# Patient Record
Sex: FEMALE | Race: OTHER | HISPANIC OR LATINO | URBAN - METROPOLITAN AREA
[De-identification: names, ages, dates, MRNs, and addresses within clinical notes are randomized per-mention and may not be internally consistent; named-entity substitution may affect disease eponyms.]

---

## 2017-03-01 ENCOUNTER — OUTPATIENT (OUTPATIENT)
Dept: OUTPATIENT SERVICES | Facility: HOSPITAL | Age: 57
LOS: 1 days | End: 2017-03-01
Payer: MEDICARE

## 2017-03-01 DIAGNOSIS — E11.9 TYPE 2 DIABETES MELLITUS WITHOUT COMPLICATIONS: ICD-10-CM

## 2017-03-01 DIAGNOSIS — R06.09 OTHER FORMS OF DYSPNEA: ICD-10-CM

## 2017-03-01 DIAGNOSIS — I35.0 NONRHEUMATIC AORTIC (VALVE) STENOSIS: ICD-10-CM

## 2017-03-01 PROCEDURE — A9505: CPT

## 2017-03-01 PROCEDURE — A9500: CPT

## 2017-03-01 PROCEDURE — 93017 CV STRESS TEST TRACING ONLY: CPT

## 2017-03-01 PROCEDURE — 78452 HT MUSCLE IMAGE SPECT MULT: CPT

## 2017-04-10 PROBLEM — Z00.00 ENCOUNTER FOR PREVENTIVE HEALTH EXAMINATION: Status: ACTIVE | Noted: 2017-04-10

## 2017-04-14 VITALS — BODY MASS INDEX: 38.45 KG/M2 | HEIGHT: 63 IN | WEIGHT: 217 LBS

## 2017-04-14 PROBLEM — Z82.49 FAMILY HISTORY OF HEART ATTACK: Status: ACTIVE | Noted: 2017-04-14

## 2017-04-14 PROBLEM — Z86.79 HISTORY OF HYPERTENSION: Status: RESOLVED | Noted: 2017-04-14 | Resolved: 2017-04-14

## 2017-04-14 PROBLEM — Z87.09 HISTORY OF ASTHMA: Status: RESOLVED | Noted: 2017-04-14 | Resolved: 2017-04-14

## 2017-04-14 PROBLEM — Z82.3 FAMILY HISTORY OF STROKE: Status: ACTIVE | Noted: 2017-04-14

## 2017-04-14 PROBLEM — Z87.19 HISTORY OF ESOPHAGEAL REFLUX: Status: RESOLVED | Noted: 2017-04-14 | Resolved: 2017-04-14

## 2017-04-14 PROBLEM — E66.9 OBESITY (BMI 30-39.9): Status: RESOLVED | Noted: 2017-04-14 | Resolved: 2017-04-14

## 2017-04-14 RX ORDER — ASPIRIN ENTERIC COATED TABLETS 81 MG 81 MG/1
81 TABLET, DELAYED RELEASE ORAL
Qty: 30 | Refills: 1 | Status: ACTIVE | COMMUNITY

## 2017-04-19 ENCOUNTER — APPOINTMENT (OUTPATIENT)
Dept: CARDIOTHORACIC SURGERY | Facility: CLINIC | Age: 57
End: 2017-04-19

## 2017-04-19 ENCOUNTER — OUTPATIENT (OUTPATIENT)
Dept: OUTPATIENT SERVICES | Facility: HOSPITAL | Age: 57
LOS: 1 days | End: 2017-04-19
Payer: MEDICARE

## 2017-04-19 VITALS
DIASTOLIC BLOOD PRESSURE: 60 MMHG | WEIGHT: 213 LBS | RESPIRATION RATE: 18 BRPM | TEMPERATURE: 97.8 F | SYSTOLIC BLOOD PRESSURE: 110 MMHG | HEART RATE: 80 BPM | HEIGHT: 63 IN | BODY MASS INDEX: 37.74 KG/M2 | OXYGEN SATURATION: 97 %

## 2017-04-19 DIAGNOSIS — Z87.19 PERSONAL HISTORY OF OTHER DISEASES OF THE DIGESTIVE SYSTEM: ICD-10-CM

## 2017-04-19 DIAGNOSIS — Z86.79 PERSONAL HISTORY OF OTHER DISEASES OF THE CIRCULATORY SYSTEM: ICD-10-CM

## 2017-04-19 DIAGNOSIS — I35.0 NONRHEUMATIC AORTIC (VALVE) STENOSIS: ICD-10-CM

## 2017-04-19 DIAGNOSIS — Z82.3 FAMILY HISTORY OF STROKE: ICD-10-CM

## 2017-04-19 DIAGNOSIS — E66.9 OBESITY, UNSPECIFIED: ICD-10-CM

## 2017-04-19 DIAGNOSIS — Z87.09 PERSONAL HISTORY OF OTHER DISEASES OF THE RESPIRATORY SYSTEM: ICD-10-CM

## 2017-04-19 DIAGNOSIS — Z82.49 FAMILY HISTORY OF ISCHEMIC HEART DISEASE AND OTHER DISEASES OF THE CIRCULATORY SYSTEM: ICD-10-CM

## 2017-04-19 PROCEDURE — 93306 TTE W/DOPPLER COMPLETE: CPT

## 2017-04-19 PROCEDURE — 93306 TTE W/DOPPLER COMPLETE: CPT | Mod: 26

## 2017-04-20 PROBLEM — Z87.19 HISTORY OF DIVERTICULITIS OF COLON: Status: RESOLVED | Noted: 2017-04-20 | Resolved: 2017-04-20

## 2017-05-03 ENCOUNTER — FORM ENCOUNTER (OUTPATIENT)
Age: 57
End: 2017-05-03

## 2017-05-03 VITALS
TEMPERATURE: 97 F | OXYGEN SATURATION: 97 % | HEART RATE: 70 BPM | SYSTOLIC BLOOD PRESSURE: 140 MMHG | DIASTOLIC BLOOD PRESSURE: 80 MMHG | RESPIRATION RATE: 18 BRPM

## 2017-05-03 NOTE — H&P ADULT - PMH
Asthma    Diabetes mellitus    Diverticulitis    GERD (gastroesophageal reflux disease)    Hyperlipidemia    Hypertension    Obesity

## 2017-05-03 NOTE — H&P ADULT - PSH
Carpal tunnel syndrome    Status post small bowel resection Carpal tunnel syndrome    Status post laser lithotripsy of ureteral calculus    Status post small bowel resection    Uterine fibroid

## 2017-05-03 NOTE — H&P ADULT - RS GEN PE MLT RESP DETAILS PC
good air movement/clear to auscultation bilaterally/breath sounds equal/airway patent/normal/respirations non-labored

## 2017-05-03 NOTE — H&P ADULT - FAMILY HISTORY
Mother  Still living? Unknown  Family history of atrial fibrillation, Age at diagnosis: Age Unknown  Family history of cardiac pacemaker, Age at diagnosis: Age Unknown  Family history of asthma, Age at diagnosis: Age Unknown  Family history of MI (myocardial infarction), Age at diagnosis: Age Unknown  Family history of pulmonary embolism, Age at diagnosis: Age Unknown

## 2017-05-03 NOTE — H&P ADULT - HISTORY OF PRESENT ILLNESS
57 y.o Obese Female, Current smoker,  with PMHx of HTN, dyslipidemia, NIDDM, Aortic stenosis,     who presented to her cardiologist c/o       She denies experiencing any CP, SOB, palpitations, dizziness, syncope, or decrease in exercise tolerance.      In light of pt's risk factors, above CCS Anginal Class 3 Equivalent symptoms, pt is now referred to [facility] for recommended Right and Left Heart Cath to ****SKELETON:        57 y.o Obese Female, Current smoker,  with FHx of MI/CVA,  PMHx significant for HTN, dyslipidemia, NIDDM, Asthma (denies hospitalizations/intubations   ????), GERD, Diverticulitis,  Aortic stenosis, who presented to her cardiologist Dr Islas for  c/o recent decline in her exercise tolerance due to SOB and back pain.  Pt states she is able to ambulate no more than one city block before she is winded and fatigue and finds the need to stop and rest for awhile.   Symptoms are relieved with rest.  She denies experiencing any CP, recent PND/orthopnea, palpitations, dizziness, or syncope.  Echo 11/05/16 revealed  mild LVH, impaired relaxation with elevated LA pressure, moderate to severe AS, ARAMIS of 0.6cm with mean gradient of 31mmHg and a peak gradient of 36mmHg, LVEF of 65-70%.  Pt was referred to Dr. Hanson for surgical valuation of aortic stenosis.  Nuclear Stress test done 03/01/17 revealed     In light of pt's risk factors, above CCS Anginal Class 3 Equivalent symptoms, pt is now referred to [facility] for recommended Right and Left Heart Cath to evaluate coronaries and aortic valve in the setting of risk stratification for upcoming AVR. ****SKELETON:        57 y.o Obese Female, Current smoker,  with FHx of MI/CVA,  PMHx significant for HTN, dyslipidemia, NIDDM, Asthma (denies hospitalizations/intubations   ????), GERD, Diverticulitis,  Aortic stenosis, who presented to her cardiologist Dr Islas for  c/o recent decline in her exercise tolerance due to SOB and back pain.  Pt states she is able to ambulate no more than one city block before she is winded and fatigue and finds the need to stop and rest for awhile.   Symptoms are relieved with rest.  She denies experiencing any CP, recent PND/orthopnea, palpitations, dizziness, or syncope.  Echo 11/05/16 revealed  mild LVH, impaired relaxation with elevated LA pressure, moderate to severe AS, ARAMIS of 0.6cm with mean gradient of 31mmHg and a peak gradient of 36mmHg, LVEF of 65-70%.  Pt was referred to Dr. Hanson for surgical valuation of aortic stenosis.  As per MD note, Nuclear Stress test done 03/01/17 was negative for ischemia.      In light of pt's risk factors, above CCS Anginal Class 3 Equivalent symptoms,   and moderate to Severe AS, pt is now referred to [facility] for recommended Right and Left Heart Cath to evaluate coronaries and aortic valve in the setting of risk stratification for upcoming AVR. PRE OP: DR. YIP:  AORTIC STENOSIS  57 y.o Obese Female, Current smoker,  with FHx of MI/CVA, with a PMHx significant for HTN, dyslipidemia, NIDDM, Asthma (denies hospitalizations/intubations), GERD, Diverticulitis,  moderate to severe Aortic stenosis who presented to her cardiologist Dr Islas for c/o recent decline in her exercise tolerance due to SOB and back pain.  Pt states she is able to ambulate no more than one city block before she is winded and fatigue and finds the need to stop and rest for a while.  At times with heavy exertion her SOB is associated with "pounding palpitations" and dizziness (such as walking uphill, dancing or brisk exertion).  She denies experiencing any CP, recent PND/orthopnea, palpitations, or syncope.  Echo 11/05/16 revealed  mild LVH, impaired relaxation with elevated LA pressure, moderate to severe AS, ARAMIS of 0.6cm with mean gradient of 31mmHg and a peak gradient of 36mmHg, LVEF of 65-70%.  Pt was referred to Dr. Yip for surgical valuation of aortic stenosis.  Pharmacological NST (3/1/2017) was negative for ischemic, but there was a 20 mmHG drop in systolic BP during peak exercise. Pt has been evaluated by Dr. Yip with recommendation for Right and Left heart catheterization to evaluate coronaries and aortic valve in the setting of risk stratification for upcoming AVR. PRE OP: DR. YIP:  AORTIC STENOSIS  57 y.o Obese Female, Current smoker,  with FHx of MI/CVA, with a PMHx significant for HTN, dyslipidemia, NIDDM, Asthma (denies hospitalizations/intubations), GERD, Diverticulitis,  moderate to severe Aortic stenosis who presented to her cardiologist Dr Islas for c/o recent decline in her exercise tolerance due to SOB and back pain.  Pt states she is able to ambulate no more than one city block before she is winded and fatigue and finds the need to stop and rest for a while.  At times with heavy exertion her SOB is associated with "pounding palpitations" and dizziness (such as walking uphill, dancing or brisk exertion).  She denies experiencing any CP, recent PND/orthopnea, palpitations, or syncope.  Echo 11/05/16 revealed  mild LVH, impaired relaxation with elevated LA pressure, moderate to severe AS, ARAMIS of 0.6cm with mean gradient of 31mmHg and a peak gradient of 36mmHg, LVEF of 65-70%.  Pt was referred to Dr. Yip for surgical valuation of aortic stenosis.  Pharmacological NST (3/1/2017) was negative for ischemic, but there was a 20 mmHG drop in systolic BP during peak exercise. Pt has been evaluated by Dr. Yip with recommendation for Right and Left heart catheterization to evaluate coronaries and aortic valve in the setting of risk stratification for upcoming AVR, CCS Angina Class III Equivalent symptoms and patient's risk factors .     *Of note upon arrival to cath lab; pt with headache since this AM. Pt attributes headache to no coffee caffeine this morning. No focal deficits noted; Given Tylenol 650 mg orally x one dose pre procedure. PRE OP: DR. YIP:  AORTIC STENOSIS  57 y.o Obese Female, Current smoker,  with FHx of MI/CVA, with a PMHx significant for HTN, dyslipidemia, NIDDM, Asthma (denies hospitalizations/intubations), GERD, Diverticulitis,  moderate to severe Aortic stenosis who presented to her cardiologist Dr Islas for c/o recent decline in her exercise tolerance due to SOB and back pain.  Pt states she is able to ambulate no more than one city block before she is winded and fatigue and finds the need to stop and rest for a while.  At times with heavy exertion her SOB is associated with "pounding palpitations" and dizziness (such as walking uphill, dancing or brisk exertion).  She denies experiencing any CP, recent PND/orthopnea, palpitations, or syncope.  Echo 11/05/16 revealed  mild LVH, impaired relaxation with elevated LA pressure, moderate to severe AS, ARAMIS of 0.6cm with mean gradient of 31mmHg and a peak gradient of 36mmHg, LVEF of 65-70%.  Pt was referred to Dr. Yip for surgical valuation of aortic stenosis.  Pharmacological NST (3/1/2017) was negative for ischemic, but there was a 20 mmHG drop in systolic BP during peak exercise. Pt has been evaluated by Dr. Yip with recommendation for Right and Left heart catheterization to evaluate coronaries and aortic valve in the setting of risk stratification for upcoming AVR, CCS Angina Class III Equivalent symptoms and patient's risk factors .     *Of note upon arrival to cath lab; pt with headache since this AM. Pt attributes headache to no coffee caffeine this morning. No focal deficits noted; Given Tylenol 650 mg orally x one dose pre procedure.   *Pre Op: PFTS and CXR completed; awaiting Carotid US PRE OP: DR. YIP:  AORTIC STENOSIS  57 y.o Obese Female, Current smoker,  with FHx of MI/CVA, with a PMHx significant for HTN, dyslipidemia, NIDDM, Asthma (denies hospitalizations/intubations), GERD, Diverticulitis,  moderate to severe Aortic stenosis who presented to her cardiologist Dr Islas for c/o recent decline in her exercise tolerance due to SOB and back pain.  Pt states she is able to ambulate no more than one city block before she is winded and fatigue and finds the need to stop and rest for a while.  At times with heavy exertion her SOB is associated with "pounding palpitations" and dizziness (such as walking uphill, dancing or brisk exertion).  She denies experiencing any CP, recent PND/orthopnea, palpitations, or syncope.  Echo 11/05/16 revealed  mild LVH, impaired relaxation with elevated LA pressure, moderate to severe AS, ARAMIS of 0.6cm with mean gradient of 31mmHg and a peak gradient of 36mmHg, LVEF of 65-70%.  Pt was referred to Dr. Yip for surgical valuation of aortic stenosis.  Pharmacological NST (3/1/2017) was negative for ischemic, but there was a 20 mmHG drop in systolic BP during peak exercise. Pt has been evaluated by Dr. Yip with recommendation for Right and Left heart catheterization to evaluate coronaries and aortic valve in the setting of risk stratification for upcoming AVR, CCS Angina Class III Equivalent symptoms and patient's risk factors .     *Of note upon arrival to cath lab; pt with headache since this AM. Pt attributes headache to no coffee caffeine this morning. No focal deficits noted; Given Tylenol 650 mg orally x one dose pre procedure. Fingerstick 92   *Pre Op: PFTS and CXR completed; awaiting Carotid US PRE OP: DR. YIP:  AORTIC STENOSIS  57 y.o Obese Female, Current smoker,  with FHx of MI/CVA, with a PMHx significant for HTN, dyslipidemia, NIDDM, Asthma (denies hospitalizations/intubations), GERD, Diverticulitis,  moderate to severe Aortic stenosis who presented to her cardiologist Dr Islas for c/o recent decline in her exercise tolerance due to SOB and back pain.  Pt states she is able to ambulate no more than one city block before she is winded and fatigue and finds the need to stop and rest for a while.  At times with heavy exertion her SOB is associated with "pounding palpitations" and dizziness (such as walking uphill, dancing or brisk exertion).  She denies experiencing any CP, recent PND/orthopnea, palpitations, or syncope.  Echo 11/05/16 revealed  mild LVH, impaired relaxation with elevated LA pressure, moderate to severe AS, ARAMIS of 0.6cm with mean gradient of 31mmHg and a peak gradient of 36mmHg, LVEF of 65-70%.  Pt was referred to Dr. Yip for surgical valuation of aortic stenosis.  Pharmacological NST (3/1/2017) was negative for ischemic, but there was a 20 mmHG drop in systolic BP during peak exercise. Pt has been evaluated by Dr. Yip with recommendation for Right and Left heart catheterization to evaluate coronaries and aortic valve in the setting of risk stratification for upcoming AVR, CCS Angina Class III Equivalent symptoms and patient's risk factors .     *Of note upon arrival to cath lab; pt with headache since this AM. Pt attributes headache to no coffee caffeine this morning. No focal deficits noted; Given Tylenol 650 mg orally x one dose pre procedure. Fingerstick 92   *Pre Op: PFTS and CXR completed; awaiting Carotid US  *No ASA/Plavix load prior to procedure: pre op: Severe AS

## 2017-05-03 NOTE — H&P ADULT - ASSESSMENT
57 y.o Obese Female, Current smoker,  with FHx of MI/CVA, with a PMHx significant for HTN, dyslipidemia, NIDDM, Asthma (denies hospitalizations/intubations), GERD, Diverticulitis,  moderate to severe Aortic stenosis presents for right and left heart catheterization due to need for  risk stratification for upcoming AVR, CCS Angina Class III Equivalent symptoms and patient's risk factors.  -No ASA/Plavix load administered in the setting of Pre OP work up  -PFTs and CXR performed. Awaiting Carotid US prior to cath this afternoon.   *Of note upon arrival to cath lab; pt with headache since this AM. Pt attributes headache to no coffee caffeine this morning. No focal deficits noted; Given Tylenol 650 mg orally x one dose pre procedure. 57 y.o Obese Female, Current smoker,  with FHx of MI/CVA, with a PMHx significant for HTN, dyslipidemia, NIDDM, Asthma (denies hospitalizations/intubations), GERD, Diverticulitis,  moderate to severe Aortic stenosis presents for right and left heart catheterization due to need for  risk stratification for upcoming AVR, CCS Angina Class III Equivalent symptoms and patient's risk factors.  -No ASA/Plavix load administered in the setting of Pre OP work up  -PFTs and CXR performed. Awaiting Carotid US prior to cath this afternoon.   *Of note upon arrival to cath lab; pt with headache since this AM. Pt attributes headache to no coffee caffeine this morning. No focal deficits noted; Given Tylenol 650 mg orally x one dose pre procedure.     Risks & benefits of procedure and alternative therapy have been explained to the patient including but not limited to: allergic reaction, bleeding w/possible need for blood transfusion, infection, renal and vascular compromise, limb damage, arrhythmia, stroke, vessel dissection/perforation, Myocardial infarction, emergent CABG. Informed consent obtained and in chart.

## 2017-05-04 ENCOUNTER — OUTPATIENT (OUTPATIENT)
Dept: OUTPATIENT SERVICES | Facility: HOSPITAL | Age: 57
LOS: 1 days | Discharge: NURSING, REHAB OR LONG-TERM CARE | End: 2017-05-04
Payer: MEDICARE

## 2017-05-04 DIAGNOSIS — Z01.810 ENCOUNTER FOR PREPROCEDURAL CARDIOVASCULAR EXAMINATION: ICD-10-CM

## 2017-05-04 DIAGNOSIS — Z98.890 OTHER SPECIFIED POSTPROCEDURAL STATES: Chronic | ICD-10-CM

## 2017-05-04 DIAGNOSIS — I10 ESSENTIAL (PRIMARY) HYPERTENSION: ICD-10-CM

## 2017-05-04 DIAGNOSIS — E78.5 HYPERLIPIDEMIA, UNSPECIFIED: ICD-10-CM

## 2017-05-04 DIAGNOSIS — I34.0 NONRHEUMATIC MITRAL (VALVE) INSUFFICIENCY: ICD-10-CM

## 2017-05-04 DIAGNOSIS — F17.210 NICOTINE DEPENDENCE, CIGARETTES, UNCOMPLICATED: ICD-10-CM

## 2017-05-04 DIAGNOSIS — E11.9 TYPE 2 DIABETES MELLITUS WITHOUT COMPLICATIONS: ICD-10-CM

## 2017-05-04 DIAGNOSIS — Z90.49 ACQUIRED ABSENCE OF OTHER SPECIFIED PARTS OF DIGESTIVE TRACT: Chronic | ICD-10-CM

## 2017-05-04 DIAGNOSIS — D25.9 LEIOMYOMA OF UTERUS, UNSPECIFIED: Chronic | ICD-10-CM

## 2017-05-04 DIAGNOSIS — G56.00 CARPAL TUNNEL SYNDROME, UNSPECIFIED UPPER LIMB: Chronic | ICD-10-CM

## 2017-05-04 DIAGNOSIS — Z82.49 FAMILY HISTORY OF ISCHEMIC HEART DISEASE AND OTHER DISEASES OF THE CIRCULATORY SYSTEM: ICD-10-CM

## 2017-05-04 DIAGNOSIS — R94.39 ABNORMAL RESULT OF OTHER CARDIOVASCULAR FUNCTION STUDY: ICD-10-CM

## 2017-05-04 LAB
ALBUMIN SERPL ELPH-MCNC: 3.7 G/DL — SIGNIFICANT CHANGE UP (ref 3.4–5)
ALP SERPL-CCNC: 36 U/L — LOW (ref 40–120)
ALT FLD-CCNC: 36 U/L — SIGNIFICANT CHANGE UP (ref 12–42)
ANION GAP SERPL CALC-SCNC: 10 MMOL/L — SIGNIFICANT CHANGE UP (ref 9–16)
APTT BLD: 31.4 SEC — SIGNIFICANT CHANGE UP (ref 27.5–37.4)
AST SERPL-CCNC: 35 U/L — SIGNIFICANT CHANGE UP (ref 15–37)
BASOPHILS NFR BLD AUTO: 0.5 % — SIGNIFICANT CHANGE UP (ref 0–2)
BILIRUB SERPL-MCNC: 0.2 MG/DL — SIGNIFICANT CHANGE UP (ref 0.2–1.2)
BUN SERPL-MCNC: 14 MG/DL — SIGNIFICANT CHANGE UP (ref 7–23)
CALCIUM SERPL-MCNC: 9 MG/DL — SIGNIFICANT CHANGE UP (ref 8.5–10.5)
CHLORIDE SERPL-SCNC: 101 MMOL/L — SIGNIFICANT CHANGE UP (ref 96–108)
CHOLEST SERPL-MCNC: 196 MG/DL — SIGNIFICANT CHANGE UP
CK MB CFR SERPL CALC: 2.1 NG/ML — SIGNIFICANT CHANGE UP (ref 0.5–3.6)
CO2 SERPL-SCNC: 27 MMOL/L — SIGNIFICANT CHANGE UP (ref 22–31)
CREAT SERPL-MCNC: 0.54 MG/DL — SIGNIFICANT CHANGE UP (ref 0.5–1.3)
CRP SERPL-MCNC: 0.36 MG/DL — HIGH
EOSINOPHIL NFR BLD AUTO: 2 % — SIGNIFICANT CHANGE UP (ref 0–6)
GLUCOSE SERPL-MCNC: 100 MG/DL — HIGH (ref 70–99)
HBA1C BLD-MCNC: 7.3 % — HIGH (ref 4.8–5.6)
HCT VFR BLD CALC: 42.4 % — SIGNIFICANT CHANGE UP (ref 34.5–45)
HDLC SERPL-MCNC: 40 MG/DL — SIGNIFICANT CHANGE UP
HGB BLD-MCNC: 14.2 G/DL — SIGNIFICANT CHANGE UP (ref 11.5–15.5)
INR BLD: 0.94 — SIGNIFICANT CHANGE UP (ref 0.88–1.16)
LIPID PNL WITH DIRECT LDL SERPL: 53 MG/DL — SIGNIFICANT CHANGE UP
LYMPHOCYTES # BLD AUTO: 37.3 % — SIGNIFICANT CHANGE UP (ref 13–44)
MCHC RBC-ENTMCNC: 29 PG — SIGNIFICANT CHANGE UP (ref 27–34)
MCHC RBC-ENTMCNC: 33.5 G/DL — SIGNIFICANT CHANGE UP (ref 32–36)
MCV RBC AUTO: 86.5 FL — SIGNIFICANT CHANGE UP (ref 80–100)
MONOCYTES NFR BLD AUTO: 6.4 % — SIGNIFICANT CHANGE UP (ref 2–14)
NEUTROPHILS NFR BLD AUTO: 53.8 % — SIGNIFICANT CHANGE UP (ref 43–77)
PLATELET # BLD AUTO: 251 K/UL — SIGNIFICANT CHANGE UP (ref 150–400)
POTASSIUM SERPL-MCNC: 4 MMOL/L — SIGNIFICANT CHANGE UP (ref 3.5–5.3)
POTASSIUM SERPL-SCNC: 4 MMOL/L — SIGNIFICANT CHANGE UP (ref 3.5–5.3)
PROT SERPL-MCNC: 8.1 G/DL — SIGNIFICANT CHANGE UP (ref 6.4–8.2)
PROTHROM AB SERPL-ACNC: 10.4 SEC — SIGNIFICANT CHANGE UP (ref 9.8–12.7)
RBC # BLD: 4.9 M/UL — SIGNIFICANT CHANGE UP (ref 3.8–5.2)
RBC # FLD: 13.3 % — SIGNIFICANT CHANGE UP (ref 10.3–16.9)
SODIUM SERPL-SCNC: 138 MMOL/L — SIGNIFICANT CHANGE UP (ref 135–145)
TOTAL CHOLESTEROL/HDL RATIO MEASUREMENT: 4.9 RATIO — SIGNIFICANT CHANGE UP
TRIGL SERPL-MCNC: 513 MG/DL — HIGH
WBC # BLD: 8.6 K/UL — SIGNIFICANT CHANGE UP (ref 3.8–10.5)
WBC # FLD AUTO: 8.6 K/UL — SIGNIFICANT CHANGE UP (ref 3.8–10.5)

## 2017-05-04 PROCEDURE — 93454 CORONARY ARTERY ANGIO S&I: CPT | Mod: 26

## 2017-05-04 PROCEDURE — 83036 HEMOGLOBIN GLYCOSYLATED A1C: CPT

## 2017-05-04 PROCEDURE — 93005 ELECTROCARDIOGRAM TRACING: CPT

## 2017-05-04 PROCEDURE — 86140 C-REACTIVE PROTEIN: CPT

## 2017-05-04 PROCEDURE — C1889: CPT

## 2017-05-04 PROCEDURE — 93880 EXTRACRANIAL BILAT STUDY: CPT

## 2017-05-04 PROCEDURE — 82550 ASSAY OF CK (CPK): CPT

## 2017-05-04 PROCEDURE — 94010 BREATHING CAPACITY TEST: CPT | Mod: 26

## 2017-05-04 PROCEDURE — C1894: CPT

## 2017-05-04 PROCEDURE — C1769: CPT

## 2017-05-04 PROCEDURE — 71010: CPT | Mod: 26

## 2017-05-04 PROCEDURE — 80061 LIPID PANEL: CPT

## 2017-05-04 PROCEDURE — 85610 PROTHROMBIN TIME: CPT

## 2017-05-04 PROCEDURE — 36415 COLL VENOUS BLD VENIPUNCTURE: CPT

## 2017-05-04 PROCEDURE — 93010 ELECTROCARDIOGRAM REPORT: CPT

## 2017-05-04 PROCEDURE — 85730 THROMBOPLASTIN TIME PARTIAL: CPT

## 2017-05-04 PROCEDURE — 93454 CORONARY ARTERY ANGIO S&I: CPT

## 2017-05-04 PROCEDURE — 82553 CREATINE MB FRACTION: CPT

## 2017-05-04 PROCEDURE — 85025 COMPLETE CBC W/AUTO DIFF WBC: CPT

## 2017-05-04 PROCEDURE — 93880 EXTRACRANIAL BILAT STUDY: CPT | Mod: 26

## 2017-05-04 PROCEDURE — 80053 COMPREHEN METABOLIC PANEL: CPT

## 2017-05-04 PROCEDURE — 71045 X-RAY EXAM CHEST 1 VIEW: CPT

## 2017-05-04 PROCEDURE — C1887: CPT

## 2017-05-04 RX ORDER — CHLORHEXIDINE GLUCONATE 213 G/1000ML
1 SOLUTION TOPICAL ONCE
Qty: 0 | Refills: 0 | Status: DISCONTINUED | OUTPATIENT
Start: 2017-05-04 | End: 2017-05-04

## 2017-05-04 RX ORDER — DEXTROSE 50 % IN WATER 50 %
1 SYRINGE (ML) INTRAVENOUS ONCE
Qty: 0 | Refills: 0 | Status: DISCONTINUED | OUTPATIENT
Start: 2017-05-04 | End: 2017-05-04

## 2017-05-04 RX ORDER — ACETAMINOPHEN 500 MG
650 TABLET ORAL ONCE
Qty: 0 | Refills: 0 | Status: COMPLETED | OUTPATIENT
Start: 2017-05-04 | End: 2017-05-04

## 2017-05-04 RX ORDER — SODIUM CHLORIDE 9 MG/ML
1000 INJECTION, SOLUTION INTRAVENOUS
Qty: 0 | Refills: 0 | Status: DISCONTINUED | OUTPATIENT
Start: 2017-05-04 | End: 2017-05-04

## 2017-05-04 RX ORDER — SODIUM CHLORIDE 9 MG/ML
500 INJECTION INTRAMUSCULAR; INTRAVENOUS; SUBCUTANEOUS
Qty: 0 | Refills: 0 | Status: DISCONTINUED | OUTPATIENT
Start: 2017-05-04 | End: 2017-05-04

## 2017-05-04 RX ORDER — GLUCAGON INJECTION, SOLUTION 0.5 MG/.1ML
1 INJECTION, SOLUTION SUBCUTANEOUS ONCE
Qty: 0 | Refills: 0 | Status: DISCONTINUED | OUTPATIENT
Start: 2017-05-04 | End: 2017-05-04

## 2017-05-04 RX ORDER — DEXTROSE 50 % IN WATER 50 %
12.5 SYRINGE (ML) INTRAVENOUS ONCE
Qty: 0 | Refills: 0 | Status: DISCONTINUED | OUTPATIENT
Start: 2017-05-04 | End: 2017-05-04

## 2017-05-04 RX ORDER — DEXTROSE 50 % IN WATER 50 %
25 SYRINGE (ML) INTRAVENOUS ONCE
Qty: 0 | Refills: 0 | Status: DISCONTINUED | OUTPATIENT
Start: 2017-05-04 | End: 2017-05-04

## 2017-05-04 RX ORDER — INSULIN LISPRO 100/ML
VIAL (ML) SUBCUTANEOUS ONCE
Qty: 0 | Refills: 0 | Status: DISCONTINUED | OUTPATIENT
Start: 2017-05-04 | End: 2017-05-04

## 2017-05-04 RX ADMIN — Medication 650 MILLIGRAM(S): at 17:39

## 2017-05-04 NOTE — PROGRESS NOTE ADULT - SUBJECTIVE AND OBJECTIVE BOX
Interventional Cardiology PA SDA Discharge Note    Patient without complaints.   Afebrile, VSS    Ext:    		Right Groin:      no hematoma,    no bruit, dressing; C/D/I      Pulses:    intact DP/PT to baseline     A/P:  57 y.o Obese Female, Current smoker,  with FHx of MI/CVA, with a PMHx significant for HTN, dyslipidemia, NIDDM, Asthma (denies hospitalizations/intubations), GERD, Diverticulitis,  moderate to severe Aortic stenosis presents for right and left heart catheterization due to need for  risk stratification for upcoming AVR, CCS Angina Class III Equivalent symptoms and patient's risk factors.    cath revealed: LM -normal, LAD 30% m, Cx normal, RCA moderate Ca + 80%, LV not done.         CTS consult with Dr Hanson as an outpatient.         1.	Stable for discharge as per attending . _____Khris____ after bed rest, pt voids, groin/wrist stable and 30 minutes of ambulation.  2.	Follow-up with PMD/Cardiologist _____Margie______ in 1-2 weeks  3.	Discharged forms signed and copies in chart

## 2017-05-08 PROBLEM — E11.9 TYPE 2 DIABETES MELLITUS WITHOUT COMPLICATIONS: Chronic | Status: ACTIVE | Noted: 2017-05-03

## 2017-05-08 PROBLEM — E78.5 HYPERLIPIDEMIA, UNSPECIFIED: Chronic | Status: ACTIVE | Noted: 2017-05-03

## 2017-05-08 PROBLEM — J45.909 UNSPECIFIED ASTHMA, UNCOMPLICATED: Chronic | Status: ACTIVE | Noted: 2017-05-03

## 2017-05-08 PROBLEM — I10 ESSENTIAL (PRIMARY) HYPERTENSION: Chronic | Status: ACTIVE | Noted: 2017-05-03

## 2017-05-08 PROBLEM — K21.9 GASTRO-ESOPHAGEAL REFLUX DISEASE WITHOUT ESOPHAGITIS: Chronic | Status: ACTIVE | Noted: 2017-05-03

## 2017-05-10 ENCOUNTER — APPOINTMENT (OUTPATIENT)
Dept: CARDIOTHORACIC SURGERY | Facility: CLINIC | Age: 57
End: 2017-05-10

## 2017-05-10 ENCOUNTER — OUTPATIENT (OUTPATIENT)
Dept: OUTPATIENT SERVICES | Facility: HOSPITAL | Age: 57
LOS: 1 days | End: 2017-05-10
Payer: MEDICARE

## 2017-05-10 VITALS
BODY MASS INDEX: 37.55 KG/M2 | WEIGHT: 212 LBS | HEART RATE: 74 BPM | SYSTOLIC BLOOD PRESSURE: 117 MMHG | DIASTOLIC BLOOD PRESSURE: 67 MMHG | RESPIRATION RATE: 18 BRPM | OXYGEN SATURATION: 95 % | TEMPERATURE: 98.5 F

## 2017-05-10 DIAGNOSIS — Z90.49 ACQUIRED ABSENCE OF OTHER SPECIFIED PARTS OF DIGESTIVE TRACT: Chronic | ICD-10-CM

## 2017-05-10 DIAGNOSIS — D25.9 LEIOMYOMA OF UTERUS, UNSPECIFIED: Chronic | ICD-10-CM

## 2017-05-10 DIAGNOSIS — I34.0 NONRHEUMATIC MITRAL (VALVE) INSUFFICIENCY: ICD-10-CM

## 2017-05-10 DIAGNOSIS — Z98.890 OTHER SPECIFIED POSTPROCEDURAL STATES: Chronic | ICD-10-CM

## 2017-05-10 DIAGNOSIS — G56.00 CARPAL TUNNEL SYNDROME, UNSPECIFIED UPPER LIMB: Chronic | ICD-10-CM

## 2017-05-10 LAB
ALBUMIN SERPL ELPH-MCNC: 3.6 G/DL — SIGNIFICANT CHANGE UP (ref 3.4–5)
ALP SERPL-CCNC: 38 U/L — LOW (ref 40–120)
ALT FLD-CCNC: 35 U/L — SIGNIFICANT CHANGE UP (ref 12–42)
ANION GAP SERPL CALC-SCNC: 8 MMOL/L — LOW (ref 9–16)
APPEARANCE UR: CLEAR — SIGNIFICANT CHANGE UP
APTT BLD: 36.2 SEC — SIGNIFICANT CHANGE UP (ref 27.5–37.4)
AST SERPL-CCNC: 22 U/L — SIGNIFICANT CHANGE UP (ref 15–37)
BACTERIA # UR AUTO: PRESENT /HPF
BASOPHILS NFR BLD AUTO: 0.3 % — SIGNIFICANT CHANGE UP (ref 0–2)
BILIRUB SERPL-MCNC: 0.2 MG/DL — SIGNIFICANT CHANGE UP (ref 0.2–1.2)
BILIRUB UR-MCNC: NEGATIVE — SIGNIFICANT CHANGE UP
BUN SERPL-MCNC: 13 MG/DL — SIGNIFICANT CHANGE UP (ref 7–23)
CALCIUM SERPL-MCNC: 9.4 MG/DL — SIGNIFICANT CHANGE UP (ref 8.5–10.5)
CHLORIDE SERPL-SCNC: 102 MMOL/L — SIGNIFICANT CHANGE UP (ref 96–108)
CHOLEST SERPL-MCNC: 191 MG/DL — SIGNIFICANT CHANGE UP
CO2 SERPL-SCNC: 28 MMOL/L — SIGNIFICANT CHANGE UP (ref 22–31)
COLOR SPEC: YELLOW — SIGNIFICANT CHANGE UP
CREAT SERPL-MCNC: 0.54 MG/DL — SIGNIFICANT CHANGE UP (ref 0.5–1.3)
DIFF PNL FLD: NEGATIVE — SIGNIFICANT CHANGE UP
EOSINOPHIL NFR BLD AUTO: 1.6 % — SIGNIFICANT CHANGE UP (ref 0–6)
EPI CELLS # UR: SIGNIFICANT CHANGE UP /HPF
GLUCOSE SERPL-MCNC: 125 MG/DL — HIGH (ref 70–99)
GLUCOSE UR QL: NEGATIVE — SIGNIFICANT CHANGE UP
HBA1C BLD-MCNC: 7.4 % — HIGH (ref 4.8–5.6)
HBV SURFACE AG SER-ACNC: SIGNIFICANT CHANGE UP
HCT VFR BLD CALC: 43.3 % — SIGNIFICANT CHANGE UP (ref 34.5–45)
HDLC SERPL-MCNC: 42 MG/DL — SIGNIFICANT CHANGE UP
HGB BLD-MCNC: 14.2 G/DL — SIGNIFICANT CHANGE UP (ref 11.5–15.5)
INR BLD: 0.96 — SIGNIFICANT CHANGE UP (ref 0.88–1.16)
KETONES UR-MCNC: NEGATIVE — SIGNIFICANT CHANGE UP
LEUKOCYTE ESTERASE UR-ACNC: NEGATIVE — SIGNIFICANT CHANGE UP
LIPID PNL WITH DIRECT LDL SERPL: 49 MG/DL — SIGNIFICANT CHANGE UP
LYMPHOCYTES # BLD AUTO: 37 % — SIGNIFICANT CHANGE UP (ref 13–44)
MCHC RBC-ENTMCNC: 28.5 PG — SIGNIFICANT CHANGE UP (ref 27–34)
MCHC RBC-ENTMCNC: 32.8 G/DL — SIGNIFICANT CHANGE UP (ref 32–36)
MCV RBC AUTO: 86.9 FL — SIGNIFICANT CHANGE UP (ref 80–100)
MONOCYTES NFR BLD AUTO: 5.4 % — SIGNIFICANT CHANGE UP (ref 2–14)
NEUTROPHILS NFR BLD AUTO: 55.7 % — SIGNIFICANT CHANGE UP (ref 43–77)
NITRITE UR-MCNC: POSITIVE
PH UR: 6 — SIGNIFICANT CHANGE UP (ref 5–8)
PLATELET # BLD AUTO: 300 K/UL — SIGNIFICANT CHANGE UP (ref 150–400)
POTASSIUM SERPL-MCNC: 4.1 MMOL/L — SIGNIFICANT CHANGE UP (ref 3.5–5.3)
POTASSIUM SERPL-SCNC: 4.1 MMOL/L — SIGNIFICANT CHANGE UP (ref 3.5–5.3)
PROT SERPL-MCNC: 7.7 G/DL — SIGNIFICANT CHANGE UP (ref 6.4–8.2)
PROT UR-MCNC: 30 MG/DL
PROTHROM AB SERPL-ACNC: 10.7 SEC — SIGNIFICANT CHANGE UP (ref 9.8–12.7)
RBC # BLD: 4.98 M/UL — SIGNIFICANT CHANGE UP (ref 3.8–5.2)
RBC # FLD: 13.6 % — SIGNIFICANT CHANGE UP (ref 10.3–16.9)
RBC CASTS # UR COMP ASSIST: < 5 /HPF — SIGNIFICANT CHANGE UP
SODIUM SERPL-SCNC: 138 MMOL/L — SIGNIFICANT CHANGE UP (ref 135–145)
SP GR SPEC: >=1.03 — SIGNIFICANT CHANGE UP (ref 1–1.03)
TOTAL CHOLESTEROL/HDL RATIO MEASUREMENT: 4.5 RATIO — SIGNIFICANT CHANGE UP
TRIGL SERPL-MCNC: 501 MG/DL — HIGH
TSH SERPL-MCNC: 2.49 UIU/ML — SIGNIFICANT CHANGE UP (ref 0.35–4.94)
UROBILINOGEN FLD QL: 0.2 E.U./DL — SIGNIFICANT CHANGE UP
WBC # BLD: 8.7 K/UL — SIGNIFICANT CHANGE UP (ref 3.8–10.5)
WBC # FLD AUTO: 8.7 K/UL — SIGNIFICANT CHANGE UP (ref 3.8–10.5)
WBC UR QL: < 5 /HPF — SIGNIFICANT CHANGE UP

## 2017-05-19 VITALS
SYSTOLIC BLOOD PRESSURE: 117 MMHG | WEIGHT: 211.86 LBS | HEIGHT: 63 IN | TEMPERATURE: 98 F | RESPIRATION RATE: 18 BRPM | DIASTOLIC BLOOD PRESSURE: 67 MMHG | HEART RATE: 74 BPM | OXYGEN SATURATION: 95 %

## 2017-05-19 RX ORDER — METFORMIN HYDROCHLORIDE 850 MG/1
1 TABLET ORAL
Qty: 0 | Refills: 0 | COMMUNITY

## 2017-05-19 RX ORDER — METFORMIN HYDROCHLORIDE 850 MG/1
2 TABLET ORAL
Qty: 0 | Refills: 0 | COMMUNITY

## 2017-05-19 NOTE — H&P ADULT - ASSESSMENT
56 yo female with a history of HTN, HLD, obesity, current smoker, Asthma who presents with severe AS and single vessel CAD.  Dr. Hanson reviewed the cardiac cath images and echocardiogram images with the patient and her friend and discussed the case with Dr. Islas.  Dr. Hanson discussed the risks, benefits and alternatives to surgery and the various surgical approaches, minimally invasive versus sternotomy.    Risks include but not limited to death, heart attack, bleeding, stroke, kidney problems and infection.  Dr. Hanson quoted a 1-2% operative mortality and complication risk. Dr. Hanson feels the patient will benefit from and is a candidate for AVR w/bioprosthesis and CABG x 1. All questions were addressed and the patient agrees to proceed with surgery.     Plan:   PST  SDA 5/22  instructions provided re antibacterial showers and pt given 3 sponges

## 2017-05-19 NOTE — H&P ADULT - NSHPLABSRESULTS_GEN_ALL_CORE
Hgb A1C = 7.4  creat =0.54  hct= 43.3  hgb= 14.2  plt= 300  WBC= 8.7  INR= 0.96  tot alb= 3.6  tot bili= 0.2    TSH= 2.489    5/4/17 Chest xray: linear atelectasis in the left mid lung     5/4/17 EKG: SR, 72 bpm    5/4/17 Carotid US: no hemodynamically significant stenosis    5/4/17 PFT's: FEV1= 57%--moderate-severe COPD    4/19/17 Echo: EF 65-70%, moderate-severe AS, peak grad 63mmHg, mean grad 36mmHg, ARAMIS 0.6cm2    5/4/17 Cardiac Cath:30% mid LAD, 80% mid RCA Hgb A1C = 7.4  creat =0.54  hct= 43.3  hgb= 14.2  plt= 300  WBC= 8.7  INR= 0.96  tot alb= 3.6  tot bili= 0.2    TSH= 2.489    5/4/17 Chest xray: linear atelectasis in the left mid lung     5/4/17 EKG: SR, 72 bpm    5/4/17 Carotid US: no hemodynamically significant stenosis    5/4/17 PFT's: FEV1= 57%--moderate-severe COPD    4/19/17 Echo: EF 65-70%, moderate-severe AS, peak grad 63mmHg, mean grad 36mmHg, ARAMIS 0.6cm2    5/4/17 Cardiac Cath:30% mid LAD, 80% mid RCA    On the day of surgery, patient is not in acute distress.  She denies chest pain no shortness of breath.  She is NPO.  She took metoprolol the evening prior.  She took her lisinopril, ASA, diazepam, HCTZ on the AM of the surgery.

## 2017-05-19 NOTE — H&P ADULT - PSH
Carpal tunnel syndrome    Status post laser lithotripsy of ureteral calculus    Status post small bowel resection    Uterine fibroid

## 2017-05-19 NOTE — PATIENT PROFILE ADULT. - PSH
Carpal tunnel syndrome    Status post laser lithotripsy of ureteral calculus    Status post small bowel resection    Uterine fibroid Carpal tunnel syndrome    Status post laser lithotripsy of ureteral calculus    Status post small bowel resection    Uterine fibroid  removal

## 2017-05-19 NOTE — PATIENT PROFILE ADULT. - PMH
Asthma    Back injury  lumbar, work related  Diabetes mellitus    Diverticulitis    GERD (gastroesophageal reflux disease)    Hyperlipidemia    Hypertension    Kidney stone    Obesity

## 2017-05-19 NOTE — PATIENT PROFILE ADULT. - LONGEST PERIOD TOBACCO-FREE
smoker since 16 yrs old, 1.5 pack a day, trying to quit on her own and smoking 3-4 cigarettes a day currently

## 2017-05-19 NOTE — H&P ADULT - HISTORY OF PRESENT ILLNESS
57 y.o Obese Female, Current smoker,  with FHx of MI/CVA, with a PMHx significant for HTN, dyslipidemia, NIDDM, Asthma (denies hospitalizations/intubations), GERD, Diverticulitis,  moderate to severe Aortic stenosis who presented to her cardiologist Dr Islas for c/o recent decline in her exercise tolerance due to SOB and back pain.  Pt states she is able to ambulate no more than one city block before she is winded and fatigue and finds the need to stop and rest for a while.  At times with heavy exertion her SOB is associated with "pounding palpitations" and dizziness (such as walking uphill, dancing or brisk exertion).  She denies experiencing any CP, recent PND/orthopnea, palpitations, or syncope.  Echo 11/05/16 revealed  mild LVH, impaired relaxation with elevated LA pressure, moderate to severe AS, ARAMIS of 0.6cm with mean gradient of 31mmHg and a peak gradient of 36mmHg, LVEF of 65-70%.  Pt was referred to Dr. Hanson for surgical valuation of aortic stenosis.  Pharmacological NST (3/1/2017) was negative for ischemic, but there was a 20 mmHG drop in systolic BP during peak exercise. Cardiac cath 5/4/17 80% mid RCA stenosis. Patient was seen in the outpatient office by Dr. Hanson and now presents for elective surgery.

## 2017-05-22 ENCOUNTER — INPATIENT (INPATIENT)
Facility: HOSPITAL | Age: 57
LOS: 11 days | Discharge: HOME HEALTH | DRG: 219 | End: 2017-06-03
Attending: THORACIC SURGERY (CARDIOTHORACIC VASCULAR SURGERY) | Admitting: THORACIC SURGERY (CARDIOTHORACIC VASCULAR SURGERY)
Payer: MEDICARE

## 2017-05-22 ENCOUNTER — APPOINTMENT (OUTPATIENT)
Dept: CARDIOTHORACIC SURGERY | Facility: HOSPITAL | Age: 57
End: 2017-05-22

## 2017-05-22 ENCOUNTER — RESULT REVIEW (OUTPATIENT)
Age: 57
End: 2017-05-22

## 2017-05-22 DIAGNOSIS — D25.9 LEIOMYOMA OF UTERUS, UNSPECIFIED: Chronic | ICD-10-CM

## 2017-05-22 DIAGNOSIS — Z90.49 ACQUIRED ABSENCE OF OTHER SPECIFIED PARTS OF DIGESTIVE TRACT: Chronic | ICD-10-CM

## 2017-05-22 DIAGNOSIS — G56.00 CARPAL TUNNEL SYNDROME, UNSPECIFIED UPPER LIMB: Chronic | ICD-10-CM

## 2017-05-22 DIAGNOSIS — Z98.890 OTHER SPECIFIED POSTPROCEDURAL STATES: Chronic | ICD-10-CM

## 2017-05-22 PROBLEM — S39.92XA UNSPECIFIED INJURY OF LOWER BACK, INITIAL ENCOUNTER: Chronic | Status: ACTIVE | Noted: 2017-05-19

## 2017-05-22 PROBLEM — N20.0 CALCULUS OF KIDNEY: Chronic | Status: ACTIVE | Noted: 2017-05-19

## 2017-05-22 LAB
ALBUMIN SERPL ELPH-MCNC: 3.1 G/DL — LOW (ref 3.3–5)
ALP SERPL-CCNC: 31 U/L — LOW (ref 40–120)
ALT FLD-CCNC: 24 U/L — SIGNIFICANT CHANGE UP (ref 10–45)
ANION GAP SERPL CALC-SCNC: 15 MMOL/L — SIGNIFICANT CHANGE UP (ref 5–17)
ANION GAP SERPL CALC-SCNC: 16 MMOL/L — SIGNIFICANT CHANGE UP (ref 5–17)
ANION GAP SERPL CALC-SCNC: 17 MMOL/L — SIGNIFICANT CHANGE UP (ref 5–17)
APTT BLD: 24.9 SEC — LOW (ref 27.5–37.4)
APTT BLD: 36.7 SEC — SIGNIFICANT CHANGE UP (ref 27.5–37.4)
APTT BLD: 41.5 SEC — HIGH (ref 27.5–37.4)
AST SERPL-CCNC: 68 U/L — HIGH (ref 10–40)
BASE EXCESS BLDA CALC-SCNC: -0.7 MMOL/L — SIGNIFICANT CHANGE UP (ref -2–3)
BASE EXCESS BLDA CALC-SCNC: -2.7 MMOL/L — LOW (ref -2–3)
BILIRUB SERPL-MCNC: 0.5 MG/DL — SIGNIFICANT CHANGE UP (ref 0.2–1.2)
BLD GP AB SCN SERPL QL: NEGATIVE — SIGNIFICANT CHANGE UP
BUN SERPL-MCNC: 10 MG/DL — SIGNIFICANT CHANGE UP (ref 7–23)
BUN SERPL-MCNC: 12 MG/DL — SIGNIFICANT CHANGE UP (ref 7–23)
BUN SERPL-MCNC: 14 MG/DL — SIGNIFICANT CHANGE UP (ref 7–23)
CA-I BLDA-SCNC: 1.1 MMOL/L — SIGNIFICANT CHANGE UP (ref 1.1–1.3)
CALCIUM SERPL-MCNC: 7.8 MG/DL — LOW (ref 8.4–10.5)
CALCIUM SERPL-MCNC: 8.1 MG/DL — LOW (ref 8.4–10.5)
CALCIUM SERPL-MCNC: 8.4 MG/DL — SIGNIFICANT CHANGE UP (ref 8.4–10.5)
CHLORIDE SERPL-SCNC: 101 MMOL/L — SIGNIFICANT CHANGE UP (ref 96–108)
CHLORIDE SERPL-SCNC: 102 MMOL/L — SIGNIFICANT CHANGE UP (ref 96–108)
CHLORIDE SERPL-SCNC: 98 MMOL/L — SIGNIFICANT CHANGE UP (ref 96–108)
CO2 SERPL-SCNC: 20 MMOL/L — LOW (ref 22–31)
CO2 SERPL-SCNC: 22 MMOL/L — SIGNIFICANT CHANGE UP (ref 22–31)
CO2 SERPL-SCNC: 23 MMOL/L — SIGNIFICANT CHANGE UP (ref 22–31)
COHGB MFR BLDA: 2.7 % — HIGH
CREAT SERPL-MCNC: 0.5 MG/DL — SIGNIFICANT CHANGE UP (ref 0.5–1.3)
CREAT SERPL-MCNC: 0.6 MG/DL — SIGNIFICANT CHANGE UP (ref 0.5–1.3)
CREAT SERPL-MCNC: 0.7 MG/DL — SIGNIFICANT CHANGE UP (ref 0.5–1.3)
GAS PNL BLDA: SIGNIFICANT CHANGE UP
GLUCOSE SERPL-MCNC: 158 MG/DL — HIGH (ref 70–99)
GLUCOSE SERPL-MCNC: 232 MG/DL — HIGH (ref 70–99)
GLUCOSE SERPL-MCNC: 232 MG/DL — HIGH (ref 70–99)
HCO3 BLDA-SCNC: 23 MMOL/L — SIGNIFICANT CHANGE UP (ref 21–28)
HCO3 BLDA-SCNC: 24 MMOL/L — SIGNIFICANT CHANGE UP (ref 21–28)
HCT VFR BLD CALC: 28.7 % — LOW (ref 34.5–45)
HCT VFR BLD CALC: 29.5 % — LOW (ref 34.5–45)
HCT VFR BLD CALC: 32.3 % — LOW (ref 34.5–45)
HGB BLD-MCNC: 10.5 G/DL — LOW (ref 11.5–15.5)
HGB BLD-MCNC: 9.2 G/DL — LOW (ref 11.5–15.5)
HGB BLD-MCNC: 9.9 G/DL — LOW (ref 11.5–15.5)
HGB BLDA-MCNC: 13.7 G/DL — SIGNIFICANT CHANGE UP (ref 11.5–15.5)
INR BLD: 1.02 — SIGNIFICANT CHANGE UP (ref 0.88–1.16)
INR BLD: 1.04 — SIGNIFICANT CHANGE UP (ref 0.88–1.16)
INR BLD: 1.09 — SIGNIFICANT CHANGE UP (ref 0.88–1.16)
LACTATE SERPL-SCNC: 4 MMOL/L — CRITICAL HIGH (ref 0.5–2)
LACTATE SERPL-SCNC: 5.6 MMOL/L — CRITICAL HIGH (ref 0.5–2)
LACTATE SERPL-SCNC: 5.7 MMOL/L — CRITICAL HIGH (ref 0.5–2)
MAGNESIUM SERPL-MCNC: 1.9 MG/DL — SIGNIFICANT CHANGE UP (ref 1.6–2.6)
MAGNESIUM SERPL-MCNC: 2.2 MG/DL — SIGNIFICANT CHANGE UP (ref 1.6–2.6)
MCHC RBC-ENTMCNC: 27.6 PG — SIGNIFICANT CHANGE UP (ref 27–34)
MCHC RBC-ENTMCNC: 28.1 PG — SIGNIFICANT CHANGE UP (ref 27–34)
MCHC RBC-ENTMCNC: 28.7 PG — SIGNIFICANT CHANGE UP (ref 27–34)
MCHC RBC-ENTMCNC: 32.1 G/DL — SIGNIFICANT CHANGE UP (ref 32–36)
MCHC RBC-ENTMCNC: 32.5 G/DL — SIGNIFICANT CHANGE UP (ref 32–36)
MCHC RBC-ENTMCNC: 33.6 G/DL — SIGNIFICANT CHANGE UP (ref 32–36)
MCV RBC AUTO: 85.5 FL — SIGNIFICANT CHANGE UP (ref 80–100)
MCV RBC AUTO: 86.2 FL — SIGNIFICANT CHANGE UP (ref 80–100)
MCV RBC AUTO: 86.4 FL — SIGNIFICANT CHANGE UP (ref 80–100)
METHGB MFR BLDA: 0.2 % — SIGNIFICANT CHANGE UP
O2 CT VFR BLDA CALC: 19.5 ML/DL — SIGNIFICANT CHANGE UP (ref 15–23)
OXYHGB MFR BLDA: 97 % — SIGNIFICANT CHANGE UP (ref 94–100)
PCO2 BLDA: 39 MMHG — SIGNIFICANT CHANGE UP (ref 32–45)
PCO2 BLDA: 43 MMHG — SIGNIFICANT CHANGE UP (ref 32–45)
PH BLDA: 7.34 — LOW (ref 7.35–7.45)
PH BLDA: 7.4 — SIGNIFICANT CHANGE UP (ref 7.35–7.45)
PLATELET # BLD AUTO: 181 K/UL — SIGNIFICANT CHANGE UP (ref 150–400)
PLATELET # BLD AUTO: 204 K/UL — SIGNIFICANT CHANGE UP (ref 150–400)
PLATELET # BLD AUTO: 233 K/UL — SIGNIFICANT CHANGE UP (ref 150–400)
PO2 BLDA: 219 MMHG — HIGH (ref 83–108)
PO2 BLDA: 342 MMHG — HIGH (ref 83–108)
POTASSIUM BLDA-SCNC: 4 MMOL/L — SIGNIFICANT CHANGE UP (ref 3.5–4.9)
POTASSIUM SERPL-MCNC: 4.2 MMOL/L — SIGNIFICANT CHANGE UP (ref 3.5–5.3)
POTASSIUM SERPL-MCNC: 4.3 MMOL/L — SIGNIFICANT CHANGE UP (ref 3.5–5.3)
POTASSIUM SERPL-MCNC: 4.6 MMOL/L — SIGNIFICANT CHANGE UP (ref 3.5–5.3)
POTASSIUM SERPL-SCNC: 4.2 MMOL/L — SIGNIFICANT CHANGE UP (ref 3.5–5.3)
POTASSIUM SERPL-SCNC: 4.3 MMOL/L — SIGNIFICANT CHANGE UP (ref 3.5–5.3)
POTASSIUM SERPL-SCNC: 4.6 MMOL/L — SIGNIFICANT CHANGE UP (ref 3.5–5.3)
PROT SERPL-MCNC: 5.5 G/DL — LOW (ref 6–8.3)
PROTHROM AB SERPL-ACNC: 11.3 SEC — SIGNIFICANT CHANGE UP (ref 9.8–12.7)
PROTHROM AB SERPL-ACNC: 11.5 SEC — SIGNIFICANT CHANGE UP (ref 9.8–12.7)
PROTHROM AB SERPL-ACNC: 12.1 SEC — SIGNIFICANT CHANGE UP (ref 9.8–12.7)
RBC # BLD: 3.33 M/UL — LOW (ref 3.8–5.2)
RBC # BLD: 3.45 M/UL — LOW (ref 3.8–5.2)
RBC # BLD: 3.74 M/UL — LOW (ref 3.8–5.2)
RBC # FLD: 13 % — SIGNIFICANT CHANGE UP (ref 10.3–16.9)
RBC # FLD: 13.1 % — SIGNIFICANT CHANGE UP (ref 10.3–16.9)
RBC # FLD: 13.3 % — SIGNIFICANT CHANGE UP (ref 10.3–16.9)
RH IG SCN BLD-IMP: POSITIVE — SIGNIFICANT CHANGE UP
SAO2 % BLDA: 100 % — SIGNIFICANT CHANGE UP (ref 95–100)
SAO2 % BLDA: 99 % — SIGNIFICANT CHANGE UP (ref 95–100)
SODIUM BLDA-SCNC: 140 MMOL/L — SIGNIFICANT CHANGE UP (ref 138–146)
SODIUM SERPL-SCNC: 137 MMOL/L — SIGNIFICANT CHANGE UP (ref 135–145)
SODIUM SERPL-SCNC: 138 MMOL/L — SIGNIFICANT CHANGE UP (ref 135–145)
SODIUM SERPL-SCNC: 139 MMOL/L — SIGNIFICANT CHANGE UP (ref 135–145)
WBC # BLD: 11 K/UL — HIGH (ref 3.8–10.5)
WBC # BLD: 13.9 K/UL — HIGH (ref 3.8–10.5)
WBC # BLD: 9.1 K/UL — SIGNIFICANT CHANGE UP (ref 3.8–10.5)
WBC # FLD AUTO: 11 K/UL — HIGH (ref 3.8–10.5)
WBC # FLD AUTO: 13.9 K/UL — HIGH (ref 3.8–10.5)
WBC # FLD AUTO: 9.1 K/UL — SIGNIFICANT CHANGE UP (ref 3.8–10.5)

## 2017-05-22 PROCEDURE — 71010: CPT | Mod: 26

## 2017-05-22 PROCEDURE — 80061 LIPID PANEL: CPT

## 2017-05-22 PROCEDURE — 85025 COMPLETE CBC W/AUTO DIFF WBC: CPT

## 2017-05-22 PROCEDURE — 84443 ASSAY THYROID STIM HORMONE: CPT

## 2017-05-22 PROCEDURE — 83036 HEMOGLOBIN GLYCOSYLATED A1C: CPT

## 2017-05-22 PROCEDURE — 81001 URINALYSIS AUTO W/SCOPE: CPT

## 2017-05-22 PROCEDURE — 99291 CRITICAL CARE FIRST HOUR: CPT

## 2017-05-22 PROCEDURE — 80053 COMPREHEN METABOLIC PANEL: CPT

## 2017-05-22 PROCEDURE — 86850 RBC ANTIBODY SCREEN: CPT

## 2017-05-22 PROCEDURE — 86900 BLOOD TYPING SEROLOGIC ABO: CPT

## 2017-05-22 PROCEDURE — 87340 HEPATITIS B SURFACE AG IA: CPT

## 2017-05-22 PROCEDURE — 93010 ELECTROCARDIOGRAM REPORT: CPT

## 2017-05-22 PROCEDURE — 86901 BLOOD TYPING SEROLOGIC RH(D): CPT

## 2017-05-22 PROCEDURE — 33860: CPT | Mod: AS

## 2017-05-22 PROCEDURE — 33411 REPLACEMENT OF AORTIC VALVE: CPT | Mod: AS

## 2017-05-22 PROCEDURE — 85610 PROTHROMBIN TIME: CPT

## 2017-05-22 PROCEDURE — 85730 THROMBOPLASTIN TIME PARTIAL: CPT

## 2017-05-22 RX ORDER — PHENYLEPHRINE HYDROCHLORIDE 10 MG/ML
0.2 INJECTION INTRAVENOUS
Qty: 80 | Refills: 0 | Status: DISCONTINUED | OUTPATIENT
Start: 2017-05-22 | End: 2017-05-23

## 2017-05-22 RX ORDER — ALBUMIN HUMAN 25 %
250 VIAL (ML) INTRAVENOUS ONCE
Qty: 0 | Refills: 0 | Status: COMPLETED | OUTPATIENT
Start: 2017-05-22 | End: 2017-05-22

## 2017-05-22 RX ORDER — PROPOFOL 10 MG/ML
5 INJECTION, EMULSION INTRAVENOUS
Qty: 1000 | Refills: 0 | Status: DISCONTINUED | OUTPATIENT
Start: 2017-05-22 | End: 2017-05-23

## 2017-05-22 RX ORDER — FUROSEMIDE 40 MG
20 TABLET ORAL ONCE
Qty: 0 | Refills: 0 | Status: COMPLETED | OUTPATIENT
Start: 2017-05-22 | End: 2017-05-22

## 2017-05-22 RX ORDER — SODIUM CHLORIDE 9 MG/ML
1000 INJECTION, SOLUTION INTRAVENOUS
Qty: 0 | Refills: 0 | Status: DISCONTINUED | OUTPATIENT
Start: 2017-05-22 | End: 2017-06-03

## 2017-05-22 RX ORDER — FENTANYL CITRATE 50 UG/ML
25 INJECTION INTRAVENOUS ONCE
Qty: 0 | Refills: 0 | Status: DISCONTINUED | OUTPATIENT
Start: 2017-05-22 | End: 2017-05-22

## 2017-05-22 RX ORDER — CALCIUM GLUCONATE 100 MG/ML
2 VIAL (ML) INTRAVENOUS ONCE
Qty: 0 | Refills: 0 | Status: COMPLETED | OUTPATIENT
Start: 2017-05-22 | End: 2017-05-22

## 2017-05-22 RX ORDER — FAMOTIDINE 10 MG/ML
20 INJECTION INTRAVENOUS DAILY
Qty: 0 | Refills: 0 | Status: DISCONTINUED | OUTPATIENT
Start: 2017-05-22 | End: 2017-05-27

## 2017-05-22 RX ORDER — CEFAZOLIN SODIUM 1 G
2000 VIAL (EA) INJECTION EVERY 8 HOURS
Qty: 0 | Refills: 0 | Status: COMPLETED | OUTPATIENT
Start: 2017-05-22 | End: 2017-05-24

## 2017-05-22 RX ORDER — MAGNESIUM SULFATE 500 MG/ML
2 VIAL (ML) INJECTION ONCE
Qty: 0 | Refills: 0 | Status: COMPLETED | OUTPATIENT
Start: 2017-05-22 | End: 2017-05-22

## 2017-05-22 RX ORDER — HEPARIN SODIUM 5000 [USP'U]/ML
5000 INJECTION INTRAVENOUS; SUBCUTANEOUS EVERY 8 HOURS
Qty: 0 | Refills: 0 | Status: DISCONTINUED | OUTPATIENT
Start: 2017-05-22 | End: 2017-06-03

## 2017-05-22 RX ORDER — NOREPINEPHRINE BITARTRATE/D5W 8 MG/250ML
0.03 PLASTIC BAG, INJECTION (ML) INTRAVENOUS
Qty: 8 | Refills: 0 | Status: DISCONTINUED | OUTPATIENT
Start: 2017-05-22 | End: 2017-05-24

## 2017-05-22 RX ORDER — DEXMEDETOMIDINE HYDROCHLORIDE IN 0.9% SODIUM CHLORIDE 4 UG/ML
0.41 INJECTION INTRAVENOUS
Qty: 200 | Refills: 0 | Status: DISCONTINUED | OUTPATIENT
Start: 2017-05-22 | End: 2017-05-26

## 2017-05-22 RX ORDER — MILRINONE LACTATE 1 MG/ML
0.2 INJECTION, SOLUTION INTRAVENOUS
Qty: 20 | Refills: 0 | Status: DISCONTINUED | OUTPATIENT
Start: 2017-05-22 | End: 2017-05-24

## 2017-05-22 RX ORDER — POTASSIUM CHLORIDE 20 MEQ
20 PACKET (EA) ORAL ONCE
Qty: 0 | Refills: 0 | Status: COMPLETED | OUTPATIENT
Start: 2017-05-22 | End: 2017-05-22

## 2017-05-22 RX ORDER — MEPERIDINE HYDROCHLORIDE 50 MG/ML
25 INJECTION INTRAMUSCULAR; INTRAVENOUS; SUBCUTANEOUS ONCE
Qty: 0 | Refills: 0 | Status: DISCONTINUED | OUTPATIENT
Start: 2017-05-22 | End: 2017-05-23

## 2017-05-22 RX ORDER — ACETAMINOPHEN 500 MG
1000 TABLET ORAL ONCE
Qty: 0 | Refills: 0 | Status: COMPLETED | OUTPATIENT
Start: 2017-05-22 | End: 2017-05-22

## 2017-05-22 RX ORDER — FENTANYL CITRATE 50 UG/ML
25 INJECTION INTRAVENOUS ONCE
Qty: 0 | Refills: 0 | Status: DISCONTINUED | OUTPATIENT
Start: 2017-05-22 | End: 2017-05-23

## 2017-05-22 RX ORDER — INSULIN HUMAN 100 [IU]/ML
1 INJECTION, SOLUTION SUBCUTANEOUS
Qty: 250 | Refills: 0 | Status: DISCONTINUED | OUTPATIENT
Start: 2017-05-22 | End: 2017-05-27

## 2017-05-22 RX ADMIN — Medication 400 MILLIGRAM(S): at 17:30

## 2017-05-22 RX ADMIN — FENTANYL CITRATE 25 MICROGRAM(S): 50 INJECTION INTRAVENOUS at 19:30

## 2017-05-22 RX ADMIN — Medication 125 MILLILITER(S): at 19:03

## 2017-05-22 RX ADMIN — Medication 100 MILLIGRAM(S): at 17:30

## 2017-05-22 RX ADMIN — Medication 125 MILLILITER(S): at 18:56

## 2017-05-22 RX ADMIN — FENTANYL CITRATE 25 MICROGRAM(S): 50 INJECTION INTRAVENOUS at 18:59

## 2017-05-22 RX ADMIN — Medication 100 MILLIEQUIVALENT(S): at 23:30

## 2017-05-22 RX ADMIN — Medication 50 GRAM(S): at 22:45

## 2017-05-22 RX ADMIN — Medication 200 GRAM(S): at 19:14

## 2017-05-22 RX ADMIN — FAMOTIDINE 20 MILLIGRAM(S): 10 INJECTION INTRAVENOUS at 21:10

## 2017-05-22 RX ADMIN — Medication 1000 MILLIGRAM(S): at 18:00

## 2017-05-22 RX ADMIN — Medication 20 MILLIGRAM(S): at 22:50

## 2017-05-22 NOTE — PROVIDER CONTACT NOTE (CRITICAL VALUE NOTIFICATION) - ASSESSMENT
pt's awake and alert and follow simple commands and MARIA VICTORIA*4. pt's intubated to ventilation.

## 2017-05-22 NOTE — BRIEF OPERATIVE NOTE - PROCEDURE
AVR (aortic valve replacement)  05/22/2017    Active  [email or web address]  CABG x 1  05/22/2017  SVG to PDA  Active  [email or web address]

## 2017-05-22 NOTE — BRIEF OPERATIVE NOTE - POST-OP DX
Aortic stenosis  05/22/2017    Active  Lucinda Gibbs  CAD (coronary artery disease)  05/22/2017    Active  Lucinda Gibbs

## 2017-05-22 NOTE — PROGRESS NOTE ADULT - SUBJECTIVE AND OBJECTIVE BOX
Date of surgery : 5/22/2017    Surgeon : Yasir    Anesthesia : Iglesia    Procedure : AVR, Root replacement CABG X1    Pump Time :     126min         Aortic X -Clamp :          104min         Circulatory Arrest :    EF :     Pre OP :           nl    Post OP :   nl                                         Assist Device : none    Airway :      Difficult Airway :                   [ ] Yes     [x ] No      ETT             Size :                         Lip Line :                           Ventilator setting :              [ x] ON          [ x] BS +           [x ] ETCO2       Mode: AC/ CMV (Assist Control/ Continuous Mandatory Ventilation)  RR (machine): 14  TV (machine): 500  FiO2: 50  PEEP: 8  ITime: 1  MAP: 11  PIP: 31          Lines :      [ ] PA catheter      [ x] Radial Arterial Line              [ x ] Right              [  ] Left       [ ] Femoral Arterial Line          [  ] Right              [  ] Left      [ x]Central Line   IJ                     [ x ] Right              [  ] Left      [ ] Femoral Central line            [  ] Right              [  ] Left     Tubes :      Blakes :                                      [ 3 ] Med.             [ left X1 ] Pleural      Chest Tubes :                           [  ] Med.             [  ] Pleural       Pacing     Wires :             [ x  ] Atrial                  [ x]  Venticular      Pacer Setting :      VVI @50   Threshold  :                Sensitivity :       Intake     Drips :   Levo @ 0.03mcg/kg/min, Milrinone 0.25mcg/kg/min     IVF : 4000ml     Albumin :     Product :           [   ]  PRBC       [1  ] Platelet     [  2] FFP          [10 units] Cryoprecipitate                                  [ x  ]  Cell saver - 1566ml                                   [ x ]  Hemostatic agent : FEIBA 500                                  [   ]   Factors :       Output      EBL :      Urine : 2300      Antibiotics :   ICU Vital Signs Last 24 Hrs  T(C): 36.7, Max: 36.7 (05-22 @ 15:00)  T(F): 98, Max: 98 (05-22 @ 15:00)  HR: 80 (78 - 86)  ABP: 118/62 (100/52 - 118/62)  ABP(mean): 76 (64 - 76)  RR: 17 (12 - 17)  SpO2: 98% (98% - 100%)    I&O's Summary    I & Os for current day (as of 22 May 2017 15:50)  =============================================  IN: 0 ml / OUT: 24 ml / NET: -24 ml    ABG - ( 22 May 2017 15:18 )  pH: 7.34  /  pCO2: 43    /  pO2: 219   / HCO3: 23    / Base Excess: -2.7  /  SaO2: 99        PT/INR - ( 22 May 2017 15:20 )   PT: 11.3 sec;   INR: 1.02     PTT - ( 22 May 2017 15:20 )  PTT:36.7 sec    MEDICATIONS  (STANDING):  sodium chloride 0.45%. 1000milliLiter(s) IV Continuous <Continuous>  heparin  Injectable 5000Unit(s) SubCutaneous every 8 hours  ceFAZolin   IVPB 2000milliGRAM(s) IV Intermittent every 8 hours  famotidine Injectable 20milliGRAM(s) IV Push daily  milrinone Infusion 0.199MICROgram(s)/kG/Min IV Continuous <Continuous>  propofol Infusion 5MICROgram(s)/kG/Min IV Continuous <Continuous>  norepinephrine Infusion 0.029MICROgram(s)/kG/Min IV Continuous <Continuous>  insulin Infusion 1Unit(s)/Hr IV Continuous <Continuous>  dexmedetomidine Infusion 0.411MICROgram(s)/kG/Hr IV Continuous <Continuous>    AS CAD  GERD (gastroesophageal reflux disease)  Diverticulitis  Asthma  Obesity  Diabetes mellitus  Hyperlipidemia  Hypertension  Status post laser lithotripsy of ureteral calculus  Uterine fibroid  Status post small bowel resection  Carpal tunnel syndrome      PHYSICAL EXAM:  Gen : no acute distress  Respiratory: decreased in the bases  Cardiovascular: S1 and S2, RRR, no M/G/R  Gastrointestinal: BS+, soft, NT/ND  Extremities: No peripheral edema  Vascular: 2+ peripheral pulses  Neurological: no focal deficits, following command  Incision: clean dry/ no sign of infection  Lines: no sign of infection

## 2017-05-22 NOTE — PROGRESS NOTE ADULT - ASSESSMENT
58yo female with history of Asthma, obesity, HTN, active smoker diagnosed with severe AS (ARAMIS 0.6cm 2) on workup of CARRILLO.  Pt underwent AVR, root repair and CABG X1 (vein to RCA).  Uncomplicated case.  Recieved products as above.  Concern for RV dysfunction coming off bypass started on milrinone, and low dose levo.      Problems  1. s/p AVR, root repair and CABG x1  2. hemodynamic instability  3. post op resp failure    Plan   Neuro -- pain control, sedation PRN  CVS - hemodynamic support, monitor for arrhythmia.  Monitor for bleeding  wean pressors as tolerated  chest tube management  Pulm - vent weaning as tolerated   GI - GI proph   - monitor UOP  Endo - glycemic control  Heme - correct coagulapathy, monitor for bleeding  ID - periop antibiotics.     Critical post op.    Critical care time spent 50 min

## 2017-05-23 LAB
ALBUMIN SERPL ELPH-MCNC: 3.6 G/DL — SIGNIFICANT CHANGE UP (ref 3.3–5)
ALP SERPL-CCNC: 25 U/L — LOW (ref 40–120)
ALT FLD-CCNC: 24 U/L — SIGNIFICANT CHANGE UP (ref 10–45)
ANION GAP SERPL CALC-SCNC: 11 MMOL/L — SIGNIFICANT CHANGE UP (ref 5–17)
ANION GAP SERPL CALC-SCNC: 11 MMOL/L — SIGNIFICANT CHANGE UP (ref 5–17)
ANION GAP SERPL CALC-SCNC: 13 MMOL/L — SIGNIFICANT CHANGE UP (ref 5–17)
ANION GAP SERPL CALC-SCNC: 14 MMOL/L — SIGNIFICANT CHANGE UP (ref 5–17)
ANION GAP SERPL CALC-SCNC: 15 MMOL/L — SIGNIFICANT CHANGE UP (ref 5–17)
APTT BLD: 24 SEC — LOW (ref 27.5–37.4)
APTT BLD: 24.5 SEC — LOW (ref 27.5–37.4)
APTT BLD: 25.1 SEC — LOW (ref 27.5–37.4)
APTT BLD: 25.4 SEC — LOW (ref 27.5–37.4)
AST SERPL-CCNC: 86 U/L — HIGH (ref 10–40)
BASE EXCESS BLDA CALC-SCNC: 2.7 MMOL/L — SIGNIFICANT CHANGE UP (ref -2–3)
BILIRUB SERPL-MCNC: 0.3 MG/DL — SIGNIFICANT CHANGE UP (ref 0.2–1.2)
BUN SERPL-MCNC: 11 MG/DL — SIGNIFICANT CHANGE UP (ref 7–23)
BUN SERPL-MCNC: 14 MG/DL — SIGNIFICANT CHANGE UP (ref 7–23)
BUN SERPL-MCNC: 15 MG/DL — SIGNIFICANT CHANGE UP (ref 7–23)
BUN SERPL-MCNC: 18 MG/DL — SIGNIFICANT CHANGE UP (ref 7–23)
BUN SERPL-MCNC: 21 MG/DL — SIGNIFICANT CHANGE UP (ref 7–23)
CALCIUM SERPL-MCNC: 7 MG/DL — LOW (ref 8.4–10.5)
CALCIUM SERPL-MCNC: 8.2 MG/DL — LOW (ref 8.4–10.5)
CALCIUM SERPL-MCNC: 8.2 MG/DL — LOW (ref 8.4–10.5)
CALCIUM SERPL-MCNC: 8.3 MG/DL — LOW (ref 8.4–10.5)
CALCIUM SERPL-MCNC: 8.7 MG/DL — SIGNIFICANT CHANGE UP (ref 8.4–10.5)
CHLORIDE SERPL-SCNC: 103 MMOL/L — SIGNIFICANT CHANGE UP (ref 96–108)
CHLORIDE SERPL-SCNC: 104 MMOL/L — SIGNIFICANT CHANGE UP (ref 96–108)
CHLORIDE SERPL-SCNC: 105 MMOL/L — SIGNIFICANT CHANGE UP (ref 96–108)
CHLORIDE SERPL-SCNC: 106 MMOL/L — SIGNIFICANT CHANGE UP (ref 96–108)
CHLORIDE SERPL-SCNC: 109 MMOL/L — HIGH (ref 96–108)
CO2 SERPL-SCNC: 22 MMOL/L — SIGNIFICANT CHANGE UP (ref 22–31)
CO2 SERPL-SCNC: 24 MMOL/L — SIGNIFICANT CHANGE UP (ref 22–31)
CO2 SERPL-SCNC: 26 MMOL/L — SIGNIFICANT CHANGE UP (ref 22–31)
CREAT SERPL-MCNC: 0.5 MG/DL — SIGNIFICANT CHANGE UP (ref 0.5–1.3)
CREAT SERPL-MCNC: 0.6 MG/DL — SIGNIFICANT CHANGE UP (ref 0.5–1.3)
EOSINOPHIL NFR BLD AUTO: 0.3 % — SIGNIFICANT CHANGE UP (ref 0–6)
GAS PNL BLDA: SIGNIFICANT CHANGE UP
GLUCOSE SERPL-MCNC: 121 MG/DL — HIGH (ref 70–99)
GLUCOSE SERPL-MCNC: 127 MG/DL — HIGH (ref 70–99)
GLUCOSE SERPL-MCNC: 140 MG/DL — HIGH (ref 70–99)
GLUCOSE SERPL-MCNC: 161 MG/DL — HIGH (ref 70–99)
GLUCOSE SERPL-MCNC: 97 MG/DL — SIGNIFICANT CHANGE UP (ref 70–99)
HCO3 BLDA-SCNC: 26 MMOL/L — SIGNIFICANT CHANGE UP (ref 21–28)
HCT VFR BLD CALC: 27.2 % — LOW (ref 34.5–45)
HCT VFR BLD CALC: 27.7 % — LOW (ref 34.5–45)
HCT VFR BLD CALC: 28 % — LOW (ref 34.5–45)
HCT VFR BLD CALC: 30.4 % — LOW (ref 34.5–45)
HCT VFR BLD CALC: 30.4 % — LOW (ref 34.5–45)
HGB BLD-MCNC: 10 G/DL — LOW (ref 11.5–15.5)
HGB BLD-MCNC: 10.4 G/DL — LOW (ref 11.5–15.5)
HGB BLD-MCNC: 9 G/DL — LOW (ref 11.5–15.5)
HGB BLD-MCNC: 9.1 G/DL — LOW (ref 11.5–15.5)
HGB BLD-MCNC: 9.3 G/DL — LOW (ref 11.5–15.5)
INR BLD: 1.09 — SIGNIFICANT CHANGE UP (ref 0.88–1.16)
LACTATE SERPL-SCNC: 1.5 MMOL/L — SIGNIFICANT CHANGE UP (ref 0.5–2)
LACTATE SERPL-SCNC: 2.4 MMOL/L — HIGH (ref 0.5–2)
LACTATE SERPL-SCNC: 2.5 MMOL/L — HIGH (ref 0.5–2)
LACTATE SERPL-SCNC: 3.6 MMOL/L — HIGH (ref 0.5–2)
LYMPHOCYTES # BLD AUTO: 8.1 % — LOW (ref 13–44)
MAGNESIUM SERPL-MCNC: 1.9 MG/DL — SIGNIFICANT CHANGE UP (ref 1.6–2.6)
MAGNESIUM SERPL-MCNC: 2 MG/DL — SIGNIFICANT CHANGE UP (ref 1.6–2.6)
MAGNESIUM SERPL-MCNC: 2 MG/DL — SIGNIFICANT CHANGE UP (ref 1.6–2.6)
MAGNESIUM SERPL-MCNC: 2.2 MG/DL — SIGNIFICANT CHANGE UP (ref 1.6–2.6)
MCHC RBC-ENTMCNC: 28 PG — SIGNIFICANT CHANGE UP (ref 27–34)
MCHC RBC-ENTMCNC: 28.2 PG — SIGNIFICANT CHANGE UP (ref 27–34)
MCHC RBC-ENTMCNC: 28.2 PG — SIGNIFICANT CHANGE UP (ref 27–34)
MCHC RBC-ENTMCNC: 28.4 PG — SIGNIFICANT CHANGE UP (ref 27–34)
MCHC RBC-ENTMCNC: 28.6 PG — SIGNIFICANT CHANGE UP (ref 27–34)
MCHC RBC-ENTMCNC: 32.5 G/DL — SIGNIFICANT CHANGE UP (ref 32–36)
MCHC RBC-ENTMCNC: 32.9 G/DL — SIGNIFICANT CHANGE UP (ref 32–36)
MCHC RBC-ENTMCNC: 33.2 G/DL — SIGNIFICANT CHANGE UP (ref 32–36)
MCHC RBC-ENTMCNC: 33.5 G/DL — SIGNIFICANT CHANGE UP (ref 32–36)
MCHC RBC-ENTMCNC: 34.2 G/DL — SIGNIFICANT CHANGE UP (ref 32–36)
MCV RBC AUTO: 83.5 FL — SIGNIFICANT CHANGE UP (ref 80–100)
MCV RBC AUTO: 84.8 FL — SIGNIFICANT CHANGE UP (ref 80–100)
MCV RBC AUTO: 85 FL — SIGNIFICANT CHANGE UP (ref 80–100)
MCV RBC AUTO: 85.6 FL — SIGNIFICANT CHANGE UP (ref 80–100)
MCV RBC AUTO: 86.3 FL — SIGNIFICANT CHANGE UP (ref 80–100)
MONOCYTES NFR BLD AUTO: 3.3 % — SIGNIFICANT CHANGE UP (ref 2–14)
NEUTROPHILS NFR BLD AUTO: 88.3 % — HIGH (ref 43–77)
PCO2 BLDA: 37 MMHG — SIGNIFICANT CHANGE UP (ref 32–45)
PH BLDA: 7.47 — HIGH (ref 7.35–7.45)
PHOSPHATE SERPL-MCNC: 2.7 MG/DL — SIGNIFICANT CHANGE UP (ref 2.5–4.5)
PHOSPHATE SERPL-MCNC: 2.7 MG/DL — SIGNIFICANT CHANGE UP (ref 2.5–4.5)
PHOSPHATE SERPL-MCNC: 3.3 MG/DL — SIGNIFICANT CHANGE UP (ref 2.5–4.5)
PLATELET # BLD AUTO: 120 K/UL — LOW (ref 150–400)
PLATELET # BLD AUTO: 126 K/UL — LOW (ref 150–400)
PLATELET # BLD AUTO: 174 K/UL — SIGNIFICANT CHANGE UP (ref 150–400)
PLATELET # BLD AUTO: 177 K/UL — SIGNIFICANT CHANGE UP (ref 150–400)
PLATELET # BLD AUTO: 191 K/UL — SIGNIFICANT CHANGE UP (ref 150–400)
PO2 BLDA: 66 MMHG — LOW (ref 83–108)
POTASSIUM SERPL-MCNC: 3.6 MMOL/L — SIGNIFICANT CHANGE UP (ref 3.5–5.3)
POTASSIUM SERPL-MCNC: 3.8 MMOL/L — SIGNIFICANT CHANGE UP (ref 3.5–5.3)
POTASSIUM SERPL-MCNC: 4 MMOL/L — SIGNIFICANT CHANGE UP (ref 3.5–5.3)
POTASSIUM SERPL-MCNC: 4.5 MMOL/L — SIGNIFICANT CHANGE UP (ref 3.5–5.3)
POTASSIUM SERPL-MCNC: 4.8 MMOL/L — SIGNIFICANT CHANGE UP (ref 3.5–5.3)
POTASSIUM SERPL-SCNC: 3.6 MMOL/L — SIGNIFICANT CHANGE UP (ref 3.5–5.3)
POTASSIUM SERPL-SCNC: 3.8 MMOL/L — SIGNIFICANT CHANGE UP (ref 3.5–5.3)
POTASSIUM SERPL-SCNC: 4 MMOL/L — SIGNIFICANT CHANGE UP (ref 3.5–5.3)
POTASSIUM SERPL-SCNC: 4.5 MMOL/L — SIGNIFICANT CHANGE UP (ref 3.5–5.3)
POTASSIUM SERPL-SCNC: 4.8 MMOL/L — SIGNIFICANT CHANGE UP (ref 3.5–5.3)
PROT SERPL-MCNC: 6 G/DL — SIGNIFICANT CHANGE UP (ref 6–8.3)
PROTHROM AB SERPL-ACNC: 12.1 SEC — SIGNIFICANT CHANGE UP (ref 9.8–12.7)
RBC # BLD: 3.2 M/UL — LOW (ref 3.8–5.2)
RBC # BLD: 3.21 M/UL — LOW (ref 3.8–5.2)
RBC # BLD: 3.3 M/UL — LOW (ref 3.8–5.2)
RBC # BLD: 3.55 M/UL — LOW (ref 3.8–5.2)
RBC # BLD: 3.64 M/UL — LOW (ref 3.8–5.2)
RBC # FLD: 13.2 % — SIGNIFICANT CHANGE UP (ref 10.3–16.9)
RBC # FLD: 13.4 % — SIGNIFICANT CHANGE UP (ref 10.3–16.9)
RBC # FLD: 13.5 % — SIGNIFICANT CHANGE UP (ref 10.3–16.9)
RBC # FLD: 13.7 % — SIGNIFICANT CHANGE UP (ref 10.3–16.9)
RBC # FLD: 14.8 % — SIGNIFICANT CHANGE UP (ref 10.3–16.9)
SAO2 % BLDA: 94 % — LOW (ref 95–100)
SODIUM SERPL-SCNC: 142 MMOL/L — SIGNIFICANT CHANGE UP (ref 135–145)
SODIUM SERPL-SCNC: 144 MMOL/L — SIGNIFICANT CHANGE UP (ref 135–145)
SODIUM SERPL-SCNC: 145 MMOL/L — SIGNIFICANT CHANGE UP (ref 135–145)
WBC # BLD: 11.4 K/UL — HIGH (ref 3.8–10.5)
WBC # BLD: 12.2 K/UL — HIGH (ref 3.8–10.5)
WBC # BLD: 12.6 K/UL — HIGH (ref 3.8–10.5)
WBC # BLD: 4 K/UL — SIGNIFICANT CHANGE UP (ref 3.8–10.5)
WBC # BLD: 9.1 K/UL — SIGNIFICANT CHANGE UP (ref 3.8–10.5)
WBC # FLD AUTO: 11.4 K/UL — HIGH (ref 3.8–10.5)
WBC # FLD AUTO: 12.2 K/UL — HIGH (ref 3.8–10.5)
WBC # FLD AUTO: 12.6 K/UL — HIGH (ref 3.8–10.5)
WBC # FLD AUTO: 4 K/UL — SIGNIFICANT CHANGE UP (ref 3.8–10.5)
WBC # FLD AUTO: 9.1 K/UL — SIGNIFICANT CHANGE UP (ref 3.8–10.5)

## 2017-05-23 PROCEDURE — 99292 CRITICAL CARE ADDL 30 MIN: CPT

## 2017-05-23 PROCEDURE — 99291 CRITICAL CARE FIRST HOUR: CPT

## 2017-05-23 PROCEDURE — 71010: CPT | Mod: 26,76

## 2017-05-23 RX ORDER — FENTANYL CITRATE 50 UG/ML
25 INJECTION INTRAVENOUS ONCE
Qty: 0 | Refills: 0 | Status: DISCONTINUED | OUTPATIENT
Start: 2017-05-23 | End: 2017-05-23

## 2017-05-23 RX ORDER — POTASSIUM CHLORIDE 20 MEQ
20 PACKET (EA) ORAL
Qty: 0 | Refills: 0 | Status: COMPLETED | OUTPATIENT
Start: 2017-05-23 | End: 2017-05-23

## 2017-05-23 RX ORDER — FUROSEMIDE 40 MG
10 TABLET ORAL ONCE
Qty: 0 | Refills: 0 | Status: COMPLETED | OUTPATIENT
Start: 2017-05-23 | End: 2017-05-23

## 2017-05-23 RX ORDER — FUROSEMIDE 40 MG
20 TABLET ORAL ONCE
Qty: 0 | Refills: 0 | Status: COMPLETED | OUTPATIENT
Start: 2017-05-23 | End: 2017-05-23

## 2017-05-23 RX ORDER — POTASSIUM CHLORIDE 20 MEQ
20 PACKET (EA) ORAL ONCE
Qty: 0 | Refills: 0 | Status: COMPLETED | OUTPATIENT
Start: 2017-05-23 | End: 2017-05-23

## 2017-05-23 RX ORDER — ATORVASTATIN CALCIUM 80 MG/1
80 TABLET, FILM COATED ORAL AT BEDTIME
Qty: 0 | Refills: 0 | Status: DISCONTINUED | OUTPATIENT
Start: 2017-05-23 | End: 2017-06-03

## 2017-05-23 RX ORDER — IPRATROPIUM/ALBUTEROL SULFATE 18-103MCG
3 AEROSOL WITH ADAPTER (GRAM) INHALATION EVERY 6 HOURS
Qty: 0 | Refills: 0 | Status: DISCONTINUED | OUTPATIENT
Start: 2017-05-23 | End: 2017-05-23

## 2017-05-23 RX ORDER — HYDROMORPHONE HYDROCHLORIDE 2 MG/ML
0.5 INJECTION INTRAMUSCULAR; INTRAVENOUS; SUBCUTANEOUS ONCE
Qty: 0 | Refills: 0 | Status: DISCONTINUED | OUTPATIENT
Start: 2017-05-23 | End: 2017-05-23

## 2017-05-23 RX ORDER — CALCIUM GLUCONATE 100 MG/ML
2 VIAL (ML) INTRAVENOUS ONCE
Qty: 0 | Refills: 0 | Status: COMPLETED | OUTPATIENT
Start: 2017-05-23 | End: 2017-05-23

## 2017-05-23 RX ORDER — ALBUMIN HUMAN 25 %
250 VIAL (ML) INTRAVENOUS ONCE
Qty: 0 | Refills: 0 | Status: COMPLETED | OUTPATIENT
Start: 2017-05-23 | End: 2017-05-23

## 2017-05-23 RX ORDER — IPRATROPIUM/ALBUTEROL SULFATE 18-103MCG
3 AEROSOL WITH ADAPTER (GRAM) INHALATION EVERY 6 HOURS
Qty: 0 | Refills: 0 | Status: COMPLETED | OUTPATIENT
Start: 2017-05-23 | End: 2017-05-23

## 2017-05-23 RX ORDER — VASOPRESSIN 20 [USP'U]/ML
0.02 INJECTION INTRAVENOUS
Qty: 100 | Refills: 0 | Status: DISCONTINUED | OUTPATIENT
Start: 2017-05-23 | End: 2017-05-25

## 2017-05-23 RX ORDER — ALBUTEROL 90 UG/1
1 AEROSOL, METERED ORAL EVERY 4 HOURS
Qty: 0 | Refills: 0 | Status: DISCONTINUED | OUTPATIENT
Start: 2017-05-23 | End: 2017-05-23

## 2017-05-23 RX ORDER — MAGNESIUM SULFATE 500 MG/ML
2 VIAL (ML) INJECTION ONCE
Qty: 0 | Refills: 0 | Status: COMPLETED | OUTPATIENT
Start: 2017-05-23 | End: 2017-05-23

## 2017-05-23 RX ORDER — IPRATROPIUM BROMIDE 0.2 MG/ML
1 SOLUTION, NON-ORAL INHALATION EVERY 6 HOURS
Qty: 0 | Refills: 0 | Status: DISCONTINUED | OUTPATIENT
Start: 2017-05-23 | End: 2017-05-23

## 2017-05-23 RX ORDER — ACETAMINOPHEN 500 MG
1000 TABLET ORAL ONCE
Qty: 0 | Refills: 0 | Status: COMPLETED | OUTPATIENT
Start: 2017-05-23 | End: 2017-05-23

## 2017-05-23 RX ADMIN — HYDROMORPHONE HYDROCHLORIDE 0.5 MILLIGRAM(S): 2 INJECTION INTRAMUSCULAR; INTRAVENOUS; SUBCUTANEOUS at 15:30

## 2017-05-23 RX ADMIN — FENTANYL CITRATE 25 MICROGRAM(S): 50 INJECTION INTRAVENOUS at 15:45

## 2017-05-23 RX ADMIN — FENTANYL CITRATE 25 MICROGRAM(S): 50 INJECTION INTRAVENOUS at 11:30

## 2017-05-23 RX ADMIN — Medication 100 MILLIGRAM(S): at 08:44

## 2017-05-23 RX ADMIN — FAMOTIDINE 20 MILLIGRAM(S): 10 INJECTION INTRAVENOUS at 13:00

## 2017-05-23 RX ADMIN — Medication 100 MILLIEQUIVALENT(S): at 03:40

## 2017-05-23 RX ADMIN — FENTANYL CITRATE 25 MICROGRAM(S): 50 INJECTION INTRAVENOUS at 08:05

## 2017-05-23 RX ADMIN — Medication 100 MILLIGRAM(S): at 01:00

## 2017-05-23 RX ADMIN — Medication 10 MILLIGRAM(S): at 19:00

## 2017-05-23 RX ADMIN — Medication 20 MILLIGRAM(S): at 14:30

## 2017-05-23 RX ADMIN — Medication 20 MILLIGRAM(S): at 21:17

## 2017-05-23 RX ADMIN — Medication 3 MILLILITER(S): at 04:14

## 2017-05-23 RX ADMIN — FENTANYL CITRATE 25 MICROGRAM(S): 50 INJECTION INTRAVENOUS at 07:50

## 2017-05-23 RX ADMIN — Medication 200 GRAM(S): at 22:38

## 2017-05-23 RX ADMIN — Medication 125 MILLILITER(S): at 15:41

## 2017-05-23 RX ADMIN — FENTANYL CITRATE 25 MICROGRAM(S): 50 INJECTION INTRAVENOUS at 15:15

## 2017-05-23 RX ADMIN — Medication 3 MILLILITER(S): at 11:57

## 2017-05-23 RX ADMIN — HEPARIN SODIUM 5000 UNIT(S): 5000 INJECTION INTRAVENOUS; SUBCUTANEOUS at 13:24

## 2017-05-23 RX ADMIN — Medication 200 GRAM(S): at 14:30

## 2017-05-23 RX ADMIN — HEPARIN SODIUM 5000 UNIT(S): 5000 INJECTION INTRAVENOUS; SUBCUTANEOUS at 06:00

## 2017-05-23 RX ADMIN — Medication 50 GRAM(S): at 04:40

## 2017-05-23 RX ADMIN — Medication 100 MILLIEQUIVALENT(S): at 20:44

## 2017-05-23 RX ADMIN — Medication 400 MILLIGRAM(S): at 22:07

## 2017-05-23 RX ADMIN — Medication 100 MILLIEQUIVALENT(S): at 08:44

## 2017-05-23 RX ADMIN — Medication 10 MILLIGRAM(S): at 05:30

## 2017-05-23 RX ADMIN — HEPARIN SODIUM 5000 UNIT(S): 5000 INJECTION INTRAVENOUS; SUBCUTANEOUS at 21:16

## 2017-05-23 RX ADMIN — HYDROMORPHONE HYDROCHLORIDE 0.5 MILLIGRAM(S): 2 INJECTION INTRAMUSCULAR; INTRAVENOUS; SUBCUTANEOUS at 16:00

## 2017-05-23 RX ADMIN — Medication 100 MILLIEQUIVALENT(S): at 05:40

## 2017-05-23 RX ADMIN — Medication 50 GRAM(S): at 21:14

## 2017-05-23 RX ADMIN — Medication 1000 MILLIGRAM(S): at 22:40

## 2017-05-23 RX ADMIN — MILRINONE LACTATE 5.8 MICROGRAM(S)/KG/MIN: 1 INJECTION, SOLUTION INTRAVENOUS at 08:45

## 2017-05-23 RX ADMIN — FENTANYL CITRATE 25 MICROGRAM(S): 50 INJECTION INTRAVENOUS at 00:40

## 2017-05-23 RX ADMIN — Medication 10 MILLIGRAM(S): at 10:16

## 2017-05-23 RX ADMIN — Medication 100 MILLIGRAM(S): at 17:18

## 2017-05-23 RX ADMIN — FENTANYL CITRATE 25 MICROGRAM(S): 50 INJECTION INTRAVENOUS at 12:00

## 2017-05-23 RX ADMIN — Medication 3 MILLILITER(S): at 17:23

## 2017-05-23 RX ADMIN — FENTANYL CITRATE 25 MICROGRAM(S): 50 INJECTION INTRAVENOUS at 00:20

## 2017-05-23 RX ADMIN — Medication 100 MILLIEQUIVALENT(S): at 08:00

## 2017-05-23 NOTE — PROGRESS NOTE ADULT - SUBJECTIVE AND OBJECTIVE BOX
CTICU  CRITICAL  CARE  attending     Hand off received 					   Pertinent clinical, laboratory, radiographic, hemodynamic, echocardiographic, respiratory data, microbiologic data and chart were reviewed and analyzed frequently throughout the course of the day and night  Patient seen and examined with CTS/ SH attending at bedside  Pt is a 57y , Female, HEALTH ISSUES - PROBLEM Dx:      , FAMILY HISTORY:  Family history of pulmonary embolism (Mother)  Family history of MI (myocardial infarction) (Mother)  Family history of asthma (Mother)  Family history of cardiac pacemaker (Mother)  Family history of atrial fibrillation (Mother)  PAST MEDICAL & SURGICAL HISTORY:  Back injury: lumbar, work related  Kidney stone  GERD (gastroesophageal reflux disease)  Diverticulitis  Asthma  Obesity  Diabetes mellitus  Hyperlipidemia  Hypertension  Status post laser lithotripsy of ureteral calculus  Uterine fibroid: removal  Status post small bowel resection  Carpal tunnel syndrome    Patient is a 57y old  Female who presents with a chief complaint of AS (19 May 2017 10:50)      14 system review was unremarkable  acute changes include acute respiratory failure  Vital signs, hemodynamic and respiratory parameters were reviewed from the bedside nursing flowsheet.  ICU Vital Signs Last 24 Hrs  T(C): 37.1, Max: 37.1 (05-23 @ 11:00)  T(F): 98.8, Max: 98.8 (05-23 @ 11:00)  HR: 78 (78 - 102)  BP: 110/59 (94/55 - 110/59)  BP(mean): 70 (64 - 70)  ABP: 100/62 (100/62 - 146/66)  ABP(mean): 74 (62 - 90)  RR: 15 (5 - 28)  SpO2: 95% (92% - 100%)    Adult Advanced Hemodynamics Last 24 Hrs  CVP(mm Hg): 14 (5 - 24)  CVP(cm H2O): --  CO: --  CI: --  PA: --  PA(mean): --  PCWP: --  SVR: --  SVRI: --  PVR: --  PVRI: --, ABG - ( 23 May 2017 21:34 )  pH: 7.49  /  pCO2: 37    /  pO2: 64    / HCO3: 28    / Base Excess: 4.2   /  SaO2: 94                Mode: AC/ CMV (Assist Control/ Continuous Mandatory Ventilation)  RR (machine): 10  TV (machine): 500  FiO2: 60  PEEP: 8  ITime: 2.3  MAP: 16  PIP: 28    Intake and output was reviewed and the fluid balance was calculated  Daily     Daily   I&O's Summary  I & Os for 24h ending 23 May 2017 07:00  =============================================  IN: 2911.5 ml / OUT: 2806 ml / NET: 105.5 ml    I & Os for current day (as of 23 May 2017 21:50)  =============================================  IN: 1957.2 ml / OUT: 1959 ml / NET: -1.8 ml      All lines and drain sites were assessed  Glycemic trend was reviewedCAPILLARY BLOOD GLUCOSE  90 (23 May 2017 21:00)    No acute change in mental status  Auscultation of the chest reveals equal bs  Abdomen is soft  Extremities are warm and well perfused  Wounds appear clean and unremarkable  Antibiotics are periop    labs  CBC Full  -  ( 23 May 2017 19:10 )  WBC Count : 9.1 K/uL  Hemoglobin : 10.0 g/dL  Hematocrit : 30.4 %  Platelet Count - Automated : 126 K/uL  Mean Cell Volume : 85.6 fL  Mean Cell Hemoglobin : 28.2 pg  Mean Cell Hemoglobin Concentration : 32.9 g/dL  Auto Neutrophil # : x  Auto Lymphocyte # : x  Auto Monocyte # : x  Auto Eosinophil # : x  Auto Basophil # : x  Auto Neutrophil % : x  Auto Lymphocyte % : x  Auto Monocyte % : x  Auto Eosinophil % : x  Auto Basophil % : x    05-23    145  |  106  |  21  ----------------------------<  97  3.8   |  26  |  0.60    Ca    8.2<L>      23 May 2017 19:10  Phos  3.3     05-23  Mg     1.9     05-23    TPro  6.0  /  Alb  3.6  /  TBili  0.3  /  DBili  x   /  AST  86<H>  /  ALT  24  /  AlkPhos  25<L>  05-23    PT/INR - ( 23 May 2017 19:10 )   PT: 12.1 sec;   INR: 1.09          PTT - ( 23 May 2017 19:10 )  PTT:24.0 sec  The current medications were reviewed   MEDICATIONS  (STANDING):  sodium chloride 0.45%. 1000milliLiter(s) IV Continuous <Continuous>  heparin  Injectable 5000Unit(s) SubCutaneous every 8 hours  ceFAZolin   IVPB 2000milliGRAM(s) IV Intermittent every 8 hours  famotidine Injectable 20milliGRAM(s) IV Push daily  milrinone Infusion 0.199MICROgram(s)/kG/Min IV Continuous <Continuous>  norepinephrine Infusion 0.029MICROgram(s)/kG/Min IV Continuous <Continuous>  insulin Infusion 1Unit(s)/Hr IV Continuous <Continuous>  dexmedetomidine Infusion 0.411MICROgram(s)/kG/Hr IV Continuous <Continuous>  vasopressin Infusion 0.017Unit(s)/Min IV Continuous <Continuous>  atorvastatin 80milliGRAM(s) Oral at bedtime  acetaminophen  IVPB. 1000milliGRAM(s) IV Intermittent once  calcium gluconate IVPB 2Gram(s) IV Intermittent once    MEDICATIONS  (PRN):       PROBLEM LIST/ ASSESSMENT:  HEALTH ISSUES - PROBLEM Dx:      ,   Patient is a 57y old  Female who presents with a chief complaint of AS (19 May 2017 10:50)     s/p aortic root cabg pod 1  acute changes include acute respiratory failure    My plan includes :  close hemodynamic, ventilatory and drain monitoring and management per post op routine    Monitor for arrhythmias and monitor parameters for organ perfusion  monitor neurologic status  Head of the bed should remain elevated to 45 deg .   chest PT and IS will be encouraged  monitor adequacy of oxygenation and ventilation and attempt to wean oxygen  Nutritional goals will be met using po eventually , ensure adequate caloric intake and montior the same  Stress ulcer and VTE prophylaxis will be achieved    Glycemic control is satisfactory  Electrolytes have been repleted as necessary and wound care has been carried out. Pain control has been achieved.   agressive physical therapy and early mobility and ambulation goals will be met   The family was updated about the course and plan  CRITICAL CARE TIME SPENT in evaluation and management, reassessments, review and interpretation of labs and x-rays, ventilator and hemodynamic management, formulating a plan and coordinating care: ___90____ MIN.  Time does not include procedural time.  CTICU ATTENDING     					    Rivera Pace MD

## 2017-05-24 LAB
ALBUMIN SERPL ELPH-MCNC: 3.2 G/DL — LOW (ref 3.3–5)
ALBUMIN SERPL ELPH-MCNC: 3.4 G/DL — SIGNIFICANT CHANGE UP (ref 3.3–5)
ALP SERPL-CCNC: 20 U/L — LOW (ref 40–120)
ALP SERPL-CCNC: 22 U/L — LOW (ref 40–120)
ALT FLD-CCNC: 15 U/L — SIGNIFICANT CHANGE UP (ref 10–45)
ALT FLD-CCNC: 20 U/L — SIGNIFICANT CHANGE UP (ref 10–45)
ANION GAP SERPL CALC-SCNC: 12 MMOL/L — SIGNIFICANT CHANGE UP (ref 5–17)
ANION GAP SERPL CALC-SCNC: 13 MMOL/L — SIGNIFICANT CHANGE UP (ref 5–17)
ANION GAP SERPL CALC-SCNC: 13 MMOL/L — SIGNIFICANT CHANGE UP (ref 5–17)
ANION GAP SERPL CALC-SCNC: 15 MMOL/L — SIGNIFICANT CHANGE UP (ref 5–17)
APTT BLD: 20 SEC — LOW (ref 27.5–37.4)
APTT BLD: 23.2 SEC — LOW (ref 27.5–37.4)
APTT BLD: 25.1 SEC — LOW (ref 27.5–37.4)
APTT BLD: 26.1 SEC — LOW (ref 27.5–37.4)
AST SERPL-CCNC: 30 U/L — SIGNIFICANT CHANGE UP (ref 10–40)
AST SERPL-CCNC: 53 U/L — HIGH (ref 10–40)
BASE EXCESS BLDA CALC-SCNC: 3.9 MMOL/L — HIGH (ref -2–3)
BASE EXCESS BLDA CALC-SCNC: 4.9 MMOL/L — HIGH (ref -2–3)
BASOPHILS NFR BLD AUTO: 0.1 % — SIGNIFICANT CHANGE UP (ref 0–2)
BILIRUB SERPL-MCNC: 0.2 MG/DL — SIGNIFICANT CHANGE UP (ref 0.2–1.2)
BILIRUB SERPL-MCNC: 0.3 MG/DL — SIGNIFICANT CHANGE UP (ref 0.2–1.2)
BUN SERPL-MCNC: 21 MG/DL — SIGNIFICANT CHANGE UP (ref 7–23)
BUN SERPL-MCNC: 22 MG/DL — SIGNIFICANT CHANGE UP (ref 7–23)
BUN SERPL-MCNC: 23 MG/DL — SIGNIFICANT CHANGE UP (ref 7–23)
BUN SERPL-MCNC: 24 MG/DL — HIGH (ref 7–23)
CALCIUM SERPL-MCNC: 8.1 MG/DL — LOW (ref 8.4–10.5)
CALCIUM SERPL-MCNC: 8.4 MG/DL — SIGNIFICANT CHANGE UP (ref 8.4–10.5)
CALCIUM SERPL-MCNC: 8.5 MG/DL — SIGNIFICANT CHANGE UP (ref 8.4–10.5)
CALCIUM SERPL-MCNC: 8.6 MG/DL — SIGNIFICANT CHANGE UP (ref 8.4–10.5)
CHLORIDE SERPL-SCNC: 102 MMOL/L — SIGNIFICANT CHANGE UP (ref 96–108)
CHLORIDE SERPL-SCNC: 104 MMOL/L — SIGNIFICANT CHANGE UP (ref 96–108)
CHLORIDE SERPL-SCNC: 104 MMOL/L — SIGNIFICANT CHANGE UP (ref 96–108)
CHLORIDE SERPL-SCNC: 98 MMOL/L — SIGNIFICANT CHANGE UP (ref 96–108)
CO2 SERPL-SCNC: 26 MMOL/L — SIGNIFICANT CHANGE UP (ref 22–31)
CO2 SERPL-SCNC: 27 MMOL/L — SIGNIFICANT CHANGE UP (ref 22–31)
CREAT SERPL-MCNC: 0.5 MG/DL — SIGNIFICANT CHANGE UP (ref 0.5–1.3)
CREAT SERPL-MCNC: 0.6 MG/DL — SIGNIFICANT CHANGE UP (ref 0.5–1.3)
EOSINOPHIL NFR BLD AUTO: 0.1 % — SIGNIFICANT CHANGE UP (ref 0–6)
GAS PNL BLDA: SIGNIFICANT CHANGE UP
GLUCOSE SERPL-MCNC: 100 MG/DL — HIGH (ref 70–99)
GLUCOSE SERPL-MCNC: 131 MG/DL — HIGH (ref 70–99)
GLUCOSE SERPL-MCNC: 156 MG/DL — HIGH (ref 70–99)
GLUCOSE SERPL-MCNC: 88 MG/DL — SIGNIFICANT CHANGE UP (ref 70–99)
HCO3 BLDA-SCNC: 28 MMOL/L — SIGNIFICANT CHANGE UP (ref 21–28)
HCO3 BLDA-SCNC: 29 MMOL/L — HIGH (ref 21–28)
HCT VFR BLD CALC: 30.4 % — LOW (ref 34.5–45)
HCT VFR BLD CALC: 30.6 % — LOW (ref 34.5–45)
HCT VFR BLD CALC: 30.9 % — LOW (ref 34.5–45)
HCT VFR BLD CALC: 31.2 % — LOW (ref 34.5–45)
HGB BLD-MCNC: 10 G/DL — LOW (ref 11.5–15.5)
HGB BLD-MCNC: 10.3 G/DL — LOW (ref 11.5–15.5)
HGB BLD-MCNC: 10.5 G/DL — LOW (ref 11.5–15.5)
HGB BLD-MCNC: 10.5 G/DL — LOW (ref 11.5–15.5)
INR BLD: 1.11 — SIGNIFICANT CHANGE UP (ref 0.88–1.16)
LYMPHOCYTES # BLD AUTO: 13 % — SIGNIFICANT CHANGE UP (ref 13–44)
MAGNESIUM SERPL-MCNC: 2 MG/DL — SIGNIFICANT CHANGE UP (ref 1.6–2.6)
MAGNESIUM SERPL-MCNC: 2.2 MG/DL — SIGNIFICANT CHANGE UP (ref 1.6–2.6)
MAGNESIUM SERPL-MCNC: 2.3 MG/DL — SIGNIFICANT CHANGE UP (ref 1.6–2.6)
MAGNESIUM SERPL-MCNC: 3.2 MG/DL — HIGH (ref 1.6–2.6)
MCHC RBC-ENTMCNC: 28.3 PG — SIGNIFICANT CHANGE UP (ref 27–34)
MCHC RBC-ENTMCNC: 28.7 PG — SIGNIFICANT CHANGE UP (ref 27–34)
MCHC RBC-ENTMCNC: 29.1 PG — SIGNIFICANT CHANGE UP (ref 27–34)
MCHC RBC-ENTMCNC: 29.3 PG — SIGNIFICANT CHANGE UP (ref 27–34)
MCHC RBC-ENTMCNC: 32.7 G/DL — SIGNIFICANT CHANGE UP (ref 32–36)
MCHC RBC-ENTMCNC: 33 G/DL — SIGNIFICANT CHANGE UP (ref 32–36)
MCHC RBC-ENTMCNC: 34 G/DL — SIGNIFICANT CHANGE UP (ref 32–36)
MCHC RBC-ENTMCNC: 34.5 G/DL — SIGNIFICANT CHANGE UP (ref 32–36)
MCV RBC AUTO: 84.9 FL — SIGNIFICANT CHANGE UP (ref 80–100)
MCV RBC AUTO: 85.6 FL — SIGNIFICANT CHANGE UP (ref 80–100)
MCV RBC AUTO: 86.7 FL — SIGNIFICANT CHANGE UP (ref 80–100)
MCV RBC AUTO: 86.9 FL — SIGNIFICANT CHANGE UP (ref 80–100)
MONOCYTES NFR BLD AUTO: 9.9 % — SIGNIFICANT CHANGE UP (ref 2–14)
NEUTROPHILS NFR BLD AUTO: 76.9 % — SIGNIFICANT CHANGE UP (ref 43–77)
PCO2 BLDA: 39 MMHG — SIGNIFICANT CHANGE UP (ref 32–45)
PCO2 BLDA: 40 MMHG — SIGNIFICANT CHANGE UP (ref 32–45)
PH BLDA: 7.46 — HIGH (ref 7.35–7.45)
PH BLDA: 7.48 — HIGH (ref 7.35–7.45)
PHOSPHATE SERPL-MCNC: 2.4 MG/DL — LOW (ref 2.5–4.5)
PHOSPHATE SERPL-MCNC: 2.8 MG/DL — SIGNIFICANT CHANGE UP (ref 2.5–4.5)
PHOSPHATE SERPL-MCNC: 3.7 MG/DL — SIGNIFICANT CHANGE UP (ref 2.5–4.5)
PLATELET # BLD AUTO: 107 K/UL — LOW (ref 150–400)
PLATELET # BLD AUTO: 107 K/UL — LOW (ref 150–400)
PLATELET # BLD AUTO: 110 K/UL — LOW (ref 150–400)
PLATELET # BLD AUTO: 115 K/UL — LOW (ref 150–400)
PO2 BLDA: 68 MMHG — LOW (ref 83–108)
PO2 BLDA: 70 MMHG — LOW (ref 83–108)
POTASSIUM SERPL-MCNC: 3.3 MMOL/L — LOW (ref 3.5–5.3)
POTASSIUM SERPL-MCNC: 3.6 MMOL/L — SIGNIFICANT CHANGE UP (ref 3.5–5.3)
POTASSIUM SERPL-MCNC: 3.9 MMOL/L — SIGNIFICANT CHANGE UP (ref 3.5–5.3)
POTASSIUM SERPL-MCNC: 4.4 MMOL/L — SIGNIFICANT CHANGE UP (ref 3.5–5.3)
POTASSIUM SERPL-SCNC: 3.3 MMOL/L — LOW (ref 3.5–5.3)
POTASSIUM SERPL-SCNC: 3.6 MMOL/L — SIGNIFICANT CHANGE UP (ref 3.5–5.3)
POTASSIUM SERPL-SCNC: 3.9 MMOL/L — SIGNIFICANT CHANGE UP (ref 3.5–5.3)
POTASSIUM SERPL-SCNC: 4.4 MMOL/L — SIGNIFICANT CHANGE UP (ref 3.5–5.3)
PROT SERPL-MCNC: 6 G/DL — SIGNIFICANT CHANGE UP (ref 6–8.3)
PROT SERPL-MCNC: 6 G/DL — SIGNIFICANT CHANGE UP (ref 6–8.3)
PROTHROM AB SERPL-ACNC: 12.3 SEC — SIGNIFICANT CHANGE UP (ref 9.8–12.7)
PROTHROM AB SERPL-ACNC: 12.4 SEC — SIGNIFICANT CHANGE UP (ref 9.8–12.7)
RBC # BLD: 3.53 M/UL — LOW (ref 3.8–5.2)
RBC # BLD: 3.58 M/UL — LOW (ref 3.8–5.2)
RBC # BLD: 3.59 M/UL — LOW (ref 3.8–5.2)
RBC # BLD: 3.61 M/UL — LOW (ref 3.8–5.2)
RBC # FLD: 13.7 % — SIGNIFICANT CHANGE UP (ref 10.3–16.9)
RBC # FLD: 13.7 % — SIGNIFICANT CHANGE UP (ref 10.3–16.9)
RBC # FLD: 13.9 % — SIGNIFICANT CHANGE UP (ref 10.3–16.9)
RBC # FLD: 13.9 % — SIGNIFICANT CHANGE UP (ref 10.3–16.9)
SAO2 % BLDA: 94 % — LOW (ref 95–100)
SAO2 % BLDA: 95 % — SIGNIFICANT CHANGE UP (ref 95–100)
SODIUM SERPL-SCNC: 140 MMOL/L — SIGNIFICANT CHANGE UP (ref 135–145)
SODIUM SERPL-SCNC: 142 MMOL/L — SIGNIFICANT CHANGE UP (ref 135–145)
SODIUM SERPL-SCNC: 142 MMOL/L — SIGNIFICANT CHANGE UP (ref 135–145)
SODIUM SERPL-SCNC: 144 MMOL/L — SIGNIFICANT CHANGE UP (ref 135–145)
WBC # BLD: 11 K/UL — HIGH (ref 3.8–10.5)
WBC # BLD: 11.4 K/UL — HIGH (ref 3.8–10.5)
WBC # BLD: 11.9 K/UL — HIGH (ref 3.8–10.5)
WBC # BLD: 13 K/UL — HIGH (ref 3.8–10.5)
WBC # FLD AUTO: 11 K/UL — HIGH (ref 3.8–10.5)
WBC # FLD AUTO: 11.4 K/UL — HIGH (ref 3.8–10.5)
WBC # FLD AUTO: 11.9 K/UL — HIGH (ref 3.8–10.5)
WBC # FLD AUTO: 13 K/UL — HIGH (ref 3.8–10.5)

## 2017-05-24 PROCEDURE — 71010: CPT | Mod: 26

## 2017-05-24 PROCEDURE — 93010 ELECTROCARDIOGRAM REPORT: CPT

## 2017-05-24 PROCEDURE — 99291 CRITICAL CARE FIRST HOUR: CPT

## 2017-05-24 PROCEDURE — 71010: CPT | Mod: 26,77

## 2017-05-24 PROCEDURE — 43752 NASAL/OROGASTRIC W/TUBE PLMT: CPT

## 2017-05-24 RX ORDER — ALBUTEROL 90 UG/1
1 AEROSOL, METERED ORAL EVERY 4 HOURS
Qty: 0 | Refills: 0 | Status: DISCONTINUED | OUTPATIENT
Start: 2017-05-24 | End: 2017-05-27

## 2017-05-24 RX ORDER — FUROSEMIDE 40 MG
20 TABLET ORAL ONCE
Qty: 0 | Refills: 0 | Status: COMPLETED | OUTPATIENT
Start: 2017-05-24 | End: 2017-05-24

## 2017-05-24 RX ORDER — DIAZEPAM 5 MG
5 TABLET ORAL ONCE
Qty: 0 | Refills: 0 | Status: DISCONTINUED | OUTPATIENT
Start: 2017-05-24 | End: 2017-05-24

## 2017-05-24 RX ORDER — MAGNESIUM SULFATE 500 MG/ML
1 VIAL (ML) INJECTION ONCE
Qty: 0 | Refills: 0 | Status: COMPLETED | OUTPATIENT
Start: 2017-05-24 | End: 2017-05-24

## 2017-05-24 RX ORDER — POTASSIUM CHLORIDE 20 MEQ
20 PACKET (EA) ORAL
Qty: 0 | Refills: 0 | Status: COMPLETED | OUTPATIENT
Start: 2017-05-24 | End: 2017-05-24

## 2017-05-24 RX ORDER — ALBUMIN HUMAN 25 %
250 VIAL (ML) INTRAVENOUS ONCE
Qty: 0 | Refills: 0 | Status: COMPLETED | OUTPATIENT
Start: 2017-05-24 | End: 2017-05-24

## 2017-05-24 RX ORDER — POTASSIUM CHLORIDE 20 MEQ
20 PACKET (EA) ORAL ONCE
Qty: 0 | Refills: 0 | Status: COMPLETED | OUTPATIENT
Start: 2017-05-24 | End: 2017-05-24

## 2017-05-24 RX ORDER — IPRATROPIUM/ALBUTEROL SULFATE 18-103MCG
3 AEROSOL WITH ADAPTER (GRAM) INHALATION EVERY 6 HOURS
Qty: 0 | Refills: 0 | Status: DISCONTINUED | OUTPATIENT
Start: 2017-05-24 | End: 2017-05-27

## 2017-05-24 RX ORDER — PROPOFOL 10 MG/ML
5 INJECTION, EMULSION INTRAVENOUS
Qty: 1000 | Refills: 0 | Status: DISCONTINUED | OUTPATIENT
Start: 2017-05-24 | End: 2017-05-24

## 2017-05-24 RX ORDER — FUROSEMIDE 40 MG
40 TABLET ORAL ONCE
Qty: 0 | Refills: 0 | Status: COMPLETED | OUTPATIENT
Start: 2017-05-24 | End: 2017-05-24

## 2017-05-24 RX ORDER — KETOROLAC TROMETHAMINE 30 MG/ML
15 SYRINGE (ML) INJECTION ONCE
Qty: 0 | Refills: 0 | Status: COMPLETED | OUTPATIENT
Start: 2017-05-24 | End: 2017-05-29

## 2017-05-24 RX ORDER — HYDROMORPHONE HYDROCHLORIDE 2 MG/ML
0.5 INJECTION INTRAMUSCULAR; INTRAVENOUS; SUBCUTANEOUS ONCE
Qty: 0 | Refills: 0 | Status: DISCONTINUED | OUTPATIENT
Start: 2017-05-24 | End: 2017-05-24

## 2017-05-24 RX ORDER — TIOTROPIUM BROMIDE 18 UG/1
1 CAPSULE ORAL; RESPIRATORY (INHALATION) DAILY
Qty: 0 | Refills: 0 | Status: DISCONTINUED | OUTPATIENT
Start: 2017-05-24 | End: 2017-06-03

## 2017-05-24 RX ORDER — ACETAMINOPHEN 500 MG
1000 TABLET ORAL ONCE
Qty: 0 | Refills: 0 | Status: COMPLETED | OUTPATIENT
Start: 2017-05-24 | End: 2017-05-24

## 2017-05-24 RX ORDER — POTASSIUM CHLORIDE 20 MEQ
20 PACKET (EA) ORAL ONCE
Qty: 0 | Refills: 0 | Status: DISCONTINUED | OUTPATIENT
Start: 2017-05-24 | End: 2017-05-24

## 2017-05-24 RX ORDER — LIDOCAINE 4 G/100G
1 CREAM TOPICAL DAILY
Qty: 0 | Refills: 0 | Status: DISCONTINUED | OUTPATIENT
Start: 2017-05-24 | End: 2017-06-03

## 2017-05-24 RX ADMIN — Medication 100 MILLIEQUIVALENT(S): at 09:30

## 2017-05-24 RX ADMIN — HYDROMORPHONE HYDROCHLORIDE 0.5 MILLIGRAM(S): 2 INJECTION INTRAMUSCULAR; INTRAVENOUS; SUBCUTANEOUS at 07:57

## 2017-05-24 RX ADMIN — Medication 1000 MILLIGRAM(S): at 06:15

## 2017-05-24 RX ADMIN — HEPARIN SODIUM 5000 UNIT(S): 5000 INJECTION INTRAVENOUS; SUBCUTANEOUS at 05:43

## 2017-05-24 RX ADMIN — HYDROMORPHONE HYDROCHLORIDE 0.5 MILLIGRAM(S): 2 INJECTION INTRAMUSCULAR; INTRAVENOUS; SUBCUTANEOUS at 08:16

## 2017-05-24 RX ADMIN — Medication 40 MILLIGRAM(S): at 09:30

## 2017-05-24 RX ADMIN — Medication 100 MILLIEQUIVALENT(S): at 07:18

## 2017-05-24 RX ADMIN — DEXMEDETOMIDINE HYDROCHLORIDE IN 0.9% SODIUM CHLORIDE 10 MICROGRAM(S)/KG/HR: 4 INJECTION INTRAVENOUS at 17:30

## 2017-05-24 RX ADMIN — Medication 3 MILLILITER(S): at 15:12

## 2017-05-24 RX ADMIN — Medication 100 MILLIEQUIVALENT(S): at 19:00

## 2017-05-24 RX ADMIN — FAMOTIDINE 20 MILLIGRAM(S): 10 INJECTION INTRAVENOUS at 12:00

## 2017-05-24 RX ADMIN — Medication 100 MILLIEQUIVALENT(S): at 23:27

## 2017-05-24 RX ADMIN — INSULIN HUMAN 1 UNIT(S)/HR: 100 INJECTION, SOLUTION SUBCUTANEOUS at 10:00

## 2017-05-24 RX ADMIN — Medication 100 MILLIEQUIVALENT(S): at 04:30

## 2017-05-24 RX ADMIN — Medication 100 MILLIEQUIVALENT(S): at 19:30

## 2017-05-24 RX ADMIN — VASOPRESSIN 1 UNIT(S)/MIN: 20 INJECTION INTRAVENOUS at 07:44

## 2017-05-24 RX ADMIN — Medication 100 GRAM(S): at 13:00

## 2017-05-24 RX ADMIN — Medication 40 MILLIGRAM(S): at 19:30

## 2017-05-24 RX ADMIN — HEPARIN SODIUM 5000 UNIT(S): 5000 INJECTION INTRAVENOUS; SUBCUTANEOUS at 21:28

## 2017-05-24 RX ADMIN — Medication 20 MILLIGRAM(S): at 01:30

## 2017-05-24 RX ADMIN — Medication 5 MILLIGRAM(S): at 12:00

## 2017-05-24 RX ADMIN — DEXMEDETOMIDINE HYDROCHLORIDE IN 0.9% SODIUM CHLORIDE 10 MICROGRAM(S)/KG/HR: 4 INJECTION INTRAVENOUS at 12:00

## 2017-05-24 RX ADMIN — Medication 1000 MILLIGRAM(S): at 22:10

## 2017-05-24 RX ADMIN — Medication 400 MILLIGRAM(S): at 21:53

## 2017-05-24 RX ADMIN — INSULIN HUMAN 1 UNIT(S)/HR: 100 INJECTION, SOLUTION SUBCUTANEOUS at 07:20

## 2017-05-24 RX ADMIN — HYDROMORPHONE HYDROCHLORIDE 0.5 MILLIGRAM(S): 2 INJECTION INTRAMUSCULAR; INTRAVENOUS; SUBCUTANEOUS at 17:15

## 2017-05-24 RX ADMIN — Medication 1000 MILLIGRAM(S): at 09:45

## 2017-05-24 RX ADMIN — Medication 400 MILLIGRAM(S): at 09:30

## 2017-05-24 RX ADMIN — Medication 20 MILLIGRAM(S): at 07:45

## 2017-05-24 RX ADMIN — Medication 400 MILLIGRAM(S): at 05:42

## 2017-05-24 RX ADMIN — Medication 100 MILLIEQUIVALENT(S): at 06:00

## 2017-05-24 RX ADMIN — HEPARIN SODIUM 5000 UNIT(S): 5000 INJECTION INTRAVENOUS; SUBCUTANEOUS at 15:30

## 2017-05-24 RX ADMIN — Medication 3 MILLILITER(S): at 23:28

## 2017-05-24 RX ADMIN — DEXMEDETOMIDINE HYDROCHLORIDE IN 0.9% SODIUM CHLORIDE 10 MICROGRAM(S)/KG/HR: 4 INJECTION INTRAVENOUS at 07:19

## 2017-05-24 RX ADMIN — Medication 100 GRAM(S): at 19:45

## 2017-05-24 RX ADMIN — HYDROMORPHONE HYDROCHLORIDE 0.5 MILLIGRAM(S): 2 INJECTION INTRAMUSCULAR; INTRAVENOUS; SUBCUTANEOUS at 17:30

## 2017-05-24 RX ADMIN — DEXMEDETOMIDINE HYDROCHLORIDE IN 0.9% SODIUM CHLORIDE 10 MICROGRAM(S)/KG/HR: 4 INJECTION INTRAVENOUS at 00:44

## 2017-05-24 RX ADMIN — ATORVASTATIN CALCIUM 80 MILLIGRAM(S): 80 TABLET, FILM COATED ORAL at 21:27

## 2017-05-24 RX ADMIN — LIDOCAINE 1 PATCH: 4 CREAM TOPICAL at 12:00

## 2017-05-24 RX ADMIN — Medication 100 MILLIGRAM(S): at 01:00

## 2017-05-24 RX ADMIN — Medication 3 MILLILITER(S): at 17:25

## 2017-05-24 NOTE — DIETITIAN INITIAL EVALUATION ADULT. - ENERGY NEEDS
IBW 52.3Kg  %%  BMI 38    Utilized IBW to calculate needs, pt >120% of IBW. Adjusted needs for post-op/vent.

## 2017-05-24 NOTE — DIETITIAN INITIAL EVALUATION ADULT. - OTHER INFO
56y/o F s/p AVR, Root replacement, CABG X1. Pt remains intubated on CPAP. Precedex and insulin infusion running. If pt is safely extubated, conduct dysphagia screen vs formal S&S prior to diet advancement. If pt remains intubated, recommend EN initiation; route per MD. Will follow.

## 2017-05-24 NOTE — PROGRESS NOTE ADULT - ASSESSMENT
58yo female with history of Asthma, obesity, HTN, active smoker diagnosed with severe AS (ARAMIS 0.6cm 2) on workup of CARRILLO.  Pt underwent AVR, root repair and CABG X1 (vein to RCA)on 5/21  Uncomplicated case.  Post op remains vent dependent due to hypoxia    Problems  1. s/p AVR, root repair and CABG x1  2. hemodynamic instability  3. post op resp failure - increased A-a gradient    Plan   Neuro -- pain control, resume her home pain meds  CVS - hemodynamic support, continue to wean pressors  chest tube management  Pulm - vent weaning as tolerated  - CPAP wean  GI - GI proph, start feeds   - monitor UOP, aggressive diuresis  Endo - glycemic control  Heme -  DVT prophylaxis  ID - completed periop antibiotics    Critical post op.    Critical care time spent 40 min

## 2017-05-24 NOTE — PROGRESS NOTE ADULT - SUBJECTIVE AND OBJECTIVE BOX
POD #2 s/p AVR, Root replacement, CABG X1   EF normal    Post op course complicated by hypoxia increased A-a gradient  On iontropic support which was weaned this AM  remains on low dose Vaso  Pt complaining of chronic low back pain    PMH :  AS CAD  Back injury  GERD (gastroesophageal reflux disease)  Diverticulitis  Asthma  Obesity  Diabetes mellitus  Hyperlipidemia  Hypertension    ROS No complaints  All other systems reviewed and negative.    ICU Vital Signs Last 24 Hrs  T(C): 36.1, Max: 37.1 (05-23 @ 11:00)  T(F): 97, Max: 98.8 (05-23 @ 11:00)  HR: 70 (70 - 102)  ABP: 106/82 (88/70 - 146/66)  ABP(mean): 92 (62 - 94)  RR: 23 (10 - 28)  SpO2: 94% (92% - 98%)    I&O's Summary  I & Os for 24h ending 24 May 2017 07:00  =============================================  IN: 2850 ml / OUT: 3491 ml / NET: -641 ml    I & Os for current day (as of 24 May 2017 10:35)  =============================================  IN: 46.4 ml / OUT: 421 ml / NET: -374.6 ml    Mode: CPAP with PS  FiO2: 80  PEEP: 8  PS: 15  PIP: 26    ABG - ( 24 May 2017 08:12 )  pH: 7.46  /  pCO2: 40    /  pO2: 70    / HCO3: 28    / Base Excess: 3.9   /  SaO2: 95                              10.5   11.4  )-----------( 115      ( 24 May 2017 02:40 )             30.4     24 May 2017 02:41    140    |  98     |  22     ----------------------------<  131    3.3     |  27     |  0.60     Ca    8.4        24 May 2017 02:41  Phos  3.7       24 May 2017 02:41  Mg     2.2       24 May 2017 02:41    TPro  6.0    /  Alb  3.4    /  TBili  0.2    /  DBili  x      /  AST  53     /  ALT  20     /  AlkPhos  22     24 May 2017 02:41    PT/INR - ( 24 May 2017 02:38 )   PT: 12.3 sec;   INR: 1.11          PTT - ( 24 May 2017 02:38 )  PTT:23.2 sec  MEDICATIONS  (STANDING):  sodium chloride 0.45%. 1000milliLiter(s) IV Continuous <Continuous>  heparin  Injectable 5000Unit(s) SubCutaneous every 8 hours  famotidine Injectable 20milliGRAM(s) IV Push daily  insulin Infusion 1Unit(s)/Hr IV Continuous <Continuous>  dexmedetomidine Infusion 0.411MICROgram(s)/kG/Hr IV Continuous <Continuous>  vasopressin Infusion 0.017Unit(s)/Min IV Continuous <Continuous>  atorvastatin 80milliGRAM(s) Oral at bedtime  potassium chloride  20 mEq/100 mL IVPB 20milliEquivalent(s) IV Intermittent once  diazepam    Tablet 5milliGRAM(s) Oral once  ALBUTerol/ipratropium for Nebulization 3milliLiter(s) Nebulizer every 6 hours  ALBUTerol    90 MICROgram(s) HFA Inhaler 1Puff(s) Inhalation every 4 hours  tiotropium 18 MICROgram(s) Capsule 1Capsule(s) Inhalation daily  lidocaine   Patch 1Patch Transdermal daily    PHYSICAL EXAM:  Gen : no acute distress  Respiratory: decreased in the bases, no wheezing  Cardiovascular: S1 and S2, RRR, no M/G/R  Gastrointestinal: BS+, soft, NT/ND  Extremities: No peripheral edema  Vascular: 2+ peripheral pulses  Neurological: A/O x 3, no focal deficits  Incision: clean dry/ no sign of infection  Lines: no sign of infection

## 2017-05-24 NOTE — DIETITIAN INITIAL EVALUATION ADULT. - PERTINENT MEDS FT
heparin, KCl, propofol, lipitor, vasopressin, precedex, pepcid, primacor, insulin infusion, levophed

## 2017-05-25 LAB
ALBUMIN SERPL ELPH-MCNC: 3 G/DL — LOW (ref 3.3–5)
ALBUMIN SERPL ELPH-MCNC: 3.1 G/DL — LOW (ref 3.3–5)
ALP SERPL-CCNC: 21 U/L — LOW (ref 40–120)
ALP SERPL-CCNC: 24 U/L — LOW (ref 40–120)
ALT FLD-CCNC: 14 U/L — SIGNIFICANT CHANGE UP (ref 10–45)
ALT FLD-CCNC: 19 U/L — SIGNIFICANT CHANGE UP (ref 10–45)
ANION GAP SERPL CALC-SCNC: 12 MMOL/L — SIGNIFICANT CHANGE UP (ref 5–17)
ANION GAP SERPL CALC-SCNC: 15 MMOL/L — SIGNIFICANT CHANGE UP (ref 5–17)
ANION GAP SERPL CALC-SCNC: 16 MMOL/L — SIGNIFICANT CHANGE UP (ref 5–17)
APTT BLD: 23.7 SEC — LOW (ref 27.5–37.4)
APTT BLD: 26.9 SEC — LOW (ref 27.5–37.4)
APTT BLD: 28 SEC — SIGNIFICANT CHANGE UP (ref 27.5–37.4)
AST SERPL-CCNC: 27 U/L — SIGNIFICANT CHANGE UP (ref 10–40)
AST SERPL-CCNC: 31 U/L — SIGNIFICANT CHANGE UP (ref 10–40)
BASE EXCESS BLDA CALC-SCNC: 0.1 MMOL/L — SIGNIFICANT CHANGE UP (ref -2–3)
BASE EXCESS BLDA CALC-SCNC: 1.8 MMOL/L — SIGNIFICANT CHANGE UP (ref -2–3)
BASE EXCESS BLDA CALC-SCNC: 4.1 MMOL/L — HIGH (ref -2–3)
BASE EXCESS BLDV CALC-SCNC: 4.2 MMOL/L — SIGNIFICANT CHANGE UP
BILIRUB SERPL-MCNC: 0.3 MG/DL — SIGNIFICANT CHANGE UP (ref 0.2–1.2)
BILIRUB SERPL-MCNC: 0.4 MG/DL — SIGNIFICANT CHANGE UP (ref 0.2–1.2)
BUN SERPL-MCNC: 24 MG/DL — HIGH (ref 7–23)
BUN SERPL-MCNC: 26 MG/DL — HIGH (ref 7–23)
BUN SERPL-MCNC: 27 MG/DL — HIGH (ref 7–23)
CALCIUM SERPL-MCNC: 8.2 MG/DL — LOW (ref 8.4–10.5)
CALCIUM SERPL-MCNC: 8.6 MG/DL — SIGNIFICANT CHANGE UP (ref 8.4–10.5)
CALCIUM SERPL-MCNC: 8.7 MG/DL — SIGNIFICANT CHANGE UP (ref 8.4–10.5)
CHLORIDE SERPL-SCNC: 101 MMOL/L — SIGNIFICANT CHANGE UP (ref 96–108)
CHLORIDE SERPL-SCNC: 103 MMOL/L — SIGNIFICANT CHANGE UP (ref 96–108)
CHLORIDE SERPL-SCNC: 105 MMOL/L — SIGNIFICANT CHANGE UP (ref 96–108)
CO2 SERPL-SCNC: 24 MMOL/L — SIGNIFICANT CHANGE UP (ref 22–31)
CO2 SERPL-SCNC: 24 MMOL/L — SIGNIFICANT CHANGE UP (ref 22–31)
CO2 SERPL-SCNC: 25 MMOL/L — SIGNIFICANT CHANGE UP (ref 22–31)
CREAT SERPL-MCNC: 0.6 MG/DL — SIGNIFICANT CHANGE UP (ref 0.5–1.3)
CREAT SERPL-MCNC: 0.7 MG/DL — SIGNIFICANT CHANGE UP (ref 0.5–1.3)
CREAT SERPL-MCNC: 0.7 MG/DL — SIGNIFICANT CHANGE UP (ref 0.5–1.3)
GAS PNL BLDA: SIGNIFICANT CHANGE UP
GAS PNL BLDV: SIGNIFICANT CHANGE UP
GLUCOSE SERPL-MCNC: 119 MG/DL — HIGH (ref 70–99)
GLUCOSE SERPL-MCNC: 124 MG/DL — HIGH (ref 70–99)
GLUCOSE SERPL-MCNC: 99 MG/DL — SIGNIFICANT CHANGE UP (ref 70–99)
HCO3 BLDA-SCNC: 22 MMOL/L — SIGNIFICANT CHANGE UP (ref 21–28)
HCO3 BLDA-SCNC: 25 MMOL/L — SIGNIFICANT CHANGE UP (ref 21–28)
HCO3 BLDA-SCNC: 26 MMOL/L — SIGNIFICANT CHANGE UP (ref 21–28)
HCO3 BLDV-SCNC: 29 MMOL/L — HIGH (ref 20–27)
HCT VFR BLD CALC: 31 % — LOW (ref 34.5–45)
HCT VFR BLD CALC: 31.3 % — LOW (ref 34.5–45)
HCT VFR BLD CALC: 31.4 % — LOW (ref 34.5–45)
HGB BLD-MCNC: 10.1 G/DL — LOW (ref 11.5–15.5)
HGB BLD-MCNC: 10.2 G/DL — LOW (ref 11.5–15.5)
HGB BLD-MCNC: 10.3 G/DL — LOW (ref 11.5–15.5)
INR BLD: 1.1 — SIGNIFICANT CHANGE UP (ref 0.88–1.16)
INR BLD: 1.16 — SIGNIFICANT CHANGE UP (ref 0.88–1.16)
INR BLD: 1.16 — SIGNIFICANT CHANGE UP (ref 0.88–1.16)
LACTATE SERPL-SCNC: 1.1 MMOL/L — SIGNIFICANT CHANGE UP (ref 0.5–2)
MAGNESIUM SERPL-MCNC: 1.9 MG/DL — SIGNIFICANT CHANGE UP (ref 1.6–2.6)
MAGNESIUM SERPL-MCNC: 2 MG/DL — SIGNIFICANT CHANGE UP (ref 1.6–2.6)
MAGNESIUM SERPL-MCNC: 2.4 MG/DL — SIGNIFICANT CHANGE UP (ref 1.6–2.6)
MCHC RBC-ENTMCNC: 28.2 PG — SIGNIFICANT CHANGE UP (ref 27–34)
MCHC RBC-ENTMCNC: 28.3 PG — SIGNIFICANT CHANGE UP (ref 27–34)
MCHC RBC-ENTMCNC: 28.5 PG — SIGNIFICANT CHANGE UP (ref 27–34)
MCHC RBC-ENTMCNC: 32.6 G/DL — SIGNIFICANT CHANGE UP (ref 32–36)
MCHC RBC-ENTMCNC: 32.6 G/DL — SIGNIFICANT CHANGE UP (ref 32–36)
MCHC RBC-ENTMCNC: 32.8 G/DL — SIGNIFICANT CHANGE UP (ref 32–36)
MCV RBC AUTO: 86.6 FL — SIGNIFICANT CHANGE UP (ref 80–100)
MCV RBC AUTO: 86.7 FL — SIGNIFICANT CHANGE UP (ref 80–100)
MCV RBC AUTO: 87 FL — SIGNIFICANT CHANGE UP (ref 80–100)
PCO2 BLDA: 27 MMHG — LOW (ref 32–45)
PCO2 BLDA: 28 MMHG — LOW (ref 32–45)
PCO2 BLDA: 36 MMHG — SIGNIFICANT CHANGE UP (ref 32–45)
PCO2 BLDV: 42 MMHG — SIGNIFICANT CHANGE UP (ref 41–51)
PH BLDA: 7.46 — HIGH (ref 7.35–7.45)
PH BLDA: 7.53 — HIGH (ref 7.35–7.45)
PH BLDA: 7.57 — HIGH (ref 7.35–7.45)
PH BLDV: 7.45 — HIGH (ref 7.32–7.43)
PHOSPHATE SERPL-MCNC: 3.5 MG/DL — SIGNIFICANT CHANGE UP (ref 2.5–4.5)
PHOSPHATE SERPL-MCNC: 4.4 MG/DL — SIGNIFICANT CHANGE UP (ref 2.5–4.5)
PLATELET # BLD AUTO: 121 K/UL — LOW (ref 150–400)
PLATELET # BLD AUTO: 136 K/UL — LOW (ref 150–400)
PLATELET # BLD AUTO: 137 K/UL — LOW (ref 150–400)
PO2 BLDA: 108 MMHG — SIGNIFICANT CHANGE UP (ref 83–108)
PO2 BLDA: 83 MMHG — SIGNIFICANT CHANGE UP (ref 83–108)
PO2 BLDA: 83 MMHG — SIGNIFICANT CHANGE UP (ref 83–108)
PO2 BLDV: 36 MMHG — SIGNIFICANT CHANGE UP
POTASSIUM SERPL-MCNC: 3.1 MMOL/L — LOW (ref 3.5–5.3)
POTASSIUM SERPL-MCNC: 3.8 MMOL/L — SIGNIFICANT CHANGE UP (ref 3.5–5.3)
POTASSIUM SERPL-MCNC: 4.1 MMOL/L — SIGNIFICANT CHANGE UP (ref 3.5–5.3)
POTASSIUM SERPL-SCNC: 3.1 MMOL/L — LOW (ref 3.5–5.3)
POTASSIUM SERPL-SCNC: 3.8 MMOL/L — SIGNIFICANT CHANGE UP (ref 3.5–5.3)
POTASSIUM SERPL-SCNC: 4.1 MMOL/L — SIGNIFICANT CHANGE UP (ref 3.5–5.3)
PROT SERPL-MCNC: 6 G/DL — SIGNIFICANT CHANGE UP (ref 6–8.3)
PROT SERPL-MCNC: 6.1 G/DL — SIGNIFICANT CHANGE UP (ref 6–8.3)
PROTHROM AB SERPL-ACNC: 12.2 SEC — SIGNIFICANT CHANGE UP (ref 9.8–12.7)
PROTHROM AB SERPL-ACNC: 12.9 SEC — HIGH (ref 9.8–12.7)
PROTHROM AB SERPL-ACNC: 12.9 SEC — HIGH (ref 9.8–12.7)
RBC # BLD: 3.58 M/UL — LOW (ref 3.8–5.2)
RBC # BLD: 3.61 M/UL — LOW (ref 3.8–5.2)
RBC # BLD: 3.61 M/UL — LOW (ref 3.8–5.2)
RBC # FLD: 13.3 % — SIGNIFICANT CHANGE UP (ref 10.3–16.9)
RBC # FLD: 13.5 % — SIGNIFICANT CHANGE UP (ref 10.3–16.9)
RBC # FLD: 13.8 % — SIGNIFICANT CHANGE UP (ref 10.3–16.9)
SAO2 % BLDA: 95 % — SIGNIFICANT CHANGE UP (ref 95–100)
SAO2 % BLDA: 96 % — SIGNIFICANT CHANGE UP (ref 95–100)
SAO2 % BLDA: 98 % — SIGNIFICANT CHANGE UP (ref 95–100)
SAO2 % BLDV: 61 % — SIGNIFICANT CHANGE UP
SODIUM SERPL-SCNC: 141 MMOL/L — SIGNIFICANT CHANGE UP (ref 135–145)
SODIUM SERPL-SCNC: 142 MMOL/L — SIGNIFICANT CHANGE UP (ref 135–145)
SODIUM SERPL-SCNC: 142 MMOL/L — SIGNIFICANT CHANGE UP (ref 135–145)
WBC # BLD: 10.2 K/UL — SIGNIFICANT CHANGE UP (ref 3.8–10.5)
WBC # BLD: 12.5 K/UL — HIGH (ref 3.8–10.5)
WBC # BLD: 13.1 K/UL — HIGH (ref 3.8–10.5)
WBC # FLD AUTO: 10.2 K/UL — SIGNIFICANT CHANGE UP (ref 3.8–10.5)
WBC # FLD AUTO: 12.5 K/UL — HIGH (ref 3.8–10.5)
WBC # FLD AUTO: 13.1 K/UL — HIGH (ref 3.8–10.5)

## 2017-05-25 PROCEDURE — 71010: CPT | Mod: 26,77

## 2017-05-25 PROCEDURE — 93970 EXTREMITY STUDY: CPT | Mod: 26

## 2017-05-25 PROCEDURE — 93010 ELECTROCARDIOGRAM REPORT: CPT

## 2017-05-25 PROCEDURE — 99291 CRITICAL CARE FIRST HOUR: CPT

## 2017-05-25 PROCEDURE — 71010: CPT | Mod: 26

## 2017-05-25 PROCEDURE — 93306 TTE W/DOPPLER COMPLETE: CPT | Mod: 26

## 2017-05-25 RX ORDER — METOPROLOL TARTRATE 50 MG
12.5 TABLET ORAL
Qty: 0 | Refills: 0 | Status: DISCONTINUED | OUTPATIENT
Start: 2017-05-25 | End: 2017-05-26

## 2017-05-25 RX ORDER — PIPERACILLIN AND TAZOBACTAM 4; .5 G/20ML; G/20ML
3.38 INJECTION, POWDER, LYOPHILIZED, FOR SOLUTION INTRAVENOUS ONCE
Qty: 0 | Refills: 0 | Status: DISCONTINUED | OUTPATIENT
Start: 2017-05-25 | End: 2017-05-25

## 2017-05-25 RX ORDER — ALBUMIN HUMAN 25 %
100 VIAL (ML) INTRAVENOUS ONCE
Qty: 0 | Refills: 0 | Status: COMPLETED | OUTPATIENT
Start: 2017-05-25 | End: 2017-05-25

## 2017-05-25 RX ORDER — HYDRALAZINE HCL 50 MG
25 TABLET ORAL ONCE
Qty: 0 | Refills: 0 | Status: DISCONTINUED | OUTPATIENT
Start: 2017-05-25 | End: 2017-05-27

## 2017-05-25 RX ORDER — CALCIUM GLUCONATE 100 MG/ML
2 VIAL (ML) INTRAVENOUS ONCE
Qty: 0 | Refills: 0 | Status: COMPLETED | OUTPATIENT
Start: 2017-05-25 | End: 2017-05-25

## 2017-05-25 RX ORDER — NICOTINE POLACRILEX 2 MG
1 GUM BUCCAL DAILY
Qty: 0 | Refills: 0 | Status: DISCONTINUED | OUTPATIENT
Start: 2017-05-25 | End: 2017-06-03

## 2017-05-25 RX ORDER — MAGNESIUM SULFATE 500 MG/ML
2 VIAL (ML) INJECTION ONCE
Qty: 0 | Refills: 0 | Status: COMPLETED | OUTPATIENT
Start: 2017-05-25 | End: 2017-05-25

## 2017-05-25 RX ORDER — FUROSEMIDE 40 MG
20 TABLET ORAL ONCE
Qty: 0 | Refills: 0 | Status: COMPLETED | OUTPATIENT
Start: 2017-05-25 | End: 2017-05-25

## 2017-05-25 RX ORDER — FUROSEMIDE 40 MG
40 TABLET ORAL ONCE
Qty: 0 | Refills: 0 | Status: COMPLETED | OUTPATIENT
Start: 2017-05-25 | End: 2017-05-25

## 2017-05-25 RX ORDER — CHLORHEXIDINE GLUCONATE 213 G/1000ML
15 SOLUTION TOPICAL EVERY 4 HOURS
Qty: 0 | Refills: 0 | Status: DISCONTINUED | OUTPATIENT
Start: 2017-05-25 | End: 2017-05-27

## 2017-05-25 RX ORDER — FENTANYL CITRATE 50 UG/ML
25 INJECTION INTRAVENOUS ONCE
Qty: 0 | Refills: 0 | Status: DISCONTINUED | OUTPATIENT
Start: 2017-05-25 | End: 2017-05-25

## 2017-05-25 RX ORDER — POTASSIUM CHLORIDE 20 MEQ
20 PACKET (EA) ORAL
Qty: 0 | Refills: 0 | Status: COMPLETED | OUTPATIENT
Start: 2017-05-25 | End: 2017-05-26

## 2017-05-25 RX ORDER — MORPHINE SULFATE 50 MG/1
2 CAPSULE, EXTENDED RELEASE ORAL ONCE
Qty: 0 | Refills: 0 | Status: DISCONTINUED | OUTPATIENT
Start: 2017-05-25 | End: 2017-05-25

## 2017-05-25 RX ORDER — ACETAMINOPHEN 500 MG
1000 TABLET ORAL ONCE
Qty: 0 | Refills: 0 | Status: COMPLETED | OUTPATIENT
Start: 2017-05-25 | End: 2017-05-25

## 2017-05-25 RX ORDER — HYDROMORPHONE HYDROCHLORIDE 2 MG/ML
0.5 INJECTION INTRAMUSCULAR; INTRAVENOUS; SUBCUTANEOUS ONCE
Qty: 0 | Refills: 0 | Status: DISCONTINUED | OUTPATIENT
Start: 2017-05-25 | End: 2017-05-25

## 2017-05-25 RX ORDER — FUROSEMIDE 40 MG
40 TABLET ORAL EVERY 8 HOURS
Qty: 0 | Refills: 0 | Status: DISCONTINUED | OUTPATIENT
Start: 2017-05-25 | End: 2017-05-27

## 2017-05-25 RX ORDER — POTASSIUM CHLORIDE 20 MEQ
20 PACKET (EA) ORAL
Qty: 0 | Refills: 0 | Status: COMPLETED | OUTPATIENT
Start: 2017-05-25 | End: 2017-05-25

## 2017-05-25 RX ORDER — PIPERACILLIN AND TAZOBACTAM 4; .5 G/20ML; G/20ML
INJECTION, POWDER, LYOPHILIZED, FOR SOLUTION INTRAVENOUS
Qty: 0 | Refills: 0 | Status: DISCONTINUED | OUTPATIENT
Start: 2017-05-25 | End: 2017-05-28

## 2017-05-25 RX ORDER — KETOROLAC TROMETHAMINE 30 MG/ML
15 SYRINGE (ML) INJECTION ONCE
Qty: 0 | Refills: 0 | Status: DISCONTINUED | OUTPATIENT
Start: 2017-05-25 | End: 2017-05-25

## 2017-05-25 RX ORDER — PIPERACILLIN AND TAZOBACTAM 4; .5 G/20ML; G/20ML
4.5 INJECTION, POWDER, LYOPHILIZED, FOR SOLUTION INTRAVENOUS EVERY 6 HOURS
Qty: 0 | Refills: 0 | Status: DISCONTINUED | OUTPATIENT
Start: 2017-05-25 | End: 2017-05-28

## 2017-05-25 RX ORDER — ALPRAZOLAM 0.25 MG
5 TABLET ORAL DAILY
Qty: 0 | Refills: 0 | Status: DISCONTINUED | OUTPATIENT
Start: 2017-05-25 | End: 2017-05-25

## 2017-05-25 RX ORDER — POTASSIUM PHOSPHATE, MONOBASIC POTASSIUM PHOSPHATE, DIBASIC 236; 224 MG/ML; MG/ML
15 INJECTION, SOLUTION INTRAVENOUS ONCE
Qty: 0 | Refills: 0 | Status: COMPLETED | OUTPATIENT
Start: 2017-05-25 | End: 2017-05-25

## 2017-05-25 RX ORDER — PIPERACILLIN AND TAZOBACTAM 4; .5 G/20ML; G/20ML
4.5 INJECTION, POWDER, LYOPHILIZED, FOR SOLUTION INTRAVENOUS ONCE
Qty: 0 | Refills: 0 | Status: COMPLETED | OUTPATIENT
Start: 2017-05-25 | End: 2017-05-25

## 2017-05-25 RX ORDER — DIAZEPAM 5 MG
5 TABLET ORAL DAILY
Qty: 0 | Refills: 0 | Status: DISCONTINUED | OUTPATIENT
Start: 2017-05-25 | End: 2017-05-27

## 2017-05-25 RX ORDER — KETOROLAC TROMETHAMINE 30 MG/ML
30 SYRINGE (ML) INJECTION ONCE
Qty: 0 | Refills: 0 | Status: DISCONTINUED | OUTPATIENT
Start: 2017-05-25 | End: 2017-05-25

## 2017-05-25 RX ORDER — PIPERACILLIN AND TAZOBACTAM 4; .5 G/20ML; G/20ML
3.38 INJECTION, POWDER, LYOPHILIZED, FOR SOLUTION INTRAVENOUS EVERY 8 HOURS
Qty: 0 | Refills: 0 | Status: DISCONTINUED | OUTPATIENT
Start: 2017-05-25 | End: 2017-05-25

## 2017-05-25 RX ADMIN — Medication 100 MILLIEQUIVALENT(S): at 17:22

## 2017-05-25 RX ADMIN — Medication 3 MILLILITER(S): at 05:22

## 2017-05-25 RX ADMIN — PIPERACILLIN AND TAZOBACTAM 200 GRAM(S): 4; .5 INJECTION, POWDER, LYOPHILIZED, FOR SOLUTION INTRAVENOUS at 06:43

## 2017-05-25 RX ADMIN — Medication 1000 MILLIGRAM(S): at 06:37

## 2017-05-25 RX ADMIN — Medication 400 MILLIGRAM(S): at 06:13

## 2017-05-25 RX ADMIN — FENTANYL CITRATE 25 MICROGRAM(S): 50 INJECTION INTRAVENOUS at 09:55

## 2017-05-25 RX ADMIN — Medication 100 MILLIEQUIVALENT(S): at 19:12

## 2017-05-25 RX ADMIN — Medication 15 MILLIGRAM(S): at 03:42

## 2017-05-25 RX ADMIN — FENTANYL CITRATE 25 MICROGRAM(S): 50 INJECTION INTRAVENOUS at 01:12

## 2017-05-25 RX ADMIN — Medication 200 GRAM(S): at 01:12

## 2017-05-25 RX ADMIN — HEPARIN SODIUM 5000 UNIT(S): 5000 INJECTION INTRAVENOUS; SUBCUTANEOUS at 06:07

## 2017-05-25 RX ADMIN — Medication 40 MILLIGRAM(S): at 13:17

## 2017-05-25 RX ADMIN — Medication 40 MILLIGRAM(S): at 08:24

## 2017-05-25 RX ADMIN — Medication 100 MILLIEQUIVALENT(S): at 23:53

## 2017-05-25 RX ADMIN — CHLORHEXIDINE GLUCONATE 15 MILLILITER(S): 213 SOLUTION TOPICAL at 21:34

## 2017-05-25 RX ADMIN — PIPERACILLIN AND TAZOBACTAM 200 GRAM(S): 4; .5 INJECTION, POWDER, LYOPHILIZED, FOR SOLUTION INTRAVENOUS at 19:12

## 2017-05-25 RX ADMIN — Medication 50 GRAM(S): at 23:52

## 2017-05-25 RX ADMIN — Medication 30 MILLIGRAM(S): at 15:46

## 2017-05-25 RX ADMIN — CHLORHEXIDINE GLUCONATE 15 MILLILITER(S): 213 SOLUTION TOPICAL at 13:18

## 2017-05-25 RX ADMIN — Medication 50 MILLILITER(S): at 22:51

## 2017-05-25 RX ADMIN — HEPARIN SODIUM 5000 UNIT(S): 5000 INJECTION INTRAVENOUS; SUBCUTANEOUS at 21:34

## 2017-05-25 RX ADMIN — FAMOTIDINE 20 MILLIGRAM(S): 10 INJECTION INTRAVENOUS at 11:50

## 2017-05-25 RX ADMIN — POTASSIUM PHOSPHATE, MONOBASIC POTASSIUM PHOSPHATE, DIBASIC 63.75 MILLIMOLE(S): 236; 224 INJECTION, SOLUTION INTRAVENOUS at 02:11

## 2017-05-25 RX ADMIN — PIPERACILLIN AND TAZOBACTAM 200 GRAM(S): 4; .5 INJECTION, POWDER, LYOPHILIZED, FOR SOLUTION INTRAVENOUS at 11:51

## 2017-05-25 RX ADMIN — HEPARIN SODIUM 5000 UNIT(S): 5000 INJECTION INTRAVENOUS; SUBCUTANEOUS at 13:17

## 2017-05-25 RX ADMIN — Medication 3 MILLILITER(S): at 18:11

## 2017-05-25 RX ADMIN — DEXMEDETOMIDINE HYDROCHLORIDE IN 0.9% SODIUM CHLORIDE 10 MICROGRAM(S)/KG/HR: 4 INJECTION INTRAVENOUS at 02:11

## 2017-05-25 RX ADMIN — Medication 5 MILLIGRAM(S): at 11:50

## 2017-05-25 RX ADMIN — CHLORHEXIDINE GLUCONATE 15 MILLILITER(S): 213 SOLUTION TOPICAL at 17:32

## 2017-05-25 RX ADMIN — HYDROMORPHONE HYDROCHLORIDE 0.5 MILLIGRAM(S): 2 INJECTION INTRAMUSCULAR; INTRAVENOUS; SUBCUTANEOUS at 20:03

## 2017-05-25 RX ADMIN — Medication 20 MILLIGRAM(S): at 20:28

## 2017-05-25 RX ADMIN — FENTANYL CITRATE 25 MICROGRAM(S): 50 INJECTION INTRAVENOUS at 15:46

## 2017-05-25 RX ADMIN — Medication 30 MILLIGRAM(S): at 16:00

## 2017-05-25 RX ADMIN — FENTANYL CITRATE 25 MICROGRAM(S): 50 INJECTION INTRAVENOUS at 10:10

## 2017-05-25 RX ADMIN — HYDROMORPHONE HYDROCHLORIDE 0.5 MILLIGRAM(S): 2 INJECTION INTRAMUSCULAR; INTRAVENOUS; SUBCUTANEOUS at 20:29

## 2017-05-25 RX ADMIN — ATORVASTATIN CALCIUM 80 MILLIGRAM(S): 80 TABLET, FILM COATED ORAL at 21:34

## 2017-05-25 RX ADMIN — LIDOCAINE 1 PATCH: 4 CREAM TOPICAL at 11:50

## 2017-05-25 RX ADMIN — FENTANYL CITRATE 25 MICROGRAM(S): 50 INJECTION INTRAVENOUS at 16:00

## 2017-05-25 RX ADMIN — Medication 1 PATCH: at 11:50

## 2017-05-25 RX ADMIN — LIDOCAINE 1 PATCH: 4 CREAM TOPICAL at 01:02

## 2017-05-25 RX ADMIN — Medication 15 MILLIGRAM(S): at 04:10

## 2017-05-25 RX ADMIN — Medication 20 MILLIGRAM(S): at 04:36

## 2017-05-25 RX ADMIN — Medication 3 MILLILITER(S): at 13:05

## 2017-05-25 RX ADMIN — Medication 100 MILLIEQUIVALENT(S): at 17:32

## 2017-05-25 RX ADMIN — Medication 40 MILLIGRAM(S): at 21:33

## 2017-05-25 NOTE — PROGRESS NOTE ADULT - SUBJECTIVE AND OBJECTIVE BOX
POD #3 s/p AVR, Root replacement, CABG X1   EF normal    Post op course complicated by hypoxia increased A-a gradient  On iontropic support which was weaned this AM  remains on low dose Vaso  Pt complaining of chronic low back pain    PMH :  AS CAD  Back injury  GERD (gastroesophageal reflux disease)  Diverticulitis  Asthma      ROS  All other systems reviewed and negative.    ICU Vital Signs Last 24 Hrs  T(C): 37.5, Max: 37.5 (05-25 @ 09:00)  T(F): 99.5, Max: 99.5 (05-25 @ 09:00)  HR: 68 (58 - 80)  BP: 102/70 (89/62 - 104/71)  BP(mean): 80 (73 - 83)  ABP: 126/62 (98/56 - 158/82)  ABP(mean): 84 (70 - 110)  RR: 17 (11 - 36)  SpO2: 96% (92% - 99%)    I&O's Summary  I & Os for 24h ending 25 May 2017 07:00  =============================================  IN: 1643.4 ml / OUT: 3344 ml / NET: -1700.6 ml    I & Os for current day (as of 25 May 2017 14:04)  =============================================  IN: 53.2 ml / OUT: 404 ml / NET: -350.8 ml    Mode: CPAP with PS  FiO2: 70  PEEP: 8  PS: 15  MAP: 12  PIP: 27    ABG - ( 25 May 2017 13:53 )  pH: 7.46  /  pCO2: 36    /  pO2: 83    / HCO3: 26    / Base Excess: 1.8   /  SaO2: 95                                      10.2   13.1  )-----------( 121      ( 25 May 2017 03:37 )             31.3     25 May 2017 03:37    141    |  105    |  24     ----------------------------<  119    4.1     |  24     |  0.60     Ca    8.7        25 May 2017 03:37  Phos  3.5       25 May 2017 03:37  Mg     2.4       25 May 2017 03:37    TPro  6.0    /  Alb  3.1    /  TBili  0.3    /  DBili  x      /  AST  27     /  ALT  14     /  AlkPhos  21     25 May 2017 03:37    PT/INR - ( 25 May 2017 03:37 )   PT: 12.9 sec;   INR: 1.16          PTT - ( 25 May 2017 03:37 )  PTT:23.7 sec  MEDICATIONS  (STANDING):  sodium chloride 0.45%. 1000milliLiter(s) IV Continuous <Continuous>  heparin  Injectable 5000Unit(s) SubCutaneous every 8 hours  famotidine Injectable 20milliGRAM(s) IV Push daily  insulin Infusion 1Unit(s)/Hr IV Continuous <Continuous>  dexmedetomidine Infusion 0.411MICROgram(s)/kG/Hr IV Continuous <Continuous>  atorvastatin 80milliGRAM(s) Oral at bedtime  ALBUTerol/ipratropium for Nebulization 3milliLiter(s) Nebulizer every 6 hours  ALBUTerol    90 MICROgram(s) HFA Inhaler 1Puff(s) Inhalation every 4 hours  tiotropium 18 MICROgram(s) Capsule 1Capsule(s) Inhalation daily  lidocaine   Patch 1Patch Transdermal daily  piperacillin/tazobactam IVPB.  IV Intermittent   piperacillin/tazobactam IVPB. 4.5Gram(s) IV Intermittent every 6 hours  diazepam    Tablet 5milliGRAM(s) Oral daily  nicotine -   7 mG/24Hr(s) Patch 1patch Transdermal daily  furosemide   Injectable 40milliGRAM(s) IV Push every 8 hours  metoprolol 12.5milliGRAM(s) Oral <User Schedule>  chlorhexidine 0.12% Liquid 15milliLiter(s) Swish and Spit every 4 hours    PHYSICAL EXAM:  Gen : no acute distress  Neck: No LAD, No JVD  Respiratory: decreased in the bases  Cardiovascular: S1 and S2, RRR, no M/G/R  Gastrointestinal: BS+, soft, NT/ND  Extremities: No peripheral edema  Vascular: 2+ peripheral pulses  Neurological: A/O x 3, no focal deficits  Incision: clean dry/ no sign of infection  Lines: no sign of infection POD #3 s/p AVR, Root replacement, CABG X1   EF normal    Post op course complicated by hypoxia increased A-a gradient  s/p aggressive diuresis yesterday   off pressors  Able to tolerate early mobility oob to chair - clinically very strong  oxygenation improving       PMH :  AS CAD  Back injury  GERD (gastroesophageal reflux disease)  Diverticulitis  Asthma      ROS _ pain and discomfort from ETT  All other systems reviewed and negative.    ICU Vital Signs Last 24 Hrs  T(C): 37.5, Max: 37.5 (05-25 @ 09:00)  T(F): 99.5, Max: 99.5 (05-25 @ 09:00)  HR: 68 (58 - 80)  BP: 102/70 (89/62 - 104/71)  BP(mean): 80 (73 - 83)  ABP: 126/62 (98/56 - 158/82)  ABP(mean): 84 (70 - 110)  RR: 17 (11 - 36)  SpO2: 96% (92% - 99%)    I&O's Summary  I & Os for 24h ending 25 May 2017 07:00  =============================================  IN: 1643.4 ml / OUT: 3344 ml / NET: -1700.6 ml    I & Os for current day (as of 25 May 2017 14:04)  =============================================  IN: 53.2 ml / OUT: 404 ml / NET: -350.8 ml    Mode: CPAP with PS  FiO2: 70  PEEP: 8  PS: 15  MAP: 12  PIP: 27    ABG - ( 25 May 2017 13:53 )  pH: 7.46  /  pCO2: 36    /  pO2: 83    / HCO3: 26    / Base Excess: 1.8   /  SaO2: 95                                      10.2   13.1  )-----------( 121      ( 25 May 2017 03:37 )             31.3     25 May 2017 03:37    141    |  105    |  24     ----------------------------<  119    4.1     |  24     |  0.60     Ca    8.7        25 May 2017 03:37  Phos  3.5       25 May 2017 03:37  Mg     2.4       25 May 2017 03:37    TPro  6.0    /  Alb  3.1    /  TBili  0.3    /  DBili  x      /  AST  27     /  ALT  14     /  AlkPhos  21     25 May 2017 03:37    PT/INR - ( 25 May 2017 03:37 )   PT: 12.9 sec;   INR: 1.16          PTT - ( 25 May 2017 03:37 )  PTT:23.7 sec  MEDICATIONS  (STANDING):  sodium chloride 0.45%. 1000milliLiter(s) IV Continuous <Continuous>  heparin  Injectable 5000Unit(s) SubCutaneous every 8 hours  famotidine Injectable 20milliGRAM(s) IV Push daily  insulin Infusion 1Unit(s)/Hr IV Continuous <Continuous>  dexmedetomidine Infusion 0.411MICROgram(s)/kG/Hr IV Continuous <Continuous>  atorvastatin 80milliGRAM(s) Oral at bedtime  ALBUTerol/ipratropium for Nebulization 3milliLiter(s) Nebulizer every 6 hours  ALBUTerol    90 MICROgram(s) HFA Inhaler 1Puff(s) Inhalation every 4 hours  tiotropium 18 MICROgram(s) Capsule 1Capsule(s) Inhalation daily  lidocaine   Patch 1Patch Transdermal daily  piperacillin/tazobactam IVPB.  IV Intermittent   piperacillin/tazobactam IVPB. 4.5Gram(s) IV Intermittent every 6 hours  diazepam    Tablet 5milliGRAM(s) Oral daily  nicotine -   7 mG/24Hr(s) Patch 1patch Transdermal daily  furosemide   Injectable 40milliGRAM(s) IV Push every 8 hours  metoprolol 12.5milliGRAM(s) Oral <User Schedule>  chlorhexidine 0.12% Liquid 15milliLiter(s) Swish and Spit every 4 hours    PHYSICAL EXAM:  Gen intubated but awake and interactive  Respiratory: decreased in the bases  Cardiovascular: S1 and S2, RRR, no M/G/R  Gastrointestinal: BS+, soft, NT/ND  Extremities: No peripheral edema  Vascular: 2+ peripheral pulses  Neurological: A/O x 3, no focal deficits  Incision: clean dry/ no sign of infection  Lines: no sign of infection

## 2017-05-25 NOTE — PHYSICAL THERAPY INITIAL EVALUATION ADULT - PERTINENT HX OF CURRENT PROBLEM, REHAB EVAL
56 yo female with a history of HTN, HLD, obesity, current smoker, Asthma who presents with severe AS and single vessel CAD.

## 2017-05-25 NOTE — PHYSICAL THERAPY INITIAL EVALUATION ADULT - GENERAL OBSERVATIONS, REHAB EVAL
Patient received semi-supine no acute distress +telemetry +4 blakes to wall suction +de anda +CPAP PEEP 8 FiO2 80% +Jackie +b/l SCDs. Patient left seated out of bed no acute distress +call campbell. CAM Connors aware.

## 2017-05-25 NOTE — PHYSICAL THERAPY INITIAL EVALUATION ADULT - ADDITIONAL COMMENTS
patient reports she owns cane but never used it,was able to complete all mobility and ADLs independently

## 2017-05-26 LAB
ALBUMIN SERPL ELPH-MCNC: 3.3 G/DL — SIGNIFICANT CHANGE UP (ref 3.3–5)
ALBUMIN SERPL ELPH-MCNC: 3.4 G/DL — SIGNIFICANT CHANGE UP (ref 3.3–5)
ALBUMIN SERPL ELPH-MCNC: 3.5 G/DL — SIGNIFICANT CHANGE UP (ref 3.3–5)
ALBUMIN SERPL ELPH-MCNC: 3.5 G/DL — SIGNIFICANT CHANGE UP (ref 3.3–5)
ALP SERPL-CCNC: 25 U/L — LOW (ref 40–120)
ALP SERPL-CCNC: 25 U/L — LOW (ref 40–120)
ALP SERPL-CCNC: 26 U/L — LOW (ref 40–120)
ALP SERPL-CCNC: 26 U/L — LOW (ref 40–120)
ALT FLD-CCNC: 26 U/L — SIGNIFICANT CHANGE UP (ref 10–45)
ALT FLD-CCNC: 31 U/L — SIGNIFICANT CHANGE UP (ref 10–45)
ALT FLD-CCNC: 32 U/L — SIGNIFICANT CHANGE UP (ref 10–45)
ALT FLD-CCNC: 33 U/L — SIGNIFICANT CHANGE UP (ref 10–45)
ANION GAP SERPL CALC-SCNC: 13 MMOL/L — SIGNIFICANT CHANGE UP (ref 5–17)
ANION GAP SERPL CALC-SCNC: 14 MMOL/L — SIGNIFICANT CHANGE UP (ref 5–17)
ANION GAP SERPL CALC-SCNC: 15 MMOL/L — SIGNIFICANT CHANGE UP (ref 5–17)
ANION GAP SERPL CALC-SCNC: 16 MMOL/L — SIGNIFICANT CHANGE UP (ref 5–17)
APTT BLD: 28.1 SEC — SIGNIFICANT CHANGE UP (ref 27.5–37.4)
APTT BLD: 28.6 SEC — SIGNIFICANT CHANGE UP (ref 27.5–37.4)
APTT BLD: 30.7 SEC — SIGNIFICANT CHANGE UP (ref 27.5–37.4)
AST SERPL-CCNC: 27 U/L — SIGNIFICANT CHANGE UP (ref 10–40)
AST SERPL-CCNC: 37 U/L — SIGNIFICANT CHANGE UP (ref 10–40)
AST SERPL-CCNC: 51 U/L — HIGH (ref 10–40)
AST SERPL-CCNC: 55 U/L — HIGH (ref 10–40)
BASE EXCESS BLDA CALC-SCNC: 3.8 MMOL/L — HIGH (ref -2–3)
BASE EXCESS BLDV CALC-SCNC: 2.9 MMOL/L — SIGNIFICANT CHANGE UP
BILIRUB SERPL-MCNC: 0.4 MG/DL — SIGNIFICANT CHANGE UP (ref 0.2–1.2)
BILIRUB SERPL-MCNC: 0.4 MG/DL — SIGNIFICANT CHANGE UP (ref 0.2–1.2)
BILIRUB SERPL-MCNC: 0.5 MG/DL — SIGNIFICANT CHANGE UP (ref 0.2–1.2)
BILIRUB SERPL-MCNC: 0.6 MG/DL — SIGNIFICANT CHANGE UP (ref 0.2–1.2)
BUN SERPL-MCNC: 24 MG/DL — HIGH (ref 7–23)
BUN SERPL-MCNC: 26 MG/DL — HIGH (ref 7–23)
BUN SERPL-MCNC: 27 MG/DL — HIGH (ref 7–23)
BUN SERPL-MCNC: 28 MG/DL — HIGH (ref 7–23)
CALCIUM SERPL-MCNC: 8.5 MG/DL — SIGNIFICANT CHANGE UP (ref 8.4–10.5)
CALCIUM SERPL-MCNC: 8.6 MG/DL — SIGNIFICANT CHANGE UP (ref 8.4–10.5)
CALCIUM SERPL-MCNC: 8.9 MG/DL — SIGNIFICANT CHANGE UP (ref 8.4–10.5)
CALCIUM SERPL-MCNC: 8.9 MG/DL — SIGNIFICANT CHANGE UP (ref 8.4–10.5)
CHLORIDE SERPL-SCNC: 102 MMOL/L — SIGNIFICANT CHANGE UP (ref 96–108)
CHLORIDE SERPL-SCNC: 102 MMOL/L — SIGNIFICANT CHANGE UP (ref 96–108)
CHLORIDE SERPL-SCNC: 104 MMOL/L — SIGNIFICANT CHANGE UP (ref 96–108)
CHLORIDE SERPL-SCNC: 104 MMOL/L — SIGNIFICANT CHANGE UP (ref 96–108)
CO2 SERPL-SCNC: 23 MMOL/L — SIGNIFICANT CHANGE UP (ref 22–31)
CO2 SERPL-SCNC: 23 MMOL/L — SIGNIFICANT CHANGE UP (ref 22–31)
CO2 SERPL-SCNC: 27 MMOL/L — SIGNIFICANT CHANGE UP (ref 22–31)
CO2 SERPL-SCNC: 27 MMOL/L — SIGNIFICANT CHANGE UP (ref 22–31)
CREAT SERPL-MCNC: 0.7 MG/DL — SIGNIFICANT CHANGE UP (ref 0.5–1.3)
GAS PNL BLDA: SIGNIFICANT CHANGE UP
GAS PNL BLDV: SIGNIFICANT CHANGE UP
GLUCOSE SERPL-MCNC: 101 MG/DL — HIGH (ref 70–99)
GLUCOSE SERPL-MCNC: 131 MG/DL — HIGH (ref 70–99)
GLUCOSE SERPL-MCNC: 141 MG/DL — HIGH (ref 70–99)
GLUCOSE SERPL-MCNC: 88 MG/DL — SIGNIFICANT CHANGE UP (ref 70–99)
GRAM STN FLD: SIGNIFICANT CHANGE UP
HCO3 BLDA-SCNC: 28 MMOL/L — SIGNIFICANT CHANGE UP (ref 21–28)
HCO3 BLDV-SCNC: 28 MMOL/L — HIGH (ref 20–27)
HCT VFR BLD CALC: 29.5 % — LOW (ref 34.5–45)
HCT VFR BLD CALC: 30.1 % — LOW (ref 34.5–45)
HCT VFR BLD CALC: 30.8 % — LOW (ref 34.5–45)
HGB BLD-MCNC: 10 G/DL — LOW (ref 11.5–15.5)
HGB BLD-MCNC: 9.8 G/DL — LOW (ref 11.5–15.5)
HGB BLD-MCNC: 9.9 G/DL — LOW (ref 11.5–15.5)
INR BLD: 1.04 — SIGNIFICANT CHANGE UP (ref 0.88–1.16)
INR BLD: 1.09 — SIGNIFICANT CHANGE UP (ref 0.88–1.16)
INR BLD: 1.11 — SIGNIFICANT CHANGE UP (ref 0.88–1.16)
LACTATE SERPL-SCNC: 1.2 MMOL/L — SIGNIFICANT CHANGE UP (ref 0.5–2)
MAGNESIUM SERPL-MCNC: 2 MG/DL — SIGNIFICANT CHANGE UP (ref 1.6–2.6)
MAGNESIUM SERPL-MCNC: 2.1 MG/DL — SIGNIFICANT CHANGE UP (ref 1.6–2.6)
MAGNESIUM SERPL-MCNC: 2.1 MG/DL — SIGNIFICANT CHANGE UP (ref 1.6–2.6)
MAGNESIUM SERPL-MCNC: 2.5 MG/DL — SIGNIFICANT CHANGE UP (ref 1.6–2.6)
MCHC RBC-ENTMCNC: 28.3 PG — SIGNIFICANT CHANGE UP (ref 27–34)
MCHC RBC-ENTMCNC: 28.7 PG — SIGNIFICANT CHANGE UP (ref 27–34)
MCHC RBC-ENTMCNC: 29.2 PG — SIGNIFICANT CHANGE UP (ref 27–34)
MCHC RBC-ENTMCNC: 32.1 G/DL — SIGNIFICANT CHANGE UP (ref 32–36)
MCHC RBC-ENTMCNC: 33.2 G/DL — SIGNIFICANT CHANGE UP (ref 32–36)
MCHC RBC-ENTMCNC: 33.2 G/DL — SIGNIFICANT CHANGE UP (ref 32–36)
MCV RBC AUTO: 86.2 FL — SIGNIFICANT CHANGE UP (ref 80–100)
MCV RBC AUTO: 87.8 FL — SIGNIFICANT CHANGE UP (ref 80–100)
MCV RBC AUTO: 88 FL — SIGNIFICANT CHANGE UP (ref 80–100)
PCO2 BLDA: 41 MMHG — SIGNIFICANT CHANGE UP (ref 32–45)
PCO2 BLDV: 42 MMHG — SIGNIFICANT CHANGE UP (ref 41–51)
PH BLDA: 7.46 — HIGH (ref 7.35–7.45)
PH BLDV: 7.43 — SIGNIFICANT CHANGE UP (ref 7.32–7.43)
PHOSPHATE SERPL-MCNC: 4.5 MG/DL — SIGNIFICANT CHANGE UP (ref 2.5–4.5)
PHOSPHATE SERPL-MCNC: 5.1 MG/DL — HIGH (ref 2.5–4.5)
PHOSPHATE SERPL-MCNC: 5.5 MG/DL — HIGH (ref 2.5–4.5)
PHOSPHATE SERPL-MCNC: 5.9 MG/DL — HIGH (ref 2.5–4.5)
PLATELET # BLD AUTO: 131 K/UL — LOW (ref 150–400)
PLATELET # BLD AUTO: 166 K/UL — SIGNIFICANT CHANGE UP (ref 150–400)
PLATELET # BLD AUTO: 174 K/UL — SIGNIFICANT CHANGE UP (ref 150–400)
PO2 BLDA: 131 MMHG — HIGH (ref 83–108)
PO2 BLDV: 33 MMHG — SIGNIFICANT CHANGE UP
POTASSIUM SERPL-MCNC: 3.7 MMOL/L — SIGNIFICANT CHANGE UP (ref 3.5–5.3)
POTASSIUM SERPL-MCNC: 3.8 MMOL/L — SIGNIFICANT CHANGE UP (ref 3.5–5.3)
POTASSIUM SERPL-MCNC: 4.2 MMOL/L — SIGNIFICANT CHANGE UP (ref 3.5–5.3)
POTASSIUM SERPL-MCNC: 5.8 MMOL/L — HIGH (ref 3.5–5.3)
POTASSIUM SERPL-SCNC: 3.7 MMOL/L — SIGNIFICANT CHANGE UP (ref 3.5–5.3)
POTASSIUM SERPL-SCNC: 3.8 MMOL/L — SIGNIFICANT CHANGE UP (ref 3.5–5.3)
POTASSIUM SERPL-SCNC: 4.2 MMOL/L — SIGNIFICANT CHANGE UP (ref 3.5–5.3)
POTASSIUM SERPL-SCNC: 5.8 MMOL/L — HIGH (ref 3.5–5.3)
PROT SERPL-MCNC: 5.9 G/DL — LOW (ref 6–8.3)
PROT SERPL-MCNC: 6.1 G/DL — SIGNIFICANT CHANGE UP (ref 6–8.3)
PROT SERPL-MCNC: 6.2 G/DL — SIGNIFICANT CHANGE UP (ref 6–8.3)
PROT SERPL-MCNC: 6.4 G/DL — SIGNIFICANT CHANGE UP (ref 6–8.3)
PROTHROM AB SERPL-ACNC: 11.6 SEC — SIGNIFICANT CHANGE UP (ref 9.8–12.7)
PROTHROM AB SERPL-ACNC: 12.1 SEC — SIGNIFICANT CHANGE UP (ref 9.8–12.7)
PROTHROM AB SERPL-ACNC: 12.3 SEC — SIGNIFICANT CHANGE UP (ref 9.8–12.7)
RBC # BLD: 3.36 M/UL — LOW (ref 3.8–5.2)
RBC # BLD: 3.49 M/UL — LOW (ref 3.8–5.2)
RBC # BLD: 3.5 M/UL — LOW (ref 3.8–5.2)
RBC # FLD: 13.5 % — SIGNIFICANT CHANGE UP (ref 10.3–16.9)
RBC # FLD: 13.5 % — SIGNIFICANT CHANGE UP (ref 10.3–16.9)
RBC # FLD: 13.8 % — SIGNIFICANT CHANGE UP (ref 10.3–16.9)
SAO2 % BLDA: 99 % — SIGNIFICANT CHANGE UP (ref 95–100)
SAO2 % BLDV: 65 % — SIGNIFICANT CHANGE UP
SODIUM SERPL-SCNC: 141 MMOL/L — SIGNIFICANT CHANGE UP (ref 135–145)
SODIUM SERPL-SCNC: 142 MMOL/L — SIGNIFICANT CHANGE UP (ref 135–145)
SODIUM SERPL-SCNC: 142 MMOL/L — SIGNIFICANT CHANGE UP (ref 135–145)
SODIUM SERPL-SCNC: 145 MMOL/L — SIGNIFICANT CHANGE UP (ref 135–145)
SPECIMEN SOURCE: SIGNIFICANT CHANGE UP
SURGICAL PATHOLOGY STUDY: SIGNIFICANT CHANGE UP
WBC # BLD: 8.9 K/UL — SIGNIFICANT CHANGE UP (ref 3.8–10.5)
WBC # BLD: 8.9 K/UL — SIGNIFICANT CHANGE UP (ref 3.8–10.5)
WBC # BLD: 9.9 K/UL — SIGNIFICANT CHANGE UP (ref 3.8–10.5)
WBC # FLD AUTO: 8.9 K/UL — SIGNIFICANT CHANGE UP (ref 3.8–10.5)
WBC # FLD AUTO: 8.9 K/UL — SIGNIFICANT CHANGE UP (ref 3.8–10.5)
WBC # FLD AUTO: 9.9 K/UL — SIGNIFICANT CHANGE UP (ref 3.8–10.5)

## 2017-05-26 PROCEDURE — 99291 CRITICAL CARE FIRST HOUR: CPT

## 2017-05-26 PROCEDURE — 99292 CRITICAL CARE ADDL 30 MIN: CPT

## 2017-05-26 PROCEDURE — 71010: CPT | Mod: 26

## 2017-05-26 RX ORDER — ACETAMINOPHEN 500 MG
1000 TABLET ORAL ONCE
Qty: 0 | Refills: 0 | Status: COMPLETED | OUTPATIENT
Start: 2017-05-26 | End: 2017-05-26

## 2017-05-26 RX ORDER — FENTANYL CITRATE 50 UG/ML
50 INJECTION INTRAVENOUS ONCE
Qty: 0 | Refills: 0 | Status: DISCONTINUED | OUTPATIENT
Start: 2017-05-26 | End: 2017-05-26

## 2017-05-26 RX ORDER — FUROSEMIDE 40 MG
40 TABLET ORAL ONCE
Qty: 0 | Refills: 0 | Status: COMPLETED | OUTPATIENT
Start: 2017-05-26 | End: 2017-05-26

## 2017-05-26 RX ORDER — HYDROMORPHONE HYDROCHLORIDE 2 MG/ML
0.5 INJECTION INTRAMUSCULAR; INTRAVENOUS; SUBCUTANEOUS ONCE
Qty: 0 | Refills: 0 | Status: DISCONTINUED | OUTPATIENT
Start: 2017-05-26 | End: 2017-05-26

## 2017-05-26 RX ORDER — KETOROLAC TROMETHAMINE 30 MG/ML
30 SYRINGE (ML) INJECTION ONCE
Qty: 0 | Refills: 0 | Status: DISCONTINUED | OUTPATIENT
Start: 2017-05-26 | End: 2017-05-26

## 2017-05-26 RX ORDER — CALCIUM GLUCONATE 100 MG/ML
2 VIAL (ML) INTRAVENOUS ONCE
Qty: 0 | Refills: 0 | Status: COMPLETED | OUTPATIENT
Start: 2017-05-26 | End: 2017-05-26

## 2017-05-26 RX ORDER — POTASSIUM CHLORIDE 20 MEQ
80 PACKET (EA) ORAL ONCE
Qty: 0 | Refills: 0 | Status: COMPLETED | OUTPATIENT
Start: 2017-05-26 | End: 2017-05-26

## 2017-05-26 RX ORDER — BUDESONIDE, MICRONIZED 100 %
0.5 POWDER (GRAM) MISCELLANEOUS EVERY 12 HOURS
Qty: 0 | Refills: 0 | Status: DISCONTINUED | OUTPATIENT
Start: 2017-05-26 | End: 2017-06-03

## 2017-05-26 RX ORDER — VASOPRESSIN 20 [USP'U]/ML
0.03 INJECTION INTRAVENOUS
Qty: 100 | Refills: 0 | Status: DISCONTINUED | OUTPATIENT
Start: 2017-05-26 | End: 2017-05-26

## 2017-05-26 RX ORDER — POTASSIUM CHLORIDE 20 MEQ
20 PACKET (EA) ORAL ONCE
Qty: 0 | Refills: 0 | Status: COMPLETED | OUTPATIENT
Start: 2017-05-26 | End: 2017-05-26

## 2017-05-26 RX ORDER — POTASSIUM CHLORIDE 20 MEQ
20 PACKET (EA) ORAL
Qty: 0 | Refills: 0 | Status: COMPLETED | OUTPATIENT
Start: 2017-05-26 | End: 2017-05-26

## 2017-05-26 RX ADMIN — PIPERACILLIN AND TAZOBACTAM 200 GRAM(S): 4; .5 INJECTION, POWDER, LYOPHILIZED, FOR SOLUTION INTRAVENOUS at 05:27

## 2017-05-26 RX ADMIN — Medication 40 MILLIGRAM(S): at 11:16

## 2017-05-26 RX ADMIN — Medication 400 MILLIGRAM(S): at 00:48

## 2017-05-26 RX ADMIN — Medication 1000 MILLIGRAM(S): at 01:13

## 2017-05-26 RX ADMIN — Medication 40 MILLIGRAM(S): at 04:21

## 2017-05-26 RX ADMIN — PIPERACILLIN AND TAZOBACTAM 200 GRAM(S): 4; .5 INJECTION, POWDER, LYOPHILIZED, FOR SOLUTION INTRAVENOUS at 11:17

## 2017-05-26 RX ADMIN — Medication 30 MILLIGRAM(S): at 10:00

## 2017-05-26 RX ADMIN — HEPARIN SODIUM 5000 UNIT(S): 5000 INJECTION INTRAVENOUS; SUBCUTANEOUS at 05:27

## 2017-05-26 RX ADMIN — Medication 100 MILLIEQUIVALENT(S): at 15:13

## 2017-05-26 RX ADMIN — Medication 3 MILLILITER(S): at 17:02

## 2017-05-26 RX ADMIN — HYDROMORPHONE HYDROCHLORIDE 0.5 MILLIGRAM(S): 2 INJECTION INTRAMUSCULAR; INTRAVENOUS; SUBCUTANEOUS at 00:47

## 2017-05-26 RX ADMIN — Medication 100 MILLIEQUIVALENT(S): at 07:31

## 2017-05-26 RX ADMIN — Medication 1 PATCH: at 11:17

## 2017-05-26 RX ADMIN — PIPERACILLIN AND TAZOBACTAM 200 GRAM(S): 4; .5 INJECTION, POWDER, LYOPHILIZED, FOR SOLUTION INTRAVENOUS at 19:22

## 2017-05-26 RX ADMIN — Medication 5 MILLIGRAM(S): at 11:16

## 2017-05-26 RX ADMIN — HYDROMORPHONE HYDROCHLORIDE 0.5 MILLIGRAM(S): 2 INJECTION INTRAMUSCULAR; INTRAVENOUS; SUBCUTANEOUS at 03:50

## 2017-05-26 RX ADMIN — Medication 1000 MILLIGRAM(S): at 10:15

## 2017-05-26 RX ADMIN — Medication 3 MILLILITER(S): at 06:45

## 2017-05-26 RX ADMIN — Medication 40 MILLIGRAM(S): at 21:52

## 2017-05-26 RX ADMIN — FENTANYL CITRATE 50 MICROGRAM(S): 50 INJECTION INTRAVENOUS at 03:30

## 2017-05-26 RX ADMIN — Medication 100 MILLIEQUIVALENT(S): at 23:02

## 2017-05-26 RX ADMIN — PIPERACILLIN AND TAZOBACTAM 200 GRAM(S): 4; .5 INJECTION, POWDER, LYOPHILIZED, FOR SOLUTION INTRAVENOUS at 00:47

## 2017-05-26 RX ADMIN — FENTANYL CITRATE 50 MICROGRAM(S): 50 INJECTION INTRAVENOUS at 03:50

## 2017-05-26 RX ADMIN — Medication 40 MILLIGRAM(S): at 01:13

## 2017-05-26 RX ADMIN — CHLORHEXIDINE GLUCONATE 15 MILLILITER(S): 213 SOLUTION TOPICAL at 01:57

## 2017-05-26 RX ADMIN — Medication 400 MILLIGRAM(S): at 10:00

## 2017-05-26 RX ADMIN — HYDROMORPHONE HYDROCHLORIDE 0.5 MILLIGRAM(S): 2 INJECTION INTRAMUSCULAR; INTRAVENOUS; SUBCUTANEOUS at 03:30

## 2017-05-26 RX ADMIN — HEPARIN SODIUM 5000 UNIT(S): 5000 INJECTION INTRAVENOUS; SUBCUTANEOUS at 22:30

## 2017-05-26 RX ADMIN — Medication 3 MILLILITER(S): at 23:43

## 2017-05-26 RX ADMIN — Medication 3 MILLILITER(S): at 12:19

## 2017-05-26 RX ADMIN — HYDROMORPHONE HYDROCHLORIDE 0.5 MILLIGRAM(S): 2 INJECTION INTRAMUSCULAR; INTRAVENOUS; SUBCUTANEOUS at 01:13

## 2017-05-26 RX ADMIN — Medication 80 MILLIEQUIVALENT(S): at 00:47

## 2017-05-26 RX ADMIN — Medication 1 PATCH: at 11:15

## 2017-05-26 RX ADMIN — FAMOTIDINE 20 MILLIGRAM(S): 10 INJECTION INTRAVENOUS at 11:16

## 2017-05-26 RX ADMIN — Medication 30 MILLIGRAM(S): at 10:15

## 2017-05-26 RX ADMIN — Medication 3 MILLILITER(S): at 00:52

## 2017-05-26 RX ADMIN — HEPARIN SODIUM 5000 UNIT(S): 5000 INJECTION INTRAVENOUS; SUBCUTANEOUS at 11:16

## 2017-05-26 RX ADMIN — Medication 100 MILLIEQUIVALENT(S): at 01:20

## 2017-05-26 RX ADMIN — Medication 100 MILLIEQUIVALENT(S): at 21:53

## 2017-05-26 RX ADMIN — LIDOCAINE 1 PATCH: 4 CREAM TOPICAL at 01:13

## 2017-05-26 RX ADMIN — Medication 200 GRAM(S): at 00:48

## 2017-05-26 RX ADMIN — Medication 0.5 MILLIGRAM(S): at 22:07

## 2017-05-26 NOTE — PROGRESS NOTE ADULT - SUBJECTIVE AND OBJECTIVE BOX
CTICU  CRITICAL  CARE  attending     Hand off received 					   Pertinent clinical, laboratory, radiographic, hemodynamic, echocardiographic, respiratory data, microbiologic data and chart were reviewed and analyzed frequently throughout the course of the day and night  Patient seen and examined with CTS/ SH attending at bedside  Pt is a 57y , Female, HEALTH ISSUES - PROBLEM Dx:      , FAMILY HISTORY:  Family history of pulmonary embolism (Mother)  Family history of MI (myocardial infarction) (Mother)  Family history of asthma (Mother)  Family history of cardiac pacemaker (Mother)  Family history of atrial fibrillation (Mother)  PAST MEDICAL & SURGICAL HISTORY:  Back injury: lumbar, work related  Kidney stone  GERD (gastroesophageal reflux disease)  Diverticulitis  Asthma  Obesity  Diabetes mellitus  Hyperlipidemia  Hypertension  Status post laser lithotripsy of ureteral calculus  Uterine fibroid: removal  Status post small bowel resection  Carpal tunnel syndrome    Patient is a 57y old  Female who presents with a chief complaint of AS (19 May 2017 10:50)      14 system review was unremarkable  acute changes include acute respiratory failure  Vital signs, hemodynamic and respiratory parameters were reviewed from the bedside nursing flowsheet.  ICU Vital Signs Last 24 Hrs  T(C): 36.2, Max: 36.3 (05-25 @ 18:10)  T(F): 97.1, Max: 97.4 (05-25 @ 18:10)  HR: 68 (59 - 70)  BP: --  BP(mean): --  ABP: 129/71 (92/46 - 154/76)  ABP(mean): 96 (64 - 104)  RR: 16 (12 - 36)  SpO2: 98% (91% - 100%)    Adult Advanced Hemodynamics Last 24 Hrs  CVP(mm Hg): 14 (8 - 16)  CVP(cm H2O): --  CO: --  CI: --  PA: --  PA(mean): --  PCWP: --  SVR: --  SVRI: --  PVR: --  PVRI: --, ABG - ( 26 May 2017 09:56 )  pH: 7.46  /  pCO2: 41    /  pO2: 131   / HCO3: 28    / Base Excess: 3.8   /  SaO2: 99                Mode: CPAP with PS  FiO2: 50  PEEP: 8  PS: 14  MAP: 11  PIP: 27    Intake and output was reviewed and the fluid balance was calculated  Daily     Daily   I&O's Summary  I & Os for 24h ending 26 May 2017 07:00  =============================================  IN: 2691.1 ml / OUT: 5004 ml / NET: -2312.9 ml    I & Os for current day (as of 26 May 2017 10:06)  =============================================  IN: 137 ml / OUT: 75 ml / NET: 62 ml      All lines and drain sites were assessed  Glycemic trend was reviewedCAPILLARY BLOOD GLUCOSE  102 (26 May 2017 09:00)    No acute change in mental status  Auscultation of the chest reveals equal bs  Abdomen is soft  Extremities are warm and well perfused  Wounds appear clean and unremarkable  Antibiotics are periop    labs  CBC Full  -  ( 26 May 2017 02:27 )  WBC Count : 9.9 K/uL  Hemoglobin : 9.8 g/dL  Hematocrit : 29.5 %  Platelet Count - Automated : 131 K/uL  Mean Cell Volume : 87.8 fL  Mean Cell Hemoglobin : 29.2 pg  Mean Cell Hemoglobin Concentration : 33.2 g/dL  Auto Neutrophil # : x  Auto Lymphocyte # : x  Auto Monocyte # : x  Auto Eosinophil # : x  Auto Basophil # : x  Auto Neutrophil % : x  Auto Lymphocyte % : x  Auto Monocyte % : x  Auto Eosinophil % : x  Auto Basophil % : x    05-26    142  |  104  |  24<H>  ----------------------------<  131<H>  4.2   |  23  |  0.70    Ca    8.5      26 May 2017 06:03  Phos  5.1     05-26  Mg     2.1     05-26    TPro  6.1  /  Alb  3.4  /  TBili  0.4  /  DBili  x   /  AST  51<H>  /  ALT  33  /  AlkPhos  26<L>  05-26    PT/INR - ( 26 May 2017 02:27 )   PT: 12.1 sec;   INR: 1.09          PTT - ( 26 May 2017 02:27 )  PTT:28.6 sec  The current medications were reviewed   MEDICATIONS  (STANDING):  sodium chloride 0.45%. 1000milliLiter(s) IV Continuous <Continuous>  heparin  Injectable 5000Unit(s) SubCutaneous every 8 hours  famotidine Injectable 20milliGRAM(s) IV Push daily  insulin Infusion 1Unit(s)/Hr IV Continuous <Continuous>  dexmedetomidine Infusion 0.411MICROgram(s)/kG/Hr IV Continuous <Continuous>  atorvastatin 80milliGRAM(s) Oral at bedtime  ALBUTerol/ipratropium for Nebulization 3milliLiter(s) Nebulizer every 6 hours  ALBUTerol    90 MICROgram(s) HFA Inhaler 1Puff(s) Inhalation every 4 hours  tiotropium 18 MICROgram(s) Capsule 1Capsule(s) Inhalation daily  lidocaine   Patch 1Patch Transdermal daily  piperacillin/tazobactam IVPB.  IV Intermittent   piperacillin/tazobactam IVPB. 4.5Gram(s) IV Intermittent every 6 hours  diazepam    Tablet 5milliGRAM(s) Oral daily  nicotine -   7 mG/24Hr(s) Patch 1patch Transdermal daily  furosemide   Injectable 40milliGRAM(s) IV Push every 8 hours  chlorhexidine 0.12% Liquid 15milliLiter(s) Swish and Spit every 4 hours  hydrALAZINE 25milliGRAM(s) Oral once  vasopressin Infusion 0.03Unit(s)/Min IV Continuous <Continuous>    MEDICATIONS  (PRN):  oxyCODONE  5 mG/acetaminophen 325 mG 2Tablet(s) Oral every 4 hours PRN Moderate Pain (4 - 6)       PROBLEM LIST/ ASSESSMENT:  HEALTH ISSUES - PROBLEM Dx:      ,   Patient is a 57y old  Female who presents with a chief complaint of AS (19 May 2017 10:50)      acute changes include acute respiratory failure    My plan includes :  close hemodynamic, ventilatory and drain monitoring and management per post op routine  extubate to bipap  Monitor for arrhythmias and monitor parameters for organ perfusion  monitor neurologic status  Head of the bed should remain elevated to 45 deg .   chest PT and IS will be encouraged  monitor adequacy of oxygenation and ventilation and attempt to wean oxygen  Nutritional goals will be met using po eventually , ensure adequate caloric intake and montior the same  Stress ulcer and VTE prophylaxis will be achieved    Glycemic control is satisfactory  Electrolytes have been repleted as necessary and wound care has been carried out. Pain control has been achieved.   agressive physical therapy and early mobility and ambulation goals will be met   The family was updated about the course and plan  CRITICAL CARE TIME SPENT in evaluation and management, reassessments, review and interpretation of labs and x-rays, ventilator and hemodynamic management, formulating a plan and coordinating care: ___90____ MIN.  Time does not include procedural time.  CTICU ATTENDING     					    Rivera Pace MD

## 2017-05-26 NOTE — PROGRESS NOTE ADULT - SUBJECTIVE AND OBJECTIVE BOX
58yo female with history of Asthma, obesity, HTN, active smoker diagnosed with severe AS (ARAMIS 0.6cm 2) on workup of CARRILLO.  Pt underwent AVR, root repair and CABG X1 (vein to RCA)on 5/21  Uncomplicated case.  Post op remains vent dependent due to hypoxia    POD #4 s/p AVR, Root replacement, CABG X1   EF normal    Post op course complicated by hypoxia increased A-a gradient  s/p aggressive diuresis, successfully extubated this AM.  Weaned for BiPAP to High flow nasal cannula.  Remains hemodynamically stable.    Overall improving.         Problems  1. s/p AVR, root repair and CABG x1  2.  post op resp failure  s/p extubation     Plan   Neuro -- pain management  CVS - Stable HD., ASA, will start BB tmr   Pulm - continue diuresis, Support with High flow and intermittent BiPAP. Ambulated with BiPAP.  Clinically stable.  But needs close follow up.   GI - GI proph, start feeds   - monitor UOP, aggressive diuresis  Endo - glycemic control  Heme -  DVT prophylaxis  Dopplers negative for DVT.       Critical post op.    Critical care time spent 40 min

## 2017-05-27 LAB
ALBUMIN SERPL ELPH-MCNC: 3.4 G/DL — SIGNIFICANT CHANGE UP (ref 3.3–5)
ALP SERPL-CCNC: 26 U/L — LOW (ref 40–120)
ALT FLD-CCNC: 25 U/L — SIGNIFICANT CHANGE UP (ref 10–45)
ANION GAP SERPL CALC-SCNC: 12 MMOL/L — SIGNIFICANT CHANGE UP (ref 5–17)
ANION GAP SERPL CALC-SCNC: 14 MMOL/L — SIGNIFICANT CHANGE UP (ref 5–17)
ANION GAP SERPL CALC-SCNC: 17 MMOL/L — SIGNIFICANT CHANGE UP (ref 5–17)
APTT BLD: 24.4 SEC — LOW (ref 27.5–37.4)
APTT BLD: 25.3 SEC — LOW (ref 27.5–37.4)
APTT BLD: 27.8 SEC — SIGNIFICANT CHANGE UP (ref 27.5–37.4)
AST SERPL-CCNC: 22 U/L — SIGNIFICANT CHANGE UP (ref 10–40)
BILIRUB SERPL-MCNC: 0.6 MG/DL — SIGNIFICANT CHANGE UP (ref 0.2–1.2)
BUN SERPL-MCNC: 26 MG/DL — HIGH (ref 7–23)
CALCIUM SERPL-MCNC: 8.2 MG/DL — LOW (ref 8.4–10.5)
CALCIUM SERPL-MCNC: 8.5 MG/DL — SIGNIFICANT CHANGE UP (ref 8.4–10.5)
CALCIUM SERPL-MCNC: 8.5 MG/DL — SIGNIFICANT CHANGE UP (ref 8.4–10.5)
CHLORIDE SERPL-SCNC: 100 MMOL/L — SIGNIFICANT CHANGE UP (ref 96–108)
CHLORIDE SERPL-SCNC: 97 MMOL/L — SIGNIFICANT CHANGE UP (ref 96–108)
CHLORIDE SERPL-SCNC: 97 MMOL/L — SIGNIFICANT CHANGE UP (ref 96–108)
CO2 SERPL-SCNC: 25 MMOL/L — SIGNIFICANT CHANGE UP (ref 22–31)
CO2 SERPL-SCNC: 27 MMOL/L — SIGNIFICANT CHANGE UP (ref 22–31)
CO2 SERPL-SCNC: 28 MMOL/L — SIGNIFICANT CHANGE UP (ref 22–31)
CREAT SERPL-MCNC: 0.7 MG/DL — SIGNIFICANT CHANGE UP (ref 0.5–1.3)
CREAT SERPL-MCNC: 0.7 MG/DL — SIGNIFICANT CHANGE UP (ref 0.5–1.3)
CREAT SERPL-MCNC: 0.8 MG/DL — SIGNIFICANT CHANGE UP (ref 0.5–1.3)
CULTURE RESULTS: SIGNIFICANT CHANGE UP
GAS PNL BLDA: SIGNIFICANT CHANGE UP
GAS PNL BLDA: SIGNIFICANT CHANGE UP
GLUCOSE SERPL-MCNC: 113 MG/DL — HIGH (ref 70–99)
GLUCOSE SERPL-MCNC: 117 MG/DL — HIGH (ref 70–99)
GLUCOSE SERPL-MCNC: 89 MG/DL — SIGNIFICANT CHANGE UP (ref 70–99)
HCT VFR BLD CALC: 29.6 % — LOW (ref 34.5–45)
HCT VFR BLD CALC: 30.1 % — LOW (ref 34.5–45)
HCT VFR BLD CALC: 31.8 % — LOW (ref 34.5–45)
HGB BLD-MCNC: 10.3 G/DL — LOW (ref 11.5–15.5)
HGB BLD-MCNC: 9.4 G/DL — LOW (ref 11.5–15.5)
HGB BLD-MCNC: 9.7 G/DL — LOW (ref 11.5–15.5)
INR BLD: 1.04 — SIGNIFICANT CHANGE UP (ref 0.88–1.16)
INR BLD: 1.1 — SIGNIFICANT CHANGE UP (ref 0.88–1.16)
INR BLD: 1.1 — SIGNIFICANT CHANGE UP (ref 0.88–1.16)
LACTATE SERPL-SCNC: 1.2 MMOL/L — SIGNIFICANT CHANGE UP (ref 0.5–2)
MAGNESIUM SERPL-MCNC: 2 MG/DL — SIGNIFICANT CHANGE UP (ref 1.6–2.6)
MAGNESIUM SERPL-MCNC: 2.2 MG/DL — SIGNIFICANT CHANGE UP (ref 1.6–2.6)
MAGNESIUM SERPL-MCNC: 2.9 MG/DL — HIGH (ref 1.6–2.6)
MCHC RBC-ENTMCNC: 27.8 PG — SIGNIFICANT CHANGE UP (ref 27–34)
MCHC RBC-ENTMCNC: 28.2 PG — SIGNIFICANT CHANGE UP (ref 27–34)
MCHC RBC-ENTMCNC: 28.3 PG — SIGNIFICANT CHANGE UP (ref 27–34)
MCHC RBC-ENTMCNC: 31.8 G/DL — LOW (ref 32–36)
MCHC RBC-ENTMCNC: 32.2 G/DL — SIGNIFICANT CHANGE UP (ref 32–36)
MCHC RBC-ENTMCNC: 32.4 G/DL — SIGNIFICANT CHANGE UP (ref 32–36)
MCV RBC AUTO: 87.4 FL — SIGNIFICANT CHANGE UP (ref 80–100)
MCV RBC AUTO: 87.5 FL — SIGNIFICANT CHANGE UP (ref 80–100)
MCV RBC AUTO: 87.6 FL — SIGNIFICANT CHANGE UP (ref 80–100)
PHOSPHATE SERPL-MCNC: 4.1 MG/DL — SIGNIFICANT CHANGE UP (ref 2.5–4.5)
PHOSPHATE SERPL-MCNC: 4.4 MG/DL — SIGNIFICANT CHANGE UP (ref 2.5–4.5)
PLATELET # BLD AUTO: 176 K/UL — SIGNIFICANT CHANGE UP (ref 150–400)
PLATELET # BLD AUTO: 187 K/UL — SIGNIFICANT CHANGE UP (ref 150–400)
PLATELET # BLD AUTO: 199 K/UL — SIGNIFICANT CHANGE UP (ref 150–400)
POTASSIUM SERPL-MCNC: 3.8 MMOL/L — SIGNIFICANT CHANGE UP (ref 3.5–5.3)
POTASSIUM SERPL-MCNC: 3.8 MMOL/L — SIGNIFICANT CHANGE UP (ref 3.5–5.3)
POTASSIUM SERPL-MCNC: 4 MMOL/L — SIGNIFICANT CHANGE UP (ref 3.5–5.3)
POTASSIUM SERPL-SCNC: 3.8 MMOL/L — SIGNIFICANT CHANGE UP (ref 3.5–5.3)
POTASSIUM SERPL-SCNC: 3.8 MMOL/L — SIGNIFICANT CHANGE UP (ref 3.5–5.3)
POTASSIUM SERPL-SCNC: 4 MMOL/L — SIGNIFICANT CHANGE UP (ref 3.5–5.3)
PROT SERPL-MCNC: 6.2 G/DL — SIGNIFICANT CHANGE UP (ref 6–8.3)
PROTHROM AB SERPL-ACNC: 11.6 SEC — SIGNIFICANT CHANGE UP (ref 9.8–12.7)
PROTHROM AB SERPL-ACNC: 12.2 SEC — SIGNIFICANT CHANGE UP (ref 9.8–12.7)
PROTHROM AB SERPL-ACNC: 12.2 SEC — SIGNIFICANT CHANGE UP (ref 9.8–12.7)
RBC # BLD: 3.38 M/UL — LOW (ref 3.8–5.2)
RBC # BLD: 3.44 M/UL — LOW (ref 3.8–5.2)
RBC # BLD: 3.64 M/UL — LOW (ref 3.8–5.2)
RBC # FLD: 13.2 % — SIGNIFICANT CHANGE UP (ref 10.3–16.9)
RBC # FLD: 13.6 % — SIGNIFICANT CHANGE UP (ref 10.3–16.9)
RBC # FLD: 13.8 % — SIGNIFICANT CHANGE UP (ref 10.3–16.9)
SODIUM SERPL-SCNC: 136 MMOL/L — SIGNIFICANT CHANGE UP (ref 135–145)
SODIUM SERPL-SCNC: 139 MMOL/L — SIGNIFICANT CHANGE UP (ref 135–145)
SODIUM SERPL-SCNC: 142 MMOL/L — SIGNIFICANT CHANGE UP (ref 135–145)
SPECIMEN SOURCE: SIGNIFICANT CHANGE UP
WBC # BLD: 10.4 K/UL — SIGNIFICANT CHANGE UP (ref 3.8–10.5)
WBC # BLD: 10.5 K/UL — SIGNIFICANT CHANGE UP (ref 3.8–10.5)
WBC # BLD: 8.6 K/UL — SIGNIFICANT CHANGE UP (ref 3.8–10.5)
WBC # FLD AUTO: 10.4 K/UL — SIGNIFICANT CHANGE UP (ref 3.8–10.5)
WBC # FLD AUTO: 10.5 K/UL — SIGNIFICANT CHANGE UP (ref 3.8–10.5)
WBC # FLD AUTO: 8.6 K/UL — SIGNIFICANT CHANGE UP (ref 3.8–10.5)

## 2017-05-27 PROCEDURE — 99292 CRITICAL CARE ADDL 30 MIN: CPT

## 2017-05-27 PROCEDURE — 93010 ELECTROCARDIOGRAM REPORT: CPT

## 2017-05-27 PROCEDURE — 71010: CPT | Mod: 26

## 2017-05-27 PROCEDURE — 99291 CRITICAL CARE FIRST HOUR: CPT

## 2017-05-27 RX ORDER — DEXTROSE 50 % IN WATER 50 %
25 SYRINGE (ML) INTRAVENOUS ONCE
Qty: 0 | Refills: 0 | Status: DISCONTINUED | OUTPATIENT
Start: 2017-05-27 | End: 2017-06-03

## 2017-05-27 RX ORDER — INSULIN LISPRO 100/ML
VIAL (ML) SUBCUTANEOUS
Qty: 0 | Refills: 0 | Status: DISCONTINUED | OUTPATIENT
Start: 2017-05-27 | End: 2017-06-03

## 2017-05-27 RX ORDER — DEXTROSE 50 % IN WATER 50 %
12.5 SYRINGE (ML) INTRAVENOUS ONCE
Qty: 0 | Refills: 0 | Status: DISCONTINUED | OUTPATIENT
Start: 2017-05-27 | End: 2017-06-03

## 2017-05-27 RX ORDER — POTASSIUM CHLORIDE 20 MEQ
20 PACKET (EA) ORAL
Qty: 0 | Refills: 0 | Status: COMPLETED | OUTPATIENT
Start: 2017-05-27 | End: 2017-05-27

## 2017-05-27 RX ORDER — DOCUSATE SODIUM 100 MG
100 CAPSULE ORAL THREE TIMES A DAY
Qty: 0 | Refills: 0 | Status: DISCONTINUED | OUTPATIENT
Start: 2017-05-27 | End: 2017-06-03

## 2017-05-27 RX ORDER — POTASSIUM CHLORIDE 20 MEQ
20 PACKET (EA) ORAL ONCE
Qty: 0 | Refills: 0 | Status: COMPLETED | OUTPATIENT
Start: 2017-05-27 | End: 2017-05-27

## 2017-05-27 RX ORDER — PANTOPRAZOLE SODIUM 20 MG/1
40 TABLET, DELAYED RELEASE ORAL
Qty: 0 | Refills: 0 | Status: DISCONTINUED | OUTPATIENT
Start: 2017-05-27 | End: 2017-06-03

## 2017-05-27 RX ORDER — GLUCAGON INJECTION, SOLUTION 0.5 MG/.1ML
1 INJECTION, SOLUTION SUBCUTANEOUS ONCE
Qty: 0 | Refills: 0 | Status: DISCONTINUED | OUTPATIENT
Start: 2017-05-27 | End: 2017-06-03

## 2017-05-27 RX ORDER — HYDROMORPHONE HYDROCHLORIDE 2 MG/ML
2 INJECTION INTRAMUSCULAR; INTRAVENOUS; SUBCUTANEOUS EVERY 4 HOURS
Qty: 0 | Refills: 0 | Status: DISCONTINUED | OUTPATIENT
Start: 2017-05-27 | End: 2017-06-02

## 2017-05-27 RX ORDER — SODIUM CHLORIDE 9 MG/ML
1000 INJECTION, SOLUTION INTRAVENOUS
Qty: 0 | Refills: 0 | Status: DISCONTINUED | OUTPATIENT
Start: 2017-05-27 | End: 2017-06-03

## 2017-05-27 RX ORDER — ALBUMIN HUMAN 25 %
250 VIAL (ML) INTRAVENOUS ONCE
Qty: 0 | Refills: 0 | Status: COMPLETED | OUTPATIENT
Start: 2017-05-27 | End: 2017-05-27

## 2017-05-27 RX ORDER — FUROSEMIDE 40 MG
40 TABLET ORAL EVERY 12 HOURS
Qty: 0 | Refills: 0 | Status: DISCONTINUED | OUTPATIENT
Start: 2017-05-27 | End: 2017-05-28

## 2017-05-27 RX ORDER — METOCLOPRAMIDE HCL 10 MG
10 TABLET ORAL ONCE
Qty: 0 | Refills: 0 | Status: COMPLETED | OUTPATIENT
Start: 2017-05-27 | End: 2017-05-28

## 2017-05-27 RX ORDER — DEXTROSE 50 % IN WATER 50 %
1 SYRINGE (ML) INTRAVENOUS ONCE
Qty: 0 | Refills: 0 | Status: DISCONTINUED | OUTPATIENT
Start: 2017-05-27 | End: 2017-06-03

## 2017-05-27 RX ORDER — MAGNESIUM SULFATE 500 MG/ML
2 VIAL (ML) INJECTION ONCE
Qty: 0 | Refills: 0 | Status: COMPLETED | OUTPATIENT
Start: 2017-05-27 | End: 2017-05-27

## 2017-05-27 RX ORDER — ASPIRIN/CALCIUM CARB/MAGNESIUM 324 MG
81 TABLET ORAL DAILY
Qty: 0 | Refills: 0 | Status: DISCONTINUED | OUTPATIENT
Start: 2017-05-27 | End: 2017-06-03

## 2017-05-27 RX ORDER — POLYETHYLENE GLYCOL 3350 17 G/17G
17 POWDER, FOR SOLUTION ORAL DAILY
Qty: 0 | Refills: 0 | Status: DISCONTINUED | OUTPATIENT
Start: 2017-05-27 | End: 2017-06-03

## 2017-05-27 RX ORDER — DIAZEPAM 5 MG
5 TABLET ORAL DAILY
Qty: 0 | Refills: 0 | Status: DISCONTINUED | OUTPATIENT
Start: 2017-05-28 | End: 2017-06-03

## 2017-05-27 RX ORDER — SENNA PLUS 8.6 MG/1
2 TABLET ORAL AT BEDTIME
Qty: 0 | Refills: 0 | Status: DISCONTINUED | OUTPATIENT
Start: 2017-05-27 | End: 2017-06-03

## 2017-05-27 RX ORDER — KETOROLAC TROMETHAMINE 30 MG/ML
30 SYRINGE (ML) INJECTION ONCE
Qty: 0 | Refills: 0 | Status: DISCONTINUED | OUTPATIENT
Start: 2017-05-27 | End: 2017-05-27

## 2017-05-27 RX ORDER — KETOROLAC TROMETHAMINE 30 MG/ML
15 SYRINGE (ML) INJECTION ONCE
Qty: 0 | Refills: 0 | Status: DISCONTINUED | OUTPATIENT
Start: 2017-05-27 | End: 2017-05-27

## 2017-05-27 RX ORDER — HYDROMORPHONE HYDROCHLORIDE 2 MG/ML
0.5 INJECTION INTRAMUSCULAR; INTRAVENOUS; SUBCUTANEOUS ONCE
Qty: 0 | Refills: 0 | Status: DISCONTINUED | OUTPATIENT
Start: 2017-05-27 | End: 2017-05-27

## 2017-05-27 RX ADMIN — Medication 100 MILLIEQUIVALENT(S): at 23:48

## 2017-05-27 RX ADMIN — Medication 125 MILLILITER(S): at 08:52

## 2017-05-27 RX ADMIN — HYDROMORPHONE HYDROCHLORIDE 0.5 MILLIGRAM(S): 2 INJECTION INTRAMUSCULAR; INTRAVENOUS; SUBCUTANEOUS at 11:45

## 2017-05-27 RX ADMIN — HEPARIN SODIUM 5000 UNIT(S): 5000 INJECTION INTRAVENOUS; SUBCUTANEOUS at 06:30

## 2017-05-27 RX ADMIN — Medication 15 MILLIGRAM(S): at 19:40

## 2017-05-27 RX ADMIN — HYDROMORPHONE HYDROCHLORIDE 0.5 MILLIGRAM(S): 2 INJECTION INTRAMUSCULAR; INTRAVENOUS; SUBCUTANEOUS at 16:30

## 2017-05-27 RX ADMIN — PIPERACILLIN AND TAZOBACTAM 200 GRAM(S): 4; .5 INJECTION, POWDER, LYOPHILIZED, FOR SOLUTION INTRAVENOUS at 15:34

## 2017-05-27 RX ADMIN — FAMOTIDINE 20 MILLIGRAM(S): 10 INJECTION INTRAVENOUS at 10:36

## 2017-05-27 RX ADMIN — Medication 0.5 MILLIGRAM(S): at 17:03

## 2017-05-27 RX ADMIN — HEPARIN SODIUM 5000 UNIT(S): 5000 INJECTION INTRAVENOUS; SUBCUTANEOUS at 21:47

## 2017-05-27 RX ADMIN — Medication 1 PATCH: at 09:35

## 2017-05-27 RX ADMIN — Medication 100 MILLIEQUIVALENT(S): at 05:00

## 2017-05-27 RX ADMIN — Medication 15 MILLIGRAM(S): at 19:13

## 2017-05-27 RX ADMIN — Medication 40 MILLIGRAM(S): at 03:26

## 2017-05-27 RX ADMIN — Medication 100 MILLIEQUIVALENT(S): at 04:00

## 2017-05-27 RX ADMIN — Medication 0.5 MILLIGRAM(S): at 06:18

## 2017-05-27 RX ADMIN — Medication 1 PATCH: at 10:36

## 2017-05-27 RX ADMIN — PIPERACILLIN AND TAZOBACTAM 200 GRAM(S): 4; .5 INJECTION, POWDER, LYOPHILIZED, FOR SOLUTION INTRAVENOUS at 00:58

## 2017-05-27 RX ADMIN — Medication 100 MILLIEQUIVALENT(S): at 09:39

## 2017-05-27 RX ADMIN — HYDROMORPHONE HYDROCHLORIDE 0.5 MILLIGRAM(S): 2 INJECTION INTRAMUSCULAR; INTRAVENOUS; SUBCUTANEOUS at 16:14

## 2017-05-27 RX ADMIN — HYDROMORPHONE HYDROCHLORIDE 0.5 MILLIGRAM(S): 2 INJECTION INTRAMUSCULAR; INTRAVENOUS; SUBCUTANEOUS at 11:37

## 2017-05-27 RX ADMIN — Medication 125 MILLILITER(S): at 18:46

## 2017-05-27 RX ADMIN — LIDOCAINE 1 PATCH: 4 CREAM TOPICAL at 23:08

## 2017-05-27 RX ADMIN — PIPERACILLIN AND TAZOBACTAM 200 GRAM(S): 4; .5 INJECTION, POWDER, LYOPHILIZED, FOR SOLUTION INTRAVENOUS at 10:37

## 2017-05-27 RX ADMIN — Medication 30 MILLIGRAM(S): at 16:45

## 2017-05-27 RX ADMIN — ATORVASTATIN CALCIUM 80 MILLIGRAM(S): 80 TABLET, FILM COATED ORAL at 21:47

## 2017-05-27 RX ADMIN — Medication 5 MILLIGRAM(S): at 10:36

## 2017-05-27 RX ADMIN — Medication 3 MILLILITER(S): at 06:19

## 2017-05-27 RX ADMIN — HEPARIN SODIUM 5000 UNIT(S): 5000 INJECTION INTRAVENOUS; SUBCUTANEOUS at 12:28

## 2017-05-27 RX ADMIN — HYDROMORPHONE HYDROCHLORIDE 2 MILLIGRAM(S): 2 INJECTION INTRAMUSCULAR; INTRAVENOUS; SUBCUTANEOUS at 21:48

## 2017-05-27 RX ADMIN — Medication 30 MILLIGRAM(S): at 16:14

## 2017-05-27 RX ADMIN — LIDOCAINE 1 PATCH: 4 CREAM TOPICAL at 10:36

## 2017-05-27 RX ADMIN — Medication 50 GRAM(S): at 09:39

## 2017-05-27 RX ADMIN — Medication 40 MILLIGRAM(S): at 11:42

## 2017-05-27 RX ADMIN — HYDROMORPHONE HYDROCHLORIDE 2 MILLIGRAM(S): 2 INJECTION INTRAMUSCULAR; INTRAVENOUS; SUBCUTANEOUS at 22:30

## 2017-05-27 RX ADMIN — Medication 500 MILLILITER(S): at 21:48

## 2017-05-27 RX ADMIN — PIPERACILLIN AND TAZOBACTAM 200 GRAM(S): 4; .5 INJECTION, POWDER, LYOPHILIZED, FOR SOLUTION INTRAVENOUS at 06:30

## 2017-05-27 NOTE — PROGRESS NOTE ADULT - SUBJECTIVE AND OBJECTIVE BOX
CTICU  CRITICAL  CARE  attending     Hand off received 					   Pertinent clinical, laboratory, radiographic, hemodynamic, echocardiographic, respiratory data, microbiologic data and chart were reviewed and analyzed frequently throughout the course of the day and night  Patient seen and examined with CTS/ SH attending at bedside  Pt is a 57y , Female, HEALTH ISSUES - PROBLEM Dx:      , FAMILY HISTORY:  Family history of pulmonary embolism (Mother)  Family history of MI (myocardial infarction) (Mother)  Family history of asthma (Mother)  Family history of cardiac pacemaker (Mother)  Family history of atrial fibrillation (Mother)  PAST MEDICAL & SURGICAL HISTORY:  Back injury: lumbar, work related  Kidney stone  GERD (gastroesophageal reflux disease)  Diverticulitis  Asthma  Obesity  Diabetes mellitus  Hyperlipidemia  Hypertension  Status post laser lithotripsy of ureteral calculus  Uterine fibroid: removal  Status post small bowel resection  Carpal tunnel syndrome    Patient is a 57y old  Female who presents with a chief complaint of AS (19 May 2017 10:50)      14 system review was unremarkable  acute changes include acute respiratory failure  Vital signs, hemodynamic and respiratory parameters were reviewed from the bedside nursing flowsheet.  ICU Vital Signs Last 24 Hrs  T(C): 36.3, Max: 37.4 (05-27 @ 10:26)  T(F): 97.3, Max: 99.4 (05-27 @ 10:26)  HR: 68 (68 - 98)  BP: --  BP(mean): --  ABP: 94/54 (94/52 - 112/68)  ABP(mean): 70 (64 - 86)  RR: 20 (15 - 28)  SpO2: 95% (94% - 99%)    Adult Advanced Hemodynamics Last 24 Hrs  CVP(mm Hg): 1 (1 - 4)  CVP(cm H2O): --  CO: --  CI: --  PA: --  PA(mean): --  PCWP: --  SVR: --  SVRI: --  PVR: --  PVRI: --, ABG - ( 27 May 2017 11:41 )  pH: 7.43  /  pCO2: 44    /  pO2: 90    / HCO3: 28    / Base Excess: 3.7   /  SaO2: 97                  Intake and output was reviewed and the fluid balance was calculated  Daily     Daily   I&O's Summary  I & Os for 24h ending 27 May 2017 07:00  =============================================  IN: 1163 ml / OUT: 5520 ml / NET: -4357 ml    I & Os for current day (as of 27 May 2017 21:28)  =============================================  IN: 1080 ml / OUT: 2280 ml / NET: -1200 ml      All lines and drain sites were assessed  Glycemic trend was reviewedCAPILLARY BLOOD GLUCOSE  94 (27 May 2017 00:00)    No acute change in mental status  Auscultation of the chest reveals equal bs  Abdomen is soft  Extremities are warm and well perfused  Wounds appear clean and unremarkable  Antibiotics are periop    labs  CBC Full  -  ( 27 May 2017 11:43 )  WBC Count : 10.5 K/uL  Hemoglobin : 10.3 g/dL  Hematocrit : 31.8 %  Platelet Count - Automated : 199 K/uL  Mean Cell Volume : 87.4 fL  Mean Cell Hemoglobin : 28.3 pg  Mean Cell Hemoglobin Concentration : 32.4 g/dL  Auto Neutrophil # : x  Auto Lymphocyte # : x  Auto Monocyte # : x  Auto Eosinophil # : x  Auto Basophil # : x  Auto Neutrophil % : x  Auto Lymphocyte % : x  Auto Monocyte % : x  Auto Eosinophil % : x  Auto Basophil % : x    05-27    139  |  97  |  26<H>  ----------------------------<  113<H>  3.8   |  25  |  0.70    Ca    8.5      27 May 2017 11:43  Phos  4.4     05-27  Mg     2.9     05-27    TPro  6.2  /  Alb  3.4  /  TBili  0.6  /  DBili  x   /  AST  22  /  ALT  25  /  AlkPhos  26<L>  05-27    PT/INR - ( 27 May 2017 11:43 )   PT: 12.2 sec;   INR: 1.10          PTT - ( 27 May 2017 11:43 )  PTT:24.4 sec  The current medications were reviewed   MEDICATIONS  (STANDING):  sodium chloride 0.45%. 1000milliLiter(s) IV Continuous <Continuous>  heparin  Injectable 5000Unit(s) SubCutaneous every 8 hours  atorvastatin 80milliGRAM(s) Oral at bedtime  tiotropium 18 MICROgram(s) Capsule 1Capsule(s) Inhalation daily  lidocaine   Patch 1Patch Transdermal daily  piperacillin/tazobactam IVPB.  IV Intermittent   piperacillin/tazobactam IVPB. 4.5Gram(s) IV Intermittent every 6 hours  nicotine -   7 mG/24Hr(s) Patch 1patch Transdermal daily  buDESOnide   0.5 milliGRAM(s) Respule 0.5milliGRAM(s) Inhalation every 12 hours  insulin lispro (HumaLOG) corrective regimen sliding scale  SubCutaneous Before meals and at bedtime  dextrose 5%. 1000milliLiter(s) IV Continuous <Continuous>  dextrose 50% Injectable 12.5Gram(s) IV Push once  dextrose 50% Injectable 25Gram(s) IV Push once  dextrose 50% Injectable 25Gram(s) IV Push once  furosemide   Injectable 40milliGRAM(s) IV Push every 12 hours  aspirin enteric coated 81milliGRAM(s) Oral daily  pantoprazole    Tablet 40milliGRAM(s) Oral before breakfast  docusate sodium 100milliGRAM(s) Oral three times a day  senna 2Tablet(s) Oral at bedtime  albumin human  5% IVPB 250milliLiter(s) IV Intermittent once    MEDICATIONS  (PRN):  dextrose Gel 1Dose(s) Oral once PRN Blood Glucose LESS THAN 70 milliGRAM(s)/deciliter  glucagon  Injectable 1milliGRAM(s) IntraMuscular once PRN Glucose LESS THAN 70 milligrams/deciliter  HYDROmorphone   Tablet 2milliGRAM(s) Oral every 4 hours PRN Moderate Pain (4 - 6)  polyethylene glycol 3350 17Gram(s) Oral daily PRN Constipation       PROBLEM LIST/ ASSESSMENT:  HEALTH ISSUES - PROBLEM Dx:      ,   Patient is a 57y old  Female who presents with a chief complaint of AS (19 May 2017 10:50)     s/p ohs  acute changes include acute respiratory failure    My plan includes :  close hemodynamic, ventilatory and drain monitoring and management per post op routine    Monitor for arrhythmias and monitor parameters for organ perfusion  monitor neurologic status  Head of the bed should remain elevated to 45 deg .   chest PT and IS will be encouraged  monitor adequacy of oxygenation and ventilation and attempt to wean oxygen  Nutritional goals will be met using po eventually , ensure adequate caloric intake and montior the same  Stress ulcer and VTE prophylaxis will be achieved    Glycemic control is satisfactory  Electrolytes have been repleted as necessary and wound care has been carried out. Pain control has been achieved.   agressive physical therapy and early mobility and ambulation goals will be met   The family was updated about the course and plan  CRITICAL CARE TIME SPENT in evaluation and management, reassessments, review and interpretation of labs and x-rays, ventilator and hemodynamic management, formulating a plan and coordinating care: ___90____ MIN.  Time does not include procedural time.  CTICU ATTENDING     					    Rivera Pace MD

## 2017-05-28 LAB
ANION GAP SERPL CALC-SCNC: 12 MMOL/L — SIGNIFICANT CHANGE UP (ref 5–17)
ANION GAP SERPL CALC-SCNC: 13 MMOL/L — SIGNIFICANT CHANGE UP (ref 5–17)
APTT BLD: 26.1 SEC — LOW (ref 27.5–37.4)
APTT BLD: 26.1 SEC — LOW (ref 27.5–37.4)
BUN SERPL-MCNC: 19 MG/DL — SIGNIFICANT CHANGE UP (ref 7–23)
BUN SERPL-MCNC: 26 MG/DL — HIGH (ref 7–23)
CALCIUM SERPL-MCNC: 8.3 MG/DL — LOW (ref 8.4–10.5)
CALCIUM SERPL-MCNC: 8.4 MG/DL — SIGNIFICANT CHANGE UP (ref 8.4–10.5)
CHLORIDE SERPL-SCNC: 96 MMOL/L — SIGNIFICANT CHANGE UP (ref 96–108)
CHLORIDE SERPL-SCNC: 96 MMOL/L — SIGNIFICANT CHANGE UP (ref 96–108)
CO2 SERPL-SCNC: 25 MMOL/L — SIGNIFICANT CHANGE UP (ref 22–31)
CO2 SERPL-SCNC: 27 MMOL/L — SIGNIFICANT CHANGE UP (ref 22–31)
CREAT SERPL-MCNC: 0.7 MG/DL — SIGNIFICANT CHANGE UP (ref 0.5–1.3)
CREAT SERPL-MCNC: 0.7 MG/DL — SIGNIFICANT CHANGE UP (ref 0.5–1.3)
GLUCOSE SERPL-MCNC: 119 MG/DL — HIGH (ref 70–99)
GLUCOSE SERPL-MCNC: 141 MG/DL — HIGH (ref 70–99)
HCT VFR BLD CALC: 28 % — LOW (ref 34.5–45)
HCT VFR BLD CALC: 29.8 % — LOW (ref 34.5–45)
HGB BLD-MCNC: 9.3 G/DL — LOW (ref 11.5–15.5)
HGB BLD-MCNC: 9.5 G/DL — LOW (ref 11.5–15.5)
INR BLD: 1.04 — SIGNIFICANT CHANGE UP (ref 0.88–1.16)
INR BLD: 1.04 — SIGNIFICANT CHANGE UP (ref 0.88–1.16)
MAGNESIUM SERPL-MCNC: 2.1 MG/DL — SIGNIFICANT CHANGE UP (ref 1.6–2.6)
MAGNESIUM SERPL-MCNC: 2.2 MG/DL — SIGNIFICANT CHANGE UP (ref 1.6–2.6)
MCHC RBC-ENTMCNC: 27.7 PG — SIGNIFICANT CHANGE UP (ref 27–34)
MCHC RBC-ENTMCNC: 28.8 PG — SIGNIFICANT CHANGE UP (ref 27–34)
MCHC RBC-ENTMCNC: 31.9 G/DL — LOW (ref 32–36)
MCHC RBC-ENTMCNC: 33.2 G/DL — SIGNIFICANT CHANGE UP (ref 32–36)
MCV RBC AUTO: 86.7 FL — SIGNIFICANT CHANGE UP (ref 80–100)
MCV RBC AUTO: 86.9 FL — SIGNIFICANT CHANGE UP (ref 80–100)
PHOSPHATE SERPL-MCNC: 3.6 MG/DL — SIGNIFICANT CHANGE UP (ref 2.5–4.5)
PLATELET # BLD AUTO: 189 K/UL — SIGNIFICANT CHANGE UP (ref 150–400)
PLATELET # BLD AUTO: 224 K/UL — SIGNIFICANT CHANGE UP (ref 150–400)
POTASSIUM SERPL-MCNC: 3.9 MMOL/L — SIGNIFICANT CHANGE UP (ref 3.5–5.3)
POTASSIUM SERPL-MCNC: 4.1 MMOL/L — SIGNIFICANT CHANGE UP (ref 3.5–5.3)
POTASSIUM SERPL-SCNC: 3.9 MMOL/L — SIGNIFICANT CHANGE UP (ref 3.5–5.3)
POTASSIUM SERPL-SCNC: 4.1 MMOL/L — SIGNIFICANT CHANGE UP (ref 3.5–5.3)
PROTHROM AB SERPL-ACNC: 11.5 SEC — SIGNIFICANT CHANGE UP (ref 9.8–12.7)
PROTHROM AB SERPL-ACNC: 11.5 SEC — SIGNIFICANT CHANGE UP (ref 9.8–12.7)
RBC # BLD: 3.23 M/UL — LOW (ref 3.8–5.2)
RBC # BLD: 3.43 M/UL — LOW (ref 3.8–5.2)
RBC # FLD: 13.3 % — SIGNIFICANT CHANGE UP (ref 10.3–16.9)
RBC # FLD: 13.4 % — SIGNIFICANT CHANGE UP (ref 10.3–16.9)
SODIUM SERPL-SCNC: 134 MMOL/L — LOW (ref 135–145)
SODIUM SERPL-SCNC: 135 MMOL/L — SIGNIFICANT CHANGE UP (ref 135–145)
WBC # BLD: 10.6 K/UL — HIGH (ref 3.8–10.5)
WBC # BLD: 10.7 K/UL — HIGH (ref 3.8–10.5)
WBC # FLD AUTO: 10.6 K/UL — HIGH (ref 3.8–10.5)
WBC # FLD AUTO: 10.7 K/UL — HIGH (ref 3.8–10.5)

## 2017-05-28 PROCEDURE — 99291 CRITICAL CARE FIRST HOUR: CPT

## 2017-05-28 PROCEDURE — 71010: CPT | Mod: 26

## 2017-05-28 PROCEDURE — 93010 ELECTROCARDIOGRAM REPORT: CPT

## 2017-05-28 RX ORDER — DIPHENHYDRAMINE HCL 50 MG
25 CAPSULE ORAL ONCE
Qty: 0 | Refills: 0 | Status: COMPLETED | OUTPATIENT
Start: 2017-05-28 | End: 2017-05-28

## 2017-05-28 RX ORDER — FUROSEMIDE 40 MG
40 TABLET ORAL DAILY
Qty: 0 | Refills: 0 | Status: DISCONTINUED | OUTPATIENT
Start: 2017-05-29 | End: 2017-05-30

## 2017-05-28 RX ORDER — DIPHENHYDRAMINE HCL 50 MG
25 CAPSULE ORAL ONCE
Qty: 0 | Refills: 0 | Status: DISCONTINUED | OUTPATIENT
Start: 2017-05-28 | End: 2017-05-28

## 2017-05-28 RX ORDER — ALBUMIN HUMAN 25 %
250 VIAL (ML) INTRAVENOUS ONCE
Qty: 0 | Refills: 0 | Status: COMPLETED | OUTPATIENT
Start: 2017-05-28 | End: 2017-05-28

## 2017-05-28 RX ORDER — KETOROLAC TROMETHAMINE 30 MG/ML
15 SYRINGE (ML) INJECTION ONCE
Qty: 0 | Refills: 0 | Status: DISCONTINUED | OUTPATIENT
Start: 2017-05-28 | End: 2017-05-28

## 2017-05-28 RX ORDER — ACETAMINOPHEN 500 MG
1000 TABLET ORAL ONCE
Qty: 0 | Refills: 0 | Status: COMPLETED | OUTPATIENT
Start: 2017-05-28 | End: 2017-05-28

## 2017-05-28 RX ADMIN — HYDROMORPHONE HYDROCHLORIDE 2 MILLIGRAM(S): 2 INJECTION INTRAMUSCULAR; INTRAVENOUS; SUBCUTANEOUS at 19:25

## 2017-05-28 RX ADMIN — Medication 25 MILLIGRAM(S): at 21:18

## 2017-05-28 RX ADMIN — Medication 15 MILLIGRAM(S): at 22:00

## 2017-05-28 RX ADMIN — HYDROMORPHONE HYDROCHLORIDE 2 MILLIGRAM(S): 2 INJECTION INTRAMUSCULAR; INTRAVENOUS; SUBCUTANEOUS at 19:35

## 2017-05-28 RX ADMIN — Medication 5 MILLIGRAM(S): at 11:21

## 2017-05-28 RX ADMIN — HEPARIN SODIUM 5000 UNIT(S): 5000 INJECTION INTRAVENOUS; SUBCUTANEOUS at 16:06

## 2017-05-28 RX ADMIN — Medication 1 PATCH: at 11:21

## 2017-05-28 RX ADMIN — HYDROMORPHONE HYDROCHLORIDE 2 MILLIGRAM(S): 2 INJECTION INTRAMUSCULAR; INTRAVENOUS; SUBCUTANEOUS at 21:01

## 2017-05-28 RX ADMIN — Medication 0.5 MILLIGRAM(S): at 17:12

## 2017-05-28 RX ADMIN — Medication 15 MILLIGRAM(S): at 21:17

## 2017-05-28 RX ADMIN — HYDROMORPHONE HYDROCHLORIDE 2 MILLIGRAM(S): 2 INJECTION INTRAMUSCULAR; INTRAVENOUS; SUBCUTANEOUS at 05:49

## 2017-05-28 RX ADMIN — PANTOPRAZOLE SODIUM 40 MILLIGRAM(S): 20 TABLET, DELAYED RELEASE ORAL at 06:49

## 2017-05-28 RX ADMIN — Medication 125 MILLILITER(S): at 10:30

## 2017-05-28 RX ADMIN — PIPERACILLIN AND TAZOBACTAM 200 GRAM(S): 4; .5 INJECTION, POWDER, LYOPHILIZED, FOR SOLUTION INTRAVENOUS at 01:19

## 2017-05-28 RX ADMIN — Medication 0.5 MILLIGRAM(S): at 05:41

## 2017-05-28 RX ADMIN — ATORVASTATIN CALCIUM 80 MILLIGRAM(S): 80 TABLET, FILM COATED ORAL at 21:16

## 2017-05-28 RX ADMIN — Medication 40 MILLIGRAM(S): at 06:49

## 2017-05-28 RX ADMIN — PIPERACILLIN AND TAZOBACTAM 200 GRAM(S): 4; .5 INJECTION, POWDER, LYOPHILIZED, FOR SOLUTION INTRAVENOUS at 06:49

## 2017-05-28 RX ADMIN — HEPARIN SODIUM 5000 UNIT(S): 5000 INJECTION INTRAVENOUS; SUBCUTANEOUS at 21:17

## 2017-05-28 RX ADMIN — HYDROMORPHONE HYDROCHLORIDE 2 MILLIGRAM(S): 2 INJECTION INTRAMUSCULAR; INTRAVENOUS; SUBCUTANEOUS at 15:40

## 2017-05-28 RX ADMIN — HEPARIN SODIUM 5000 UNIT(S): 5000 INJECTION INTRAVENOUS; SUBCUTANEOUS at 06:49

## 2017-05-28 RX ADMIN — Medication 125 MILLILITER(S): at 10:31

## 2017-05-28 RX ADMIN — Medication 10 MILLIGRAM(S): at 06:49

## 2017-05-28 RX ADMIN — LIDOCAINE 1 PATCH: 4 CREAM TOPICAL at 11:22

## 2017-05-28 RX ADMIN — Medication 400 MILLIGRAM(S): at 06:49

## 2017-05-28 RX ADMIN — LIDOCAINE 1 PATCH: 4 CREAM TOPICAL at 23:56

## 2017-05-28 RX ADMIN — Medication 1 PATCH: at 10:39

## 2017-05-28 RX ADMIN — Medication 2: at 21:16

## 2017-05-28 RX ADMIN — Medication 4: at 11:21

## 2017-05-28 RX ADMIN — Medication 81 MILLIGRAM(S): at 11:21

## 2017-05-28 RX ADMIN — Medication 125 MILLILITER(S): at 08:33

## 2017-05-28 RX ADMIN — HYDROMORPHONE HYDROCHLORIDE 2 MILLIGRAM(S): 2 INJECTION INTRAMUSCULAR; INTRAVENOUS; SUBCUTANEOUS at 04:35

## 2017-05-28 RX ADMIN — Medication 1000 MILLIGRAM(S): at 07:32

## 2017-05-28 NOTE — PROGRESS NOTE ADULT - ASSESSMENT
58yo female with history of Asthma, obesity, HTN, active smoker diagnosed with severe AS (ARAMIS 0.6cm 2) on workup of CARRILLO.  Pt underwent AVR, root repair and CABG X1 (vein to RCA) on 5/22  Uncomplicated case.     Problems  1. s/p AVR, root repair and CABG x1  2.  post op Hypoxia - improved     Plan   Neuro -- pain control, resume her home pain meds.   CVS - stable hemodynamics, Hypotension likely hypovolemia - improved with fluid resusciation.  Pt s/p Aggressive diuresis during the last few days  Pulm - tolerating TC.  continue pulm toileting   GI - GI proph, Diet    - monitor UOP, Cr stable   Endo - glycemic control  Heme -  DVT prophylaxis  pending dopplers of LE and Echo to rule out VTE   ID - completed periop antibiotics    Critical post op.    Critical care time spent 40 min 58yo female with history of Asthma, obesity, HTN, active smoker diagnosed with severe AS (ARAMIS 0.6cm 2) on workup of CARRILLO.  Pt underwent AVR, root repair and CABG X1 (vein to RCA) on 5/22  Uncomplicated case.     Problems  1. s/p AVR, root repair and CABG x1  2.  post op Hypoxia - improved     Plan   Neuro -- pain control, resume her home pain meds.   CVS - stable hemodynamics, Hypotension likely hypovolemia - improved with fluid resusciation.  Pt s/p Aggressive diuresis during the last few days  Pulm -  continue pulm toileting   GI - GI proph, Diet    - monitor UOP, Cr stable   Endo - glycemic control  Heme -  DVT prophylaxis    ID - completed periop antibiotics    Critical post op.    Critical care time spent 40 min

## 2017-05-28 NOTE — PROGRESS NOTE ADULT - SUBJECTIVE AND OBJECTIVE BOX
POD #6 s/p AVR, Root replacement, CABG X1   EF normal    Transient hypotension improved with fluid resuscitaion  Overall improving. Oxygenation improved  pt on ^L NC      PMH :  AS CAD  Back injury  Kidney stone  GERD (gastroesophageal reflux disease)  Diverticulitis  Asthma  Obesity  Diabetes mellitus  Hyperlipidemia  Hypertension  AVR (aortic valve replacement)    ROS  All other systems reviewed and negative.    ICU Vital Signs Last 24 Hrs  T(C): 36.2, Max: 36.9 (05-28 @ 10:05)  T(F): 97.2, Max: 98.4 (05-28 @ 10:05)  HR: 82 (68 - 96)  BP: 112/53 (77/45 - 112/53)  BP(mean): 74 (57 - 74)  ABP: 96/52 (94/52 - 110/58)  ABP(mean): 66 (64 - 80)  RR: 26 (10 - 28)  SpO2: 93% (93% - 99%)    I&O's Summary  I & Os for 24h ending 28 May 2017 07:00  =============================================  IN: 1890 ml / OUT: 2705 ml / NET: -815 ml    I & Os for current day (as of 28 May 2017 15:34)  =============================================  IN: 1050 ml / OUT: 1085 ml / NET: -35 ml      ABG - ( 27 May 2017 11:41 )  pH: 7.43  /  pCO2: 44    /  pO2: 90    / HCO3: 28    / Base Excess: 3.7   /  SaO2: 97                   9.5    10.7  )-----------( 224      ( 28 May 2017 13:55 )             29.8     28 May 2017 13:55    134    |  96     |  19     ----------------------------<  141    3.9     |  25     |  0.70     Ca    8.4        28 May 2017 13:55  Phos  3.6       28 May 2017 02:36  Mg     2.1       28 May 2017 13:55    TPro  6.2    /  Alb  3.4    /  TBili  0.6    /  DBili  x      /  AST  22     /  ALT  25     /  AlkPhos  26     27 May 2017 03:26    PT/INR - ( 28 May 2017 13:55 )   PT: 11.5 sec;   INR: 1.04     PTT - ( 28 May 2017 13:55 )  PTT:26.1 sec    MEDICATIONS  (STANDING):  sodium chloride 0.45%. 1000milliLiter(s) IV Continuous <Continuous>  heparin  Injectable 5000Unit(s) SubCutaneous every 8 hours  atorvastatin 80milliGRAM(s) Oral at bedtime  tiotropium 18 MICROgram(s) Capsule 1Capsule(s) Inhalation daily  lidocaine   Patch 1Patch Transdermal daily  nicotine -   7 mG/24Hr(s) Patch 1patch Transdermal daily  buDESOnide   0.5 milliGRAM(s) Respule 0.5milliGRAM(s) Inhalation every 12 hours  insulin lispro (HumaLOG) corrective regimen sliding scale  SubCutaneous Before meals and at bedtime  aspirin enteric coated 81milliGRAM(s) Oral daily  pantoprazole    Tablet 40milliGRAM(s) Oral before breakfast  diazepam    Tablet 5milliGRAM(s) Oral daily  docusate sodium 100milliGRAM(s) Oral three times a day  senna 2Tablet(s) Oral at bedtime    PHYSICAL EXAM:  Gen : no acute distress  Respiratory: decreased in the bases, no wheezing or rhonchi  Cardiovascular: S1 and S2, RRR, no M/G/R  Gastrointestinal: BS+, soft, NT/ND  Extremities: No peripheral edema  Vascular: 2+ peripheral pulses  Neurological: A/O x 3, no focal deficits  Incision: clean dry/ no sign of infection  Lines: no sign of infection

## 2017-05-29 LAB
ANION GAP SERPL CALC-SCNC: 12 MMOL/L — SIGNIFICANT CHANGE UP (ref 5–17)
APTT BLD: 29.2 SEC — SIGNIFICANT CHANGE UP (ref 27.5–37.4)
BUN SERPL-MCNC: 18 MG/DL — SIGNIFICANT CHANGE UP (ref 7–23)
CALCIUM SERPL-MCNC: 8.4 MG/DL — SIGNIFICANT CHANGE UP (ref 8.4–10.5)
CHLORIDE SERPL-SCNC: 99 MMOL/L — SIGNIFICANT CHANGE UP (ref 96–108)
CO2 SERPL-SCNC: 26 MMOL/L — SIGNIFICANT CHANGE UP (ref 22–31)
CREAT SERPL-MCNC: 0.6 MG/DL — SIGNIFICANT CHANGE UP (ref 0.5–1.3)
GLUCOSE SERPL-MCNC: 120 MG/DL — HIGH (ref 70–99)
HCT VFR BLD CALC: 29.4 % — LOW (ref 34.5–45)
HGB BLD-MCNC: 9.4 G/DL — LOW (ref 11.5–15.5)
INR BLD: 1.06 — SIGNIFICANT CHANGE UP (ref 0.88–1.16)
MAGNESIUM SERPL-MCNC: 2.2 MG/DL — SIGNIFICANT CHANGE UP (ref 1.6–2.6)
MCHC RBC-ENTMCNC: 28.2 PG — SIGNIFICANT CHANGE UP (ref 27–34)
MCHC RBC-ENTMCNC: 32 G/DL — SIGNIFICANT CHANGE UP (ref 32–36)
MCV RBC AUTO: 88.3 FL — SIGNIFICANT CHANGE UP (ref 80–100)
PHOSPHATE SERPL-MCNC: 2.6 MG/DL — SIGNIFICANT CHANGE UP (ref 2.5–4.5)
PLATELET # BLD AUTO: 219 K/UL — SIGNIFICANT CHANGE UP (ref 150–400)
POTASSIUM SERPL-MCNC: 4 MMOL/L — SIGNIFICANT CHANGE UP (ref 3.5–5.3)
POTASSIUM SERPL-SCNC: 4 MMOL/L — SIGNIFICANT CHANGE UP (ref 3.5–5.3)
PROTHROM AB SERPL-ACNC: 11.8 SEC — SIGNIFICANT CHANGE UP (ref 9.8–12.7)
RBC # BLD: 3.33 M/UL — LOW (ref 3.8–5.2)
RBC # FLD: 13.2 % — SIGNIFICANT CHANGE UP (ref 10.3–16.9)
SODIUM SERPL-SCNC: 137 MMOL/L — SIGNIFICANT CHANGE UP (ref 135–145)
WBC # BLD: 11.2 K/UL — HIGH (ref 3.8–10.5)
WBC # FLD AUTO: 11.2 K/UL — HIGH (ref 3.8–10.5)

## 2017-05-29 PROCEDURE — 99233 SBSQ HOSP IP/OBS HIGH 50: CPT

## 2017-05-29 PROCEDURE — 71010: CPT | Mod: 26

## 2017-05-29 RX ORDER — POTASSIUM CHLORIDE 20 MEQ
20 PACKET (EA) ORAL ONCE
Qty: 0 | Refills: 0 | Status: COMPLETED | OUTPATIENT
Start: 2017-05-29 | End: 2017-05-29

## 2017-05-29 RX ORDER — KETOROLAC TROMETHAMINE 30 MG/ML
30 SYRINGE (ML) INJECTION EVERY 6 HOURS
Qty: 0 | Refills: 0 | Status: DISCONTINUED | OUTPATIENT
Start: 2017-05-29 | End: 2017-05-31

## 2017-05-29 RX ORDER — ACETAMINOPHEN 500 MG
650 TABLET ORAL EVERY 6 HOURS
Qty: 0 | Refills: 0 | Status: DISCONTINUED | OUTPATIENT
Start: 2017-05-29 | End: 2017-06-03

## 2017-05-29 RX ORDER — FUROSEMIDE 40 MG
40 TABLET ORAL ONCE
Qty: 0 | Refills: 0 | Status: COMPLETED | OUTPATIENT
Start: 2017-05-29 | End: 2017-05-29

## 2017-05-29 RX ORDER — DIPHENHYDRAMINE HCL 50 MG
25 CAPSULE ORAL ONCE
Qty: 0 | Refills: 0 | Status: COMPLETED | OUTPATIENT
Start: 2017-05-29 | End: 2017-05-29

## 2017-05-29 RX ADMIN — Medication 30 MILLIGRAM(S): at 22:20

## 2017-05-29 RX ADMIN — PANTOPRAZOLE SODIUM 40 MILLIGRAM(S): 20 TABLET, DELAYED RELEASE ORAL at 06:55

## 2017-05-29 RX ADMIN — Medication 30 MILLIGRAM(S): at 22:35

## 2017-05-29 RX ADMIN — ATORVASTATIN CALCIUM 80 MILLIGRAM(S): 80 TABLET, FILM COATED ORAL at 22:09

## 2017-05-29 RX ADMIN — Medication 1 PATCH: at 11:12

## 2017-05-29 RX ADMIN — LIDOCAINE 1 PATCH: 4 CREAM TOPICAL at 23:00

## 2017-05-29 RX ADMIN — Medication 1 PATCH: at 11:07

## 2017-05-29 RX ADMIN — HYDROMORPHONE HYDROCHLORIDE 2 MILLIGRAM(S): 2 INJECTION INTRAMUSCULAR; INTRAVENOUS; SUBCUTANEOUS at 11:27

## 2017-05-29 RX ADMIN — Medication 5 MILLIGRAM(S): at 11:07

## 2017-05-29 RX ADMIN — Medication 40 MILLIGRAM(S): at 06:55

## 2017-05-29 RX ADMIN — Medication 2: at 11:16

## 2017-05-29 RX ADMIN — Medication 0.5 MILLIGRAM(S): at 06:07

## 2017-05-29 RX ADMIN — Medication 0.5 MILLIGRAM(S): at 18:00

## 2017-05-29 RX ADMIN — Medication 25 MILLIGRAM(S): at 22:09

## 2017-05-29 RX ADMIN — HYDROMORPHONE HYDROCHLORIDE 2 MILLIGRAM(S): 2 INJECTION INTRAMUSCULAR; INTRAVENOUS; SUBCUTANEOUS at 17:04

## 2017-05-29 RX ADMIN — Medication 2: at 22:09

## 2017-05-29 RX ADMIN — HYDROMORPHONE HYDROCHLORIDE 2 MILLIGRAM(S): 2 INJECTION INTRAMUSCULAR; INTRAVENOUS; SUBCUTANEOUS at 11:45

## 2017-05-29 RX ADMIN — HEPARIN SODIUM 5000 UNIT(S): 5000 INJECTION INTRAVENOUS; SUBCUTANEOUS at 13:28

## 2017-05-29 RX ADMIN — LIDOCAINE 1 PATCH: 4 CREAM TOPICAL at 11:07

## 2017-05-29 RX ADMIN — HEPARIN SODIUM 5000 UNIT(S): 5000 INJECTION INTRAVENOUS; SUBCUTANEOUS at 06:55

## 2017-05-29 RX ADMIN — Medication 81 MILLIGRAM(S): at 11:07

## 2017-05-29 RX ADMIN — Medication 30 MILLIGRAM(S): at 16:52

## 2017-05-29 RX ADMIN — HEPARIN SODIUM 5000 UNIT(S): 5000 INJECTION INTRAVENOUS; SUBCUTANEOUS at 22:10

## 2017-05-29 RX ADMIN — Medication 100 MILLIEQUIVALENT(S): at 08:46

## 2017-05-29 RX ADMIN — Medication 20 MILLIEQUIVALENT(S): at 20:33

## 2017-05-29 RX ADMIN — Medication 40 MILLIGRAM(S): at 20:33

## 2017-05-29 NOTE — PROGRESS NOTE ADULT - ASSESSMENT
56yo female with history of Asthma, obesity, HTN, active smoker diagnosed with severe AS (ARAMIS 0.6cm 2) on workup of CARRILLO.  Pt underwent AVR, root repair and CABG X1 (vein to RCA) on 5/22  Uncomplicated case.     Problems  1. s/p AVR, root repair and CABG x1  2.  post op Hypoxia - improved     Plan   Neuro -- pain control, back on home dose of valium  CVS - stable hemodynamics  Pulm -  continue pulm toileting   GI - GI proph, Diet    - monitor UOP, Cr stable   Endo - glycemic control  Heme -  DVT prophylaxis   Floor eligible.

## 2017-05-29 NOTE — PROGRESS NOTE ADULT - SUBJECTIVE AND OBJECTIVE BOX
POD #7 s/p AVR, Root replacement, CABG X1   EF normal    stable hemodynamics.  complained of blurry vision thsi AM which spontaneously resolved, otherwise OOB to chair, ambulated     PMH :  AS CAD  Back injury  Kidney stone  GERD (gastroesophageal reflux disease)  Diverticulitis  Asthma  Obesity  Diabetes mellitus  Hyperlipidemia  Hypertension  AVR (aortic valve replacement)    ROS + pain   All other systems reviewed and negative.    ICU Vital Signs Last 24 Hrs  T(C): 36.3, Max: 36.8 (05-28 @ 18:27)  T(F): 97.3, Max: 98.3 (05-28 @ 20:55)  HR: 84 (74 - 99)  BP: 114/56 (92/68 - 122/60)  BP(mean): 69 (64 - 90)  RR: 28 (12 - 32)  SpO2: 74% (74% - 100%)                            9.4    11.2  )-----------( 219      ( 29 May 2017 03:48 )             29.4   05-29    137  |  99  |  18  ----------------------------<  120<H>  4.0   |  26  |  0.60    Ca    8.4      29 May 2017 03:48  Phos  2.6     05-29  Mg     2.2     05-29    PHYSICAL EXAM:  Gen : no acute distress  Respiratory: decreased in the bases, no wheezing  Cardiovascular: S1 and S2, RRR, no M/G/R  Gastrointestinal: BS+, soft, NT/ND  Extremities: No peripheral edema  Vascular: 2+ peripheral pulses  Neurological: A/O x 3, no focal deficits  Incision: + previna dressing on   Lines: no sign of infectio

## 2017-05-30 LAB
ALBUMIN SERPL ELPH-MCNC: 3.4 G/DL — SIGNIFICANT CHANGE UP (ref 3.3–5)
ALP SERPL-CCNC: 29 U/L — LOW (ref 40–120)
ALT FLD-CCNC: 21 U/L — SIGNIFICANT CHANGE UP (ref 10–45)
ANION GAP SERPL CALC-SCNC: 11 MMOL/L — SIGNIFICANT CHANGE UP (ref 5–17)
APTT BLD: 28.8 SEC — SIGNIFICANT CHANGE UP (ref 27.5–37.4)
AST SERPL-CCNC: 23 U/L — SIGNIFICANT CHANGE UP (ref 10–40)
BILIRUB SERPL-MCNC: 0.4 MG/DL — SIGNIFICANT CHANGE UP (ref 0.2–1.2)
BUN SERPL-MCNC: 19 MG/DL — SIGNIFICANT CHANGE UP (ref 7–23)
CALCIUM SERPL-MCNC: 8.7 MG/DL — SIGNIFICANT CHANGE UP (ref 8.4–10.5)
CHLORIDE SERPL-SCNC: 100 MMOL/L — SIGNIFICANT CHANGE UP (ref 96–108)
CO2 SERPL-SCNC: 23 MMOL/L — SIGNIFICANT CHANGE UP (ref 22–31)
CREAT SERPL-MCNC: 0.7 MG/DL — SIGNIFICANT CHANGE UP (ref 0.5–1.3)
GLUCOSE SERPL-MCNC: 134 MG/DL — HIGH (ref 70–99)
HCT VFR BLD CALC: 29.2 % — LOW (ref 34.5–45)
HGB BLD-MCNC: 9.6 G/DL — LOW (ref 11.5–15.5)
INR BLD: 1.03 — SIGNIFICANT CHANGE UP (ref 0.88–1.16)
MAGNESIUM SERPL-MCNC: 2.1 MG/DL — SIGNIFICANT CHANGE UP (ref 1.6–2.6)
MCHC RBC-ENTMCNC: 28.8 PG — SIGNIFICANT CHANGE UP (ref 27–34)
MCHC RBC-ENTMCNC: 32.9 G/DL — SIGNIFICANT CHANGE UP (ref 32–36)
MCV RBC AUTO: 87.7 FL — SIGNIFICANT CHANGE UP (ref 80–100)
PHOSPHATE SERPL-MCNC: 3.3 MG/DL — SIGNIFICANT CHANGE UP (ref 2.5–4.5)
PLATELET # BLD AUTO: 241 K/UL — SIGNIFICANT CHANGE UP (ref 150–400)
POTASSIUM SERPL-MCNC: 4.2 MMOL/L — SIGNIFICANT CHANGE UP (ref 3.5–5.3)
POTASSIUM SERPL-SCNC: 4.2 MMOL/L — SIGNIFICANT CHANGE UP (ref 3.5–5.3)
PROT SERPL-MCNC: 6.3 G/DL — SIGNIFICANT CHANGE UP (ref 6–8.3)
PROTHROM AB SERPL-ACNC: 11.4 SEC — SIGNIFICANT CHANGE UP (ref 9.8–12.7)
RBC # BLD: 3.33 M/UL — LOW (ref 3.8–5.2)
RBC # FLD: 13.6 % — SIGNIFICANT CHANGE UP (ref 10.3–16.9)
SODIUM SERPL-SCNC: 134 MMOL/L — LOW (ref 135–145)
WBC # BLD: 9.9 K/UL — SIGNIFICANT CHANGE UP (ref 3.8–10.5)
WBC # FLD AUTO: 9.9 K/UL — SIGNIFICANT CHANGE UP (ref 3.8–10.5)

## 2017-05-30 PROCEDURE — 71010: CPT | Mod: 26,76

## 2017-05-30 RX ORDER — ALBUMIN HUMAN 25 %
250 VIAL (ML) INTRAVENOUS ONCE
Qty: 0 | Refills: 0 | Status: COMPLETED | OUTPATIENT
Start: 2017-05-30 | End: 2017-05-30

## 2017-05-30 RX ORDER — POTASSIUM CHLORIDE 20 MEQ
20 PACKET (EA) ORAL DAILY
Qty: 0 | Refills: 0 | Status: DISCONTINUED | OUTPATIENT
Start: 2017-05-30 | End: 2017-05-30

## 2017-05-30 RX ORDER — ALBUMIN HUMAN 25 %
50 VIAL (ML) INTRAVENOUS ONCE
Qty: 0 | Refills: 0 | Status: COMPLETED | OUTPATIENT
Start: 2017-05-30 | End: 2017-05-30

## 2017-05-30 RX ORDER — ALBUMIN HUMAN 25 %
50 VIAL (ML) INTRAVENOUS ONCE
Qty: 0 | Refills: 0 | Status: DISCONTINUED | OUTPATIENT
Start: 2017-05-30 | End: 2017-05-30

## 2017-05-30 RX ORDER — FUROSEMIDE 40 MG
20 TABLET ORAL DAILY
Qty: 0 | Refills: 0 | Status: DISCONTINUED | OUTPATIENT
Start: 2017-05-31 | End: 2017-06-03

## 2017-05-30 RX ORDER — SODIUM CHLORIDE 9 MG/ML
3 INJECTION INTRAMUSCULAR; INTRAVENOUS; SUBCUTANEOUS EVERY 8 HOURS
Qty: 0 | Refills: 0 | Status: DISCONTINUED | OUTPATIENT
Start: 2017-05-30 | End: 2017-06-03

## 2017-05-30 RX ORDER — POTASSIUM CHLORIDE 20 MEQ
10 PACKET (EA) ORAL DAILY
Qty: 0 | Refills: 0 | Status: DISCONTINUED | OUTPATIENT
Start: 2017-05-31 | End: 2017-06-02

## 2017-05-30 RX ADMIN — SODIUM CHLORIDE 3 MILLILITER(S): 9 INJECTION INTRAMUSCULAR; INTRAVENOUS; SUBCUTANEOUS at 21:46

## 2017-05-30 RX ADMIN — Medication 81 MILLIGRAM(S): at 11:30

## 2017-05-30 RX ADMIN — Medication 100 MILLIGRAM(S): at 21:45

## 2017-05-30 RX ADMIN — Medication 30 MILLIGRAM(S): at 13:30

## 2017-05-30 RX ADMIN — Medication 30 MILLIGRAM(S): at 22:15

## 2017-05-30 RX ADMIN — Medication 4: at 11:31

## 2017-05-30 RX ADMIN — Medication 30 MILLIGRAM(S): at 12:56

## 2017-05-30 RX ADMIN — Medication 40 MILLIGRAM(S): at 06:18

## 2017-05-30 RX ADMIN — ATORVASTATIN CALCIUM 80 MILLIGRAM(S): 80 TABLET, FILM COATED ORAL at 21:45

## 2017-05-30 RX ADMIN — Medication 1 PATCH: at 13:01

## 2017-05-30 RX ADMIN — Medication 30 MILLIGRAM(S): at 21:45

## 2017-05-30 RX ADMIN — Medication 5 MILLIGRAM(S): at 11:30

## 2017-05-30 RX ADMIN — HEPARIN SODIUM 5000 UNIT(S): 5000 INJECTION INTRAVENOUS; SUBCUTANEOUS at 13:01

## 2017-05-30 RX ADMIN — Medication 0.5 MILLIGRAM(S): at 17:32

## 2017-05-30 RX ADMIN — SENNA PLUS 2 TABLET(S): 8.6 TABLET ORAL at 21:45

## 2017-05-30 RX ADMIN — Medication 100 MILLIGRAM(S): at 13:01

## 2017-05-30 RX ADMIN — Medication 1 PATCH: at 12:55

## 2017-05-30 RX ADMIN — LIDOCAINE 1 PATCH: 4 CREAM TOPICAL at 11:30

## 2017-05-30 RX ADMIN — Medication 0.5 MILLIGRAM(S): at 06:08

## 2017-05-30 RX ADMIN — SODIUM CHLORIDE 3 MILLILITER(S): 9 INJECTION INTRAMUSCULAR; INTRAVENOUS; SUBCUTANEOUS at 13:05

## 2017-05-30 RX ADMIN — HEPARIN SODIUM 5000 UNIT(S): 5000 INJECTION INTRAVENOUS; SUBCUTANEOUS at 06:19

## 2017-05-30 RX ADMIN — PANTOPRAZOLE SODIUM 40 MILLIGRAM(S): 20 TABLET, DELAYED RELEASE ORAL at 06:19

## 2017-05-30 RX ADMIN — Medication 125 MILLILITER(S): at 13:21

## 2017-05-30 RX ADMIN — Medication 50 MILLILITER(S): at 09:54

## 2017-05-30 RX ADMIN — HEPARIN SODIUM 5000 UNIT(S): 5000 INJECTION INTRAVENOUS; SUBCUTANEOUS at 21:45

## 2017-05-30 NOTE — DISCHARGE NOTE ADULT - HOSPITAL COURSE
This 57-year old female with PMH of HTN. HLD, CAD, DMT2 and aortic aneurysm presents with findings of aortic root aneurysm/dilation along with her CAD underwent surgical evaluation.  She underwent AVR/ascending root and CABGx1; patient tolerates the procedures well.  For further details, please refer the operative reports.  Post operatively, she is complicated with pulmonary issues associated with hypoxia.  Diuresis and broncho dilator does help her.  She is extubated without difficulty.  She is discharged home with home O2.  During the hospitalization, she continues to do well.  Beta blocker is titrated up to meet her need.  Incision was taken care of with prevena post op, which was removed at POD#8.  On POD#12, patient is hemodynamically stable.  He incisions are examined.  Pacing wires and stitches were removed.  She states some soreness of the left leg due to vein harvesting; instruction for warm compression adn ambulation were given.  Patient understands that.  She is discharged to home for further care.

## 2017-05-30 NOTE — DISCHARGE NOTE ADULT - CARE PROVIDERS DIRECT ADDRESSES
[email or web address]

## 2017-05-30 NOTE — DISCHARGE NOTE ADULT - CARE PLAN
Principal Discharge DX:	CAD (coronary artery disease)  Goal:	s/p CABG  Instructions for follow-up, activity and diet:	low sodium diet  continue current care   ASA 81mg  metoprolol 12.5 BID  Secondary Diagnosis:	COPD (chronic obstructive pulmonary disease)  Instructions for follow-up, activity and diet:	smoking cessation with nicotine patch daily  inhaler continues as needed  Secondary Diagnosis:	Has been smoking tobacco for 20 years  Instructions for follow-up, activity and diet:	smoking cessation with aid of nicotine patch  Secondary Diagnosis:	Diabetes mellitus  Instructions for follow-up, activity and diet:	resume home metformin  continue diet control and return to PCP for further management  Secondary Diagnosis:	Ascending aortic aneurysm

## 2017-05-30 NOTE — DISCHARGE NOTE ADULT - ADDITIONAL INSTRUCTIONS
-Please follow up with Dr. Hanson on 06/14/17_.  The office is located at [facility], [facility], 4th floor. Call us with any questions #[phone].  Please call  to confirm the appointment     -Walk daily as tolerated and use your incentive spirometer every hour.    -No driving or strenuous activity/exercise for 6 weeks, or until cleared by your surgeon.    -Gently clean your incisions with anti-bacterial soap and water, pat dry.  You may leave them open to air.    -Call your doctor if you have shortness of breath, chest pain not relieved by pain medication, dizziness, fever >101.5, or increased redness or drainage from incisions.

## 2017-05-30 NOTE — PROGRESS NOTE ADULT - SUBJECTIVE AND OBJECTIVE BOX
CTICU  CRITICAL  CARE  attending     Hand off received 					   Pertinent clinical, laboratory, radiographic, hemodynamic, echocardiographic, respiratory data, microbiologic data and chart were reviewed and analyzed frequently throughout the course of the day and night  Patient seen and examined with CTS/ SH attending at bedside  Pt is a 57y , Female, HEALTH ISSUES - PROBLEM Dx:      , FAMILY HISTORY:  Family history of pulmonary embolism (Mother)  Family history of MI (myocardial infarction) (Mother)  Family history of asthma (Mother)  Family history of cardiac pacemaker (Mother)  Family history of atrial fibrillation (Mother)  PAST MEDICAL & SURGICAL HISTORY:  Back injury: lumbar, work related  Kidney stone  GERD (gastroesophageal reflux disease)  Diverticulitis  Asthma  Obesity  Diabetes mellitus  Hyperlipidemia  Hypertension  Status post laser lithotripsy of ureteral calculus  Uterine fibroid: removal  Status post small bowel resection  Carpal tunnel syndrome    Patient is a 57y old  Female who presents with a chief complaint of AS (19 May 2017 10:50)      14 system review was unremarkable  acute changes include acute respiratory failure  Vital signs, hemodynamic and respiratory parameters were reviewed from the bedside nursing flowsheet.  ICU Vital Signs Last 24 Hrs  T(C): 36.3, Max: 36.8 (05-29 @ 18:28)  T(F): 97.3, Max: 98.3 (05-29 @ 18:28)  HR: 82 (64 - 95)  BP: 100/63 (86/49 - 130/64)  BP(mean): 71 (60 - 93)  ABP: --  ABP(mean): --  RR: 20 (16 - 29)  SpO2: 98% (90% - 919%)    Adult Advanced Hemodynamics Last 24 Hrs  CVP(mm Hg): --  CVP(cm H2O): --  CO: --  CI: --  PA: --  PA(mean): --  PCWP: --  SVR: --  SVRI: --  PVR: --  PVRI: --,     Intake and output was reviewed and the fluid balance was calculated  Daily     Daily   I&O's Summary  I & Os for 24h ending 30 May 2017 07:00  =============================================  IN: 1600 ml / OUT: 2370 ml / NET: -770 ml    I & Os for current day (as of 30 May 2017 17:38)  =============================================  IN: 1290 ml / OUT: 650 ml / NET: 640 ml      All lines and drain sites were assessed  Glycemic trend was reviewedCAPILLARY BLOOD GLUCOSE  147 (30 May 2017 17:01)    No acute change in mental status  Auscultation of the chest reveals equal bs  Abdomen is soft  Extremities are warm and well perfused  Wounds appear clean and unremarkable  Antibiotics are periop    labs  CBC Full  -  ( 30 May 2017 03:30 )  WBC Count : 9.9 K/uL  Hemoglobin : 9.6 g/dL  Hematocrit : 29.2 %  Platelet Count - Automated : 241 K/uL  Mean Cell Volume : 87.7 fL  Mean Cell Hemoglobin : 28.8 pg  Mean Cell Hemoglobin Concentration : 32.9 g/dL  Auto Neutrophil # : x  Auto Lymphocyte # : x  Auto Monocyte # : x  Auto Eosinophil # : x  Auto Basophil # : x  Auto Neutrophil % : x  Auto Lymphocyte % : x  Auto Monocyte % : x  Auto Eosinophil % : x  Auto Basophil % : x    05-30    134<L>  |  100  |  19  ----------------------------<  134<H>  4.2   |  23  |  0.70    Ca    8.7      30 May 2017 03:34  Phos  3.3     05-30  Mg     2.1     05-30    TPro  6.3  /  Alb  3.4  /  TBili  0.4  /  DBili  x   /  AST  23  /  ALT  21  /  AlkPhos  29<L>  05-30    PT/INR - ( 30 May 2017 03:34 )   PT: 11.4 sec;   INR: 1.03          PTT - ( 30 May 2017 03:34 )  PTT:28.8 sec  The current medications were reviewed   MEDICATIONS  (STANDING):  sodium chloride 0.45%. 1000milliLiter(s) IV Continuous <Continuous>  heparin  Injectable 5000Unit(s) SubCutaneous every 8 hours  atorvastatin 80milliGRAM(s) Oral at bedtime  tiotropium 18 MICROgram(s) Capsule 1Capsule(s) Inhalation daily  lidocaine   Patch 1Patch Transdermal daily  nicotine -   7 mG/24Hr(s) Patch 1patch Transdermal daily  buDESOnide   0.5 milliGRAM(s) Respule 0.5milliGRAM(s) Inhalation every 12 hours  insulin lispro (HumaLOG) corrective regimen sliding scale  SubCutaneous Before meals and at bedtime  dextrose 5%. 1000milliLiter(s) IV Continuous <Continuous>  dextrose 50% Injectable 12.5Gram(s) IV Push once  dextrose 50% Injectable 25Gram(s) IV Push once  dextrose 50% Injectable 25Gram(s) IV Push once  aspirin enteric coated 81milliGRAM(s) Oral daily  pantoprazole    Tablet 40milliGRAM(s) Oral before breakfast  diazepam    Tablet 5milliGRAM(s) Oral daily  docusate sodium 100milliGRAM(s) Oral three times a day  senna 2Tablet(s) Oral at bedtime  sodium chloride 0.9% lock flush 3milliLiter(s) IV Push every 8 hours    MEDICATIONS  (PRN):  dextrose Gel 1Dose(s) Oral once PRN Blood Glucose LESS THAN 70 milliGRAM(s)/deciliter  glucagon  Injectable 1milliGRAM(s) IntraMuscular once PRN Glucose LESS THAN 70 milligrams/deciliter  HYDROmorphone   Tablet 2milliGRAM(s) Oral every 4 hours PRN Moderate Pain (4 - 6)  polyethylene glycol 3350 17Gram(s) Oral daily PRN Constipation  acetaminophen   Tablet. 650milliGRAM(s) Oral every 6 hours PRN Mild Pain (1 - 3)  ketorolac   Injectable 30milliGRAM(s) IV Push every 6 hours PRN Severe Pain (7 - 10)       PROBLEM LIST/ ASSESSMENT:  HEALTH ISSUES - PROBLEM Dx:      ,   Patient is a 57y old  Female who presents with a chief complaint of AS (19 May 2017 10:50)     s/p ohs  acute changes include acute respiratory failure    My plan includes :  close hemodynamic, ventilatory and drain monitoring and management per post op routine    Monitor for arrhythmias and monitor parameters for organ perfusion  monitor neurologic status  Head of the bed should remain elevated to 45 deg .   chest PT and IS will be encouraged  monitor adequacy of oxygenation and ventilation and attempt to wean oxygen  Nutritional goals will be met using po eventually , ensure adequate caloric intake and montior the same  Stress ulcer and VTE prophylaxis will be achieved    Glycemic control is satisfactory  Electrolytes have been repleted as necessary and wound care has been carried out. Pain control has been achieved.   agressive physical therapy and early mobility and ambulation goals will be met   The family was updated about the course and plan  CRITICAL CARE TIME SPENT in evaluation and management, reassessments, review and interpretation of labs and x-rays, ventilator and hemodynamic management, formulating a plan and coordinating care: ___90____ MIN.  Time does not include procedural time.  CTICU ATTENDING     					    Rivera Pace MD

## 2017-05-30 NOTE — DISCHARGE NOTE ADULT - MEDICATION SUMMARY - MEDICATIONS TO TAKE
I will START or STAY ON the medications listed below when I get home from the hospital:    Percocet 5/325 oral tablet  -- 1 tab(s) by mouth every 8 hours MDD:no more than 4 tabs daily  -- Indication: For pain control    aspirin 81 mg oral delayed release tablet  -- 1 tab(s) by mouth once a day  -- Indication: For Antiplatelet    diazePAM 10 mg oral tablet  -- 0.5 tab(s) by mouth once a day MDD:no more than 1 tabs daily  -- Indication: For supplement    metFORMIN 500 mg oral tablet  -- 2 tab(s) by mouth once a day  -- Indication: For Anti-hyperglycemia    metFORMIN 500 mg oral tablet  -- 1 tab(s) by mouth once a day (at bedtime)  -- Indication: For Antihyperglycemia    Crestor 20 mg oral tablet  -- 1 tab(s) by mouth once a day (at bedtime)  -- Indication: For Anti-dyslipidemia    metoprolol succinate 25 mg oral tablet, extended release  -- 0.5 tab(s) by mouth once a day MDD:hope when SBP is less than 120.  HR is less than 60  -- Indication: For rate control    ProAir HFA 90 mcg/inh inhalation aerosol  -- 2 puff(s) inhaled 2 times a day, As Needed  -- Indication: For COPD (chronic obstructive pulmonary disease)    furosemide 20 mg oral tablet  -- 1 tab(s) by mouth once a day.  -- Indication: For fluid over load    raNITIdine 150 mg oral capsule  -- 1 cap(s) by mouth 2 times a day  -- Indication: For GI PPX    polyethylene glycol 3350 oral powder for reconstitution  -- 17 gram(s) by mouth once a day, As needed, Constipation. OTC  -- Indication: For GI PPX    K-Tab 10 mEq oral tablet, extended release  -- 1 tab(s) by mouth once a day MDD:only take it when taking lasix  -- It is very important that you take or use this exactly as directed.  Do not skip doses or discontinue unless directed by your doctor.  Medication should be taken with plenty of water.  Take with food or milk.    -- Indication: For supplement     pantoprazole 40 mg oral delayed release tablet  -- 1 tab(s) by mouth once a day (before a meal)  -- Indication: For GI PPX    Habitrol 21 mg/24 hr transdermal film, extended release  -- 1 patch by transdermal patch once a day  -- Do not take this drug if you are pregnant.  For external use only.  It is very important that you take or use this exactly as directed.  Do not skip doses or discontinue unless directed by your doctor.  Remove old patch prior to applying a new patch.    -- Indication: For smoking cessation

## 2017-05-30 NOTE — DISCHARGE NOTE ADULT - PATIENT PORTAL LINK FT
“You can access the [employer] Patient Portal, offered by [facility], by registering with the following website: [email or web address]”

## 2017-05-30 NOTE — CHART NOTE - NSCHARTNOTEFT_GEN_A_CORE
CT SURGERY:    As directed by Dr. Kim, boff Nasogastric Feeding tube placed at the bedside without incident. Pt tolerated procedure well and placement was confirmed by gastric ascultation and XR.
Mediastinal enid with minimal drainage removed without difficulty. Patient tolerated procedure well. CXR ordered.

## 2017-05-30 NOTE — PROGRESS NOTE ADULT - SUBJECTIVE AND OBJECTIVE BOX
CTICU TO 9LA TRANSFER NOTE    Operation / Date: AVR, ascending root replacement, and CABG x 1 (SVG to PDA) on 5/22/17    SUBJECTIVE ASSESSMENT:  57y Female seen at bedside after transfer from CTICU.  Pt states that she feels well, slept well overnight, but continues to endorse some moderate incisional pain with SOB/CARRILLO.          Vital Signs Last 24 Hrs  T(C): 35.8, Max: 36.8 (05-29 @ 18:28)  T(F): 96.5, Max: 98.3 (05-29 @ 18:28)  HR: 95 (64 - 95)  BP: 93/48 (86/49 - 132/62)  BP(mean): 60 (60 - 86)  RR: 20 (16 - 31)  SpO2: 95% (74% - 919%)  I&O's Detail  I & Os for 24h ending 30 May 2017 07:00  =============================================  IN:    Oral Fluid: 1600 ml    Total IN: 1600 ml  ---------------------------------------------  OUT:    Voided: 2325 ml    Drain: 45 ml    Total OUT: 2370 ml  ---------------------------------------------  Total NET: -770 ml    I & Os for current day (as of 30 May 2017 13:40)  =============================================  IN:    Oral Fluid: 1040 ml    Albumin 5%  - 250 mL: 250 ml    Total IN: 1290 ml  ---------------------------------------------  OUT:    Voided: 50 ml    Total OUT: 50 ml  ---------------------------------------------  Total NET: 1240 ml      CHEST TUBE:  Yes/No. AIR LEAKS: Yes/No. Suction / H2O SEAL.   ROXANE DRAIN:  Yes/No.  EPICARDIAL WIRES: Yes/No.  TIE DOWNS: Yes/No.  HACKETT: Yes/No.    PHYSICAL EXAM:    General:     Neurological:    Cardiovascular:    Respiratory:    Gastrointestinal:    Extremities:    Vascular:    Incision Sites:    LABS:                        9.6    9.9   )-----------( 241      ( 30 May 2017 03:30 )             29.2       COUMADIN:  Yes/No. REASON: .    PT/INR - ( 30 May 2017 03:34 )   PT: 11.4 sec;   INR: 1.03          PTT - ( 30 May 2017 03:34 )  PTT:28.8 sec    05-30    134<L>  |  100  |  19  ----------------------------<  134<H>  4.2   |  23  |  0.70    Ca    8.7      30 May 2017 03:34  Phos  3.3     05-30  Mg     2.1     05-30    TPro  6.3  /  Alb  3.4  /  TBili  0.4  /  DBili  x   /  AST  23  /  ALT  21  /  AlkPhos  29<L>  05-30          MEDICATIONS  (STANDING):  sodium chloride 0.45%. 1000milliLiter(s) IV Continuous <Continuous>  heparin  Injectable 5000Unit(s) SubCutaneous every 8 hours  atorvastatin 80milliGRAM(s) Oral at bedtime  tiotropium 18 MICROgram(s) Capsule 1Capsule(s) Inhalation daily  lidocaine   Patch 1Patch Transdermal daily  nicotine -   7 mG/24Hr(s) Patch 1patch Transdermal daily  buDESOnide   0.5 milliGRAM(s) Respule 0.5milliGRAM(s) Inhalation every 12 hours  insulin lispro (HumaLOG) corrective regimen sliding scale  SubCutaneous Before meals and at bedtime  dextrose 5%. 1000milliLiter(s) IV Continuous <Continuous>  dextrose 50% Injectable 12.5Gram(s) IV Push once  dextrose 50% Injectable 25Gram(s) IV Push once  dextrose 50% Injectable 25Gram(s) IV Push once  aspirin enteric coated 81milliGRAM(s) Oral daily  pantoprazole    Tablet 40milliGRAM(s) Oral before breakfast  diazepam    Tablet 5milliGRAM(s) Oral daily  docusate sodium 100milliGRAM(s) Oral three times a day  senna 2Tablet(s) Oral at bedtime  sodium chloride 0.9% lock flush 3milliLiter(s) IV Push every 8 hours    MEDICATIONS  (PRN):  dextrose Gel 1Dose(s) Oral once PRN Blood Glucose LESS THAN 70 milliGRAM(s)/deciliter  glucagon  Injectable 1milliGRAM(s) IntraMuscular once PRN Glucose LESS THAN 70 milligrams/deciliter  HYDROmorphone   Tablet 2milliGRAM(s) Oral every 4 hours PRN Moderate Pain (4 - 6)  polyethylene glycol 3350 17Gram(s) Oral daily PRN Constipation  acetaminophen   Tablet. 650milliGRAM(s) Oral every 6 hours PRN Mild Pain (1 - 3)  ketorolac   Injectable 30milliGRAM(s) IV Push every 6 hours PRN Severe Pain (7 - 10)        RADIOLOGY & ADDITIONAL TESTS: CTICU TO 9LA TRANSFER NOTE    Operation / Date: AVR, ascending root replacement, and CABG x 1 (SVG to PDA) on 5/22/17    SUBJECTIVE ASSESSMENT:  57y Female seen at bedside after transfer from CTICU.  Pt states that she feels well, slept well overnight, but continues to endorse some moderate incisional pain with SOB/CARRILLO.  She has had a BM post-operatively. No other acute complaints, denies HA, AMS, substernal chest pain, hemoptysis, orthopnea, PND, n/v/d, fever.        Vital Signs Last 24 Hrs  T(C): 35.8, Max: 36.8 (05-29 @ 18:28)  T(F): 96.5, Max: 98.3 (05-29 @ 18:28)  HR: 95 (64 - 95)  BP: 93/48 (86/49 - 132/62)  BP(mean): 60 (60 - 86)  RR: 20 (16 - 31)  SpO2: 95% (74% - 919%)  I&O's Detail  I & Os for 24h ending 30 May 2017 07:00  =============================================  IN:    Oral Fluid: 1600 ml    Total IN: 1600 ml  ---------------------------------------------  OUT:    Voided: 2325 ml    Drain: 45 ml    Total OUT: 2370 ml  ---------------------------------------------  Total NET: -770 ml    I & Os for current day (as of 30 May 2017 13:40)  =============================================  IN:    Oral Fluid: 1040 ml    Albumin 5%  - 250 mL: 250 ml    Total IN: 1290 ml  ---------------------------------------------  OUT:    Voided: 50 ml    Total OUT: 50 ml  ---------------------------------------------  Total NET: 1240 ml      CHEST TUBE:  No.   ROXANE DRAIN: No.  EPICARDIAL WIRES: Yes.  TIE DOWNS: Yes  HACKETT: No.    PHYSICAL EXAM:    General: OOB to chair, no acute distress.     Neurological: AAOx3, no AMS or focal deficits.     Cardiovascular: RRR, S1/S2, no m/r/g.     Respiratory: No acute distress, diminished BS at b/l bases, diffuse expiratory wheeze.       Gastrointestinal: ND, NBS, non-TTP.    Extremities: Warm and well perfused, no calf ttp b/l.  No edema b/l.     Vascular: Pulses 2+ throughout.     Incision sites: MSI: Prevena dressing in place, no palpable click.  L EVH site: C/D/I    LABS:                        9.6    9.9   )-----------( 241      ( 30 May 2017 03:30 )             29.2       COUMADIN:  No.     PT/INR - ( 30 May 2017 03:34 )   PT: 11.4 sec;   INR: 1.03          PTT - ( 30 May 2017 03:34 )  PTT:28.8 sec    05-30    134<L>  |  100  |  19  ----------------------------<  134<H>  4.2   |  23  |  0.70    Ca    8.7      30 May 2017 03:34  Phos  3.3     05-30  Mg     2.1     05-30    TPro  6.3  /  Alb  3.4  /  TBili  0.4  /  DBili  x   /  AST  23  /  ALT  21  /  AlkPhos  29<L>  05-30      MEDICATIONS  (STANDING):  sodium chloride 0.45%. 1000milliLiter(s) IV Continuous <Continuous>  heparin  Injectable 5000Unit(s) SubCutaneous every 8 hours  atorvastatin 80milliGRAM(s) Oral at bedtime  tiotropium 18 MICROgram(s) Capsule 1Capsule(s) Inhalation daily  lidocaine   Patch 1Patch Transdermal daily  nicotine -   7 mG/24Hr(s) Patch 1patch Transdermal daily  buDESOnide   0.5 milliGRAM(s) Respule 0.5milliGRAM(s) Inhalation every 12 hours  insulin lispro (HumaLOG) corrective regimen sliding scale  SubCutaneous Before meals and at bedtime  dextrose 5%. 1000milliLiter(s) IV Continuous <Continuous>  dextrose 50% Injectable 12.5Gram(s) IV Push once  dextrose 50% Injectable 25Gram(s) IV Push once  dextrose 50% Injectable 25Gram(s) IV Push once  aspirin enteric coated 81milliGRAM(s) Oral daily  pantoprazole    Tablet 40milliGRAM(s) Oral before breakfast  diazepam    Tablet 5milliGRAM(s) Oral daily  docusate sodium 100milliGRAM(s) Oral three times a day  senna 2Tablet(s) Oral at bedtime  sodium chloride 0.9% lock flush 3milliLiter(s) IV Push every 8 hours    MEDICATIONS  (PRN):  dextrose Gel 1Dose(s) Oral once PRN Blood Glucose LESS THAN 70 milliGRAM(s)/deciliter  glucagon  Injectable 1milliGRAM(s) IntraMuscular once PRN Glucose LESS THAN 70 milligrams/deciliter  HYDROmorphone   Tablet 2milliGRAM(s) Oral every 4 hours PRN Moderate Pain (4 - 6)  polyethylene glycol 3350 17Gram(s) Oral daily PRN Constipation  acetaminophen   Tablet. 650milliGRAM(s) Oral every 6 hours PRN Mild Pain (1 - 3)  ketorolac   Injectable 30milliGRAM(s) IV Push every 6 hours PRN Severe Pain (7 - 10)        RADIOLOGY & ADDITIONAL TESTS:    EXAM:  XR CHEST 1 VIEW PORT URGENT                          PROCEDURE DATE:  05/30/2017         INTERPRETATION:  XR CHEST 1 VIEW PORT URGENT DATED 5/30/2017 8:37 AM    INDICATION: 57 years-old Female with chest tube removed.    PRIOR STUDIES: Chest x-ray from 5/30/2017    FINDINGS: AP view of the chest demonstrates the chest tube has been   removed. Persistent bilateral pleural effusions and bibasilar   infiltrates. Trace right apical pneumothorax, decreased. Heart size   stable. Noother change.    IMPRESSION:  Decreased trace right apical pneumothorax. Persistent pleural effusions.

## 2017-05-30 NOTE — DISCHARGE NOTE ADULT - CARE PROVIDER_API CALL
Hayden Hanson (MD), Surgery; Thoracic and Cardiac Surgery  [address] [place] [address]  Phone: [phone]  Fax: [phone]

## 2017-05-30 NOTE — DISCHARGE NOTE ADULT - PLAN OF CARE
s/p CABG low sodium diet  continue current care   ASA 81mg  metoprolol 12.5 BID smoking cessation with nicotine patch daily  inhaler continues as needed smoking cessation with aid of nicotine patch resume home metformin  continue diet control and return to PCP for further management

## 2017-05-30 NOTE — DISCHARGE NOTE ADULT - SECONDARY DIAGNOSIS.
COPD (chronic obstructive pulmonary disease) Has been smoking tobacco for 20 years Diabetes mellitus Ascending aortic aneurysm

## 2017-05-30 NOTE — PROGRESS NOTE ADULT - ASSESSMENT
57y F, current smoker, with history of HTN, HLD, DM2, Asthma, GERD, diverticulitis, moderate to severe AS, and recently diagnosed CAD found on cardiac cath from 5/4/17 showing 80% RCA stenosis who is POD 8 s/p AVR, ascending aortic root replacement, and CABG x 1 on 5/22/17.  Intraoperatively, pt required transfusion with 2u FFP, 1u Plt, 10u cryo, FEIBA 500u, and cell saver.  She arrived to CTICU intubated and on pressors.  Developed hypoxia overnight requiring diuresis and intermittent CPAP as well as an additional 1u PC and Terrell 20ppm.  POD 2, improved respiratory status, Terrell d/c'd, extubated to biPAP.  POD 3, HiFlow alternating with BiPAP required during ambulation.  5/27-5/30, uneventful respiratory status, progressive improvement.  POD 8, stable and transferred to [facility] for step down care.    1. Neuro: No delirium, pain well managed  -c/w PRNs for pain control  -C/w diazepam 5mg QD  -Nicotine patch and continue to encourage smoke cessation    2. Respiratory: 96% on 6L NC; post-op course c/b respiratory distress requiring prolonged intubation with CPAP, then HFNC/BiPAP.  Hx of asthma  -Successfully weaned to 6L NC this AM, saturating well with no acute distress.   -chest tubes removed at bedside, stable ptx on f/u CXR.  -C/w lasix 40mg QD and nebs  -CXR in AM  -IS and ambulation  -Monitor SpO2 for respiratory status    3. CVS: HD Stable, HR controlled.  POD 8 s/p AVR/CABG/aortic root replacement on 5/22/17, EF 65%.  Hx of HTN, HLD  -C/w ASA 81mg QD, Statin  -Lopressor held at this time 2/2 hypotension.  Resume when BP controlled.   -Pacing wires present, on VVI backup at 30.   -Monitor HR/BP/Tele    4. GI: Stable, tolerating PO diet well.  -PPX: Protonix  -Colace and senna for bowel regimen.     5. /Renal: Cr: 0.70; No urinary issues  -Trend AM Cr  -Monitor I/O    6. ID: Afebrile, Asymptomatic  -No acute issues at this time, continue to monitor for SIRS    7. Endo: A1C: 7.4, TSH: 2.489  -Hx of DM2, BS well controlled on current regimen.   -Trend FS    8. Heme: H/H Stable  -DVT PPX: HSQ 5000 q8h  -CBC, chem in AM    Dispo: Home when medically ready.

## 2017-05-30 NOTE — DISCHARGE NOTE ADULT - MEDICATION SUMMARY - MEDICATIONS TO STOP TAKING
I will STOP taking the medications listed below when I get home from the hospital:    lisinopril 20 mg oral tablet  -- 1 tab(s) by mouth once a day    hydroCHLOROthiazide 25 mg oral tablet  -- 1 tab(s) by mouth once a day    Tylenol 500 mg oral tablet  --  by mouth , As Needed

## 2017-05-31 LAB
ANION GAP SERPL CALC-SCNC: 14 MMOL/L — SIGNIFICANT CHANGE UP (ref 5–17)
BUN SERPL-MCNC: 17 MG/DL — SIGNIFICANT CHANGE UP (ref 7–23)
CALCIUM SERPL-MCNC: 9 MG/DL — SIGNIFICANT CHANGE UP (ref 8.4–10.5)
CHLORIDE SERPL-SCNC: 100 MMOL/L — SIGNIFICANT CHANGE UP (ref 96–108)
CO2 SERPL-SCNC: 22 MMOL/L — SIGNIFICANT CHANGE UP (ref 22–31)
CREAT SERPL-MCNC: 0.6 MG/DL — SIGNIFICANT CHANGE UP (ref 0.5–1.3)
GLUCOSE SERPL-MCNC: 144 MG/DL — HIGH (ref 70–99)
HCT VFR BLD CALC: 30 % — LOW (ref 34.5–45)
HGB BLD-MCNC: 9.5 G/DL — LOW (ref 11.5–15.5)
MAGNESIUM SERPL-MCNC: 2 MG/DL — SIGNIFICANT CHANGE UP (ref 1.6–2.6)
MCHC RBC-ENTMCNC: 28.2 PG — SIGNIFICANT CHANGE UP (ref 27–34)
MCHC RBC-ENTMCNC: 31.7 G/DL — LOW (ref 32–36)
MCV RBC AUTO: 89 FL — SIGNIFICANT CHANGE UP (ref 80–100)
PHOSPHATE SERPL-MCNC: 3.8 MG/DL — SIGNIFICANT CHANGE UP (ref 2.5–4.5)
PLATELET # BLD AUTO: 290 K/UL — SIGNIFICANT CHANGE UP (ref 150–400)
POTASSIUM SERPL-MCNC: 4 MMOL/L — SIGNIFICANT CHANGE UP (ref 3.5–5.3)
POTASSIUM SERPL-SCNC: 4 MMOL/L — SIGNIFICANT CHANGE UP (ref 3.5–5.3)
RBC # BLD: 3.37 M/UL — LOW (ref 3.8–5.2)
RBC # FLD: 13.3 % — SIGNIFICANT CHANGE UP (ref 10.3–16.9)
SODIUM SERPL-SCNC: 136 MMOL/L — SIGNIFICANT CHANGE UP (ref 135–145)
T3 SERPL-MCNC: 202 NG/DL — HIGH (ref 80–200)
T4 AB SER-ACNC: 6.95 UG/DL — SIGNIFICANT CHANGE UP (ref 3.17–11.72)
TSH SERPL-MCNC: 4.47 UIU/ML — SIGNIFICANT CHANGE UP (ref 0.35–4.94)
WBC # BLD: 9.3 K/UL — SIGNIFICANT CHANGE UP (ref 3.8–10.5)
WBC # FLD AUTO: 9.3 K/UL — SIGNIFICANT CHANGE UP (ref 3.8–10.5)

## 2017-05-31 PROCEDURE — 71010: CPT | Mod: 26,76

## 2017-05-31 PROCEDURE — 93010 ELECTROCARDIOGRAM REPORT: CPT

## 2017-05-31 RX ORDER — FUROSEMIDE 40 MG
20 TABLET ORAL ONCE
Qty: 0 | Refills: 0 | Status: COMPLETED | OUTPATIENT
Start: 2017-05-31 | End: 2017-05-31

## 2017-05-31 RX ORDER — METOPROLOL TARTRATE 50 MG
12.5 TABLET ORAL EVERY 12 HOURS
Qty: 0 | Refills: 0 | Status: DISCONTINUED | OUTPATIENT
Start: 2017-05-31 | End: 2017-06-03

## 2017-05-31 RX ADMIN — Medication 30 MILLIGRAM(S): at 18:35

## 2017-05-31 RX ADMIN — HYDROMORPHONE HYDROCHLORIDE 2 MILLIGRAM(S): 2 INJECTION INTRAMUSCULAR; INTRAVENOUS; SUBCUTANEOUS at 22:24

## 2017-05-31 RX ADMIN — Medication 100 MILLIGRAM(S): at 15:42

## 2017-05-31 RX ADMIN — Medication 30 MILLIGRAM(S): at 05:28

## 2017-05-31 RX ADMIN — Medication 20 MILLIGRAM(S): at 15:42

## 2017-05-31 RX ADMIN — Medication 10 MILLIEQUIVALENT(S): at 11:26

## 2017-05-31 RX ADMIN — Medication 100 MILLIGRAM(S): at 05:27

## 2017-05-31 RX ADMIN — Medication 650 MILLIGRAM(S): at 22:35

## 2017-05-31 RX ADMIN — ATORVASTATIN CALCIUM 80 MILLIGRAM(S): 80 TABLET, FILM COATED ORAL at 22:07

## 2017-05-31 RX ADMIN — SODIUM CHLORIDE 3 MILLILITER(S): 9 INJECTION INTRAMUSCULAR; INTRAVENOUS; SUBCUTANEOUS at 05:50

## 2017-05-31 RX ADMIN — HEPARIN SODIUM 5000 UNIT(S): 5000 INJECTION INTRAVENOUS; SUBCUTANEOUS at 05:28

## 2017-05-31 RX ADMIN — Medication 5 MILLIGRAM(S): at 15:42

## 2017-05-31 RX ADMIN — Medication 0.5 MILLIGRAM(S): at 08:37

## 2017-05-31 RX ADMIN — Medication 650 MILLIGRAM(S): at 12:25

## 2017-05-31 RX ADMIN — HEPARIN SODIUM 5000 UNIT(S): 5000 INJECTION INTRAVENOUS; SUBCUTANEOUS at 22:07

## 2017-05-31 RX ADMIN — Medication 650 MILLIGRAM(S): at 22:07

## 2017-05-31 RX ADMIN — LIDOCAINE 1 PATCH: 4 CREAM TOPICAL at 22:35

## 2017-05-31 RX ADMIN — Medication 650 MILLIGRAM(S): at 11:25

## 2017-05-31 RX ADMIN — SODIUM CHLORIDE 3 MILLILITER(S): 9 INJECTION INTRAMUSCULAR; INTRAVENOUS; SUBCUTANEOUS at 15:42

## 2017-05-31 RX ADMIN — PANTOPRAZOLE SODIUM 40 MILLIGRAM(S): 20 TABLET, DELAYED RELEASE ORAL at 07:17

## 2017-05-31 RX ADMIN — LIDOCAINE 1 PATCH: 4 CREAM TOPICAL at 00:03

## 2017-05-31 RX ADMIN — Medication 100 MILLIGRAM(S): at 22:07

## 2017-05-31 RX ADMIN — Medication 30 MILLIGRAM(S): at 06:00

## 2017-05-31 RX ADMIN — Medication 1 PATCH: at 11:30

## 2017-05-31 RX ADMIN — Medication 20 MILLIGRAM(S): at 05:28

## 2017-05-31 RX ADMIN — SODIUM CHLORIDE 3 MILLILITER(S): 9 INJECTION INTRAMUSCULAR; INTRAVENOUS; SUBCUTANEOUS at 22:04

## 2017-05-31 RX ADMIN — Medication 1 PATCH: at 11:26

## 2017-05-31 RX ADMIN — LIDOCAINE 1 PATCH: 4 CREAM TOPICAL at 11:26

## 2017-05-31 RX ADMIN — Medication 81 MILLIGRAM(S): at 11:26

## 2017-05-31 RX ADMIN — HYDROMORPHONE HYDROCHLORIDE 2 MILLIGRAM(S): 2 INJECTION INTRAMUSCULAR; INTRAVENOUS; SUBCUTANEOUS at 22:45

## 2017-05-31 RX ADMIN — HEPARIN SODIUM 5000 UNIT(S): 5000 INJECTION INTRAVENOUS; SUBCUTANEOUS at 15:41

## 2017-05-31 RX ADMIN — SENNA PLUS 2 TABLET(S): 8.6 TABLET ORAL at 22:09

## 2017-05-31 RX ADMIN — Medication 0.5 MILLIGRAM(S): at 19:33

## 2017-05-31 RX ADMIN — Medication 30 MILLIGRAM(S): at 19:00

## 2017-05-31 RX ADMIN — Medication 12.5 MILLIGRAM(S): at 18:34

## 2017-05-31 NOTE — PROGRESS NOTE ADULT - SUBJECTIVE AND OBJECTIVE BOX
Patient discussed on morning rounds with Dr. Yuen      Operation / Date: AVR, ascending root replacement, and CABG x 1 (SVG to PDA) on 5/22/17     SUBJECTIVE ASSESSMENT:  57y Female seen at bedside this AM.  Pt is doing well overall and feels that she is progressing well but continues to have incisional chest pain well managed on current medications.  Currently denies HA, AMS, chest pain, SOB, hemoptysis, orthopnea, PND, n/v/d, fever.     Of note: pt began to have chest pain/pressure in late morning, EKG was ordered which was stable compared to prior studies.  Pain resolved with rest.  Will continue to monitor.         Vital Signs Last 24 Hrs  T(C): 35.8, Max: 36.4 (05-31 @ 01:00)  T(F): 96.5, Max: 97.6 (05-31 @ 01:00)  HR: 80 (80 - 92)  BP: 114/60 (100/63 - 142/75)  BP(mean): 88 (71 - 95)  RR: 18 (18 - 24)  SpO2: 95% (94% - 98%)  I&O's Detail  I & Os for 24h ending 31 May 2017 07:00  =============================================  IN:    Oral Fluid: 1040 ml    Albumin 5%  - 250 mL: 250 ml    Total IN: 1290 ml  ---------------------------------------------  OUT:    Voided: 1350 ml    Total OUT: 1350 ml  ---------------------------------------------  Total NET: -60 ml    I & Os for current day (as of 31 May 2017 14:11)  =============================================  IN:    Total IN: 0 ml  ---------------------------------------------  OUT:    Voided: 480 ml    Total OUT: 480 ml  ---------------------------------------------  Total NET: -480 ml    CHEST TUBE:  No.   ROXANE DRAIN: No.  EPICARDIAL WIRES: Yes.  TIE DOWNS: Yes  HACKETT: No.    PHYSICAL EXAM:    General: OOB to chair, no acute distress.     Neurological: AAOx3, no AMS or focal deficits.     Cardiovascular: RRR, S1/S2, no m/r/g.     Respiratory: No acute distress, diminished BS at b/l bases, diffuse expiratory wheeze.       Gastrointestinal: ND, NBS, non-TTP.    Extremities: Warm and well perfused, no calf ttp b/l.  No edema b/l.     Vascular: Pulses 2+ throughout.     Incision sites: MSI: Prevena dressing in place, no palpable click.  L EVH site: C/D/I    LABS:                        9.5    9.3   )-----------( 290      ( 31 May 2017 06:08 )             30.0       COUMADIN:  No.    PT/INR - ( 30 May 2017 03:34 )   PT: 11.4 sec;   INR: 1.03          PTT - ( 30 May 2017 03:34 )  PTT:28.8 sec    05-31    136  |  100  |  17  ----------------------------<  144<H>  4.0   |  22  |  0.60    Ca    9.0      31 May 2017 06:08  Phos  3.8     05-31  Mg     2.0     05-31    TPro  6.3  /  Alb  3.4  /  TBili  0.4  /  DBili  x   /  AST  23  /  ALT  21  /  AlkPhos  29<L>  05-30      MEDICATIONS  (STANDING):  sodium chloride 0.45%. 1000milliLiter(s) IV Continuous <Continuous>  heparin  Injectable 5000Unit(s) SubCutaneous every 8 hours  atorvastatin 80milliGRAM(s) Oral at bedtime  tiotropium 18 MICROgram(s) Capsule 1Capsule(s) Inhalation daily  lidocaine   Patch 1Patch Transdermal daily  nicotine -   7 mG/24Hr(s) Patch 1patch Transdermal daily  buDESOnide   0.5 milliGRAM(s) Respule 0.5milliGRAM(s) Inhalation every 12 hours  insulin lispro (HumaLOG) corrective regimen sliding scale  SubCutaneous Before meals and at bedtime  dextrose 5%. 1000milliLiter(s) IV Continuous <Continuous>  dextrose 50% Injectable 12.5Gram(s) IV Push once  dextrose 50% Injectable 25Gram(s) IV Push once  dextrose 50% Injectable 25Gram(s) IV Push once  aspirin enteric coated 81milliGRAM(s) Oral daily  pantoprazole    Tablet 40milliGRAM(s) Oral before breakfast  diazepam    Tablet 5milliGRAM(s) Oral daily  docusate sodium 100milliGRAM(s) Oral three times a day  senna 2Tablet(s) Oral at bedtime  sodium chloride 0.9% lock flush 3milliLiter(s) IV Push every 8 hours  furosemide    Tablet 20milliGRAM(s) Oral daily  potassium chloride    Tablet ER 10milliEquivalent(s) Oral daily    MEDICATIONS  (PRN):  dextrose Gel 1Dose(s) Oral once PRN Blood Glucose LESS THAN 70 milliGRAM(s)/deciliter  glucagon  Injectable 1milliGRAM(s) IntraMuscular once PRN Glucose LESS THAN 70 milligrams/deciliter  HYDROmorphone   Tablet 2milliGRAM(s) Oral every 4 hours PRN Moderate Pain (4 - 6)  polyethylene glycol 3350 17Gram(s) Oral daily PRN Constipation  acetaminophen   Tablet. 650milliGRAM(s) Oral every 6 hours PRN Mild Pain (1 - 3)  ketorolac   Injectable 30milliGRAM(s) IV Push every 6 hours PRN Severe Pain (7 - 10)        RADIOLOGY & ADDITIONAL TESTS:    EXAM:  XR CHEST 1 VIEW PORT URGENT                          PROCEDURE DATE:  05/30/2017           INTERPRETATION:  XR CHEST 1 VIEW PORT URGENT DATED 5/30/2017 8:37 AM    INDICATION: 57 years-old Female with chest tube removed.    PRIOR STUDIES: Chest x-ray from 5/30/2017    FINDINGS: AP view of the chest demonstrates the chest tube has been   removed. Persistent bilateral pleural effusions and bibasilar   infiltrates. Trace right apical pneumothorax, decreased. Heart size   stable. Noother change.    IMPRESSION:  Decreased trace right apical pneumothorax. Persistent pleural effusions.

## 2017-05-31 NOTE — PROGRESS NOTE ADULT - ASSESSMENT
57y F, current smoker, with history of HTN, HLD, DM2, Asthma, GERD, diverticulitis, moderate to severe AS, and recently diagnosed CAD found on cardiac cath from 5/4/17 showing 80% RCA stenosis who is POD 8 s/p AVR, ascending aortic root replacement, and CABG x 1 on 5/22/17.  Intraoperatively, pt required transfusion with 2u FFP, 1u Plt, 10u cryo, FEIBA 500u, and cell saver.  She arrived to [facility]ICU intubated and on pressors.  Developed hypoxia overnight requiring diuresis and intermittent CPAP as well as an additional 1u PC and Terrell 20ppm.  POD 2, improved respiratory status, Terrell d/c'd, extubated to biPAP.  POD 3, HiFlow alternating with BiPAP required during ambulation.  5/27-5/30, uneventful respiratory status, progressive improvement.  POD 8, stable and transferred to [facility] for step down care. Now POD 9.     1. Neuro: No delirium, pain well managed  -c/w PRNs for pain control  -C/w diazepam 5mg QD  -Nicotine patch and continue to encourage smoke cessation    2. Respiratory: 95% on 4L NC; post-op course c/b respiratory distress requiring prolonged intubation with CPAP, then HFNC/BiPAP.  Hx of asthma  -Successfully weaned to 4L NC this AM, saturating well with no acute distress.   -chest tubes removed at bedside, stable ptx on f/u CXR.  -C/w lasix 40mg QD and nebs  -CXR in AM  -IS and ambulation  -Monitor SpO2 for respiratory status  -CXR ordered in afternoon to r/o acute issues after onset of acute chest pain.     3. CVS: HD Stable, HR controlled.  POD 9 s/p AVR/CABG/aortic root replacement on 5/22/17, EF 65%.  Hx of HTN, HLD  -C/w ASA 81mg QD, Statin  -Lopressor held at this time 2/2 hypotension.  Resume when BP controlled.   -Pacing wires present, on VVI backup at 30.   -Monitor HR/BP/Tele    4. GI: Stable, tolerating PO diet well.  -PPX: Protonix  -Colace and senna for bowel regimen.     5. /Renal: Cr: 0.60; No urinary issues  -Trend AM Cr  -Monitor I/O    6. ID: Afebrile, Asymptomatic  -No acute issues at this time, continue to monitor for SIRS    7. Endo: A1C: 7.4, TSH: 2.489  -Hx of DM2, BS well controlled on current regimen.   -Trend FS    8. Heme: H/H Stable  -DVT PPX: HSQ 5000 q8h  -CBC, chem in AM    Dispo: Home when medically ready.

## 2017-06-01 LAB
ANION GAP SERPL CALC-SCNC: 13 MMOL/L — SIGNIFICANT CHANGE UP (ref 5–17)
BUN SERPL-MCNC: 18 MG/DL — SIGNIFICANT CHANGE UP (ref 7–23)
CALCIUM SERPL-MCNC: 8.7 MG/DL — SIGNIFICANT CHANGE UP (ref 8.4–10.5)
CHLORIDE SERPL-SCNC: 101 MMOL/L — SIGNIFICANT CHANGE UP (ref 96–108)
CO2 SERPL-SCNC: 23 MMOL/L — SIGNIFICANT CHANGE UP (ref 22–31)
CREAT SERPL-MCNC: 0.6 MG/DL — SIGNIFICANT CHANGE UP (ref 0.5–1.3)
GLUCOSE SERPL-MCNC: 125 MG/DL — HIGH (ref 70–99)
HCT VFR BLD CALC: 29.6 % — LOW (ref 34.5–45)
HGB BLD-MCNC: 9.4 G/DL — LOW (ref 11.5–15.5)
MAGNESIUM SERPL-MCNC: 2 MG/DL — SIGNIFICANT CHANGE UP (ref 1.6–2.6)
MCHC RBC-ENTMCNC: 28.2 PG — SIGNIFICANT CHANGE UP (ref 27–34)
MCHC RBC-ENTMCNC: 31.8 G/DL — LOW (ref 32–36)
MCV RBC AUTO: 88.9 FL — SIGNIFICANT CHANGE UP (ref 80–100)
PLATELET # BLD AUTO: 319 K/UL — SIGNIFICANT CHANGE UP (ref 150–400)
POTASSIUM SERPL-MCNC: 4.3 MMOL/L — SIGNIFICANT CHANGE UP (ref 3.5–5.3)
POTASSIUM SERPL-SCNC: 4.3 MMOL/L — SIGNIFICANT CHANGE UP (ref 3.5–5.3)
RBC # BLD: 3.33 M/UL — LOW (ref 3.8–5.2)
RBC # FLD: 13.7 % — SIGNIFICANT CHANGE UP (ref 10.3–16.9)
SODIUM SERPL-SCNC: 137 MMOL/L — SIGNIFICANT CHANGE UP (ref 135–145)
WBC # BLD: 9.5 K/UL — SIGNIFICANT CHANGE UP (ref 3.8–10.5)
WBC # FLD AUTO: 9.5 K/UL — SIGNIFICANT CHANGE UP (ref 3.8–10.5)

## 2017-06-01 PROCEDURE — 93306 TTE W/DOPPLER COMPLETE: CPT | Mod: 26

## 2017-06-01 PROCEDURE — 71010: CPT | Mod: 26

## 2017-06-01 RX ADMIN — Medication 650 MILLIGRAM(S): at 18:00

## 2017-06-01 RX ADMIN — LIDOCAINE 1 PATCH: 4 CREAM TOPICAL at 11:35

## 2017-06-01 RX ADMIN — HEPARIN SODIUM 5000 UNIT(S): 5000 INJECTION INTRAVENOUS; SUBCUTANEOUS at 22:21

## 2017-06-01 RX ADMIN — Medication 100 MILLIGRAM(S): at 14:44

## 2017-06-01 RX ADMIN — HEPARIN SODIUM 5000 UNIT(S): 5000 INJECTION INTRAVENOUS; SUBCUTANEOUS at 06:29

## 2017-06-01 RX ADMIN — Medication 650 MILLIGRAM(S): at 06:30

## 2017-06-01 RX ADMIN — Medication 20 MILLIGRAM(S): at 06:30

## 2017-06-01 RX ADMIN — LIDOCAINE 1 PATCH: 4 CREAM TOPICAL at 00:13

## 2017-06-01 RX ADMIN — Medication 81 MILLIGRAM(S): at 11:36

## 2017-06-01 RX ADMIN — Medication 650 MILLIGRAM(S): at 07:00

## 2017-06-01 RX ADMIN — Medication 650 MILLIGRAM(S): at 17:26

## 2017-06-01 RX ADMIN — SODIUM CHLORIDE 3 MILLILITER(S): 9 INJECTION INTRAMUSCULAR; INTRAVENOUS; SUBCUTANEOUS at 14:42

## 2017-06-01 RX ADMIN — SODIUM CHLORIDE 3 MILLILITER(S): 9 INJECTION INTRAMUSCULAR; INTRAVENOUS; SUBCUTANEOUS at 22:00

## 2017-06-01 RX ADMIN — HYDROMORPHONE HYDROCHLORIDE 2 MILLIGRAM(S): 2 INJECTION INTRAMUSCULAR; INTRAVENOUS; SUBCUTANEOUS at 07:00

## 2017-06-01 RX ADMIN — Medication 12.5 MILLIGRAM(S): at 06:31

## 2017-06-01 RX ADMIN — ATORVASTATIN CALCIUM 80 MILLIGRAM(S): 80 TABLET, FILM COATED ORAL at 22:21

## 2017-06-01 RX ADMIN — Medication 0.5 MILLIGRAM(S): at 17:54

## 2017-06-01 RX ADMIN — Medication 12.5 MILLIGRAM(S): at 17:27

## 2017-06-01 RX ADMIN — SODIUM CHLORIDE 3 MILLILITER(S): 9 INJECTION INTRAMUSCULAR; INTRAVENOUS; SUBCUTANEOUS at 06:33

## 2017-06-01 RX ADMIN — Medication 1 PATCH: at 13:25

## 2017-06-01 RX ADMIN — Medication 100 MILLIGRAM(S): at 22:21

## 2017-06-01 RX ADMIN — Medication 100 MILLIGRAM(S): at 06:30

## 2017-06-01 RX ADMIN — SENNA PLUS 2 TABLET(S): 8.6 TABLET ORAL at 22:21

## 2017-06-01 RX ADMIN — HEPARIN SODIUM 5000 UNIT(S): 5000 INJECTION INTRAVENOUS; SUBCUTANEOUS at 14:44

## 2017-06-01 RX ADMIN — Medication 5 MILLIGRAM(S): at 11:36

## 2017-06-01 RX ADMIN — Medication 0.5 MILLIGRAM(S): at 06:31

## 2017-06-01 RX ADMIN — HYDROMORPHONE HYDROCHLORIDE 2 MILLIGRAM(S): 2 INJECTION INTRAMUSCULAR; INTRAVENOUS; SUBCUTANEOUS at 23:12

## 2017-06-01 RX ADMIN — HYDROMORPHONE HYDROCHLORIDE 2 MILLIGRAM(S): 2 INJECTION INTRAMUSCULAR; INTRAVENOUS; SUBCUTANEOUS at 06:32

## 2017-06-01 RX ADMIN — HYDROMORPHONE HYDROCHLORIDE 2 MILLIGRAM(S): 2 INJECTION INTRAMUSCULAR; INTRAVENOUS; SUBCUTANEOUS at 22:21

## 2017-06-01 RX ADMIN — Medication 1 PATCH: at 11:35

## 2017-06-01 RX ADMIN — Medication 1: at 17:51

## 2017-06-01 RX ADMIN — Medication 10 MILLIEQUIVALENT(S): at 11:36

## 2017-06-01 RX ADMIN — PANTOPRAZOLE SODIUM 40 MILLIGRAM(S): 20 TABLET, DELAYED RELEASE ORAL at 06:31

## 2017-06-01 NOTE — PROGRESS NOTE ADULT - ASSESSMENT
57y F pod#10 s/p AVR/Ao Root/CABGx1    1. Neuro: No delirium, pain well managed  -c/w PRNs for pain control  -C/w diazepam 5mg QD  -Nicotine patch and continue to encourage smoke cessation    2. Respiratory: 92% on 3L NC; post-op course c/b respiratory distress requiring prolonged intubation with CPAP, then HFNC/BiPAP.  Hx of asthma  -desaturating to 86% when ambulating on room air, encouraged increased IS and ambulation.   -C/w lasix 40mg QD and nebs  -CXR in AM  -Monitor SpO2 for respiratory status    3. CVS: HD Stable, HR controlled.  POD 10 s/p AVR/CABG/aortic root replacement on 5/22/17, EF 65%.  Hx of HTN, HLD  -C/w ASA 81mg QD, Statin  -c/w metop 12.5 bid  -Pacing wires present, on VVI backup at 30.   -Monitor HR/BP/Tele    4. GI: Stable, tolerating PO diet well.  -PPX: Protonix  -Colace and senna for bowel regimen.     5. /Renal: Cr: 0.60; No urinary issues  -Trend AM Cr  -Monitor I/O    6. ID: Afebrile, Asymptomatic  -No acute issues at this time, continue to monitor for SIRS    7. Endo: A1C: 7.4, TSH: 2.489  -Hx of DM2, BS well controlled on current regimen.   -Trend FS    8. Heme: H/H Stable  -DVT PPX: HSQ 5000 q8h  -CBC, chem in AM    Dispo: Home when medically ready, likely over weekend. 57y F pod#11 s/p AVR/Ao Root/CABGx1    1. Neuro: No delirium, pain well managed  -c/w PRNs for pain control  - C/w diazepam 5mg QD  - Nicotine patch and continue to encourage smoke cessation    2. Respiratory: 94% on 3L NC; post-op course c/b respiratory distress requiring prolonged intubation with CPAP, then HFNC/BiPAP.  Hx of asthma. Richard following. Chest Xray looks wet this morning + wheeze on exam.  - Start on Spiriva, pulmicort and given 20 IV Lasix today.   - desaturating to 86% when ambulating on room air, encouraged increased IS and ambulation.   - Home O2 ordered in case unable to wean. Will likely need O2 for a few weeks.   - CXR in AM  - Monitor SpO2 for respiratory status    3. CVS: HD Stable, HR controlled.  POD 11s/p AVR/CABG/aortic root replacement on 5/22/17, EF 65%.  Hx of HTN, HLD  -C/w ASA 81mg QD, Statin  -c/w metoprolol 12.5mg bid. Blood pressure soft but patient asymptomatic.   -Pacing wires present, on VVI backup .   -Monitor HR/BP/Tele    4. GI: Stable, tolerating PO diet well.  -PPX: Protonix  -Colace and senna for bowel regimen.     5. /Renal: Cr: 0.60; No urinary issues  -Trend AM Cr  -Monitor I/O    6. ID: Afebrile, Asymptomatic WBC 10.9. Continue to trend.   -No acute issues at this time, continue to monitor for SIRS    7. Endo: A1C: 7.4, TSH: 2.489  -Hx of DM2, BS well controlled on ISS.   - Endocrine consulted awaiting recs.   -Trend FS    8. Heme: H/H Stable at 9.3/29.6  -DVT PPX: HSQ 5000 q8h  - Trend CBC     Dispo: Home when medically ready, O2 ordered. may need as an outpatient for a few weeks.

## 2017-06-01 NOTE — PROGRESS NOTE ADULT - SUBJECTIVE AND OBJECTIVE BOX
Patient discussed on morning rounds with Dr. Hanson     Operation / Date: 5/ SUBJECTIVE ASSESSMENT:  57y Female         Vital Signs Last 24 Hrs  T(C): 38.3, Max: 38.3 (06-01 @ 16:31)  T(F): 100.9, Max: 100.9 (06-01 @ 16:31)  HR: 88 (66 - 88)  BP: 130/76 (94/53 - 137/68)  BP(mean): 90 (65 - 90)  RR: 18 (14 - 18)  SpO2: 92% (86% - 96%)  I&O's Detail    I & Os for current day (as of 01 Jun 2017 17:07)  =============================================  IN:    Oral Fluid: 425 ml    Total IN: 425 ml  ---------------------------------------------  OUT:    Voided: 1680 ml    Total OUT: 1680 ml  ---------------------------------------------  Total NET: -1255 ml      CHEST TUBE:  Yes/No. AIR LEAKS: Yes/No. Suction / H2O SEAL.   ROXANE DRAIN:  Yes/No.  EPICARDIAL WIRES: Yes/No.  TIE DOWNS: Yes/No.  HACKETT: Yes/No.    PHYSICAL EXAM:    General:     Neurological:    Cardiovascular:    Respiratory:    Gastrointestinal:    Extremities:    Vascular:    Incision Sites:    LABS:                        9.4    9.5   )-----------( 319      ( 01 Jun 2017 06:10 )             29.6       COUMADIN:  Yes/No. REASON: .        06-01    137  |  101  |  18  ----------------------------<  125<H>  4.3   |  23  |  0.60    Ca    8.7      01 Jun 2017 06:10  Phos  3.8     05-31  Mg     2.0     06-01            MEDICATIONS  (STANDING):  sodium chloride 0.45%. 1000milliLiter(s) IV Continuous <Continuous>  heparin  Injectable 5000Unit(s) SubCutaneous every 8 hours  atorvastatin 80milliGRAM(s) Oral at bedtime  tiotropium 18 MICROgram(s) Capsule 1Capsule(s) Inhalation daily  lidocaine   Patch 1Patch Transdermal daily  nicotine -   7 mG/24Hr(s) Patch 1patch Transdermal daily  buDESOnide   0.5 milliGRAM(s) Respule 0.5milliGRAM(s) Inhalation every 12 hours  insulin lispro (HumaLOG) corrective regimen sliding scale  SubCutaneous Before meals and at bedtime  dextrose 5%. 1000milliLiter(s) IV Continuous <Continuous>  dextrose 50% Injectable 12.5Gram(s) IV Push once  dextrose 50% Injectable 25Gram(s) IV Push once  dextrose 50% Injectable 25Gram(s) IV Push once  aspirin enteric coated 81milliGRAM(s) Oral daily  pantoprazole    Tablet 40milliGRAM(s) Oral before breakfast  diazepam    Tablet 5milliGRAM(s) Oral daily  docusate sodium 100milliGRAM(s) Oral three times a day  senna 2Tablet(s) Oral at bedtime  sodium chloride 0.9% lock flush 3milliLiter(s) IV Push every 8 hours  furosemide    Tablet 20milliGRAM(s) Oral daily  potassium chloride    Tablet ER 10milliEquivalent(s) Oral daily  metoprolol 12.5milliGRAM(s) Oral every 12 hours    MEDICATIONS  (PRN):  dextrose Gel 1Dose(s) Oral once PRN Blood Glucose LESS THAN 70 milliGRAM(s)/deciliter  glucagon  Injectable 1milliGRAM(s) IntraMuscular once PRN Glucose LESS THAN 70 milligrams/deciliter  HYDROmorphone   Tablet 2milliGRAM(s) Oral every 4 hours PRN Moderate Pain (4 - 6)  polyethylene glycol 3350 17Gram(s) Oral daily PRN Constipation  acetaminophen   Tablet. 650milliGRAM(s) Oral every 6 hours PRN Mild Pain (1 - 3)  LORazepam     Tablet 0.5milliGRAM(s) Oral at bedtime PRN insomnia        RADIOLOGY & ADDITIONAL TESTS: Patient discussed on morning rounds with Dr. Hanson     Operation / Date: 5/22/17 AVR/Ao Root/CABGx1    SUBJECTIVE ASSESSMENT:    Pt reports pain is controlled with pain rx, but still feels that she can't take a full breath.  She denies dyspnea/SOB at rest but does get dyspneic while ambulating.  She denies fevers/chills, N/V.    Vital Signs Last 24 Hrs  T(C): 38.3, Max: 38.3 (06-01 @ 16:31)  T(F): 100.9, Max: 100.9 (06-01 @ 16:31)  HR: 88 (66 - 88)  BP: 130/76 (94/53 - 137/68)  BP(mean): 90 (65 - 90)  RR: 18 (14 - 18)  SpO2: 92% (86% - 96%)  I&O's Detail    I & Os for current day (as of 01 Jun 2017 17:07)  =============================================  IN:    Oral Fluid: 425 ml    Total IN: 425 ml  ---------------------------------------------  OUT:    Voided: 1680 ml    Total OUT: 1680 ml  ---------------------------------------------  Total NET: -1255 ml      CHEST TUBE:  Yes/No. AIR LEAKS: Yes/No. Suction / H2O SEAL.   ROXANE DRAIN:  Yes/No.  EPICARDIAL WIRES: Yes/No.  TIE DOWNS: Yes/No.  HACKETT: Yes/No.    PHYSICAL EXAM:    General:     Neurological:    Cardiovascular:    Respiratory:    Gastrointestinal:    Extremities:    Vascular:    Incision Sites:    LABS:                        9.4    9.5   )-----------( 319      ( 01 Jun 2017 06:10 )             29.6       COUMADIN:  Yes/No. REASON: .        06-01    137  |  101  |  18  ----------------------------<  125<H>  4.3   |  23  |  0.60    Ca    8.7      01 Jun 2017 06:10  Phos  3.8     05-31  Mg     2.0     06-01            MEDICATIONS  (STANDING):  sodium chloride 0.45%. 1000milliLiter(s) IV Continuous <Continuous>  heparin  Injectable 5000Unit(s) SubCutaneous every 8 hours  atorvastatin 80milliGRAM(s) Oral at bedtime  tiotropium 18 MICROgram(s) Capsule 1Capsule(s) Inhalation daily  lidocaine   Patch 1Patch Transdermal daily  nicotine -   7 mG/24Hr(s) Patch 1patch Transdermal daily  buDESOnide   0.5 milliGRAM(s) Respule 0.5milliGRAM(s) Inhalation every 12 hours  insulin lispro (HumaLOG) corrective regimen sliding scale  SubCutaneous Before meals and at bedtime  dextrose 5%. 1000milliLiter(s) IV Continuous <Continuous>  dextrose 50% Injectable 12.5Gram(s) IV Push once  dextrose 50% Injectable 25Gram(s) IV Push once  dextrose 50% Injectable 25Gram(s) IV Push once  aspirin enteric coated 81milliGRAM(s) Oral daily  pantoprazole    Tablet 40milliGRAM(s) Oral before breakfast  diazepam    Tablet 5milliGRAM(s) Oral daily  docusate sodium 100milliGRAM(s) Oral three times a day  senna 2Tablet(s) Oral at bedtime  sodium chloride 0.9% lock flush 3milliLiter(s) IV Push every 8 hours  furosemide    Tablet 20milliGRAM(s) Oral daily  potassium chloride    Tablet ER 10milliEquivalent(s) Oral daily  metoprolol 12.5milliGRAM(s) Oral every 12 hours    MEDICATIONS  (PRN):  dextrose Gel 1Dose(s) Oral once PRN Blood Glucose LESS THAN 70 milliGRAM(s)/deciliter  glucagon  Injectable 1milliGRAM(s) IntraMuscular once PRN Glucose LESS THAN 70 milligrams/deciliter  HYDROmorphone   Tablet 2milliGRAM(s) Oral every 4 hours PRN Moderate Pain (4 - 6)  polyethylene glycol 3350 17Gram(s) Oral daily PRN Constipation  acetaminophen   Tablet. 650milliGRAM(s) Oral every 6 hours PRN Mild Pain (1 - 3)  LORazepam     Tablet 0.5milliGRAM(s) Oral at bedtime PRN insomnia        RADIOLOGY & ADDITIONAL TESTS: Patient discussed on morning rounds with Dr. Hanson     Operation / Date: 5/22/17 AVR/Ao Root/CABGx1    SUBJECTIVE ASSESSMENT:    Pt reports pain is controlled with pain rx, but still feels that she can't take a full breath.  She denies dyspnea/SOB at rest but does get dyspneic while ambulating.  She denies fevers/chills, N/V.    Vital Signs Last 24 Hrs  T(C): 38.3, Max: 38.3 (06-01 @ 16:31)  T(F): 100.9, Max: 100.9 (06-01 @ 16:31)  HR: 88 (66 - 88)  BP: 130/76 (94/53 - 137/68)  BP(mean): 90 (65 - 90)  RR: 18 (14 - 18)  SpO2: 92% (86% - 96%)  I&O's Detail    I & Os for current day (as of 01 Jun 2017 17:07)  =============================================  IN:    Oral Fluid: 425 ml    Total IN: 425 ml  ---------------------------------------------  OUT:    Voided: 1680 ml    Total OUT: 1680 ml  ---------------------------------------------  Total NET: -1255 ml      CHEST TUBE:  No.    ROXANE DRAIN:  No.  EPICARDIAL WIRES: Yes.  TIE DOWNS: Yes.  HACKETT: No.    PHYSICAL EXAM:    General: NAD    Neurological: A&Ox3    Cardiovascular: S1S2 RRR    Respiratory:  CTA b/l no W/R/R    Gastrointestinal: soft NT/ND +BS    Extremities: no edema    Vascular: warm and well perfused bilaterally    Incision Sites: MSI C/D/I, surgi-bra in place    LABS:                        9.4    9.5   )-----------( 319      ( 01 Jun 2017 06:10 )             29.6       COUMADIN:  No.         06-01    137  |  101  |  18  ----------------------------<  125<H>  4.3   |  23  |  0.60    Ca    8.7      01 Jun 2017 06:10  Phos  3.8     05-31  Mg     2.0     06-01            MEDICATIONS  (STANDING):  sodium chloride 0.45%. 1000milliLiter(s) IV Continuous <Continuous>  heparin  Injectable 5000Unit(s) SubCutaneous every 8 hours  atorvastatin 80milliGRAM(s) Oral at bedtime  tiotropium 18 MICROgram(s) Capsule 1Capsule(s) Inhalation daily  lidocaine   Patch 1Patch Transdermal daily  nicotine -   7 mG/24Hr(s) Patch 1patch Transdermal daily  buDESOnide   0.5 milliGRAM(s) Respule 0.5milliGRAM(s) Inhalation every 12 hours  insulin lispro (HumaLOG) corrective regimen sliding scale  SubCutaneous Before meals and at bedtime  dextrose 5%. 1000milliLiter(s) IV Continuous <Continuous>  dextrose 50% Injectable 12.5Gram(s) IV Push once  dextrose 50% Injectable 25Gram(s) IV Push once  dextrose 50% Injectable 25Gram(s) IV Push once  aspirin enteric coated 81milliGRAM(s) Oral daily  pantoprazole    Tablet 40milliGRAM(s) Oral before breakfast  diazepam    Tablet 5milliGRAM(s) Oral daily  docusate sodium 100milliGRAM(s) Oral three times a day  senna 2Tablet(s) Oral at bedtime  sodium chloride 0.9% lock flush 3milliLiter(s) IV Push every 8 hours  furosemide    Tablet 20milliGRAM(s) Oral daily  potassium chloride    Tablet ER 10milliEquivalent(s) Oral daily  metoprolol 12.5milliGRAM(s) Oral every 12 hours    MEDICATIONS  (PRN):  dextrose Gel 1Dose(s) Oral once PRN Blood Glucose LESS THAN 70 milliGRAM(s)/deciliter  glucagon  Injectable 1milliGRAM(s) IntraMuscular once PRN Glucose LESS THAN 70 milligrams/deciliter  HYDROmorphone   Tablet 2milliGRAM(s) Oral every 4 hours PRN Moderate Pain (4 - 6)  polyethylene glycol 3350 17Gram(s) Oral daily PRN Constipation  acetaminophen   Tablet. 650milliGRAM(s) Oral every 6 hours PRN Mild Pain (1 - 3)  LORazepam     Tablet 0.5milliGRAM(s) Oral at bedtime PRN insomnia

## 2017-06-02 LAB
ANION GAP SERPL CALC-SCNC: 13 MMOL/L — SIGNIFICANT CHANGE UP (ref 5–17)
BUN SERPL-MCNC: 12 MG/DL — SIGNIFICANT CHANGE UP (ref 7–23)
CALCIUM SERPL-MCNC: 9.2 MG/DL — SIGNIFICANT CHANGE UP (ref 8.4–10.5)
CHLORIDE SERPL-SCNC: 102 MMOL/L — SIGNIFICANT CHANGE UP (ref 96–108)
CO2 SERPL-SCNC: 23 MMOL/L — SIGNIFICANT CHANGE UP (ref 22–31)
CREAT SERPL-MCNC: 0.6 MG/DL — SIGNIFICANT CHANGE UP (ref 0.5–1.3)
GLUCOSE SERPL-MCNC: 124 MG/DL — HIGH (ref 70–99)
HCT VFR BLD CALC: 29.6 % — LOW (ref 34.5–45)
HGB BLD-MCNC: 9.3 G/DL — LOW (ref 11.5–15.5)
MAGNESIUM SERPL-MCNC: 2 MG/DL — SIGNIFICANT CHANGE UP (ref 1.6–2.6)
MCHC RBC-ENTMCNC: 28 PG — SIGNIFICANT CHANGE UP (ref 27–34)
MCHC RBC-ENTMCNC: 31.4 G/DL — LOW (ref 32–36)
MCV RBC AUTO: 89.2 FL — SIGNIFICANT CHANGE UP (ref 80–100)
PLATELET # BLD AUTO: 338 K/UL — SIGNIFICANT CHANGE UP (ref 150–400)
POTASSIUM SERPL-MCNC: 4.2 MMOL/L — SIGNIFICANT CHANGE UP (ref 3.5–5.3)
POTASSIUM SERPL-SCNC: 4.2 MMOL/L — SIGNIFICANT CHANGE UP (ref 3.5–5.3)
RBC # BLD: 3.32 M/UL — LOW (ref 3.8–5.2)
RBC # FLD: 14 % — SIGNIFICANT CHANGE UP (ref 10.3–16.9)
SODIUM SERPL-SCNC: 138 MMOL/L — SIGNIFICANT CHANGE UP (ref 135–145)
WBC # BLD: 10.9 K/UL — HIGH (ref 3.8–10.5)
WBC # FLD AUTO: 10.9 K/UL — HIGH (ref 3.8–10.5)

## 2017-06-02 PROCEDURE — 99223 1ST HOSP IP/OBS HIGH 75: CPT | Mod: GC

## 2017-06-02 PROCEDURE — 71010: CPT | Mod: 26

## 2017-06-02 RX ORDER — BUDESONIDE, MICRONIZED 100 %
2 POWDER (GRAM) MISCELLANEOUS
Qty: 0 | Refills: 0 | Status: DISCONTINUED | OUTPATIENT
Start: 2017-06-02 | End: 2017-06-02

## 2017-06-02 RX ORDER — SODIUM CHLORIDE 9 MG/ML
250 INJECTION INTRAMUSCULAR; INTRAVENOUS; SUBCUTANEOUS ONCE
Qty: 0 | Refills: 0 | Status: COMPLETED | OUTPATIENT
Start: 2017-06-02 | End: 2017-06-02

## 2017-06-02 RX ORDER — FUROSEMIDE 40 MG
20 TABLET ORAL ONCE
Qty: 0 | Refills: 0 | Status: COMPLETED | OUTPATIENT
Start: 2017-06-02 | End: 2017-06-02

## 2017-06-02 RX ORDER — TIOTROPIUM BROMIDE 18 UG/1
1 CAPSULE ORAL; RESPIRATORY (INHALATION) DAILY
Qty: 0 | Refills: 0 | Status: DISCONTINUED | OUTPATIENT
Start: 2017-06-02 | End: 2017-06-03

## 2017-06-02 RX ADMIN — HEPARIN SODIUM 5000 UNIT(S): 5000 INJECTION INTRAVENOUS; SUBCUTANEOUS at 05:08

## 2017-06-02 RX ADMIN — Medication 1 PATCH: at 12:31

## 2017-06-02 RX ADMIN — Medication 0.5 MILLIGRAM(S): at 17:11

## 2017-06-02 RX ADMIN — Medication 0.5 MILLIGRAM(S): at 07:10

## 2017-06-02 RX ADMIN — PANTOPRAZOLE SODIUM 40 MILLIGRAM(S): 20 TABLET, DELAYED RELEASE ORAL at 07:10

## 2017-06-02 RX ADMIN — Medication 100 MILLIGRAM(S): at 05:00

## 2017-06-02 RX ADMIN — Medication 650 MILLIGRAM(S): at 12:30

## 2017-06-02 RX ADMIN — Medication 1 PATCH: at 11:30

## 2017-06-02 RX ADMIN — Medication 81 MILLIGRAM(S): at 12:31

## 2017-06-02 RX ADMIN — Medication 650 MILLIGRAM(S): at 19:09

## 2017-06-02 RX ADMIN — Medication 12.5 MILLIGRAM(S): at 05:01

## 2017-06-02 RX ADMIN — Medication 650 MILLIGRAM(S): at 13:30

## 2017-06-02 RX ADMIN — Medication 650 MILLIGRAM(S): at 05:30

## 2017-06-02 RX ADMIN — ATORVASTATIN CALCIUM 80 MILLIGRAM(S): 80 TABLET, FILM COATED ORAL at 21:29

## 2017-06-02 RX ADMIN — SODIUM CHLORIDE 3 MILLILITER(S): 9 INJECTION INTRAMUSCULAR; INTRAVENOUS; SUBCUTANEOUS at 15:54

## 2017-06-02 RX ADMIN — SODIUM CHLORIDE 3 MILLILITER(S): 9 INJECTION INTRAMUSCULAR; INTRAVENOUS; SUBCUTANEOUS at 21:35

## 2017-06-02 RX ADMIN — HEPARIN SODIUM 5000 UNIT(S): 5000 INJECTION INTRAVENOUS; SUBCUTANEOUS at 21:29

## 2017-06-02 RX ADMIN — Medication 0.5 MILLIGRAM(S): at 21:32

## 2017-06-02 RX ADMIN — Medication 5 MILLIGRAM(S): at 12:31

## 2017-06-02 RX ADMIN — LIDOCAINE 1 PATCH: 4 CREAM TOPICAL at 19:11

## 2017-06-02 RX ADMIN — Medication 20 MILLIGRAM(S): at 13:39

## 2017-06-02 RX ADMIN — Medication 650 MILLIGRAM(S): at 05:00

## 2017-06-02 RX ADMIN — HEPARIN SODIUM 5000 UNIT(S): 5000 INJECTION INTRAVENOUS; SUBCUTANEOUS at 13:39

## 2017-06-02 RX ADMIN — LIDOCAINE 1 PATCH: 4 CREAM TOPICAL at 06:09

## 2017-06-02 RX ADMIN — Medication 12.5 MILLIGRAM(S): at 19:08

## 2017-06-02 RX ADMIN — SODIUM CHLORIDE 3 MILLILITER(S): 9 INJECTION INTRAMUSCULAR; INTRAVENOUS; SUBCUTANEOUS at 05:01

## 2017-06-02 RX ADMIN — SODIUM CHLORIDE 500 MILLILITER(S): 9 INJECTION INTRAMUSCULAR; INTRAVENOUS; SUBCUTANEOUS at 06:05

## 2017-06-02 NOTE — CONSULT NOTE ADULT - SUBJECTIVE AND OBJECTIVE BOX
Patient is a 57y old  Female who presents with a chief complaint of AS (19 May 2017 10:50)      HPI:  57 y.o Obese Female, Current smoker,  with FHx of MI/CVA, with a PMHx significant for HTN, dyslipidemia, NIDDM, Asthma (denies hospitalizations/intubations), GERD, Diverticulitis,  moderate to severe Aortic stenosis who presented to her cardiologist Dr Islas for c/o recent decline in her exercise tolerance due to SOB and back pain.  Pt states she is able to ambulate no more than one city block before she is winded and fatigue and finds the need to stop and rest for a while.  At times with heavy exertion her SOB is associated with "pounding palpitations" and dizziness (such as walking uphill, dancing or brisk exertion).  She denies experiencing any CP, recent PND/orthopnea, palpitations, or syncope.  Echo 11/05/16 revealed  mild LVH, impaired relaxation with elevated LA pressure, moderate to severe AS, ARAMIS of 0.6cm with mean gradient of 31mmHg and a peak gradient of 36mmHg, LVEF of 65-70%.  Pt was referred to Dr. Hanson for surgical valuation of aortic stenosis.  Pharmacological NST (3/1/2017) was negative for ischemic, but there was a 20 mmHG drop in systolic BP during peak exercise. Cardiac cath 5/4/17 80% mid RCA stenosis. Patient was seen in the outpatient office by Dr. Hnason and now presents for elective surgery. (19 May 2017 10:50)      PAST MEDICAL & SURGICAL HISTORY:  Back injury: lumbar, work related  Kidney stone  GERD (gastroesophageal reflux disease)  Diverticulitis  Asthma  Obesity  Diabetes mellitus  Hyperlipidemia  Hypertension  Status post laser lithotripsy of ureteral calculus  Uterine fibroid: removal  Status post small bowel resection  Carpal tunnel syndrome      FAMILY HISTORY:  Family history of pulmonary embolism (Mother)  Family history of MI (myocardial infarction) (Mother)  Family history of asthma (Mother)  Family history of cardiac pacemaker (Mother)  Family history of atrial fibrillation (Mother)      SOCIAL HISTORY:  Smoking Status: [ ] Current, [ ] Former, [ ] Never  Pack Years:    MEDICATIONS:  Pulmonary:  tiotropium 18 MICROgram(s) Capsule 1Capsule(s) Inhalation daily  buDESOnide   0.5 milliGRAM(s) Respule 0.5milliGRAM(s) Inhalation every 12 hours  tiotropium 18 MICROgram(s) Capsule 1Capsule(s) Inhalation daily    Antimicrobials:    Anticoagulants:  heparin  Injectable 5000Unit(s) SubCutaneous every 8 hours  aspirin enteric coated 81milliGRAM(s) Oral daily    Onc:    GI/:  pantoprazole    Tablet 40milliGRAM(s) Oral before breakfast  docusate sodium 100milliGRAM(s) Oral three times a day  senna 2Tablet(s) Oral at bedtime  polyethylene glycol 3350 17Gram(s) Oral daily PRN    Endocrine:  atorvastatin 80milliGRAM(s) Oral at bedtime  insulin lispro (HumaLOG) corrective regimen sliding scale  SubCutaneous Before meals and at bedtime  dextrose Gel 1Dose(s) Oral once PRN  dextrose 50% Injectable 12.5Gram(s) IV Push once  dextrose 50% Injectable 25Gram(s) IV Push once  dextrose 50% Injectable 25Gram(s) IV Push once  glucagon  Injectable 1milliGRAM(s) IntraMuscular once PRN    Cardiac:  furosemide    Tablet 20milliGRAM(s) Oral daily  metoprolol 12.5milliGRAM(s) Oral every 12 hours    Other Medications:  sodium chloride 0.45%. 1000milliLiter(s) IV Continuous <Continuous>  lidocaine   Patch 1Patch Transdermal daily  nicotine -   7 mG/24Hr(s) Patch 1patch Transdermal daily  dextrose 5%. 1000milliLiter(s) IV Continuous <Continuous>  diazepam    Tablet 5milliGRAM(s) Oral daily  acetaminophen   Tablet. 650milliGRAM(s) Oral every 6 hours PRN  LORazepam     Tablet 0.5milliGRAM(s) Oral at bedtime PRN      Allergies    Biaxin (Urticaria; Rash; Hives)  latex (Unknown)    Intolerances        Vital Signs Last 24 Hrs  T(C): 36.1, Max: 36.9 (06-02 @ 21:40)  T(F): 97, Max: 98.4 (06-02 @ 21:40)  HR: 70 (68 - 80)  BP: 125/63 (100/57 - 125/63)  BP(mean): 89 (73 - 89)  RR: 18 (15 - 30)  SpO2: 94% (87% - 96%)    I & Os for current day (as of 06-03 @ 08:51)  =============================================  IN: 840 ml / OUT: 0 ml / NET: 840 ml        LABS:      CBC Full  -  ( 03 Jun 2017 06:21 )  WBC Count : 11.4 K/uL  Hemoglobin : 10.1 g/dL  Hematocrit : 32.2 %  Platelet Count - Automated : 374 K/uL  Mean Cell Volume : 89.7 fL  Mean Cell Hemoglobin : 28.1 pg  Mean Cell Hemoglobin Concentration : 31.4 g/dL  Auto Neutrophil # : x  Auto Lymphocyte # : x  Auto Monocyte # : x  Auto Eosinophil # : x  Auto Basophil # : x  Auto Neutrophil % : x  Auto Lymphocyte % : x  Auto Monocyte % : x  Auto Eosinophil % : x  Auto Basophil % : x    06-03    139  |  102  |  11  ----------------------------<  132<H>  4.7   |  26  |  0.60    Ca    9.5      03 Jun 2017 06:21  Phos  3.9     06-03  Mg     2.1     06-03      PT/INR - ( 03 Jun 2017 06:22 )   PT: 12.3 sec;   INR: 1.11          PTT - ( 03 Jun 2017 06:22 )  PTT:30.6 sec        ?  EXAM:  XR CHEST 1 VIEW PORT IMMEDIATE                          PROCEDURE DATE:  06/02/2017                     INTERPRETATION:  Portable Chest X-Ray dated 6/2/2017 8:41 AM    Indication: follow up    An AP portable view of the chest is compared to 6/1/2017. There is mild   to moderate pulmonary venous congestion, slightly increased.  Retrocardiac consolidation is is slightly more prominent. Sternal wires.   Probable cardiomegaly. Probable small bilateral pleural effusions.    IMPRESSION:  Moderate pulmonary venous congestion, increased. Retrocardiac   consolidation again noted.            RADIOLOGY & ADDITIONAL STUDIES (The following images were personally reviewed):

## 2017-06-02 NOTE — PROGRESS NOTE ADULT - SUBJECTIVE AND OBJECTIVE BOX
Patient discussed on morning rounds with Dr. Hanson    Operation / Date: 5/22/17 AVR/Aortic Root Replacement/ CABG x 1 (SVG to PDA), EF 65%    SUBJECTIVE ASSESSMENT:  57y Female         Vital Signs Last 24 Hrs  T(C): 35.7, Max: 38.3 (06-01 @ 16:31)  T(F): 96.3, Max: 100.9 (06-01 @ 16:31)  HR: 76 (76 - 88)  BP: 108/57 (100/61 - 130/76)  BP(mean): 79 (62 - 90)  RR: 15 (15 - 18)  SpO2: 96% (86% - 96%)  I&O's Detail    I & Os for current day (as of 02 Jun 2017 14:27)  =============================================  IN:    Total IN: 0 ml  ---------------------------------------------  OUT:    Voided: 1300 ml    Total OUT: 1300 ml  ---------------------------------------------  Total NET: -1300 ml      CHEST TUBE:  Yes/No. AIR LEAKS: Yes/No. Suction / H2O SEAL.   ROXANE DRAIN:  Yes/No.  EPICARDIAL WIRES: Yes/No.  TIE DOWNS: Yes/No.  HACKETT: Yes/No.    PHYSICAL EXAM:    General:     Neurological:    Cardiovascular:    Respiratory:    Gastrointestinal:    Extremities:    Vascular:    Incision Sites:    LABS:                        9.3    10.9  )-----------( 338      ( 02 Jun 2017 06:02 )             29.6       COUMADIN:  Yes/No. REASON: .        06-02    138  |  102  |  12  ----------------------------<  124<H>  4.2   |  23  |  0.60    Ca    9.2      02 Jun 2017 06:02  Mg     2.0     06-02            MEDICATIONS  (STANDING):  sodium chloride 0.45%. 1000milliLiter(s) IV Continuous <Continuous>  heparin  Injectable 5000Unit(s) SubCutaneous every 8 hours  atorvastatin 80milliGRAM(s) Oral at bedtime  tiotropium 18 MICROgram(s) Capsule 1Capsule(s) Inhalation daily  lidocaine   Patch 1Patch Transdermal daily  nicotine -   7 mG/24Hr(s) Patch 1patch Transdermal daily  buDESOnide   0.5 milliGRAM(s) Respule 0.5milliGRAM(s) Inhalation every 12 hours  insulin lispro (HumaLOG) corrective regimen sliding scale  SubCutaneous Before meals and at bedtime  dextrose 5%. 1000milliLiter(s) IV Continuous <Continuous>  dextrose 50% Injectable 12.5Gram(s) IV Push once  dextrose 50% Injectable 25Gram(s) IV Push once  dextrose 50% Injectable 25Gram(s) IV Push once  aspirin enteric coated 81milliGRAM(s) Oral daily  pantoprazole    Tablet 40milliGRAM(s) Oral before breakfast  diazepam    Tablet 5milliGRAM(s) Oral daily  docusate sodium 100milliGRAM(s) Oral three times a day  senna 2Tablet(s) Oral at bedtime  sodium chloride 0.9% lock flush 3milliLiter(s) IV Push every 8 hours  furosemide    Tablet 20milliGRAM(s) Oral daily  potassium chloride    Tablet ER 10milliEquivalent(s) Oral daily  metoprolol 12.5milliGRAM(s) Oral every 12 hours  tiotropium 18 MICROgram(s) Capsule 1Capsule(s) Inhalation daily    MEDICATIONS  (PRN):  dextrose Gel 1Dose(s) Oral once PRN Blood Glucose LESS THAN 70 milliGRAM(s)/deciliter  glucagon  Injectable 1milliGRAM(s) IntraMuscular once PRN Glucose LESS THAN 70 milligrams/deciliter  HYDROmorphone   Tablet 2milliGRAM(s) Oral every 4 hours PRN Moderate Pain (4 - 6)  polyethylene glycol 3350 17Gram(s) Oral daily PRN Constipation  acetaminophen   Tablet. 650milliGRAM(s) Oral every 6 hours PRN Mild Pain (1 - 3)  LORazepam     Tablet 0.5milliGRAM(s) Oral at bedtime PRN insomnia        RADIOLOGY & ADDITIONAL TESTS: Patient discussed on morning rounds with Dr. Hanson    Operation / Date: 5/22/17 AVR/Aortic Root Replacement/ CABG x 1 (SVG to PDA), EF 65%    SUBJECTIVE ASSESSMENT:  57y Female reports feeling very short of breath with ambulation today but reports being comfortable on oxygen . States that she hears herself wheezing as well. Denies chest pain. States that she feels steady with ambulation although she is SOB.         Vital Signs Last 24 Hrs  T(C): 35.7, Max: 38.3 (06-01 @ 16:31)  T(F): 96.3, Max: 100.9 (06-01 @ 16:31)  HR: 76 (76 - 88)  BP: 108/57 (100/61 - 130/76)  BP(mean): 79 (62 - 90)  RR: 15 (15 - 18)  SpO2: 96% (86% - 96%)  I&O's Detail    I & Os for current day (as of 02 Jun 2017 14:27)  =============================================  IN:    Total IN: 0 ml  ---------------------------------------------  OUT:    Voided: 1300 ml    Total OUT: 1300 ml  ---------------------------------------------  Total NET: -1300 ml      CHEST TUBE:  No.   ROXANE DRAIN: /No.  EPICARDIAL WIRES: Yes A-V wires.  TIE DOWNS: Yes.  HACKETT: No.    PHYSICAL EXAM:    General: Aox3 in no acute distress    Neurological: No focal neuro deficit.     Cardiovascular: RRR no murmurs rubs or gallops.     Respiratory: + wheeze worse on left than right. No rhonchi or rales. Poor inspiratory effort     Gastrointestinal: soft non tender non distended    Extremities: WWP EVH site above left knee with ecchymosis proximal.     Vascular: 2+ distal pulses bilaterally    Incision Sites: MSI clean dry intact without sternal click.     LABS:                        9.3    10.9  )-----------( 338      ( 02 Jun 2017 06:02 )             29.6         06-02    138  |  102  |  12  ----------------------------<  124<H>  4.2   |  23  |  0.60    Ca    9.2      02 Jun 2017 06:02  Mg     2.0     06-02            MEDICATIONS  (STANDING):  sodium chloride 0.45%. 1000milliLiter(s) IV Continuous <Continuous>  heparin  Injectable 5000Unit(s) SubCutaneous every 8 hours  atorvastatin 80milliGRAM(s) Oral at bedtime  tiotropium 18 MICROgram(s) Capsule 1Capsule(s) Inhalation daily  lidocaine   Patch 1Patch Transdermal daily  nicotine -   7 mG/24Hr(s) Patch 1patch Transdermal daily  buDESOnide   0.5 milliGRAM(s) Respule 0.5milliGRAM(s) Inhalation every 12 hours  insulin lispro (HumaLOG) corrective regimen sliding scale  SubCutaneous Before meals and at bedtime  dextrose 5%. 1000milliLiter(s) IV Continuous <Continuous>  dextrose 50% Injectable 12.5Gram(s) IV Push once  dextrose 50% Injectable 25Gram(s) IV Push once  dextrose 50% Injectable 25Gram(s) IV Push once  aspirin enteric coated 81milliGRAM(s) Oral daily  pantoprazole    Tablet 40milliGRAM(s) Oral before breakfast  diazepam    Tablet 5milliGRAM(s) Oral daily  docusate sodium 100milliGRAM(s) Oral three times a day  senna 2Tablet(s) Oral at bedtime  sodium chloride 0.9% lock flush 3milliLiter(s) IV Push every 8 hours  furosemide    Tablet 20milliGRAM(s) Oral daily  potassium chloride    Tablet ER 10milliEquivalent(s) Oral daily  metoprolol 12.5milliGRAM(s) Oral every 12 hours  tiotropium 18 MICROgram(s) Capsule 1Capsule(s) Inhalation daily    MEDICATIONS  (PRN):  dextrose Gel 1Dose(s) Oral once PRN Blood Glucose LESS THAN 70 milliGRAM(s)/deciliter  glucagon  Injectable 1milliGRAM(s) IntraMuscular once PRN Glucose LESS THAN 70 milligrams/deciliter  HYDROmorphone   Tablet 2milliGRAM(s) Oral every 4 hours PRN Moderate Pain (4 - 6)  polyethylene glycol 3350 17Gram(s) Oral daily PRN Constipation  acetaminophen   Tablet. 650milliGRAM(s) Oral every 6 hours PRN Mild Pain (1 - 3)  LORazepam     Tablet 0.5milliGRAM(s) Oral at bedtime PRN insomnia        RADIOLOGY & ADDITIONAL TESTS:  An AP portable view of the chest is compared to 6/1/2017. There is mild   to moderate pulmonary venous congestion, slightly increased.  Retrocardiac consolidation is is slightly more prominent. Sternal wires.   Probable cardiomegaly. Probable small bilateral pleural effusions.    IMPRESSION:  Moderate pulmonary venous congestion, increased. Retrocardiac   consolidation again noted.

## 2017-06-02 NOTE — CONSULT NOTE ADULT - PROBLEM SELECTOR RECOMMENDATION 3
most likely related to fluid overload as suggested by the finding on the chest x-ray. Patient is on diuretics. There is no clinical evidence of underlying thromboembolic disease neither pulmonary infection. There's no evidence of exacerbation Medrol her COPD or asthma

## 2017-06-02 NOTE — CONSULT NOTE ADULT - PROBLEM SELECTOR RECOMMENDATION 9
patient has a history of asthma which is mild and she uses the inhaler as-needed basis. Does no evidence of exacerbation of bronchial asthma at this point and she is to continue on Pulmicort

## 2017-06-02 NOTE — CONSULT NOTE ADULT - PROBLEM SELECTOR RECOMMENDATION 2
the patient recently quit smoking. Patient has known diagnosis of COPD but suggestive from the clinical history. There is no evidence of exacerbation of COPD. Started the patient was reluctant will follow as outpatient

## 2017-06-03 VITALS — TEMPERATURE: 98 F

## 2017-06-03 DIAGNOSIS — R09.02 HYPOXEMIA: ICD-10-CM

## 2017-06-03 DIAGNOSIS — J45.909 UNSPECIFIED ASTHMA, UNCOMPLICATED: ICD-10-CM

## 2017-06-03 DIAGNOSIS — R06.00 DYSPNEA, UNSPECIFIED: ICD-10-CM

## 2017-06-03 DIAGNOSIS — J44.9 CHRONIC OBSTRUCTIVE PULMONARY DISEASE, UNSPECIFIED: ICD-10-CM

## 2017-06-03 LAB
ANION GAP SERPL CALC-SCNC: 11 MMOL/L — SIGNIFICANT CHANGE UP (ref 5–17)
APTT BLD: 30.6 SEC — SIGNIFICANT CHANGE UP (ref 27.5–37.4)
BUN SERPL-MCNC: 11 MG/DL — SIGNIFICANT CHANGE UP (ref 7–23)
CALCIUM SERPL-MCNC: 9.5 MG/DL — SIGNIFICANT CHANGE UP (ref 8.4–10.5)
CHLORIDE SERPL-SCNC: 102 MMOL/L — SIGNIFICANT CHANGE UP (ref 96–108)
CO2 SERPL-SCNC: 26 MMOL/L — SIGNIFICANT CHANGE UP (ref 22–31)
CREAT SERPL-MCNC: 0.6 MG/DL — SIGNIFICANT CHANGE UP (ref 0.5–1.3)
GLUCOSE SERPL-MCNC: 132 MG/DL — HIGH (ref 70–99)
HCT VFR BLD CALC: 32.2 % — LOW (ref 34.5–45)
HGB BLD-MCNC: 10.1 G/DL — LOW (ref 11.5–15.5)
INR BLD: 1.11 — SIGNIFICANT CHANGE UP (ref 0.88–1.16)
MAGNESIUM SERPL-MCNC: 2.1 MG/DL — SIGNIFICANT CHANGE UP (ref 1.6–2.6)
MCHC RBC-ENTMCNC: 28.1 PG — SIGNIFICANT CHANGE UP (ref 27–34)
MCHC RBC-ENTMCNC: 31.4 G/DL — LOW (ref 32–36)
MCV RBC AUTO: 89.7 FL — SIGNIFICANT CHANGE UP (ref 80–100)
PHOSPHATE SERPL-MCNC: 3.9 MG/DL — SIGNIFICANT CHANGE UP (ref 2.5–4.5)
PLATELET # BLD AUTO: 374 K/UL — SIGNIFICANT CHANGE UP (ref 150–400)
POTASSIUM SERPL-MCNC: 4.7 MMOL/L — SIGNIFICANT CHANGE UP (ref 3.5–5.3)
POTASSIUM SERPL-SCNC: 4.7 MMOL/L — SIGNIFICANT CHANGE UP (ref 3.5–5.3)
PROTHROM AB SERPL-ACNC: 12.3 SEC — SIGNIFICANT CHANGE UP (ref 9.8–12.7)
RBC # BLD: 3.59 M/UL — LOW (ref 3.8–5.2)
RBC # FLD: 14 % — SIGNIFICANT CHANGE UP (ref 10.3–16.9)
SODIUM SERPL-SCNC: 139 MMOL/L — SIGNIFICANT CHANGE UP (ref 135–145)
WBC # BLD: 11.4 K/UL — HIGH (ref 3.8–10.5)
WBC # FLD AUTO: 11.4 K/UL — HIGH (ref 3.8–10.5)

## 2017-06-03 PROCEDURE — 85610 PROTHROMBIN TIME: CPT

## 2017-06-03 PROCEDURE — 83605 ASSAY OF LACTIC ACID: CPT

## 2017-06-03 PROCEDURE — 97116 GAIT TRAINING THERAPY: CPT

## 2017-06-03 PROCEDURE — 93306 TTE W/DOPPLER COMPLETE: CPT

## 2017-06-03 PROCEDURE — 84132 ASSAY OF SERUM POTASSIUM: CPT

## 2017-06-03 PROCEDURE — C8929: CPT

## 2017-06-03 PROCEDURE — 83735 ASSAY OF MAGNESIUM: CPT

## 2017-06-03 PROCEDURE — 94660 CPAP INITIATION&MGMT: CPT

## 2017-06-03 PROCEDURE — P9047: CPT

## 2017-06-03 PROCEDURE — P9016: CPT

## 2017-06-03 PROCEDURE — 94003 VENT MGMT INPAT SUBQ DAY: CPT

## 2017-06-03 PROCEDURE — P9012: CPT

## 2017-06-03 PROCEDURE — 82803 BLOOD GASES ANY COMBINATION: CPT

## 2017-06-03 PROCEDURE — 82330 ASSAY OF CALCIUM: CPT

## 2017-06-03 PROCEDURE — 86923 COMPATIBILITY TEST ELECTRIC: CPT

## 2017-06-03 PROCEDURE — 36415 COLL VENOUS BLD VENIPUNCTURE: CPT

## 2017-06-03 PROCEDURE — 85027 COMPLETE CBC AUTOMATED: CPT

## 2017-06-03 PROCEDURE — 93005 ELECTROCARDIOGRAM TRACING: CPT

## 2017-06-03 PROCEDURE — 71045 X-RAY EXAM CHEST 1 VIEW: CPT

## 2017-06-03 PROCEDURE — P9035: CPT

## 2017-06-03 PROCEDURE — 87070 CULTURE OTHR SPECIMN AEROBIC: CPT

## 2017-06-03 PROCEDURE — 85018 HEMOGLOBIN: CPT

## 2017-06-03 PROCEDURE — 88311 DECALCIFY TISSUE: CPT

## 2017-06-03 PROCEDURE — 84436 ASSAY OF TOTAL THYROXINE: CPT

## 2017-06-03 PROCEDURE — 99233 SBSQ HOSP IP/OBS HIGH 50: CPT | Mod: GC

## 2017-06-03 PROCEDURE — 80048 BASIC METABOLIC PNL TOTAL CA: CPT

## 2017-06-03 PROCEDURE — 84480 ASSAY TRIIODOTHYRONINE (T3): CPT

## 2017-06-03 PROCEDURE — 84295 ASSAY OF SERUM SODIUM: CPT

## 2017-06-03 PROCEDURE — 84443 ASSAY THYROID STIM HORMONE: CPT

## 2017-06-03 PROCEDURE — P9017: CPT

## 2017-06-03 PROCEDURE — 94640 AIRWAY INHALATION TREATMENT: CPT

## 2017-06-03 PROCEDURE — 97163 PT EVAL HIGH COMPLEX 45 MIN: CPT

## 2017-06-03 PROCEDURE — 93970 EXTREMITY STUDY: CPT

## 2017-06-03 PROCEDURE — C1889: CPT

## 2017-06-03 PROCEDURE — P9045: CPT

## 2017-06-03 PROCEDURE — 85730 THROMBOPLASTIN TIME PARTIAL: CPT

## 2017-06-03 PROCEDURE — 94002 VENT MGMT INPAT INIT DAY: CPT

## 2017-06-03 PROCEDURE — 86850 RBC ANTIBODY SCREEN: CPT

## 2017-06-03 PROCEDURE — 88305 TISSUE EXAM BY PATHOLOGIST: CPT

## 2017-06-03 PROCEDURE — 71010: CPT | Mod: 26

## 2017-06-03 PROCEDURE — 84100 ASSAY OF PHOSPHORUS: CPT

## 2017-06-03 PROCEDURE — 86901 BLOOD TYPING SEROLOGIC RH(D): CPT

## 2017-06-03 PROCEDURE — 85025 COMPLETE CBC W/AUTO DIFF WBC: CPT

## 2017-06-03 PROCEDURE — 86900 BLOOD TYPING SEROLOGIC ABO: CPT

## 2017-06-03 PROCEDURE — 36430 TRANSFUSION BLD/BLD COMPNT: CPT

## 2017-06-03 PROCEDURE — C1768: CPT

## 2017-06-03 PROCEDURE — 80053 COMPREHEN METABOLIC PANEL: CPT

## 2017-06-03 RX ORDER — PANTOPRAZOLE SODIUM 20 MG/1
1 TABLET, DELAYED RELEASE ORAL
Qty: 30 | Refills: 0
Start: 2017-06-03 | End: 2017-07-03

## 2017-06-03 RX ORDER — DIAZEPAM 5 MG
0.5 TABLET ORAL
Qty: 15 | Refills: 0
Start: 2017-06-03 | End: 2017-07-03

## 2017-06-03 RX ORDER — POTASSIUM CHLORIDE 20 MEQ
1 PACKET (EA) ORAL
Qty: 30 | Refills: 0
Start: 2017-06-03 | End: 2017-07-03

## 2017-06-03 RX ORDER — ASPIRIN/CALCIUM CARB/MAGNESIUM 324 MG
1 TABLET ORAL
Qty: 0 | Refills: 0 | COMMUNITY

## 2017-06-03 RX ORDER — FUROSEMIDE 40 MG
1 TABLET ORAL
Qty: 30 | Refills: 0
Start: 2017-06-03 | End: 2017-07-03

## 2017-06-03 RX ORDER — ACETAMINOPHEN 500 MG
0 TABLET ORAL
Qty: 0 | Refills: 0 | COMMUNITY

## 2017-06-03 RX ORDER — RANITIDINE HYDROCHLORIDE 150 MG/1
1 TABLET, FILM COATED ORAL
Qty: 60 | Refills: 0
Start: 2017-06-03 | End: 2017-07-03

## 2017-06-03 RX ORDER — METFORMIN HYDROCHLORIDE 850 MG/1
2 TABLET ORAL
Qty: 0 | Refills: 0 | COMMUNITY

## 2017-06-03 RX ORDER — METFORMIN HYDROCHLORIDE 850 MG/1
1 TABLET ORAL
Qty: 0 | Refills: 0 | COMMUNITY

## 2017-06-03 RX ORDER — METOPROLOL TARTRATE 50 MG
1 TABLET ORAL
Qty: 0 | Refills: 0 | COMMUNITY

## 2017-06-03 RX ORDER — RANITIDINE HYDROCHLORIDE 150 MG/1
1 TABLET, FILM COATED ORAL
Qty: 0 | Refills: 0 | COMMUNITY

## 2017-06-03 RX ORDER — METFORMIN HYDROCHLORIDE 850 MG/1
1 TABLET ORAL
Qty: 30 | Refills: 0
Start: 2017-06-03 | End: 2017-07-03

## 2017-06-03 RX ORDER — DIAZEPAM 5 MG
0.5 TABLET ORAL
Qty: 0 | Refills: 0 | COMMUNITY

## 2017-06-03 RX ORDER — POLYETHYLENE GLYCOL 3350 17 G/17G
17 POWDER, FOR SOLUTION ORAL
Qty: 510 | Refills: 0
Start: 2017-06-03 | End: 2017-07-03

## 2017-06-03 RX ORDER — ASPIRIN/CALCIUM CARB/MAGNESIUM 324 MG
1 TABLET ORAL
Qty: 30 | Refills: 0
Start: 2017-06-03 | End: 2017-07-03

## 2017-06-03 RX ORDER — METFORMIN HYDROCHLORIDE 850 MG/1
2 TABLET ORAL
Qty: 60 | Refills: 0
Start: 2017-06-03 | End: 2017-07-03

## 2017-06-03 RX ORDER — LISINOPRIL 2.5 MG/1
1 TABLET ORAL
Qty: 0 | Refills: 0 | COMMUNITY

## 2017-06-03 RX ORDER — METOPROLOL TARTRATE 50 MG
0.5 TABLET ORAL
Qty: 15 | Refills: 0
Start: 2017-06-03 | End: 2017-07-03

## 2017-06-03 RX ORDER — ROSUVASTATIN CALCIUM 5 MG/1
1 TABLET ORAL
Qty: 30 | Refills: 0
Start: 2017-06-03 | End: 2017-07-03

## 2017-06-03 RX ORDER — ALBUTEROL 90 UG/1
2 AEROSOL, METERED ORAL
Qty: 2 | Refills: 0
Start: 2017-06-03 | End: 2017-07-03

## 2017-06-03 RX ORDER — ALBUTEROL 90 UG/1
2 AEROSOL, METERED ORAL
Qty: 0 | Refills: 0 | COMMUNITY

## 2017-06-03 RX ORDER — ROSUVASTATIN CALCIUM 5 MG/1
1 TABLET ORAL
Qty: 0 | Refills: 0 | COMMUNITY

## 2017-06-03 RX ORDER — NICOTINE POLACRILEX 2 MG
1 GUM BUCCAL
Qty: 30 | Refills: 1
Start: 2017-06-03 | End: 2017-08-01

## 2017-06-03 RX ADMIN — SODIUM CHLORIDE 3 MILLILITER(S): 9 INJECTION INTRAMUSCULAR; INTRAVENOUS; SUBCUTANEOUS at 05:49

## 2017-06-03 RX ADMIN — Medication 0.5 MILLIGRAM(S): at 06:03

## 2017-06-03 RX ADMIN — HEPARIN SODIUM 5000 UNIT(S): 5000 INJECTION INTRAVENOUS; SUBCUTANEOUS at 06:03

## 2017-06-03 RX ADMIN — Medication 81 MILLIGRAM(S): at 09:14

## 2017-06-03 RX ADMIN — Medication 12.5 MILLIGRAM(S): at 06:03

## 2017-06-03 RX ADMIN — Medication 2: at 13:19

## 2017-06-03 RX ADMIN — Medication 100 MILLIGRAM(S): at 06:03

## 2017-06-03 RX ADMIN — PANTOPRAZOLE SODIUM 40 MILLIGRAM(S): 20 TABLET, DELAYED RELEASE ORAL at 06:03

## 2017-06-03 RX ADMIN — Medication 20 MILLIGRAM(S): at 06:03

## 2017-06-03 NOTE — PROGRESS NOTE ADULT - PROBLEM SELECTOR PLAN 1
A. stable and no evidence of bronchospasm. Continue inhaled steroids and as needed short-acting beta agonist

## 2017-06-03 NOTE — PROGRESS NOTE ADULT - SUBJECTIVE AND OBJECTIVE BOX
Interval Events: reviewed  Patient seen and examined at bedside.    Patient is a 57y old  Female who presents with a chief complaint of AS (19 May 2017 10:50)    she is feeling better ready she is out of bed. She was able to spend more time in the bathroom without being short of breath  PAST MEDICAL & SURGICAL HISTORY:  Back injury: lumbar, work related  Kidney stone  GERD (gastroesophageal reflux disease)  Diverticulitis  Asthma  Obesity  Diabetes mellitus  Hyperlipidemia  Hypertension  Status post laser lithotripsy of ureteral calculus  Uterine fibroid: removal  Status post small bowel resection  Carpal tunnel syndrome      MEDICATIONS:  Pulmonary:  tiotropium 18 MICROgram(s) Capsule 1Capsule(s) Inhalation daily  buDESOnide   0.5 milliGRAM(s) Respule 0.5milliGRAM(s) Inhalation every 12 hours  tiotropium 18 MICROgram(s) Capsule 1Capsule(s) Inhalation daily    Antimicrobials:    Anticoagulants:  heparin  Injectable 5000Unit(s) SubCutaneous every 8 hours  aspirin enteric coated 81milliGRAM(s) Oral daily    Cardiac:  furosemide    Tablet 20milliGRAM(s) Oral daily  metoprolol 12.5milliGRAM(s) Oral every 12 hours      Allergies    Biaxin (Urticaria; Rash; Hives)  latex (Unknown)    Intolerances        Vital Signs Last 24 Hrs  T(C): 36.1, Max: 36.9 (06-02 @ 21:40)  T(F): 97, Max: 98.4 (06-02 @ 21:40)  HR: 78 (68 - 80)  BP: 94/56 (94/56 - 125/63)  BP(mean): 70 (70 - 89)  RR: 18 (15 - 30)  SpO2: 94% (87% - 96%)    I & Os for current day (as of 06-03 @ 08:57)  =============================================  IN: 840 ml / OUT: 0 ml / NET: 840 ml        LABS:      CBC Full  -  ( 03 Jun 2017 06:21 )  WBC Count : 11.4 K/uL  Hemoglobin : 10.1 g/dL  Hematocrit : 32.2 %  Platelet Count - Automated : 374 K/uL  Mean Cell Volume : 89.7 fL  Mean Cell Hemoglobin : 28.1 pg  Mean Cell Hemoglobin Concentration : 31.4 g/dL  Auto Neutrophil # : x  Auto Lymphocyte # : x  Auto Monocyte # : x  Auto Eosinophil # : x  Auto Basophil # : x  Auto Neutrophil % : x  Auto Lymphocyte % : x  Auto Monocyte % : x  Auto Eosinophil % : x  Auto Basophil % : x    06-03    139  |  102  |  11  ----------------------------<  132<H>  4.7   |  26  |  0.60    Ca    9.5      03 Jun 2017 06:21  Phos  3.9     06-03  Mg     2.1     06-03      PT/INR - ( 03 Jun 2017 06:22 )   PT: 12.3 sec;   INR: 1.11          PTT - ( 03 Jun 2017 06:22 )  PTT:30.6 sec        chest x-ray revealed decrease in Marisa and sister show morphine            RADIOLOGY & ADDITIONAL STUDIES (The following images were personally reviewed):  [place]:                                 No  Urine output:               Yes         DVT prophylaxis:         Yes         Flattus:                          Yes         Bowel movement:        No

## 2017-06-03 NOTE — PROGRESS NOTE ADULT - SUBJECTIVE AND OBJECTIVE BOX
Patient discussed on morning rounds with       Operation / Date:     Surgeon:    Referring Physician:    SUBJECTIVE ASSESSMENT:  57y Female     Hospital Course:    Vital Signs Last 24 Hrs  T(C): 36.6, Max: 36.9 (06-02 @ 21:40)  T(F): 97.9, Max: 98.4 (06-02 @ 21:40)  HR: 78 (68 - 78)  BP: 94/56 (94/56 - 125/63)  BP(mean): 70 (70 - 89)  RR: 18 (18 - 18)  SpO2: 94% (93% - 95%)  I&O's Detail    I & Os for current day (as of 03 Jun 2017 20:26)  =============================================  IN:    Oral Fluid: 840 ml    Total IN: 840 ml  ---------------------------------------------  OUT:    Total OUT: 0 ml  ---------------------------------------------  Total NET: 840 ml      EPICARDIAL WIRES REMOVED: Yes/No.  TIE DOWNS REMOVED: Yes/No.    PHYSICAL EXAM:    General:     Neurological:    Cardiovascular:    Respiratory:    Gastrointestinal:    Extremities:    Vascular:    Incision Sites:    LABS:                        10.1   11.4  )-----------( 374      ( 03 Jun 2017 06:21 )             32.2       COUMADIN:  Yes/No.        DOSE:                  INDICATION:                GOAL INR:    PT/INR - ( 03 Jun 2017 06:22 )   PT: 12.3 sec;   INR: 1.11          PTT - ( 03 Jun 2017 06:22 )  PTT:30.6 sec    06-03    139  |  102  |  11  ----------------------------<  132<H>  4.7   |  26  |  0.60    Ca    9.5      03 Jun 2017 06:21  Phos  3.9     06-03  Mg     2.1     06-03    MEDICATIONS  (STANDING): Patient discussed on morning rounds with Dr. Hanson and Lyle      Operation / Date:     Surgeon:    Referring Physician:    SUBJECTIVE ASSESSMENT:  57y Female     Hospital Course:    Vital Signs Last 24 Hrs  T(C): 36.6, Max: 36.9 (06-02 @ 21:40)  T(F): 97.9, Max: 98.4 (06-02 @ 21:40)  HR: 78 (68 - 78)  BP: 94/56 (94/56 - 125/63)  BP(mean): 70 (70 - 89)  RR: 18 (18 - 18)  SpO2: 94% (93% - 95%)  I&O's Detail    I & Os for current day (as of 03 Jun 2017 20:26)  =============================================  IN:    Oral Fluid: 840 ml    Total IN: 840 ml  ---------------------------------------------  OUT:    Total OUT: 0 ml  ---------------------------------------------  Total NET: 840 ml      EPICARDIAL WIRES REMOVED: Yes/No.  TIE DOWNS REMOVED: Yes/No.    PHYSICAL EXAM:    General:     Neurological:    Cardiovascular:    Respiratory:    Gastrointestinal:    Extremities:    Vascular:    Incision Sites:    LABS:                        10.1   11.4  )-----------( 374      ( 03 Jun 2017 06:21 )             32.2         PT/INR - ( 03 Jun 2017 06:22 )   PT: 12.3 sec;   INR: 1.11     PTT - ( 03 Jun 2017 06:22 )  PTT:30.6 sec    06-03    139  |  102  |  11  ----------------------------<  132<H>  4.7   |  26  |  0.60    Ca    9.5      03 Jun 2017 06:21  Phos  3.9     06-03  Mg     2.1     06-03    MEDICATIONS (discharged)  Percocet 5/325 oral tablet  -- 1 tab(s) by mouth every 8 hours MDD:no more than 4 tabs daily    aspirin 81 mg oral delayed release tablet  -- 1 tab(s) by mouth once a day    diazePAM 10 mg oral tablet  -- 0.5 tab(s) by mouth once a day MDD:no more than 1 tabs daily    metFORMIN 500 mg oral tablet  -- 2 tab(s) by mouth once a day    metFORMIN 500 mg oral tablet  -- 1 tab(s) by mouth once a day (at bedtime)    Crestor 20 mg oral tablet  -- 1 tab(s) by mouth once a day (at bedtime)    metoprolol succinate 25 mg oral tablet, extended release  -- 0.5 tab(s) by mouth once a day MDD:hope when SBP is less than 120.  HR is less than 60    ProAir HFA 90 mcg/inh inhalation aerosol  -- 2 puff(s) inhaled 2 times a day, As Needed    furosemide 20 mg oral tablet  -- 1 tab(s) by mouth once a day.    raNITIdine 150 mg oral capsule  -- 1 cap(s) by mouth 2 times a day    polyethylene glycol 3350 oral powder for reconstitution  -- 17 gram(s) by mouth once a day, As needed, Constipation. OTC    K-Tab 10 mEq oral tablet, extended release  -- 1 tab(s) by mouth once a day MDD: only take it when taking lasix  -- It is very important that you take or use this exactly as directed.    pantoprazole 40 mg oral delayed release tablet  -- 1 tab(s) by mouth once a day (before a meal)    Habitrol 21 mg/24 hr transdermal film, extended release  -- 1 patch by transdermal patch once a day

## 2017-06-06 DIAGNOSIS — E78.5 HYPERLIPIDEMIA, UNSPECIFIED: ICD-10-CM

## 2017-06-06 DIAGNOSIS — I10 ESSENTIAL (PRIMARY) HYPERTENSION: ICD-10-CM

## 2017-06-06 DIAGNOSIS — I25.10 ATHEROSCLEROTIC HEART DISEASE OF NATIVE CORONARY ARTERY WITHOUT ANGINA PECTORIS: ICD-10-CM

## 2017-06-06 DIAGNOSIS — K21.9 GASTRO-ESOPHAGEAL REFLUX DISEASE WITHOUT ESOPHAGITIS: ICD-10-CM

## 2017-06-06 DIAGNOSIS — E66.9 OBESITY, UNSPECIFIED: ICD-10-CM

## 2017-06-06 DIAGNOSIS — E11.9 TYPE 2 DIABETES MELLITUS WITHOUT COMPLICATIONS: ICD-10-CM

## 2017-06-06 DIAGNOSIS — G89.18 OTHER ACUTE POSTPROCEDURAL PAIN: ICD-10-CM

## 2017-06-06 DIAGNOSIS — J96.01 ACUTE RESPIRATORY FAILURE WITH HYPOXIA: ICD-10-CM

## 2017-06-06 DIAGNOSIS — J44.9 CHRONIC OBSTRUCTIVE PULMONARY DISEASE, UNSPECIFIED: ICD-10-CM

## 2017-06-06 DIAGNOSIS — I35.2 NONRHEUMATIC AORTIC (VALVE) STENOSIS WITH INSUFFICIENCY: ICD-10-CM

## 2017-06-06 DIAGNOSIS — F17.210 NICOTINE DEPENDENCE, CIGARETTES, UNCOMPLICATED: ICD-10-CM

## 2017-06-06 DIAGNOSIS — I35.0 NONRHEUMATIC AORTIC (VALVE) STENOSIS: ICD-10-CM

## 2017-06-06 DIAGNOSIS — F41.9 ANXIETY DISORDER, UNSPECIFIED: ICD-10-CM

## 2017-06-06 DIAGNOSIS — I71.2 THORACIC AORTIC ANEURYSM, WITHOUT RUPTURE: ICD-10-CM

## 2017-06-06 DIAGNOSIS — I95.9 HYPOTENSION, UNSPECIFIED: ICD-10-CM

## 2017-06-09 PROBLEM — Z09 POSTOP CHECK: Status: ACTIVE | Noted: 2017-06-09

## 2017-06-09 RX ORDER — LISINOPRIL 10 MG/1
10 TABLET ORAL DAILY
Qty: 30 | Refills: 1 | Status: COMPLETED | COMMUNITY
End: 2017-06-09

## 2017-06-09 RX ORDER — HYDROCODONE BITARTRATE AND ACETAMINOPHEN 7.5; 325 MG/1; MG/1
7.5-325 TABLET ORAL
Refills: 0 | Status: COMPLETED | COMMUNITY
End: 2017-06-09

## 2017-06-09 RX ORDER — HYDROCODONE BITARTRATE AND IBUPROFEN 2.5; 2 MG/1; MG/1
2.5-2 TABLET ORAL
Refills: 0 | Status: COMPLETED | COMMUNITY
End: 2017-06-09

## 2017-06-09 RX ORDER — HYDROCHLOROTHIAZIDE 25 MG/1
25 TABLET ORAL TWICE DAILY
Refills: 0 | Status: COMPLETED | COMMUNITY
End: 2017-06-09

## 2017-06-09 RX ORDER — PANTOPRAZOLE 40 MG/1
40 TABLET, DELAYED RELEASE ORAL DAILY
Qty: 30 | Refills: 0 | Status: ACTIVE | COMMUNITY
Start: 2017-06-09

## 2017-06-09 RX ORDER — LOTEPREDNOL ETABONATE 5 MG/ML
0.5 SUSPENSION/ DROPS OPHTHALMIC 4 TIMES DAILY
Refills: 0 | Status: COMPLETED | COMMUNITY
End: 2017-06-09

## 2017-06-14 ENCOUNTER — APPOINTMENT (OUTPATIENT)
Dept: CARDIOTHORACIC SURGERY | Facility: CLINIC | Age: 57
End: 2017-06-14

## 2017-06-14 VITALS
SYSTOLIC BLOOD PRESSURE: 124 MMHG | RESPIRATION RATE: 22 BRPM | HEART RATE: 104 BPM | BODY MASS INDEX: 37.2 KG/M2 | TEMPERATURE: 98.1 F | DIASTOLIC BLOOD PRESSURE: 66 MMHG | OXYGEN SATURATION: 94 % | WEIGHT: 210 LBS

## 2017-06-14 DIAGNOSIS — Z09 ENCOUNTER FOR FOLLOW-UP EXAMINATION AFTER COMPLETED TREATMENT FOR CONDITIONS OTHER THAN MALIGNANT NEOPLASM: ICD-10-CM

## 2017-08-23 ENCOUNTER — EMERGENCY (EMERGENCY)
Facility: HOSPITAL | Age: 57
LOS: 1 days | Discharge: HOME | End: 2017-08-23
Attending: EMERGENCY MEDICINE | Admitting: EMERGENCY MEDICINE
Payer: MEDICARE

## 2017-08-23 VITALS
DIASTOLIC BLOOD PRESSURE: 92 MMHG | OXYGEN SATURATION: 96 % | TEMPERATURE: 97 F | RESPIRATION RATE: 18 BRPM | HEART RATE: 71 BPM | SYSTOLIC BLOOD PRESSURE: 159 MMHG

## 2017-08-23 VITALS
RESPIRATION RATE: 20 BRPM | HEART RATE: 76 BPM | DIASTOLIC BLOOD PRESSURE: 90 MMHG | SYSTOLIC BLOOD PRESSURE: 154 MMHG | OXYGEN SATURATION: 99 % | TEMPERATURE: 99 F

## 2017-08-23 DIAGNOSIS — Z88.8 ALLERGY STATUS TO OTHER DRUGS, MEDICAMENTS AND BIOLOGICAL SUBSTANCES: ICD-10-CM

## 2017-08-23 DIAGNOSIS — G56.00 CARPAL TUNNEL SYNDROME, UNSPECIFIED UPPER LIMB: Chronic | ICD-10-CM

## 2017-08-23 DIAGNOSIS — Z79.891 LONG TERM (CURRENT) USE OF OPIATE ANALGESIC: ICD-10-CM

## 2017-08-23 DIAGNOSIS — J45.909 UNSPECIFIED ASTHMA, UNCOMPLICATED: ICD-10-CM

## 2017-08-23 DIAGNOSIS — I10 ESSENTIAL (PRIMARY) HYPERTENSION: ICD-10-CM

## 2017-08-23 DIAGNOSIS — Z79.82 LONG TERM (CURRENT) USE OF ASPIRIN: ICD-10-CM

## 2017-08-23 DIAGNOSIS — Z79.899 OTHER LONG TERM (CURRENT) DRUG THERAPY: ICD-10-CM

## 2017-08-23 DIAGNOSIS — Z91.040 LATEX ALLERGY STATUS: ICD-10-CM

## 2017-08-23 DIAGNOSIS — Z98.890 OTHER SPECIFIED POSTPROCEDURAL STATES: Chronic | ICD-10-CM

## 2017-08-23 DIAGNOSIS — Z90.49 ACQUIRED ABSENCE OF OTHER SPECIFIED PARTS OF DIGESTIVE TRACT: Chronic | ICD-10-CM

## 2017-08-23 DIAGNOSIS — D25.9 LEIOMYOMA OF UTERUS, UNSPECIFIED: Chronic | ICD-10-CM

## 2017-08-23 DIAGNOSIS — E11.9 TYPE 2 DIABETES MELLITUS WITHOUT COMPLICATIONS: ICD-10-CM

## 2017-08-23 DIAGNOSIS — R42 DIZZINESS AND GIDDINESS: ICD-10-CM

## 2017-08-23 LAB
ALBUMIN SERPL ELPH-MCNC: 4.1 G/DL — SIGNIFICANT CHANGE UP (ref 3.3–5)
ALP SERPL-CCNC: 42 U/L — SIGNIFICANT CHANGE UP (ref 40–120)
ALT FLD-CCNC: 15 U/L — SIGNIFICANT CHANGE UP (ref 10–45)
ANION GAP SERPL CALC-SCNC: 14 MMOL/L — SIGNIFICANT CHANGE UP (ref 5–17)
APPEARANCE UR: CLEAR — SIGNIFICANT CHANGE UP
AST SERPL-CCNC: 23 U/L — SIGNIFICANT CHANGE UP (ref 10–40)
BASOPHILS NFR BLD AUTO: 0.3 % — SIGNIFICANT CHANGE UP (ref 0–2)
BILIRUB SERPL-MCNC: 0.2 MG/DL — SIGNIFICANT CHANGE UP (ref 0.2–1.2)
BILIRUB UR-MCNC: NEGATIVE — SIGNIFICANT CHANGE UP
BUN SERPL-MCNC: 13 MG/DL — SIGNIFICANT CHANGE UP (ref 7–23)
CALCIUM SERPL-MCNC: 9.4 MG/DL — SIGNIFICANT CHANGE UP (ref 8.4–10.5)
CHLORIDE SERPL-SCNC: 98 MMOL/L — SIGNIFICANT CHANGE UP (ref 96–108)
CK MB CFR SERPL CALC: 1.3 NG/ML — SIGNIFICANT CHANGE UP (ref 0–6.7)
CK MB CFR SERPL CALC: 1.3 NG/ML — SIGNIFICANT CHANGE UP (ref 0–6.7)
CK SERPL-CCNC: 62 U/L — SIGNIFICANT CHANGE UP (ref 25–170)
CO2 SERPL-SCNC: 27 MMOL/L — SIGNIFICANT CHANGE UP (ref 22–31)
COLOR SPEC: YELLOW — SIGNIFICANT CHANGE UP
CREAT SERPL-MCNC: 1 MG/DL — SIGNIFICANT CHANGE UP (ref 0.5–1.3)
DIFF PNL FLD: (no result)
EOSINOPHIL NFR BLD AUTO: 1.9 % — SIGNIFICANT CHANGE UP (ref 0–6)
GLUCOSE SERPL-MCNC: 138 MG/DL — HIGH (ref 70–99)
GLUCOSE UR QL: NEGATIVE — SIGNIFICANT CHANGE UP
HCT VFR BLD CALC: 41.3 % — SIGNIFICANT CHANGE UP (ref 34.5–45)
HGB BLD-MCNC: 12.9 G/DL — SIGNIFICANT CHANGE UP (ref 11.5–15.5)
KETONES UR-MCNC: NEGATIVE — SIGNIFICANT CHANGE UP
LEUKOCYTE ESTERASE UR-ACNC: NEGATIVE — SIGNIFICANT CHANGE UP
LYMPHOCYTES # BLD AUTO: 32.5 % — SIGNIFICANT CHANGE UP (ref 13–44)
MCHC RBC-ENTMCNC: 24.6 PG — LOW (ref 27–34)
MCHC RBC-ENTMCNC: 31.2 G/DL — LOW (ref 32–36)
MCV RBC AUTO: 78.7 FL — LOW (ref 80–100)
MONOCYTES NFR BLD AUTO: 7.1 % — SIGNIFICANT CHANGE UP (ref 2–14)
NEUTROPHILS NFR BLD AUTO: 58.2 % — SIGNIFICANT CHANGE UP (ref 43–77)
NITRITE UR-MCNC: NEGATIVE — SIGNIFICANT CHANGE UP
PH UR: 5.5 — SIGNIFICANT CHANGE UP (ref 5–8)
PLATELET # BLD AUTO: 280 K/UL — SIGNIFICANT CHANGE UP (ref 150–400)
POTASSIUM SERPL-MCNC: 4.2 MMOL/L — SIGNIFICANT CHANGE UP (ref 3.5–5.3)
POTASSIUM SERPL-SCNC: 4.2 MMOL/L — SIGNIFICANT CHANGE UP (ref 3.5–5.3)
PROT SERPL-MCNC: 8.2 G/DL — SIGNIFICANT CHANGE UP (ref 6–8.3)
PROT UR-MCNC: 100 MG/DL
RBC # BLD: 5.25 M/UL — HIGH (ref 3.8–5.2)
RBC # FLD: 14.7 % — SIGNIFICANT CHANGE UP (ref 10.3–16.9)
SODIUM SERPL-SCNC: 139 MMOL/L — SIGNIFICANT CHANGE UP (ref 135–145)
SP GR SPEC: >=1.03 — SIGNIFICANT CHANGE UP (ref 1–1.03)
TROPONIN T SERPL-MCNC: <0.01 NG/ML — SIGNIFICANT CHANGE UP (ref 0–0.01)
TROPONIN T SERPL-MCNC: <0.01 NG/ML — SIGNIFICANT CHANGE UP (ref 0–0.01)
UROBILINOGEN FLD QL: 1 E.U./DL — SIGNIFICANT CHANGE UP
WBC # BLD: 8.9 K/UL — SIGNIFICANT CHANGE UP (ref 3.8–10.5)
WBC # FLD AUTO: 8.9 K/UL — SIGNIFICANT CHANGE UP (ref 3.8–10.5)

## 2017-08-23 PROCEDURE — 84484 ASSAY OF TROPONIN QUANT: CPT

## 2017-08-23 PROCEDURE — 70450 CT HEAD/BRAIN W/O DYE: CPT | Mod: 26

## 2017-08-23 PROCEDURE — 82553 CREATINE MB FRACTION: CPT

## 2017-08-23 PROCEDURE — 70450 CT HEAD/BRAIN W/O DYE: CPT

## 2017-08-23 PROCEDURE — 93005 ELECTROCARDIOGRAM TRACING: CPT

## 2017-08-23 PROCEDURE — 36415 COLL VENOUS BLD VENIPUNCTURE: CPT

## 2017-08-23 PROCEDURE — 81001 URINALYSIS AUTO W/SCOPE: CPT

## 2017-08-23 PROCEDURE — 93010 ELECTROCARDIOGRAM REPORT: CPT

## 2017-08-23 PROCEDURE — 99285 EMERGENCY DEPT VISIT HI MDM: CPT | Mod: 25

## 2017-08-23 PROCEDURE — 80053 COMPREHEN METABOLIC PANEL: CPT

## 2017-08-23 PROCEDURE — 99284 EMERGENCY DEPT VISIT MOD MDM: CPT | Mod: 25

## 2017-08-23 PROCEDURE — 82550 ASSAY OF CK (CPK): CPT

## 2017-08-23 PROCEDURE — 85025 COMPLETE CBC W/AUTO DIFF WBC: CPT

## 2017-08-23 RX ORDER — ACETAMINOPHEN 500 MG
975 TABLET ORAL ONCE
Qty: 0 | Refills: 0 | Status: COMPLETED | OUTPATIENT
Start: 2017-08-23 | End: 2017-08-23

## 2017-08-23 RX ORDER — AMLODIPINE BESYLATE 2.5 MG/1
5 TABLET ORAL ONCE
Qty: 0 | Refills: 0 | Status: COMPLETED | OUTPATIENT
Start: 2017-08-23 | End: 2017-08-23

## 2017-08-23 RX ADMIN — AMLODIPINE BESYLATE 5 MILLIGRAM(S): 2.5 TABLET ORAL at 19:00

## 2017-08-23 RX ADMIN — Medication 975 MILLIGRAM(S): at 20:25

## 2017-08-23 RX ADMIN — Medication 975 MILLIGRAM(S): at 20:23

## 2017-08-23 NOTE — ED ADULT NURSE NOTE - PSH
Carpal tunnel syndrome    Status post laser lithotripsy of ureteral calculus    Status post small bowel resection    Uterine fibroid  removal

## 2017-08-23 NOTE — ED PROVIDER NOTE - PHYSICAL EXAMINATION
CON: ao x 3, HENMT: clear oropharynx, soft neck, HEAD: atraumatic, CV: rrr, equal pulses b/l UE and pedal pulses, RESP: cta b/l, GI: +BS, soft, nontender, no rebound, no guarding, SKIN: no rash, MSK: no edema, moving all extremities spontaneously, NEURO: neuro exam nonfocal, perrl, full EOMI, f-n normal, neg pronator, neg romberg, strength 5/5, no dysmetria, no dysdiadochokinesia

## 2017-08-23 NOTE — ED PROVIDER NOTE - MEDICAL DECISION MAKING DETAILS
htn, equal pulses in all extremities UE and LE, soft abd, nonfocal neuro exam, ekg nsr w/o sig change compared to prior, given home bp 190s, possibly htn urgency, will check labs, ct head, reassess

## 2017-08-23 NOTE — ED PROVIDER NOTE - OBJECTIVE STATEMENT
57 yof pw feeling dizzy since yesterday, and today took her BP and found it to be elevated to 190s systolic and felt concerned.  states she gets dizzy with high blood pressure.  dizziness described as woozy but no spinning sensation, no HA, no blurry vision, no double vision.  denies new cp/abd pain (states baseline chronic cp).  no nvd, no sob, no pleurisy, no focal weakness.

## 2017-08-23 NOTE — ED ADULT NURSE NOTE - CHPI ED SYMPTOMS NEG
no vomiting/no back pain/no diaphoresis/no fever/no syncope/no cough/visual changes, weakness/no nausea/no chills

## 2017-10-17 ENCOUNTER — APPOINTMENT (OUTPATIENT)
Dept: PULMONOLOGY | Facility: CLINIC | Age: 57
End: 2017-10-17

## 2017-10-30 NOTE — PRE-OP CHECKLIST - RESPIRATORY RATE (BREATHS/MIN)
ED Lower Extremity HPI





- General


Chief Complaint: Extremity Injury, Lower


Stated Complaint: RIGHT ANKLE PAIN


Time Seen by Provider: 10/30/17 13:55


Source: patient


Mode of arrival: Ambulatory


Limitations: No Limitations





- History of Present Illness


Initial Comments: 





Pt reports R ankle pain since yesterday. Does not recall injuring it. Denies 

numbness. No calf pain or leg swelling. 


MD Complaint: ankle injury


-: Gradual, days(s) (1)


Injury: Ankle: Right


Type of Injury: unknown


Place: work


Severity: moderate


Severity scale (0 -10): 6


Improves With: rest


Worsens With: movement


Associated Symptoms: denies: swelling, numbness, tingling





- Related Data


 Previous Rx's











 Medication  Instructions  Recorded  Last Taken  Type


 


Benzocaine/Menthol [Cepacol Sore 1 each MM Q4H PRN #16 lozenge 12/21/16 Unknown 

Rx





Throat Lozenge]    


 


Ondansetron [Zofran Odt] 4 mg PO Q8H PRN #10 tab.rapdis 12/21/16 Unknown Rx


 


Ibuprofen [Motrin 600 MG tab] 600 mg PO Q8H PRN #20 tablet 10/30/17 Unknown Rx











 Allergies











Allergy/AdvReac Type Severity Reaction Status Date / Time


 


No Known Allergies Allergy   Unverified 11/08/13 22:27














ED Review of Systems


ROS: 


Stated complaint: RIGHT ANKLE PAIN


Other details as noted in HPI





Comment: All other systems reviewed and negative


Constitutional: denies: chills, fever


Eyes: denies: eye pain, eye discharge, vision change


ENT: denies: ear pain, throat pain


Respiratory: denies: cough, shortness of breath, wheezing


Cardiovascular: denies: chest pain, palpitations


Endocrine: no symptoms reported


Gastrointestinal: denies: abdominal pain, nausea, diarrhea


Genitourinary: denies: urgency, dysuria, discharge


Musculoskeletal: denies: back pain, joint swelling


Skin: denies: rash, lesions


Neurological: denies: headache, weakness, paresthesias


Psychiatric: denies: anxiety, depression


Hematological/Lymphatic: denies: easy bleeding, easy bruising





ED Past Medical Hx





- Past Medical History


Previous Medical History?: No


Hx Sickle Cell Disease:  (TRAIT)


Additional medical history: n/v





- Surgical History


Past Surgical History?: No





- Social History


Smoking Status: Never Smoker


Substance Use Type: None





- Medications


Home Medications: 


 Home Medications











 Medication  Instructions  Recorded  Confirmed  Last Taken  Type


 


Benzocaine/Menthol [Cepacol Sore 1 each MM Q4H PRN #16 lozenge 12/21/16  

Unknown Rx





Throat Lozenge]     


 


Ondansetron [Zofran Odt] 4 mg PO Q8H PRN #10 tab.rapdis 12/21/16  Unknown Rx


 


Ibuprofen [Motrin 600 MG tab] 600 mg PO Q8H PRN #20 tablet 10/30/17  Unknown Rx














ED Physical Exam





- General


Limitations: No Limitations


General appearance: alert, in no apparent distress





- Head


Head exam: Present: atraumatic, normocephalic





- Eye


Eye exam: Present: normal appearance





- ENT


ENT exam: Present: mucous membranes moist





- Neck


Neck exam: Present: normal inspection





- Respiratory


Respiratory exam: Present: normal lung sounds bilaterally.  Absent: respiratory 

distress





- Cardiovascular


Cardiovascular Exam: Present: regular rate, normal rhythm.  Absent: systolic 

murmur, diastolic murmur, rubs, gallop





- GI/Abdominal


GI/Abdominal exam: Present: soft, normal bowel sounds





- Extremities Exam


Extremities exam: Present: normal inspection





- Expanded Lower Extremity Exam


  ** Right


Knee exam: Present: normal inspection, full ROM


Lower Leg exam: Present: normal inspection, full ROM


Ankle exam: Present: normal inspection, full ROM, tenderness, swelling (minimal)

.  Absent: deformity


Foot/Toe exam: Present: normal inspection, full ROM.  Absent: tenderness, 

swelling


Neuro vascular tendon exam: Present: no vascular compromise.  Absent: motor 

deficit, sensory deficit, tendon deficit


Gait: Positive: unable to bear weight





- Back Exam


Back exam: Present: normal inspection





- Neurological Exam


Neurological exam: Present: alert, oriented X3, reflexes normal





- Psychiatric


Psychiatric exam: Present: normal affect, normal mood





- Skin


Skin exam: Present: warm, dry, intact, normal color.  Absent: rash





ED Course


 Vital Signs











  10/30/17





  13:55


 


Temperature 98.3 F


 


Pulse Rate 96


 


Respiratory 18





Rate 


 


Blood Pressure 115/64


 


O2 Sat by Pulse 99





Oximetry 














- Reevaluation(s)


Reevaluation #1: 





10/30/17 15:08


Pt is in NAD and stable for d/c. 





ED Lower Extremity MDM





- Radiology Data


Radiology results: report reviewed





no bony abnormality





- Medical Decision Making





Pt given brace and crutches and a follow up with ortho recommended. 





- Differential Diagnosis


sprain, fx 


Critical care attestation.: 


If time is entered above; I have spent that time in minutes in the direct care 

of this critically ill patient, excluding procedure time.








ED Disposition


Clinical Impression: 


 Acute right ankle pain





Disposition: DC-01 TO HOME OR SELFCARE


Is pt being admited?: No


Condition: Good


Instructions:  Arthralgia (ED)


Prescriptions: 


Ibuprofen [Motrin 600 MG tab] 600 mg PO Q8H PRN #20 tablet


 PRN Reason: Pain


Referrals: 


PRIMARY CARE,MD [Primary Care Provider] - 3-5 Days


WILBER FRANK MD [Referring] - 3-5 Days


Time of Disposition: 15:11 16

## 2017-11-28 NOTE — BRIEF OPERATIVE NOTE - NS MD BRIEF OPTUBE MEDIASTINAL NUM
Pt is A/O x self. Pt is redirectable. Anxious, tearful at times.  at bedside. Complains of arm pain, R arm fistula. Vascular surgery to see pt. Tramadol PRN for pain given 1x through night. Seizure precautions no seiz activity.    BLE weakness, 3

## 2019-02-05 ENCOUNTER — FORM ENCOUNTER (OUTPATIENT)
Age: 59
End: 2019-02-05

## 2019-02-06 ENCOUNTER — APPOINTMENT (OUTPATIENT)
Dept: CT IMAGING | Facility: HOSPITAL | Age: 59
End: 2019-02-06
Payer: MEDICARE

## 2019-02-06 ENCOUNTER — OUTPATIENT (OUTPATIENT)
Dept: OUTPATIENT SERVICES | Facility: HOSPITAL | Age: 59
LOS: 1 days | End: 2019-02-06
Payer: MEDICARE

## 2019-02-06 DIAGNOSIS — Z98.890 OTHER SPECIFIED POSTPROCEDURAL STATES: Chronic | ICD-10-CM

## 2019-02-06 DIAGNOSIS — Z90.49 ACQUIRED ABSENCE OF OTHER SPECIFIED PARTS OF DIGESTIVE TRACT: Chronic | ICD-10-CM

## 2019-02-06 DIAGNOSIS — G56.00 CARPAL TUNNEL SYNDROME, UNSPECIFIED UPPER LIMB: Chronic | ICD-10-CM

## 2019-02-06 DIAGNOSIS — D25.9 LEIOMYOMA OF UTERUS, UNSPECIFIED: Chronic | ICD-10-CM

## 2019-02-06 PROCEDURE — 71275 CT ANGIOGRAPHY CHEST: CPT | Mod: 26

## 2019-02-06 PROCEDURE — 71275 CT ANGIOGRAPHY CHEST: CPT

## 2019-02-07 PROBLEM — Z09 SURGICAL FOLLOWUP VISIT: Status: ACTIVE | Noted: 2019-02-07

## 2019-02-13 ENCOUNTER — APPOINTMENT (OUTPATIENT)
Dept: CARDIOTHORACIC SURGERY | Facility: CLINIC | Age: 59
End: 2019-02-13
Payer: MEDICARE

## 2019-02-13 VITALS
SYSTOLIC BLOOD PRESSURE: 115 MMHG | TEMPERATURE: 98.6 F | DIASTOLIC BLOOD PRESSURE: 62 MMHG | WEIGHT: 210 LBS | OXYGEN SATURATION: 96 % | RESPIRATION RATE: 19 BRPM | HEART RATE: 83 BPM | BODY MASS INDEX: 37.2 KG/M2

## 2019-02-13 DIAGNOSIS — F17.200 NICOTINE DEPENDENCE, UNSPECIFIED, UNCOMPLICATED: ICD-10-CM

## 2019-02-13 DIAGNOSIS — Z09 ENCOUNTER FOR FOLLOW-UP EXAMINATION AFTER COMPLETED TREATMENT FOR CONDITIONS OTHER THAN MALIGNANT NEOPLASM: ICD-10-CM

## 2019-02-13 PROCEDURE — 99214 OFFICE O/P EST MOD 30 MIN: CPT

## 2019-02-13 RX ORDER — LISINOPRIL 40 MG/1
40 TABLET ORAL DAILY
Qty: 30 | Refills: 1 | Status: ACTIVE | COMMUNITY
Start: 2019-02-13

## 2019-02-14 PROBLEM — F17.200 CURRENT EVERY DAY SMOKER: Status: ACTIVE | Noted: 2017-04-14

## 2019-02-14 NOTE — PHYSICAL EXAM
[Sclera] : the sclera and conjunctiva were normal [PERRL With Normal Accommodation] : pupils were equal in size, round, and reactive to light [Neck Appearance] : the appearance of the neck was normal [] : no respiratory distress [Apical Impulse] : the apical impulse was normal [Examination Of The Chest] : the chest was normal in appearance [2+] : left 2+ [Abdomen Soft] : soft [Cervical Lymph Nodes Enlarged Posterior Bilaterally] : posterior cervical [No CVA Tenderness] : no ~M costovertebral angle tenderness [Abnormal Walk] : normal gait [Deep Tendon Reflexes (DTR)] : deep tendon reflexes were 2+ and symmetric [Oriented To Time, Place, And Person] : oriented to person, place, and time

## 2019-02-14 NOTE — PROCEDURE
[FreeTextEntry1] :  \par We reviewed the benefits of smoking cessation:\par \par 1. Your blood pressure will drop back down to normal within 20 minutes. \par \par 2. Within eight hours the carbon monoxide levels in your bloodstream should decrease by about one half and oxygen level should return to normal. \par \par 3. In 48 hours, your chance of having a heart attack will have decreased. All the nicotine will have left your body and your sense of taste and smell should return to a normal level. \par \par 4. In 72 hours, your bronchial tubes will relax and your energy levels will increase. \par \par 5. After two weeks, your circulation should increase and it will continue to improve for the next 10 weeks. \par \par 6. From 3 - 9 months after quitting smoking, coughing, wheezing and breathing problems will dissipate as your lung capacity should improve by 10%. \par \par 7. In one year, your risk of having a heart attack will have dropped by one half. \par \par 8. After five years of smoking cessation, your risk of having a stroke returns to that of a non-smoker. \par \par 9. After 10 years, your risk of lung cancer will return to that of a non-smoker. \par \par 10. Finally, after 15 years, risk of heart attack will return to that of a non-smoker." - <https://excommunity.becomeanex.org>; www.cdc.org <http://www.cdc.org>\par \par

## 2019-02-14 NOTE — CONSULT LETTER
[Please see my note below.] : Please see my note below. [Sincerely,] : Sincerely, [Dear  ___] : Dear  [unfilled], [FreeTextEntry2] : Dr. Mayra Renee [FreeTextEntry1] : I had the pleasure of seeing your patient, RUSSEL DAVID, in my office today. \par \par We take a multidisciplinary team approach to patient care and consider you, the physician, an extension of our team. We will maintain an open line of communication with you throughout your patient's treatment course.  \par \par As you recall, she is a 58 year year old female status post AVR/ascending aortc replacement, CABG x1 on 5/22/17. The patient presents to the office today for a routine follow up visit with repeat diagnostic imaging. I have enclosed a copy for your records.\par \par The surgical repair is intact and stable. Therefore, I have recommended that the patient will follow up in the [facility] in 1 year with a CTA chest to monitor her surgical repair. My office will assist the patient with her upcoming appointment and I will update you on her  progress at that time.\par \par I have discussed with the patient that we will continue to monitor her aortic pathology closely at the [facility], that encompasses the entire health care system and is one of the largest in the nation at this point.\par \par I appreciate the opportunity to care for your patient at the [facility] based at [facility]. If there are any questions or concerns, please call me directly at [phone]. \par \par  [FreeTextEntry3] : \par Hayden Hanson M.D.\par Professor of Cardiovascular and Thoracic Surgery\par Minimally Invasive Valve Surgeon\par Director of Aortic Surgery, [facility]\par Cell: [phone]\par Email: [email or web address] \par \par :\par [address], [address] Floor, [place] [address]\par Office: [phone]\par Fax: [phone]\par \par [facility]:\par Department of Cardiovascular and Thoracic Surgery\par [address] [place] [address], [address]\par Office: [phone]\par Fax: [phone]\par \par Practice Manager: Ms. Danna Aguila\par Email: [email or web address]\par Phone: [phone]\par \par \par

## 2019-02-14 NOTE — HISTORY OF PRESENT ILLNESS
[FreeTextEntry1] : 58 year old female with a history of HTN, HLD, Asthma, Obesity, status post AVR/ascending aortc replacement, CABG x1 on 5/22/17 presents for a followup visit. \par \par CTA chest 2/6/19 revealed: ascending aortic graft repair with extraluminal contrast pooling around the left lateral abdominal wall, consistent with early pseudoaneurysm. \par \par Echo 1/31/19 revealed EF 55-60%, mild AS, small pericardial effusion vs. pericardial fat pad. (not well visualized). \par \par Patient denies any fever, chills, fatigue, syncope, acute chest pain, palpitations, SOB, orthopnea, paroxysmal nocturnal dyspnea, vomiting, abdominal pain, back pain, BRBPR or swelling to legs.\par

## 2019-02-14 NOTE — DATA REVIEWED
[FreeTextEntry1] : \par CTA chest 2/6/19 revealed: ascending aortic graft repair with extraluminal contrast pooling around the left lateral abdominal wall, consistent with early pseudoaneursym. \par \par Echo 1/31/19 revealed EF 55-60%, mild AS, small pericardial effusion vs. pericardial fat pad. (not well visualized). \par

## 2019-02-14 NOTE — ASSESSMENT
[FreeTextEntry1] : 58 year old female with a history of HTN, HLD, Asthma, Obesity, status post AVR/ascending aortic replacement, CABG x1 on 5/22/17 presents for a followup visit. \par \par The patient's medical records and diagnostic images were reviewed at the time of this office visit, and the following recommendation was made.  The surgical repair is intact and stable. \par \par Plan:\par 1. Continue medication regimen\par 2. Follow up with PCP and cardiologist\par 3. BP control- I have recommended the patient to monitor her  blood pressure closely. I have also advised the patient to take daily blood pressures at home and adhere to medication regimen.\par 4. Return to [facility] in 1 year CTA chest and echocardiogram \par 5. Review CT imaging with Dr. Conrad\par

## 2019-05-19 VITALS
RESPIRATION RATE: 18 BRPM | OXYGEN SATURATION: 100 % | WEIGHT: 212.08 LBS | HEART RATE: 79 BPM | DIASTOLIC BLOOD PRESSURE: 57 MMHG | SYSTOLIC BLOOD PRESSURE: 89 MMHG | TEMPERATURE: 99 F | HEIGHT: 63 IN

## 2019-05-19 LAB
APTT BLD: 30.3 SEC — SIGNIFICANT CHANGE UP (ref 27.5–36.3)
D DIMER BLD IA.RAPID-MCNC: 270 NG/ML DDU — HIGH
INR BLD: 0.94 — SIGNIFICANT CHANGE UP (ref 0.88–1.16)
PROTHROM AB SERPL-ACNC: 10.6 SEC — SIGNIFICANT CHANGE UP (ref 10–12.9)

## 2019-05-19 PROCEDURE — 71045 X-RAY EXAM CHEST 1 VIEW: CPT | Mod: 26

## 2019-05-19 PROCEDURE — 93010 ELECTROCARDIOGRAM REPORT: CPT

## 2019-05-19 NOTE — ED ADULT NURSE NOTE - NSIMPLEMENTINTERV_GEN_ALL_ED
Implemented All Universal Safety Interventions:  [place] to call system. Call bell, personal items and telephone within reach. Instruct patient to call for assistance. Room bathroom lighting operational. Non-slip footwear when patient is off stretcher. Physically safe environment: no spills, clutter or unnecessary equipment. Stretcher in lowest position, wheels locked, appropriate side rails in place.

## 2019-05-19 NOTE — ED PROVIDER NOTE - CLINICAL SUMMARY MEDICAL DECISION MAKING FREE TEXT BOX
60 yo F with hx of CAD CABG  prior thoracic aneurysm repair 2 years ago now with chest pain mid sternal N/V after eating dinner-  upper abd and back pain-  CTA no dissection or PE - cant exclude ACS  will admit to cards tele serial trops/ cardiac monitoring possible card cath

## 2019-05-19 NOTE — ED ADULT NURSE NOTE - CHIEF COMPLAINT QUOTE
chest tightness with sob started 3 hours ago, relieved with nitro sprays times 2 from home and on her way to ED

## 2019-05-19 NOTE — ED PROVIDER NOTE - OBJECTIVE STATEMENT
58 yo F with hx of AS HTN DM GERD no known CAD - had " nl echo 4 months ago now with onset of mid sternal cp / nausea  2 hr after having dinner-  no sandra abd pain pos belching burping  - slight pain to back  was given slntg  x 2 by ems with relief   no diarrhea   no leg pain or calf tenderness  - no hx of DVT or PE 60 yo F with hx of AS HTN DM GERD no known CAD  hx of aortic aneurysm with pseudoaneurysm ? repair - had " nl echo 4 months ago now with onset of mid sternal cp / nausea  2 hr after having dinner-  no sandra abd pain pos belching burping  - slight pain to back  was given slntg  x 2 by ems with relief   no diarrhea   no leg pain or calf tenderness  - no hx of DVT or PE 58 yo F with hx of AS HTN DM GERD CAD CABG  hx of thoracic aneurysm with pseudoaneurysm s/p repair 2 yeasrs ago - had " nl echo 4 months ago now with onset of mid sternal cp / nausea  2 hr after having dinner-  no sandra abd pain pos belching burping  - slight pain to back  was given slntg  x 2 by ems with relief   no diarrhea   no leg pain or calf tenderness  - no hx of DVT or PE

## 2019-05-19 NOTE — ED PROVIDER NOTE - CRITICAL CARE PROVIDED
additional history taking/interpretation of diagnostic studies/consultation with other physicians/documentation/conducted a detailed discussion of DNR status/direct patient care (not related to procedure)

## 2019-05-19 NOTE — ED ADULT NURSE NOTE - OBJECTIVE STATEMENT
Pt presents complaining of chest pain radiating to the back. Pt reports she was eating dinner when she began to experience excessive belching. Pt reports chest pain began shortly after and is radiating from her epigastrum to her back, pt describes pain as a band around her epigastrum. Pt received 2 sprays of NTG in the ambulance, states significant relief resulted. PT reports hx of CABG in 2017 following stress test with adventitious findings. Pt reports taking aspirin daily.

## 2019-05-19 NOTE — ED ADULT NURSE NOTE - CHPI ED NUR SYMPTOMS NEG
no dizziness/no fever/no congestion/no vomiting/no diaphoresis/no shortness of breath/no nausea/no syncope/no chills

## 2019-05-19 NOTE — ED ADULT TRIAGE NOTE - CHIEF COMPLAINT QUOTE
chest tightness with sob started 3 hours ago chest tightness with sob started 3 hours ago, relieved with nitro sprays times 2 from home and on her way to ED

## 2019-05-20 ENCOUNTER — TRANSCRIPTION ENCOUNTER (OUTPATIENT)
Age: 59
End: 2019-05-20

## 2019-05-20 ENCOUNTER — INPATIENT (INPATIENT)
Facility: HOSPITAL | Age: 59
LOS: 0 days | Discharge: HOME | DRG: 313 | End: 2019-05-20
Attending: INTERNAL MEDICINE | Admitting: INTERNAL MEDICINE
Payer: MEDICARE

## 2019-05-20 VITALS — TEMPERATURE: 97 F

## 2019-05-20 DIAGNOSIS — R07.9 CHEST PAIN, UNSPECIFIED: ICD-10-CM

## 2019-05-20 DIAGNOSIS — I25.10 ATHEROSCLEROTIC HEART DISEASE OF NATIVE CORONARY ARTERY WITHOUT ANGINA PECTORIS: ICD-10-CM

## 2019-05-20 DIAGNOSIS — Z98.890 OTHER SPECIFIED POSTPROCEDURAL STATES: Chronic | ICD-10-CM

## 2019-05-20 DIAGNOSIS — G56.00 CARPAL TUNNEL SYNDROME, UNSPECIFIED UPPER LIMB: Chronic | ICD-10-CM

## 2019-05-20 DIAGNOSIS — E11.9 TYPE 2 DIABETES MELLITUS WITHOUT COMPLICATIONS: ICD-10-CM

## 2019-05-20 DIAGNOSIS — E78.5 HYPERLIPIDEMIA, UNSPECIFIED: ICD-10-CM

## 2019-05-20 DIAGNOSIS — I35.0 NONRHEUMATIC AORTIC (VALVE) STENOSIS: ICD-10-CM

## 2019-05-20 DIAGNOSIS — I71.2 THORACIC AORTIC ANEURYSM, WITHOUT RUPTURE: ICD-10-CM

## 2019-05-20 DIAGNOSIS — Z90.49 ACQUIRED ABSENCE OF OTHER SPECIFIED PARTS OF DIGESTIVE TRACT: Chronic | ICD-10-CM

## 2019-05-20 DIAGNOSIS — D25.9 LEIOMYOMA OF UTERUS, UNSPECIFIED: Chronic | ICD-10-CM

## 2019-05-20 DIAGNOSIS — K21.9 GASTRO-ESOPHAGEAL REFLUX DISEASE WITHOUT ESOPHAGITIS: ICD-10-CM

## 2019-05-20 DIAGNOSIS — I10 ESSENTIAL (PRIMARY) HYPERTENSION: ICD-10-CM

## 2019-05-20 DIAGNOSIS — Z72.0 TOBACCO USE: ICD-10-CM

## 2019-05-20 DIAGNOSIS — E66.9 OBESITY, UNSPECIFIED: ICD-10-CM

## 2019-05-20 LAB
ALBUMIN SERPL ELPH-MCNC: 3.9 G/DL — SIGNIFICANT CHANGE UP (ref 3.3–5)
ALP SERPL-CCNC: 28 U/L — LOW (ref 40–120)
ALT FLD-CCNC: 16 U/L — SIGNIFICANT CHANGE UP (ref 10–45)
ANION GAP SERPL CALC-SCNC: 10 MMOL/L — SIGNIFICANT CHANGE UP (ref 5–17)
APPEARANCE UR: CLEAR — SIGNIFICANT CHANGE UP
APTT BLD: 31.8 SEC — SIGNIFICANT CHANGE UP (ref 27.5–36.3)
AST SERPL-CCNC: 16 U/L — SIGNIFICANT CHANGE UP (ref 10–40)
BASOPHILS # BLD AUTO: 0.02 K/UL — SIGNIFICANT CHANGE UP (ref 0–0.2)
BASOPHILS NFR BLD AUTO: 0.2 % — SIGNIFICANT CHANGE UP (ref 0–2)
BILIRUB SERPL-MCNC: 0.2 MG/DL — SIGNIFICANT CHANGE UP (ref 0.2–1.2)
BILIRUB UR-MCNC: NEGATIVE — SIGNIFICANT CHANGE UP
BUN SERPL-MCNC: 18 MG/DL — SIGNIFICANT CHANGE UP (ref 7–23)
CALCIUM SERPL-MCNC: 9.5 MG/DL — SIGNIFICANT CHANGE UP (ref 8.4–10.5)
CHLORIDE SERPL-SCNC: 99 MMOL/L — SIGNIFICANT CHANGE UP (ref 96–108)
CHOLEST SERPL-MCNC: 160 MG/DL — SIGNIFICANT CHANGE UP (ref 10–199)
CK MB CFR SERPL CALC: 1.3 NG/ML — SIGNIFICANT CHANGE UP (ref 0–6.7)
CK MB CFR SERPL CALC: 1.8 NG/ML — SIGNIFICANT CHANGE UP (ref 0–6.7)
CK SERPL-CCNC: 88 U/L — SIGNIFICANT CHANGE UP (ref 25–170)
CK SERPL-CCNC: 94 U/L — SIGNIFICANT CHANGE UP (ref 25–170)
CO2 SERPL-SCNC: 29 MMOL/L — SIGNIFICANT CHANGE UP (ref 22–31)
COLOR SPEC: YELLOW — SIGNIFICANT CHANGE UP
CREAT SERPL-MCNC: 0.74 MG/DL — SIGNIFICANT CHANGE UP (ref 0.5–1.3)
DIFF PNL FLD: NEGATIVE — SIGNIFICANT CHANGE UP
EOSINOPHIL # BLD AUTO: 0.19 K/UL — SIGNIFICANT CHANGE UP (ref 0–0.5)
EOSINOPHIL NFR BLD AUTO: 2.2 % — SIGNIFICANT CHANGE UP (ref 0–6)
GLUCOSE BLDC GLUCOMTR-MCNC: 118 MG/DL — HIGH (ref 70–99)
GLUCOSE BLDC GLUCOMTR-MCNC: 119 MG/DL — HIGH (ref 70–99)
GLUCOSE SERPL-MCNC: 131 MG/DL — HIGH (ref 70–99)
GLUCOSE UR QL: NEGATIVE — SIGNIFICANT CHANGE UP
HBA1C BLD-MCNC: 6.5 % — HIGH (ref 4–5.6)
HCT VFR BLD CALC: 43.8 % — SIGNIFICANT CHANGE UP (ref 34.5–45)
HCV AB S/CO SERPL IA: 0.04 S/CO — SIGNIFICANT CHANGE UP
HCV AB SERPL-IMP: SIGNIFICANT CHANGE UP
HDLC SERPL-MCNC: 37 MG/DL — LOW
HGB BLD-MCNC: 13.9 G/DL — SIGNIFICANT CHANGE UP (ref 11.5–15.5)
IMM GRANULOCYTES NFR BLD AUTO: 0.5 % — SIGNIFICANT CHANGE UP (ref 0–1.5)
INR BLD: 1.03 — SIGNIFICANT CHANGE UP (ref 0.88–1.16)
KETONES UR-MCNC: NEGATIVE — SIGNIFICANT CHANGE UP
LEUKOCYTE ESTERASE UR-ACNC: NEGATIVE — SIGNIFICANT CHANGE UP
LIPID PNL WITH DIRECT LDL SERPL: 49 MG/DL — SIGNIFICANT CHANGE UP
LYMPHOCYTES # BLD AUTO: 3.75 K/UL — HIGH (ref 1–3.3)
LYMPHOCYTES # BLD AUTO: 44.1 % — HIGH (ref 13–44)
MAGNESIUM SERPL-MCNC: 1.6 MG/DL — SIGNIFICANT CHANGE UP (ref 1.6–2.6)
MCHC RBC-ENTMCNC: 28.1 PG — SIGNIFICANT CHANGE UP (ref 27–34)
MCHC RBC-ENTMCNC: 31.7 GM/DL — LOW (ref 32–36)
MCV RBC AUTO: 88.5 FL — SIGNIFICANT CHANGE UP (ref 80–100)
MONOCYTES # BLD AUTO: 0.56 K/UL — SIGNIFICANT CHANGE UP (ref 0–0.9)
MONOCYTES NFR BLD AUTO: 6.6 % — SIGNIFICANT CHANGE UP (ref 2–14)
NEUTROPHILS # BLD AUTO: 3.95 K/UL — SIGNIFICANT CHANGE UP (ref 1.8–7.4)
NEUTROPHILS NFR BLD AUTO: 46.4 % — SIGNIFICANT CHANGE UP (ref 43–77)
NITRITE UR-MCNC: NEGATIVE — SIGNIFICANT CHANGE UP
NRBC # BLD: 0 /100 WBCS — SIGNIFICANT CHANGE UP (ref 0–0)
PH UR: 6.5 — SIGNIFICANT CHANGE UP (ref 5–8)
PLATELET # BLD AUTO: 241 K/UL — SIGNIFICANT CHANGE UP (ref 150–400)
POTASSIUM SERPL-MCNC: 4.3 MMOL/L — SIGNIFICANT CHANGE UP (ref 3.5–5.3)
POTASSIUM SERPL-SCNC: 4.3 MMOL/L — SIGNIFICANT CHANGE UP (ref 3.5–5.3)
PROT SERPL-MCNC: 7.4 G/DL — SIGNIFICANT CHANGE UP (ref 6–8.3)
PROT UR-MCNC: NEGATIVE MG/DL — SIGNIFICANT CHANGE UP
PROTHROM AB SERPL-ACNC: 11.7 SEC — SIGNIFICANT CHANGE UP (ref 10–12.9)
RBC # BLD: 4.95 M/UL — SIGNIFICANT CHANGE UP (ref 3.8–5.2)
RBC # FLD: 13.1 % — SIGNIFICANT CHANGE UP (ref 10.3–14.5)
SODIUM SERPL-SCNC: 138 MMOL/L — SIGNIFICANT CHANGE UP (ref 135–145)
SP GR SPEC: <=1.005 — SIGNIFICANT CHANGE UP (ref 1–1.03)
TOTAL CHOLESTEROL/HDL RATIO MEASUREMENT: 4.3 RATIO — SIGNIFICANT CHANGE UP (ref 3.3–7.1)
TRIGL SERPL-MCNC: 368 MG/DL — HIGH (ref 10–149)
TROPONIN T SERPL-MCNC: <0.01 NG/ML — SIGNIFICANT CHANGE UP (ref 0–0.01)
TROPONIN T SERPL-MCNC: <0.01 NG/ML — SIGNIFICANT CHANGE UP (ref 0–0.01)
UROBILINOGEN FLD QL: 0.2 E.U./DL — SIGNIFICANT CHANGE UP
WBC # BLD: 8.51 K/UL — SIGNIFICANT CHANGE UP (ref 3.8–10.5)
WBC # FLD AUTO: 8.51 K/UL — SIGNIFICANT CHANGE UP (ref 3.8–10.5)

## 2019-05-20 PROCEDURE — 84484 ASSAY OF TROPONIN QUANT: CPT

## 2019-05-20 PROCEDURE — 83036 HEMOGLOBIN GLYCOSYLATED A1C: CPT

## 2019-05-20 PROCEDURE — 99291 CRITICAL CARE FIRST HOUR: CPT

## 2019-05-20 PROCEDURE — 99220: CPT

## 2019-05-20 PROCEDURE — 80061 LIPID PANEL: CPT

## 2019-05-20 PROCEDURE — 82248 BILIRUBIN DIRECT: CPT

## 2019-05-20 PROCEDURE — 93005 ELECTROCARDIOGRAM TRACING: CPT

## 2019-05-20 PROCEDURE — 82553 CREATINE MB FRACTION: CPT

## 2019-05-20 PROCEDURE — 71275 CT ANGIOGRAPHY CHEST: CPT

## 2019-05-20 PROCEDURE — 71045 X-RAY EXAM CHEST 1 VIEW: CPT

## 2019-05-20 PROCEDURE — 85025 COMPLETE CBC W/AUTO DIFF WBC: CPT

## 2019-05-20 PROCEDURE — 80053 COMPREHEN METABOLIC PANEL: CPT

## 2019-05-20 PROCEDURE — 74174 CTA ABD&PLVS W/CONTRAST: CPT

## 2019-05-20 PROCEDURE — 87086 URINE CULTURE/COLONY COUNT: CPT

## 2019-05-20 PROCEDURE — 86803 HEPATITIS C AB TEST: CPT

## 2019-05-20 PROCEDURE — 71275 CT ANGIOGRAPHY CHEST: CPT | Mod: 26

## 2019-05-20 PROCEDURE — 36415 COLL VENOUS BLD VENIPUNCTURE: CPT

## 2019-05-20 PROCEDURE — 85730 THROMBOPLASTIN TIME PARTIAL: CPT

## 2019-05-20 PROCEDURE — 82550 ASSAY OF CK (CPK): CPT

## 2019-05-20 PROCEDURE — 85379 FIBRIN DEGRADATION QUANT: CPT

## 2019-05-20 PROCEDURE — 83735 ASSAY OF MAGNESIUM: CPT

## 2019-05-20 PROCEDURE — 74174 CTA ABD&PLVS W/CONTRAST: CPT | Mod: 26

## 2019-05-20 PROCEDURE — 81003 URINALYSIS AUTO W/O SCOPE: CPT

## 2019-05-20 PROCEDURE — G0378: CPT

## 2019-05-20 PROCEDURE — 82962 GLUCOSE BLOOD TEST: CPT

## 2019-05-20 PROCEDURE — 99285 EMERGENCY DEPT VISIT HI MDM: CPT | Mod: 25

## 2019-05-20 PROCEDURE — 85610 PROTHROMBIN TIME: CPT

## 2019-05-20 PROCEDURE — 93010 ELECTROCARDIOGRAM REPORT: CPT

## 2019-05-20 RX ORDER — ACETAMINOPHEN 500 MG
650 TABLET ORAL ONCE
Refills: 0 | Status: COMPLETED | OUTPATIENT
Start: 2019-05-20 | End: 2019-05-20

## 2019-05-20 RX ORDER — MAGNESIUM SULFATE 500 MG/ML
2 VIAL (ML) INJECTION ONCE
Refills: 0 | Status: COMPLETED | OUTPATIENT
Start: 2019-05-20 | End: 2019-05-20

## 2019-05-20 RX ORDER — IPRATROPIUM/ALBUTEROL SULFATE 18-103MCG
3 AEROSOL WITH ADAPTER (GRAM) INHALATION
Qty: 0 | Refills: 0 | DISCHARGE
Start: 2019-05-20

## 2019-05-20 RX ORDER — GLUCAGON INJECTION, SOLUTION 0.5 MG/.1ML
1 INJECTION, SOLUTION SUBCUTANEOUS ONCE
Refills: 0 | Status: DISCONTINUED | OUTPATIENT
Start: 2019-05-20 | End: 2019-05-20

## 2019-05-20 RX ORDER — CHLORTHALIDONE 50 MG
25 TABLET ORAL DAILY
Refills: 0 | Status: DISCONTINUED | OUTPATIENT
Start: 2019-05-20 | End: 2019-05-20

## 2019-05-20 RX ORDER — DEXTROSE 50 % IN WATER 50 %
25 SYRINGE (ML) INTRAVENOUS ONCE
Refills: 0 | Status: DISCONTINUED | OUTPATIENT
Start: 2019-05-20 | End: 2019-05-20

## 2019-05-20 RX ORDER — INSULIN LISPRO 100/ML
VIAL (ML) SUBCUTANEOUS
Refills: 0 | Status: DISCONTINUED | OUTPATIENT
Start: 2019-05-20 | End: 2019-05-20

## 2019-05-20 RX ORDER — ATORVASTATIN CALCIUM 80 MG/1
80 TABLET, FILM COATED ORAL AT BEDTIME
Refills: 0 | Status: DISCONTINUED | OUTPATIENT
Start: 2019-05-20 | End: 2019-05-20

## 2019-05-20 RX ORDER — SODIUM CHLORIDE 9 MG/ML
1000 INJECTION, SOLUTION INTRAVENOUS
Refills: 0 | Status: DISCONTINUED | OUTPATIENT
Start: 2019-05-20 | End: 2019-05-20

## 2019-05-20 RX ORDER — PANTOPRAZOLE SODIUM 20 MG/1
1 TABLET, DELAYED RELEASE ORAL
Qty: 0 | Refills: 0 | DISCHARGE
Start: 2019-05-20

## 2019-05-20 RX ORDER — DEXTROSE 50 % IN WATER 50 %
12.5 SYRINGE (ML) INTRAVENOUS ONCE
Refills: 0 | Status: DISCONTINUED | OUTPATIENT
Start: 2019-05-20 | End: 2019-05-20

## 2019-05-20 RX ORDER — NICOTINE POLACRILEX 2 MG
1 GUM BUCCAL DAILY
Refills: 0 | Status: DISCONTINUED | OUTPATIENT
Start: 2019-05-20 | End: 2019-05-20

## 2019-05-20 RX ORDER — HEPARIN SODIUM 5000 [USP'U]/ML
7500 INJECTION INTRAVENOUS; SUBCUTANEOUS EVERY 8 HOURS
Refills: 0 | Status: DISCONTINUED | OUTPATIENT
Start: 2019-05-20 | End: 2019-05-20

## 2019-05-20 RX ORDER — LISINOPRIL 2.5 MG/1
40 TABLET ORAL DAILY
Refills: 0 | Status: DISCONTINUED | OUTPATIENT
Start: 2019-05-20 | End: 2019-05-20

## 2019-05-20 RX ORDER — POTASSIUM CHLORIDE 20 MEQ
40 PACKET (EA) ORAL ONCE
Refills: 0 | Status: COMPLETED | OUTPATIENT
Start: 2019-05-20 | End: 2019-05-20

## 2019-05-20 RX ORDER — ASPIRIN/CALCIUM CARB/MAGNESIUM 324 MG
81 TABLET ORAL DAILY
Refills: 0 | Status: DISCONTINUED | OUTPATIENT
Start: 2019-05-20 | End: 2019-05-20

## 2019-05-20 RX ORDER — DIAZEPAM 5 MG
10 TABLET ORAL DAILY
Refills: 0 | Status: DISCONTINUED | OUTPATIENT
Start: 2019-05-20 | End: 2019-05-20

## 2019-05-20 RX ORDER — PANTOPRAZOLE SODIUM 20 MG/1
40 TABLET, DELAYED RELEASE ORAL
Refills: 0 | Status: DISCONTINUED | OUTPATIENT
Start: 2019-05-20 | End: 2019-05-20

## 2019-05-20 RX ORDER — DEXTROSE 50 % IN WATER 50 %
15 SYRINGE (ML) INTRAVENOUS ONCE
Refills: 0 | Status: DISCONTINUED | OUTPATIENT
Start: 2019-05-20 | End: 2019-05-20

## 2019-05-20 RX ORDER — IPRATROPIUM/ALBUTEROL SULFATE 18-103MCG
3 AEROSOL WITH ADAPTER (GRAM) INHALATION EVERY 6 HOURS
Refills: 0 | Status: DISCONTINUED | OUTPATIENT
Start: 2019-05-20 | End: 2019-05-20

## 2019-05-20 RX ORDER — METOPROLOL TARTRATE 50 MG
100 TABLET ORAL DAILY
Refills: 0 | Status: DISCONTINUED | OUTPATIENT
Start: 2019-05-20 | End: 2019-05-20

## 2019-05-20 RX ADMIN — Medication 650 MILLIGRAM(S): at 03:48

## 2019-05-20 RX ADMIN — Medication 40 MILLIEQUIVALENT(S): at 03:49

## 2019-05-20 RX ADMIN — Medication 100 MILLIGRAM(S): at 11:19

## 2019-05-20 RX ADMIN — Medication 50 GRAM(S): at 07:48

## 2019-05-20 RX ADMIN — LISINOPRIL 40 MILLIGRAM(S): 2.5 TABLET ORAL at 11:19

## 2019-05-20 RX ADMIN — HEPARIN SODIUM 7500 UNIT(S): 5000 INJECTION INTRAVENOUS; SUBCUTANEOUS at 06:44

## 2019-05-20 RX ADMIN — Medication 10 MILLIGRAM(S): at 11:19

## 2019-05-20 RX ADMIN — Medication 81 MILLIGRAM(S): at 11:20

## 2019-05-20 RX ADMIN — Medication 650 MILLIGRAM(S): at 04:13

## 2019-05-20 RX ADMIN — PANTOPRAZOLE SODIUM 40 MILLIGRAM(S): 20 TABLET, DELAYED RELEASE ORAL at 06:51

## 2019-05-20 RX ADMIN — Medication 1 PATCH: at 11:19

## 2019-05-20 NOTE — H&P ADULT - HISTORY OF PRESENT ILLNESS
58 y/o obese female, former smoker, with FHx of MI/CVA, and PMHx of HTN, HPL, COPD, DM-2, known CAD s/p cardiac cath 5/4/17 @[facility] revealing 80% mRCA stenosis, mod-severe AS (Echocardiogram 5/4/17 revealing mod-severe AS, ARAMIS of 0.6cm with mean gradient of 31mmHg and a peak gradient of 36mmHg LVEF 65-75%, s/p 1VCABG AVR/Ascending Root aneurysm @[facility] 5/22/19 presents this 58 y/o obese female, former smoker, with FHx of MI/CVA, and PMHx of HTN, HPL, COPD, DM-2, known CAD s/p cardiac cath 5/4/17 @[facility] revealing 80% mRCA stenosis, mod-severe AS (Echocardiogram 5/4/17 revealing mod-severe AS, ARAMIS of 0.6cm with mean gradient of 31mmHg and a peak gradient of 36mmHg LVEF 65-75%, s/p 1VCABG AVR/Ascending Root aneurysm @[facility] 5/22/19 presents to [facility] ER this evening, 5/19/19, complaining of intermittent, midsternal chest pain radiating to back associated with nausea two hours after eating dinner.    According to patient, 60 y/o obese female, current smoker, with FHx of MI/CVA, and PMHx of HTN, HPL, COPD, DM-2, Diverticulitis (2007 tx with resection @[facility]), known CAD s/p cardiac cath 5/4/17 @[facility] revealing 80% mRCA stenosis, mod-severe AS (Echocardiogram 5/4/17 revealing mod-severe AS, ARAMIS of 0.6cm with mean gradient of 31mmHg and a peak gradient of 36mmHg LVEF 65-75%, s/p 1VCABG AVR/Ascending Root aneurysm @[facility] 5/22/19 presents to [facility] ER this evening, 5/19/19, complaining of intermittent, midsternal chest pain radiating to back associated with nausea two hours after eating dinner.    According to patient, she was in her usual state until two hours after dinner she began to experience "heavy" chest pressure radiating to b/l back associated with nausea and belching for over two hours.  Pt. states symptoms became frequent with intensity within the two hours.   She called EMS, when they arrived they gave her 2 SL Nitro in which her pressure dropped to 87/50 (pt. has hx of AS).  Pt. admits to compliance with medication and medical follow up appointment.   However, pt. started smoking three months ago, 1/2 ppd a day,  after quitting for one year.  She was counseled on this admission regarding smoking cessation and would like to stop, Nicotine patch offered.  She also endorses occasional epigastric pressure with burning sensation when she moves her stools, described as "ambar" and would like a GI referral.  Pt. denies recent URI,  SOB, dizziness, diaphoresis, palpitations and vomiting.      In ER ECG reveals NSR@79bpm with non specific ST-T wave changes, Troponin neg X1, D-dimer 270, WBC 11.18, Potassium Chloride 3.7 (pt. was supplemented Potassium Chloride 40mEq PO X1), Blood Glucose 129, /70 (2/2 to EMS giving pt. SL Nitro X2).  CTA of chest reveals no dissection or acute pathology, no change in fusiform outpouching ascending aneurysm 3cm and heart size normal.    In Light of patient's risk factors, history and symptoms pt. admitted to [facility] for recommended telemetry, ECG, serial CE, Echocardiogram and NPO for further cardiac workup to rule out ACS.  F/U labs and discuss further plan with Dr. Cervantes in AM.

## 2019-05-20 NOTE — DISCHARGE NOTE PROVIDER - HOSPITAL COURSE
60 y/o obese female, current smoker, with FHx of MI/CVA, and PMHx of HTN, HPL, COPD, DM-2, Diverticulitis (2007 tx with resection @[facility]), known CAD s/p cardiac cath 5/4/17 @[facility] revealing 80% mRCA stenosis, mod-severe AS (Echocardiogram 5/4/17 revealing mod-severe AS, ARAMIS of 0.6cm with mean gradient of 31mmHg and a peak gradient of 36mmHg LVEF 65-75%, s/p 1VCABG AVR/Ascending Root aneurysm @[facility] 5/22/17 presents to [facility] ER  5/19/19, complaining of intermittent, midsternal chest pain radiating to back associated with nausea two hours after eating dinner.According to patient, she was in her usual state until two hours after dinner she began to experience "heavy" chest pressure radiating to b/l back associated with nausea and belching for over two hours.  Pt. states symptoms became frequent with intensity within the two hours.   She called EMS, when they arrived they gave her 2 SL Nitro in which her pressure dropped to 87/50 (pt. has hx of AS).  Pt. admits to compliance with medication and medical follow up appointment.   However, pt. started smoking three months ago, 1/2 ppd a day,  after quitting for one year.  She was counseled on this admission regarding smoking cessation and would like to stop, Nicotine patch offered.  She also endorses occasional epigastric pressure with burning sensation when she moves her stools, described as "ambar" and would like a GI referral.  Pt. denies recent URI,  SOB, dizziness, diaphoresis, palpitations and vomiting.  In ER ECG reveals NSR@79bpm with non specific ST-T wave changes, Troponin neg X1, D-dimer 270, WBC 11.18, Potassium Chloride 3.7 (pt. was supplemented Potassium Chloride 40mEq PO X1), Blood Glucose 129, /70 (2/2 to EMS giving pt. SL Nitro X2).  CTA of chest reveals no dissection or acute pathology, no change in fusiform outpouching ascending aneurysm 3cm and heart size normal. Patient admitted for further cardiac work-up. Patient R/O MI X 3 and no events on telemetry. Patient ambulated and did not experience chest pain. Patient with recent echocardiogram this year with outpatient cardiologist; no need to repeat as inpatient. EKG unchanged from prior. Patient C/P free and HD stable and cleared for discharge by Dr. Cervantes.     BP: 	100-120's/50-70s	 HR: 60s    	RR: 16-18		Temp: 97.4 F     Monitor : NSR              O2 sat- 98% RA     GENERAL:  Sitting in bed in no acute distress    CVS: + S1 S2. RRR. 2-3/6 Harsh MONIQUE RUSB radiating to bilateral carotids.     Pulm: CTA Bilaterally. No rhonchi, wheezes, or rales.    Abd: Obese. BS x 4. Soft NT/ND.    Ext: No clubbing, cyanosis, or edema BLE. No calf tenderness BLE.

## 2019-05-20 NOTE — DISCHARGE NOTE PROVIDER - PROVIDER TOKENS
FREE:[LAST:[Víctor],FIRST:[Mayra],PHONE:[phone]],FAX:[(   )    -],ADDRESS:[[address] [place] [address]]]

## 2019-05-20 NOTE — H&P ADULT - PROBLEM SELECTOR PLAN 6
Monitor FS, F/U Hgb A1C  Pt. takes Metformin 500mg PO bid, hold for now.   Insulin Lispro Sliding scale.

## 2019-05-20 NOTE — H&P ADULT - NSHPREVIEWOFSYSTEMS_GEN_ALL_CORE
GENERAL, CONSTITUTIONAL : denies recent weight loss, fever, chills  EYES, VISION: denies changes in vision   EARS, NOSE, THROAT: denies hearing loss  HEART, CARDIOVASCULAR: Admits to  chest pain (pressure radiating to back), denies arrhythmia, palpitations,   RESPIRATORY: Denies cough, SOB, wheezing, PND, orthopnea  GASTROINTESTINAL: Admits to nausea, Denies abdominal pain, heartburn, bloody stool, dark tarry stool  GENITOURINARY: Denies frequent urination, urgency  MUSCULOSKELETAL Admits to back pain, denies joint pain or swelling, restricted motion  SKIN & INTEGUMENTARY Denies rashes, sores, blisters, blisters, growths.  NEUROLOGICAL: Denies numbness or tingling sensations, sensation loss, burning.   PSYCHIATRIC: Denies nervousness, anxiety, depression  ENDOCRINE Denies heat or cold intolerance, excessive thirst  HEMATOLOGIC/LYMPHATIC: Denies abnormal bleeding, bleeding of any kind GENERAL, CONSTITUTIONAL : denies recent weight loss, fever, chills  EYES, VISION: denies changes in vision   EARS, NOSE, THROAT: denies hearing loss  HEART, CARDIOVASCULAR: Admits to  chest pain (pressure radiating to back), denies arrhythmia, palpitations,   RESPIRATORY: Denies cough, SOB, wheezing, PND, orthopnea  GASTROINTESTINAL: Admits to nausea, belching  heartburn,  Denies abdominal pain,, bloody stool, dark tarry stool  GENITOURINARY: Denies frequent urination, urgency  MUSCULOSKELETAL Admits to back pain, denies joint pain or swelling, restricted motion  SKIN & INTEGUMENTARY Denies rashes, sores, blisters, blisters, growths.  NEUROLOGICAL: Denies numbness or tingling sensations, sensation loss, burning.   PSYCHIATRIC: Denies nervousness, anxiety, depression  ENDOCRINE Denies heat or cold intolerance, excessive thirst  HEMATOLOGIC/LYMPHATIC: Denies abnormal bleeding, bleeding of any kind

## 2019-05-20 NOTE — H&P ADULT - NSHPPHYSICALEXAM_GEN_ALL_CORE
T(C): 36.7 (05-20-19 @ 02:07), Max: 37 (05-19-19 @ 21:27)  HR: 69 (05-20-19 @ 02:07) (69 - 79)  BP: 129/81 (05-20-19 @ 02:07) (89/57 - 129/81)  RR: 18 (05-20-19 @ 02:07) (18 - 18)  SpO2: 96% (05-20-19 @ 02:07) (96% - 100%)  Wt(kg): --    Appearance: Normal	  HEENT:   Normal oral mucosa, PERRL, EOMI	  Neck: Supple, - JVD; No Carotid Bruit and 2+ pulses B/L  Cardiovascular: Normal S1 S2, No JVD, No murmurs  Respiratory: Lungs clear to auscultation/Decreased Breath Sounds/No Rales, Rhonchi, Wheezing	  Gastrointestinal:  Soft, Non-tender, + BS	  Skin: No rashes, No ecchymoses, No cyanosis  Extremities: Normal range of motion, No clubbing, cyanosis or edema  Vascular: Femoral pulses 2+ b/l without bruit, DP 1+ b/l, PT 1+ b/l  Neurologic: Non-focal  Psychiatry: A & O x 3, Mood & affect appropriate T(C): 36.7 (05-20-19 @ 02:07), Max: 37 (05-19-19 @ 21:27)  HR: 69 (05-20-19 @ 02:07) (69 - 79)  BP: 129/81 (05-20-19 @ 02:07) (89/57 - 129/81)  RR: 18 (05-20-19 @ 02:07) (18 - 18)  SpO2: 96% (05-20-19 @ 02:07) (96% - 100%)  Wt(kg): --    Appearance: Normal; obese	  HEENT:   Normal oral mucosa, PERRL, EOMI	  Neck: Supple, - JVD; No Carotid Bruit and 2+ pulses B/L  Cardiovascular: Normal S1 S2, No JVD,+ RICS systolic murmur radiating to right carotids and across chest  Respiratory: Lungs clear to auscultation//No Rales, Rhonchi, Wheezing	  Gastrointestinal:  Soft, Non-tender, + BS	  Skin: No rashes, No ecchymoses, No cyanosis  Extremities: Normal range of motion, No clubbing, cyanosis or edema  Vascular: Femoral pulses 2+ b/l without bruit, DP 2+ b/l, PT 1+ b/l  Neurologic: Non-focal  Psychiatry: A & O x 3, Mood & affect appropriate

## 2019-05-20 NOTE — H&P ADULT - PROBLEM SELECTOR PLAN 4
Hx of Cardiac Catheterization 5/4/17 revealing 80% stenosis of mRCA and 1VCABG  ECG, serial CE, Echocardiogram and NPO for further cardiac workup.  F/U labs.  Pt. on Aspirin Ec 81mg PO daily.

## 2019-05-20 NOTE — H&P ADULT - PROBLEM SELECTOR PLAN 1
Telemetry, ECG, serial CE, Echocardiogram and NPO for further cardiac workup.   Troponin neg X1 F/U labs

## 2019-05-20 NOTE — PROVIDER CONTACT NOTE (OTHER) - SITUATION
admitted for chest pressure. Pt's current smoker with hx of asthma. teaching cessation offered , Pt refused stated:" will stop on my own when needed".

## 2019-05-20 NOTE — H&P ADULT - PROBLEM SELECTOR PLAN 5
Monitor BP.    Pt. on Lisinopril 40mg PO daily, Metoprolol XL 100mg PO daily, Chlorthalidone 25mg PO daily.

## 2019-05-20 NOTE — DISCHARGE NOTE NURSING/CASE MANAGEMENT/SOCIAL WORK - NSDCPEWEB_GEN_ALL_CORE
[facility] for Tobacco Control website --- [email or web address] website --- [email or web address]

## 2019-05-20 NOTE — DISCHARGE NOTE PROVIDER - REASON FOR ADMISSION
Constant, non exertional midsternal chest pressure radiating to back associated with nausea two hours after eating dinner.

## 2019-05-20 NOTE — H&P ADULT - NSHPLABSRESULTS_GEN_ALL_CORE
13.9   11.18 )-----------( 270      ( 19 May 2019 21:49 )             43.8       05-19    136  |  96  |  21  ----------------------------<  129<H>  3.7   |  26  |  0.78    Ca    10.0      19 May 2019 21:49    TPro  7.6  /  Alb  4.2  /  TBili  <0.2  /  DBili  x   /  AST  17  /  ALT  19  /  AlkPhos  31<L>  05-19      PT/INR - ( 19 May 2019 21:50 )   PT: 10.6 sec;   INR: 0.94          PTT - ( 19 May 2019 21:50 )  PTT:30.3 sec    CARDIAC MARKERS ( 19 May 2019 21:49 )  x     / <0.01 ng/mL / 96 U/L / x     / 1.3 ng/mL            EKG: NSR@79bpm with non specific ST-T wave changes

## 2019-05-20 NOTE — DISCHARGE NOTE NURSING/CASE MANAGEMENT/SOCIAL WORK - NSDCPEEMAIL_GEN_ALL_CORE
[facility] for Tobacco Control email [email or web address]

## 2019-05-20 NOTE — H&P ADULT - REASON FOR ADMISSION
Intermittent non exertional midsternal chest pressure radiating to back associated with nausea two hours after eating dinner. Constant, non exertional midsternal chest pressure radiating to back associated with nausea two hours after eating dinner.

## 2019-05-20 NOTE — H&P ADULT - NSHPSOCIALHISTORY_GEN_ALL_CORE
Single lives alone.  Admits to tobacco use 1/2 ppd X5 months (total hx of smoking >40yrs, pt. has history of quitting for one year then back to smoking) denies illicit drug use.  EtOH occasion  one glass of wine a week.

## 2019-05-20 NOTE — H&P ADULT - PROBLEM SELECTOR PLAN 7
F/U Lipid panel and LFT's  Pt. on Crestor 40mg PO daily at home, Continue Atorvastatin 80mg PO daily

## 2019-05-20 NOTE — H&P ADULT - ASSESSMENT
58 y/o obese female, current smoker, with FHx of MI/CVA, and PMHx of HTN, HPL, COPD, DM-2, Diverticulitis (2007 tx with resection @[facility]), known CAD s/p cardiac cath 5/4/17 @[facility] revealing 80% mRCA stenosis, mod-severe AS (Echocardiogram 5/4/17 revealing mod-severe AS, ARAMIS of 0.6cm with mean gradient of 31mmHg and a peak gradient of 36mmHg LVEF 65-75%, s/p 1VCABG AVR/Ascending Root aneurysm @[facility] 5/22/19 presents to [facility] ER this evening, 5/19/19, complaining of intermittent, midsternal chest pain radiating to back associated with nausea two hours after eating dinner.

## 2019-05-20 NOTE — H&P ADULT - NSICDXFAMILYHX_GEN_ALL_CORE_FT
FAMILY HISTORY:  Mother  Still living? Unknown  Family history of asthma, Age at diagnosis: Age Unknown  Family history of atrial fibrillation, Age at diagnosis: Age Unknown  Family history of cardiac pacemaker, Age at diagnosis: Age Unknown  Family history of MI (myocardial infarction), Age at diagnosis: Age Unknown  Family history of pulmonary embolism, Age at diagnosis: Age Unknown

## 2019-05-20 NOTE — H&P ADULT - PROBLEM SELECTOR PLAN 2
AVR repair 5/22/17 with Dr. Hanson @[facility].  Pt. had 1VCABG, AVR and Ascending Aortic Repair.     Echocardiogram in AM.  CTS AYDEE Williamson evaluated pt. , nothing to due form CTS as per Dr. Lewis.

## 2019-05-20 NOTE — H&P ADULT - NSICDXPASTMEDICALHX_GEN_ALL_CORE_FT
PAST MEDICAL HISTORY:  Asthma     Back injury lumbar, work related    Diabetes mellitus     Diverticulitis     GERD (gastroesophageal reflux disease)     Hyperlipidemia     Hypertension     Kidney stone     Obesity

## 2019-05-20 NOTE — DISCHARGE NOTE NURSING/CASE MANAGEMENT/SOCIAL WORK - NSDCDPATPORTLINK_GEN_ALL_CORE
You can access the [facility] Patient Portal, offered by [facility], by registering with the following website: [email or web address]

## 2019-05-20 NOTE — DISCHARGE NOTE PROVIDER - NSDCCPCAREPLAN_GEN_ALL_CORE_FT
PRINCIPAL DISCHARGE DIAGNOSIS  Diagnosis: Chest pain  Assessment and Plan of Treatment: Your heart enzymes were negative x 3. You had no abnormal heart rhythm on cardiac monitor. Your symptoms may be gastrointestinal. Follow-up with Primary Care Physician in 1-2 weeks. Follow-up with Cardiologist in 1-2 weeks.      SECONDARY DISCHARGE DIAGNOSES  Diagnosis: DMII (diabetes mellitus, type 2)  Assessment and Plan of Treatment: Monitor Fingersticks before meals and at bedtime. Maintain diet low in sugar and carbohydrates (bread, rice, pasta).    Diagnosis: Hypertension  Assessment and Plan of Treatment: Monitor BP. Maintain Low Salt Diet Less than 2 g -2000 mg daily. Conitnue Chlorthalidone, Lisinopril and Metoprolol.    Diagnosis: Hyperlipidemia  Assessment and Plan of Treatment: Maintain Low Cholesterol Diet. Continue Crestor PRINCIPAL DISCHARGE DIAGNOSIS  Diagnosis: Chest pain  Assessment and Plan of Treatment: Your heart enzymes were negative x 3. Your symptoms may be gastrointestinal. Follow-up with Primary Care Physician in 1-2 weeks who can give you a referral to GI Doctor. Follow-up with Cardiologist in 1-2 weeks. STOP SMOKING IT IS ONE OF THE BEST THINGS YOU CAN DO FOR YOUR HEALTH. CONTACT [employer] HOTLINE YOU MAY BE ABLE TO OBTAIN FREE SUPPLIES-PATCHES, GUM, ET.      SECONDARY DISCHARGE DIAGNOSES  Diagnosis: DMII (diabetes mellitus, type 2)  Assessment and Plan of Treatment: Monitor Fingersticks before meals and at bedtime. Maintain diet low in sugar and carbohydrates (bread, rice, pasta).    Diagnosis: Hypertension  Assessment and Plan of Treatment: Monitor BP. Maintain Low Salt Diet Less than 2 g -2000 mg daily. Conitnue Chlorthalidone, Lisinopril and Metoprolol.    Diagnosis: Hyperlipidemia  Assessment and Plan of Treatment: Maintain Low Cholesterol Diet. Continue Crestor

## 2019-05-20 NOTE — H&P ADULT - NSICDXPASTSURGICALHX_GEN_ALL_CORE_FT
PAST SURGICAL HISTORY:  Carpal tunnel syndrome     Status post laser lithotripsy of ureteral calculus     Status post small bowel resection     Uterine fibroid removal

## 2019-05-21 LAB
CULTURE RESULTS: SIGNIFICANT CHANGE UP
SPECIMEN SOURCE: SIGNIFICANT CHANGE UP

## 2019-05-23 DIAGNOSIS — R07.9 CHEST PAIN, UNSPECIFIED: ICD-10-CM

## 2019-05-23 DIAGNOSIS — E66.9 OBESITY, UNSPECIFIED: ICD-10-CM

## 2019-05-23 DIAGNOSIS — J45.909 UNSPECIFIED ASTHMA, UNCOMPLICATED: ICD-10-CM

## 2019-05-23 DIAGNOSIS — K21.9 GASTRO-ESOPHAGEAL REFLUX DISEASE WITHOUT ESOPHAGITIS: ICD-10-CM

## 2019-05-23 DIAGNOSIS — I25.10 ATHEROSCLEROTIC HEART DISEASE OF NATIVE CORONARY ARTERY WITHOUT ANGINA PECTORIS: ICD-10-CM

## 2019-05-23 DIAGNOSIS — Z95.1 PRESENCE OF AORTOCORONARY BYPASS GRAFT: ICD-10-CM

## 2019-05-23 DIAGNOSIS — F17.210 NICOTINE DEPENDENCE, CIGARETTES, UNCOMPLICATED: ICD-10-CM

## 2019-05-23 DIAGNOSIS — I10 ESSENTIAL (PRIMARY) HYPERTENSION: ICD-10-CM

## 2019-05-23 DIAGNOSIS — J44.9 CHRONIC OBSTRUCTIVE PULMONARY DISEASE, UNSPECIFIED: ICD-10-CM

## 2019-05-23 DIAGNOSIS — E11.9 TYPE 2 DIABETES MELLITUS WITHOUT COMPLICATIONS: ICD-10-CM

## 2019-06-12 ENCOUNTER — EMERGENCY (EMERGENCY)
Facility: HOSPITAL | Age: 59
LOS: 1 days | Discharge: HOME | End: 2019-06-12
Attending: EMERGENCY MEDICINE | Admitting: EMERGENCY MEDICINE
Payer: MEDICARE

## 2019-06-12 VITALS
DIASTOLIC BLOOD PRESSURE: 65 MMHG | OXYGEN SATURATION: 98 % | RESPIRATION RATE: 18 BRPM | HEART RATE: 66 BPM | SYSTOLIC BLOOD PRESSURE: 118 MMHG | TEMPERATURE: 98 F

## 2019-06-12 VITALS
RESPIRATION RATE: 18 BRPM | HEART RATE: 74 BPM | OXYGEN SATURATION: 97 % | TEMPERATURE: 99 F | DIASTOLIC BLOOD PRESSURE: 71 MMHG | SYSTOLIC BLOOD PRESSURE: 107 MMHG

## 2019-06-12 DIAGNOSIS — R07.89 OTHER CHEST PAIN: ICD-10-CM

## 2019-06-12 DIAGNOSIS — Z90.49 ACQUIRED ABSENCE OF OTHER SPECIFIED PARTS OF DIGESTIVE TRACT: Chronic | ICD-10-CM

## 2019-06-12 DIAGNOSIS — Z98.890 OTHER SPECIFIED POSTPROCEDURAL STATES: Chronic | ICD-10-CM

## 2019-06-12 DIAGNOSIS — R10.13 EPIGASTRIC PAIN: ICD-10-CM

## 2019-06-12 DIAGNOSIS — D25.9 LEIOMYOMA OF UTERUS, UNSPECIFIED: Chronic | ICD-10-CM

## 2019-06-12 DIAGNOSIS — G56.00 CARPAL TUNNEL SYNDROME, UNSPECIFIED UPPER LIMB: Chronic | ICD-10-CM

## 2019-06-12 LAB
ALBUMIN SERPL ELPH-MCNC: 4.3 G/DL — SIGNIFICANT CHANGE UP (ref 3.3–5)
ALP SERPL-CCNC: 33 U/L — LOW (ref 40–120)
ALT FLD-CCNC: 21 U/L — SIGNIFICANT CHANGE UP (ref 10–45)
ANION GAP SERPL CALC-SCNC: 14 MMOL/L — SIGNIFICANT CHANGE UP (ref 5–17)
AST SERPL-CCNC: 25 U/L — SIGNIFICANT CHANGE UP (ref 10–40)
BASOPHILS # BLD AUTO: 0.03 K/UL — SIGNIFICANT CHANGE UP (ref 0–0.2)
BASOPHILS NFR BLD AUTO: 0.3 % — SIGNIFICANT CHANGE UP (ref 0–2)
BILIRUB SERPL-MCNC: 0.2 MG/DL — SIGNIFICANT CHANGE UP (ref 0.2–1.2)
BUN SERPL-MCNC: 12 MG/DL — SIGNIFICANT CHANGE UP (ref 7–23)
CALCIUM SERPL-MCNC: 10.4 MG/DL — SIGNIFICANT CHANGE UP (ref 8.4–10.5)
CHLORIDE SERPL-SCNC: 98 MMOL/L — SIGNIFICANT CHANGE UP (ref 96–108)
CK MB CFR SERPL CALC: 1.1 NG/ML — SIGNIFICANT CHANGE UP (ref 0–6.7)
CO2 SERPL-SCNC: 26 MMOL/L — SIGNIFICANT CHANGE UP (ref 22–31)
CREAT SERPL-MCNC: 0.67 MG/DL — SIGNIFICANT CHANGE UP (ref 0.5–1.3)
EOSINOPHIL # BLD AUTO: 0.13 K/UL — SIGNIFICANT CHANGE UP (ref 0–0.5)
EOSINOPHIL NFR BLD AUTO: 1.5 % — SIGNIFICANT CHANGE UP (ref 0–6)
GLUCOSE SERPL-MCNC: 104 MG/DL — HIGH (ref 70–99)
HCT VFR BLD CALC: 45.6 % — HIGH (ref 34.5–45)
HGB BLD-MCNC: 14.5 G/DL — SIGNIFICANT CHANGE UP (ref 11.5–15.5)
IMM GRANULOCYTES NFR BLD AUTO: 0.6 % — SIGNIFICANT CHANGE UP (ref 0–1.5)
LIDOCAIN IGE QN: 46 U/L — SIGNIFICANT CHANGE UP (ref 7–60)
LYMPHOCYTES # BLD AUTO: 2.72 K/UL — SIGNIFICANT CHANGE UP (ref 1–3.3)
LYMPHOCYTES # BLD AUTO: 30.7 % — SIGNIFICANT CHANGE UP (ref 13–44)
MCHC RBC-ENTMCNC: 28.2 PG — SIGNIFICANT CHANGE UP (ref 27–34)
MCHC RBC-ENTMCNC: 31.8 GM/DL — LOW (ref 32–36)
MCV RBC AUTO: 88.5 FL — SIGNIFICANT CHANGE UP (ref 80–100)
MONOCYTES # BLD AUTO: 0.64 K/UL — SIGNIFICANT CHANGE UP (ref 0–0.9)
MONOCYTES NFR BLD AUTO: 7.2 % — SIGNIFICANT CHANGE UP (ref 2–14)
NEUTROPHILS # BLD AUTO: 5.3 K/UL — SIGNIFICANT CHANGE UP (ref 1.8–7.4)
NEUTROPHILS NFR BLD AUTO: 59.7 % — SIGNIFICANT CHANGE UP (ref 43–77)
NRBC # BLD: 0 /100 WBCS — SIGNIFICANT CHANGE UP (ref 0–0)
PLATELET # BLD AUTO: 266 K/UL — SIGNIFICANT CHANGE UP (ref 150–400)
POTASSIUM SERPL-MCNC: 4.3 MMOL/L — SIGNIFICANT CHANGE UP (ref 3.5–5.3)
POTASSIUM SERPL-SCNC: 4.3 MMOL/L — SIGNIFICANT CHANGE UP (ref 3.5–5.3)
PROT SERPL-MCNC: 7.9 G/DL — SIGNIFICANT CHANGE UP (ref 6–8.3)
RBC # BLD: 5.15 M/UL — SIGNIFICANT CHANGE UP (ref 3.8–5.2)
RBC # FLD: 13.3 % — SIGNIFICANT CHANGE UP (ref 10.3–14.5)
SODIUM SERPL-SCNC: 138 MMOL/L — SIGNIFICANT CHANGE UP (ref 135–145)
TROPONIN T SERPL-MCNC: <0.01 NG/ML — SIGNIFICANT CHANGE UP (ref 0–0.01)
TROPONIN T SERPL-MCNC: <0.01 NG/ML — SIGNIFICANT CHANGE UP (ref 0–0.01)
WBC # BLD: 8.87 K/UL — SIGNIFICANT CHANGE UP (ref 3.8–10.5)
WBC # FLD AUTO: 8.87 K/UL — SIGNIFICANT CHANGE UP (ref 3.8–10.5)

## 2019-06-12 PROCEDURE — 99283 EMERGENCY DEPT VISIT LOW MDM: CPT | Mod: 25

## 2019-06-12 PROCEDURE — 82553 CREATINE MB FRACTION: CPT

## 2019-06-12 PROCEDURE — 71046 X-RAY EXAM CHEST 2 VIEWS: CPT

## 2019-06-12 PROCEDURE — 85025 COMPLETE CBC W/AUTO DIFF WBC: CPT

## 2019-06-12 PROCEDURE — 71046 X-RAY EXAM CHEST 2 VIEWS: CPT | Mod: 26

## 2019-06-12 PROCEDURE — 84484 ASSAY OF TROPONIN QUANT: CPT

## 2019-06-12 PROCEDURE — 36415 COLL VENOUS BLD VENIPUNCTURE: CPT

## 2019-06-12 PROCEDURE — 83690 ASSAY OF LIPASE: CPT

## 2019-06-12 PROCEDURE — 82550 ASSAY OF CK (CPK): CPT

## 2019-06-12 PROCEDURE — 99285 EMERGENCY DEPT VISIT HI MDM: CPT

## 2019-06-12 PROCEDURE — 80053 COMPREHEN METABOLIC PANEL: CPT

## 2019-06-12 NOTE — ED ADULT NURSE NOTE - NSIMPLEMENTINTERV_GEN_ALL_ED
Implemented All Universal Safety Interventions:  [place] to call system. Call bell, personal items and telephone within reach. Instruct patient to call for assistance. Room bathroom lighting operational. Non-slip footwear when patient is off stretcher. Physically safe environment: no spills, clutter or unnecessary equipment. Stretcher in lowest position, wheels locked, appropriate side rails in place.

## 2019-06-12 NOTE — ED ADULT NURSE NOTE - RESPIRATORY ASSESSMENT
Patient called stating symptoms a little better but not cleared up  She also still has pressure in left ear  Please advise  WDL

## 2019-06-12 NOTE — ED ADULT TRIAGE NOTE - CHIEF COMPLAINT QUOTE
c/o chest pain that started 1.5 hours ago accompanied with dizziness and abdominal pain (that is chronic).

## 2019-06-12 NOTE — ED ADULT NURSE NOTE - OBJECTIVE STATEMENT
58 y/o female w/ hx of CABG, HTN, HLD, asthma, presents to the ED via EMS c/o sudden onset midsternal cp that began PTA associated w/ SOB. Pt also c/o generalized abdominal pain that is chronic. Denies fever, chills, nausea, vomiting, back pain, and any other sx.

## 2019-06-12 NOTE — ED PROVIDER NOTE - OBJECTIVE STATEMENT
58 y/o obese female, current smoker, with FHx of MI/CVA, and PMHx of HTN, HPL, COPD, DM-2, Diverticulitis (2007 tx with resection @[facility]), known CAD s/p cardiac cath 5/4/17 @[facility] revealing 80% mRCA stenosis, mod-severe AS (Echocardiogram 5/4/17 revealing mod-severe AS, ARAMIS of 0.6cm with mean gradient of 31mmHg and a peak gradient of 36mmHg LVEF 65-75%, s/p 1VCABG AVR/Ascending Root aneurysm @[facility] 5/22/17, recent admission for r/o ACS last month, returns today w/ complaint of chest pain. 58 y/o obese female, current smoker, with FHx of MI/CVA, and PMHx of HTN, HPL, COPD, DM-2, Diverticulitis (2007 tx with resection @[facility]), known CAD s/p cardiac cath 5/4/17 @[facility] revealing 80% mRCA stenosis, mod-severe AS (Echocardiogram 5/4/17 revealing mod-severe AS, ARAMIS of 0.6cm with mean gradient of 31mmHg and a peak gradient of 36mmHg LVEF 65-75%, s/p 1VCABG AVR/Ascending Root aneurysm @[facility] 5/22/17, recent admission for r/o ACS last month, returns today w/ complaint of chest pain today that started after drinking coffee and eating lunch. radiated to L arm. no associated SOB, diaphoresis, palpitations, N/V, radiation to back. +radiation to epigastric region. Saw her PCP for this yesterday and given referral to GI as she has been having intermittent abdominal pain for the past several months, overdue for colonoscopy, and also been suffering from hemorrhoids. no fevers/chills, no recent illness.

## 2019-06-12 NOTE — ED PROVIDER NOTE - NSFOLLOWUPINSTRUCTIONS_ED_ALL_ED_FT
Chest Pain    Chest pain can be caused by many different conditions which may or may not be dangerous. Causes include heartburn, lung infections, heart attack, blood clot in lungs, skin infections, strain or damage to muscle, cartilage, or bones, etc. In addition to a history and physical examination, an electrocardiogram (ECG) or other lab tests may have been performed to determine the cause of your chest pain. Follow up with your primary care provider or with a cardiologist as instructed.     SEEK IMMEDIATE MEDICAL CARE IF YOU HAVE ANY OF THE FOLLOWING SYMPTOMS: worsening chest pain, coughing up blood, unexplained back/neck/jaw pain, severe abdominal pain, dizziness or lightheadedness, fainting, shortness of breath, sweaty or clammy skin, vomiting, or racing heart beat. These symptoms may represent a serious problem that is an emergency. Do not wait to see if the symptoms will go away. Get medical help right away. Call 911 and do not drive yourself to the hospital. Please follow up with both your Cardiologist, and GI    Chest Pain    Chest pain can be caused by many different conditions which may or may not be dangerous. Causes include heartburn, lung infections, heart attack, blood clot in lungs, skin infections, strain or damage to muscle, cartilage, or bones, etc. In addition to a history and physical examination, an electrocardiogram (ECG) or other lab tests may have been performed to determine the cause of your chest pain. Follow up with your primary care provider or with a cardiologist as instructed.     SEEK IMMEDIATE MEDICAL CARE IF YOU HAVE ANY OF THE FOLLOWING SYMPTOMS: worsening chest pain, coughing up blood, unexplained back/neck/jaw pain, severe abdominal pain, dizziness or lightheadedness, fainting, shortness of breath, sweaty or clammy skin, vomiting, or racing heart beat. These symptoms may represent a serious problem that is an emergency. Do not wait to see if the symptoms will go away. Get medical help right away. Call 911 and do not drive yourself to the hospital.

## 2019-06-12 NOTE — ED PROVIDER NOTE - CLINICAL SUMMARY MEDICAL DECISION MAKING FREE TEXT BOX
here w/ cp, 2 trop neg, ekg unchanged from prior. both this episode and prior were in setting of food intake, ?GI source. no RUQ pain or tenderness. already planned for outpatient GI eval that her PCP is sending referral for. plan for dietary changes, follow up with outpatient doctors, return immediately for any change in status.

## 2019-10-22 NOTE — PATIENT PROFILE ADULT - NSPROHMDIABETMGMTSTRAT_GEN_A_NUR
[facility] - Outpatient Follow-up Note  Jolie Mulligan 56 y o  female MRN: [id number]  Encounter: [id number]          ASSESSMENT AND PLAN:      1  Diarrhea, unspecified type: multiple episodes of post-prandial diarrhea x 3 weeks  Denies travel, antibiotics, sick contacts  This could be due to infectious cause, IBS-D, celiac diseae  Will check stool studies to rule out infection  If negative, could start imodium   - Celiac Disease Panel  - Clostridium difficile toxin by PCR  - Giardia antigen  - Stool Enteric Bacterial Panel by PCR  - Pancreatic elastase, fecal  - Ova and parasite examination; Future    2  H  pylori infection: hx of h pylori infection diagnosed on EGD in 09/2018  She did start antibiotic therapy but states that she did discontinue this due to chemotherapy for breast cancer  Will recheck H pylori stool antigen at this time  - H  pylori antigen, stool; Future    3  Epigastric pain:  She admits to epigastric pain  She takes omeprazole 20 mg daily but states that this does not control her symptoms  We will increase her omeprazole to 40 mg daily, check right upper quadrant ultrasound to rule out biliary source  Last EGD was in September 2018 with 1-2 cm hiatal hernia  If symptoms persistent right upper quadrant ultrasound negative, would consider repeating EGD  - US right upper quadrant; Future  - omeprazole (PriLOSEC) 40 MG capsule; Take 1 capsule (40 mg total) by mouth daily  Dispense: 30 capsule; Refill: 3    ______________________________________________________________________    SUBJECTIVE:  Sofía Diallo is a 57 yo Surinamese-speaking female with a past medical history of diabetes type 2, breast cancer status post radiation and chemotherapy now in remission who presents to the office for epigastric abdominal pain and diarrhea  She states that for the past 3 weeks she has been experiencing postprandial diarrhea multiple times a day    She denies any sick contacts, antibiotic use, recent travel  She also complains of epigastric abdominal pain  She does take omeprazole 20 mg daily but states that this is not control her symptoms  She admits to occasional nausea but denies vomiting  Last EGD and colonoscopy were in 09/2018  EGD was normal other than 1-2 cm hiatal hernia  Colonoscopy revealed multiple polyps, tubular adenomas present and repeat colonoscopy was recommended in 3 years  REVIEW OF SYSTEMS IS OTHERWISE NEGATIVE  Historical Information   Past Medical History:   Diagnosis Date    Abdominal infection ([facility] )     h-pylori    Abnormal chest x-ray 4/5/2019    Asthma     [facility]2018    Cancer ([facility] )     BREAST    [facility]     Hiatal hernia     History of chemotherapy     History of radiation therapy     Hypercholesterolemia     Hypertension     Hypothyroid     Renal disorder     Rheumatoid arthritis ([facility] )      Past Surgical History:   Procedure Laterality Date    BREAST BIOPSY Right 05/23/2018    DCIS    BREAST LUMPECTOMY Right 6/26/2018    Procedure: RIGHT BREAST NEEDLE LOCALIZATION X2 WITH RIGHT BREAST LUMPECTOMY ( NEEDLE LOC @ 1000); Surgeon: Adal Nascimento MD;  Location: [facility];  Service: Surgical Oncology    BREAST LUMPECTOMY Right 8/2/2018    Procedure: LYMPHOSCINITGRAPHY INTRAOPERATIVE LYMPHATIC MAPPING , RIGHT SENTINEL NODE BIOPSY, REEXCISION  RIGHT BREAST LUMPECTOMY CAVITY (SUPERIOR MARGIN); Surgeon: Adal Nascimento MD;  Location: [facility];  Service: Surgical Oncology    BREAST SURGERY Left     CATARACT EXTRACTION, BILATERAL Bilateral     EGD AND COLONOSCOPY N/A 9/5/2018    Procedure: EGD AND COLONOSCOPY;  Surgeon: Niels Little MD;  Location: [facility] GI [facility];   Service: Gastroenterology    [place] GUIDED CENTRAL VENOUS ACCESS DEVICE INSERTION  9/13/2018    KNEE SURGERY Right     MAMMO NEEDLE LOCALIZATION RIGHT (ALL INC) Right 6/26/2018    MAMMO STEREOTACTIC BREAST BIOPSY RIGHT (ALL INC) Right 5/23/2018    RETINAL DETACHMENT SURGERY Right     TUBAL LIGATION      TUNNELED VENOUS PORT PLACEMENT Left 9/13/2018    Procedure: INSERTION VENOUS PORT (PORT-A-CATH);   Surgeon: Royal Monse MD;  Location: [facility];  Service: Surgical Oncology     Social History   Social History     Substance and Sexual Activity   Alcohol Use No     Social History     Substance and Sexual Activity   Drug Use No     Social History     Tobacco Use   Smoking Status Never Smoker   Smokeless Tobacco Never Used     Family History   Problem Relation Age of Onset    Diabetes Mother     Hypertension Mother     Diabetes Father     Hypertension Father     Diabetes Brother     Hypertension Brother     Prostate cancer Brother     Cancer Maternal Grandfather     No Known Problems Sister     No Known Problems Daughter     No Known Problems Sister     No Known Problems Sister     No Known Problems Sister     No Known Problems Sister     No Known Problems Daughter     No Known Problems Daughter        Meds/Allergies       Current Outpatient Medications:     albuterol (2 5 mg/3 mL) 0 083 % nebulizer solution    albuterol (PROVENTIL HFA,VENTOLIN HFA) 90 mcg/act inhaler    amLODIPine (NORVASC) 10 mg tablet    amLODIPine-atorvastatin (CADUET) 10-10 MG per tablet    anastrozole (ARIMIDEX) 1 mg tablet    SANG MICROLET LANCETS lancets    Blood Glucose Monitoring Suppl (ACURA BLOOD GLUCOSE METER) w/Device KIT    Blood Glucose Monitoring Suppl (SANG CONTOUR MONITOR) w/Device KIT    budesonide (PULMICORT) 0 5 mg/2 mL nebulizer solution    enalapril (VASOTEC) 20 mg tablet    fluticasone-vilanterol (BREO ELLIPTA) 100-25 mcg/inh inhaler    gemfibrozil (LOPID) 600 mg tablet    glucose blood (SANG CONTOUR TEST) test strip    Insulin Glargine (TOUJEO SOLOSTAR) 300 units/mL CONCETRATED U-300 injection pen    insulin lispro (HUMALOG KWIKPEN) 100 units/mL injection pen    Insulin Pen Needle (BD PEN NEEDLE NATALEE U/F) 32G X 4 MM MISC    Lancets MISC    levothyroxine 50 mcg tablet    oxyCODONE (ROXICODONE) 5 mg immediate release tablet    predniSONE 20 mg tablet    rivaroxaban (XARELTO) 20 mg tablet    cyclobenzaprine (FLEXERIL) 5 mg tablet    dextromethorphan-guaifenesin (MUCINEX DM)  MG per 12 hr tablet    DULoxetine (CYMBALTA) 30 mg delayed release capsule    EPINEPHrine (EPIPEN) 0 3 mg/0 3 mL SOAJ    omeprazole (PriLOSEC) 40 MG capsule    Allergies   Allergen Reactions    Aspirin Hives     Dr  in [address] told patient not to take aspirin r/t kidneys     Tylenol With Codeine #3 [Acetaminophen-Codeine] Rash           Objective     Blood pressure 124/72, pulse 92, temperature 98 7 °F (37 1 °C), temperature source Tympanic, height 5' 4" (1 626 m), weight 78 kg (172 lb), not currently breastfeeding  Body mass index is 29 52 kg/m²  PHYSICAL EXAM:      General Appearance:   Alert, cooperative, no distress   HEENT:   Normocephalic, atraumatic, anicteric  Right eye exhibits no discharge  Left eye exhibits no discharge  No scleral icters     Neck:  Supple, symmetrical, trachea midline, no stridor    Lungs:   Clear to auscultation bilaterally; no rales, rhonchi or wheezing; respirations unlabored    Heart[de-identified]   Regular rate and rhythm; no murmur, rub, or gallop  Abdomen:   Soft, non-tender, non-distended; normal bowel sounds; no masses, no organomegaly    Genitalia:   Deferred    Rectal:   Deferred    Extremities:  No cyanosis, clubbing or edema        Skin:  No jaundice, rashes, or lesions          Lab Results:   No visits with results within 1 Day(s) from this visit     Latest known visit with results is:   Admission on 09/12/2019, Discharged on 09/12/2019   Component Date Value    WBC 09/12/2019 7 24     RBC 09/12/2019 4 08     Hemoglobin 09/12/2019 11 9     Hematocrit 09/12/2019 36 4     MCV 09/12/2019 89     MCH 09/12/2019 29 2     MCHC 09/12/2019 32 7     RDW 09/12/2019 13 4     MPV 09/12/2019 11 1     Platelets 40/24/6393 232     nRBC 09/12/2019 0     Neutrophils Relative 09/12/2019 68     Immat GRANS % 09/12/2019 0     Lymphocytes Relative 09/12/2019 19     Monocytes Relative 09/12/2019 9     Eosinophils Relative 09/12/2019 3     Basophils Relative 09/12/2019 1     Neutrophils Absolute 09/12/2019 4 90     Immature Grans Absolute 09/12/2019 0 02     Lymphocytes Absolute 09/12/2019 1 40     Monocytes Absolute 09/12/2019 0 63     Eosinophils Absolute 09/12/2019 0 20     Basophils Absolute 09/12/2019 0 09     Sodium 09/12/2019 136     Potassium 09/12/2019 4 1     Chloride 09/12/2019 99*    CO2 09/12/2019 25     ANION GAP 09/12/2019 12     BUN 09/12/2019 12     Creatinine 09/12/2019 0 83     Glucose 09/12/2019 311*    Calcium 09/12/2019 9 6     AST 09/12/2019 15     ALT 09/12/2019 29     Alkaline Phosphatase 09/12/2019 73     Total Protein 09/12/2019 7 9     Albumin 09/12/2019 3 5     Total Bilirubin 09/12/2019 0 41     eGFR 09/12/2019 76     Troponin I 09/12/2019 <0 02     Ventricular Rate 09/12/2019 114     Atrial Rate 09/12/2019 114     CA Interval 09/12/2019 152     QRSD Interval 09/12/2019 74     QT Interval 09/12/2019 326     QTC Interval 09/12/2019 449     P Axis 09/12/2019 57     QRS Axis 09/12/2019 16     T Wave Axis 09/12/2019 39          Radiology Results:   No results found  diet modification

## 2020-03-13 NOTE — ASSESSMENT
[FreeTextEntry1] : 59 year old female with a history of HTN, HLD, Asthma, Obesity, status post AVR/ascending aortc replacement, CABG x1 on 5/22/17 presents for a followup visit. \par \par The patient's medical records and diagnostic images were reviewed at the time of this office visit, and the following recommendation was made.  The surgical repair is intact and stable. \par \par Plan:\par 1. Continue medication regimen\par 2. Follow up with PCP and cardiologist\par 3. BP control- I have recommended the patient to monitor her blood pressure closely. I have also advised the patient to take daily blood pressures at home and adhere to medication regimen.\par 4. Return to the [facility] in\par

## 2020-03-13 NOTE — CONSULT LETTER
[Dear  ___] : Dear  [unfilled], [Please see my note below.] : Please see my note below. [Sincerely,] : Sincerely, [FreeTextEntry2] : Dr. Mayra Renee [FreeTextEntry1] : I had the pleasure of seeing your patient, RUSSEL DAVID, in my office today. \par \par We take a multidisciplinary team approach to patient care and consider you, the physician, an extension of our team. We will maintain an open line of communication with you throughout your patient's treatment course.  \par \par As you recall, she is a 59 year year old female status post AVR/ascending aortc replacement, CABG x1 on 5/22/17. The patient presents to the office today for a routine follow up visit with repeat diagnostic imaging. I have enclosed a copy for your records.\par \par The surgical repair is intact and stable. Therefore, I have recommended that the patient will follow up in the [facility] in 1 year with a CTA chest to monitor her surgical repair. My office will assist the patient with her upcoming appointment and I will update you on her  progress at that time.\par \par I have discussed with the patient that we will continue to monitor her aortic pathology closely at the [facility] for the [facility], that encompasses the entire health care system and is one of the largest in the nation at this point.\par \par I appreciate the opportunity to care for your patient at the [facility] for [facility] based at [facility]. If there are any questions or concerns, please call me directly at [phone]. \par \par  [FreeTextEntry3] : \par Hayden Hanson M.D.\par Professor of Cardiovascular and Thoracic Surgery\par Minimally Invasive Valve Surgeon\par Director of Aortic Surgery, [facility]\par Cell: [phone]\par Email: [email or web address] \par \par [facility]:\par [address], [address] Floor, [place] [address]\par Office: [phone]\par Fax: [phone]\par \par [facility]:\par Department of Cardiovascular and Thoracic Surgery\par [address] [place] [address], [address]\par Office: [phone]\par Fax: [phone]\par \par Practice Manager: Ms. Danna Aguila\par Email: [email or web address]\par Phone: [phone]\par \par \par

## 2020-03-13 NOTE — HISTORY OF PRESENT ILLNESS
[FreeTextEntry1] : 59 year old female with a history of HTN, HLD, Asthma, Obesity, status post AVR/ascending aortc replacement, CABG x1 on 5/22/17 presents for a followup visit. \par \par CTA chest 3/7/20:aortic root 3cm, ascending aorta 3.5cm, stable surgical repair, median sternotomy with non union and fractured 3rd and 4th sternal wires. \par \par Echocardiogram \par \par Patient denies any fever, chills, fatigue, syncope, acute chest pain, palpitations, SOB, orthopnea, paroxysmal nocturnal dyspnea, vomiting, abdominal pain, back pain, BRBPR or swelling to legs.\par

## 2020-03-13 NOTE — DATA REVIEWED
[FreeTextEntry1] : \par CTA chest 2/6/19 revealed: ascending aortic graft repair with extraluminal contrast pooling around the left lateral abdominal wall, consistent with early pseudoaneursym. \par \par Echo 1/31/19 revealed EF 55-60%, mild AS, small pericardial effusion vs. pericardial fat pad. (not well visualized). \par \par CTA chest 3/7/20:aortic root 3cm, ascending aorta 3.5cm, stable surgical repair, median sternotomy with non union and fractured 3rd and 4th sternal wires. \par

## 2020-03-18 ENCOUNTER — APPOINTMENT (OUTPATIENT)
Dept: CARDIOTHORACIC SURGERY | Facility: CLINIC | Age: 60
End: 2020-03-18

## 2020-07-01 ENCOUNTER — INPATIENT (INPATIENT)
Facility: HOSPITAL | Age: 60
LOS: 0 days | Discharge: HOME | DRG: 392 | End: 2020-07-02
Attending: HOSPITALIST | Admitting: HOSPITALIST
Payer: MEDICARE

## 2020-07-01 VITALS
DIASTOLIC BLOOD PRESSURE: 91 MMHG | HEIGHT: 63 IN | SYSTOLIC BLOOD PRESSURE: 143 MMHG | OXYGEN SATURATION: 98 % | RESPIRATION RATE: 18 BRPM | TEMPERATURE: 98 F | WEIGHT: 199.96 LBS | HEART RATE: 86 BPM

## 2020-07-01 DIAGNOSIS — Z98.890 OTHER SPECIFIED POSTPROCEDURAL STATES: Chronic | ICD-10-CM

## 2020-07-01 DIAGNOSIS — Z90.49 ACQUIRED ABSENCE OF OTHER SPECIFIED PARTS OF DIGESTIVE TRACT: Chronic | ICD-10-CM

## 2020-07-01 DIAGNOSIS — G56.00 CARPAL TUNNEL SYNDROME, UNSPECIFIED UPPER LIMB: Chronic | ICD-10-CM

## 2020-07-01 DIAGNOSIS — D25.9 LEIOMYOMA OF UTERUS, UNSPECIFIED: Chronic | ICD-10-CM

## 2020-07-01 LAB
ALBUMIN SERPL ELPH-MCNC: 4.1 G/DL — SIGNIFICANT CHANGE UP (ref 3.3–5)
ALP SERPL-CCNC: 32 U/L — LOW (ref 40–120)
ALT FLD-CCNC: 21 U/L — SIGNIFICANT CHANGE UP (ref 10–45)
ANION GAP SERPL CALC-SCNC: 12 MMOL/L — SIGNIFICANT CHANGE UP (ref 5–17)
AST SERPL-CCNC: 22 U/L — SIGNIFICANT CHANGE UP (ref 10–40)
BILIRUB SERPL-MCNC: <0.2 MG/DL — SIGNIFICANT CHANGE UP (ref 0.2–1.2)
BUN SERPL-MCNC: 15 MG/DL — SIGNIFICANT CHANGE UP (ref 7–23)
CALCIUM SERPL-MCNC: 9.2 MG/DL — SIGNIFICANT CHANGE UP (ref 8.4–10.5)
CHLORIDE SERPL-SCNC: 101 MMOL/L — SIGNIFICANT CHANGE UP (ref 96–108)
CK MB CFR SERPL CALC: 1.7 NG/ML — SIGNIFICANT CHANGE UP (ref 0–6.7)
CK SERPL-CCNC: 75 U/L — SIGNIFICANT CHANGE UP (ref 25–170)
CO2 SERPL-SCNC: 27 MMOL/L — SIGNIFICANT CHANGE UP (ref 22–31)
CREAT SERPL-MCNC: 0.69 MG/DL — SIGNIFICANT CHANGE UP (ref 0.5–1.3)
GLUCOSE BLDC GLUCOMTR-MCNC: 164 MG/DL — HIGH (ref 70–99)
GLUCOSE SERPL-MCNC: 106 MG/DL — HIGH (ref 70–99)
POTASSIUM SERPL-MCNC: 4.3 MMOL/L — SIGNIFICANT CHANGE UP (ref 3.5–5.3)
POTASSIUM SERPL-SCNC: 4.3 MMOL/L — SIGNIFICANT CHANGE UP (ref 3.5–5.3)
PROT SERPL-MCNC: 7 G/DL — SIGNIFICANT CHANGE UP (ref 6–8.3)
SODIUM SERPL-SCNC: 140 MMOL/L — SIGNIFICANT CHANGE UP (ref 135–145)
TROPONIN T SERPL-MCNC: <0.01 NG/ML — SIGNIFICANT CHANGE UP (ref 0–0.01)

## 2020-07-01 PROCEDURE — 71045 X-RAY EXAM CHEST 1 VIEW: CPT | Mod: 26

## 2020-07-01 PROCEDURE — 74174 CTA ABD&PLVS W/CONTRAST: CPT | Mod: 26

## 2020-07-01 PROCEDURE — 93010 ELECTROCARDIOGRAM REPORT: CPT

## 2020-07-01 PROCEDURE — 76705 ECHO EXAM OF ABDOMEN: CPT | Mod: 26

## 2020-07-01 PROCEDURE — 71275 CT ANGIOGRAPHY CHEST: CPT | Mod: 26

## 2020-07-01 PROCEDURE — 99285 EMERGENCY DEPT VISIT HI MDM: CPT | Mod: 25

## 2020-07-01 RX ORDER — ATORVASTATIN CALCIUM 80 MG/1
80 TABLET, FILM COATED ORAL AT BEDTIME
Refills: 0 | Status: DISCONTINUED | OUTPATIENT
Start: 2020-07-01 | End: 2020-07-02

## 2020-07-01 RX ORDER — METFORMIN HYDROCHLORIDE 850 MG/1
1 TABLET ORAL
Qty: 0 | Refills: 0 | DISCHARGE

## 2020-07-01 RX ORDER — DIAZEPAM 5 MG
1 TABLET ORAL
Qty: 0 | Refills: 0 | DISCHARGE

## 2020-07-01 RX ORDER — IPRATROPIUM BROMIDE 0.2 MG/ML
500 SOLUTION, NON-ORAL INHALATION EVERY 6 HOURS
Refills: 0 | Status: DISCONTINUED | OUTPATIENT
Start: 2020-07-01 | End: 2020-07-02

## 2020-07-01 RX ORDER — LISINOPRIL 2.5 MG/1
40 TABLET ORAL DAILY
Refills: 0 | Status: DISCONTINUED | OUTPATIENT
Start: 2020-07-01 | End: 2020-07-02

## 2020-07-01 RX ORDER — DEXTROSE 50 % IN WATER 50 %
12.5 SYRINGE (ML) INTRAVENOUS ONCE
Refills: 0 | Status: DISCONTINUED | OUTPATIENT
Start: 2020-07-01 | End: 2020-07-02

## 2020-07-01 RX ORDER — ASPIRIN/CALCIUM CARB/MAGNESIUM 324 MG
81 TABLET ORAL DAILY
Refills: 0 | Status: DISCONTINUED | OUTPATIENT
Start: 2020-07-01 | End: 2020-07-02

## 2020-07-01 RX ORDER — PANTOPRAZOLE SODIUM 20 MG/1
40 TABLET, DELAYED RELEASE ORAL
Refills: 0 | Status: DISCONTINUED | OUTPATIENT
Start: 2020-07-01 | End: 2020-07-02

## 2020-07-01 RX ORDER — INSULIN LISPRO 100/ML
VIAL (ML) SUBCUTANEOUS
Refills: 0 | Status: DISCONTINUED | OUTPATIENT
Start: 2020-07-01 | End: 2020-07-02

## 2020-07-01 RX ORDER — SODIUM CHLORIDE 9 MG/ML
1000 INJECTION, SOLUTION INTRAVENOUS
Refills: 0 | Status: DISCONTINUED | OUTPATIENT
Start: 2020-07-01 | End: 2020-07-02

## 2020-07-01 RX ORDER — SODIUM CHLORIDE 9 MG/ML
1000 INJECTION INTRAMUSCULAR; INTRAVENOUS; SUBCUTANEOUS
Refills: 0 | Status: DISCONTINUED | OUTPATIENT
Start: 2020-07-01 | End: 2020-07-02

## 2020-07-01 RX ORDER — METOPROLOL TARTRATE 50 MG
100 TABLET ORAL DAILY
Refills: 0 | Status: DISCONTINUED | OUTPATIENT
Start: 2020-07-01 | End: 2020-07-02

## 2020-07-01 RX ORDER — NICOTINE POLACRILEX 2 MG
1 GUM BUCCAL DAILY
Refills: 0 | Status: DISCONTINUED | OUTPATIENT
Start: 2020-07-01 | End: 2020-07-02

## 2020-07-01 RX ORDER — ASPIRIN/CALCIUM CARB/MAGNESIUM 324 MG
325 TABLET ORAL ONCE
Refills: 0 | Status: COMPLETED | OUTPATIENT
Start: 2020-07-01 | End: 2020-07-01

## 2020-07-01 RX ORDER — GLUCAGON INJECTION, SOLUTION 0.5 MG/.1ML
1 INJECTION, SOLUTION SUBCUTANEOUS ONCE
Refills: 0 | Status: DISCONTINUED | OUTPATIENT
Start: 2020-07-01 | End: 2020-07-02

## 2020-07-01 RX ORDER — DEXTROSE 50 % IN WATER 50 %
15 SYRINGE (ML) INTRAVENOUS ONCE
Refills: 0 | Status: DISCONTINUED | OUTPATIENT
Start: 2020-07-01 | End: 2020-07-02

## 2020-07-01 RX ORDER — FAMOTIDINE 10 MG/ML
20 INJECTION INTRAVENOUS ONCE
Refills: 0 | Status: COMPLETED | OUTPATIENT
Start: 2020-07-01 | End: 2020-07-01

## 2020-07-01 RX ORDER — CHLORTHALIDONE 50 MG
25 TABLET ORAL DAILY
Refills: 0 | Status: DISCONTINUED | OUTPATIENT
Start: 2020-07-02 | End: 2020-07-02

## 2020-07-01 RX ORDER — DEXTROSE 50 % IN WATER 50 %
25 SYRINGE (ML) INTRAVENOUS ONCE
Refills: 0 | Status: DISCONTINUED | OUTPATIENT
Start: 2020-07-01 | End: 2020-07-02

## 2020-07-01 RX ORDER — HEPARIN SODIUM 5000 [USP'U]/ML
5000 INJECTION INTRAVENOUS; SUBCUTANEOUS EVERY 8 HOURS
Refills: 0 | Status: DISCONTINUED | OUTPATIENT
Start: 2020-07-01 | End: 2020-07-02

## 2020-07-01 RX ORDER — IOHEXOL 300 MG/ML
30 INJECTION, SOLUTION INTRAVENOUS ONCE
Refills: 0 | Status: COMPLETED | OUTPATIENT
Start: 2020-07-01 | End: 2020-07-01

## 2020-07-01 RX ADMIN — FAMOTIDINE 20 MILLIGRAM(S): 10 INJECTION INTRAVENOUS at 16:27

## 2020-07-01 RX ADMIN — IOHEXOL 30 MILLILITER(S): 300 INJECTION, SOLUTION INTRAVENOUS at 17:10

## 2020-07-01 RX ADMIN — SODIUM CHLORIDE 125 MILLILITER(S): 9 INJECTION INTRAMUSCULAR; INTRAVENOUS; SUBCUTANEOUS at 23:32

## 2020-07-01 RX ADMIN — SODIUM CHLORIDE 125 MILLILITER(S): 9 INJECTION INTRAMUSCULAR; INTRAVENOUS; SUBCUTANEOUS at 16:29

## 2020-07-01 RX ADMIN — ATORVASTATIN CALCIUM 80 MILLIGRAM(S): 80 TABLET, FILM COATED ORAL at 23:30

## 2020-07-01 RX ADMIN — Medication 2: at 23:46

## 2020-07-01 RX ADMIN — Medication 1 PATCH: at 23:50

## 2020-07-01 RX ADMIN — Medication 325 MILLIGRAM(S): at 19:48

## 2020-07-01 RX ADMIN — HEPARIN SODIUM 5000 UNIT(S): 5000 INJECTION INTRAVENOUS; SUBCUTANEOUS at 23:30

## 2020-07-01 NOTE — H&P ADULT - ASSESSMENT
58 y/o obese female, current smoker, with FHx of MI/CVA, and PMHx of HTN, HPL, COPD, DM-2, hx of Herniated Disc (sciatica), Diverticulitis (2007 tx with resection @[facility]), known CAD s/p cardiac cath 5/4/17 @[facility] revealing 80% mRCA stenosis, mod-severe AS (Echocardiogram 5/4/17 revealing mod-severe AS, ARAMIS of 0.6cm with mean gradient of 31mmHg and a peak gradient of 36mmHg LVEF 65-75%, s/p 1VCABG AVR/Ascending Root aneurysm @[facility] 5/22/17 presents to [facility] ER this evening, 7/1/20, complaining of epigastric discomfort (sharp/pressure) w/nbnb emesis X1 and intermittent, non exertional, left sided chest pressure/sharp since yesterday evening.

## 2020-07-01 NOTE — H&P ADULT - NSHPPHYSICALEXAM_GEN_ALL_CORE
T(C): 37 (07-01-20 @ 19:48), Max: 37 (07-01-20 @ 16:25)  HR: 64 (07-01-20 @ 19:48) (58 - 86)  BP: 128/86 (07-01-20 @ 19:48) (107/66 - 143/91)  RR: 18 (07-01-20 @ 19:48) (18 - 18)  SpO2: 100% (07-01-20 @ 19:48) (98% - 100%)  Wt(kg): --    Appearance: Normal	  HEENT:   Normal oral mucosa, PERRL, EOMI	  Neck: Supple, - JVD; No Carotid Bruit and 2+ pulses B/L  Cardiovascular: Normal S1 S2, No JVD, No murmurs  Respiratory: Lungs clear to auscultation/No Rales, Rhonchi, Wheezing	  Gastrointestinal:  Soft, Non-tender, + BS	  Skin: No rashes, No ecchymoses, No cyanosis  Extremities: Normal range of motion, No clubbing, cyanosis or edema  Vascular: Femoral pulses 2+ b/l without bruit, DP 1+ b/l, PT 1+ b/l  Neurologic: Non-focal  Psychiatry: A & O x 3, Mood & affect appropriate

## 2020-07-01 NOTE — ED ADULT NURSE NOTE - NSIMPLEMENTINTERV_GEN_ALL_ED
Implemented All Universal Safety Interventions:  [place] to call system. Call bell, personal items and telephone within reach. Instruct patient to call for assistance. Room bathroom lighting operational. Non-slip footwear when patient is off stretcher. Physically safe environment: no spills, clutter or unnecessary equipment. Stretcher in lowest position, wheels locked, appropriate side rails in place.

## 2020-07-01 NOTE — H&P ADULT - PROBLEM SELECTOR PLAN 1
Telemetry, ECG, serial CE, Echocardiogram and NPO for further cardiac workup.  Troponin neg X1  Pt. has hx of CAD w/CABG with AVR and Ascending Root Aneurysm repair 5/22/17 @[facility]

## 2020-07-01 NOTE — ED PROVIDER NOTE - PROGRESS NOTE DETAILS
Pt is pain free; agreeable to being admitted..  No acute findings on CTA.  d/w cardiology fellow, will admit.  Repeat cardiac enz now.

## 2020-07-01 NOTE — H&P ADULT - PROBLEM SELECTOR PLAN 6
Monitor FS; F/U Hgb A1C in AM.  Pt. takes Metformin 1000mg PO AM and Metformin 500mg PO PM.  Hold Metformin for further cardiac workup; Pt. now on Insulin Lispro Med sliding scale.

## 2020-07-01 NOTE — H&P ADULT - NSHPREVIEWOFSYSTEMS_GEN_ALL_CORE
GENERAL, CONSTITUTIONAL : denies recent weight loss, fever, chills  EYES, VISION: denies changes in vision   EARS, NOSE, THROAT: denies hearing loss  HEART, CARDIOVASCULAR: Admits to left-sided chest pressure, denies palpitations,  RESPIRATORY: Denies cough, SOB, wheezing, PND, orthopnea  GASTROINTESTINAL: Denies abdominal pain, heartburn, bloody stool, dark tarry stool  GENITOURINARY: Denies frequent urination, urgency  MUSCULOSKELETAL denies joint pain or swelling, restricted motion, musculoskeletal pain.   SKIN & INTEGUMENTARY Denies rashes, sores, blisters, blisters, growths.  NEUROLOGICAL: Denies numbness or tingling sensations, sensation loss, burning.   PSYCHIATRIC: Denies nervousness, anxiety, depression  ENDOCRINE Denies heat or cold intolerance, excessive thirst  HEMATOLOGIC/LYMPHATIC: Denies abnormal bleeding, bleeding of any kind GENERAL, CONSTITUTIONAL : denies recent weight loss, fever, chills  EYES, VISION: denies changes in vision   EARS, NOSE, THROAT: denies hearing loss  HEART, CARDIOVASCULAR: Admits to left-sided chest pressure, denies palpitations,  RESPIRATORY: Admits to occasional SOB 2/2 COPD; Denies cough, wheezing, PND, orthopnea  GASTROINTESTINAL: admits to Epigastric discomfort; described as indigestive;  heartburn, denies bloody stool, dark tarry stool  GENITOURINARY: Denies frequent urination, urgency  MUSCULOSKELETAL  Admits to hx of herniated disc with occasional back pain (sciatica)denies joint pain or swelling, restricted motion  SKIN & INTEGUMENTARY Denies rashes, sores, blisters, blisters, growths.  NEUROLOGICAL: Denies numbness or tingling sensations, sensation loss, burning.   PSYCHIATRIC: Denies nervousness, anxiety, depression  ENDOCRINE Denies heat or cold intolerance, excessive thirst  HEMATOLOGIC/LYMPHATIC: Denies abnormal bleeding, bleeding of any kind

## 2020-07-01 NOTE — H&P ADULT - NSHPLABSRESULTS_GEN_ALL_CORE
14.0   10.06 )-----------( 265      ( 01 Jul 2020 16:06 )             45.2       07-01    140  |  101  |  15  ----------------------------<  106<H>  4.3   |  27  |  0.69    Ca    9.2      01 Jul 2020 17:11    TPro  7.0  /  Alb  4.1  /  TBili  <0.2  /  DBili  x   /  AST  22  /  ALT  21  /  AlkPhos  32<L>  07-01      PT/INR - ( 01 Jul 2020 16:05 )   PT: 10.7 sec;   INR: 0.89          PTT - ( 01 Jul 2020 16:05 )  PTT:31.2 sec    CARDIAC MARKERS ( 01 Jul 2020 16:05 )  x     / <0.01 ng/mL / 97 U/L / x     / 1.7 ng/mL            EKG:NSR@74bpm with TWI in leads V1-V3

## 2020-07-01 NOTE — H&P ADULT - PROBLEM SELECTOR PLAN 3
Hx of CAD w/ 1V CABG   ECG on admission NSR@74bpm with TWI in leads V1-V3 Troponin neg X1, f/u serial CE.   Echocardiogram and NPO for further cardiac workup.

## 2020-07-01 NOTE — H&P ADULT - REASON FOR ADMISSION
Epigastric discomfort with intermittent, non exertional, left-sided chest pressure X 2days.  Unstable Angina

## 2020-07-01 NOTE — ED PROVIDER NOTE - CLINICAL SUMMARY MEDICAL DECISION MAKING FREE TEXT BOX
60 yo F with CAD GERD obesity copd HTN HLD  prior AVR and 1 vessel CABG 2017 with epigastric and CP after eating chicken and and wings pos N/V  x 1 last nite  no sandra RUQ tenderness await lipase trop GI  vs Cardiac   no radiation of pain to back

## 2020-07-01 NOTE — ED ADULT NURSE NOTE - OBJECTIVE STATEMENT
c.o intermittent epigastric pain since last night. states she has hx of heartburn. denies any sob, cough, fever/chills, abd pain, nausea/vomiting or any other medical complaints

## 2020-07-01 NOTE — H&P ADULT - PROBLEM SELECTOR PLAN 2
Intermittent epigastric pain described as indigestion with one episode of nbnb emesis after eating chinese food   Continue Protonix

## 2020-07-01 NOTE — ED PROVIDER NOTE - OBJECTIVE STATEMENT
60 y/o obese female, current smoker, with FHx of MI/CVA, and PMHx of HTN, HPL, COPD, DM-2, Diverticulitis (2007 tx with resection @[facility]), known CAD s/p cardiac cath 5/4/17 @[facility] revealing 80% mRCA stenosis, mod-severe AS (Echocardiogram 5/4/17 revealing mod-severe AS, ARAMIS of 0.6cm with mean gradient of 31mmHg and a peak gradient of 36mmHg LVEF 65-75%, s/p 1VCABG AVR/Ascending Root aneurysm @[facility] 5/22/17, recent admission for r/o ACS last month, c.o of epigastric pain / chest pain since yesterday after eating chicken wings and french fries-  no back pain no N/V or diaphoresis  no cough or covid sx

## 2020-07-01 NOTE — H&P ADULT - HISTORY OF PRESENT ILLNESS
58 y/o obese female, current smoker, with FHx of MI/CVA, and PMHx of HTN, HPL, COPD, DM-2, Diverticulitis (2007 tx with resection @[facility]), known CAD s/p cardiac cath 5/4/17 @[facility] revealing 80% mRCA stenosis, mod-severe AS (Echocardiogram 5/4/17 revealing mod-severe AS, ARAMIS of 0.6cm with mean gradient of 31mmHg and a peak gradient of 36mmHg LVEF 65-75%, s/p 1VCABG AVR/Ascending Root aneurysm @[facility] 5/22/17 presents to [facility] ER this evening, 7/1/20, A&O X3 Full Code    60 y/o obese female, current smoker, with FHx of MI/CVA, and PMHx of HTN, HPL, COPD, DM-2, Diverticulitis (2007 tx with resection @[facility]), known CAD s/p cardiac cath 5/4/17 @[facility] revealing 80% mRCA stenosis, mod-severe AS (Echocardiogram 5/4/17 revealing mod-severe AS, ARAMIS of 0.6cm with mean gradient of 31mmHg and a peak gradient of 36mmHg LVEF 65-75%, s/p 1VCABG AVR/Ascending Root aneurysm @[facility] 5/22/17 presents to [facility] ER this evening, 7/1/20, complaining of epigastric discomfort (sharp/pressure) with intermittent, non exertional, left sided chest pressure since yesterday evening.     According to patient, walker A&O X3 Full Code    60 y/o obese female, current smoker, with FHx of MI/CVA, and PMHx of HTN, HPL, COPD, DM-2, hx of Herniated Disc (sciatica), Diverticulitis (2007 tx with resection @[facility]), known CAD s/p cardiac cath 5/4/17 @[facility] revealing 80% mRCA stenosis, mod-severe AS (Echocardiogram 5/4/17 revealing mod-severe AS, ARAMIS of 0.6cm with mean gradient of 31mmHg and a peak gradient of 36mmHg LVEF 65-75%, s/p 1VCABG AVR/Ascending Root aneurysm @[facility] 5/22/17 presents to [facility] ER this evening, 7/1/20, complaining of epigastric discomfort (sharp/pressure) w/nbnb emesis X1 and intermittent, non exertional, left sided chest pressure/sharp since yesterday evening.     According to patient, symptoms began yesterday evening one hour after eating chinese food (chicken wings and french fries).  Pt. reports  she first experienced epigastric discomfort, described as "indigestion", one hour after she ate, felt nauseous and vomited X1 in which she took Maalox and gave some relief.   Approximately, 20 minutes later, she felt left-sided mixed pressure/ sharp chest pain, 8/10, lasting 2-3 minutes.  Pt. states she  took a baby aspirin and chest pain subsided.   She reports this morning symptoms return with frequency and intensity which prompted her to go to [facility] ER for further evaluation.   Pt. was admitted to [facility] for similar symptoms last year, 5/20/19, in which ACS was ruled out, ECG was unchanged, Troponin neg X3, CTA no dissection and normal Echocardiogram EF 65%.  Pt. denies fever, chills, HA, SOB, diaphoresis, dizziness and back pain.    In ER ECG reveals NSR@74bpm with TWI V1-V3,  Troponin neg X1, Lipase 204, Blood Glucose 106, CTA of chest/abdomen and pelvis reveals no dissection, CXR reveals no acute pathology f/u official report.Pt. received Apirin Ec 325mg PO X1.  Pt. admitted to [facility] with UA symptoms, continue telemetry, ECG, serial CE, Echocardiogram and NPO for further cardiac workup.

## 2020-07-02 ENCOUNTER — TRANSCRIPTION ENCOUNTER (OUTPATIENT)
Age: 60
End: 2020-07-02

## 2020-07-02 VITALS — TEMPERATURE: 98 F

## 2020-07-02 DIAGNOSIS — E78.5 HYPERLIPIDEMIA, UNSPECIFIED: ICD-10-CM

## 2020-07-02 DIAGNOSIS — I10 ESSENTIAL (PRIMARY) HYPERTENSION: ICD-10-CM

## 2020-07-02 DIAGNOSIS — F17.200 NICOTINE DEPENDENCE, UNSPECIFIED, UNCOMPLICATED: ICD-10-CM

## 2020-07-02 DIAGNOSIS — I82.90 ACUTE EMBOLISM AND THROMBOSIS OF UNSPECIFIED VEIN: ICD-10-CM

## 2020-07-02 DIAGNOSIS — R10.13 EPIGASTRIC PAIN: ICD-10-CM

## 2020-07-02 DIAGNOSIS — I20.0 UNSTABLE ANGINA: ICD-10-CM

## 2020-07-02 DIAGNOSIS — J44.9 CHRONIC OBSTRUCTIVE PULMONARY DISEASE, UNSPECIFIED: ICD-10-CM

## 2020-07-02 DIAGNOSIS — E11.9 TYPE 2 DIABETES MELLITUS WITHOUT COMPLICATIONS: ICD-10-CM

## 2020-07-02 DIAGNOSIS — K21.9 GASTRO-ESOPHAGEAL REFLUX DISEASE WITHOUT ESOPHAGITIS: ICD-10-CM

## 2020-07-02 DIAGNOSIS — I25.10 ATHEROSCLEROTIC HEART DISEASE OF NATIVE CORONARY ARTERY WITHOUT ANGINA PECTORIS: ICD-10-CM

## 2020-07-02 LAB
A1C WITH ESTIMATED AVERAGE GLUCOSE RESULT: 6.7 % — HIGH (ref 4–5.6)
ALBUMIN SERPL ELPH-MCNC: 3.6 G/DL — SIGNIFICANT CHANGE UP (ref 3.3–5)
ALP SERPL-CCNC: 29 U/L — LOW (ref 40–120)
ALT FLD-CCNC: 17 U/L — SIGNIFICANT CHANGE UP (ref 10–45)
AMYLASE P1 CFR SERPL: 57 U/L — SIGNIFICANT CHANGE UP (ref 25–125)
ANION GAP SERPL CALC-SCNC: 11 MMOL/L — SIGNIFICANT CHANGE UP (ref 5–17)
APTT BLD: 34.5 SEC — SIGNIFICANT CHANGE UP (ref 27.5–35.5)
AST SERPL-CCNC: 17 U/L — SIGNIFICANT CHANGE UP (ref 10–40)
BASOPHILS # BLD AUTO: 0 K/UL — SIGNIFICANT CHANGE UP (ref 0–0.2)
BASOPHILS NFR BLD AUTO: 0 % — SIGNIFICANT CHANGE UP (ref 0–2)
BILIRUB SERPL-MCNC: 0.2 MG/DL — SIGNIFICANT CHANGE UP (ref 0.2–1.2)
BUN SERPL-MCNC: 13 MG/DL — SIGNIFICANT CHANGE UP (ref 7–23)
CALCIUM SERPL-MCNC: 8.8 MG/DL — SIGNIFICANT CHANGE UP (ref 8.4–10.5)
CHLORIDE SERPL-SCNC: 102 MMOL/L — SIGNIFICANT CHANGE UP (ref 96–108)
CHOLEST SERPL-MCNC: 159 MG/DL — SIGNIFICANT CHANGE UP (ref 10–199)
CK MB CFR SERPL CALC: 1.8 NG/ML — SIGNIFICANT CHANGE UP (ref 0–6.7)
CK SERPL-CCNC: 78 U/L — SIGNIFICANT CHANGE UP (ref 25–170)
CO2 SERPL-SCNC: 26 MMOL/L — SIGNIFICANT CHANGE UP (ref 22–31)
CREAT SERPL-MCNC: 0.65 MG/DL — SIGNIFICANT CHANGE UP (ref 0.5–1.3)
EOSINOPHIL # BLD AUTO: 0.26 K/UL — SIGNIFICANT CHANGE UP (ref 0–0.5)
EOSINOPHIL NFR BLD AUTO: 3.5 % — SIGNIFICANT CHANGE UP (ref 0–6)
ESTIMATED AVERAGE GLUCOSE: 146 MG/DL — HIGH (ref 68–114)
GLUCOSE BLDC GLUCOMTR-MCNC: 115 MG/DL — HIGH (ref 70–99)
GLUCOSE BLDC GLUCOMTR-MCNC: 115 MG/DL — HIGH (ref 70–99)
GLUCOSE SERPL-MCNC: 96 MG/DL — SIGNIFICANT CHANGE UP (ref 70–99)
HCT VFR BLD CALC: 42.2 % — SIGNIFICANT CHANGE UP (ref 34.5–45)
HDLC SERPL-MCNC: 34 MG/DL — LOW
HGB BLD-MCNC: 13 G/DL — SIGNIFICANT CHANGE UP (ref 11.5–15.5)
INR BLD: 0.9 — SIGNIFICANT CHANGE UP (ref 0.88–1.16)
LIDOCAIN IGE QN: 37 U/L — SIGNIFICANT CHANGE UP (ref 7–60)
LIPID PNL WITH DIRECT LDL SERPL: 23 MG/DL — SIGNIFICANT CHANGE UP
LYMPHOCYTES # BLD AUTO: 2.75 K/UL — SIGNIFICANT CHANGE UP (ref 1–3.3)
LYMPHOCYTES # BLD AUTO: 36.5 % — SIGNIFICANT CHANGE UP (ref 13–44)
MAGNESIUM SERPL-MCNC: 1.7 MG/DL — SIGNIFICANT CHANGE UP (ref 1.6–2.6)
MCHC RBC-ENTMCNC: 27.7 PG — SIGNIFICANT CHANGE UP (ref 27–34)
MCHC RBC-ENTMCNC: 30.8 GM/DL — LOW (ref 32–36)
MCV RBC AUTO: 89.8 FL — SIGNIFICANT CHANGE UP (ref 80–100)
MONOCYTES # BLD AUTO: 0.39 K/UL — SIGNIFICANT CHANGE UP (ref 0–0.9)
MONOCYTES NFR BLD AUTO: 5.2 % — SIGNIFICANT CHANGE UP (ref 2–14)
NEUTROPHILS # BLD AUTO: 3.8 K/UL — SIGNIFICANT CHANGE UP (ref 1.8–7.4)
NEUTROPHILS NFR BLD AUTO: 50.4 % — SIGNIFICANT CHANGE UP (ref 43–77)
PLATELET # BLD AUTO: 221 K/UL — SIGNIFICANT CHANGE UP (ref 150–400)
POTASSIUM SERPL-MCNC: 4.1 MMOL/L — SIGNIFICANT CHANGE UP (ref 3.5–5.3)
POTASSIUM SERPL-SCNC: 4.1 MMOL/L — SIGNIFICANT CHANGE UP (ref 3.5–5.3)
PROT SERPL-MCNC: 6.7 G/DL — SIGNIFICANT CHANGE UP (ref 6–8.3)
PROTHROM AB SERPL-ACNC: 10.9 SEC — SIGNIFICANT CHANGE UP (ref 10.6–13.6)
RBC # BLD: 4.7 M/UL — SIGNIFICANT CHANGE UP (ref 3.8–5.2)
RBC # FLD: 13.5 % — SIGNIFICANT CHANGE UP (ref 10.3–14.5)
SODIUM SERPL-SCNC: 139 MMOL/L — SIGNIFICANT CHANGE UP (ref 135–145)
TOTAL CHOLESTEROL/HDL RATIO MEASUREMENT: 4.7 RATIO — SIGNIFICANT CHANGE UP (ref 3.3–7.1)
TRIGL SERPL-MCNC: 509 MG/DL — HIGH (ref 10–149)
TROPONIN T SERPL-MCNC: <0.01 NG/ML — SIGNIFICANT CHANGE UP (ref 0–0.01)
WBC # BLD: 7.54 K/UL — SIGNIFICANT CHANGE UP (ref 3.8–10.5)
WBC # FLD AUTO: 7.54 K/UL — SIGNIFICANT CHANGE UP (ref 3.8–10.5)

## 2020-07-02 PROCEDURE — 93005 ELECTROCARDIOGRAM TRACING: CPT

## 2020-07-02 PROCEDURE — 83735 ASSAY OF MAGNESIUM: CPT

## 2020-07-02 PROCEDURE — 84484 ASSAY OF TROPONIN QUANT: CPT

## 2020-07-02 PROCEDURE — 36415 COLL VENOUS BLD VENIPUNCTURE: CPT

## 2020-07-02 PROCEDURE — 82553 CREATINE MB FRACTION: CPT

## 2020-07-02 PROCEDURE — 83690 ASSAY OF LIPASE: CPT

## 2020-07-02 PROCEDURE — U0003: CPT

## 2020-07-02 PROCEDURE — 93306 TTE W/DOPPLER COMPLETE: CPT | Mod: 26

## 2020-07-02 PROCEDURE — 85730 THROMBOPLASTIN TIME PARTIAL: CPT

## 2020-07-02 PROCEDURE — 82962 GLUCOSE BLOOD TEST: CPT

## 2020-07-02 PROCEDURE — 82550 ASSAY OF CK (CPK): CPT

## 2020-07-02 PROCEDURE — 74174 CTA ABD&PLVS W/CONTRAST: CPT

## 2020-07-02 PROCEDURE — 76705 ECHO EXAM OF ABDOMEN: CPT

## 2020-07-02 PROCEDURE — 83036 HEMOGLOBIN GLYCOSYLATED A1C: CPT

## 2020-07-02 PROCEDURE — 96374 THER/PROPH/DIAG INJ IV PUSH: CPT | Mod: XU

## 2020-07-02 PROCEDURE — 85025 COMPLETE CBC W/AUTO DIFF WBC: CPT

## 2020-07-02 PROCEDURE — 71275 CT ANGIOGRAPHY CHEST: CPT

## 2020-07-02 PROCEDURE — 82150 ASSAY OF AMYLASE: CPT

## 2020-07-02 PROCEDURE — 85610 PROTHROMBIN TIME: CPT

## 2020-07-02 PROCEDURE — G0378: CPT

## 2020-07-02 PROCEDURE — 82248 BILIRUBIN DIRECT: CPT

## 2020-07-02 PROCEDURE — 99285 EMERGENCY DEPT VISIT HI MDM: CPT | Mod: 25

## 2020-07-02 PROCEDURE — 71045 X-RAY EXAM CHEST 1 VIEW: CPT

## 2020-07-02 PROCEDURE — 80061 LIPID PANEL: CPT

## 2020-07-02 PROCEDURE — C8929: CPT

## 2020-07-02 PROCEDURE — 80053 COMPREHEN METABOLIC PANEL: CPT

## 2020-07-02 RX ORDER — NICOTINE POLACRILEX 2 MG
1 GUM BUCCAL
Qty: 30 | Refills: 0
Start: 2020-07-02 | End: 2020-07-31

## 2020-07-02 RX ADMIN — Medication 1 PATCH: at 13:00

## 2020-07-02 RX ADMIN — Medication 25 MILLIGRAM(S): at 12:58

## 2020-07-02 RX ADMIN — HEPARIN SODIUM 5000 UNIT(S): 5000 INJECTION INTRAVENOUS; SUBCUTANEOUS at 13:07

## 2020-07-02 RX ADMIN — PANTOPRAZOLE SODIUM 40 MILLIGRAM(S): 20 TABLET, DELAYED RELEASE ORAL at 06:07

## 2020-07-02 RX ADMIN — Medication 81 MILLIGRAM(S): at 12:58

## 2020-07-02 RX ADMIN — HEPARIN SODIUM 5000 UNIT(S): 5000 INJECTION INTRAVENOUS; SUBCUTANEOUS at 06:07

## 2020-07-02 NOTE — DISCHARGE NOTE NURSING/CASE MANAGEMENT/SOCIAL WORK - NSDCFUADDAPPT_GEN_ALL_CORE_FT
1. Follow up with your primary care provider MATT Thomas at [address] [place] [address] Physician's clinic as scheduled on 7/7/20.

## 2020-07-02 NOTE — DISCHARGE NOTE NURSING/CASE MANAGEMENT/SOCIAL WORK - PATIENT PORTAL LINK FT
You can access the FollowMyHealth Patient Portal offered by [facility] by registering at the following website: [email or web address]. By joining [facility]’s FollowMyHealth portal, you will also be able to view your health information using other applications (apps) compatible with our system.

## 2020-07-02 NOTE — DISCHARGE NOTE PROVIDER - NSDCFUADDAPPT_GEN_ALL_CORE_FT
1. Follow up with your primary care provider MATT Thomas at [address] [place] [address] Physician's clinic as scheduled on 7/7/20.

## 2020-07-02 NOTE — DISCHARGE NOTE PROVIDER - NSDCCPTREATMENT_GEN_ALL_CORE_FT
PRINCIPAL PROCEDURE  Procedure: 2D echocardiography  Findings and Treatment: CONCLUSIONS:   1. The aortic valve is not well visualized. There is a bioprosthetic valve in the aortic position per history. The peak transvalvular velocity is 2.70 m/s, the mean transvalvular gradient is 13.00 mmHg, and the LVOT/AV velocity ratio is 0.60. There is no evidence of aortic regurgitation.   2. Normal left and right ventricular size and systolic function.   3. No pericardial effusion.      SECONDARY PROCEDURE  Procedure: Complete ultrasound of abdomen  Findings and Treatment: IMPRESSION:  Hepatomegaly and hepatic steatosis.

## 2020-07-02 NOTE — DISCHARGE NOTE PROVIDER - NSDCMRMEDTOKEN_GEN_ALL_CORE_FT
aspirin 81 mg oral delayed release tablet: 1 tab(s) orally once a day  chlorthalidone 25 mg oral tablet: 1 tab(s) orally once a day  Crestor 40 mg oral tablet: 1 tab(s) orally once a day  ipratropium-albuterol 0.5 mg-2.5 mg/3 mLinhalation solution: 3 milliliter(s) inhaled every 6 hours, As needed, Shortness of Breath and/or Wheezing  lisinopril 40 mg oral tablet: 1 tab(s) orally once a day  metFORMIN 500 mg oral tablet: 1500 milligram(s) orally once a day  metoprolol succinate 100 mg oral tablet, extended release: 1 tab(s) orally once a day  pantoprazole 40 mg oral delayed release tablet: 1 tab(s) orally once a day (before a meal)  potassium chloride 10 mEq oral tablet, extended release: 1 tab(s) orally once a day  ProAir HFA 90 mcg/inh inhalation aerosol: 2 puff(s) inhaled 2 times a day, As Needed aspirin 81 mg oral delayed release tablet: 1 tab(s) orally once a day  chlorthalidone 25 mg oral tablet: 1 tab(s) orally once a day  Crestor 40 mg oral tablet: 1 tab(s) orally once a day  ipratropium-albuterol 0.5 mg-2.5 mg/3 mLinhalation solution: 3 milliliter(s) inhaled every 6 hours, As needed, Shortness of Breath and/or Wheezing  lisinopril 40 mg oral tablet: 1 tab(s) orally once a day  metFORMIN 500 mg oral tablet: 1500 milligram(s) orally once a day  metoprolol succinate 100 mg oral tablet, extended release: 1 tab(s) orally once a day  nicotine 14 mg/24 hr transdermal film, extended release: 1 patch transdermal once a day   pantoprazole 40 mg oral delayed release tablet: 1 tab(s) orally once a day (before a meal)  potassium chloride 10 mEq oral tablet, extended release: 1 tab(s) orally once a day  ProAir HFA 90 mcg/inh inhalation aerosol: 2 puff(s) inhaled 2 times a day, As Needed

## 2020-07-02 NOTE — DISCHARGE NOTE PROVIDER - HOSPITAL COURSE
60 y/o obese female, current smoker, with FHx of MI/CVA, and PMHx of HTN, HLD, COPD, DM-2, hx of Herniated Disc (sciatica), Diverticulitis (2007 tx with resection @[facility]), known CAD s/p cardiac cath 5/4/17 @[facility] revealing 80% mRCA stenosis, mod-severe AS (Echocardiogram 5/4/17 revealing mod-severe AS, ARAMIS of 0.6cm with mean gradient of 31mmHg and a peak gradient of 36mmHg LVEF 65-75%, s/p 1VCABG AVR/Ascending Root aneurysm @[facility] 5/22/17 presents to [facility] ER this evening, 7/1/20, complaining of epigastric discomfort (sharp/pressure) w/nbnb emesis X1 and intermittent, non exertional, left sided chest pressure/sharp since yesterday evening.         According to patient, symptoms began yesterday evening one hour after eating chinese food (chicken wings and french fries).  Pt. reports  she first experienced epigastric discomfort, described as "indigestion", one hour after she ate, felt nauseous and vomited X1 in which she took Maalox and gave some relief.   Approximately, 20 minutes later, she felt left-sided mixed pressure/ sharp chest pain, 8/10, lasting 2-3 minutes.  Pt. states she  took a baby aspirin and chest pain subsided.   She reports this morning symptoms return with frequency and intensity which prompted her to go to [facility] ER for further evaluation.   Pt. was admitted to [facility] for similar symptoms last year, 5/20/19, in which ACS was ruled out, ECG was unchanged, Troponin neg X3, CTA no dissection and normal Echocardiogram EF 65%.  Pt. denies fever, chills, HA, SOB, diaphoresis, dizziness and back pain.        In ER ECG reveals NSR@74bpm with TWI V1-V3,  Troponin neg X1, Lipase 204, Blood Glucose 106, CTA of chest/abdomen and pelvis reveals no dissection, CXR reveals no acute pathology f/u official report.Pt. received Apirin Ec 325mg PO X1.  [facility]. [facility] with UA symptoms, continue telemetry, ECG, serial CE, Echocardiogram and NPO for further cardiac workup. 58 y/o obese female, current smoker, with FHx of MI/CVA, and PMHx of HTN, HLD, COPD, DM-2, hx of Herniated Disc (sciatica), Diverticulitis (2007 tx with resection @[facility]), known CAD s/p cardiac cath 5/4/17 @[facility] revealing 80% mRCA stenosis, mod-severe AS s/p 1V CABG AVR/Ascending Root aneurysm @[facility] 5/22/17 presents to [facility] c/o complaining of intermittent, non exertional epigastric discomfort (sharp/pressure) w/ nbnb emesis x1 w/ episodes of few loose stools.  Pt reports symptoms 1 hour after eating chinese food (chicken wings and french fries).  Pt reports she first experienced epigastric discomfort, described as "indigestion", one hour after she ate, felt nauseous and vomited x1 in which she took Maalox and gave some relief.  Pt was admitted to cardiac [address] for similar symptoms last year, 5/20/19, in which ACS was ruled out, ECG was unchanged, Troponin neg X3, CTA no dissection and normal Echocardiogram EF 65%. .        In ER ECG reveals NSR@74bpm with TWI V1-V3 (unchanged), Troponin neg x3, Lipase 204, CTA of chest/abdomen and pelvis reveals no dissection. CXR reveals no acute pathology f/u official report.  Pt. admitted to cardiac [address] and ruled out for ACS. ECHO performed w/ EF 61%, normal left and right ventricular size and systolic function, no significant valvular abn. Repeat Lipase/Amylase resolved to WNL, Lipid panel w/ Triglyceride 509. Physical exam noting reproducible epigastric pain w/o further n/v/d.         On the day of discharge, the patient was seen and examined. Symptoms improved. Vital signs are stable. Labs and imaging reviewed. Patient is medically optimized and hemodynamically stable. Return precautions discussed, medication teach back done, and importance of physician followup emphasized. The patient verbalized understanding. 58 y/o obese female, current smoker, with FHx of MI/CVA, and PMHx of HTN, HLD, COPD, DM-2, hx of Herniated Disc (sciatica), Diverticulitis (2007 tx with resection @[facility]), known CAD s/p cardiac cath 5/4/17 @[facility] revealing 80% mRCA stenosis, mod-severe AS s/p 1V CABG AVR/Ascending Root aneurysm @[facility] 5/22/17 presents to [facility] c/o complaining of intermittent, non exertional epigastric discomfort (sharp/pressure) w/ nbnb emesis x1 w/ episodes of few loose stools.  Pt reports symptoms 1 hour after eating chinese food (chicken wings and french fries).  Pt reports she first experienced epigastric discomfort, described as "indigestion", one hour after she ate, felt nauseous and vomited x1 in which she took Maalox and gave some relief.  Pt was admitted to cardiac [address] for similar symptoms last year, 5/20/19, in which ACS was ruled out, ECG was unchanged, Troponin neg X3, CTA no dissection and normal Echocardiogram EF 65%. .        In ER ECG reveals NSR@74bpm with TWI V1-V3 (unchanged), Troponin neg x3, Lipase 204, CTA of chest/abdomen and pelvis reveals no dissection. CXR reveals no acute pathology f/u official report.  Pt. admitted to cardiac [address] and ruled out for ACS. ECHO performed w/ EF 61%, normal left and right ventricular size and systolic function, no significant valvular abn. Repeat Lipase/Amylase resolved to WNL, Lipid panel w/ Triglyceride 509. Physical exam noting reproducible epigastric pain w/o further n/v/d. Symptoms resolved.        On the day of discharge, the patient was seen and examined. Vital signs are stable. Labs and imaging reviewed. Patient is medically optimized and hemodynamically stable. Return precautions discussed, medication teach back done, and importance of physician followup emphasized. The patient verbalized understanding.

## 2020-07-07 DIAGNOSIS — F17.210 NICOTINE DEPENDENCE, CIGARETTES, UNCOMPLICATED: ICD-10-CM

## 2020-07-07 DIAGNOSIS — K21.9 GASTRO-ESOPHAGEAL REFLUX DISEASE WITHOUT ESOPHAGITIS: ICD-10-CM

## 2020-07-07 DIAGNOSIS — J44.9 CHRONIC OBSTRUCTIVE PULMONARY DISEASE, UNSPECIFIED: ICD-10-CM

## 2020-07-07 DIAGNOSIS — E78.5 HYPERLIPIDEMIA, UNSPECIFIED: ICD-10-CM

## 2020-07-07 DIAGNOSIS — I35.0 NONRHEUMATIC AORTIC (VALVE) STENOSIS: ICD-10-CM

## 2020-07-07 DIAGNOSIS — I25.110 ATHEROSCLEROTIC HEART DISEASE OF NATIVE CORONARY ARTERY WITH UNSTABLE ANGINA PECTORIS: ICD-10-CM

## 2020-07-07 DIAGNOSIS — E66.9 OBESITY, UNSPECIFIED: ICD-10-CM

## 2020-07-07 DIAGNOSIS — Z87.442 PERSONAL HISTORY OF URINARY CALCULI: ICD-10-CM

## 2020-07-07 DIAGNOSIS — Z79.82 LONG TERM (CURRENT) USE OF ASPIRIN: ICD-10-CM

## 2020-07-07 DIAGNOSIS — Z79.84 LONG TERM (CURRENT) USE OF ORAL HYPOGLYCEMIC DRUGS: ICD-10-CM

## 2020-07-07 DIAGNOSIS — Z95.3 PRESENCE OF XENOGENIC HEART VALVE: ICD-10-CM

## 2020-07-07 DIAGNOSIS — E11.9 TYPE 2 DIABETES MELLITUS WITHOUT COMPLICATIONS: ICD-10-CM

## 2020-07-07 DIAGNOSIS — R10.13 EPIGASTRIC PAIN: ICD-10-CM

## 2020-07-07 DIAGNOSIS — Z88.3 ALLERGY STATUS TO OTHER ANTI-INFECTIVE AGENTS: ICD-10-CM

## 2020-07-07 DIAGNOSIS — Z91.040 LATEX ALLERGY STATUS: ICD-10-CM

## 2020-07-07 DIAGNOSIS — I10 ESSENTIAL (PRIMARY) HYPERTENSION: ICD-10-CM

## 2020-11-13 NOTE — ED PROVIDER NOTE - GASTROINTESTINAL [-], MLM
Goal Outcome Evaluation:  Plan of Care Reviewed With: patient  Progress: improving  Outcome Summary: More comfortable today. Concerned about  pain episode last night and fears a repeat episode.No nausea or emesis. Bm today.   no nausea/no diarrhea/no vomiting/no melena

## 2020-11-22 NOTE — DISCHARGE NOTE PROVIDER - NSDCCPCAREPLAN_GEN_ALL_CORE_FT
Private car
PRINCIPAL DISCHARGE DIAGNOSIS  Diagnosis: Epigastric abdominal pain  Assessment and Plan of Treatment: You came into the hospital for reproducible epigastric pain. You had cardiac evaluation that did not show any significant findings (including cardiac blood work, echocardiogram, and EKG). You had bloodwork that showed elevated Lipase (207) and Triglyceride (509) level, which self improved (repeat Lipase 50). Please follow up with your primary care provider and gastroenterologist for further work-up of the intermittent pain.

## 2020-12-07 NOTE — ED ADULT NURSE NOTE - CHIEF COMPLAINT
The patient is a 59y Female complaining of chest pain. Subsequent Stages Histo Method Verbiage: Using a similar technique to that described above, a thin layer of tissue was removed from all areas where tumor was visible on the previous stage.  The tissue was again oriented, mapped, dyed, and processed as above.

## 2021-01-20 NOTE — CONSULT NOTE ADULT - PROBLEM/RECOMMENDATION-4
We will see you back as planned. If you have any questions or concerns, we can be reached Monday through Friday 8am - 430pm at [phone] ([facility] If you have concerns related to a potential reaction/side effect after hours/weekends/holiday's, please seek emergent medical care.     
DISPLAY PLAN FREE TEXT

## 2021-03-10 NOTE — ASSESSMENT
[FreeTextEntry1] : 60 year old female status post AVR/ascending aortc replacement, CABG x1 on 5/22/17 presents for a followup visit.

## 2021-03-10 NOTE — HISTORY OF PRESENT ILLNESS
[FreeTextEntry1] : 60 year old female with a history of HTN, HLD, Asthma, Obesity, status post AVR/ascending aortc replacement, CABG x1 on 5/22/17 presents for a followup visit. \par \par CTA \par \par Echo \par

## 2021-03-10 NOTE — PROCEDURE
[FreeTextEntry1] : Patient was advised to view the educational video prior to this visit regarding aortic pathology, risk factors, surgical procedures, and lifestyle modifications. Video can be retrieved at [email or web address].\par

## 2021-03-10 NOTE — DATA REVIEWED
[FreeTextEntry1] : CTA chest 2/6/19 revealed: ascending aortic graft repair with extraluminal contrast pooling around the left lateral abdominal wall, consistent with early pseudoaneurysm. \par \par Echo 1/31/19 revealed EF 55-60%, mild AS, small pericardial effusion vs. pericardial fat pad. (not well visualized). \par \par CTA 3/7/20: coronary and peripheral arterial atherosclerotic. disease. s/p AVR and ascending aorta interposition graft placement. status post median sternotomy with nonunion and fractured 3rd, 4th sternal wires. \par \par Echo 2/21/20: LVEF 55-60%. mild aortic stenosis. Peak AV gradient 28mmHg. mean AV gradient 16mmHg.

## 2021-03-11 ENCOUNTER — NON-APPOINTMENT (OUTPATIENT)
Age: 61
End: 2021-03-11

## 2021-03-17 ENCOUNTER — APPOINTMENT (OUTPATIENT)
Dept: CARDIOTHORACIC SURGERY | Facility: CLINIC | Age: 61
End: 2021-03-17

## 2021-10-05 NOTE — PATIENT PROFILE ADULT. - DOES PATIENT HAVE ADVANCE DIRECTIVE
Encounter addended by: Amy Maldonado RN on: 10/5/2021 8:51 AM   Actions taken: Charge Capture section accepted, Clinical Note Signed
Yes

## 2021-12-10 NOTE — ED ADULT NURSE NOTE - PERIPHERAL VASCULAR WDL
[Heartburn] : heartburn [Nl] : Integumentary [FreeTextEntry6] : see HPI Pulses equal bilaterally, no edema present.

## 2022-02-07 ENCOUNTER — EMERGENCY (EMERGENCY)
Facility: HOSPITAL | Age: 62
LOS: 1 days | Discharge: HOME | End: 2022-02-07
Attending: EMERGENCY MEDICINE | Admitting: EMERGENCY MEDICINE
Payer: MEDICARE

## 2022-02-07 VITALS
RESPIRATION RATE: 18 BRPM | WEIGHT: 188.05 LBS | OXYGEN SATURATION: 97 % | TEMPERATURE: 98 F | HEART RATE: 105 BPM | HEIGHT: 63 IN | SYSTOLIC BLOOD PRESSURE: 113 MMHG | DIASTOLIC BLOOD PRESSURE: 79 MMHG

## 2022-02-07 VITALS
SYSTOLIC BLOOD PRESSURE: 110 MMHG | OXYGEN SATURATION: 97 % | RESPIRATION RATE: 16 BRPM | HEART RATE: 88 BPM | DIASTOLIC BLOOD PRESSURE: 74 MMHG

## 2022-02-07 DIAGNOSIS — Z79.82 LONG TERM (CURRENT) USE OF ASPIRIN: ICD-10-CM

## 2022-02-07 DIAGNOSIS — Z20.822 CONTACT WITH AND (SUSPECTED) EXPOSURE TO COVID-19: ICD-10-CM

## 2022-02-07 DIAGNOSIS — Z90.49 ACQUIRED ABSENCE OF OTHER SPECIFIED PARTS OF DIGESTIVE TRACT: Chronic | ICD-10-CM

## 2022-02-07 DIAGNOSIS — Z90.49 ACQUIRED ABSENCE OF OTHER SPECIFIED PARTS OF DIGESTIVE TRACT: ICD-10-CM

## 2022-02-07 DIAGNOSIS — Z91.040 LATEX ALLERGY STATUS: ICD-10-CM

## 2022-02-07 DIAGNOSIS — G56.00 CARPAL TUNNEL SYNDROME, UNSPECIFIED UPPER LIMB: Chronic | ICD-10-CM

## 2022-02-07 DIAGNOSIS — D25.9 LEIOMYOMA OF UTERUS, UNSPECIFIED: Chronic | ICD-10-CM

## 2022-02-07 DIAGNOSIS — K52.9 NONINFECTIVE GASTROENTERITIS AND COLITIS, UNSPECIFIED: ICD-10-CM

## 2022-02-07 DIAGNOSIS — Z88.1 ALLERGY STATUS TO OTHER ANTIBIOTIC AGENTS STATUS: ICD-10-CM

## 2022-02-07 DIAGNOSIS — K62.5 HEMORRHAGE OF ANUS AND RECTUM: ICD-10-CM

## 2022-02-07 DIAGNOSIS — Z98.890 OTHER SPECIFIED POSTPROCEDURAL STATES: Chronic | ICD-10-CM

## 2022-02-07 LAB
ALBUMIN SERPL ELPH-MCNC: 4.5 G/DL — SIGNIFICANT CHANGE UP (ref 3.3–5)
ALP SERPL-CCNC: 33 U/L — LOW (ref 40–120)
ALT FLD-CCNC: 17 U/L — SIGNIFICANT CHANGE UP (ref 10–45)
ANION GAP SERPL CALC-SCNC: 12 MMOL/L — SIGNIFICANT CHANGE UP (ref 5–17)
APPEARANCE UR: CLEAR — SIGNIFICANT CHANGE UP
APTT BLD: 32.6 SEC — SIGNIFICANT CHANGE UP (ref 27.5–35.5)
AST SERPL-CCNC: 18 U/L — SIGNIFICANT CHANGE UP (ref 10–40)
BASOPHILS # BLD AUTO: 0.04 K/UL — SIGNIFICANT CHANGE UP (ref 0–0.2)
BASOPHILS NFR BLD AUTO: 0.5 % — SIGNIFICANT CHANGE UP (ref 0–2)
BILIRUB SERPL-MCNC: <0.2 MG/DL — SIGNIFICANT CHANGE UP (ref 0.2–1.2)
BILIRUB UR-MCNC: NEGATIVE — SIGNIFICANT CHANGE UP
BLD GP AB SCN SERPL QL: NEGATIVE — SIGNIFICANT CHANGE UP
BUN SERPL-MCNC: 18 MG/DL — SIGNIFICANT CHANGE UP (ref 7–23)
CALCIUM SERPL-MCNC: 10 MG/DL — SIGNIFICANT CHANGE UP (ref 8.4–10.5)
CHLORIDE SERPL-SCNC: 99 MMOL/L — SIGNIFICANT CHANGE UP (ref 96–108)
CO2 SERPL-SCNC: 29 MMOL/L — SIGNIFICANT CHANGE UP (ref 22–31)
COLOR SPEC: YELLOW — SIGNIFICANT CHANGE UP
CREAT SERPL-MCNC: 0.78 MG/DL — SIGNIFICANT CHANGE UP (ref 0.5–1.3)
DIFF PNL FLD: NEGATIVE — SIGNIFICANT CHANGE UP
EOSINOPHIL # BLD AUTO: 0.19 K/UL — SIGNIFICANT CHANGE UP (ref 0–0.5)
EOSINOPHIL NFR BLD AUTO: 2.3 % — SIGNIFICANT CHANGE UP (ref 0–6)
GLUCOSE SERPL-MCNC: 149 MG/DL — HIGH (ref 70–99)
GLUCOSE UR QL: NEGATIVE — SIGNIFICANT CHANGE UP
HCT VFR BLD CALC: 45.1 % — HIGH (ref 34.5–45)
HGB BLD-MCNC: 13.8 G/DL — SIGNIFICANT CHANGE UP (ref 11.5–15.5)
IMM GRANULOCYTES NFR BLD AUTO: 0.6 % — SIGNIFICANT CHANGE UP (ref 0–1.5)
INR BLD: 0.91 — SIGNIFICANT CHANGE UP (ref 0.88–1.16)
KETONES UR-MCNC: ABNORMAL MG/DL
LEUKOCYTE ESTERASE UR-ACNC: NEGATIVE — SIGNIFICANT CHANGE UP
LYMPHOCYTES # BLD AUTO: 3.73 K/UL — HIGH (ref 1–3.3)
LYMPHOCYTES # BLD AUTO: 44.6 % — HIGH (ref 13–44)
MCHC RBC-ENTMCNC: 26.8 PG — LOW (ref 27–34)
MCHC RBC-ENTMCNC: 30.6 GM/DL — LOW (ref 32–36)
MCV RBC AUTO: 87.6 FL — SIGNIFICANT CHANGE UP (ref 80–100)
MONOCYTES # BLD AUTO: 0.56 K/UL — SIGNIFICANT CHANGE UP (ref 0–0.9)
MONOCYTES NFR BLD AUTO: 6.7 % — SIGNIFICANT CHANGE UP (ref 2–14)
NEUTROPHILS # BLD AUTO: 3.79 K/UL — SIGNIFICANT CHANGE UP (ref 1.8–7.4)
NEUTROPHILS NFR BLD AUTO: 45.3 % — SIGNIFICANT CHANGE UP (ref 43–77)
NITRITE UR-MCNC: NEGATIVE — SIGNIFICANT CHANGE UP
NRBC # BLD: 0 /100 WBCS — SIGNIFICANT CHANGE UP (ref 0–0)
OB PNL STL: NEGATIVE — SIGNIFICANT CHANGE UP
PH UR: 5.5 — SIGNIFICANT CHANGE UP (ref 5–8)
PLATELET # BLD AUTO: 291 K/UL — SIGNIFICANT CHANGE UP (ref 150–400)
POTASSIUM SERPL-MCNC: 4 MMOL/L — SIGNIFICANT CHANGE UP (ref 3.5–5.3)
POTASSIUM SERPL-SCNC: 4 MMOL/L — SIGNIFICANT CHANGE UP (ref 3.5–5.3)
PROT SERPL-MCNC: 7.9 G/DL — SIGNIFICANT CHANGE UP (ref 6–8.3)
PROT UR-MCNC: NEGATIVE MG/DL — SIGNIFICANT CHANGE UP
PROTHROM AB SERPL-ACNC: 11 SEC — SIGNIFICANT CHANGE UP (ref 10.6–13.6)
RBC # BLD: 5.15 M/UL — SIGNIFICANT CHANGE UP (ref 3.8–5.2)
RBC # FLD: 13.2 % — SIGNIFICANT CHANGE UP (ref 10.3–14.5)
RH IG SCN BLD-IMP: POSITIVE — SIGNIFICANT CHANGE UP
SARS-COV-2 RNA SPEC QL NAA+PROBE: SIGNIFICANT CHANGE UP
SODIUM SERPL-SCNC: 140 MMOL/L — SIGNIFICANT CHANGE UP (ref 135–145)
SP GR SPEC: 1.02 — SIGNIFICANT CHANGE UP (ref 1–1.03)
UROBILINOGEN FLD QL: 0.2 E.U./DL — SIGNIFICANT CHANGE UP
WBC # BLD: 8.36 K/UL — SIGNIFICANT CHANGE UP (ref 3.8–10.5)
WBC # FLD AUTO: 8.36 K/UL — SIGNIFICANT CHANGE UP (ref 3.8–10.5)

## 2022-02-07 PROCEDURE — 86850 RBC ANTIBODY SCREEN: CPT

## 2022-02-07 PROCEDURE — 36415 COLL VENOUS BLD VENIPUNCTURE: CPT

## 2022-02-07 PROCEDURE — 93005 ELECTROCARDIOGRAM TRACING: CPT

## 2022-02-07 PROCEDURE — 85610 PROTHROMBIN TIME: CPT

## 2022-02-07 PROCEDURE — 99284 EMERGENCY DEPT VISIT MOD MDM: CPT | Mod: 25

## 2022-02-07 PROCEDURE — U0003: CPT

## 2022-02-07 PROCEDURE — 86901 BLOOD TYPING SEROLOGIC RH(D): CPT

## 2022-02-07 PROCEDURE — 74177 CT ABD & PELVIS W/CONTRAST: CPT | Mod: MA

## 2022-02-07 PROCEDURE — 85730 THROMBOPLASTIN TIME PARTIAL: CPT

## 2022-02-07 PROCEDURE — U0005: CPT

## 2022-02-07 PROCEDURE — 99285 EMERGENCY DEPT VISIT HI MDM: CPT

## 2022-02-07 PROCEDURE — 82272 OCCULT BLD FECES 1-3 TESTS: CPT

## 2022-02-07 PROCEDURE — 80053 COMPREHEN METABOLIC PANEL: CPT

## 2022-02-07 PROCEDURE — 74177 CT ABD & PELVIS W/CONTRAST: CPT | Mod: 26,MA

## 2022-02-07 PROCEDURE — 86900 BLOOD TYPING SEROLOGIC ABO: CPT

## 2022-02-07 PROCEDURE — 85025 COMPLETE CBC W/AUTO DIFF WBC: CPT

## 2022-02-07 PROCEDURE — 81003 URINALYSIS AUTO W/O SCOPE: CPT

## 2022-02-07 RX ORDER — SODIUM CHLORIDE 9 MG/ML
1000 INJECTION INTRAMUSCULAR; INTRAVENOUS; SUBCUTANEOUS ONCE
Refills: 0 | Status: COMPLETED | OUTPATIENT
Start: 2022-02-07 | End: 2022-02-07

## 2022-02-07 RX ORDER — IOHEXOL 300 MG/ML
30 INJECTION, SOLUTION INTRAVENOUS ONCE
Refills: 0 | Status: COMPLETED | OUTPATIENT
Start: 2022-02-07 | End: 2022-02-07

## 2022-02-07 RX ADMIN — IOHEXOL 30 MILLILITER(S): 300 INJECTION, SOLUTION INTRAVENOUS at 16:40

## 2022-02-07 RX ADMIN — SODIUM CHLORIDE 1000 MILLILITER(S): 9 INJECTION INTRAMUSCULAR; INTRAVENOUS; SUBCUTANEOUS at 15:53

## 2022-02-07 NOTE — ED PROVIDER NOTE - OBJECTIVE STATEMENT
61 F ho diverticulitis - s/p partial colectomy 61 F ho diverticulitis - s/p partial colectomy - co crampy abd pain pain x 2 days no f/c no n/v - about 1 week ago had copious diarrhea- for 3 days watery nonbloody- took some pepto for a few days- then noted her stools to be dark  now w left sided crampy pain- jeannine po- bkfast and lunch no n/v  ho tic's s/p partial colectomy  mod severity

## 2022-02-07 NOTE — ED PROVIDER NOTE - NSFOLLOWUPINSTRUCTIONS_ED_ALL_ED_FT
COLITIS - AfterCare(R) Instructions(ER/ED)           Colitis    WHAT YOU NEED TO KNOW:    Colitis is swelling and irritation of your colon. Colitis may be caused by ulcers or a problem with your immune system. Bacteria, a virus, or a parasite may also cause colitis. The cause may not be known. You may have diarrhea, abdominal pain, fever, or blood or mucus in your bowel movement.    DISCHARGE INSTRUCTIONS:    Return to the emergency department if:   •You have sudden trouble breathing.      •Your bowel movements are black or have blood in them.      •You have blood in your vomit.      •You have severe abdominal pain or your abdomen is swollen and feels hard.      •You have any of the following signs of dehydration: ?Dizziness or weakness      ?Dry mouth, cracked lips, or severe thirst      ?Fast heartbeat or breathing      ?Urinating very little or not at all        Call your doctor if:   •Your symptoms get worse or do not go away.      •You have a fever, chills, cough, or feel weak and achy.      •You suddenly lose weight without trying.      •You have questions or concerns about your condition or care.      Medicines:   •Medicines may be given to decrease inflammation in your colon and treat diarrhea.      •Take your medicine as directed. Contact your healthcare provider if you think your medicine is not helping or if you have side effects. Tell him of her if you are allergic to any medicine. Keep a list of the medicines, vitamins, and herbs you take. Include the amounts, and when and why you take them. Bring the list or the pill bottles to follow-up visits. Carry your medicine list with you in case of an emergency.      Manage your symptoms:   •Drink liquids as directed to help prevent dehydration. Good liquids to drink include water, juice, and broth. Ask how much liquid to drink each day. You may need to drink an oral rehydration solution (ORS). An ORS contains a balance of water, salt, and sugar to replace body fluids lost during diarrhea.      •Eat a variety of healthy foods. Healthy foods include fruits, vegetables, whole-grain breads, beans, low-fat dairy products, lean meats, and fish. You may need to eat several small meals throughout the day instead of large meals. Avoid spicy foods, caffeine, chocolate, and foods high in fat.      •Talk to your healthcare provider before you take NSAIDs. NSAIDs can cause worsen your symptoms if ulcers are causing your colitis.      •Start to exercise when you feel better. Regular exercise helps your bowels work normally. Ask about the best exercise plan for you.      Prevent the spread of germs:          •Wash your hands often. Wash your hands several times each day. Wash after you use the bathroom, change a child's diaper, and before you prepare or eat food. Use soap and water every time. Rub your soapy hands together, lacing your fingers. Wash the front and back of your hands, and in between your fingers. Use the fingers of one hand to scrub under the fingernails of the other hand. Wash for at least 20 seconds. Rinse with warm, running water for several seconds. Then dry your hands with a clean towel or paper towel. Use hand  that contains alcohol if soap and water are not available. Do not touch your eyes, nose, or mouth without washing your hands first.  Handwashing           •Cover a sneeze or cough. Use a tissue that covers your mouth and nose. Throw the tissue away in a trash can right away. Use the bend of your arm if a tissue is not available. Wash your hands well with soap and water or use a hand .      •Clean surfaces often. Clean doorknobs, countertops, cell phones, and other surfaces that are touched often. Use a disinfecting wipe, a single-use sponge, or a cloth you can wash and reuse. Use disinfecting  if you do not have wipes. You can create a disinfecting  by mixing 1 part bleach with 10 parts water.      •Ask about vaccines you may need. Vaccines help prevent disease caused by some viruses and bacteria. Get the influenza (flu) vaccine as soon as recommended each year. The flu vaccine is usually available starting in September or October. Flu viruses change, so it is important to get a flu vaccine every year. Get the pneumonia vaccine if recommended. This vaccine is usually recommended every 5 years. Your provider will tell you when to get this vaccine, if needed. Your healthcare provider can tell you if you should get other vaccines, and when to get them.      Follow up with your doctor as directed: You may need to return for a colonoscopy or other tests. Write down how often you have a bowel movements and what they look like. Bring this to your follow-up visits. Write down your questions so you remember to ask them during your visits.       © Copyright [employer] 2022           back to top                          © Copyright [employer] 2022

## 2022-02-07 NOTE — ED ADULT NURSE NOTE - OBJECTIVE STATEMENT
Pt c/o rectal bleeding for 4-5 days, endorses dark tarry stools. Takes 81mg ASA, hx of diverticulitis and partial collectomy. Denies SOB, CP, or dizziness. Pt denies taking iron pills but states she took pepto for diarrhea a few days ago.

## 2022-02-07 NOTE — ED PROVIDER NOTE - PATIENT PORTAL LINK FT
You can access the FollowMyHealth Patient Portal offered by [facility] by registering at the following website: [email or web address]. By joining [employer]’s FollowMyHealth portal, you will also be able to view your health information using other applications (apps) compatible with our system.

## 2022-02-07 NOTE — ED ADULT NURSE NOTE - NSIMPLEMENTINTERV_GEN_ALL_ED
Implemented All Universal Safety Interventions:  [place] to call system. Call bell, personal items and telephone within reach. Instruct patient to call for assistance. Room bathroom lighting operational. Non-slip footwear when patient is off stretcher. Physically safe environment: no spills, clutter or unnecessary equipment. Stretcher in lowest position, wheels locked, appropriate side rails in place.

## 2022-02-21 NOTE — ED ADULT TRIAGE NOTE - PAIN: PRESENCE, MLM
Pre-charting complete. Care gaps identified will be addressed at the time of visit.     Health Maintenance Due   Topic Date Due   • Pneumococcal Vaccine 0-64 (1 of 2 - PPSV23) Never done   • Shingles Vaccine (1 of 2) Never done       Patient is due for the topics as listed above and wishes to proceed with them. Orders placed for Immunization(s) Shingles.    Vaccine Information Statement(s) or the Emergency Use Authorization was given today. This has been reviewed, questions answered, and verbal consent given by Patient for injection(s) and administration of Shingles.    Patient tolerated without incident. See immunization grid for documentation.             complains of pain/discomfort

## 2022-05-22 ENCOUNTER — EMERGENCY (EMERGENCY)
Facility: HOSPITAL | Age: 62
LOS: 1 days | Discharge: HOME | End: 2022-05-22
Attending: EMERGENCY MEDICINE | Admitting: EMERGENCY MEDICINE
Payer: MEDICARE

## 2022-05-22 VITALS
SYSTOLIC BLOOD PRESSURE: 113 MMHG | HEART RATE: 61 BPM | RESPIRATION RATE: 18 BRPM | DIASTOLIC BLOOD PRESSURE: 72 MMHG | WEIGHT: 188.05 LBS | TEMPERATURE: 99 F | HEIGHT: 63 IN | OXYGEN SATURATION: 98 %

## 2022-05-22 DIAGNOSIS — R20.2 PARESTHESIA OF SKIN: ICD-10-CM

## 2022-05-22 DIAGNOSIS — G56.00 CARPAL TUNNEL SYNDROME, UNSPECIFIED UPPER LIMB: Chronic | ICD-10-CM

## 2022-05-22 DIAGNOSIS — Z90.49 ACQUIRED ABSENCE OF OTHER SPECIFIED PARTS OF DIGESTIVE TRACT: ICD-10-CM

## 2022-05-22 DIAGNOSIS — M54.50 LOW BACK PAIN, UNSPECIFIED: ICD-10-CM

## 2022-05-22 DIAGNOSIS — Z91.040 LATEX ALLERGY STATUS: ICD-10-CM

## 2022-05-22 DIAGNOSIS — Z87.19 PERSONAL HISTORY OF OTHER DISEASES OF THE DIGESTIVE SYSTEM: ICD-10-CM

## 2022-05-22 DIAGNOSIS — I25.10 ATHEROSCLEROTIC HEART DISEASE OF NATIVE CORONARY ARTERY WITHOUT ANGINA PECTORIS: ICD-10-CM

## 2022-05-22 DIAGNOSIS — J44.9 CHRONIC OBSTRUCTIVE PULMONARY DISEASE, UNSPECIFIED: ICD-10-CM

## 2022-05-22 DIAGNOSIS — E78.5 HYPERLIPIDEMIA, UNSPECIFIED: ICD-10-CM

## 2022-05-22 DIAGNOSIS — Z79.82 LONG TERM (CURRENT) USE OF ASPIRIN: ICD-10-CM

## 2022-05-22 DIAGNOSIS — Z95.1 PRESENCE OF AORTOCORONARY BYPASS GRAFT: ICD-10-CM

## 2022-05-22 DIAGNOSIS — D25.9 LEIOMYOMA OF UTERUS, UNSPECIFIED: Chronic | ICD-10-CM

## 2022-05-22 DIAGNOSIS — E66.9 OBESITY, UNSPECIFIED: ICD-10-CM

## 2022-05-22 DIAGNOSIS — Z90.49 ACQUIRED ABSENCE OF OTHER SPECIFIED PARTS OF DIGESTIVE TRACT: Chronic | ICD-10-CM

## 2022-05-22 DIAGNOSIS — I10 ESSENTIAL (PRIMARY) HYPERTENSION: ICD-10-CM

## 2022-05-22 DIAGNOSIS — Z88.1 ALLERGY STATUS TO OTHER ANTIBIOTIC AGENTS STATUS: ICD-10-CM

## 2022-05-22 DIAGNOSIS — Z95.4 PRESENCE OF OTHER HEART-VALVE REPLACEMENT: ICD-10-CM

## 2022-05-22 DIAGNOSIS — E11.9 TYPE 2 DIABETES MELLITUS WITHOUT COMPLICATIONS: ICD-10-CM

## 2022-05-22 DIAGNOSIS — Z79.84 LONG TERM (CURRENT) USE OF ORAL HYPOGLYCEMIC DRUGS: ICD-10-CM

## 2022-05-22 DIAGNOSIS — Z98.890 OTHER SPECIFIED POSTPROCEDURAL STATES: Chronic | ICD-10-CM

## 2022-05-22 PROCEDURE — 99284 EMERGENCY DEPT VISIT MOD MDM: CPT

## 2022-05-22 PROCEDURE — 99283 EMERGENCY DEPT VISIT LOW MDM: CPT

## 2022-05-22 PROCEDURE — 96372 THER/PROPH/DIAG INJ SC/IM: CPT

## 2022-05-22 RX ORDER — CYCLOBENZAPRINE HYDROCHLORIDE 10 MG/1
10 TABLET, FILM COATED ORAL ONCE
Refills: 0 | Status: COMPLETED | OUTPATIENT
Start: 2022-05-22 | End: 2022-05-22

## 2022-05-22 RX ORDER — KETOROLAC TROMETHAMINE 30 MG/ML
30 SYRINGE (ML) INJECTION ONCE
Refills: 0 | Status: DISCONTINUED | OUTPATIENT
Start: 2022-05-22 | End: 2022-05-22

## 2022-05-22 RX ORDER — CYCLOBENZAPRINE HYDROCHLORIDE 10 MG/1
1 TABLET, FILM COATED ORAL
Qty: 12 | Refills: 0
Start: 2022-05-22

## 2022-05-22 RX ORDER — ACETAMINOPHEN 500 MG
650 TABLET ORAL ONCE
Refills: 0 | Status: COMPLETED | OUTPATIENT
Start: 2022-05-22 | End: 2022-05-22

## 2022-05-22 RX ADMIN — CYCLOBENZAPRINE HYDROCHLORIDE 10 MILLIGRAM(S): 10 TABLET, FILM COATED ORAL at 12:12

## 2022-05-22 RX ADMIN — Medication 30 MILLIGRAM(S): at 12:12

## 2022-05-22 RX ADMIN — Medication 650 MILLIGRAM(S): at 12:12

## 2022-05-22 NOTE — ED ADULT TRIAGE NOTE - CHIEF COMPLAINT QUOTE
Pt endorses chronic lumbar back pain (no hx of surgeries), states pain has been worse the last x3 weeks, states the pain is making it difficulty to ambulate. pt denies any current numbness/tingling. No known injury.

## 2022-05-22 NOTE — ED ADULT NURSE NOTE - CHPI ED NUR SYMPTOMS NEG
no anorexia/no bladder dysfunction/no bowel dysfunction/no constipation/no difficulty bearing weight/no fatigue/no motor function loss/no neck tenderness/no tingling

## 2022-05-22 NOTE — ED PROVIDER NOTE - OBJECTIVE STATEMENT
62F smoker PMH HTN, HLD, COPD, DM2, Herniated Disc (sciatica), Diverticulitis (2007 tx with resection @[facility]), CAD w/ CABG, mod-severe AS, AVR/ascending Root aneurysm p/w back pain. Pt states she has lower back for many yrs, worse for 3 years but even worse over last 3 weeks. Worse in the morning, occasional radiation down RLE. Intermittent tingling to R toes (positional, none currently). States she last followed up w/ spine specialist ~1yr ago and received injection w/ improvement. Unknown last MRI. Has new spine consultation scheduled for 5/26. Taking tylenol/motrin sporadically w/ minimal relief. No other systemic symptoms.   Denies any other focal weakness/focal numbness, dysuria, hematuria, urinary frequency/urgency, incontinence, f/c, SOB, CP, abd pain, nausea, vomiting, diarrhea, recent unexplained weight loss, night sweats, rhinorrhea, cough. No hx IVDU. Cannot recall any specific precipitating trauma.

## 2022-05-22 NOTE — ED ADULT NURSE NOTE - NSICDXPASTMEDICALHX_GEN_ALL_CORE_FT
PAST MEDICAL HISTORY:  Asthma     Back injury lumbar, work related    Diabetes mellitus     Diverticulitis     GERD (gastroesophageal reflux disease)     Hyperlipidemia     Hypertension     Kidney stone

## 2022-05-22 NOTE — ED PROVIDER NOTE - PATIENT PORTAL LINK FT
You can access the FollowMyHealth Patient Portal offered by [facility] by registering at the following website: [email or web address]. By joining [employer]’s FollowMyHealth portal, you will also be able to view your health information using other applications (apps) compatible with our system.

## 2022-05-22 NOTE — ED PROVIDER NOTE - PROGRESS NOTE DETAILS
[place]: Still w/ pain but overall much improved. steady unassisted gait, does not want cane.   Discussed importance of outpt follow up and return precautions. Clinically no indication for further emergent ED workup or hospitalization at this time. Comfortable for dc, outpt f/u.

## 2022-05-22 NOTE — ED PROVIDER NOTE - NSFOLLOWUPINSTRUCTIONS_ED_ALL_ED_FT
Can take tylenol 650mg AND/OR motrin 600mg (May cause stomach issues - take with food and avoid prolonged use) every 6hrs as needed for pain.    Follow up with primary doctor within 1-2 days.    Return to ER immediately for fevers, persistent vomit, uncontrolled pain, incontinence, focal weakness/numbness, worsening dizziness.     Follow up with your spine specialist.  Can also call [phone] [facility]- ([phone]) to schedule appointment or go online [email or web address]    Back Pain    Back pain is very common in adults. The cause of back pain is rarely dangerous and the pain often gets better over time. The cause of your back pain may not be known and may include strain of muscles or ligaments, degeneration of the spinal disks, or arthritis. Occasionally the pain may radiate down your leg(s). Over-the-counter medicines to reduce pain and inflammation are often the most helpful. Stretching and remaining active frequently helps the healing process.     SEEK IMMEDIATE MEDICAL CARE IF YOU HAVE ANY OF THE FOLLOWING SYMPTOMS: bowel or bladder control problems, unusual weakness or numbness in your arms or legs, nausea or vomiting, abdominal pain, fever, dizziness/lightheadedness. Can take tylenol 650mg AND/OR motrin 600mg (May cause stomach issues - take with food and avoid prolonged use) every 6hrs as needed for pain.    Can take flexeril (cyclobenzaprine) as prescribed.     Follow up with primary doctor within 1-2 days.    Return to ER immediately for fevers, persistent vomit, uncontrolled pain, incontinence, focal weakness/numbness, worsening dizziness.     Follow up with your spine specialist.  Can also call [phone] [facility]- ([phone]) to schedule appointment or go online [email or web address]    Back Pain    Back pain is very common in adults. The cause of back pain is rarely dangerous and the pain often gets better over time. The cause of your back pain may not be known and may include strain of muscles or ligaments, degeneration of the spinal disks, or arthritis. Occasionally the pain may radiate down your leg(s). Over-the-counter medicines to reduce pain and inflammation are often the most helpful. Stretching and remaining active frequently helps the healing process.     SEEK IMMEDIATE MEDICAL CARE IF YOU HAVE ANY OF THE FOLLOWING SYMPTOMS: bowel or bladder control problems, unusual weakness or numbness in your arms or legs, nausea or vomiting, abdominal pain, fever, dizziness/lightheadedness.

## 2022-05-22 NOTE — ED ADULT NURSE NOTE - OBJECTIVE STATEMENT
Pt A&Ox4, Pt BIB EMS speaking in clear/full sentences, no acute distress, vital signs stable. Pt reports hx of sciatica in left leg. Pt reports this time it is worse than usual with difficulty walking. Pt reports some pain in right side as well. Pt reports bilateral numbness in lower extremities which is typical for her during episodes. Pt denies loss of control of bowel/bladder.

## 2022-05-22 NOTE — ED PROVIDER NOTE - PHYSICAL EXAMINATION
No spinal ttp, neck FROM. Strength 5/5. No bony ttp, FROM all extremities. Normal equal distal pulses. Steady unassisted gait.   normal dorsi/plantar flexion.   lower back pain reproduced w/ certain truncal movements.

## 2022-05-22 NOTE — ED PROVIDER NOTE - CLINICAL SUMMARY MEDICAL DECISION MAKING FREE TEXT BOX
62F smoker PMH HTN, HLD, COPD, DM2, Herniated Disc (sciatica), Diverticulitis (2007 tx with resection @[facility]), CAD w/ CABG, mod-severe AS, AVR/ascending Root aneurysm p/w back pain. Pt states she has lower back for many yrs, worse for 3 years but even worse over last 3 weeks. Worse in the morning, occasional radiation down RLE. Intermittent tingling to R toes (positional, none currently). States she last followed up w/ spine specialist ~1yr ago and received injection w/ improvement. Unknown last MRI. Has new spine consultation scheduled for 5/26. Taking tylenol/motrin sporadically w/ minimal relief. No other systemic symptoms.   Vitals wnl, exam as above.  ddx: Likely radiculopathy.   attempt symptom control, reassess.

## 2022-05-22 NOTE — ED ADULT NURSE NOTE - NSIMPLEMENTINTERV_GEN_ALL_ED
Implemented All Universal Safety Interventions:  [employer] to call system. Call bell, personal items and telephone within reach. Instruct patient to call for assistance. Room bathroom lighting operational. Non-slip footwear when patient is off stretcher. Physically safe environment: no spills, clutter or unnecessary equipment. Stretcher in lowest position, wheels locked, appropriate side rails in place.

## 2022-07-21 ENCOUNTER — EMERGENCY (EMERGENCY)
Facility: HOSPITAL | Age: 62
LOS: 1 days | Discharge: HOME | End: 2022-07-21
Attending: EMERGENCY MEDICINE | Admitting: EMERGENCY MEDICINE
Payer: MEDICARE

## 2022-07-21 VITALS
OXYGEN SATURATION: 96 % | HEART RATE: 69 BPM | RESPIRATION RATE: 18 BRPM | SYSTOLIC BLOOD PRESSURE: 101 MMHG | DIASTOLIC BLOOD PRESSURE: 67 MMHG

## 2022-07-21 VITALS
WEIGHT: 190.04 LBS | SYSTOLIC BLOOD PRESSURE: 106 MMHG | HEIGHT: 63 IN | OXYGEN SATURATION: 99 % | HEART RATE: 67 BPM | TEMPERATURE: 98 F | DIASTOLIC BLOOD PRESSURE: 72 MMHG | RESPIRATION RATE: 18 BRPM

## 2022-07-21 DIAGNOSIS — Z88.1 ALLERGY STATUS TO OTHER ANTIBIOTIC AGENTS STATUS: ICD-10-CM

## 2022-07-21 DIAGNOSIS — F17.200 NICOTINE DEPENDENCE, UNSPECIFIED, UNCOMPLICATED: ICD-10-CM

## 2022-07-21 DIAGNOSIS — E78.00 PURE HYPERCHOLESTEROLEMIA, UNSPECIFIED: ICD-10-CM

## 2022-07-21 DIAGNOSIS — R07.89 OTHER CHEST PAIN: ICD-10-CM

## 2022-07-21 DIAGNOSIS — I10 ESSENTIAL (PRIMARY) HYPERTENSION: ICD-10-CM

## 2022-07-21 DIAGNOSIS — F41.9 ANXIETY DISORDER, UNSPECIFIED: ICD-10-CM

## 2022-07-21 DIAGNOSIS — K21.9 GASTRO-ESOPHAGEAL REFLUX DISEASE WITHOUT ESOPHAGITIS: ICD-10-CM

## 2022-07-21 DIAGNOSIS — G56.00 CARPAL TUNNEL SYNDROME, UNSPECIFIED UPPER LIMB: Chronic | ICD-10-CM

## 2022-07-21 DIAGNOSIS — R51.9 HEADACHE, UNSPECIFIED: ICD-10-CM

## 2022-07-21 DIAGNOSIS — Z82.49 FAMILY HISTORY OF ISCHEMIC HEART DISEASE AND OTHER DISEASES OF THE CIRCULATORY SYSTEM: ICD-10-CM

## 2022-07-21 DIAGNOSIS — Z87.828 PERSONAL HISTORY OF OTHER (HEALED) PHYSICAL INJURY AND TRAUMA: ICD-10-CM

## 2022-07-21 DIAGNOSIS — Z20.822 CONTACT WITH AND (SUSPECTED) EXPOSURE TO COVID-19: ICD-10-CM

## 2022-07-21 DIAGNOSIS — Z79.82 LONG TERM (CURRENT) USE OF ASPIRIN: ICD-10-CM

## 2022-07-21 DIAGNOSIS — Z87.442 PERSONAL HISTORY OF URINARY CALCULI: ICD-10-CM

## 2022-07-21 DIAGNOSIS — D25.9 LEIOMYOMA OF UTERUS, UNSPECIFIED: Chronic | ICD-10-CM

## 2022-07-21 DIAGNOSIS — Z90.49 ACQUIRED ABSENCE OF OTHER SPECIFIED PARTS OF DIGESTIVE TRACT: ICD-10-CM

## 2022-07-21 DIAGNOSIS — Z90.49 ACQUIRED ABSENCE OF OTHER SPECIFIED PARTS OF DIGESTIVE TRACT: Chronic | ICD-10-CM

## 2022-07-21 DIAGNOSIS — Z98.890 OTHER SPECIFIED POSTPROCEDURAL STATES: Chronic | ICD-10-CM

## 2022-07-21 DIAGNOSIS — R90.82 WHITE MATTER DISEASE, UNSPECIFIED: ICD-10-CM

## 2022-07-21 DIAGNOSIS — I35.1 NONRHEUMATIC AORTIC (VALVE) INSUFFICIENCY: ICD-10-CM

## 2022-07-21 DIAGNOSIS — Z91.040 LATEX ALLERGY STATUS: ICD-10-CM

## 2022-07-21 DIAGNOSIS — J45.909 UNSPECIFIED ASTHMA, UNCOMPLICATED: ICD-10-CM

## 2022-07-21 DIAGNOSIS — Z87.42 PERSONAL HISTORY OF OTHER DISEASES OF THE FEMALE GENITAL TRACT: ICD-10-CM

## 2022-07-21 DIAGNOSIS — E11.9 TYPE 2 DIABETES MELLITUS WITHOUT COMPLICATIONS: ICD-10-CM

## 2022-07-21 DIAGNOSIS — Z79.84 LONG TERM (CURRENT) USE OF ORAL HYPOGLYCEMIC DRUGS: ICD-10-CM

## 2022-07-21 LAB
ALBUMIN SERPL ELPH-MCNC: 4.4 G/DL — SIGNIFICANT CHANGE UP (ref 3.3–5)
ALP SERPL-CCNC: 36 U/L — LOW (ref 40–120)
ALT FLD-CCNC: 28 U/L — SIGNIFICANT CHANGE UP (ref 10–45)
ANION GAP SERPL CALC-SCNC: 12 MMOL/L — SIGNIFICANT CHANGE UP (ref 5–17)
APTT BLD: 31.6 SEC — SIGNIFICANT CHANGE UP (ref 27.5–35.5)
AST SERPL-CCNC: 26 U/L — SIGNIFICANT CHANGE UP (ref 10–40)
BASOPHILS # BLD AUTO: 0.04 K/UL — SIGNIFICANT CHANGE UP (ref 0–0.2)
BASOPHILS NFR BLD AUTO: 0.4 % — SIGNIFICANT CHANGE UP (ref 0–2)
BILIRUB SERPL-MCNC: 0.2 MG/DL — SIGNIFICANT CHANGE UP (ref 0.2–1.2)
BUN SERPL-MCNC: 19 MG/DL — SIGNIFICANT CHANGE UP (ref 7–23)
CALCIUM SERPL-MCNC: 10.4 MG/DL — SIGNIFICANT CHANGE UP (ref 8.4–10.5)
CHLORIDE SERPL-SCNC: 98 MMOL/L — SIGNIFICANT CHANGE UP (ref 96–108)
CO2 SERPL-SCNC: 27 MMOL/L — SIGNIFICANT CHANGE UP (ref 22–31)
CREAT SERPL-MCNC: 0.87 MG/DL — SIGNIFICANT CHANGE UP (ref 0.5–1.3)
EGFR: 74 ML/MIN/1.73M2 — SIGNIFICANT CHANGE UP
EOSINOPHIL # BLD AUTO: 0.17 K/UL — SIGNIFICANT CHANGE UP (ref 0–0.5)
EOSINOPHIL NFR BLD AUTO: 1.8 % — SIGNIFICANT CHANGE UP (ref 0–6)
GLUCOSE SERPL-MCNC: 103 MG/DL — HIGH (ref 70–99)
HCT VFR BLD CALC: 43.2 % — SIGNIFICANT CHANGE UP (ref 34.5–45)
HGB BLD-MCNC: 14 G/DL — SIGNIFICANT CHANGE UP (ref 11.5–15.5)
IMM GRANULOCYTES NFR BLD AUTO: 0.5 % — SIGNIFICANT CHANGE UP (ref 0–1.5)
INR BLD: 0.86 — LOW (ref 0.88–1.16)
LIDOCAIN IGE QN: 48 U/L — SIGNIFICANT CHANGE UP (ref 7–60)
LYMPHOCYTES # BLD AUTO: 4.68 K/UL — HIGH (ref 1–3.3)
LYMPHOCYTES # BLD AUTO: 48.3 % — HIGH (ref 13–44)
MCHC RBC-ENTMCNC: 27.2 PG — SIGNIFICANT CHANGE UP (ref 27–34)
MCHC RBC-ENTMCNC: 32.4 GM/DL — SIGNIFICANT CHANGE UP (ref 32–36)
MCV RBC AUTO: 83.9 FL — SIGNIFICANT CHANGE UP (ref 80–100)
MONOCYTES # BLD AUTO: 0.75 K/UL — SIGNIFICANT CHANGE UP (ref 0–0.9)
MONOCYTES NFR BLD AUTO: 7.7 % — SIGNIFICANT CHANGE UP (ref 2–14)
NEUTROPHILS # BLD AUTO: 4 K/UL — SIGNIFICANT CHANGE UP (ref 1.8–7.4)
NEUTROPHILS NFR BLD AUTO: 41.3 % — LOW (ref 43–77)
NRBC # BLD: 0 /100 WBCS — SIGNIFICANT CHANGE UP (ref 0–0)
PLATELET # BLD AUTO: 273 K/UL — SIGNIFICANT CHANGE UP (ref 150–400)
POTASSIUM SERPL-MCNC: 4.4 MMOL/L — SIGNIFICANT CHANGE UP (ref 3.5–5.3)
POTASSIUM SERPL-SCNC: 4.4 MMOL/L — SIGNIFICANT CHANGE UP (ref 3.5–5.3)
PROT SERPL-MCNC: 7.8 G/DL — SIGNIFICANT CHANGE UP (ref 6–8.3)
PROTHROM AB SERPL-ACNC: 10.2 SEC — LOW (ref 10.5–13.4)
RBC # BLD: 5.15 M/UL — SIGNIFICANT CHANGE UP (ref 3.8–5.2)
RBC # FLD: 13.4 % — SIGNIFICANT CHANGE UP (ref 10.3–14.5)
SARS-COV-2 RNA SPEC QL NAA+PROBE: NEGATIVE — SIGNIFICANT CHANGE UP
SODIUM SERPL-SCNC: 137 MMOL/L — SIGNIFICANT CHANGE UP (ref 135–145)
TROPONIN T SERPL-MCNC: 0.01 NG/ML — SIGNIFICANT CHANGE UP (ref 0–0.01)
TROPONIN T SERPL-MCNC: 0.01 NG/ML — SIGNIFICANT CHANGE UP (ref 0–0.01)
WBC # BLD: 9.69 K/UL — SIGNIFICANT CHANGE UP (ref 3.8–10.5)
WBC # FLD AUTO: 9.69 K/UL — SIGNIFICANT CHANGE UP (ref 3.8–10.5)

## 2022-07-21 PROCEDURE — 85025 COMPLETE CBC W/AUTO DIFF WBC: CPT

## 2022-07-21 PROCEDURE — 71045 X-RAY EXAM CHEST 1 VIEW: CPT

## 2022-07-21 PROCEDURE — 85610 PROTHROMBIN TIME: CPT

## 2022-07-21 PROCEDURE — 36415 COLL VENOUS BLD VENIPUNCTURE: CPT

## 2022-07-21 PROCEDURE — 71045 X-RAY EXAM CHEST 1 VIEW: CPT | Mod: 26

## 2022-07-21 PROCEDURE — 70450 CT HEAD/BRAIN W/O DYE: CPT | Mod: 26,MA

## 2022-07-21 PROCEDURE — 93005 ELECTROCARDIOGRAM TRACING: CPT

## 2022-07-21 PROCEDURE — 85730 THROMBOPLASTIN TIME PARTIAL: CPT

## 2022-07-21 PROCEDURE — 70450 CT HEAD/BRAIN W/O DYE: CPT | Mod: MA

## 2022-07-21 PROCEDURE — 80053 COMPREHEN METABOLIC PANEL: CPT

## 2022-07-21 PROCEDURE — 84484 ASSAY OF TROPONIN QUANT: CPT

## 2022-07-21 PROCEDURE — 93010 ELECTROCARDIOGRAM REPORT: CPT

## 2022-07-21 PROCEDURE — 96365 THER/PROPH/DIAG IV INF INIT: CPT

## 2022-07-21 PROCEDURE — 99285 EMERGENCY DEPT VISIT HI MDM: CPT | Mod: 25

## 2022-07-21 PROCEDURE — 96375 TX/PRO/DX INJ NEW DRUG ADDON: CPT

## 2022-07-21 PROCEDURE — 87635 SARS-COV-2 COVID-19 AMP PRB: CPT

## 2022-07-21 PROCEDURE — 83690 ASSAY OF LIPASE: CPT

## 2022-07-21 RX ORDER — METOCLOPRAMIDE HCL 10 MG
10 TABLET ORAL ONCE
Refills: 0 | Status: COMPLETED | OUTPATIENT
Start: 2022-07-21 | End: 2022-07-21

## 2022-07-21 RX ORDER — DIPHENHYDRAMINE HCL 50 MG
25 CAPSULE ORAL ONCE
Refills: 0 | Status: COMPLETED | OUTPATIENT
Start: 2022-07-21 | End: 2022-07-21

## 2022-07-21 RX ORDER — DIPHENHYDRAMINE HCL 50 MG
25 CAPSULE ORAL ONCE
Refills: 0 | Status: DISCONTINUED | OUTPATIENT
Start: 2022-07-21 | End: 2022-07-21

## 2022-07-21 RX ADMIN — Medication 10 MILLIGRAM(S): at 05:03

## 2022-07-21 RX ADMIN — Medication 104 MILLIGRAM(S): at 04:33

## 2022-07-21 RX ADMIN — Medication 25 MILLIGRAM(S): at 04:33

## 2022-07-21 NOTE — ED PROVIDER NOTE - PATIENT PORTAL LINK FT
You can access the FollowMyHealth Patient Portal offered by [facility] by registering at the following website: [email or web address]. By joining [employer]’s FollowMyHealth portal, you will also be able to view your health information using other applications (apps) compatible with our system.

## 2022-07-21 NOTE — ED PROVIDER NOTE - OBJECTIVE STATEMENT
62F hx htn, anxiety, AVR, dm, high chol, gerd, c/o chest pain. pt states midsternal sharp intermittent chest pain since yesterday. states comes and goes randomly, no exacerbating or relieving factors.  pt states also having intermittent HA since yesterday. no vomiting, no fevers. no cough, no SOb. no LE swelling. no recent travel. no sick contacts. took tylenol and aspirin without relief. no numbness/weakness.

## 2022-07-21 NOTE — ED PROVIDER NOTE - PROGRESS NOTE DETAILS
feeling better, HA and chest pain resolved. serial trop negative. f/u with PMD  I have discussed the discharge plan with the patient. The patient agrees with the plan, as discussed.  The patient understands Emergency Department diagnosis is a preliminary diagnosis often based on limited information and that the patient must adhere to the follow-up plan as discussed.  The patient understands that if the symptoms worsen the patient may return to the Emergency Department at any time for further evaluation and treatment.

## 2022-07-21 NOTE — ED PROVIDER NOTE - CARE PROVIDER_API CALL
Loni Melendez)  Cardiovascular Disease; Internal Medicine  [address], [address]  [place] [address]  Phone: [phone]  Fax: [phone]  Follow Up Time:     Angela Baer)  Internal Medicine  [address], [address] [place] [id number]  Phone: [phone]  Fax: [phone]  Follow Up Time:

## 2022-07-21 NOTE — ED PROVIDER NOTE - NSFOLLOWUPINSTRUCTIONS_ED_ALL_ED_FT
Follow-up with your primary care physician      Chest Pain    WHAT YOU NEED TO KNOW:    Chest pain can be caused by a range of conditions, from not serious to life-threatening. Chest pain can be a symptom of a digestive problem, such as acid reflux or a stomach ulcer. An anxiety attack or a strong emotion, such as anger, can also cause chest pain. Infection, inflammation, or a fracture in the bones or cartilage in your chest can cause pain or discomfort. Sometimes chest pain or pressure is caused by poor blood flow to your heart (angina). Chest pain may also be caused by life-threatening conditions such as a heart attack or blood clot in your lungs.    DISCHARGE INSTRUCTIONS:    Call your local emergency number (911 in the [place]) or have someone call if:   •You have any of the following signs of a heart attack: ?Squeezing, pressure, or pain in your chest    ?You may also have any of the following: ?Discomfort or pain in your back, neck, jaw, stomach, or arm    ?Shortness of breath    ?Nausea or vomiting    ?Lightheadedness or a sudden cold sweat    Return to the emergency department if:   •You have chest discomfort that gets worse, even with medicine.    •You cough or vomit blood.    •Your bowel movements are black or bloody.    •You cannot stop vomiting, or it hurts to swallow.    Call your doctor if:   •You have questions or concerns about your condition or care    Medicines:   •Medicines may be given to treat the cause of your chest pain. Examples include pain medicine, anxiety medicine, or medicines to increase blood flow to your heart.    •Do not take certain medicines without asking your healthcare provider first. These include NSAIDs, herbal or vitamin supplements, and hormones, such as estrogen or progestin.    •Take your medicine as directed. Contact your healthcare provider if you think your medicine is not helping or if you have side effects. Tell him or her if you are allergic to any medicine. Keep a list of the medicines, vitamins, and herbs you take. Include the amounts, and when and why you take them. Bring the list or the pill bottles to follow-up visits. Carry your medicine list with you in case of an emergency.    Healthy living tips: If the cause of your chest pain is known, your healthcare provider will give you specific guidelines to follow. The following are general healthy guidelines:  •Do not smoke. Nicotine and other chemicals in cigarettes and cigars can cause lung and heart damage. Ask your healthcare provider for information if you currently smoke and need help to quit. E-cigarettes or smokeless tobacco still contain nicotine. Talk to your healthcare provider before you use these products.    •Choose a variety of healthy foods as often as possible. Include fresh, frozen, or canned fruits and vegetables. Also include low-fat dairy products, fish, chicken (without skin), and lean meats. Your healthcare provider or a dietitian can help you create meal plans. You may need to avoid certain foods or drinks if your pain is caused by a digestion problem.  •Lower your sodium (salt) intake. Limit foods that are high in sodium, such as canned foods, salty snacks, and cold cuts. If you add salt when you cook food, do not add more at the table. Choose low-sodium canned foods as much as possible.      •Drink plenty of water every day. Water helps your body to control your temperature and blood pressure. Ask your healthcare provider how much water you should drink every day.    •Ask about activity. Your healthcare provider will tell you which activities to limit or avoid. Ask when you can drive, return to work, and have sex. Ask about the best exercise plan for you.    •Maintain a healthy weight. Ask your healthcare provider what a healthy weight is for you. Ask him or her to help you create a safe weight loss plan if you are overweight.    •Ask about vaccines you may need. Your healthcare provider can tell you which vaccines you need, and when to get them. The following vaccines help prevent diseases that can become serious for a person with a heart condition:?The influenza (flu) vaccine is given each year. Get a flu vaccine as soon as recommended, usually in September or October.    ?The pneumonia vaccine is usually given every 5 years. Your healthcare provider may recommend the pneumonia vaccine if you are 65 or older.    ?COVID-19 vaccines are given to adults as a shot in 1 or 2 doses. Vaccination is recommended for all adults. A booster (additional) dose is also recommended for all adults. A second booster is recommended for all adults 50 or older and for immunocompromised adults 18 or older. The second booster is also recommended for adults who received the 1-dose vaccine for the first and booster doses. Your healthcare provider can tell you when to get one or both boosters.    Follow up with your doctor within 72 hours, or as directed: You may need to return for more tests to find the cause of your chest pain. You may be referred to a specialist, such as a cardiologist or gastroenterologist. Write down your questions so you remember to ask them during your visits.      Acute Headache    WHAT YOU NEED TO KNOW:    An acute headache is pain or discomfort that may start suddenly and get worse quickly. You may have an acute headache only when you feel stress or eat certain foods. Other acute headache pain can happen every day, and sometimes several times a day.     DISCHARGE INSTRUCTIONS:    Return to the emergency department if:   •You have severe pain.    •You have numbness or weakness on one side of your face or body.    •You have a headache that occurs after a blow to the head, a fall, or other trauma.     •You have a headache, are forgetful or confused, or have trouble speaking.    •You have a headache, stiff neck, and a fever.    Call your doctor if:   •You have a constant headache and are vomiting.    •You have a headache each day that does not get better, even after treatment.    •You have changes in your headaches, or new symptoms that occur when you have a headache.    •You have questions or concerns about your condition or care.    Medicines: You may need any of the following:   •Prescription pain medicine may be given. The medicine your healthcare provider recommends will depend on the kind of headaches you have. You will need to take prescription headache medicines as directed to prevent a problem called rebound headache. These headaches happen with regular use of pain relievers for headache disorders.    •NSAIDs, such as ibuprofen, help decrease swelling, pain, and fever. This medicine is available with or without a doctor's order. NSAIDs can cause stomach bleeding or kidney problems in certain people. If you take blood thinner medicine, always ask your healthcare provider if NSAIDs are safe for you. Always read the medicine label and follow directions.    •Acetaminophen decreases pain and fever. It is available without a doctor's order. Ask how much to take and how often to take it. Follow directions. Read the labels of all other medicines you are using to see if they also contain acetaminophen, or ask your doctor or pharmacist. Acetaminophen can cause liver damage if not taken correctly.    •Antidepressants may be given for some kinds of headaches.    •Take your medicine as directed. Contact your healthcare provider if you think your medicine is not helping or if you have side effects. Tell him or her if you are allergic to any medicine. Keep a list of the medicines, vitamins, and herbs you take. Include the amounts, and when and why you take them. Bring the list or the pill bottles to follow-up visits. Carry your medicine list with you in case of an emergency.    Manage your symptoms:   •Apply heat or ice on the headache area. Use a heat or ice pack. For an ice pack, you can also put crushed ice in a plastic bag. Cover the pack or bag with a towel before you apply it to your skin. Ice and heat both help decrease pain, and heat also helps decrease muscle spasms. Apply heat for 20 to 30 minutes every 2 hours. Apply ice for 15 to 20 minutes every hour. Apply heat or ice for as long and for as many days as directed. You may alternate heat and ice.    •Relax your muscles. Lie down in a comfortable position and close your eyes. Relax your muscles slowly. Start at your toes and work your way up your body.    •Keep a record of your headaches. Write down when your headaches start and stop. Include your symptoms and what you were doing when the headache began. Record what you ate or drank for 24 hours before the headache started. Describe the pain and where it hurts. Keep track of what you did to treat your headache and if it worked.     Prevent an acute headache:   •Avoid anything that triggers an acute headache. Examples include exposure to chemicals, going to high altitude, or not getting enough sleep. Create a regular sleep routine. Go to sleep at the same time and wake up at the same time each day. Do not use electronic devices before bedtime. These may trigger a headache or prevent you from sleeping well.    •Do not smoke. Nicotine and other chemicals in cigarettes and cigars can trigger an acute headache or make it worse. Ask your healthcare provider for information if you currently smoke and need help to quit. E-cigarettes or smokeless tobacco still contain nicotine. Talk to your healthcare provider before you use these products.     •Limit alcohol as directed. Alcohol can trigger an acute headache or make it worse. If you have cluster headaches, do not drink alcohol during an episode. For other types of headaches, ask your healthcare provider if it is safe for you to drink alcohol. Ask how much is safe for you to drink, and how often.    •Exercise as directed. Exercise can reduce tension and help with headache pain. Aim for 30 minutes of physical activity on most days of the week. Your healthcare provider can help you create an exercise plan.    •Eat a variety of healthy foods. Healthy foods include fruits, vegetables, low-fat dairy products, lean meats, fish, whole grains, and cooked beans. Your healthcare provider or dietitian can help you create meals plans if you need to avoid foods that trigger headaches.    Follow up with your healthcare provider as directed: Bring your headache record with you when you see your healthcare provider. Write down your questions so you remember to ask them during your visits.

## 2022-07-21 NOTE — ED ADULT NURSE NOTE - OBJECTIVE STATEMENT
pt a&ox4 resting comfortably in stretcher, c/o HA, left sided cp beginning yesterday, no relief with tylenol at home. respirations even unlabored.

## 2022-07-21 NOTE — ED PROVIDER NOTE - CARE PROVIDERS DIRECT ADDRESSES
[email or web address]

## 2022-07-21 NOTE — ED PROVIDER NOTE - CLINICAL SUMMARY MEDICAL DECISION MAKING FREE TEXT BOX
intermittent chest pain and HA since yesterday. no focal neuro deficits, no sudden onset, not maximal at onset, doubt SAH. no resp distress, speaking in full sentences  -check labs  -ekg  -cxr  -CT head  -reglan/benadryl

## 2022-07-21 NOTE — ED ADULT TRIAGE NOTE - CHIEF COMPLAINT QUOTE
Patient presents to ER with chief complaints of headache and L sided chest pain since yesterday not relieved by Tylenol.

## 2022-08-09 ENCOUNTER — NON-APPOINTMENT (OUTPATIENT)
Age: 62
End: 2022-08-09

## 2022-08-09 ENCOUNTER — APPOINTMENT (OUTPATIENT)
Dept: HEART AND VASCULAR | Facility: CLINIC | Age: 62
End: 2022-08-09

## 2022-08-09 VITALS
DIASTOLIC BLOOD PRESSURE: 70 MMHG | HEART RATE: 76 BPM | HEIGHT: 63 IN | TEMPERATURE: 93.6 F | OXYGEN SATURATION: 96 % | BODY MASS INDEX: 34.02 KG/M2 | SYSTOLIC BLOOD PRESSURE: 106 MMHG | WEIGHT: 192 LBS

## 2022-08-09 PROCEDURE — 93000 ELECTROCARDIOGRAM COMPLETE: CPT

## 2022-08-09 PROCEDURE — 99214 OFFICE O/P EST MOD 30 MIN: CPT | Mod: 25

## 2022-08-09 RX ORDER — FUROSEMIDE 20 MG/1
20 TABLET ORAL DAILY
Refills: 0 | Status: DISCONTINUED | COMMUNITY
Start: 2017-06-09 | End: 2022-08-09

## 2022-08-09 RX ORDER — METOPROLOL SUCCINATE 50 MG/1
50 TABLET, EXTENDED RELEASE ORAL
Refills: 0 | Status: ACTIVE | COMMUNITY
Start: 2017-06-09

## 2022-08-09 NOTE — HISTORY OF PRESENT ILLNESS
[FreeTextEntry1] : Patient is 62 yr old obese F with DM, HTN, HLD, asthma, + Covid 2020,  status post AVR/ascending aortc replacement, CABG x1 on 5/22/17 seen today for cardiac eval. States she saw a cardiologist at [facility] last in 2020 prior to COVID. No cardiology follow up since then. \par She reports intermittent chest pain, described as pinching sensation, occurring independent of activity. Occasionally associated with SOB, fatigue. No dizziness, palpitations. No LOC\par Functional/exercise capacity is limited given chronic back pain due to sciatica, uses cane to ambulate\par

## 2022-08-09 NOTE — PHYSICAL EXAM

## 2022-08-09 NOTE — DISCUSSION/SUMMARY
[FreeTextEntry1] : Assessment/Plan:\par Patient is 62 yr old obese F with DM, HTN, HLD, asthma, + Covid 2020,  status post AVR/ascending aortc replacement, CABG x1 on 5/22/17 seen today for cardiac eval. States she saw a cardiologist at [facility] last in 2020 prior to COVID. No cardiology follow up since then. \par She reports intermittent chest pain, described as pinching sensation, occurring independent of activity. Occasionally associated with SOB, fatigue. No dizziness, palpitations. No LOC\par Functional/exercise capacity is limited given chronic back pain due to sciatica, uses cane to ambulate\par \par -CAD s/p CABG x 1 2017: EKG with old inferior wall MI, poor R wave progression. 1 mm ST depression I,AVL and T wave inversion v1-v2. No prior EKG to compare. CP appears atypical. \par CCTA - r/o CAD progression. Cont ASA 81 mg qd, crestor 20 mg qd, zetia 10 mg qd\par \par -s/p bioAVR: with 3/6 MONIQUE on exam. ECHO ordered. Cont asa 81 mg qd\par \par -HTN: BP on low side. Decrease toprol 50 mg qd. Cont lisinopril 40 mg po qd for now\par \par FU 1 month with test results  [EKG obtained to assist in diagnosis and management of assessed problem(s)] : EKG obtained to assist in diagnosis and management of assessed problem(s)

## 2022-08-17 ENCOUNTER — RESULT REVIEW (OUTPATIENT)
Age: 62
End: 2022-08-17

## 2022-08-17 ENCOUNTER — APPOINTMENT (OUTPATIENT)
Dept: CT IMAGING | Facility: CLINIC | Age: 62
End: 2022-08-17

## 2022-08-17 ENCOUNTER — OUTPATIENT (OUTPATIENT)
Dept: OUTPATIENT SERVICES | Facility: HOSPITAL | Age: 62
LOS: 1 days | End: 2022-08-17

## 2022-08-17 DIAGNOSIS — G56.00 CARPAL TUNNEL SYNDROME, UNSPECIFIED UPPER LIMB: Chronic | ICD-10-CM

## 2022-08-17 DIAGNOSIS — Z90.49 ACQUIRED ABSENCE OF OTHER SPECIFIED PARTS OF DIGESTIVE TRACT: Chronic | ICD-10-CM

## 2022-08-17 DIAGNOSIS — Z98.890 OTHER SPECIFIED POSTPROCEDURAL STATES: Chronic | ICD-10-CM

## 2022-08-17 DIAGNOSIS — D25.9 LEIOMYOMA OF UTERUS, UNSPECIFIED: Chronic | ICD-10-CM

## 2022-08-17 PROCEDURE — 75574 CT ANGIO HRT W/3D IMAGE: CPT | Mod: 26

## 2022-08-21 NOTE — H&P ADULT - PROBLEM SELECTOR PLAN 5
no fever and no chills.
F/U lipid panel and LFT's   Continue Atorvastatin 80mg PO daily (pt. takes Crestor 20mg PO daily at home)

## 2022-09-13 ENCOUNTER — APPOINTMENT (OUTPATIENT)
Dept: HEART AND VASCULAR | Facility: CLINIC | Age: 62
End: 2022-09-13

## 2022-09-13 VITALS
HEART RATE: 76 BPM | DIASTOLIC BLOOD PRESSURE: 70 MMHG | BODY MASS INDEX: 32.96 KG/M2 | SYSTOLIC BLOOD PRESSURE: 108 MMHG | OXYGEN SATURATION: 97 % | WEIGHT: 186 LBS | TEMPERATURE: 97 F | HEIGHT: 63 IN

## 2022-09-13 PROCEDURE — 99214 OFFICE O/P EST MOD 30 MIN: CPT | Mod: 25

## 2022-09-13 PROCEDURE — 36415 COLL VENOUS BLD VENIPUNCTURE: CPT

## 2022-09-13 NOTE — HISTORY OF PRESENT ILLNESS
[FreeTextEntry1] : Patient is 62 yr old obese F with DM, HTN, HLD, asthma, + Covid 2020, status post AVR/ascending aortc replacement, CABG x1 on 5/22/17 seen today for follow up of CCTA results. States she saw a cardiologist at [facility] last in 2020 prior to COVID. No cardiology follow up since then. \par She reports intermittent chest pain, described as pinching sensation, occurring independent of activity. Occasionally associated with SOB, fatigue. No dizziness, palpitations. No LOC\par Functional/exercise capacity is limited given chronic back pain due to sciatica, uses cane to ambulate

## 2022-09-13 NOTE — PHYSICAL EXAM

## 2022-09-13 NOTE — CARDIOLOGY SUMMARY
[de-identified] : 8 [de-identified] : CCTA 8/17/22: SVG-RPDA patent, severe dLAD, severe dRCA unable to assess mLAD and mRCA due to calcified plaque

## 2022-09-13 NOTE — DISCUSSION/SUMMARY
[FreeTextEntry1] : Assessment/Plan:\par Patient is 62 yr old obese F with DM, HTN, HLD, asthma, + Covid 2020, status post AVR/ascending aortc replacement, CABG x1 on 5/22/17 seen today for cardiac eval. States she saw a cardiologist at [facility] last in 2020 prior to COVID. No cardiology follow up since then. \par She reports intermittent chest pain, described as pinching sensation, occurring independent of activity. Occasionally associated with SOB, fatigue. No dizziness, palpitations. No LOC\par Functional/exercise capacity is limited given chronic back pain due to sciatica, uses cane to ambulate\par \par -CAD s/p CABG x 1 2017: EKG with old inferior wall MI, poor R wave progression. 1 mm ST depression I,AVL and T wave inversion v1-v2. No prior EKG to compare. CP appears atypical. \par s/p CCTA: SVG-RPDA patent, severe dLAD, severe dRCA unable to assess mLAD and mRCA due to calcified plaque \par -Given results plan for cardiac cath with Dr Murry 9/22. Labs drawn today\par  Cont ASA 81 mg qd, crestor 20 mg qd, zetia 10 mg qd. Cont toprol 50 mg qd \par \par -s/p bioAVR: with 3/6 MONIQUE on exam. ECHO ordered. Cont asa 81 mg qd\par \par -HTN: BP better controlled off lisinopril. Cont  toprol 50 mg qd.\par \par FU post cath

## 2022-09-19 ENCOUNTER — LABORATORY RESULT (OUTPATIENT)
Age: 62
End: 2022-09-19

## 2022-09-20 VITALS
TEMPERATURE: 96 F | DIASTOLIC BLOOD PRESSURE: 58 MMHG | OXYGEN SATURATION: 99 % | SYSTOLIC BLOOD PRESSURE: 112 MMHG | HEART RATE: 65 BPM | WEIGHT: 179.9 LBS | RESPIRATION RATE: 17 BRPM | HEIGHT: 63 IN

## 2022-09-20 RX ORDER — CHLORHEXIDINE GLUCONATE 213 G/1000ML
1 SOLUTION TOPICAL ONCE
Refills: 0 | Status: DISCONTINUED | OUTPATIENT
Start: 2022-09-22 | End: 2022-10-07

## 2022-09-20 RX ORDER — METFORMIN HYDROCHLORIDE 850 MG/1
1500 TABLET ORAL
Qty: 0 | Refills: 0 | DISCHARGE

## 2022-09-20 RX ORDER — METOPROLOL TARTRATE 50 MG
1 TABLET ORAL
Qty: 0 | Refills: 0 | DISCHARGE

## 2022-09-20 NOTE — H&P ADULT - HISTORY OF PRESENT ILLNESS
COVID- negative 9/19 (result in HIE)  Cardiologist- Dr. Melendez  Pharmacy- [facility] [address] (CONFIRM)  Escort-     63 y/o female, ____smoker, with FHx MI, PMHx of HTN, HLD, DM-2, obesity, mod-severe AS and 1VCAD s/p bioAVR/ascending aorta replacement + 1VCABG (SVG-PDA) 05/2017 @ [facility], asthma/COPD, sciatica/herniated disc, presented to Dr. Melendez c/o intermittent chest pain, described as pinching sensation, occurring independent of activity. Occasionally associated with SOB and fatigue. No dizziness, palpitations, or LOC. Exercise capacity limited by chronic back pain due to sciatica. She underwent CCTA 8/17/22: SVG-RPDA patent, severe dLAD, severe dRCA. Unable to assess mLAD and mRCA due to calcified plaque. Mild to moderate pLCx. Last echo 7/2/20: normal EF.     In light of risk factors, hx of CAD, CCS class IV anginal symptoms, and abnormal CCTA, patient is referred for cardiac catheterization with possible intervention if clinically indicated.    COVID- negative 9/19 (result in HIE)  Cardiologist- Dr. Melendez  [facility]- [facility] [address] [phone]  Escort- friend     63 y/o female, current smoker, with FHx MI, PMHx of asthma/COPD, sciatica/herniated disc, HTN, HLD, DM-2, obesity, mod-severe AS s/p bioAVR/ascending aorta replacement and CABG SVG-PDA 05/2017 @ [facility], presented to Dr. Melendez c/o intermittent chest pain, described as pinching sensation, occurring independent of activity. Occasionally associated with SOB and fatigue. No dizziness, palpitations, or LOC. Exercise capacity limited by chronic back pain due to sciatica. She underwent CCTA 8/17/22: SVG-RPDA patent, severe dLAD, severe dRCA. Unable to assess mLAD and mRCA due to calcified plaque. Mild to moderate pLCx. Last echo 7/2/20: normal EF, bioprosthetic AV without evidence of MR.     In light of risk factors, hx of CAD, CCS class IV anginal symptoms, and abnormal CCTA, patient is referred for cardiac catheterization with possible intervention if clinically indicated.

## 2022-09-20 NOTE — H&P ADULT - NSICDXFAMILYHX_GEN_ALL_CORE_FT
Subjective   Tanya Greenwood is a 68 y.o. female.     HPI Comments: She checks blood pressure at home routinely, readings less than 140/90. She did see nephrologist about 2 weeks and labs were ok except elevated cholesterol. Her last eye exam lasts year 5/2016 due 5/2017. She uses stationary bike three days a week.     Hyperlipidemia   This is a chronic problem. The current episode started more than 1 year ago. The problem is uncontrolled. Recent lipid tests were reviewed and are variable. Pertinent negatives include no chest pain, leg pain, myalgias or shortness of breath. Current antihyperlipidemic treatment includes statins. The current treatment provides mild improvement of lipids. There are no compliance problems.  Risk factors for coronary artery disease include hypertension and obesity.   Hypertension   This is a chronic problem. The current episode started more than 1 year ago. The problem is unchanged. The problem is controlled. Pertinent negatives include no anxiety, blurred vision, chest pain, headaches, palpitations or shortness of breath. Past treatments include calcium channel blockers, diuretics, angiotensin blockers and beta blockers. The current treatment provides significant improvement.        The following portions of the patient's history were reviewed and updated as appropriate: allergies, current medications and problem list.    Review of Systems   Constitutional: Negative for activity change, appetite change, fatigue and fever.   Eyes: Negative for blurred vision.   Respiratory: Negative for cough, shortness of breath and wheezing.    Cardiovascular: Negative for chest pain, palpitations and leg swelling.   Musculoskeletal: Negative for myalgias.   Neurological: Negative for dizziness and headaches.       Objective   Physical Exam   Constitutional: She is oriented to person, place, and time. She appears well-developed and well-nourished.   HENT:   Head: Normocephalic.   Nose: Nose normal.    Neck: Carotid bruit is not present. No thyroid mass and no thyromegaly present.   Cardiovascular: Regular rhythm and normal heart sounds.  Exam reveals no S3 and no S4.    No murmur heard.  Repeat bp left arm 122/72  No edema noted    Pulmonary/Chest: Effort normal and breath sounds normal. She has no decreased breath sounds. She has no wheezes. She has no rhonchi. She has no rales.   Musculoskeletal: She exhibits no edema.   Neurological: She is alert and oriented to person, place, and time. Gait normal.   Skin: Skin is warm and dry.   Psychiatric: She has a normal mood and affect.       Assessment/Plan   Tanya was seen today for hyperlipidemia and hypertension.    Diagnoses and all orders for this visit:    Essential hypertension  Comments:  goal of bp less than 140/90; low sodium diet and cholesterol diet     Hypercholesterolemia  Comments:  low fat/cholesterol diet       She will need to get zoster vaccination at local pharmacy. Will obtain recent lab work from nephrologist. We discussed low fat/cholesterol diet, may need to adjust meds based on labs. She will schedule for medicare wellness visit with Dr Bauer          FAMILY HISTORY:  Mother  Still living? Unknown  Family history of asthma, Age at diagnosis: Age Unknown  Family history of atrial fibrillation, Age at diagnosis: Age Unknown  Family history of cardiac pacemaker, Age at diagnosis: Age Unknown  Family history of MI (myocardial infarction), Age at diagnosis: Age Unknown  Family history of pulmonary embolism, Age at diagnosis: Age Unknown

## 2022-09-20 NOTE — H&P ADULT - NSICDXPASTMEDICALHX_GEN_ALL_CORE_FT
PAST MEDICAL HISTORY:  Asthma     Back injury lumbar, work related    CAD (coronary artery disease)     Diabetes mellitus     Diverticulitis     GERD (gastroesophageal reflux disease)     Hyperlipidemia     Hypertension     Kidney stone

## 2022-09-20 NOTE — H&P ADULT - NSICDXPASTSURGICALHX_GEN_ALL_CORE_FT
PAST SURGICAL HISTORY:  Carpal tunnel syndrome     H/O aortic valve replacement     Status post laser lithotripsy of ureteral calculus     Status post small bowel resection     Uterine fibroid removal

## 2022-09-20 NOTE — H&P ADULT - ASSESSMENT
63 y/o female, current smoker, with FHx MI, PMHx of asthma/COPD, sciatica/herniated disc, HTN, HLD, DM-2, obesity, mod-severe AS s/p bioAVR/ascending aorta replacement and CABG SVG-PDA 05/2017 @ [facility], presented to Dr. Melendez c/o intermittent chest pain, described as pinching sensation, occurring independent of activity. In light of risk factors, hx of CAD, CCS class IV anginal symptoms, and abnormal CCTA, patient is referred for cardiac catheterization with possible intervention if clinically indicated.     -H/H stable, no signs of active bleeding. Last dose of aspirin 81 mg QD 9/22/22 am, loaded with plavix 600 mg x1.  -EF 61%, s/p AVR for mod-severe AS, euvolemic, pre cath with  x1     Risks & benefits of procedure and alternative therapy have been explained to the patient including but not limited to: allergic reaction, bleeding w/possible need for blood transfusion, infection, renal and vascular compromise, limb damage, arrhythmia, stroke, vessel dissection/perforation, Myocardial infarction, emergent CABG. Informed consent obtained and in chart.       Suitable for moderate sedation.

## 2022-09-22 ENCOUNTER — OUTPATIENT (OUTPATIENT)
Dept: OUTPATIENT SERVICES | Facility: HOSPITAL | Age: 62
LOS: 1 days | Discharge: HOME | End: 2022-09-22
Payer: MEDICARE

## 2022-09-22 DIAGNOSIS — Z95.2 PRESENCE OF PROSTHETIC HEART VALVE: Chronic | ICD-10-CM

## 2022-09-22 DIAGNOSIS — Z98.890 OTHER SPECIFIED POSTPROCEDURAL STATES: Chronic | ICD-10-CM

## 2022-09-22 DIAGNOSIS — D25.9 LEIOMYOMA OF UTERUS, UNSPECIFIED: Chronic | ICD-10-CM

## 2022-09-22 DIAGNOSIS — G56.00 CARPAL TUNNEL SYNDROME, UNSPECIFIED UPPER LIMB: Chronic | ICD-10-CM

## 2022-09-22 DIAGNOSIS — Z90.49 ACQUIRED ABSENCE OF OTHER SPECIFIED PARTS OF DIGESTIVE TRACT: Chronic | ICD-10-CM

## 2022-09-22 LAB
A1C WITH ESTIMATED AVERAGE GLUCOSE RESULT: 6.7 % — HIGH (ref 4–5.6)
ALBUMIN SERPL ELPH-MCNC: 5.1 G/DL — HIGH (ref 3.3–5)
ALP SERPL-CCNC: 37 U/L — LOW (ref 40–120)
ALT FLD-CCNC: 19 U/L — SIGNIFICANT CHANGE UP (ref 10–45)
ANION GAP SERPL CALC-SCNC: 12 MMOL/L — SIGNIFICANT CHANGE UP (ref 5–17)
APTT BLD: 32.2 SEC — SIGNIFICANT CHANGE UP (ref 27.5–35.5)
AST SERPL-CCNC: 19 U/L — SIGNIFICANT CHANGE UP (ref 10–40)
BASOPHILS # BLD AUTO: 0.03 K/UL — SIGNIFICANT CHANGE UP (ref 0–0.2)
BASOPHILS NFR BLD AUTO: 0.3 % — SIGNIFICANT CHANGE UP (ref 0–2)
BILIRUB SERPL-MCNC: 0.2 MG/DL — SIGNIFICANT CHANGE UP (ref 0.2–1.2)
BUN SERPL-MCNC: 15 MG/DL — SIGNIFICANT CHANGE UP (ref 7–23)
CALCIUM SERPL-MCNC: 10.2 MG/DL — SIGNIFICANT CHANGE UP (ref 8.4–10.5)
CHLORIDE SERPL-SCNC: 97 MMOL/L — SIGNIFICANT CHANGE UP (ref 96–108)
CHOLEST SERPL-MCNC: 110 MG/DL — SIGNIFICANT CHANGE UP
CK MB CFR SERPL CALC: 2.6 NG/ML — SIGNIFICANT CHANGE UP (ref 0–6.7)
CK SERPL-CCNC: 118 U/L — SIGNIFICANT CHANGE UP (ref 25–170)
CO2 SERPL-SCNC: 31 MMOL/L — SIGNIFICANT CHANGE UP (ref 22–31)
CREAT SERPL-MCNC: 0.67 MG/DL — SIGNIFICANT CHANGE UP (ref 0.5–1.3)
EGFR: 98 ML/MIN/1.73M2 — SIGNIFICANT CHANGE UP
EOSINOPHIL # BLD AUTO: 0.08 K/UL — SIGNIFICANT CHANGE UP (ref 0–0.5)
EOSINOPHIL NFR BLD AUTO: 0.9 % — SIGNIFICANT CHANGE UP (ref 0–6)
ESTIMATED AVERAGE GLUCOSE: 146 MG/DL — HIGH (ref 68–114)
GLUCOSE BLDC GLUCOMTR-MCNC: 106 MG/DL — HIGH (ref 70–99)
GLUCOSE BLDC GLUCOMTR-MCNC: 83 MG/DL — SIGNIFICANT CHANGE UP (ref 70–99)
GLUCOSE SERPL-MCNC: 83 MG/DL — SIGNIFICANT CHANGE UP (ref 70–99)
HCT VFR BLD CALC: 45.5 % — HIGH (ref 34.5–45)
HDLC SERPL-MCNC: 48 MG/DL — LOW
HGB BLD-MCNC: 14.5 G/DL — SIGNIFICANT CHANGE UP (ref 11.5–15.5)
IMM GRANULOCYTES NFR BLD AUTO: 0.6 % — SIGNIFICANT CHANGE UP (ref 0–0.9)
INR BLD: 1.03 — SIGNIFICANT CHANGE UP (ref 0.88–1.16)
ISTAT INR: 1.1 — SIGNIFICANT CHANGE UP (ref 0.88–1.16)
ISTAT PT: 12.7 SEC — SIGNIFICANT CHANGE UP (ref 10–12.9)
LIPID PNL WITH DIRECT LDL SERPL: 11 MG/DL — SIGNIFICANT CHANGE UP
LYMPHOCYTES # BLD AUTO: 3.52 K/UL — HIGH (ref 1–3.3)
LYMPHOCYTES # BLD AUTO: 39.2 % — SIGNIFICANT CHANGE UP (ref 13–44)
MAGNESIUM SERPL-MCNC: 1.5 MG/DL — LOW (ref 1.6–2.6)
MCHC RBC-ENTMCNC: 26.8 PG — LOW (ref 27–34)
MCHC RBC-ENTMCNC: 31.9 GM/DL — LOW (ref 32–36)
MCV RBC AUTO: 83.9 FL — SIGNIFICANT CHANGE UP (ref 80–100)
MONOCYTES # BLD AUTO: 0.67 K/UL — SIGNIFICANT CHANGE UP (ref 0–0.9)
MONOCYTES NFR BLD AUTO: 7.5 % — SIGNIFICANT CHANGE UP (ref 2–14)
NEUTROPHILS # BLD AUTO: 4.63 K/UL — SIGNIFICANT CHANGE UP (ref 1.8–7.4)
NEUTROPHILS NFR BLD AUTO: 51.5 % — SIGNIFICANT CHANGE UP (ref 43–77)
NON HDL CHOLESTEROL: 62 MG/DL — SIGNIFICANT CHANGE UP
NRBC # BLD: 0 /100 WBCS — SIGNIFICANT CHANGE UP (ref 0–0)
PLATELET # BLD AUTO: 308 K/UL — SIGNIFICANT CHANGE UP (ref 150–400)
POCT ISTAT CREATININE: 0.7 MG/DL — SIGNIFICANT CHANGE UP (ref 0.5–1.3)
POTASSIUM SERPL-MCNC: 3 MMOL/L — LOW (ref 3.5–5.3)
POTASSIUM SERPL-SCNC: 3 MMOL/L — LOW (ref 3.5–5.3)
PROT SERPL-MCNC: 8.5 G/DL — HIGH (ref 6–8.3)
PROTHROM AB SERPL-ACNC: 12.3 SEC — SIGNIFICANT CHANGE UP (ref 10.5–13.4)
RBC # BLD: 5.42 M/UL — HIGH (ref 3.8–5.2)
RBC # FLD: 13.3 % — SIGNIFICANT CHANGE UP (ref 10.3–14.5)
SODIUM SERPL-SCNC: 140 MMOL/L — SIGNIFICANT CHANGE UP (ref 135–145)
TRIGL SERPL-MCNC: 257 MG/DL — HIGH
WBC # BLD: 8.98 K/UL — SIGNIFICANT CHANGE UP (ref 3.8–10.5)
WBC # FLD AUTO: 8.98 K/UL — SIGNIFICANT CHANGE UP (ref 3.8–10.5)

## 2022-09-22 PROCEDURE — C1769: CPT

## 2022-09-22 PROCEDURE — C1887: CPT

## 2022-09-22 PROCEDURE — 82565 ASSAY OF CREATININE: CPT

## 2022-09-22 PROCEDURE — 82553 CREATINE MB FRACTION: CPT

## 2022-09-22 PROCEDURE — 85730 THROMBOPLASTIN TIME PARTIAL: CPT

## 2022-09-22 PROCEDURE — 93005 ELECTROCARDIOGRAM TRACING: CPT

## 2022-09-22 PROCEDURE — 93455 CORONARY ART/GRFT ANGIO S&I: CPT

## 2022-09-22 PROCEDURE — 85610 PROTHROMBIN TIME: CPT

## 2022-09-22 PROCEDURE — 82962 GLUCOSE BLOOD TEST: CPT

## 2022-09-22 PROCEDURE — 99152 MOD SED SAME PHYS/QHP 5/>YRS: CPT

## 2022-09-22 PROCEDURE — 85025 COMPLETE CBC W/AUTO DIFF WBC: CPT

## 2022-09-22 PROCEDURE — 80061 LIPID PANEL: CPT

## 2022-09-22 PROCEDURE — 93010 ELECTROCARDIOGRAM REPORT: CPT

## 2022-09-22 PROCEDURE — C1894: CPT

## 2022-09-22 PROCEDURE — 93455 CORONARY ART/GRFT ANGIO S&I: CPT | Mod: 26

## 2022-09-22 PROCEDURE — 83036 HEMOGLOBIN GLYCOSYLATED A1C: CPT

## 2022-09-22 PROCEDURE — 83735 ASSAY OF MAGNESIUM: CPT

## 2022-09-22 PROCEDURE — 82550 ASSAY OF CK (CPK): CPT

## 2022-09-22 PROCEDURE — 80053 COMPREHEN METABOLIC PANEL: CPT

## 2022-09-22 PROCEDURE — 36415 COLL VENOUS BLD VENIPUNCTURE: CPT

## 2022-09-22 RX ORDER — INSULIN LISPRO 100/ML
VIAL (ML) SUBCUTANEOUS ONCE
Refills: 0 | Status: DISCONTINUED | OUTPATIENT
Start: 2022-09-22 | End: 2022-10-07

## 2022-09-22 RX ORDER — SODIUM CHLORIDE 9 MG/ML
1000 INJECTION, SOLUTION INTRAVENOUS
Refills: 0 | Status: DISCONTINUED | OUTPATIENT
Start: 2022-09-22 | End: 2022-10-07

## 2022-09-22 RX ORDER — DEXTROSE 50 % IN WATER 50 %
25 SYRINGE (ML) INTRAVENOUS ONCE
Refills: 0 | Status: DISCONTINUED | OUTPATIENT
Start: 2022-09-22 | End: 2022-10-07

## 2022-09-22 RX ORDER — DEXTROSE 50 % IN WATER 50 %
12.5 SYRINGE (ML) INTRAVENOUS ONCE
Refills: 0 | Status: DISCONTINUED | OUTPATIENT
Start: 2022-09-22 | End: 2022-10-07

## 2022-09-22 RX ORDER — SODIUM CHLORIDE 9 MG/ML
500 INJECTION INTRAMUSCULAR; INTRAVENOUS; SUBCUTANEOUS
Refills: 0 | Status: DISCONTINUED | OUTPATIENT
Start: 2022-09-22 | End: 2022-09-22

## 2022-09-22 RX ORDER — METFORMIN HYDROCHLORIDE 850 MG/1
1 TABLET ORAL
Qty: 0 | Refills: 0 | DISCHARGE

## 2022-09-22 RX ORDER — MAGNESIUM SULFATE 500 MG/ML
2 VIAL (ML) INJECTION ONCE
Refills: 0 | Status: COMPLETED | OUTPATIENT
Start: 2022-09-22 | End: 2022-09-22

## 2022-09-22 RX ORDER — POTASSIUM CHLORIDE 20 MEQ
40 PACKET (EA) ORAL ONCE
Refills: 0 | Status: COMPLETED | OUTPATIENT
Start: 2022-09-22 | End: 2022-09-22

## 2022-09-22 RX ORDER — POTASSIUM CHLORIDE 20 MEQ
1 PACKET (EA) ORAL
Qty: 0 | Refills: 0 | DISCHARGE

## 2022-09-22 RX ORDER — SODIUM CHLORIDE 9 MG/ML
500 INJECTION INTRAMUSCULAR; INTRAVENOUS; SUBCUTANEOUS
Refills: 0 | Status: DISCONTINUED | OUTPATIENT
Start: 2022-09-22 | End: 2022-10-07

## 2022-09-22 RX ORDER — DEXTROSE 50 % IN WATER 50 %
15 SYRINGE (ML) INTRAVENOUS ONCE
Refills: 0 | Status: DISCONTINUED | OUTPATIENT
Start: 2022-09-22 | End: 2022-10-07

## 2022-09-22 RX ORDER — SODIUM CHLORIDE 9 MG/ML
250 INJECTION INTRAMUSCULAR; INTRAVENOUS; SUBCUTANEOUS ONCE
Refills: 0 | Status: COMPLETED | OUTPATIENT
Start: 2022-09-22 | End: 2022-09-22

## 2022-09-22 RX ORDER — GLUCAGON INJECTION, SOLUTION 0.5 MG/.1ML
1 INJECTION, SOLUTION SUBCUTANEOUS ONCE
Refills: 0 | Status: DISCONTINUED | OUTPATIENT
Start: 2022-09-22 | End: 2022-10-07

## 2022-09-22 RX ORDER — POTASSIUM CHLORIDE 20 MEQ
10 PACKET (EA) ORAL ONCE
Refills: 0 | Status: DISCONTINUED | OUTPATIENT
Start: 2022-09-22 | End: 2022-09-22

## 2022-09-22 RX ORDER — LISINOPRIL 2.5 MG/1
1 TABLET ORAL
Qty: 0 | Refills: 0 | DISCHARGE

## 2022-09-22 RX ORDER — CLOPIDOGREL BISULFATE 75 MG/1
600 TABLET, FILM COATED ORAL ONCE
Refills: 0 | Status: COMPLETED | OUTPATIENT
Start: 2022-09-22 | End: 2022-09-22

## 2022-09-22 RX ORDER — POTASSIUM CHLORIDE 20 MEQ
60 PACKET (EA) ORAL ONCE
Refills: 0 | Status: COMPLETED | OUTPATIENT
Start: 2022-09-22 | End: 2022-09-22

## 2022-09-22 RX ADMIN — Medication 40 MILLIEQUIVALENT(S): at 19:33

## 2022-09-22 RX ADMIN — Medication 60 MILLIEQUIVALENT(S): at 14:55

## 2022-09-22 RX ADMIN — SODIUM CHLORIDE 100 MILLILITER(S): 9 INJECTION INTRAMUSCULAR; INTRAVENOUS; SUBCUTANEOUS at 19:35

## 2022-09-22 RX ADMIN — Medication 25 GRAM(S): at 15:20

## 2022-09-22 RX ADMIN — CLOPIDOGREL BISULFATE 600 MILLIGRAM(S): 75 TABLET, FILM COATED ORAL at 14:55

## 2022-09-22 RX ADMIN — SODIUM CHLORIDE 500 MILLILITER(S): 9 INJECTION INTRAMUSCULAR; INTRAVENOUS; SUBCUTANEOUS at 14:58

## 2022-09-22 NOTE — PROGRESS NOTE ADULT - SUBJECTIVE AND OBJECTIVE BOX
Interventional Cardiology PA SDA Discharge Note    Patient without complaints.     Afebrile, VSS    Ext:    		  		       Left              Radial :  no  hematoma,  no   bleeding, dressing; C/D/I      Pulses:    intact RAD to baseline     A/P:      63 y/o female, current smoker, with FHx MI, PMHx of asthma/COPD, sciatica/herniated disc, HTN, HLD, DM-2, obesity, mod-severe AS s/p bioAVR/ascending aorta replacement and CABG SVG-PDA 05/2017 @ [facility], presented to Dr. Melendez c/o intermittent chest pain, described as pinching sensation, occurring independent of activity. Occasionally associated with SOB and fatigue. No dizziness, palpitations, or LOC. Exercise capacity limited by chronic back pain due to sciatica. She underwent CCTA 8/17/22: SVG-RPDA patent, severe dLAD, severe dRCA. Unable to assess mLAD and mRCA due to calcified plaque. Mild to moderate pLCx. Last echo 7/2/20: normal EF, bioprosthetic AV without evidence of MR.  In light of risk factors, hx of CAD, CCS class IV anginal symptoms, and abnormal CCTA, patient is referred for cardiac catheterization with possible intervention if clinically indicated.     Pt s/p diagnostic cardiac cath 9/22/22:            1.	Stable for discharge as per attending  ____Jeanmarie_____ after bed rest, pt voids, wrist stable and 30 minutes of ambulation.  2.	Follow-up with PMD/Cardiologist ___Dr. phillip ________ in 1-2 weeks  3.	Discharged forms signed and copies in chart    Interventional Cardiology PA SDA Discharge Note    Patient without complaints.     Afebrile, VSS    Ext:    		  		       Left              Radial :  no  hematoma,  no   bleeding, dressing; C/D/I      Pulses:    intact RAD to baseline     A/P:      61 y/o female, current smoker, with FHx MI, PMHx of asthma/COPD, sciatica/herniated disc, HTN, HLD, DM-2, obesity, mod-severe AS s/p bioAVR/ascending aorta replacement and CABG SVG-PDA 05/2017 @ [facility], presented to Dr. Melendez c/o intermittent chest pain, described as pinching sensation, occurring independent of activity. Occasionally associated with SOB and fatigue. No dizziness, palpitations, or LOC. Exercise capacity limited by chronic back pain due to sciatica. She underwent CCTA 8/17/22: SVG-RPDA patent, severe dLAD, severe dRCA. Unable to assess mLAD and mRCA due to calcified plaque. Mild to moderate pLCx. Last echo 7/2/20: normal EF, bioprosthetic AV without evidence of MR.  In light of risk factors, hx of CAD, CCS class IV anginal symptoms, and abnormal CCTA, patient is referred for cardiac catheterization with possible intervention if clinically indicated.     Pt s/p diagnostic cardiac cath 9/22/22:  LM: LI, mLAD mild to mod LI (calcified), LCX: mild LI, mRCA 80-90 % supplied distally by SVG-dRCA.           1.	Stable for discharge as per attending  ____Jeanmarie_____ after bed rest, pt voids, wrist stable and 30 minutes of ambulation.  2.	Follow-up with PMD/Cardiologist ___Dr. phillip ________ in 1-2 weeks  3.	Discharged forms signed and copies in chart

## 2022-09-29 DIAGNOSIS — I25.84 CORONARY ATHEROSCLEROSIS DUE TO CALCIFIED CORONARY LESION: ICD-10-CM

## 2022-09-29 DIAGNOSIS — I25.710 ATHEROSCLEROSIS OF AUTOLOGOUS VEIN CORONARY ARTERY BYPASS GRAFT(S) WITH UNSTABLE ANGINA PECTORIS: ICD-10-CM

## 2022-09-29 DIAGNOSIS — R94.39 ABNORMAL RESULT OF OTHER CARDIOVASCULAR FUNCTION STUDY: ICD-10-CM

## 2022-09-29 DIAGNOSIS — Z82.49 FAMILY HISTORY OF ISCHEMIC HEART DISEASE AND OTHER DISEASES OF THE CIRCULATORY SYSTEM: ICD-10-CM

## 2022-11-22 ENCOUNTER — APPOINTMENT (OUTPATIENT)
Dept: HEART AND VASCULAR | Facility: CLINIC | Age: 62
End: 2022-11-22

## 2022-11-30 NOTE — ED ADULT TRIAGE NOTE - HEART RATE (BEATS/MIN)
Need the follow up lab before treatment decision.   Please see if she has had positive titer in the past.    105

## 2022-12-15 NOTE — DISCHARGE NOTE ADULT - NSFTFHOMEOTHERFT_GEN_ALL_CORE
Oxygen required.  Monitor for O2Sat Qbrexza Pregnancy And Lactation Text: There is no available data on Qbrexza use in pregnant women.  There is no available data on Qbrexza use in lactation.

## 2023-01-06 PROBLEM — I25.10 ATHEROSCLEROTIC HEART DISEASE OF NATIVE CORONARY ARTERY WITHOUT ANGINA PECTORIS: Chronic | Status: ACTIVE | Noted: 2022-09-20

## 2023-01-09 NOTE — ED ADULT TRIAGE NOTE - HEIGHT IN CM
Outpatient Physical Therapy  DAILY TREATMENT     [facility] [place] [address] [email or web address] [facility], [address]  [place] [address]  Phone:  [phone]    Date: 01/09/2023    Patient: Meggan Espinzoa  YOB: 1973  MRN: [id number]     Time Calculation    Start time: 0845  Stop time: 0930 Time Calculation (min): 45 minutes         Chief Complaint: Back problem  Visit #: 6    SUBJECTIVE:  Slipped and fell on ice in parking lot of apartment., back feels stiff but no increased pain.  Patient has had injection left shoulder -not effective.       OBJECTIVE:  Current objective measures: decrease//better with lumbar extension mobilization          Therapeutic Exercises (CPT 75357):     1. REIL, x 10 every 2 hours    2. prone on elbows, 1-2 min 3 x day    3. posture correction using lumbar roll    4. stepper L5 x 5 min, L4.5 5 min, 10 min    5. clamshell with pink tband, 1 x 10, 3 x 30 sec holds b    6. bridge with pink resistance tband, 2 x 10, 2 x 30 sec hold with arms at 90 degrees flex    7. sit to stand with green  tband, 3 X 10 (1X10 WITH 5LB WEIGHT)    8. lateral walk wieth green tband, x 1 min EACH DIRECTION    9. BALL ROLL ta STABILITY, X 2 MIN      Therapeutic Exercise Summary: Access Code: [email or web address]  URL: [email or web address]  Date: 11/29/2022  Prepared by: Taylor Jackson    Exercises  Supine Bridge - 1 x daily - 7 x weekly - 1 sets - 5 reps - 30 hold  Clamshell - 1 x daily - 7 x weekly - 3 sets - 10 reps  Prone Press Up - 6 x daily - 7 x weekly - 1 sets - 10 reps      Therapeutic Treatments and Modalities:     1. Neuromuscular Re-education (CPT 17892), lumbar extension mob prone in neutral 2 x10 with inhalation/exhalation, increase//NW  Time-based treatments/modalities:    Physical Therapy Timed Treatment Charges  Neuromusc re-ed, balance, coor, post minutes (CPT 51705): 10 minutes  Therapeutic exercise minutes (CPT 62132): 35 minutes  ASSESSMENT:   Response to treatment: patient  responding well to posterior chain work and lumbar stability.  Symptoms improve post exercise.  COntinue with current POC    PLAN/RECOMMENDATIONS:   Plan for treatment: therapy treatment to continue next visit.  Planned interventions for next visit: continue with current treatment.          160.02

## 2023-01-19 ENCOUNTER — EMERGENCY (EMERGENCY)
Facility: HOSPITAL | Age: 63
LOS: 1 days | Discharge: HOME | End: 2023-01-19
Attending: STUDENT IN AN ORGANIZED HEALTH CARE EDUCATION/TRAINING PROGRAM | Admitting: STUDENT IN AN ORGANIZED HEALTH CARE EDUCATION/TRAINING PROGRAM
Payer: MEDICARE

## 2023-01-19 VITALS
WEIGHT: 195.11 LBS | SYSTOLIC BLOOD PRESSURE: 100 MMHG | HEART RATE: 83 BPM | DIASTOLIC BLOOD PRESSURE: 71 MMHG | OXYGEN SATURATION: 94 % | RESPIRATION RATE: 17 BRPM | HEIGHT: 63 IN | TEMPERATURE: 98 F

## 2023-01-19 VITALS
RESPIRATION RATE: 17 BRPM | HEART RATE: 64 BPM | DIASTOLIC BLOOD PRESSURE: 69 MMHG | OXYGEN SATURATION: 95 % | TEMPERATURE: 98 F | SYSTOLIC BLOOD PRESSURE: 101 MMHG

## 2023-01-19 DIAGNOSIS — G56.00 CARPAL TUNNEL SYNDROME, UNSPECIFIED UPPER LIMB: Chronic | ICD-10-CM

## 2023-01-19 DIAGNOSIS — Z98.890 OTHER SPECIFIED POSTPROCEDURAL STATES: Chronic | ICD-10-CM

## 2023-01-19 DIAGNOSIS — D25.9 LEIOMYOMA OF UTERUS, UNSPECIFIED: Chronic | ICD-10-CM

## 2023-01-19 DIAGNOSIS — Z90.49 ACQUIRED ABSENCE OF OTHER SPECIFIED PARTS OF DIGESTIVE TRACT: Chronic | ICD-10-CM

## 2023-01-19 DIAGNOSIS — Z95.2 PRESENCE OF PROSTHETIC HEART VALVE: Chronic | ICD-10-CM

## 2023-01-19 LAB
ALBUMIN SERPL ELPH-MCNC: 4.3 G/DL — SIGNIFICANT CHANGE UP (ref 3.3–5)
ALP SERPL-CCNC: 33 U/L — LOW (ref 40–120)
ALT FLD-CCNC: 24 U/L — SIGNIFICANT CHANGE UP (ref 10–45)
ANION GAP SERPL CALC-SCNC: 11 MMOL/L — SIGNIFICANT CHANGE UP (ref 5–17)
APTT BLD: 31.7 SEC — SIGNIFICANT CHANGE UP (ref 27.5–35.5)
AST SERPL-CCNC: 24 U/L — SIGNIFICANT CHANGE UP (ref 10–40)
BASOPHILS # BLD AUTO: 0.03 K/UL — SIGNIFICANT CHANGE UP (ref 0–0.2)
BASOPHILS NFR BLD AUTO: 0.3 % — SIGNIFICANT CHANGE UP (ref 0–2)
BILIRUB SERPL-MCNC: 0.2 MG/DL — SIGNIFICANT CHANGE UP (ref 0.2–1.2)
BUN SERPL-MCNC: 14 MG/DL — SIGNIFICANT CHANGE UP (ref 7–23)
CALCIUM SERPL-MCNC: 9.8 MG/DL — SIGNIFICANT CHANGE UP (ref 8.4–10.5)
CHLORIDE SERPL-SCNC: 97 MMOL/L — SIGNIFICANT CHANGE UP (ref 96–108)
CK MB CFR SERPL CALC: 1.8 NG/ML — SIGNIFICANT CHANGE UP (ref 0–6.7)
CK SERPL-CCNC: 63 U/L — SIGNIFICANT CHANGE UP (ref 25–170)
CO2 SERPL-SCNC: 28 MMOL/L — SIGNIFICANT CHANGE UP (ref 22–31)
CREAT SERPL-MCNC: 0.71 MG/DL — SIGNIFICANT CHANGE UP (ref 0.5–1.3)
EGFR: 95 ML/MIN/1.73M2 — SIGNIFICANT CHANGE UP
EOSINOPHIL # BLD AUTO: 0.1 K/UL — SIGNIFICANT CHANGE UP (ref 0–0.5)
EOSINOPHIL NFR BLD AUTO: 1.1 % — SIGNIFICANT CHANGE UP (ref 0–6)
GLUCOSE SERPL-MCNC: 94 MG/DL — SIGNIFICANT CHANGE UP (ref 70–99)
HCT VFR BLD CALC: 45.2 % — HIGH (ref 34.5–45)
HGB BLD-MCNC: 14.1 G/DL — SIGNIFICANT CHANGE UP (ref 11.5–15.5)
IMM GRANULOCYTES NFR BLD AUTO: 0.6 % — SIGNIFICANT CHANGE UP (ref 0–0.9)
INR BLD: 0.99 — SIGNIFICANT CHANGE UP (ref 0.88–1.16)
LYMPHOCYTES # BLD AUTO: 3.16 K/UL — SIGNIFICANT CHANGE UP (ref 1–3.3)
LYMPHOCYTES # BLD AUTO: 33.9 % — SIGNIFICANT CHANGE UP (ref 13–44)
MCHC RBC-ENTMCNC: 26.5 PG — LOW (ref 27–34)
MCHC RBC-ENTMCNC: 31.2 GM/DL — LOW (ref 32–36)
MCV RBC AUTO: 85 FL — SIGNIFICANT CHANGE UP (ref 80–100)
MONOCYTES # BLD AUTO: 0.6 K/UL — SIGNIFICANT CHANGE UP (ref 0–0.9)
MONOCYTES NFR BLD AUTO: 6.4 % — SIGNIFICANT CHANGE UP (ref 2–14)
NEUTROPHILS # BLD AUTO: 5.37 K/UL — SIGNIFICANT CHANGE UP (ref 1.8–7.4)
NEUTROPHILS NFR BLD AUTO: 57.7 % — SIGNIFICANT CHANGE UP (ref 43–77)
NRBC # BLD: 0 /100 WBCS — SIGNIFICANT CHANGE UP (ref 0–0)
PLATELET # BLD AUTO: 307 K/UL — SIGNIFICANT CHANGE UP (ref 150–400)
POTASSIUM SERPL-MCNC: 4.2 MMOL/L — SIGNIFICANT CHANGE UP (ref 3.5–5.3)
POTASSIUM SERPL-SCNC: 4.2 MMOL/L — SIGNIFICANT CHANGE UP (ref 3.5–5.3)
PROT SERPL-MCNC: 7.6 G/DL — SIGNIFICANT CHANGE UP (ref 6–8.3)
PROTHROM AB SERPL-ACNC: 11.8 SEC — SIGNIFICANT CHANGE UP (ref 10.5–13.4)
RBC # BLD: 5.32 M/UL — HIGH (ref 3.8–5.2)
RBC # FLD: 13.6 % — SIGNIFICANT CHANGE UP (ref 10.3–14.5)
SARS-COV-2 RNA SPEC QL NAA+PROBE: NEGATIVE — SIGNIFICANT CHANGE UP
SODIUM SERPL-SCNC: 136 MMOL/L — SIGNIFICANT CHANGE UP (ref 135–145)
TROPONIN T SERPL-MCNC: 0.01 NG/ML — SIGNIFICANT CHANGE UP (ref 0–0.01)
WBC # BLD: 9.32 K/UL — SIGNIFICANT CHANGE UP (ref 3.8–10.5)
WBC # FLD AUTO: 9.32 K/UL — SIGNIFICANT CHANGE UP (ref 3.8–10.5)

## 2023-01-19 PROCEDURE — 84484 ASSAY OF TROPONIN QUANT: CPT

## 2023-01-19 PROCEDURE — 36415 COLL VENOUS BLD VENIPUNCTURE: CPT

## 2023-01-19 PROCEDURE — 85025 COMPLETE CBC W/AUTO DIFF WBC: CPT

## 2023-01-19 PROCEDURE — 85730 THROMBOPLASTIN TIME PARTIAL: CPT

## 2023-01-19 PROCEDURE — 80053 COMPREHEN METABOLIC PANEL: CPT

## 2023-01-19 PROCEDURE — 71045 X-RAY EXAM CHEST 1 VIEW: CPT | Mod: 26

## 2023-01-19 PROCEDURE — 82553 CREATINE MB FRACTION: CPT

## 2023-01-19 PROCEDURE — 82550 ASSAY OF CK (CPK): CPT

## 2023-01-19 PROCEDURE — 93005 ELECTROCARDIOGRAM TRACING: CPT

## 2023-01-19 PROCEDURE — 99285 EMERGENCY DEPT VISIT HI MDM: CPT

## 2023-01-19 PROCEDURE — 87635 SARS-COV-2 COVID-19 AMP PRB: CPT

## 2023-01-19 PROCEDURE — 85610 PROTHROMBIN TIME: CPT

## 2023-01-19 PROCEDURE — 71045 X-RAY EXAM CHEST 1 VIEW: CPT

## 2023-01-19 PROCEDURE — 99285 EMERGENCY DEPT VISIT HI MDM: CPT | Mod: 25

## 2023-01-19 RX ORDER — ASPIRIN/CALCIUM CARB/MAGNESIUM 324 MG
162 TABLET ORAL DAILY
Refills: 0 | Status: DISCONTINUED | OUTPATIENT
Start: 2023-01-19 | End: 2023-01-23

## 2023-01-19 RX ORDER — LIDOCAINE 4 G/100G
1 CREAM TOPICAL ONCE
Refills: 0 | Status: COMPLETED | OUTPATIENT
Start: 2023-01-19 | End: 2023-01-19

## 2023-01-19 RX ORDER — CYCLOBENZAPRINE HYDROCHLORIDE 10 MG/1
10 TABLET, FILM COATED ORAL ONCE
Refills: 0 | Status: COMPLETED | OUTPATIENT
Start: 2023-01-19 | End: 2023-01-19

## 2023-01-19 RX ADMIN — Medication 162 MILLIGRAM(S): at 15:54

## 2023-01-19 RX ADMIN — CYCLOBENZAPRINE HYDROCHLORIDE 10 MILLIGRAM(S): 10 TABLET, FILM COATED ORAL at 15:54

## 2023-01-19 RX ADMIN — LIDOCAINE 1 PATCH: 4 CREAM TOPICAL at 15:54

## 2023-01-19 NOTE — ED PROVIDER NOTE - NSFOLLOWUPINSTRUCTIONS_ED_ALL_ED_FT
Please take tylenol or motrin as needed. Please return for any worsening symptoms. You should follow up closely with your primary physician.

## 2023-01-19 NOTE — ED PROVIDER NOTE - NS ED ROS FT
Constitutional: No fever or chills  Eyes: No discharge or drainage  Ears, Nose, Mouth, Throat: No nasal discharge, no sore throat  Cardiovascular: + chest pain, no palpitations  Respiratory: No shortness of breath, no cough  Gastrointestinal: No nausea or vomiting, no abdominal pain, no diarrhea or constipation  Musculoskeletal: No joint pain, no swelling  Skin: No rashes or lesions  Neurological: No numbness, weakness, tingling, no headache

## 2023-01-19 NOTE — ED ADULT NURSE NOTE - OBJECTIVE STATEMENT
pt received into spot 11 A&Ox4 amb appears comfortable biba from home for eval chest pressure and left arm discomfort worse with movement and touch repsiraitons are unlearned abd nondistended 12 lead ekg done nsr noted iv placed labs sent

## 2023-01-19 NOTE — ED PROVIDER NOTE - WR INTERPRETATION 1
Hpi Title: Evaluation of Skin Lesions
How Severe Are Your Spot(S)?: mild
Have Your Spot(S) Been Treated In The Past?: has not been treated
Family Member: Mother
Location: Face
CXR negative - No pneumothorax, No opacities, No free air

## 2023-01-19 NOTE — ED ADULT TRIAGE NOTE - RESPIRATORY RATE (BREATHS/MIN)
I called the patient to follow up  She states she feels "ok"  She reports her fasting blood sugar this morning was 175 and notes her levels "are coming down"  She states they are mostly running in the 100-200's and no more readings of 300-400  She notes she continues to struggle with carbs but is decreasing portion sizes when she does eat them  I offered encouragement and support  The patient states she recently moved and has not unpacked yet  She has been keeping busy with the new residence  The patient reports she has been having a feeling of being "off balance" and feels unsteady on her feet  She states this has been happing for a few months but is worsening over the past 2 weeks  She notes her daughter needs to hold onto her when she is walking in case she feels like she falls  The patient does not believe this is related to her sugars  She states it is a feeling of dizziness and does not feel like she is walking straight  She does not have a tendency to lean to one side more than the other  She denies any head pain or vision changes (she does admit to chronic vision issues and is seeing an eye doctor in April)  She denies any ear issues  The patient reports she has a history of having a high or low magnesium and potassium but cannot recall specifics  The patient also mentioned that her daughter wants her tested for dementia as the patient tends to forget often  I will continue to follow  17

## 2023-01-19 NOTE — ED PROVIDER NOTE - CLINICAL SUMMARY MEDICAL DECISION MAKING FREE TEXT BOX
62 year old female current smoker, with FHx MI, PMHx of asthma/COPD, sciatica/herniated disc, HTN, HLD, DM-2, obesity, mod-severe AS s/p  bioAVR/ascending aorta replacement and CABG SVG-PDA 05/2017, recent cath that was unremarkable presenting with 2 days of constant L shoulder, L upper back discomfort radiating down arm. EKG unchanged from prior, low suspicion for ACS but with risk factors, will send trop X 1 (2 days of constant symptoms). May be radicular component given radiation to arm, neuro intact, no indication for emergent cervical spinal imaging at this time, vascular exam also wnl, no carotid bruits, 2+ symmetric radial pulses bilaterally. Will obtain CXR, will trial NSAID, flexeril, lidocaine patch, reassess.

## 2023-01-19 NOTE — ED PROVIDER NOTE - OBJECTIVE STATEMENT
62 year old female current smoker, with FHx MI, PMHx of asthma/COPD, sciatica/herniated disc, HTN, HLD, DM-2, obesity, mod-severe AS s/p  bioAVR/ascending aorta replacement and CABG SVG-PDA 05/2017, recent cath that was unremarkable presenting with 2 days of constant L shoulder, L upper back discomfort radiating down arm. Pt took 2 baby ASA PTA. Denies fever, chills, states pain is nonexertional and nonpleuritic. No recent travel, no history of DVT or PE, no lightheadedness or dizziness, no neck pain or discomfort, no numbness or weakness or tingling currently. No leg pain or leg swelling. ROS as above.

## 2023-01-19 NOTE — ED PROVIDER NOTE - PHYSICAL EXAMINATION
General: Well appearing, in no acute distress  HEENT: Normocephalic, atraumatic, extraocular movements intact  CV: Regular rate, 2+pulses bilaterally  Pulm: No respiratory distress, no tachypnea, CTAB, no w/r/r  Abd: Flat, no gross distension, soft, nontender  Ext: warm and well perfused, no LE edema  Skin: No gross rashes or lesions  Neuro: Alert and oriented, moving all extremities, motor and sensation intact bilaterally

## 2023-01-19 NOTE — ED ADULT TRIAGE NOTE - OTHER COMPLAINTS
chest pressure radiating to left arm with tingling since yesterday. hx of MVR, anxiety, asthma, htn. ekg in progress

## 2023-01-22 DIAGNOSIS — Z20.822 CONTACT WITH AND (SUSPECTED) EXPOSURE TO COVID-19: ICD-10-CM

## 2023-01-22 DIAGNOSIS — Z82.49 FAMILY HISTORY OF ISCHEMIC HEART DISEASE AND OTHER DISEASES OF THE CIRCULATORY SYSTEM: ICD-10-CM

## 2023-01-22 DIAGNOSIS — E78.5 HYPERLIPIDEMIA, UNSPECIFIED: ICD-10-CM

## 2023-01-22 DIAGNOSIS — I25.10 ATHEROSCLEROTIC HEART DISEASE OF NATIVE CORONARY ARTERY WITHOUT ANGINA PECTORIS: ICD-10-CM

## 2023-01-22 DIAGNOSIS — Z95.1 PRESENCE OF AORTOCORONARY BYPASS GRAFT: ICD-10-CM

## 2023-01-22 DIAGNOSIS — M25.512 PAIN IN LEFT SHOULDER: ICD-10-CM

## 2023-01-22 DIAGNOSIS — E11.9 TYPE 2 DIABETES MELLITUS WITHOUT COMPLICATIONS: ICD-10-CM

## 2023-01-22 DIAGNOSIS — Z87.442 PERSONAL HISTORY OF URINARY CALCULI: ICD-10-CM

## 2023-01-22 DIAGNOSIS — Z87.42 PERSONAL HISTORY OF OTHER DISEASES OF THE FEMALE GENITAL TRACT: ICD-10-CM

## 2023-01-22 DIAGNOSIS — R07.89 OTHER CHEST PAIN: ICD-10-CM

## 2023-01-22 DIAGNOSIS — Z87.39 PERSONAL HISTORY OF OTHER DISEASES OF THE MUSCULOSKELETAL SYSTEM AND CONNECTIVE TISSUE: ICD-10-CM

## 2023-01-22 DIAGNOSIS — Z87.19 PERSONAL HISTORY OF OTHER DISEASES OF THE DIGESTIVE SYSTEM: ICD-10-CM

## 2023-01-22 DIAGNOSIS — M54.6 PAIN IN THORACIC SPINE: ICD-10-CM

## 2023-01-22 DIAGNOSIS — Z79.84 LONG TERM (CURRENT) USE OF ORAL HYPOGLYCEMIC DRUGS: ICD-10-CM

## 2023-01-22 DIAGNOSIS — Z90.49 ACQUIRED ABSENCE OF OTHER SPECIFIED PARTS OF DIGESTIVE TRACT: ICD-10-CM

## 2023-01-22 DIAGNOSIS — Z88.1 ALLERGY STATUS TO OTHER ANTIBIOTIC AGENTS STATUS: ICD-10-CM

## 2023-01-22 DIAGNOSIS — Z95.4 PRESENCE OF OTHER HEART-VALVE REPLACEMENT: ICD-10-CM

## 2023-01-22 DIAGNOSIS — Z79.82 LONG TERM (CURRENT) USE OF ASPIRIN: ICD-10-CM

## 2023-01-22 DIAGNOSIS — F17.200 NICOTINE DEPENDENCE, UNSPECIFIED, UNCOMPLICATED: ICD-10-CM

## 2023-01-22 DIAGNOSIS — J44.9 CHRONIC OBSTRUCTIVE PULMONARY DISEASE, UNSPECIFIED: ICD-10-CM

## 2023-01-22 DIAGNOSIS — I10 ESSENTIAL (PRIMARY) HYPERTENSION: ICD-10-CM

## 2023-02-07 ENCOUNTER — APPOINTMENT (OUTPATIENT)
Dept: HEART AND VASCULAR | Facility: CLINIC | Age: 63
End: 2023-02-07
Payer: MEDICARE

## 2023-02-07 VITALS
DIASTOLIC BLOOD PRESSURE: 67 MMHG | HEIGHT: 63 IN | HEART RATE: 72 BPM | WEIGHT: 195 LBS | SYSTOLIC BLOOD PRESSURE: 125 MMHG | OXYGEN SATURATION: 96 % | TEMPERATURE: 97.2 F | BODY MASS INDEX: 34.55 KG/M2

## 2023-02-07 PROCEDURE — 99214 OFFICE O/P EST MOD 30 MIN: CPT | Mod: 25

## 2023-02-07 PROCEDURE — 93000 ELECTROCARDIOGRAM COMPLETE: CPT

## 2023-02-07 NOTE — DISCUSSION/SUMMARY
[FreeTextEntry1] : Assessment/Plan:\par Patient is 62 yr old obese F with DM, HTN, HLD, asthma, + Covid 2020, status post AVR/ascending aortic replacement, CABG x1 on 5/22/17 seen today for follow up post cath which showed patent stents. With atypical CP in the past, recently seen in ER for shoulder pain. Otherwise without active SOB, fatigue with exertion. No dizziness, palpitations. No LOC\par Functional/exercise capacity is limited given chronic back pain due to sciatica, uses cane to ambulate \par \par \par -CAD s/p CABG x 1 (SVG-PDA)  2017: s/p cath 9/2022 with patent graft. EKG with old inferior wall MI, poor R wave progression. Cont ASA 81 mg qd, crestor 20 mg qd, zetia 10 mg qd. Cont toprol 50 mg qd \par \par -s/p bioAVR: with 3/6 MONIQUE on exam, known moderate AS.  ECHO ordered. Cont asa 81 mg qd\par \par -HTN: BP better controlled off lisinopril. Cont toprol 50 mg qd.\par \par fu 1 month for echo

## 2023-02-07 NOTE — HISTORY OF PRESENT ILLNESS
[FreeTextEntry1] : Patient is 62 yr old obese F with DM, HTN, HLD, asthma, + Covid 2020, status post AVR/ascending aortic replacement, CABG x1 on 5/22/17 seen today for follow up post cath which showed patent stents. With atypical CP in the past, recently seen in ER for shoulder pain. Otherwise without active SOB, fatigue with exertion. No dizziness, palpitations. No LOC\par Functional/exercise capacity is limited given chronic back pain due to sciatica, uses cane to ambulate

## 2023-02-20 NOTE — H&P ADULT - GASTROINTESTINAL
Care Due:                  Date            Visit Type   Department     Provider  --------------------------------------------------------------------------------                                EP -                              PRIMARY      [facility]  Last Visit: 02-      CARE ([facility])   PRIMARY CARE   Elvis Cui  Next Visit: None Scheduled  None         None Found                                                            Last  Test          Frequency    Reason                     Performed    Due Date  --------------------------------------------------------------------------------    HBA1C.......  6 months...  glimepiride, metFORMIN...  10-   04-    [facility] Embedded Care Gaps. Reference number: [id number]. 2/20/2023   8:03:09 AM CST   normal/soft/nontender/nondistended/normal active bowel sounds

## 2023-02-24 NOTE — ED PROVIDER NOTE - CROS ED GU ALL NEG
negative... Cimzia Pregnancy And Lactation Text: This medication crosses the placenta but can be considered safe in certain situations. Cimzia may be excreted in breast milk.

## 2023-03-10 ENCOUNTER — EMERGENCY (EMERGENCY)
Facility: HOSPITAL | Age: 63
LOS: 1 days | Discharge: HOME | End: 2023-03-10
Attending: EMERGENCY MEDICINE | Admitting: EMERGENCY MEDICINE
Payer: MEDICARE

## 2023-03-10 VITALS
TEMPERATURE: 98 F | SYSTOLIC BLOOD PRESSURE: 102 MMHG | RESPIRATION RATE: 18 BRPM | WEIGHT: 195.11 LBS | DIASTOLIC BLOOD PRESSURE: 64 MMHG | HEART RATE: 73 BPM | OXYGEN SATURATION: 100 % | HEIGHT: 63 IN

## 2023-03-10 DIAGNOSIS — G56.00 CARPAL TUNNEL SYNDROME, UNSPECIFIED UPPER LIMB: Chronic | ICD-10-CM

## 2023-03-10 DIAGNOSIS — Z98.890 OTHER SPECIFIED POSTPROCEDURAL STATES: Chronic | ICD-10-CM

## 2023-03-10 DIAGNOSIS — Z90.49 ACQUIRED ABSENCE OF OTHER SPECIFIED PARTS OF DIGESTIVE TRACT: Chronic | ICD-10-CM

## 2023-03-10 DIAGNOSIS — Z95.2 PRESENCE OF PROSTHETIC HEART VALVE: Chronic | ICD-10-CM

## 2023-03-10 DIAGNOSIS — D25.9 LEIOMYOMA OF UTERUS, UNSPECIFIED: Chronic | ICD-10-CM

## 2023-03-10 LAB
ALBUMIN SERPL ELPH-MCNC: 4.2 G/DL — SIGNIFICANT CHANGE UP (ref 3.3–5)
ALP SERPL-CCNC: 34 U/L — LOW (ref 40–120)
ALT FLD-CCNC: 17 U/L — SIGNIFICANT CHANGE UP (ref 10–45)
ANION GAP SERPL CALC-SCNC: 14 MMOL/L — SIGNIFICANT CHANGE UP (ref 5–17)
AST SERPL-CCNC: 26 U/L — SIGNIFICANT CHANGE UP (ref 10–40)
BASOPHILS # BLD AUTO: 0.03 K/UL — SIGNIFICANT CHANGE UP (ref 0–0.2)
BASOPHILS NFR BLD AUTO: 0.3 % — SIGNIFICANT CHANGE UP (ref 0–2)
BILIRUB SERPL-MCNC: 0.2 MG/DL — SIGNIFICANT CHANGE UP (ref 0.2–1.2)
BUN SERPL-MCNC: 14 MG/DL — SIGNIFICANT CHANGE UP (ref 7–23)
CALCIUM SERPL-MCNC: 10.5 MG/DL — SIGNIFICANT CHANGE UP (ref 8.4–10.5)
CHLORIDE SERPL-SCNC: 95 MMOL/L — LOW (ref 96–108)
CO2 SERPL-SCNC: 28 MMOL/L — SIGNIFICANT CHANGE UP (ref 22–31)
CREAT SERPL-MCNC: 0.78 MG/DL — SIGNIFICANT CHANGE UP (ref 0.5–1.3)
EGFR: 85 ML/MIN/1.73M2 — SIGNIFICANT CHANGE UP
EOSINOPHIL # BLD AUTO: 0.18 K/UL — SIGNIFICANT CHANGE UP (ref 0–0.5)
EOSINOPHIL NFR BLD AUTO: 1.8 % — SIGNIFICANT CHANGE UP (ref 0–6)
GLUCOSE SERPL-MCNC: 105 MG/DL — HIGH (ref 70–99)
HCT VFR BLD CALC: 43.7 % — SIGNIFICANT CHANGE UP (ref 34.5–45)
HGB BLD-MCNC: 13.7 G/DL — SIGNIFICANT CHANGE UP (ref 11.5–15.5)
IMM GRANULOCYTES NFR BLD AUTO: 1 % — HIGH (ref 0–0.9)
LIDOCAIN IGE QN: 36 U/L — SIGNIFICANT CHANGE UP (ref 7–60)
LYMPHOCYTES # BLD AUTO: 3.97 K/UL — HIGH (ref 1–3.3)
LYMPHOCYTES # BLD AUTO: 38.8 % — SIGNIFICANT CHANGE UP (ref 13–44)
MCHC RBC-ENTMCNC: 26.7 PG — LOW (ref 27–34)
MCHC RBC-ENTMCNC: 31.4 GM/DL — LOW (ref 32–36)
MCV RBC AUTO: 85.2 FL — SIGNIFICANT CHANGE UP (ref 80–100)
MONOCYTES # BLD AUTO: 0.74 K/UL — SIGNIFICANT CHANGE UP (ref 0–0.9)
MONOCYTES NFR BLD AUTO: 7.2 % — SIGNIFICANT CHANGE UP (ref 2–14)
NEUTROPHILS # BLD AUTO: 5.21 K/UL — SIGNIFICANT CHANGE UP (ref 1.8–7.4)
NEUTROPHILS NFR BLD AUTO: 50.9 % — SIGNIFICANT CHANGE UP (ref 43–77)
NRBC # BLD: 0 /100 WBCS — SIGNIFICANT CHANGE UP (ref 0–0)
PLATELET # BLD AUTO: 258 K/UL — SIGNIFICANT CHANGE UP (ref 150–400)
POTASSIUM SERPL-MCNC: 4 MMOL/L — SIGNIFICANT CHANGE UP (ref 3.5–5.3)
POTASSIUM SERPL-SCNC: 4 MMOL/L — SIGNIFICANT CHANGE UP (ref 3.5–5.3)
PROT SERPL-MCNC: 7.5 G/DL — SIGNIFICANT CHANGE UP (ref 6–8.3)
RBC # BLD: 5.13 M/UL — SIGNIFICANT CHANGE UP (ref 3.8–5.2)
RBC # FLD: 13.9 % — SIGNIFICANT CHANGE UP (ref 10.3–14.5)
SARS-COV-2 RNA SPEC QL NAA+PROBE: SIGNIFICANT CHANGE UP
SODIUM SERPL-SCNC: 137 MMOL/L — SIGNIFICANT CHANGE UP (ref 135–145)
WBC # BLD: 10.23 K/UL — SIGNIFICANT CHANGE UP (ref 3.8–10.5)
WBC # FLD AUTO: 10.23 K/UL — SIGNIFICANT CHANGE UP (ref 3.8–10.5)

## 2023-03-10 PROCEDURE — 83690 ASSAY OF LIPASE: CPT

## 2023-03-10 PROCEDURE — 96375 TX/PRO/DX INJ NEW DRUG ADDON: CPT

## 2023-03-10 PROCEDURE — 93005 ELECTROCARDIOGRAM TRACING: CPT

## 2023-03-10 PROCEDURE — 99284 EMERGENCY DEPT VISIT MOD MDM: CPT

## 2023-03-10 PROCEDURE — 87635 SARS-COV-2 COVID-19 AMP PRB: CPT

## 2023-03-10 PROCEDURE — 80053 COMPREHEN METABOLIC PANEL: CPT

## 2023-03-10 PROCEDURE — 99284 EMERGENCY DEPT VISIT MOD MDM: CPT | Mod: 25

## 2023-03-10 PROCEDURE — 96374 THER/PROPH/DIAG INJ IV PUSH: CPT

## 2023-03-10 PROCEDURE — 85025 COMPLETE CBC W/AUTO DIFF WBC: CPT

## 2023-03-10 PROCEDURE — 36415 COLL VENOUS BLD VENIPUNCTURE: CPT

## 2023-03-10 RX ORDER — DIPHENHYDRAMINE HCL 50 MG
25 CAPSULE ORAL ONCE
Refills: 0 | Status: COMPLETED | OUTPATIENT
Start: 2023-03-10 | End: 2023-03-10

## 2023-03-10 RX ORDER — METOCLOPRAMIDE HCL 10 MG
1 TABLET ORAL
Qty: 40 | Refills: 0
Start: 2023-03-10 | End: 2023-03-19

## 2023-03-10 RX ORDER — METOCLOPRAMIDE HCL 10 MG
10 TABLET ORAL ONCE
Refills: 0 | Status: COMPLETED | OUTPATIENT
Start: 2023-03-10 | End: 2023-03-10

## 2023-03-10 RX ORDER — SODIUM CHLORIDE 9 MG/ML
1000 INJECTION INTRAMUSCULAR; INTRAVENOUS; SUBCUTANEOUS ONCE
Refills: 0 | Status: COMPLETED | OUTPATIENT
Start: 2023-03-10 | End: 2023-03-10

## 2023-03-10 RX ADMIN — Medication 104 MILLIGRAM(S): at 19:07

## 2023-03-10 RX ADMIN — Medication 25 MILLIGRAM(S): at 19:06

## 2023-03-10 RX ADMIN — SODIUM CHLORIDE 1000 MILLILITER(S): 9 INJECTION INTRAMUSCULAR; INTRAVENOUS; SUBCUTANEOUS at 19:07

## 2023-03-10 NOTE — ED ADULT TRIAGE NOTE - OTHER COMPLAINTS
left upper back pain with belching x3-4 days. no relief from otc meds. sent in from . hx valve replacement, dm, htn. ekg in progress. denies cp nor sob.

## 2023-03-10 NOTE — ED PROVIDER NOTE - PATIENT PORTAL LINK FT
You can access the FollowMyHealth Patient Portal offered by [facility] by registering at the following website: [email or web address]. By joining [employer]’s FollowMyHealth portal, you will also be able to view your health information using other applications (apps) compatible with our system.

## 2023-03-10 NOTE — ED ADULT NURSE NOTE - OBJECTIVE STATEMENT
patient presents to ED with chest discomfort radiating to back, describes as dull and pressure. denies sob, dizziness, fever, cough, n/v. A&Ox3 stable at bed.

## 2023-03-10 NOTE — ED PROVIDER NOTE - OBJECTIVE STATEMENT
62 year old female current smoker, with FHx MI, PMHx of asthma/COPD, sciatica/herniated disc, HTN, HLD, DM-2, obesity, mod-severe AS s/p  bioAVR/ascending aorta replacement and CABG SVG-PDA 05/2017, recent cath that was unremarkable   co belching- spitting up white phlegm w eating int sx and into upper back- w mild nausea occ vomiting  jeannine bagel and coffee this am  no exertional dyspnea no f/c no sob no cp no cough  feels upper abd fullness and feels it into her back- has taken mylanta/maalox and tums without sig improvement  mod- severity  has had sx int for several years- todays are worse and w more back sx

## 2023-03-10 NOTE — ED ADULT NURSE NOTE - NS_NURSE_DISC_TEACHING_YN_ED_ALL_ED
Subjective:   Erika Tai is a 4 y.o. female who is brought in for this well child visit. History was provided by the mother. No birth history on file. Patient Active Problem List    Diagnosis Date Noted    Allergic rhinitis 10/12/2016    Influenza 03/19/2016    Pulmonary stenosis 01/19/2016    RAD (reactive airway disease) 01/19/2016    Heart murmur 04/14/2011    THALASSEMIA MINOR 04/01/2011         Past Medical History:   Diagnosis Date    Anemia NEC thalassemia    Thalassemia alpha          Current Outpatient Medications   Medication Sig    albuterol (PROVENTIL HFA, VENTOLIN HFA, PROAIR HFA) 90 mcg/actuation inhaler Take 2 Puffs by inhalation every four (4) hours as needed for Wheezing.  ACETAMINOPHEN (CHILDREN'S TYLENOL PO) Take  by mouth. No current facility-administered medications for this visit.           No Known Allergies      Immunization History   Administered Date(s) Administered    (RETIRED) Pneumococcal Vaccine (Unspecified Type) 2010    DTAP Vaccine 2010, 2010, 2010, 08/03/2011    DTaP-IPV 01/31/2014    HIB Vaccine 2010, 2010, 2010, 01/18/2011    HPV (9-valent) 03/25/2021    Hepatitis A Vaccine 01/18/2011, 08/03/2011    Hepatitis B Vaccine 2010, 2010, 2010    IPV 2010, 2010, 2010    Influenza Vaccine (Quad) Mdck Pf (>4 Yrs Flucelvax QUAD 05531) 10/28/2019    Influenza Vaccine [employer]) PF (>6 Mo Flulaval, Fluarix, and >3 Yrs Afluria, Fluzone 85977) 02/11/2015, 10/28/2016    Influenza Vaccine Split 02/23/2011, 03/25/2011, 01/13/2012    MMR Vaccine 04/14/2011    MMRV 01/31/2014    Meningococcal (MCV4O) Vaccine 03/25/2021    Pneumococcal Vaccine (Pcv) 2010, 2010, 04/14/2011    Rotavirus Vaccine 2010    Tdap 03/25/2021    Varicella Virus Vaccine Live 01/18/2011         History of previous adverse reactions to immunizations: no    Current Issues:  Current concerns on the part of Kristy's mother include none. Feeling sad or depressed? no    Lost interest in activities that were once enjoyable? no    Review of Nutrition:  Current dietary habits: appetite good, well balanced, chicken, fish, meat, vegetables, fruits, juice (once a day), milk (1%), no junk food/fast food, no sodas    Dental Care: yes    Social Screening:  Concerns regarding behavior with peers? No    School performance: Doing well; no concerns        Objective:     Visit Vitals  /63 (BP 1 Location: Right upper arm, BP Patient Position: Sitting)   Pulse 77   Temp 97.6 °F (36.4 °C) (Temporal)   Resp 16   Ht (!) 5' 0.83\" (1.545 m)   Wt 103 lb 9.6 oz (47 kg)   SpO2 98%   BMI 19.68 kg/m²     Blood pressure percentiles are 43 % systolic and 51 % diastolic based on the 3370 AAP Clinical Practice Guideline. Blood pressure percentile targets: 90: 117/75, 95: 122/77, 95 + 12 mmH/89. This reading is in the normal blood pressure range. 83 %ile (Z= 0.96) based on CDC (Girls, 2-20 Years) weight-for-age data using vitals from 3/25/2021.    89 %ile (Z= 1.24) based on CDC (Girls, 2-20 Years) Stature-for-age data based on Stature recorded on 3/25/2021. Growth parameters are noted and are appropriate for age. General:  Alert, cooperative, no distress, appears stated age   Gait:  Normal   Head: Normocephalic, atraumatic   Skin:  No rashes, no ecchymoses, no petechiae, no nodules, no jaundice, no purpura, no wounds   Oral cavity:  Lips, mucosa, and tongue normal. Teeth and gums normal. Tonsils non-erythematous and w/out exudate. Eyes:  Sclerae white, pupils equal and reactive   Ears:  Normal external ear canals b/l. TM nonerythematous w/ good cone of light b/l. Nose: Nares patent. Mucosa pink. No nasal discharge. Neck:  Supple, symmetrical. Trachea midline. No adenopathy. Lungs/Chest: Clear to auscultation bilaterally, no w/r/r/c. Heart:  Regular rate and rhythm.  S1, S2 normal. No murmurs, clicks, rubs or gallop. Abdomen: Soft, non-tender. Bowel sounds normal. No masses. : normal female   Extremities:  Extremities normal, atraumatic. No cyanosis or edema. Neuro: Normal without focal findings. Reflexes normal and symmetric. Spine straight: yes      Assessment:     Healthy 4 y.o. 2 m.o. well child exam      ICD-10-CM ICD-9-CM    1. Encounter for routine child health examination without abnormal findings  Z00.129 V20.2    2. Encounter for immunization  Z23 V03.89 TETANUS, DIPHTHERIA TOXOIDS AND ACELLULAR PERTUSSIS VACCINE (TDAP), IN INDIVIDS. >=7, IM      HUMAN PAPILLOMA VIRUS NONAVALENT HPV 3 DOSE IM (GARDASIL 9)      MENINGOCOCCAL (MENVEO) CONJUGATE VACCINE, SEROGROUPS A, C, Y AND W-135 (TETRAVALENT), IM      MO IM ADM THRU 18YR ANY RTE 1ST/ONLY COMPT VAC/TOX      MO IM ADM THRU 18YR ANY RTE ADDL VAC/TOX COMPT         Plan:     · Anticipatory guidance: Gave a handout on well teen issues at this age    · [place] depression scale: 2, low risk         . · Laboratory screening:  · Cholesterol screening (once at 9-11 years and 18-21 years) declined at this time     · Orders placed during this Well Child Exam:          Orders Placed This Encounter    TETANUS, [address] [place] [address] (TDAP), IN INDIVIDS. >=7, IM     Order Specific Question:   Was provider counseling for all components provided during this visit? Answer: Yes    HUMAN PAPILLOMA VIRUS NONAVALENT HPV 3 DOSE IM (GARDASIL 9)     Order Specific Question:   Was provider counseling for all components provided during this visit? Answer: Yes    MENINGOCOCCAL (MENVEO) CONJUGATE VACCINE, SEROGROUPS A, C, Y AND W-135 (TETRAVALENT), IM     Order Specific Question:   Was provider counseling for all components provided during this visit? Answer:    Yes    MO IM ADM THRU 18YR ANY RTE 1ST/ONLY COMPT VAC/TOX    MO IM ADM THRU 18YR ANY RTE ADDL VAC/TOX COMPT         · Follow up in 1 year for  year well child exam          Campbell Hoyt, DO  Family Medicine Resident Yes

## 2023-03-10 NOTE — ED PROVIDER NOTE - CLINICAL SUMMARY MEDICAL DECISION MAKING FREE TEXT BOX
px w belching/early satiety and some nausea- sx chronic for years- worsening recently- no exertional cp/sob/syncope- ekg wnl  ivf, dose of reglan, labs, serial exams px w belching/early satiety and some nausea- sx chronic for years- worsening recently- no exertional cp/sob/syncope- ekg wnl  ivf, dose of reglan, labs, serial exams  feeling much after reglan jeannine po- will give short trial of po reglan

## 2023-03-10 NOTE — ED ADULT NURSE NOTE - NS ED NURSE DC INFO COMPLEXITY
[facility] Call Center    Phone Message    May a detailed message be left on voicemail: yes     Reason for Call: Other: Paty with the [facility] has some questions about the Pt's care plan. Pt stated that she is supposed to be getting a magnesium level check, but Paty stated that they haven't gotten orders for that. Also Paty is wanting to know what the plan is for the end date of the tigecycline 50 mg the Pt is on. Please call back to clarify at: [phone] and fax orders to: [phone]     Action Taken: Message routed to:  [facility] iD    Travel Screening: Not Applicable                                                                         Patient asked questions/Verbalized Understanding

## 2023-03-10 NOTE — ED ADULT NURSE NOTE - NS ED NURSE RECORD ANOTHER HT AND WT
Yes

## 2023-03-13 DIAGNOSIS — E78.5 HYPERLIPIDEMIA, UNSPECIFIED: ICD-10-CM

## 2023-03-13 DIAGNOSIS — R11.2 NAUSEA WITH VOMITING, UNSPECIFIED: ICD-10-CM

## 2023-03-13 DIAGNOSIS — Z87.39 PERSONAL HISTORY OF OTHER DISEASES OF THE MUSCULOSKELETAL SYSTEM AND CONNECTIVE TISSUE: ICD-10-CM

## 2023-03-13 DIAGNOSIS — Z79.82 LONG TERM (CURRENT) USE OF ASPIRIN: ICD-10-CM

## 2023-03-13 DIAGNOSIS — I10 ESSENTIAL (PRIMARY) HYPERTENSION: ICD-10-CM

## 2023-03-13 DIAGNOSIS — R14.2 ERUCTATION: ICD-10-CM

## 2023-03-13 DIAGNOSIS — F17.200 NICOTINE DEPENDENCE, UNSPECIFIED, UNCOMPLICATED: ICD-10-CM

## 2023-03-13 DIAGNOSIS — Z20.822 CONTACT WITH AND (SUSPECTED) EXPOSURE TO COVID-19: ICD-10-CM

## 2023-03-13 DIAGNOSIS — Z91.040 LATEX ALLERGY STATUS: ICD-10-CM

## 2023-03-13 DIAGNOSIS — Z79.84 LONG TERM (CURRENT) USE OF ORAL HYPOGLYCEMIC DRUGS: ICD-10-CM

## 2023-03-13 DIAGNOSIS — Z82.49 FAMILY HISTORY OF ISCHEMIC HEART DISEASE AND OTHER DISEASES OF THE CIRCULATORY SYSTEM: ICD-10-CM

## 2023-03-13 DIAGNOSIS — Z95.1 PRESENCE OF AORTOCORONARY BYPASS GRAFT: ICD-10-CM

## 2023-03-13 DIAGNOSIS — R10.9 UNSPECIFIED ABDOMINAL PAIN: ICD-10-CM

## 2023-03-13 DIAGNOSIS — E11.9 TYPE 2 DIABETES MELLITUS WITHOUT COMPLICATIONS: ICD-10-CM

## 2023-03-13 DIAGNOSIS — J44.9 CHRONIC OBSTRUCTIVE PULMONARY DISEASE, UNSPECIFIED: ICD-10-CM

## 2023-03-13 DIAGNOSIS — Z88.8 ALLERGY STATUS TO OTHER DRUGS, MEDICAMENTS AND BIOLOGICAL SUBSTANCES STATUS: ICD-10-CM

## 2023-03-13 DIAGNOSIS — Z95.4 PRESENCE OF OTHER HEART-VALVE REPLACEMENT: ICD-10-CM

## 2023-03-21 ENCOUNTER — APPOINTMENT (OUTPATIENT)
Dept: HEART AND VASCULAR | Facility: CLINIC | Age: 63
End: 2023-03-21

## 2023-04-11 ENCOUNTER — APPOINTMENT (OUTPATIENT)
Dept: HEART AND VASCULAR | Facility: CLINIC | Age: 63
End: 2023-04-11
Payer: MEDICARE

## 2023-04-11 VITALS
WEIGHT: 185 LBS | BODY MASS INDEX: 32.78 KG/M2 | HEART RATE: 83 BPM | OXYGEN SATURATION: 95 % | DIASTOLIC BLOOD PRESSURE: 70 MMHG | SYSTOLIC BLOOD PRESSURE: 116 MMHG | HEIGHT: 63 IN | TEMPERATURE: 97 F

## 2023-04-11 PROCEDURE — 99214 OFFICE O/P EST MOD 30 MIN: CPT

## 2023-04-11 PROCEDURE — 93306 TTE W/DOPPLER COMPLETE: CPT

## 2023-04-11 NOTE — DISCUSSION/SUMMARY
[FreeTextEntry1] : Assessment/Plan:\par Patient is 62 yr old obese F with DM, HTN, HLD, asthma, + Covid 2020, status post AVR/ascending aortic replacement, CABG x1 on 5/22/17 seen today for follow up post cath which showed patent stents. With atypical CP in the past, recently seen in ER for shoulder pain. Otherwise without active SOB, fatigue with exertion. No dizziness, palpitations. No LOC\par Functional/exercise capacity is limited given chronic back pain due to sciatica, uses cane to ambulate \par \par \par -CAD s/p CABG x 1 (SVG-PDA) 2017: s/p cath 9/2022 with patent graft. EKG with old inferior wall MI, poor R wave progression. Cont ASA 81 mg qd, crestor 20 mg qd, zetia 10 mg qd. Cont toprol 50 mg qd \par \par -s/p bioAVR: ECHO (suboptimal study) mildly elevated mean gradient across AV ~ 13 mmhg \par \par -HTN: BP better controlled off lisinopril. Cont toprol 50 mg qd.\par \par Cardiac status stable. Fu 6 months

## 2023-04-11 NOTE — HISTORY OF PRESENT ILLNESS
[FreeTextEntry1] : Patient is 62 yr old obese F with DM, HTN, HLD, asthma, + Covid 2020, status post AVR/ascending aortic replacement, CABG x1 on 5/22/17 seen today for follow up post cath which showed patent stents. With atypical CP in the past, recently seen in ER for shoulder pain. Otherwise without active SOB, fatigue with exertion. No dizziness, palpitations. No LOC\par Functional/exercise capacity is limited given chronic back pain due to sciatica, uses cane to ambulate \par Seen today for fu of echo.\par

## 2023-04-11 NOTE — CARDIOLOGY SUMMARY
[de-identified] : 4/11/23: (suboptimal study) EF normal, grade I DD. Bioprosthetic AV noted with normal function, mildly elevated mean gradient across AV ~ 13 mmHg

## 2023-04-27 NOTE — ED ADULT NURSE NOTE - NSICDXPASTSURGICALHX_GEN_ALL_CORE_FT
Biopsy Type: H and E PAST SURGICAL HISTORY:  Carpal tunnel syndrome     H/O aortic valve replacement     Status post laser lithotripsy of ureteral calculus     Status post small bowel resection     Uterine fibroid removal

## 2023-05-15 LAB
ANION GAP SERPL CALC-SCNC: 15 MMOL/L
BASOPHILS # BLD AUTO: 0.05 K/UL
BASOPHILS NFR BLD AUTO: 0.6 %
BUN SERPL-MCNC: 21 MG/DL
CALCIUM SERPL-MCNC: 10 MG/DL
CHLORIDE SERPL-SCNC: 104 MMOL/L
CO2 SERPL-SCNC: 24 MMOL/L
CREAT SERPL-MCNC: 0.79 MG/DL
EGFR: 83 ML/MIN/1.73M2
EOSINOPHIL # BLD AUTO: 0.11 K/UL
EOSINOPHIL NFR BLD AUTO: 1.2 %
GLUCOSE SERPL-MCNC: 98 MG/DL
HCT VFR BLD CALC: 46.6 %
HGB BLD-MCNC: 14.1 G/DL
IMM GRANULOCYTES NFR BLD AUTO: 0.7 %
LYMPHOCYTES # BLD AUTO: 3.88 K/UL
LYMPHOCYTES NFR BLD AUTO: 43.2 %
MAN DIFF?: NORMAL
MCHC RBC-ENTMCNC: 26.8 PG
MCHC RBC-ENTMCNC: 30.3 GM/DL
MCV RBC AUTO: 88.4 FL
MONOCYTES # BLD AUTO: 0.56 K/UL
MONOCYTES NFR BLD AUTO: 6.2 %
NEUTROPHILS # BLD AUTO: 4.32 K/UL
NEUTROPHILS NFR BLD AUTO: 48.1 %
PLATELET # BLD AUTO: 294 K/UL
POTASSIUM SERPL-SCNC: 5 MMOL/L
RBC # BLD: 5.27 M/UL
RBC # FLD: 14 %
SODIUM SERPL-SCNC: 143 MMOL/L
WBC # FLD AUTO: 8.98 K/UL

## 2023-08-29 ENCOUNTER — INPATIENT (INPATIENT)
Facility: HOSPITAL | Age: 63
LOS: 1 days | Discharge: HOME | DRG: 247 | End: 2023-08-31
Attending: INTERNAL MEDICINE | Admitting: INTERNAL MEDICINE
Payer: MEDICARE

## 2023-08-29 VITALS
WEIGHT: 184.97 LBS | DIASTOLIC BLOOD PRESSURE: 75 MMHG | HEART RATE: 70 BPM | TEMPERATURE: 98 F | OXYGEN SATURATION: 98 % | RESPIRATION RATE: 16 BRPM | HEIGHT: 63 IN | SYSTOLIC BLOOD PRESSURE: 126 MMHG

## 2023-08-29 DIAGNOSIS — Z90.49 ACQUIRED ABSENCE OF OTHER SPECIFIED PARTS OF DIGESTIVE TRACT: Chronic | ICD-10-CM

## 2023-08-29 DIAGNOSIS — E11.9 TYPE 2 DIABETES MELLITUS WITHOUT COMPLICATIONS: ICD-10-CM

## 2023-08-29 DIAGNOSIS — E78.5 HYPERLIPIDEMIA, UNSPECIFIED: ICD-10-CM

## 2023-08-29 DIAGNOSIS — Z95.2 PRESENCE OF PROSTHETIC HEART VALVE: Chronic | ICD-10-CM

## 2023-08-29 DIAGNOSIS — I25.10 ATHEROSCLEROTIC HEART DISEASE OF NATIVE CORONARY ARTERY WITHOUT ANGINA PECTORIS: ICD-10-CM

## 2023-08-29 DIAGNOSIS — I20.0 UNSTABLE ANGINA: ICD-10-CM

## 2023-08-29 DIAGNOSIS — Z98.890 OTHER SPECIFIED POSTPROCEDURAL STATES: Chronic | ICD-10-CM

## 2023-08-29 DIAGNOSIS — I10 ESSENTIAL (PRIMARY) HYPERTENSION: ICD-10-CM

## 2023-08-29 DIAGNOSIS — Z78.9 OTHER SPECIFIED HEALTH STATUS: ICD-10-CM

## 2023-08-29 DIAGNOSIS — G56.00 CARPAL TUNNEL SYNDROME, UNSPECIFIED UPPER LIMB: Chronic | ICD-10-CM

## 2023-08-29 DIAGNOSIS — M54.30 SCIATICA, UNSPECIFIED SIDE: ICD-10-CM

## 2023-08-29 DIAGNOSIS — J45.909 UNSPECIFIED ASTHMA, UNCOMPLICATED: ICD-10-CM

## 2023-08-29 DIAGNOSIS — D25.9 LEIOMYOMA OF UTERUS, UNSPECIFIED: Chronic | ICD-10-CM

## 2023-08-29 LAB
ALBUMIN SERPL ELPH-MCNC: 3.8 G/DL — SIGNIFICANT CHANGE UP (ref 3.3–5)
ALP SERPL-CCNC: 31 U/L — LOW (ref 40–120)
ALT FLD-CCNC: 23 U/L — SIGNIFICANT CHANGE UP (ref 10–45)
ANION GAP SERPL CALC-SCNC: 13 MMOL/L — SIGNIFICANT CHANGE UP (ref 5–17)
APPEARANCE UR: CLEAR — SIGNIFICANT CHANGE UP
AST SERPL-CCNC: 22 U/L — SIGNIFICANT CHANGE UP (ref 10–40)
BACTERIA # UR AUTO: PRESENT /HPF
BASOPHILS # BLD AUTO: 0.04 K/UL — SIGNIFICANT CHANGE UP (ref 0–0.2)
BASOPHILS NFR BLD AUTO: 0.4 % — SIGNIFICANT CHANGE UP (ref 0–2)
BILIRUB SERPL-MCNC: <0.2 MG/DL — SIGNIFICANT CHANGE UP (ref 0.2–1.2)
BILIRUB UR-MCNC: NEGATIVE — SIGNIFICANT CHANGE UP
BUN SERPL-MCNC: 14 MG/DL — SIGNIFICANT CHANGE UP (ref 7–23)
CALCIUM SERPL-MCNC: 9.5 MG/DL — SIGNIFICANT CHANGE UP (ref 8.4–10.5)
CHLORIDE SERPL-SCNC: 99 MMOL/L — SIGNIFICANT CHANGE UP (ref 96–108)
CO2 SERPL-SCNC: 23 MMOL/L — SIGNIFICANT CHANGE UP (ref 22–31)
COLOR SPEC: YELLOW — SIGNIFICANT CHANGE UP
CREAT SERPL-MCNC: 0.65 MG/DL — SIGNIFICANT CHANGE UP (ref 0.5–1.3)
DIFF PNL FLD: NEGATIVE — SIGNIFICANT CHANGE UP
EGFR: 98 ML/MIN/1.73M2 — SIGNIFICANT CHANGE UP
EOSINOPHIL # BLD AUTO: 0.11 K/UL — SIGNIFICANT CHANGE UP (ref 0–0.5)
EOSINOPHIL NFR BLD AUTO: 1 % — SIGNIFICANT CHANGE UP (ref 0–6)
EPI CELLS # UR: SIGNIFICANT CHANGE UP /HPF (ref 0–5)
GLUCOSE BLDC GLUCOMTR-MCNC: 119 MG/DL — HIGH (ref 70–99)
GLUCOSE SERPL-MCNC: 190 MG/DL — HIGH (ref 70–99)
GLUCOSE UR QL: NEGATIVE — SIGNIFICANT CHANGE UP
HCT VFR BLD CALC: 38.1 % — SIGNIFICANT CHANGE UP (ref 34.5–45)
HGB BLD-MCNC: 12.3 G/DL — SIGNIFICANT CHANGE UP (ref 11.5–15.5)
IMM GRANULOCYTES NFR BLD AUTO: 0.7 % — SIGNIFICANT CHANGE UP (ref 0–0.9)
KETONES UR-MCNC: NEGATIVE — SIGNIFICANT CHANGE UP
LEUKOCYTE ESTERASE UR-ACNC: ABNORMAL
LYMPHOCYTES # BLD AUTO: 2.28 K/UL — SIGNIFICANT CHANGE UP (ref 1–3.3)
LYMPHOCYTES # BLD AUTO: 20.3 % — SIGNIFICANT CHANGE UP (ref 13–44)
MAGNESIUM SERPL-MCNC: 1.4 MG/DL — LOW (ref 1.6–2.6)
MCHC RBC-ENTMCNC: 27.3 PG — SIGNIFICANT CHANGE UP (ref 27–34)
MCHC RBC-ENTMCNC: 32.3 GM/DL — SIGNIFICANT CHANGE UP (ref 32–36)
MCV RBC AUTO: 84.5 FL — SIGNIFICANT CHANGE UP (ref 80–100)
MONOCYTES # BLD AUTO: 0.74 K/UL — SIGNIFICANT CHANGE UP (ref 0–0.9)
MONOCYTES NFR BLD AUTO: 6.6 % — SIGNIFICANT CHANGE UP (ref 2–14)
NEUTROPHILS # BLD AUTO: 7.98 K/UL — HIGH (ref 1.8–7.4)
NEUTROPHILS NFR BLD AUTO: 71 % — SIGNIFICANT CHANGE UP (ref 43–77)
NITRITE UR-MCNC: NEGATIVE — SIGNIFICANT CHANGE UP
NRBC # BLD: 0 /100 WBCS — SIGNIFICANT CHANGE UP (ref 0–0)
PH UR: 6.5 — SIGNIFICANT CHANGE UP (ref 5–8)
PLATELET # BLD AUTO: 248 K/UL — SIGNIFICANT CHANGE UP (ref 150–400)
POTASSIUM SERPL-MCNC: 3.2 MMOL/L — LOW (ref 3.5–5.3)
POTASSIUM SERPL-SCNC: 3.2 MMOL/L — LOW (ref 3.5–5.3)
PROT SERPL-MCNC: 6.9 G/DL — SIGNIFICANT CHANGE UP (ref 6–8.3)
PROT UR-MCNC: NEGATIVE MG/DL — SIGNIFICANT CHANGE UP
RBC # BLD: 4.51 M/UL — SIGNIFICANT CHANGE UP (ref 3.8–5.2)
RBC # FLD: 13.6 % — SIGNIFICANT CHANGE UP (ref 10.3–14.5)
RBC CASTS # UR COMP ASSIST: < 5 /HPF — SIGNIFICANT CHANGE UP
SODIUM SERPL-SCNC: 135 MMOL/L — SIGNIFICANT CHANGE UP (ref 135–145)
SP GR SPEC: 1.02 — SIGNIFICANT CHANGE UP (ref 1–1.03)
TROPONIN T, HIGH SENSITIVITY RESULT: 13 NG/L — SIGNIFICANT CHANGE UP (ref 0–51)
TROPONIN T, HIGH SENSITIVITY RESULT: 9 NG/L — SIGNIFICANT CHANGE UP (ref 0–51)
UROBILINOGEN FLD QL: 0.2 E.U./DL — SIGNIFICANT CHANGE UP
WBC # BLD: 11.23 K/UL — HIGH (ref 3.8–10.5)
WBC # FLD AUTO: 11.23 K/UL — HIGH (ref 3.8–10.5)
WBC UR QL: < 5 /HPF — SIGNIFICANT CHANGE UP

## 2023-08-29 PROCEDURE — 71045 X-RAY EXAM CHEST 1 VIEW: CPT | Mod: 26

## 2023-08-29 PROCEDURE — 99285 EMERGENCY DEPT VISIT HI MDM: CPT

## 2023-08-29 RX ORDER — METOPROLOL TARTRATE 50 MG
0.5 TABLET ORAL
Qty: 0 | Refills: 0 | DISCHARGE

## 2023-08-29 RX ORDER — SODIUM CHLORIDE 9 MG/ML
1000 INJECTION, SOLUTION INTRAVENOUS
Refills: 0 | Status: DISCONTINUED | OUTPATIENT
Start: 2023-08-29 | End: 2023-08-31

## 2023-08-29 RX ORDER — ASPIRIN/CALCIUM CARB/MAGNESIUM 324 MG
162 TABLET ORAL ONCE
Refills: 0 | Status: DISCONTINUED | OUTPATIENT
Start: 2023-08-29 | End: 2023-08-29

## 2023-08-29 RX ORDER — PANTOPRAZOLE SODIUM 20 MG/1
40 TABLET, DELAYED RELEASE ORAL
Refills: 0 | Status: DISCONTINUED | OUTPATIENT
Start: 2023-08-29 | End: 2023-08-31

## 2023-08-29 RX ORDER — ENOXAPARIN SODIUM 100 MG/ML
40 INJECTION SUBCUTANEOUS EVERY 24 HOURS
Refills: 0 | Status: DISCONTINUED | OUTPATIENT
Start: 2023-08-30 | End: 2023-08-31

## 2023-08-29 RX ORDER — ASPIRIN/CALCIUM CARB/MAGNESIUM 324 MG
81 TABLET ORAL DAILY
Refills: 0 | Status: DISCONTINUED | OUTPATIENT
Start: 2023-08-29 | End: 2023-08-31

## 2023-08-29 RX ORDER — POTASSIUM CHLORIDE 20 MEQ
40 PACKET (EA) ORAL ONCE
Refills: 0 | Status: COMPLETED | OUTPATIENT
Start: 2023-08-29 | End: 2023-08-29

## 2023-08-29 RX ORDER — MAGNESIUM SULFATE 500 MG/ML
1 VIAL (ML) INJECTION ONCE
Refills: 0 | Status: COMPLETED | OUTPATIENT
Start: 2023-08-29 | End: 2023-08-29

## 2023-08-29 RX ORDER — DEXTROSE 50 % IN WATER 50 %
12.5 SYRINGE (ML) INTRAVENOUS ONCE
Refills: 0 | Status: DISCONTINUED | OUTPATIENT
Start: 2023-08-29 | End: 2023-08-31

## 2023-08-29 RX ORDER — DEXTROSE 50 % IN WATER 50 %
25 SYRINGE (ML) INTRAVENOUS ONCE
Refills: 0 | Status: DISCONTINUED | OUTPATIENT
Start: 2023-08-29 | End: 2023-08-31

## 2023-08-29 RX ORDER — ATORVASTATIN CALCIUM 80 MG/1
80 TABLET, FILM COATED ORAL AT BEDTIME
Refills: 0 | Status: DISCONTINUED | OUTPATIENT
Start: 2023-08-29 | End: 2023-08-31

## 2023-08-29 RX ORDER — DEXTROSE 50 % IN WATER 50 %
15 SYRINGE (ML) INTRAVENOUS ONCE
Refills: 0 | Status: DISCONTINUED | OUTPATIENT
Start: 2023-08-29 | End: 2023-08-31

## 2023-08-29 RX ORDER — INSULIN LISPRO 100/ML
VIAL (ML) SUBCUTANEOUS
Refills: 0 | Status: DISCONTINUED | OUTPATIENT
Start: 2023-08-29 | End: 2023-08-31

## 2023-08-29 RX ORDER — DIAZEPAM 5 MG
5 TABLET ORAL DAILY
Refills: 0 | Status: DISCONTINUED | OUTPATIENT
Start: 2023-08-29 | End: 2023-08-31

## 2023-08-29 RX ORDER — GLUCAGON INJECTION, SOLUTION 0.5 MG/.1ML
1 INJECTION, SOLUTION SUBCUTANEOUS ONCE
Refills: 0 | Status: DISCONTINUED | OUTPATIENT
Start: 2023-08-29 | End: 2023-08-31

## 2023-08-29 RX ORDER — ACETAMINOPHEN 500 MG
650 TABLET ORAL EVERY 6 HOURS
Refills: 0 | Status: DISCONTINUED | OUTPATIENT
Start: 2023-08-29 | End: 2023-08-31

## 2023-08-29 RX ORDER — IPRATROPIUM BROMIDE 0.2 MG/ML
500 SOLUTION, NON-ORAL INHALATION EVERY 6 HOURS
Refills: 0 | Status: DISCONTINUED | OUTPATIENT
Start: 2023-08-29 | End: 2023-08-31

## 2023-08-29 RX ORDER — CHLORTHALIDONE 50 MG
25 TABLET ORAL DAILY
Refills: 0 | Status: DISCONTINUED | OUTPATIENT
Start: 2023-08-29 | End: 2023-08-31

## 2023-08-29 RX ORDER — MAGNESIUM SULFATE 500 MG/ML
2 VIAL (ML) INJECTION ONCE
Refills: 0 | Status: COMPLETED | OUTPATIENT
Start: 2023-08-29 | End: 2023-08-29

## 2023-08-29 RX ORDER — METOPROLOL TARTRATE 50 MG
50 TABLET ORAL DAILY
Refills: 0 | Status: DISCONTINUED | OUTPATIENT
Start: 2023-08-29 | End: 2023-08-31

## 2023-08-29 RX ADMIN — Medication 650 MILLIGRAM(S): at 23:19

## 2023-08-29 RX ADMIN — ATORVASTATIN CALCIUM 80 MILLIGRAM(S): 80 TABLET, FILM COATED ORAL at 23:06

## 2023-08-29 RX ADMIN — Medication 100 GRAM(S): at 18:33

## 2023-08-29 RX ADMIN — Medication 500 MICROGRAM(S): at 23:12

## 2023-08-29 RX ADMIN — Medication 40 MILLIEQUIVALENT(S): at 23:06

## 2023-08-29 RX ADMIN — Medication 40 MILLIEQUIVALENT(S): at 18:32

## 2023-08-29 RX ADMIN — Medication 25 GRAM(S): at 23:05

## 2023-08-29 RX ADMIN — PANTOPRAZOLE SODIUM 40 MILLIGRAM(S): 20 TABLET, DELAYED RELEASE ORAL at 23:12

## 2023-08-29 NOTE — H&P ADULT - PROBLEM SELECTOR PLAN 1
Telemetry, ECG, Troponin T Sensitivity in AM.  Troponin T Sensitivity 13-->9  -ECG NSR@74bpm with non specific ST-T wave changes  -ASA Ec 81mg daily, Atorvastatin 80mg daily  -NPO for further cardiac workup in AM  -TTE in AM

## 2023-08-29 NOTE — PATIENT PROFILE ADULT - FALL HARM RISK - HARM RISK INTERVENTIONS

## 2023-08-29 NOTE — H&P ADULT - PROBLEM SELECTOR PLAN 2
CABG SVG-PDA 05/2017 @ [facility], s/p  recent dx Cath 9/22/22 LM: LI, mLAD mild to mod LI (calcified), LCX: mild LI, mRCA 80-90 % supplied distally by SVG-dRCA \  -NPO for further cardiac workup  -TTE in AM

## 2023-08-29 NOTE — ED PROVIDER NOTE - CLINICAL SUMMARY MEDICAL DECISION MAKING FREE TEXT BOX
63 year old female with history of asthma, HTN, HLD, DM, CAD s/p CABG, anxiety, presenting with chest tightness. No active symptoms here, EKG nsr nonischemic, vitals wnl, neuro intact. Will initiate ACS r/o given significant risk factors.    Patient states she had recent cath within past 6 months that showed nonbstructive CAD, has good follow up with her cardiologist. If 2 serial troponins negative will plan to dc with urgent cards f/u. 63 year old female with history of asthma, HTN, HLD, DM, CAD s/p CABG, anxiety, presenting with chest tightness. EKG nsr nonischemic, vitals wnl, neuro intact. Will initiate ACS r/o given significant risk factors.

## 2023-08-29 NOTE — ED ADULT NURSE NOTE - OBJECTIVE STATEMENT
pt c/o chest pain, SOB, lightheadedness and anxiety starting x 2 hours ago. denies any other sx at this time. pt took diazepam and 324 aspirin before arrival by EMS.  hx open heart valve replacement surgery 7 years ago.

## 2023-08-29 NOTE — ED PROVIDER NOTE - PROGRESS NOTE DETAILS
Uziel Dc MD: Patient reassessed at bedside--states that she still feels some chest tightness but overall feels better. Given continued symptoms here, will plan to admit for further ischemic eval in setting of significant risk factors.

## 2023-08-29 NOTE — H&P ADULT - NSHPREVIEWOFSYSTEMS_GEN_ALL_CORE
GENERAL, CONSTITUTIONAL : denies recent weight loss, fever, chills  EYES, VISION: denies changes in vision   EARS, NOSE, THROAT: denies hearing loss  HEART, CARDIOVASCULAR: admits to left sided chest pressure, denies  arrhythmia, palpitations  RESPIRATORY: Denies cough, SOB, wheezing, PND, orthopnea  GASTROINTESTINAL: Denies abdominal pain, heartburn, bloody stool, dark tarry stool  GENITOURINARY: Denies frequent urination, urgency  MUSCULOSKELETAL denies joint pain or swelling, restricted motion, musculoskeletal pain.   SKIN & INTEGUMENTARY Denies rashes, sores, blisters, blisters, growths.  NEUROLOGICAL:admits to lightheadedness  Denies numbness or tingling sensations, sensation loss, burning.   PSYCHIATRIC: Denies nervousness, anxiety, depression  ENDOCRINE Denies heat or cold intolerance, excessive thirst  HEMATOLOGIC/LYMPHATIC: Denies abnormal bleeding, bleeding of any kind

## 2023-08-29 NOTE — H&P ADULT - NSHPPHYSICALEXAM_GEN_ALL_CORE
T(C): 36.9 (08-29-23 @ 21:19), Max: 36.9 (08-29-23 @ 21:19)  HR: 66 (08-29-23 @ 21:19) (66 - 70)  BP: 110/57 (08-29-23 @ 21:19) (110/57 - 126/75)  RR: 18 (08-29-23 @ 21:19) (16 - 18)  SpO2: 99% (08-29-23 @ 21:19) (98% - 99%)  Wt(kg): --    Appearance: NAD  HEENT:   Normal oral mucosa,  EOMI	  Neck: Supple,- JVD; No Carotid Bruit and 2+ pulses B/L  Cardiovascular: Normal S1 S2, No JVD, No murmurs  Respiratory: Lungs clear to auscultationNo Rales, Rhonchi, Wheezing	  Gastrointestinal:  Soft, Non-tender, + BS	  Skin: No rashes, No ecchymoses, No cyanosis  Extremities: Normal range of motion, No clubbing, cyanosis or edema  Vascular: Femoral pulses 2+ b/l without bruit, DP 1+ b/l, PT 1+ b/l  Neurologic: Non-focal  Psychiatry: A & O x 4, Mood & affect appropriate

## 2023-08-29 NOTE — H&P ADULT - ASSESSMENT
62 y/o female, current smoker, with FHx MI, PMHx of asthma/COPD, sciatica/herniated disc, HTN, HLD, DM-2, obesity, mod-severe AS s/p bioAVR/ascending aorta replacement and CABG SVG-PDA 05/2017 @ [facility], s/p  recent dx Cath 9/22/22 LM: LI, mLAD mild to mod LI (calcified), LCX: mild LI, mRCA 80-90 % supplied distally by SVG-dRCA with normal EF presents to [facility] ER this evening, 8/29/23 complaining of left-sided chest tightness associated with lightheadedness this afternoon.

## 2023-08-29 NOTE — H&P ADULT - PROBLEM SELECTOR PLAN 4
F/U Lipid panel and LFT's   -on home Crestor 40mg daily and Zetia 10mg daily  continue Atorvastatin 80mg daily

## 2023-08-29 NOTE — ED PROVIDER NOTE - NSFOLLOWUPINSTRUCTIONS_ED_ALL_ED_FT
You were seen in the Emergency Department for: chest tightness    For pain, you may take Tylenol (acetaminophen) 975 mg every 6 hours    Please follow up with your cardiologist urgently. If you do not have a primary physician or specialist of your needs, please call [phone] to find one convenient for you. At this number you will be able to locate a provider who accepts your insurance, as well as locate the right specialist for your needs.    You should return to the Emergency Department if you feel any new/worsening/persistent symptoms including but not limited to: chest pain, difficulty breathing, loss of consciousness, bleeding, uncontrolled pain, numbness/weakness of a body part

## 2023-08-29 NOTE — ED ADULT NURSE NOTE - CHIEF COMPLAINT QUOTE
Pt presents to ED C/O chest pain, SOB, lightheadedness and anxiety starting x 2 hours ago. Pt states, " I had open heart surgery 7 years ago". Pt reports taking diazepam and 324 aspirin PTA. EKG in progress.

## 2023-08-29 NOTE — ED ADULT TRIAGE NOTE - CHIEF COMPLAINT QUOTE
Pt presents to ED C/O chest pain, SOB, lightheadedness and anxiety starting x 2 hours ago. Pt states, " I had open heart surgery 7 years ago". Pt reports taking diazepam PTA. EKG in progress. Pt presents to ED C/O chest pain, SOB, lightheadedness and anxiety starting x 2 hours ago. Pt states, " I had open heart surgery 7 years ago". Pt reports taking diazepam and 324 aspirin PTA. EKG in progress.

## 2023-08-29 NOTE — H&P ADULT - NSHPLABSRESULTS_GEN_ALL_CORE
12.3   11.23 )-----------( 248      ( 29 Aug 2023 17:11 )             38.1       08-29    135  |  99  |  14  ----------------------------<  190<H>  3.2<L>   |  23  |  0.65    Ca    9.5      29 Aug 2023 17:11  Mg     1.4     08-29    TPro  6.9  /  Alb  3.8  /  TBili  <0.2  /  DBili  x   /  AST  22  /  ALT  23  /  AlkPhos  31<L>  08-29                Urinalysis Basic - ( 29 Aug 2023 17:11 )    Color: x / Appearance: x / SG: x / pH: x  Gluc: 190 mg/dL / Ketone: x  / Bili: x / Urobili: x   Blood: x / Protein: x / Nitrite: x   Leuk Esterase: x / RBC: x / WBC x   Sq Epi: x / Non Sq Epi: x / Bacteria: x        EKG: NSR@74bpm with non specific ST-T wave changes

## 2023-08-29 NOTE — H&P ADULT - HISTORY OF PRESENT ILLNESS
A&O X4 Full Code    62 y/o female, current smoker, with FHx MI, PMHx of asthma/COPD, sciatica/herniated disc, HTN, HLD, DM-2, obesity, mod-severe AS s/p bioAVR/ascending aorta replacement and CABG SVG-PDA 05/2017 @ [facility], s/p  recent dx Cath 9/22/22 LM: LI, mLAD mild to mod LI (calcified), LCX: mild LI, mRCA 80-90 % supplied distally by SVG-dRCA with normal EF presents to [facility] ER this evening, 8/29/23 complaining of left-sided chest tightness associated with lightheadedness this afternoon.    In speaking to patient, she reports this afternoon she began to experience left-sided chest tightness, described as "heavy", rated      with associated lightheadedness while        .    According to patient, she is planned for oral surgery on Thursday, and has been off her Vicodin and Aspirin since     in preparation for surgery.  She believes symptoms are related to anxiety and too, a home valium before calling EMS.  EMS  gave ASA 324mg chewable on arrival to [facility] ER.  Currently, patient denies chest pain, SOB, diaphoresis, abdominal pain and n/v/d.         In ER ECG reveals NSR@74bpm with non specific ST-T wave changes, Troponin T Sensitivity 13->9, WBC 11.23, K+ 3.2 (Replenished total of Potassium Chloride 40mEq X2), Magnesium 1.4 (gave total Magnesium Sulfate 3mg IV X1), Blood Glucose 190. CXR portable no acute pathology seen, follow up official report.    In light of patient's risk factors and UA symptoms, patient is now admitted to [facility], Cardiology for rule out ACS and TTE in AM.   A&O X4 Full Code    62 y/o female, current smoker, with FHx MI, PMHx of asthma/COPD, sciatica/herniated disc, HTN, HLD, DM-2, obesity, mod-severe AS s/p bioAVR/ascending aorta replacement and CABG SVG-PDA 05/2017 @ [facility], s/p  recent dx Cath 9/22/22 LM: LI, mLAD mild to mod LI (calcified), LCX: mild LI, mRCA 80-90 % supplied distally by SVG-dRCA with normal EF presents to [facility] ER this evening, 8/29/23 complaining of left-sided chest tightness associated with lightheadedness this afternoon.    In speaking to patient, she reports this afternoon while speaking with her sister she suddenly began to experience left-sided chest tightness, described as "heavy", rated 6/10   with associated lightheadedness  .    According to patient, she is planned for oral surgery on Thursday, and has been off her Vicodin and Aspirin since     in preparation for surgery.  She believes symptoms are related to anxiety and too, a home valium before calling EMS.  EMS  gave ASA 324mg chewable on arrival to [facility] ER.  Currently, patient denies chest pain, SOB, diaphoresis, abdominal pain and n/v/d.         In ER ECG reveals NSR@74bpm with non specific ST-T wave changes, Troponin T Sensitivity 13->9, WBC 11.23, K+ 3.2 (Replenished total of Potassium Chloride 40mEq X2), Magnesium 1.4 (gave total Magnesium Sulfate 3mg IV X1), Blood Glucose 190. CXR portable no acute pathology seen, follow up official report.    In light of patient's risk factors and UA symptoms, patient is now admitted to [facility], Cardiology for rule out ACS and TTE in AM.

## 2023-08-29 NOTE — ED PROVIDER NOTE - CADM POA PRESS ULCER
Overweight BF well developed, well nourished , in no acute distress , ambulating without difficulty , normal communication ability  No

## 2023-08-29 NOTE — ED PROVIDER NOTE - OBJECTIVE STATEMENT
63 year old female with history of asthma, HTN, HLD, DM, CAD s/p CABG, anxiety, presenting with chest tightness. States that she has planned oral surgery on Thursday, has been off vicodin and aspirin in preparation, then today felt onset of lightheadedness with chest tightness, took a dose of home valium and called EMS, received 324mg ASA en route, now feels better. Feels possibly anxiety related. Clarifies to me that she does not have any pain in the chest, just tightness. No diaphoresis or nausea.

## 2023-08-29 NOTE — H&P ADULT - PROBLEM SELECTOR PLAN 5
Monitor FS; F/U Hgb A1C in AM  -Hold Metformin 1000mg AM and 500mg PM   -continue Insulin Lispro Med dose sliding scale

## 2023-08-30 ENCOUNTER — TRANSCRIPTION ENCOUNTER (OUTPATIENT)
Age: 63
End: 2023-08-30

## 2023-08-30 DIAGNOSIS — K21.9 GASTRO-ESOPHAGEAL REFLUX DISEASE WITHOUT ESOPHAGITIS: ICD-10-CM

## 2023-08-30 LAB
A1C WITH ESTIMATED AVERAGE GLUCOSE RESULT: 6.3 % — HIGH (ref 4–5.6)
ALBUMIN SERPL ELPH-MCNC: 3.7 G/DL — SIGNIFICANT CHANGE UP (ref 3.3–5)
ALP SERPL-CCNC: 31 U/L — LOW (ref 40–120)
ALT FLD-CCNC: 24 U/L — SIGNIFICANT CHANGE UP (ref 10–45)
ANION GAP SERPL CALC-SCNC: 12 MMOL/L — SIGNIFICANT CHANGE UP (ref 5–17)
APTT BLD: 30.5 SEC — SIGNIFICANT CHANGE UP (ref 24.5–35.6)
AST SERPL-CCNC: 33 U/L — SIGNIFICANT CHANGE UP (ref 10–40)
BILIRUB SERPL-MCNC: <0.2 MG/DL — SIGNIFICANT CHANGE UP (ref 0.2–1.2)
BUN SERPL-MCNC: 12 MG/DL — SIGNIFICANT CHANGE UP (ref 7–23)
CALCIUM SERPL-MCNC: 9.6 MG/DL — SIGNIFICANT CHANGE UP (ref 8.4–10.5)
CHLORIDE SERPL-SCNC: 101 MMOL/L — SIGNIFICANT CHANGE UP (ref 96–108)
CHOLEST SERPL-MCNC: 82 MG/DL — SIGNIFICANT CHANGE UP
CK MB CFR SERPL CALC: 1.8 NG/ML — SIGNIFICANT CHANGE UP (ref 0–6.7)
CK MB CFR SERPL CALC: 1.9 NG/ML — SIGNIFICANT CHANGE UP (ref 0–6.7)
CK SERPL-CCNC: 65 U/L — SIGNIFICANT CHANGE UP (ref 25–170)
CK SERPL-CCNC: 66 U/L — SIGNIFICANT CHANGE UP (ref 25–170)
CO2 SERPL-SCNC: 26 MMOL/L — SIGNIFICANT CHANGE UP (ref 22–31)
CREAT SERPL-MCNC: 0.73 MG/DL — SIGNIFICANT CHANGE UP (ref 0.5–1.3)
EGFR: 91 ML/MIN/1.73M2 — SIGNIFICANT CHANGE UP
ESTIMATED AVERAGE GLUCOSE: 134 MG/DL — HIGH (ref 68–114)
GLUCOSE BLDC GLUCOMTR-MCNC: 108 MG/DL — HIGH (ref 70–99)
GLUCOSE BLDC GLUCOMTR-MCNC: 109 MG/DL — HIGH (ref 70–99)
GLUCOSE BLDC GLUCOMTR-MCNC: 122 MG/DL — HIGH (ref 70–99)
GLUCOSE BLDC GLUCOMTR-MCNC: 126 MG/DL — HIGH (ref 70–99)
GLUCOSE SERPL-MCNC: 109 MG/DL — HIGH (ref 70–99)
HCT VFR BLD CALC: 39.9 % — SIGNIFICANT CHANGE UP (ref 34.5–45)
HDLC SERPL-MCNC: 37 MG/DL — LOW
HGB BLD-MCNC: 12.5 G/DL — SIGNIFICANT CHANGE UP (ref 11.5–15.5)
INR BLD: 1.03 — SIGNIFICANT CHANGE UP (ref 0.85–1.18)
LIPID PNL WITH DIRECT LDL SERPL: 10 MG/DL — SIGNIFICANT CHANGE UP
MAGNESIUM SERPL-MCNC: 1.8 MG/DL — SIGNIFICANT CHANGE UP (ref 1.6–2.6)
MCHC RBC-ENTMCNC: 26.9 PG — LOW (ref 27–34)
MCHC RBC-ENTMCNC: 31.3 GM/DL — LOW (ref 32–36)
MCV RBC AUTO: 85.8 FL — SIGNIFICANT CHANGE UP (ref 80–100)
NON HDL CHOLESTEROL: 45 MG/DL — SIGNIFICANT CHANGE UP
NRBC # BLD: 0 /100 WBCS — SIGNIFICANT CHANGE UP (ref 0–0)
NT-PROBNP SERPL-SCNC: 141 PG/ML — SIGNIFICANT CHANGE UP (ref 0–300)
PLATELET # BLD AUTO: 251 K/UL — SIGNIFICANT CHANGE UP (ref 150–400)
POTASSIUM SERPL-MCNC: 4.1 MMOL/L — SIGNIFICANT CHANGE UP (ref 3.5–5.3)
POTASSIUM SERPL-SCNC: 4.1 MMOL/L — SIGNIFICANT CHANGE UP (ref 3.5–5.3)
PROT SERPL-MCNC: 6.8 G/DL — SIGNIFICANT CHANGE UP (ref 6–8.3)
PROTHROM AB SERPL-ACNC: 11.7 SEC — SIGNIFICANT CHANGE UP (ref 9.5–13)
RBC # BLD: 4.65 M/UL — SIGNIFICANT CHANGE UP (ref 3.8–5.2)
RBC # FLD: 13.8 % — SIGNIFICANT CHANGE UP (ref 10.3–14.5)
SODIUM SERPL-SCNC: 139 MMOL/L — SIGNIFICANT CHANGE UP (ref 135–145)
TRIGL SERPL-MCNC: 173 MG/DL — HIGH
TROPONIN T, HIGH SENSITIVITY RESULT: 11 NG/L — SIGNIFICANT CHANGE UP (ref 0–51)
TROPONIN T, HIGH SENSITIVITY RESULT: 11 NG/L — SIGNIFICANT CHANGE UP (ref 0–51)
TSH SERPL-MCNC: 2.46 UIU/ML — SIGNIFICANT CHANGE UP (ref 0.27–4.2)
WBC # BLD: 8.09 K/UL — SIGNIFICANT CHANGE UP (ref 3.8–10.5)
WBC # FLD AUTO: 8.09 K/UL — SIGNIFICANT CHANGE UP (ref 3.8–10.5)

## 2023-08-30 PROCEDURE — 93571 IV DOP VEL&/PRESS C FLO 1ST: CPT | Mod: 26,LD

## 2023-08-30 PROCEDURE — 92928 PRQ TCAT PLMT NTRAC ST 1 LES: CPT | Mod: LC

## 2023-08-30 PROCEDURE — 99152 MOD SED SAME PHYS/QHP 5/>YRS: CPT

## 2023-08-30 PROCEDURE — 93572 IV DOP VEL&/PRESS C FLO EA: CPT | Mod: 26,LC

## 2023-08-30 PROCEDURE — 93455 CORONARY ART/GRFT ANGIO S&I: CPT | Mod: 26,59

## 2023-08-30 PROCEDURE — 93306 TTE W/DOPPLER COMPLETE: CPT | Mod: 26

## 2023-08-30 RX ORDER — NICOTINE POLACRILEX 2 MG
1 GUM BUCCAL DAILY
Refills: 0 | Status: DISCONTINUED | OUTPATIENT
Start: 2023-08-30 | End: 2023-08-31

## 2023-08-30 RX ORDER — CLOPIDOGREL BISULFATE 75 MG/1
75 TABLET, FILM COATED ORAL DAILY
Refills: 0 | Status: DISCONTINUED | OUTPATIENT
Start: 2023-08-31 | End: 2023-08-31

## 2023-08-30 RX ORDER — SODIUM CHLORIDE 9 MG/ML
500 INJECTION INTRAMUSCULAR; INTRAVENOUS; SUBCUTANEOUS
Refills: 0 | Status: DISCONTINUED | OUTPATIENT
Start: 2023-08-30 | End: 2023-08-31

## 2023-08-30 RX ORDER — CLOPIDOGREL BISULFATE 75 MG/1
600 TABLET, FILM COATED ORAL ONCE
Refills: 0 | Status: COMPLETED | OUTPATIENT
Start: 2023-08-30 | End: 2023-08-30

## 2023-08-30 RX ORDER — ISOSORBIDE MONONITRATE 60 MG/1
30 TABLET, EXTENDED RELEASE ORAL DAILY
Refills: 0 | Status: DISCONTINUED | OUTPATIENT
Start: 2023-08-30 | End: 2023-08-31

## 2023-08-30 RX ORDER — SODIUM CHLORIDE 9 MG/ML
250 INJECTION INTRAMUSCULAR; INTRAVENOUS; SUBCUTANEOUS ONCE
Refills: 0 | Status: COMPLETED | OUTPATIENT
Start: 2023-08-30 | End: 2023-08-30

## 2023-08-30 RX ORDER — MAGNESIUM OXIDE 400 MG ORAL TABLET 241.3 MG
400 TABLET ORAL ONCE
Refills: 0 | Status: COMPLETED | OUTPATIENT
Start: 2023-08-30 | End: 2023-08-30

## 2023-08-30 RX ADMIN — Medication 500 MICROGRAM(S): at 06:32

## 2023-08-30 RX ADMIN — Medication 650 MILLIGRAM(S): at 23:27

## 2023-08-30 RX ADMIN — Medication 50 MILLIGRAM(S): at 06:32

## 2023-08-30 RX ADMIN — SODIUM CHLORIDE 500 MILLILITER(S): 9 INJECTION INTRAMUSCULAR; INTRAVENOUS; SUBCUTANEOUS at 13:52

## 2023-08-30 RX ADMIN — Medication 500 MICROGRAM(S): at 12:07

## 2023-08-30 RX ADMIN — MAGNESIUM OXIDE 400 MG ORAL TABLET 400 MILLIGRAM(S): 241.3 TABLET ORAL at 14:29

## 2023-08-30 RX ADMIN — Medication 500 MICROGRAM(S): at 22:30

## 2023-08-30 RX ADMIN — Medication 650 MILLIGRAM(S): at 00:00

## 2023-08-30 RX ADMIN — Medication 81 MILLIGRAM(S): at 12:06

## 2023-08-30 RX ADMIN — Medication 5 MILLIGRAM(S): at 22:30

## 2023-08-30 RX ADMIN — CLOPIDOGREL BISULFATE 600 MILLIGRAM(S): 75 TABLET, FILM COATED ORAL at 14:30

## 2023-08-30 RX ADMIN — ATORVASTATIN CALCIUM 80 MILLIGRAM(S): 80 TABLET, FILM COATED ORAL at 22:30

## 2023-08-30 RX ADMIN — PANTOPRAZOLE SODIUM 40 MILLIGRAM(S): 20 TABLET, DELAYED RELEASE ORAL at 06:33

## 2023-08-30 RX ADMIN — Medication 650 MILLIGRAM(S): at 09:21

## 2023-08-30 RX ADMIN — Medication 650 MILLIGRAM(S): at 22:32

## 2023-08-30 RX ADMIN — SODIUM CHLORIDE 75 MILLILITER(S): 9 INJECTION INTRAMUSCULAR; INTRAVENOUS; SUBCUTANEOUS at 14:26

## 2023-08-30 RX ADMIN — SODIUM CHLORIDE 166 MILLILITER(S): 9 INJECTION INTRAMUSCULAR; INTRAVENOUS; SUBCUTANEOUS at 18:12

## 2023-08-30 RX ADMIN — Medication 25 MILLIGRAM(S): at 06:32

## 2023-08-30 NOTE — PROGRESS NOTE ADULT - PROBLEM SELECTOR PLAN 2
CABG SVG-PDA 05/2017 @ [facility], s/p  recent dx Cath 9/22/22 LM: LI, mLAD mild to mod LI (calcified), LCX: mild LI, mRCA 80-90 % supplied distally by SVG-dRCA \  -NPO for further cardiac workup  -TTE in AM CABG SVG-PDA 05/2017 @ [facility], s/p recent dx Cardiac Cath 9/22/22 LM: LI, mLAD mild to mod LI (calcified), LCX: mild LI, prox RCA 60% and mRCA 90 % supplied distally by SVG-RCA  -Pending cardiac cath  -c/w ASA 81mg daily, Atorvastatin 80mg daily

## 2023-08-30 NOTE — DIETITIAN INITIAL EVALUATION ADULT - OTHER INFO
64 y/o female, current smoker, PMHx of asthma/COPD, sciatica/herniated disc, HTN, HLD, DM-2, obesity, mod-severe AS s/p bioAVR/ascending aorta replacement and CABG SVG-PDA 05/2017 @[facility], s/p recent dx Cath 9/22/22 LM: LI, mLAD mild to mod LI (calcified), LCX: mild LI, mRCA 80-90 % supplied distally by SVG-dRCA with normal EF presents to [facility] ER 8/29/23 complaining of left-sided chest tightness associated with lightheadedness. EMS gave ASA 324mg chewable on arrival to [facility] ER. In light of risk factors and UA symptoms, now admitted to [facility], Cardiology for rule out ACS and TTE    Pt seen this AM on 5UR. Reports trying to lose wt and has done so x years.  pounds and down to 185 pounds which is consistent with admit wt 184 pounds. NKFA. No issues chewing/swallowing, however does report hx of SX for TMJ in 2018. Pt NPO for Pending testing. Noted with order for DASHTLC/conscho for when NPO is d/c. PTA reports consuming 2 coffees/day. Often skips meals as she reports she is not hungry and "eats like a bird." Limited recall obtained aside from bagels, crackers/cheese and a varying dinner. LBM +5/28, Soft/NT. MAGOX and Protonix ordered. C/o headache- per IDR was given Tylenol this AM. Jimbo 21. No Edema. No pressure ulcer. HDL 37, .   Please see below for nutritions recommendations.  62 y/o female, current smoker, PMHx of asthma/COPD, sciatica/herniated disc, HTN, HLD, DM-2, obesity, mod-severe AS s/p bioAVR/ascending aorta replacement and CABG SVG-PDA 05/2017 [facility], s/p recent dx Cath 9/22/22 LM: LI, mLAD mild to mod LI (calcified), LCX: mild LI, mRCA 80-90 % supplied distally by SVG-dRCA with normal EF presents to [facility] ER 8/29/23 complaining of left-sided chest tightness associated with lightheadedness. EMS gave ASA 324mg chewable on arrival to [facility] ER. In light of risk factors and UA symptoms, now admitted to [facility], Cardiology for rule out ACS and TTE    Pt seen this AM on 5UR. Reports trying to lose wt and has done so x years.  pounds and down to 185 pounds which is consistent with admit wt 184 pounds. NKFA. Pt NPO for Pending testing. Noted with order for DASHTLC/conscho for when NPO is d/c. PTA reports consuming 2 coffees/day. Often skips meals as she reports she is not hungry and "eats like a bird." Limited recall obtained aside from bagels, crackers/cheese and a varying dinner. LBM +5/28, Soft/NT. MAGOX and Protonix ordered. C/o headache- per IDR was given Tylenol this AM. Jimbo 21. No Edema. No pressure ulcer. HDL 37, .   Please see below for nutritions recommendations.

## 2023-08-30 NOTE — PROGRESS NOTE ADULT - ASSESSMENT
62 y/o female, current smoker, with FHx MI, PMHx of asthma/COPD, sciatica/herniated disc, HTN, HLD, DM-2, obesity, mod-severe AS s/p bioAVR/ascending aorta replacement and CABG SVG-PDA 05/2017 @ [facility], s/p  recent dx Cath 9/22/22 LM: LI, mLAD mild to mod LI (calcified), LCX: mild LI, mRCA 80-90 % supplied distally by SVG-dRCA with normal EF presents to [facility] ER this evening, 8/29/23 complaining of left-sided chest tightness associated with lightheadedness this afternoon. 62 y/o female, current smoker, with FHx MI, PMHx of asthma/COPD, sciatica/herniated disc, HTN, HLD, DM-2, obesity, mod-severe AS s/p bioAVR/ascending aorta replacement and CABG SVG-PDA 05/2017 @ [facility], s/p Dx cardiac cath 9/22/22 LM: LI, mLAD mild to mod LI (calcified), LCX: mild LI, pRCA 60% and mRCA 90% supplied distally by SVG-dRCA (patent graft), presents to [facility] 8/29/23 c/o left-sided chest tightness/heaviness associated with nausea/lightheadedness. Trop neg x 4. Admitted to cardiac tele for r/o ACS, pending cardiac cath.

## 2023-08-30 NOTE — PROGRESS NOTE ADULT - PROBLEM SELECTOR PLAN 3
Monitor BP  -continue Metoprolol XL 50mg daily, Chlorthalidone 25mg daily Normotensive  -continue Metoprolol XL 50mg daily, Chlorthalidone 25mg daily

## 2023-08-30 NOTE — PROGRESS NOTE ADULT - SUBJECTIVE AND OBJECTIVE BOX
CARDIOLOGY NP PROGRESS NOTE    Subjective:   Remainder ROS otherwise negative.    Overnight Events:     TELEMETRY:    EKG:      VITAL SIGNS:  T(C): 36.1 (08-30-23 @ 08:22), Max: 36.9 (08-29-23 @ 21:19)  HR: 58 (08-30-23 @ 08:22) (58 - 72)  BP: 110/59 (08-30-23 @ 08:22) (108/61 - 128/71)  RR: 18 (08-30-23 @ 08:22) (16 - 18)  SpO2: 100% (08-30-23 @ 08:22) (97% - 100%)  Wt(kg): --    I&O's Summary    29 Aug 2023 07:01  -  30 Aug 2023 07:00  --------------------------------------------------------  IN: 240 mL / OUT: 0 mL / NET: 240 mL    30 Aug 2023 07:01  -  30 Aug 2023 09:53  --------------------------------------------------------  IN: 0 mL / OUT: 0 mL / NET: 0 mL          PHYSICAL EXAM:    General: A/ox 3, No acute Distress  Neck: Supple, NO JVD  Cardiac: S1 S2, No M/R/G  Pulmonary: CTAB, Breathing unlabored, No Rhonchi/Rales/Wheezing  Abdomen: Soft, Non -tender, +BS x 4 quads  Extremities: No Rashes, No edema  Neuro: A/o x 3, No focal deficits          LABS:                          12.3   11.23 )-----------( 248      ( 29 Aug 2023 17:11 )             38.1                              08-29    135  |  99  |  14  ----------------------------<  190<H>  3.2<L>   |  23  |  0.65    Ca    9.5      29 Aug 2023 17:11  Mg     1.4     08-29    TPro  6.9  /  Alb  3.8  /  TBili  <0.2  /  DBili  x   /  AST  22  /  ALT  23  /  AlkPhos  31<L>  08-29    LIVER FUNCTIONS - ( 29 Aug 2023 17:11 )  Alb: 3.8 g/dL / Pro: 6.9 g/dL / ALK PHOS: 31 U/L / ALT: 23 U/L / AST: 22 U/L / GGT: x                                   CAPILLARY BLOOD GLUCOSE      POCT Blood Glucose.: 122 mg/dL (30 Aug 2023 07:42)  POCT Blood Glucose.: 119 mg/dL (29 Aug 2023 22:48)    CARDIAC MARKERS ( 30 Aug 2023 06:02 )  x     / x     / 66 U/L / x     / 1.8 ng/mL  CARDIAC MARKERS ( 30 Aug 2023 00:20 )  x     / x     / 65 U/L / x     / 1.9 ng/mL          Allergies:  latex (Unknown)  Biaxin (Urticaria; Rash; Hives)    MEDICATIONS  (STANDING):  aspirin enteric coated 81 milliGRAM(s) Oral daily  atorvastatin 80 milliGRAM(s) Oral at bedtime  chlorthalidone 25 milliGRAM(s) Oral daily  dextrose 5%. 1000 milliLiter(s) (100 mL/Hr) IV Continuous <Continuous>  dextrose 5%. 1000 milliLiter(s) (50 mL/Hr) IV Continuous <Continuous>  dextrose 50% Injectable 25 Gram(s) IV Push once  dextrose 50% Injectable 12.5 Gram(s) IV Push once  dextrose 50% Injectable 25 Gram(s) IV Push once  enoxaparin Injectable 40 milliGRAM(s) SubCutaneous every 24 hours  glucagon  Injectable 1 milliGRAM(s) IntraMuscular once  insulin lispro (ADMELOG) corrective regimen sliding scale   SubCutaneous Before meals and at bedtime  ipratropium    for Nebulization 500 MICROGram(s) Nebulizer every 6 hours  metoprolol succinate ER 50 milliGRAM(s) Oral daily  pantoprazole    Tablet 40 milliGRAM(s) Oral before breakfast    MEDICATIONS  (PRN):  acetaminophen     Tablet .. 650 milliGRAM(s) Oral every 6 hours PRN Moderate Pain (4 - 6)  dextrose Oral Gel 15 Gram(s) Oral once PRN Blood Glucose LESS THAN 70 milliGRAM(s)/deciliter  diazepam    Tablet 5 milliGRAM(s) Oral daily PRN anxiety        DIAGNOSTIC TESTS:        CARDIOLOGY NP PROGRESS NOTE    Subjective: Pt seen and examined at bedside. Reports feeling L sided chest pain this AM lasting 1/2 hr to 1 hr.  Remainder ROS otherwise negative.    Overnight Events: NPO s/p MN    TELEMETRY: SR 70s, short episodes x few secs of PATs 130s         VITAL SIGNS:  T(C): 36.1 (08-30-23 @ 08:22), Max: 36.9 (08-29-23 @ 21:19)  HR: 58 (08-30-23 @ 08:22) (58 - 72)  BP: 110/59 (08-30-23 @ 08:22) (108/61 - 128/71)  RR: 18 (08-30-23 @ 08:22) (16 - 18)  SpO2: 100% (08-30-23 @ 08:22) (97% - 100%)  Wt(kg): --    I&O's Summary    29 Aug 2023 07:01  -  30 Aug 2023 07:00  --------------------------------------------------------  IN: 240 mL / OUT: 0 mL / NET: 240 mL    30 Aug 2023 07:01  -  30 Aug 2023 09:53  --------------------------------------------------------  IN: 0 mL / OUT: 0 mL / NET: 0 mL          PHYSICAL EXAM:    General: A/ox 3, No acute Distress  Neck: Supple, NO JVD  Cardiac: S1 S2, No M/R/G  Pulmonary: CTAB, Breathing unlabored on RA, No Rhonchi/Rales/Wheezing  Abdomen: Soft, Non -tender, +BS x 4 quads  Extremities: No Rashes, No edema  Vascular: 1+ radial pulses opal, no femoral bruits opal, 1+ DP/PT pulses opal  Neuro: A/o x 3, No focal deficits          LABS:                          12.3   11.23 )-----------( 248      ( 29 Aug 2023 17:11 )             38.1                              08-29    135  |  99  |  14  ----------------------------<  190<H>  3.2<L>   |  23  |  0.65    Ca    9.5      29 Aug 2023 17:11  Mg     1.4     08-29    TPro  6.9  /  Alb  3.8  /  TBili  <0.2  /  DBili  x   /  AST  22  /  ALT  23  /  AlkPhos  31<L>  08-29    LIVER FUNCTIONS - ( 29 Aug 2023 17:11 )  Alb: 3.8 g/dL / Pro: 6.9 g/dL / ALK PHOS: 31 U/L / ALT: 23 U/L / AST: 22 U/L / GGT: x                                   CAPILLARY BLOOD GLUCOSE      POCT Blood Glucose.: 122 mg/dL (30 Aug 2023 07:42)  POCT Blood Glucose.: 119 mg/dL (29 Aug 2023 22:48)    CARDIAC MARKERS ( 30 Aug 2023 06:02 )  x     / x     / 66 U/L / x     / 1.8 ng/mL  CARDIAC MARKERS ( 30 Aug 2023 00:20 )  x     / x     / 65 U/L / x     / 1.9 ng/mL          Allergies:  latex (Unknown)  Biaxin (Urticaria; Rash; Hives)    MEDICATIONS  (STANDING):  aspirin enteric coated 81 milliGRAM(s) Oral daily  atorvastatin 80 milliGRAM(s) Oral at bedtime  chlorthalidone 25 milliGRAM(s) Oral daily  dextrose 5%. 1000 milliLiter(s) (100 mL/Hr) IV Continuous <Continuous>  dextrose 5%. 1000 milliLiter(s) (50 mL/Hr) IV Continuous <Continuous>  dextrose 50% Injectable 25 Gram(s) IV Push once  dextrose 50% Injectable 12.5 Gram(s) IV Push once  dextrose 50% Injectable 25 Gram(s) IV Push once  enoxaparin Injectable 40 milliGRAM(s) SubCutaneous every 24 hours  glucagon  Injectable 1 milliGRAM(s) IntraMuscular once  insulin lispro (ADMELOG) corrective regimen sliding scale   SubCutaneous Before meals and at bedtime  ipratropium    for Nebulization 500 MICROGram(s) Nebulizer every 6 hours  metoprolol succinate ER 50 milliGRAM(s) Oral daily  pantoprazole    Tablet 40 milliGRAM(s) Oral before breakfast    MEDICATIONS  (PRN):  acetaminophen     Tablet .. 650 milliGRAM(s) Oral every 6 hours PRN Moderate Pain (4 - 6)  dextrose Oral Gel 15 Gram(s) Oral once PRN Blood Glucose LESS THAN 70 milliGRAM(s)/deciliter  diazepam    Tablet 5 milliGRAM(s) Oral daily PRN anxiety        DIAGNOSTIC TESTS:     < from: TTE Echo Complete w/ Contrast w/ Doppler (08.30.23 @ 09:52) >  CONCLUSIONS:     1.Technically difficult study.   2. Normal left ventricular size and systolic function.   3. Normal right ventricular size and systolic function.   4. Normal atria.   5. Bioprosthetic valve is seen in the aortic position with apparent   normal function, without evidence of prosthetic dysfunction.   6. Trivial pericardial effusion.   7. Compared to the previous TTE performed on 7/2/2020, there have been   no significant interval changes.    < end of copied text >

## 2023-08-30 NOTE — PROGRESS NOTE ADULT - PROBLEM SELECTOR PLAN 7
Atrovent Neb q6hrs PRN Not in acute exacerbation. Lungs clear opal on exam.  -Atrovent Neb q6hrs PRN

## 2023-08-30 NOTE — PROGRESS NOTE ADULT - PROBLEM SELECTOR PLAN 9
Lovenox 40mg SQ daily; ambulate as tolerated Lovenox 40mg SQ daily; ambulate as tolerated    F: pre-cath IVF  N: DASH/TLC/DM diet. NPO pending cath  E: Replete lytes PRN K<4, Mg<2  P: DVT PPX: on Lovenox SQ  C: FULL CODE  Dispo: Admit to tele 5 [facility]

## 2023-08-30 NOTE — DISCHARGE NOTE PROVIDER - NPI NUMBER (FOR SYSADMIN USE ONLY) :
[UNKNOWN] [employer] Counseling:  I discussed with the patient the risks of mycophenolate mofetil including but not limited to infection/immunosuppression, GI upset, hypokalemia, hypercholesterolemia, bone marrow suppression, lymphoproliferative disorders, malignancy, GI ulceration/bleed/perforation, colitis, interstitial lung disease, kidney failure, progressive multifocal leukoencephalopathy, and birth defects.  The patient understands that monitoring is required including a baseline creatinine and regular CBC testing. In addition, patient must alert us immediately if symptoms of infection or other concerning signs are noted.

## 2023-08-30 NOTE — DIETITIAN INITIAL EVALUATION ADULT - NSICDXPASTSURGICALHX_GEN_ALL_CORE_FT
Eye
PAST SURGICAL HISTORY:  Carpal tunnel syndrome     H/O aortic valve replacement     Status post laser lithotripsy of ureteral calculus     Status post small bowel resection     Uterine fibroid removal

## 2023-08-30 NOTE — DISCHARGE NOTE PROVIDER - NSDCCPCAREPLAN_GEN_ALL_CORE_FT
PRINCIPAL DISCHARGE DIAGNOSIS  Diagnosis: CAD (coronary artery disease)  Assessment and Plan of Treatment:       SECONDARY DISCHARGE DIAGNOSES  Diagnosis: DM2 (diabetes mellitus, type 2)  Assessment and Plan of Treatment:     Diagnosis: Encounter for smoking cessation counseling  Assessment and Plan of Treatment:     Diagnosis: Encounter for cardiac rehabilitation  Assessment and Plan of Treatment:      PRINCIPAL DISCHARGE DIAGNOSIS  Diagnosis: CAD (coronary artery disease)  Assessment and Plan of Treatment: You came into the hospital due to chest pain and underwent a cardiac catheterization where a drug-eluting cardiac stent was placed to the 70% blockage in the First Obtuse Marginal Artery. You will need to take antiplatelet medications Asprin and Plavix every day to help the cardiac stent keep open. DO NOT STOP taking these medications unless directed by your cardiologist as this can be LIFE THREATENING and lead to closing up of the cardiac stent and lead to a heart attack! You will need to HOLD OFF oral surgery until clearance from the cardiologist when it would be safe to hold Aspirin or Plavix. The possible side effects of these medications include increased risk of bleeding and easy bruising. However, the benefits of preventing stent closure are greater than this risk, so you are advised to take these medications especially after stent placement. DO NOT TAKE NSAIDS INCLUDING IBUPROFEN, NAPROXEN AS IT CAN LEAD TO INTERNAL BLEEDING WITH ASPIRIN AND PLAVIX.      SECONDARY DISCHARGE DIAGNOSES  Diagnosis: DM2 (diabetes mellitus, type 2)  Assessment and Plan of Treatment: You received contrast during the cardiac catheterization procedure which can cause kidney abnormality when combined with with your diabetic medication Metformin. Please HOLD Metformin for 2 days after the procedure, you may RESUME on 9/2/23.    Diagnosis: Encounter for smoking cessation counseling  Assessment and Plan of Treatment: If you smoke and already have heart or vascular disease, quitting smoking will reduce your risk of sudden cardiac death, heart attack, and death from other chronic diseases. Any amount of smoking, even light smoking or occasional smoking, damages the heart and blood vessels. One of the best ways to reduce your risk of heart disease is to avoid tobacco smoke. No matter how much or how long you've smoked, quitting will benefit you. We have provided you with resources including Nicotine patches to help with smoking cessation and we strongly recommend that you use the information provided and follow up with your outpatient physician to help with setting up a “quit date” if you have not already.    Diagnosis: Encounter for cardiac rehabilitation  Assessment and Plan of Treatment: It is recommended for you to go to cardiac rehabilitation, which is outpatient physical therapy tailored to improve the heart.  It has been shown to improve the quantity and quality of life of people with heart disease like yours. You should attend cardiac rehab 3 times per week for 12 weeks.  You were provided a prescription a list of nearby facilities. Please call your insurance carrier to determine which of these facilities are covered under your plan.   -Please bring this prescription with you to your follow up appointment with your cardiologist who can then further assist you to enroll into a cardiac rehab program.

## 2023-08-30 NOTE — PROGRESS NOTE ADULT - PROBLEM SELECTOR PLAN 1
Telemetry, ECG, Troponin T Sensitivity in AM.  Troponin T Sensitivity 13-->9  -ECG NSR@74bpm with non specific ST-T wave changes  -ASA Ec 81mg daily, Atorvastatin 80mg daily  -NPO for further cardiac workup in AM  -TTE in AM Telemetry, ECG, Troponin T Sensitivity in AM.  Troponin T Sensitivity 13-->9  -ECG NSR@74bpm with non specific ST-T wave changes  -S/p ASA 324mg load prior to ED (pt held ASA x 7 days pending oral sx this week)  -Will load Plavix 600mg x1 today  -c/w ASA 81mg daily, Atorvastatin 80mg daily  -Plan for cardiac cath today w/ Dr Barrios. Consent in chart  -Mallampati Class IV.   ASA Class II  Pt is a suitable candidate for moderate sedation.   Risks & benefits of procedure and alternative therapy have been explained to the patient including but not limited to: allergic reaction, bleeding w/possible need for blood transfusion, infection, renal and vascular compromise, limb damage, arrhythmia, stroke, vessel dissection/perforation, Myocardial infarction, emergent CABG. Informed consent obtained and in chart. C/o L sided chest pressure/heaviness described as "sitting on chest" a/w nausea/dizziness,   -hs Troponin T negative 13-->9.  -ECG: NSR 70s w/ Q waves inferiorly (unchanged)  -S/p ASA 324mg load prior to ED (pt held ASA x 7 days pending oral sx this week)  -Will load Plavix 600mg x1 today  -c/w ASA 81mg daily, Atorvastatin 80mg daily  -Plan for cardiac cath today w/ Dr Barrios. Consent in chart  -Mallampati Class IV.   ASA Class II  Pt is a suitable candidate for moderate sedation.   Risks & benefits of procedure and alternative therapy have been explained to the patient including but not limited to: allergic reaction, bleeding w/possible need for blood transfusion, infection, renal and vascular compromise, limb damage, arrhythmia, stroke, vessel dissection/perforation, Myocardial infarction, emergent CABG. Informed consent obtained and in chart.

## 2023-08-30 NOTE — DIETITIAN INITIAL EVALUATION ADULT - PERTINENT MEDS FT
MEDICATIONS  (STANDING):  aspirin enteric coated 81 milliGRAM(s) Oral daily  atorvastatin 80 milliGRAM(s) Oral at bedtime  chlorthalidone 25 milliGRAM(s) Oral daily  dextrose 5%. 1000 milliLiter(s) (100 mL/Hr) IV Continuous <Continuous>  dextrose 5%. 1000 milliLiter(s) (50 mL/Hr) IV Continuous <Continuous>  dextrose 50% Injectable 25 Gram(s) IV Push once  dextrose 50% Injectable 12.5 Gram(s) IV Push once  dextrose 50% Injectable 25 Gram(s) IV Push once  enoxaparin Injectable 40 milliGRAM(s) SubCutaneous every 24 hours  glucagon  Injectable 1 milliGRAM(s) IntraMuscular once  insulin lispro (ADMELOG) corrective regimen sliding scale   SubCutaneous Before meals and at bedtime  ipratropium    for Nebulization 500 MICROGram(s) Nebulizer every 6 hours  magnesium oxide 400 milliGRAM(s) Oral once  metoprolol succinate ER 50 milliGRAM(s) Oral daily  pantoprazole    Tablet 40 milliGRAM(s) Oral before breakfast  sodium chloride 0.9%. 500 milliLiter(s) (75 mL/Hr) IV Continuous <Continuous>    MEDICATIONS  (PRN):  acetaminophen     Tablet .. 650 milliGRAM(s) Oral every 6 hours PRN Moderate Pain (4 - 6)  dextrose Oral Gel 15 Gram(s) Oral once PRN Blood Glucose LESS THAN 70 milliGRAM(s)/deciliter  diazepam    Tablet 5 milliGRAM(s) Oral daily PRN anxiety

## 2023-08-30 NOTE — DISCHARGE NOTE PROVIDER - NSDCMRMEDTOKEN_GEN_ALL_CORE_FT
aspirin 81 mg oral delayed release tablet: 1 tab(s) orally once a day  chlorthalidone 25 mg oral tablet: 1 tab(s) orally once a day  Crestor 40 mg oral tablet: 1 tab(s) orally once a day  diazePAM 5 mg oral tablet: 0.5 tab(s) orally once a day  MetFORMIN (Eqv-Fortamet) 500 mg oral tablet, extended release: 2 tab(s) orally once a day in morning   metFORMIN 500 mg oral tablet: 1 tab(s) orally once a day (at bedtime)  metoprolol succinate 50 mg oral tablet, extended release: 1 orally once a day  omeprazole 40 mg oral delayed release capsule: 1 cap(s) orally once a day  potassium chloride 10 mEq oral tablet, extended release: 1 tab(s) orally 2 times a day  Reglan 10 mg oral tablet: 1 tab(s) orally 4 times a day (before meals and at bedtime)   Zetia 10 mg oral tablet: 1 tab(s) orally once a day   acetaminophen 325 mg oral tablet: 2 tab(s) orally every 6 hours As needed Moderate Pain (4 - 6)  aspirin 81 mg oral delayed release tablet: 1 tab(s) orally once a day  Cardiac rehabilitation.: 3 times a week for 12 weeks. Dx CAD s/p PCI. Outpatient cardiologist Dr Momo Gamez  chlorthalidone 25 mg oral tablet: 1 tab(s) orally once a day  clopidogrel 75 mg oral tablet: 1 tab(s) orally once a day  Crestor 40 mg oral tablet: 1 tab(s) orally once a day  diazePAM 5 mg oral tablet: 0.5 tab(s) orally once a day  MetFORMIN (Eqv-Fortamet) 500 mg oral tablet, extended release: 2 tab(s) orally once a day in morning   metFORMIN 500 mg oral tablet: 1 tab(s) orally once a day (at bedtime)  metoprolol succinate 50 mg oral tablet, extended release: 1 orally once a day  nicotine 14 mg/24 hr transdermal film, extended release: 1 patch transdermal once a day  omeprazole 40 mg oral delayed release capsule: 1 cap(s) orally once a day  potassium chloride 10 mEq oral tablet, extended release: 1 tab(s) orally 2 times a day  Reglan 10 mg oral tablet: 1 tab(s) orally 4 times a day (before meals and at bedtime) as needed for  nausea  Zetia 10 mg oral tablet: 1 tab(s) orally once a day

## 2023-08-30 NOTE — DIETITIAN INITIAL EVALUATION ADULT - DIET TYPE
Diet to be advanced in 24-48hrs as medically feasible. Resume diet (DASHTLC, Consistent Carbohydrate diet)

## 2023-08-30 NOTE — DISCHARGE NOTE PROVIDER - CARE PROVIDER_API CALL
Shereen Larkin  Cardiology  [address] [address]  Phone: [phone]  Fax: (   )    -  Scheduled Appointment: 09/07/2023 01:40 PM

## 2023-08-30 NOTE — PROGRESS NOTE ADULT - PROBLEM SELECTOR PLAN 4
F/U Lipid panel and LFT's   -on home Crestor 40mg daily and Zetia 10mg daily  continue Atorvastatin 80mg daily Lipid panel Trig 173, LDL 10  -Takes home Crestor 40mg daily and Zetia 10mg daily  -continue Atorvastatin 80mg daily inhospital

## 2023-08-30 NOTE — DIETITIAN INITIAL EVALUATION ADULT - PERTINENT LABORATORY DATA
08-30    139  |  101  |  12  ----------------------------<  109<H>  4.1   |  26  |  0.73    Ca    9.6      30 Aug 2023 06:02  Mg     1.8     08-30    TPro  6.8  /  Alb  3.7  /  TBili  <0.2  /  DBili  x   /  AST  33  /  ALT  24  /  AlkPhos  31<L>  08-30  POCT Blood Glucose.: 126 mg/dL (08-30-23 @ 12:03)  A1C with Estimated Average Glucose Result: 6.3 % (08-30-23 @ 06:02)  A1C with Estimated Average Glucose Result: 6.7 % (09-22-22 @ 13:56)

## 2023-08-30 NOTE — DIETITIAN INITIAL EVALUATION ADULT - NS FNS DIET ORDER
Diet, NPO:   NPO for Procedure/Test     NPO Start Date: 30-Aug-2023,   NPO Start Time: 00:00  Except Medications (08-29-23 @ 22:09)  Diet, DASH/TLC:   Sodium & Cholesterol Restricted  Consistent Carbohydrate {No Snacks} (CSTCHO) (08-29-23 @ 22:09)

## 2023-08-30 NOTE — DISCHARGE NOTE PROVIDER - PROVIDER TOKENS
FREE:[LAST:[Chaya],FIRST:[Shereen],PHONE:[phone]:[phone]   [phone]    -],ADDRESS:[[place] [address]],SCHEDULEDAPPT:[09/07/2023],SCHEDULEDAPPTTIME:[01:40 PM]]

## 2023-08-30 NOTE — DIETITIAN INITIAL EVALUATION ADULT - PROBLEM SELECTOR PLAN 2
CABG SVG-PDA 05/2017 @ [facility], s/p  recent dx Cath 9/22/22 LM: LI, mLAD mild to mod LI (calcified), LCX: mild LI, mRCA 80-90 % supplied distally by SVG-dRCA \  -NPO for further cardiac workup  -TTE in AM

## 2023-08-30 NOTE — DISCHARGE NOTE PROVIDER - HOSPITAL COURSE
62 y/o female, current smoker (1/2 ppd), with FHx MI, PMHx of asthma/COPD, sciatica/herniated disc, HTN, HLD, DM-2, obesity, mod-severe AS s/p bioAVR/ascending aorta replacement and CABG SVG-PDA 05/2017 @ [facility], s/p Dx cardiac cath 9/22/22 LM: LI, mLAD mild to mod LI (calcified), LCX: mild LI, pRCA 60% and mRCA 90% supplied distally by SVG-dRCA (patent graft), presents to [facility] 8/29/23 c/o left-sided chest tightness/heaviness associated with nausea/lightheadedness. Trop neg x 4. EKG w/ NSR 70s, Q waves inferiorly (unchanged). Admitted to cardiac tele for unstable angina. Pt underwent cardiac cath 8/30/23 w/ Dr Barrios: s/p PTCA/DRAKE x 1 OM1 70% stenosis (FFR positive 0.76), pRCA 70% stenosis, mRCA 99% stenosis, RPDA-filled by patent SVG-RPDA graft, mild diffuse disease and retrograde filling. LM: MLI, pLAD 40% (FFR negative 0.92), LCx mild diffise, D1: small, mild diffuse. R radial access. Started Imdur 30mg qd as advised by IC post cath.  Pt will c/w ASA, Plavix, Crestor 40mg qd, Zetia 10mg qd. LDL 10, Trig 173. Echo w/ EF 60-65%, bioprosthetic AVR with apparent normal function, without evidence of prosthetic dysfunction. Trivial pericardial effusion. No significant changed compared to prior echo 2020.       62 y/o female, current smoker (1/2 ppd), with FHx MI, PMHx of asthma/COPD, sciatica/herniated disc, HTN, HLD, DM-2, obesity, mod-severe AS s/p bioAVR/ascending aorta replacement and CABG SVG-PDA 05/2017 @ [facility], s/p Dx cardiac cath 9/22/22 LM: LI, mLAD mild to mod LI (calcified), LCX: mild LI, pRCA 60% and mRCA 90% supplied distally by SVG-dRCA (patent graft), presents to [facility] 8/29/23 c/o left-sided chest tightness/heaviness described as "sitting on chest" associated with nausea/lightheadedness. Trop neg x 4. EKG w/ NSR 70s, Q waves inferiorly (unchanged). Admitted to cardiac tele for unstable angina. Pt underwent cardiac cath 8/30/23 w/ Dr Barrios: s/p PTCA/DRAKE x 1 OM1 70% stenosis (FFR positive 0.76), pRCA 70% stenosis, mRCA 99% stenosis, RPDA-filled by patent SVG-RPDA graft, mild diffuse disease and retrograde filling. LM: MLI, pLAD 40% (FFR negative 0.92), LCx mild diffise, D1: small, mild diffuse. R radial access CDI w/o hematoma. Pt will c/w ASA, Plavix, Crestor 40mg qd, Zetia 10mg qd. LDL 10, Trig 173. Echo w/ EF 60-65%, bioprosthetic AVR with apparent normal function, without evidence of prosthetic dysfunction. Trivial pericardial effusion. No significant changed compared to prior echo 2020. Pt developed hypotension 98/56, unable to initiate Imdur. Ambulated in hallway.     On the day of discharge, the patient was seen and examined. Symptoms improved. Vital signs are stable. Labs and imaging reviewed. Patient is medically optimized and hemodynamically stable. Return precautions discussed, medication teach back done, and importance of physician followup emphasized. The patient verbalized understanding.  Referred for Cardiac Rehab (CAD Post PCI): Education on benefits of Cardiac Rehab provided to patient. Referral and Prescription Given for Cardiac Rehab. Pt given list of locations & instructed to contact their insurance company to review list of participating providers. Pt instructed to bring Cardiac Rehab prescription with them to Cardiology Follow up appointment for assistance with enrollment.

## 2023-08-30 NOTE — DIETITIAN INITIAL EVALUATION ADULT - OTHER CALCULATIONS
IBW used to calculate energy needs due to pt's current body weight exceeding 120% of IBW  Adjust for age and current medical conditions

## 2023-08-30 NOTE — PROGRESS NOTE ADULT - PROBLEM SELECTOR PLAN 5
Monitor FS; F/U Hgb A1C in AM  -Hold Metformin 1000mg AM and 500mg PM   -continue Insulin Lispro Med dose sliding scale HgA1C 6.3  --130s  -Hold Metformin 1000mg AM and 500mg PM   -continue Insulin Lispro Med dose sliding scale

## 2023-08-30 NOTE — DISCHARGE NOTE PROVIDER - NSDCFUADDINST_GEN_ALL_CORE_FT
- Do NOT drive or operate hazardous machinery for 24 hours. Limit your physical activity for 24-48 hours. Do NOT engage in sports, heavy work or heavy lifting more than 5 lbs for 5 days.   - You MAY shower and wash the area gently with soap and water. BUT no TUB BATHS, HOT TUBS OR SWIMMING FOR 5 DAYS.  Do not keep the area covered. Do not put any lotions, creams, or ointments on the site.  - Your procedure was done through your right wrist. If you observe flank bleeding from the puncture site, it is an emergency. Please put direct pressure on the site and go directly to the ER. Bleeding under the skin may also occur and a small "black and blue" may be expected. If the area appears to be expanding or swelling around the puncture site, apply manual compression and go immediately to the nearest ER. If your arm/hand becomes cool or blue and/or you are unable to move it, this must be treated as an emergency, go directly to the nearest ER. Look for signs of infection in the wrist: fever, red streaking of the arm, obvious pus formation and pain.  -If you have any issues or concerns regarding your access site, you may call [facility] at [phone].

## 2023-08-31 ENCOUNTER — TRANSCRIPTION ENCOUNTER (OUTPATIENT)
Age: 63
End: 2023-08-31

## 2023-08-31 VITALS
TEMPERATURE: 99 F | HEART RATE: 87 BPM | SYSTOLIC BLOOD PRESSURE: 113 MMHG | DIASTOLIC BLOOD PRESSURE: 73 MMHG | RESPIRATION RATE: 18 BRPM | OXYGEN SATURATION: 97 %

## 2023-08-31 LAB
ANION GAP SERPL CALC-SCNC: 10 MMOL/L — SIGNIFICANT CHANGE UP (ref 5–17)
BUN SERPL-MCNC: 9 MG/DL — SIGNIFICANT CHANGE UP (ref 7–23)
CALCIUM SERPL-MCNC: 9.4 MG/DL — SIGNIFICANT CHANGE UP (ref 8.4–10.5)
CHLORIDE SERPL-SCNC: 102 MMOL/L — SIGNIFICANT CHANGE UP (ref 96–108)
CO2 SERPL-SCNC: 27 MMOL/L — SIGNIFICANT CHANGE UP (ref 22–31)
CREAT SERPL-MCNC: 0.66 MG/DL — SIGNIFICANT CHANGE UP (ref 0.5–1.3)
EGFR: 97 ML/MIN/1.73M2 — SIGNIFICANT CHANGE UP
GLUCOSE BLDC GLUCOMTR-MCNC: 122 MG/DL — HIGH (ref 70–99)
GLUCOSE BLDC GLUCOMTR-MCNC: 135 MG/DL — HIGH (ref 70–99)
GLUCOSE SERPL-MCNC: 124 MG/DL — HIGH (ref 70–99)
HCT VFR BLD CALC: 39.2 % — SIGNIFICANT CHANGE UP (ref 34.5–45)
HGB BLD-MCNC: 11.9 G/DL — SIGNIFICANT CHANGE UP (ref 11.5–15.5)
ISTAT ACTK (ACTIVATED CLOTTING TIME KAOLIN): 203 SEC — HIGH (ref 74–137)
ISTAT ACTK (ACTIVATED CLOTTING TIME KAOLIN): 251 SEC — HIGH (ref 74–137)
ISTAT ACTK (ACTIVATED CLOTTING TIME KAOLIN): 281 SEC — HIGH (ref 74–137)
MAGNESIUM SERPL-MCNC: 1.6 MG/DL — SIGNIFICANT CHANGE UP (ref 1.6–2.6)
MCHC RBC-ENTMCNC: 26.3 PG — LOW (ref 27–34)
MCHC RBC-ENTMCNC: 30.4 GM/DL — LOW (ref 32–36)
MCV RBC AUTO: 86.5 FL — SIGNIFICANT CHANGE UP (ref 80–100)
NRBC # BLD: 0 /100 WBCS — SIGNIFICANT CHANGE UP (ref 0–0)
PLATELET # BLD AUTO: 236 K/UL — SIGNIFICANT CHANGE UP (ref 150–400)
POTASSIUM SERPL-MCNC: 3.8 MMOL/L — SIGNIFICANT CHANGE UP (ref 3.5–5.3)
POTASSIUM SERPL-SCNC: 3.8 MMOL/L — SIGNIFICANT CHANGE UP (ref 3.5–5.3)
RBC # BLD: 4.53 M/UL — SIGNIFICANT CHANGE UP (ref 3.8–5.2)
RBC # FLD: 14.1 % — SIGNIFICANT CHANGE UP (ref 10.3–14.5)
SODIUM SERPL-SCNC: 139 MMOL/L — SIGNIFICANT CHANGE UP (ref 135–145)
WBC # BLD: 7.09 K/UL — SIGNIFICANT CHANGE UP (ref 3.8–10.5)
WBC # FLD AUTO: 7.09 K/UL — SIGNIFICANT CHANGE UP (ref 3.8–10.5)

## 2023-08-31 PROCEDURE — 85347 COAGULATION TIME ACTIVATED: CPT

## 2023-08-31 PROCEDURE — 96374 THER/PROPH/DIAG INJ IV PUSH: CPT

## 2023-08-31 PROCEDURE — 93010 ELECTROCARDIOGRAM REPORT: CPT

## 2023-08-31 PROCEDURE — 85027 COMPLETE CBC AUTOMATED: CPT

## 2023-08-31 PROCEDURE — 85025 COMPLETE CBC W/AUTO DIFF WBC: CPT

## 2023-08-31 PROCEDURE — 71045 X-RAY EXAM CHEST 1 VIEW: CPT

## 2023-08-31 PROCEDURE — C8929: CPT

## 2023-08-31 PROCEDURE — 82553 CREATINE MB FRACTION: CPT

## 2023-08-31 PROCEDURE — 84484 ASSAY OF TROPONIN QUANT: CPT

## 2023-08-31 PROCEDURE — 94640 AIRWAY INHALATION TREATMENT: CPT

## 2023-08-31 PROCEDURE — 99285 EMERGENCY DEPT VISIT HI MDM: CPT | Mod: 25

## 2023-08-31 PROCEDURE — C1769: CPT

## 2023-08-31 PROCEDURE — C1874: CPT

## 2023-08-31 PROCEDURE — 85610 PROTHROMBIN TIME: CPT

## 2023-08-31 PROCEDURE — 82550 ASSAY OF CK (CPK): CPT

## 2023-08-31 PROCEDURE — C1894: CPT

## 2023-08-31 PROCEDURE — 84443 ASSAY THYROID STIM HORMONE: CPT

## 2023-08-31 PROCEDURE — C1887: CPT

## 2023-08-31 PROCEDURE — C1725: CPT

## 2023-08-31 PROCEDURE — 80048 BASIC METABOLIC PNL TOTAL CA: CPT

## 2023-08-31 PROCEDURE — 85730 THROMBOPLASTIN TIME PARTIAL: CPT

## 2023-08-31 PROCEDURE — 80053 COMPREHEN METABOLIC PANEL: CPT

## 2023-08-31 PROCEDURE — 80061 LIPID PANEL: CPT

## 2023-08-31 PROCEDURE — 81001 URINALYSIS AUTO W/SCOPE: CPT

## 2023-08-31 PROCEDURE — 83036 HEMOGLOBIN GLYCOSYLATED A1C: CPT

## 2023-08-31 PROCEDURE — 83735 ASSAY OF MAGNESIUM: CPT

## 2023-08-31 PROCEDURE — 82962 GLUCOSE BLOOD TEST: CPT

## 2023-08-31 PROCEDURE — 36415 COLL VENOUS BLD VENIPUNCTURE: CPT

## 2023-08-31 PROCEDURE — 93005 ELECTROCARDIOGRAM TRACING: CPT

## 2023-08-31 PROCEDURE — 83880 ASSAY OF NATRIURETIC PEPTIDE: CPT

## 2023-08-31 RX ORDER — POTASSIUM CHLORIDE 20 MEQ
20 PACKET (EA) ORAL ONCE
Refills: 0 | Status: COMPLETED | OUTPATIENT
Start: 2023-08-31 | End: 2023-08-31

## 2023-08-31 RX ORDER — MAGNESIUM SULFATE 500 MG/ML
2 VIAL (ML) INJECTION ONCE
Refills: 0 | Status: COMPLETED | OUTPATIENT
Start: 2023-08-31 | End: 2023-08-31

## 2023-08-31 RX ORDER — METOCLOPRAMIDE HCL 10 MG
1 TABLET ORAL
Qty: 40 | Refills: 0
Start: 2023-08-31 | End: 2023-09-09

## 2023-08-31 RX ORDER — NICOTINE POLACRILEX 2 MG
1 GUM BUCCAL
Qty: 30 | Refills: 0
Start: 2023-08-31 | End: 2023-09-29

## 2023-08-31 RX ORDER — CLOPIDOGREL BISULFATE 75 MG/1
1 TABLET, FILM COATED ORAL
Qty: 30 | Refills: 11
Start: 2023-08-31 | End: 2024-08-24

## 2023-08-31 RX ORDER — CLOPIDOGREL BISULFATE 75 MG/1
1 TABLET, FILM COATED ORAL
Qty: 30 | Refills: 0
Start: 2023-08-31 | End: 2023-09-29

## 2023-08-31 RX ORDER — ACETAMINOPHEN 500 MG
2 TABLET ORAL
Qty: 0 | Refills: 0 | DISCHARGE
Start: 2023-08-31

## 2023-08-31 RX ADMIN — Medication 25 GRAM(S): at 09:40

## 2023-08-31 RX ADMIN — Medication 20 MILLIEQUIVALENT(S): at 09:39

## 2023-08-31 RX ADMIN — CLOPIDOGREL BISULFATE 75 MILLIGRAM(S): 75 TABLET, FILM COATED ORAL at 12:35

## 2023-08-31 RX ADMIN — Medication 81 MILLIGRAM(S): at 12:35

## 2023-08-31 RX ADMIN — Medication 500 MICROGRAM(S): at 10:58

## 2023-08-31 RX ADMIN — Medication 1 PATCH: at 12:35

## 2023-08-31 RX ADMIN — PANTOPRAZOLE SODIUM 40 MILLIGRAM(S): 20 TABLET, DELAYED RELEASE ORAL at 06:53

## 2023-08-31 RX ADMIN — Medication 25 MILLIGRAM(S): at 05:24

## 2023-08-31 NOTE — DISCHARGE NOTE NURSING/CASE MANAGEMENT/SOCIAL WORK - NSDCPEFALRISK_GEN_ALL_CORE
For information on Fall & Injury Prevention, visit: [email or web address] OR  [email or web address] OR  https://www.cdc.gov/steadi/patient.html

## 2023-08-31 NOTE — DISCHARGE NOTE NURSING/CASE MANAGEMENT/SOCIAL WORK - PATIENT PORTAL LINK FT
You can access the FollowMyHealth Patient Portal offered by [facility] by registering at the following website: [email or web address]. By joining [employer]’s FollowMyHealth portal, you will also be able to view your health information using other applications (apps) compatible with our system.

## 2023-09-02 DIAGNOSIS — E66.9 OBESITY, UNSPECIFIED: ICD-10-CM

## 2023-09-02 DIAGNOSIS — I95.9 HYPOTENSION, UNSPECIFIED: ICD-10-CM

## 2023-09-02 DIAGNOSIS — Z95.3 PRESENCE OF XENOGENIC HEART VALVE: ICD-10-CM

## 2023-09-02 DIAGNOSIS — F41.9 ANXIETY DISORDER, UNSPECIFIED: ICD-10-CM

## 2023-09-02 DIAGNOSIS — I25.110 ATHEROSCLEROTIC HEART DISEASE OF NATIVE CORONARY ARTERY WITH UNSTABLE ANGINA PECTORIS: ICD-10-CM

## 2023-09-02 DIAGNOSIS — Z91.040 LATEX ALLERGY STATUS: ICD-10-CM

## 2023-09-02 DIAGNOSIS — Z82.5 FAMILY HISTORY OF ASTHMA AND OTHER CHRONIC LOWER RESPIRATORY DISEASES: ICD-10-CM

## 2023-09-02 DIAGNOSIS — M54.31 SCIATICA, RIGHT SIDE: ICD-10-CM

## 2023-09-02 DIAGNOSIS — E78.5 HYPERLIPIDEMIA, UNSPECIFIED: ICD-10-CM

## 2023-09-02 DIAGNOSIS — E11.9 TYPE 2 DIABETES MELLITUS WITHOUT COMPLICATIONS: ICD-10-CM

## 2023-09-02 DIAGNOSIS — F17.210 NICOTINE DEPENDENCE, CIGARETTES, UNCOMPLICATED: ICD-10-CM

## 2023-09-02 DIAGNOSIS — I10 ESSENTIAL (PRIMARY) HYPERTENSION: ICD-10-CM

## 2023-09-02 DIAGNOSIS — Z95.1 PRESENCE OF AORTOCORONARY BYPASS GRAFT: ICD-10-CM

## 2023-09-02 DIAGNOSIS — K21.9 GASTRO-ESOPHAGEAL REFLUX DISEASE WITHOUT ESOPHAGITIS: ICD-10-CM

## 2023-09-02 DIAGNOSIS — Z79.84 LONG TERM (CURRENT) USE OF ORAL HYPOGLYCEMIC DRUGS: ICD-10-CM

## 2023-09-02 DIAGNOSIS — I31.39 OTHER PERICARDIAL EFFUSION (NONINFLAMMATORY): ICD-10-CM

## 2023-09-02 DIAGNOSIS — J44.9 CHRONIC OBSTRUCTIVE PULMONARY DISEASE, UNSPECIFIED: ICD-10-CM

## 2023-09-02 DIAGNOSIS — Z82.49 FAMILY HISTORY OF ISCHEMIC HEART DISEASE AND OTHER DISEASES OF THE CIRCULATORY SYSTEM: ICD-10-CM

## 2023-09-02 DIAGNOSIS — M54.32 SCIATICA, LEFT SIDE: ICD-10-CM

## 2023-09-02 DIAGNOSIS — Z87.442 PERSONAL HISTORY OF URINARY CALCULI: ICD-10-CM

## 2023-09-02 DIAGNOSIS — Z88.1 ALLERGY STATUS TO OTHER ANTIBIOTIC AGENTS STATUS: ICD-10-CM

## 2023-09-02 DIAGNOSIS — Z79.82 LONG TERM (CURRENT) USE OF ASPIRIN: ICD-10-CM

## 2023-09-02 DIAGNOSIS — J45.909 UNSPECIFIED ASTHMA, UNCOMPLICATED: ICD-10-CM

## 2023-09-14 ENCOUNTER — NON-APPOINTMENT (OUTPATIENT)
Age: 63
End: 2023-09-14

## 2023-09-14 ENCOUNTER — APPOINTMENT (OUTPATIENT)
Dept: HEART AND VASCULAR | Facility: CLINIC | Age: 63
End: 2023-09-14
Payer: MEDICARE

## 2023-09-14 VITALS
TEMPERATURE: 98 F | DIASTOLIC BLOOD PRESSURE: 70 MMHG | HEART RATE: 86 BPM | BODY MASS INDEX: 32.6 KG/M2 | WEIGHT: 184 LBS | OXYGEN SATURATION: 97 % | HEIGHT: 63 IN | SYSTOLIC BLOOD PRESSURE: 113 MMHG

## 2023-09-14 PROCEDURE — 93000 ELECTROCARDIOGRAM COMPLETE: CPT

## 2023-09-14 PROCEDURE — 99213 OFFICE O/P EST LOW 20 MIN: CPT | Mod: 25

## 2023-09-23 ENCOUNTER — EMERGENCY (EMERGENCY)
Facility: HOSPITAL | Age: 63
LOS: 1 days | Discharge: HOME | End: 2023-09-23
Attending: EMERGENCY MEDICINE | Admitting: EMERGENCY MEDICINE
Payer: MEDICARE

## 2023-09-23 VITALS
HEART RATE: 70 BPM | TEMPERATURE: 99 F | DIASTOLIC BLOOD PRESSURE: 68 MMHG | SYSTOLIC BLOOD PRESSURE: 110 MMHG | OXYGEN SATURATION: 97 % | RESPIRATION RATE: 16 BRPM

## 2023-09-23 VITALS
TEMPERATURE: 98 F | HEART RATE: 78 BPM | SYSTOLIC BLOOD PRESSURE: 163 MMHG | OXYGEN SATURATION: 100 % | HEIGHT: 63 IN | WEIGHT: 184.97 LBS | DIASTOLIC BLOOD PRESSURE: 86 MMHG | RESPIRATION RATE: 20 BRPM

## 2023-09-23 DIAGNOSIS — Z87.442 PERSONAL HISTORY OF URINARY CALCULI: ICD-10-CM

## 2023-09-23 DIAGNOSIS — Z20.822 CONTACT WITH AND (SUSPECTED) EXPOSURE TO COVID-19: ICD-10-CM

## 2023-09-23 DIAGNOSIS — Z91.040 LATEX ALLERGY STATUS: ICD-10-CM

## 2023-09-23 DIAGNOSIS — Z88.8 ALLERGY STATUS TO OTHER DRUGS, MEDICAMENTS AND BIOLOGICAL SUBSTANCES: ICD-10-CM

## 2023-09-23 DIAGNOSIS — E78.5 HYPERLIPIDEMIA, UNSPECIFIED: ICD-10-CM

## 2023-09-23 DIAGNOSIS — Z86.39 PERSONAL HISTORY OF OTHER ENDOCRINE, NUTRITIONAL AND METABOLIC DISEASE: ICD-10-CM

## 2023-09-23 DIAGNOSIS — Z82.49 FAMILY HISTORY OF ISCHEMIC HEART DISEASE AND OTHER DISEASES OF THE CIRCULATORY SYSTEM: ICD-10-CM

## 2023-09-23 DIAGNOSIS — F17.200 NICOTINE DEPENDENCE, UNSPECIFIED, UNCOMPLICATED: ICD-10-CM

## 2023-09-23 DIAGNOSIS — G56.00 CARPAL TUNNEL SYNDROME, UNSPECIFIED UPPER LIMB: Chronic | ICD-10-CM

## 2023-09-23 DIAGNOSIS — D25.9 LEIOMYOMA OF UTERUS, UNSPECIFIED: Chronic | ICD-10-CM

## 2023-09-23 DIAGNOSIS — Z95.2 PRESENCE OF PROSTHETIC HEART VALVE: ICD-10-CM

## 2023-09-23 DIAGNOSIS — Z95.1 PRESENCE OF AORTOCORONARY BYPASS GRAFT: ICD-10-CM

## 2023-09-23 DIAGNOSIS — K21.9 GASTRO-ESOPHAGEAL REFLUX DISEASE WITHOUT ESOPHAGITIS: ICD-10-CM

## 2023-09-23 DIAGNOSIS — Z90.49 ACQUIRED ABSENCE OF OTHER SPECIFIED PARTS OF DIGESTIVE TRACT: Chronic | ICD-10-CM

## 2023-09-23 DIAGNOSIS — R06.09 OTHER FORMS OF DYSPNEA: ICD-10-CM

## 2023-09-23 DIAGNOSIS — Z79.82 LONG TERM (CURRENT) USE OF ASPIRIN: ICD-10-CM

## 2023-09-23 DIAGNOSIS — Z79.84 LONG TERM (CURRENT) USE OF ORAL HYPOGLYCEMIC DRUGS: ICD-10-CM

## 2023-09-23 DIAGNOSIS — Z98.890 OTHER SPECIFIED POSTPROCEDURAL STATES: Chronic | ICD-10-CM

## 2023-09-23 DIAGNOSIS — E11.9 TYPE 2 DIABETES MELLITUS WITHOUT COMPLICATIONS: ICD-10-CM

## 2023-09-23 DIAGNOSIS — Z95.2 PRESENCE OF PROSTHETIC HEART VALVE: Chronic | ICD-10-CM

## 2023-09-23 DIAGNOSIS — I25.10 ATHEROSCLEROTIC HEART DISEASE OF NATIVE CORONARY ARTERY WITHOUT ANGINA PECTORIS: ICD-10-CM

## 2023-09-23 DIAGNOSIS — J45.901 UNSPECIFIED ASTHMA WITH (ACUTE) EXACERBATION: ICD-10-CM

## 2023-09-23 DIAGNOSIS — Z79.02 LONG TERM (CURRENT) USE OF ANTITHROMBOTICS/ANTIPLATELETS: ICD-10-CM

## 2023-09-23 DIAGNOSIS — I10 ESSENTIAL (PRIMARY) HYPERTENSION: ICD-10-CM

## 2023-09-23 LAB
ANION GAP SERPL CALC-SCNC: 14 MMOL/L — SIGNIFICANT CHANGE UP (ref 5–17)
BASOPHILS # BLD AUTO: 0.03 K/UL — SIGNIFICANT CHANGE UP (ref 0–0.2)
BASOPHILS NFR BLD AUTO: 0.4 % — SIGNIFICANT CHANGE UP (ref 0–2)
BUN SERPL-MCNC: 10 MG/DL — SIGNIFICANT CHANGE UP (ref 7–23)
CALCIUM SERPL-MCNC: 10.2 MG/DL — SIGNIFICANT CHANGE UP (ref 8.4–10.5)
CHLORIDE SERPL-SCNC: 96 MMOL/L — SIGNIFICANT CHANGE UP (ref 96–108)
CO2 SERPL-SCNC: 28 MMOL/L — SIGNIFICANT CHANGE UP (ref 22–31)
CREAT SERPL-MCNC: 0.66 MG/DL — SIGNIFICANT CHANGE UP (ref 0.5–1.3)
EGFR: 97 ML/MIN/1.73M2 — SIGNIFICANT CHANGE UP
EOSINOPHIL # BLD AUTO: 0.11 K/UL — SIGNIFICANT CHANGE UP (ref 0–0.5)
EOSINOPHIL NFR BLD AUTO: 1.5 % — SIGNIFICANT CHANGE UP (ref 0–6)
GLUCOSE SERPL-MCNC: 145 MG/DL — HIGH (ref 70–99)
HCT VFR BLD CALC: 42.4 % — SIGNIFICANT CHANGE UP (ref 34.5–45)
HGB BLD-MCNC: 13.3 G/DL — SIGNIFICANT CHANGE UP (ref 11.5–15.5)
IMM GRANULOCYTES NFR BLD AUTO: 0.7 % — SIGNIFICANT CHANGE UP (ref 0–0.9)
LYMPHOCYTES # BLD AUTO: 2.03 K/UL — SIGNIFICANT CHANGE UP (ref 1–3.3)
LYMPHOCYTES # BLD AUTO: 28.4 % — SIGNIFICANT CHANGE UP (ref 13–44)
MAGNESIUM SERPL-MCNC: 1.4 MG/DL — LOW (ref 1.6–2.6)
MCHC RBC-ENTMCNC: 26.1 PG — LOW (ref 27–34)
MCHC RBC-ENTMCNC: 31.4 GM/DL — LOW (ref 32–36)
MCV RBC AUTO: 83.1 FL — SIGNIFICANT CHANGE UP (ref 80–100)
MONOCYTES # BLD AUTO: 0.55 K/UL — SIGNIFICANT CHANGE UP (ref 0–0.9)
MONOCYTES NFR BLD AUTO: 7.7 % — SIGNIFICANT CHANGE UP (ref 2–14)
NEUTROPHILS # BLD AUTO: 4.39 K/UL — SIGNIFICANT CHANGE UP (ref 1.8–7.4)
NEUTROPHILS NFR BLD AUTO: 61.3 % — SIGNIFICANT CHANGE UP (ref 43–77)
NRBC # BLD: 0 /100 WBCS — SIGNIFICANT CHANGE UP (ref 0–0)
NT-PROBNP SERPL-SCNC: 129 PG/ML — SIGNIFICANT CHANGE UP (ref 0–300)
PLATELET # BLD AUTO: 282 K/UL — SIGNIFICANT CHANGE UP (ref 150–400)
POTASSIUM SERPL-MCNC: 3.7 MMOL/L — SIGNIFICANT CHANGE UP (ref 3.5–5.3)
POTASSIUM SERPL-SCNC: 3.7 MMOL/L — SIGNIFICANT CHANGE UP (ref 3.5–5.3)
RAPID RVP RESULT: SIGNIFICANT CHANGE UP
RBC # BLD: 5.1 M/UL — SIGNIFICANT CHANGE UP (ref 3.8–5.2)
RBC # FLD: 13.7 % — SIGNIFICANT CHANGE UP (ref 10.3–14.5)
SARS-COV-2 RNA SPEC QL NAA+PROBE: SIGNIFICANT CHANGE UP
SODIUM SERPL-SCNC: 138 MMOL/L — SIGNIFICANT CHANGE UP (ref 135–145)
TROPONIN T, HIGH SENSITIVITY RESULT: 11 NG/L — SIGNIFICANT CHANGE UP (ref 0–51)
TROPONIN T, HIGH SENSITIVITY RESULT: 12 NG/L — SIGNIFICANT CHANGE UP (ref 0–51)
WBC # BLD: 7.16 K/UL — SIGNIFICANT CHANGE UP (ref 3.8–10.5)
WBC # FLD AUTO: 7.16 K/UL — SIGNIFICANT CHANGE UP (ref 3.8–10.5)

## 2023-09-23 PROCEDURE — 83880 ASSAY OF NATRIURETIC PEPTIDE: CPT

## 2023-09-23 PROCEDURE — 93005 ELECTROCARDIOGRAM TRACING: CPT

## 2023-09-23 PROCEDURE — 99285 EMERGENCY DEPT VISIT HI MDM: CPT

## 2023-09-23 PROCEDURE — 94640 AIRWAY INHALATION TREATMENT: CPT

## 2023-09-23 PROCEDURE — 96374 THER/PROPH/DIAG INJ IV PUSH: CPT

## 2023-09-23 PROCEDURE — 93010 ELECTROCARDIOGRAM REPORT: CPT

## 2023-09-23 PROCEDURE — 36415 COLL VENOUS BLD VENIPUNCTURE: CPT

## 2023-09-23 PROCEDURE — 80048 BASIC METABOLIC PNL TOTAL CA: CPT

## 2023-09-23 PROCEDURE — 96375 TX/PRO/DX INJ NEW DRUG ADDON: CPT

## 2023-09-23 PROCEDURE — 85025 COMPLETE CBC W/AUTO DIFF WBC: CPT

## 2023-09-23 PROCEDURE — 0225U NFCT DS DNA&RNA 21 SARSCOV2: CPT

## 2023-09-23 PROCEDURE — 85379 FIBRIN DEGRADATION QUANT: CPT

## 2023-09-23 PROCEDURE — 99285 EMERGENCY DEPT VISIT HI MDM: CPT | Mod: 25

## 2023-09-23 PROCEDURE — 83735 ASSAY OF MAGNESIUM: CPT

## 2023-09-23 PROCEDURE — 84484 ASSAY OF TROPONIN QUANT: CPT

## 2023-09-23 PROCEDURE — 71045 X-RAY EXAM CHEST 1 VIEW: CPT

## 2023-09-23 PROCEDURE — 71045 X-RAY EXAM CHEST 1 VIEW: CPT | Mod: 26

## 2023-09-23 RX ORDER — ALBUTEROL 90 UG/1
2 AEROSOL, METERED ORAL
Qty: 1 | Refills: 0
Start: 2023-09-23

## 2023-09-23 RX ORDER — MAGNESIUM SULFATE 500 MG/ML
2 VIAL (ML) INJECTION ONCE
Refills: 0 | Status: COMPLETED | OUTPATIENT
Start: 2023-09-23 | End: 2023-09-23

## 2023-09-23 RX ORDER — ALBUTEROL 90 UG/1
2 AEROSOL, METERED ORAL ONCE
Refills: 0 | Status: COMPLETED | OUTPATIENT
Start: 2023-09-23 | End: 2023-09-23

## 2023-09-23 RX ADMIN — Medication 25 GRAM(S): at 14:57

## 2023-09-23 RX ADMIN — Medication 125 MILLIGRAM(S): at 14:56

## 2023-09-23 RX ADMIN — ALBUTEROL 2 PUFF(S): 90 AEROSOL, METERED ORAL at 13:09

## 2023-09-23 RX ADMIN — ALBUTEROL 2 PUFF(S): 90 AEROSOL, METERED ORAL at 14:56

## 2023-09-23 NOTE — ED PROVIDER NOTE - PHYSICAL EXAMINATION
CONSTITUTIONAL: Awake, alert and in no apparent distress.  HEENT: Head is atraumatic. Eyes clear bilaterally, normal EOMI. Airway patent.  CARDIAC: Normal rate, regular rhythm.  Heart sounds S1, S2.   RESPIRATORY: mild insp and exp wheezing, no tachypnea, respiratory distress.   GASTROINTESTINAL: Abdomen soft, non-tender, no guarding, distension.  MUSCULOSKELETAL: Spine appears normal, no midline spinal tenderness, range of motion is not limited, no muscle or joint tenderness. no bony tenderness. no pedal edema  NEUROLOGICAL: Alert, no focal deficits, no motor or sensory deficits.  SKIN: Skin normal color for race, warm, dry and intact. No evidence of rash.  PSYCHIATRIC: Normal mood and affect. no apparent risk to self or others.

## 2023-09-23 NOTE — ED ADULT NURSE REASSESSMENT NOTE - GENERAL PATIENT STATE
comfortable appearance/improvement verbalized
comfortable appearance/cooperative/improvement verbalized/smiling/interactive

## 2023-09-23 NOTE — ED PROVIDER NOTE - CLINICAL SUMMARY MEDICAL DECISION MAKING FREE TEXT BOX
Pt w above hx w sob/cp, well appearing, no hypoxia, mild wheezing on exam, no resp distress. Recent PCI, no leg swelling, states compliance with meds  Ddx ACS, PE given recent inpt stay, URI/covid, pna, asthma/copd given wheezing  -nebs, steroids, Mg, labs, cxr, reassess    Pt monitored in ED w improvement after nebs/steroids (feeling most improved after given steroids). cxr no infiltrate, afebrile, age adjusted d-dimer wnl, rpt trop down trending, given sx improving after nebs/steroids, possible flare of asthma/copd, will prescribe 3 days prednisone on dc, advised q4hr albuterol use 2 puff for the next 48 hours, strict return prec given.

## 2023-09-23 NOTE — ED ADULT TRIAGE NOTE - CHIEF COMPLAINT QUOTE
pt BIBA c/o chest pain and SOB x 1 day. endorsed cardiac stent placement x 1 month. pmh COPD, asthma,sob. given 324 mg of aspirin in route. ekg in progress.

## 2023-09-23 NOTE — ED PROVIDER NOTE - OBJECTIVE STATEMENT
64 y/o female, current smoker (1/2 ppd), with FHx MI, PMHx of asthma/COPD, sciatica/herniated disc, HTN, HLD, DM-2, obesity, mod-severe AS s/p bioAVR/ascending aorta replacement and CABG SVG-PDA 05/2017 @ [facility], s/p Dx cardiac cath 9/22/22 LM: LI, mLAD mild to mod LI (calcified), LCX: mild LI, pRCA 60% and mRCA 90% supplied distally by SVG-dRCA (patent graft), with complaints of shortness of breath and chest discomfort over the past 1 week worsening today.  Patient states she feels chest discomfort when she takes a deep breath, tightening to entire chest, assoc sob. Mild wheezing and non prod cough, no f/c, leg swelling. States compliance with medications. no abd pain, n/v/, lightheadedness, syncope, dizziness, Pt has not tried anything for symptoms, no other aggravating or relieving factors.

## 2023-09-23 NOTE — ED ADULT NURSE NOTE - NSFALLHARMRISKINTERV_ED_ALL_ED

## 2023-09-23 NOTE — ED PROVIDER NOTE - PATIENT PORTAL LINK FT
You can access the FollowMyHealth Patient Portal offered by [facility] by registering at the following website: [email or web address]. By joining [employer]’s FollowMyHealth portal, you will also be able to view your health information using other applications (apps) compatible with our system.

## 2023-09-23 NOTE — ED ADULT NURSE REASSESSMENT NOTE - NS ED NURSE REASSESS COMMENT FT1
Pt states "the tightness in my chest is gone." Pt requesting food and feels better. Pt given food, Dr Bean made aware.
Pt breathing unlabored on rm air. Still c/o of chest tightness s/p meds. Breath sounds clear to auscultation. denies pain in chest.

## 2023-09-23 NOTE — ED ADULT NURSE NOTE - OBJECTIVE STATEMENT
Pt is 63F c/o chest tightness x1week. Denies chest pain, ankle swelling, cough. Pt states "I got a stent 1 month ago and I went to my cardiologist last Thursday and ever since then I had this tightness in my chest." Pt does not wear supplemental O2 at home, requesting O2 via NC in ED for comfort. Pt is aox3, in no acute distress at this time, able to maintain airway, lung sounds clear, having non labored breathing, no retractions noted, non diaphoretic and able to talk in clear full sentences. On RN exam no ankle swelling, breathing unlabored on rm air. Pt is 63F c/o chest tightness x1week. Pt states was 50yr cigarette smoker "on and off since I was 14 but I stopped cold turkey when I needed a stent." Denies chest pain, ankle swelling, cough. Pt states "I got a stent 1 month ago and I went to my cardiologist last Thursday and ever since then I had this tightness in my chest." Pt does not wear supplemental O2 at home, requesting O2 via NC in ED for comfort. Pt is aox3, in no acute distress at this time, able to maintain airway, lung sounds clear, having non labored breathing, no retractions noted, non diaphoretic and able to talk in clear full sentences. On RN exam no ankle swelling, breathing unlabored on rm air.

## 2023-09-23 NOTE — ED PROVIDER NOTE - NSFOLLOWUPINSTRUCTIONS_ED_ALL_ED_FT
[facility]  Family Medicine  [address], [address] [place] [address]  Phone: [phone]    Asthma    Asthma is a condition in which the airways tighten and narrow, making it difficult to breath. Asthma episodes, also called asthma attacks, range from minor to life-threatening. Symptoms include wheezing, coughing, chest tightness, or shortness of breath. The diagnosis of asthma is made by a review of your medical history and a physical exam, but may involve additional testing. Asthma cannot be cured, but medicines and lifestyle changes can help control it. Avoid triggers of asthma which may include animal dander, pollen, mold, smoke, air pollutants, etc.     SEEK IMMEDIATE MEDICAL CARE IF YOU HAVE ANY OF THE FOLLOWING SYMPTOMS: worsening of symptoms, shortness of breath at rest, chest pain, bluish discoloration to lips or fingertips, lightheadedness/dizziness, or fever.

## 2023-10-14 ENCOUNTER — INPATIENT (INPATIENT)
Facility: HOSPITAL | Age: 63
LOS: 2 days | Discharge: HOME | DRG: 322 | End: 2023-10-17
Attending: INTERNAL MEDICINE | Admitting: INTERNAL MEDICINE
Payer: MEDICARE

## 2023-10-14 VITALS
WEIGHT: 179.9 LBS | DIASTOLIC BLOOD PRESSURE: 87 MMHG | HEIGHT: 63 IN | OXYGEN SATURATION: 98 % | RESPIRATION RATE: 18 BRPM | TEMPERATURE: 98 F | HEART RATE: 102 BPM | SYSTOLIC BLOOD PRESSURE: 124 MMHG

## 2023-10-14 DIAGNOSIS — E78.5 HYPERLIPIDEMIA, UNSPECIFIED: ICD-10-CM

## 2023-10-14 DIAGNOSIS — Z98.890 OTHER SPECIFIED POSTPROCEDURAL STATES: Chronic | ICD-10-CM

## 2023-10-14 DIAGNOSIS — G56.00 CARPAL TUNNEL SYNDROME, UNSPECIFIED UPPER LIMB: Chronic | ICD-10-CM

## 2023-10-14 DIAGNOSIS — E11.9 TYPE 2 DIABETES MELLITUS WITHOUT COMPLICATIONS: ICD-10-CM

## 2023-10-14 DIAGNOSIS — M54.9 DORSALGIA, UNSPECIFIED: ICD-10-CM

## 2023-10-14 DIAGNOSIS — Z95.2 PRESENCE OF PROSTHETIC HEART VALVE: Chronic | ICD-10-CM

## 2023-10-14 DIAGNOSIS — Z90.49 ACQUIRED ABSENCE OF OTHER SPECIFIED PARTS OF DIGESTIVE TRACT: Chronic | ICD-10-CM

## 2023-10-14 DIAGNOSIS — R06.02 SHORTNESS OF BREATH: ICD-10-CM

## 2023-10-14 DIAGNOSIS — D25.9 LEIOMYOMA OF UTERUS, UNSPECIFIED: Chronic | ICD-10-CM

## 2023-10-14 DIAGNOSIS — E03.9 HYPOTHYROIDISM, UNSPECIFIED: ICD-10-CM

## 2023-10-14 DIAGNOSIS — F41.9 ANXIETY DISORDER, UNSPECIFIED: ICD-10-CM

## 2023-10-14 DIAGNOSIS — I25.10 ATHEROSCLEROTIC HEART DISEASE OF NATIVE CORONARY ARTERY WITHOUT ANGINA PECTORIS: ICD-10-CM

## 2023-10-14 DIAGNOSIS — I10 ESSENTIAL (PRIMARY) HYPERTENSION: ICD-10-CM

## 2023-10-14 LAB
ANION GAP SERPL CALC-SCNC: 15 MMOL/L — SIGNIFICANT CHANGE UP (ref 5–17)
BASOPHILS # BLD AUTO: 0.03 K/UL — SIGNIFICANT CHANGE UP (ref 0–0.2)
BASOPHILS NFR BLD AUTO: 0.4 % — SIGNIFICANT CHANGE UP (ref 0–2)
BUN SERPL-MCNC: 11 MG/DL — SIGNIFICANT CHANGE UP (ref 7–23)
CALCIUM SERPL-MCNC: 9.6 MG/DL — SIGNIFICANT CHANGE UP (ref 8.4–10.5)
CHLORIDE SERPL-SCNC: 95 MMOL/L — LOW (ref 96–108)
CO2 SERPL-SCNC: 25 MMOL/L — SIGNIFICANT CHANGE UP (ref 22–31)
CREAT SERPL-MCNC: 0.72 MG/DL — SIGNIFICANT CHANGE UP (ref 0.5–1.3)
EGFR: 93 ML/MIN/1.73M2 — SIGNIFICANT CHANGE UP
EOSINOPHIL # BLD AUTO: 0.07 K/UL — SIGNIFICANT CHANGE UP (ref 0–0.5)
EOSINOPHIL NFR BLD AUTO: 1 % — SIGNIFICANT CHANGE UP (ref 0–6)
GLUCOSE BLDC GLUCOMTR-MCNC: 147 MG/DL — HIGH (ref 70–99)
GLUCOSE BLDC GLUCOMTR-MCNC: 236 MG/DL — HIGH (ref 70–99)
GLUCOSE SERPL-MCNC: 244 MG/DL — HIGH (ref 70–99)
HCT VFR BLD CALC: 37 % — SIGNIFICANT CHANGE UP (ref 34.5–45)
HGB BLD-MCNC: 11.9 G/DL — SIGNIFICANT CHANGE UP (ref 11.5–15.5)
IMM GRANULOCYTES NFR BLD AUTO: 0.7 % — SIGNIFICANT CHANGE UP (ref 0–0.9)
LYMPHOCYTES # BLD AUTO: 1.94 K/UL — SIGNIFICANT CHANGE UP (ref 1–3.3)
LYMPHOCYTES # BLD AUTO: 26.6 % — SIGNIFICANT CHANGE UP (ref 13–44)
MCHC RBC-ENTMCNC: 25.9 PG — LOW (ref 27–34)
MCHC RBC-ENTMCNC: 32.2 GM/DL — SIGNIFICANT CHANGE UP (ref 32–36)
MCV RBC AUTO: 80.4 FL — SIGNIFICANT CHANGE UP (ref 80–100)
MONOCYTES # BLD AUTO: 0.61 K/UL — SIGNIFICANT CHANGE UP (ref 0–0.9)
MONOCYTES NFR BLD AUTO: 8.4 % — SIGNIFICANT CHANGE UP (ref 2–14)
NEUTROPHILS # BLD AUTO: 4.58 K/UL — SIGNIFICANT CHANGE UP (ref 1.8–7.4)
NEUTROPHILS NFR BLD AUTO: 62.9 % — SIGNIFICANT CHANGE UP (ref 43–77)
NRBC # BLD: 0 /100 WBCS — SIGNIFICANT CHANGE UP (ref 0–0)
NT-PROBNP SERPL-SCNC: 240 PG/ML — SIGNIFICANT CHANGE UP (ref 0–300)
PLATELET # BLD AUTO: 229 K/UL — SIGNIFICANT CHANGE UP (ref 150–400)
POTASSIUM SERPL-MCNC: 3.6 MMOL/L — SIGNIFICANT CHANGE UP (ref 3.5–5.3)
POTASSIUM SERPL-SCNC: 3.6 MMOL/L — SIGNIFICANT CHANGE UP (ref 3.5–5.3)
RAPID RVP RESULT: SIGNIFICANT CHANGE UP
RBC # BLD: 4.6 M/UL — SIGNIFICANT CHANGE UP (ref 3.8–5.2)
RBC # FLD: 13.6 % — SIGNIFICANT CHANGE UP (ref 10.3–14.5)
SARS-COV-2 RNA SPEC QL NAA+PROBE: SIGNIFICANT CHANGE UP
SODIUM SERPL-SCNC: 135 MMOL/L — SIGNIFICANT CHANGE UP (ref 135–145)
TROPONIN T, HIGH SENSITIVITY RESULT: 12 NG/L — SIGNIFICANT CHANGE UP (ref 0–51)
TROPONIN T, HIGH SENSITIVITY RESULT: 9 NG/L — SIGNIFICANT CHANGE UP (ref 0–51)
WBC # BLD: 7.28 K/UL — SIGNIFICANT CHANGE UP (ref 3.8–10.5)
WBC # FLD AUTO: 7.28 K/UL — SIGNIFICANT CHANGE UP (ref 3.8–10.5)

## 2023-10-14 PROCEDURE — 71045 X-RAY EXAM CHEST 1 VIEW: CPT | Mod: 26

## 2023-10-14 PROCEDURE — 93010 ELECTROCARDIOGRAM REPORT: CPT

## 2023-10-14 PROCEDURE — 99285 EMERGENCY DEPT VISIT HI MDM: CPT

## 2023-10-14 RX ORDER — DEXTROSE 50 % IN WATER 50 %
12.5 SYRINGE (ML) INTRAVENOUS ONCE
Refills: 0 | Status: DISCONTINUED | OUTPATIENT
Start: 2023-10-14 | End: 2023-10-17

## 2023-10-14 RX ORDER — ALBUTEROL 90 UG/1
2 AEROSOL, METERED ORAL EVERY 6 HOURS
Refills: 0 | Status: DISCONTINUED | OUTPATIENT
Start: 2023-10-14 | End: 2023-10-17

## 2023-10-14 RX ORDER — CLOPIDOGREL BISULFATE 75 MG/1
75 TABLET, FILM COATED ORAL DAILY
Refills: 0 | Status: DISCONTINUED | OUTPATIENT
Start: 2023-10-15 | End: 2023-10-17

## 2023-10-14 RX ORDER — DEXTROSE 50 % IN WATER 50 %
15 SYRINGE (ML) INTRAVENOUS ONCE
Refills: 0 | Status: DISCONTINUED | OUTPATIENT
Start: 2023-10-14 | End: 2023-10-17

## 2023-10-14 RX ORDER — GLUCAGON INJECTION, SOLUTION 0.5 MG/.1ML
1 INJECTION, SOLUTION SUBCUTANEOUS ONCE
Refills: 0 | Status: DISCONTINUED | OUTPATIENT
Start: 2023-10-14 | End: 2023-10-17

## 2023-10-14 RX ORDER — DEXTROSE 50 % IN WATER 50 %
25 SYRINGE (ML) INTRAVENOUS ONCE
Refills: 0 | Status: DISCONTINUED | OUTPATIENT
Start: 2023-10-14 | End: 2023-10-17

## 2023-10-14 RX ORDER — ATORVASTATIN CALCIUM 80 MG/1
80 TABLET, FILM COATED ORAL AT BEDTIME
Refills: 0 | Status: DISCONTINUED | OUTPATIENT
Start: 2023-10-14 | End: 2023-10-17

## 2023-10-14 RX ORDER — OMEPRAZOLE 10 MG/1
1 CAPSULE, DELAYED RELEASE ORAL
Refills: 0 | DISCHARGE

## 2023-10-14 RX ORDER — INSULIN LISPRO 100/ML
VIAL (ML) SUBCUTANEOUS
Refills: 0 | Status: DISCONTINUED | OUTPATIENT
Start: 2023-10-14 | End: 2023-10-17

## 2023-10-14 RX ORDER — METOPROLOL TARTRATE 50 MG
50 TABLET ORAL DAILY
Refills: 0 | Status: DISCONTINUED | OUTPATIENT
Start: 2023-10-15 | End: 2023-10-17

## 2023-10-14 RX ORDER — DIAZEPAM 5 MG
5 TABLET ORAL DAILY
Refills: 0 | Status: DISCONTINUED | OUTPATIENT
Start: 2023-10-15 | End: 2023-10-17

## 2023-10-14 RX ORDER — ASPIRIN/CALCIUM CARB/MAGNESIUM 324 MG
81 TABLET ORAL DAILY
Refills: 0 | Status: DISCONTINUED | OUTPATIENT
Start: 2023-10-15 | End: 2023-10-17

## 2023-10-14 RX ORDER — SODIUM CHLORIDE 9 MG/ML
1000 INJECTION, SOLUTION INTRAVENOUS
Refills: 0 | Status: DISCONTINUED | OUTPATIENT
Start: 2023-10-14 | End: 2023-10-17

## 2023-10-14 RX ORDER — POTASSIUM CHLORIDE 20 MEQ
40 PACKET (EA) ORAL ONCE
Refills: 0 | Status: COMPLETED | OUTPATIENT
Start: 2023-10-14 | End: 2023-10-15

## 2023-10-14 RX ORDER — PANTOPRAZOLE SODIUM 20 MG/1
40 TABLET, DELAYED RELEASE ORAL
Refills: 0 | Status: DISCONTINUED | OUTPATIENT
Start: 2023-10-14 | End: 2023-10-17

## 2023-10-14 RX ORDER — IPRATROPIUM/ALBUTEROL SULFATE 18-103MCG
3 AEROSOL WITH ADAPTER (GRAM) INHALATION ONCE
Refills: 0 | Status: COMPLETED | OUTPATIENT
Start: 2023-10-14 | End: 2023-10-14

## 2023-10-14 RX ORDER — INFLUENZA VIRUS VACCINE 15; 15; 15; 15 UG/.5ML; UG/.5ML; UG/.5ML; UG/.5ML
0.7 SUSPENSION INTRAMUSCULAR ONCE
Refills: 0 | Status: DISCONTINUED | OUTPATIENT
Start: 2023-10-14 | End: 2023-10-14

## 2023-10-14 RX ORDER — POTASSIUM CHLORIDE 20 MEQ
1 PACKET (EA) ORAL
Qty: 0 | Refills: 0 | DISCHARGE

## 2023-10-14 RX ADMIN — Medication 125 MILLIGRAM(S): at 12:47

## 2023-10-14 RX ADMIN — ATORVASTATIN CALCIUM 80 MILLIGRAM(S): 80 TABLET, FILM COATED ORAL at 21:48

## 2023-10-14 RX ADMIN — Medication 3 MILLILITER(S): at 12:47

## 2023-10-14 RX ADMIN — ALBUTEROL 2 PUFF(S): 90 AEROSOL, METERED ORAL at 22:31

## 2023-10-14 RX ADMIN — Medication 4: at 21:50

## 2023-10-14 NOTE — H&P ADULT - NSICDXPASTMEDICALHX_GEN_ALL_CORE_FT
PAST MEDICAL HISTORY:  Asthma     Back injury lumbar, work related    CAD (coronary artery disease)     Diabetes mellitus     GERD (gastroesophageal reflux disease)     Hyperlipidemia     Hypertension     Kidney stone

## 2023-10-14 NOTE — ED ADULT TRIAGE NOTE - NSSEPSISSUSPECTED_ED_A_ED
"rec'd pt via wc from triage to room 8. Pt a/o x4, p/w c/o syncope after routine HD tx this morning. Pt states rec'd full 4 hr tx this am and believes ""they took to much water off\""  Went to go eat afterwards and passed out~no trauma and pt states be currently feels back to his normal self. Pt denies any pain, unilateral weakness / paresthesia, dizziness, nausea. Skin w/d, color good, resp unlabored. Pt has an AV fistula to LUE w/ intact dressing + thrill + bruit. Tele nsr. Call light w/in reach, cart in lowest position.   " No

## 2023-10-14 NOTE — ED PROVIDER NOTE - CLINICAL SUMMARY MEDICAL DECISION MAKING FREE TEXT BOX
64 y/o female, current smoker (1/2 ppd), with FHx MI, PMHx of asthma/COPD, sciatica/herniated disc, HTN, HLD, DM-2, obesity, mod-severe AS s/p bioAVR/ascending aorta replacement and CABG SVG-PDA 05/2017 @ [facility], s/p Dx cardiac cath 9/22/22 LM: LI, mLAD mild to mod LI (calcified), LCX: mild LI, pRCA 60% and mRCA 90% supplied distally by SVG-dRCA (patent graft), presents to [facility] 8/29/23 with complaints of shortness of breath and chest pain ongoing since yesterday but per patient and has been progressively worsening since stent placement in August. States cannot walk more than a half block before getting sob.  Patient was given aspirin and DuoNeb prior to arrival. Denies hx of blood clots, abd pain, n/v/f/c.    VSS, no hypoxia, afebrile, lungs ctab  labs/ekg drawn, cxr similar to prior  labs drawn, trop not positive, d-dimer negative will admit given 2nd ED visit, ?possible rpt cath vs obs

## 2023-10-14 NOTE — ED ADULT NURSE NOTE - NSFALLUNIVINTERV_ED_ALL_ED
Bed/Stretcher in lowest position, wheels locked, appropriate side rails in place/Call bell, personal items and telephone in reach/Instruct patient to call for assistance before getting out of bed/chair/stretcher/Non-slip footwear applied when patient is off stretcher/[place] to call system/Physically safe environment - no spills, clutter or unnecessary equipment/Purposeful proactive rounding/Room/bathroom lighting operational, light cord in reach

## 2023-10-14 NOTE — H&P ADULT - HISTORY OF PRESENT ILLNESS
64 y/o female, current smoker, with FHx MI, PMHx of asthma/COPD, sciatica/herniated disc, HTN, HLD, DM-2, obesity, mod-severe AS s/p bioAVR/ascending aorta replacement and CABG SVG-PDA 05/2017 @ [facility], s/p recent dx Cath 8/30/23 w/ Dr Barrios: s/p PTCA/DRAKE x 1 OM1 70% stenosis (FFR positive 0.76), pRCA 70% stenosis, mRCA 99% stenosis, RPDA-filled by patent SVG-RPDA graft, pLAD 40% (FFR negative 0.92) presents to [facility] compliaining of _______. She has been compliant with DAPT.         In ER, ECG reveals ______Troponin T Sensitivity 13->9, WBC 11.23, K+ 3.2 (Replenished total of Potassium Chloride 40mEq X2), Magnesium 1.4 (gave total Magnesium Sulfate 3mg IV X1), Blood Glucose 190. CXR portable no acute pathology seen, follow up official report.    In light of patient's risk factors and UA symptoms, patient is now admitted to [facility], Cardiology for rule out ACS and TTE in AM.   62 y/o female, current smoker, with FHx MI, PMHx of asthma/COPD, sciatica/herniated disc, HTN, HLD, DM-2, obesity, mod-severe AS s/p bioAVR/ascending aorta replacement and CABG SVG-PDA 05/2017 @ [facility], s/p recent dx Cath 8/30/23 w/ Dr Barrios: s/p PTCA/DRAKE x 1 OM1 70% stenosis (FFR positive 0.76), pRCA 70% stenosis, mRCA 99% stenosis, RPDA-filled by patent SVG-RPDA graft, pLAD 40% (FFR negative 0.92) presents to [facility] compliaining of _______. She has been compliant with DAPT.        62 y/o female, current smoker, with FHx MI, PMHx of asthma/COPD, sciatica/herniated disc, HTN, HLD, DM2, obesity, mod-severe AS s/p bioAVR/ascending aorta replacement and CABG SVG-PDA 05/2017 @ [facility], s/p recent dx Cath 8/30/23 w/ Dr Barrios: s/p PTCA/DRAKE x 1 OM1 70% stenosis (FFR positive 0.76), pRCA 70% stenosis, mRCA 99% stenosis, RPDA-filled by patent SVG-RPDA graft, pLAD 40% (FFR negative 0.92) presents to [facility] reporting progressive dyspnea with minimal exertion when walking half a block and performing ADLs like doing laundry/cooking. Pt reports she has not felt improved symptoms since stent placement in August. Pt also reports exertional fatigue and chest pain intermittently with activity. Denies orthopnea, cough, PND, fever, chills, edema, n/v. diaphoresis. Pt is compliant with DAPT therapy. Pt recently came to [facility] ER for these same symptoms and was diagnosed with asthma exacerbation; pt was started on duoneb tx, inhalers and prednisone therapy with no relief. Pt is admitted to cardiology telemetry unit for further monitoring and management.   64 y/o female, former smoker, with FHx MI, PMHx of asthma/COPD, sciatica/herniated disc, HTN, HLD, DM2, obesity, mod-severe AS s/p bioAVR/ascending aorta replacement and CABG SVG-PDA 05/2017 @ [facility], s/p recent Cath 8/30/23 w/ Dr Barrios: s/p PTCA/DRAKE x 1 OM1 70% stenosis (FFR positive 0.76), pRCA 70% stenosis, mRCA 99% stenosis, RPDA-filled by patent SVG-RPDA graft, pLAD 40% (FFR negative 0.92) presents to [facility] reporting progressive dyspnea with minimal exertion when walking half a block and performing ADLs such as folding laundry/cooking. Pt reports she has not felt improved symptoms since stent placement in August. Pt also reports exertional fatigue and chest pain intermittently with activity. Denies orthopnea, cough, PND, fever, chills, edema, n/v. diaphoresis. Pt is compliant with DAPT therapy. Pt recently came to [facility] ER for these same symptoms and was diagnosed with asthma exacerbation; pt was started on duoneb tx, inhalers and prednisone therapy with no relief. Pt is admitted to cardiology telemetry unit for further monitoring and management.    In ED, vital signs stable BP: 137/63, HR 81, RR 18, spO2 100 RA  Labs reveal WBC of 7.28, H/H of 11.9/37.0, ddimer 221 (wnl age adjusted), Na 135, K 3.6, Cl 95, BUN 11/ Cr 0.72  Trop T 12, pro-.   COVID & RVP neg  EKG NSR, LAD, old inferior infarct, poor R wave progression.

## 2023-10-14 NOTE — ED PROVIDER NOTE - OBJECTIVE STATEMENT
62 y/o female, current smoker (1/2 ppd), with FHx MI, PMHx of asthma/COPD, sciatica/herniated disc, HTN, HLD, DM-2, obesity, mod-severe AS s/p bioAVR/ascending aorta replacement and CABG SVG-PDA 05/2017 @ [facility], s/p Dx cardiac cath 9/22/22 LM: LI, mLAD mild to mod LI (calcified), LCX: mild LI, pRCA 60% and mRCA 90% supplied distally by SVG-dRCA (patent graft), presents to [facility] 8/29/23 with complaints of shortness of breath and chest pain ongoing since yesterday but per patient and has been progressively worsening since stent placement in August. States cannot walk more than a half block before getting sob.  Patient was given aspirin and DuoNeb prior to arrival. Denies hx of blood clots, abd pain, n/v/f/c.

## 2023-10-14 NOTE — ED ADULT NURSE NOTE - OBJECTIVE STATEMENT
Pt is 63 [facility] and chest tightness since yesterday. Hx of cardiac stent, placed 1.5 months ago per patient. Patient self-administered 324 mg ASA and duoneb prior to EMS arrival. Skin warm and dry, speaking in complete sentences, . EKG in prog. Pt has O2 at 2 lpm via NC PTA. Pt denies headache, fever, chills, abd pain, n/v/d. IV inserted and labs drawn. Assessment ongoing. Pt hooked to cardiac monitor and spo2.

## 2023-10-14 NOTE — ED ADULT TRIAGE NOTE - CHIEF COMPLAINT QUOTE
Patient to the ED via EMS for SOB and CP since yesterday. Hx of cardiac stent, placed 1.5 months ago per patient. Patient self-administered 324 mg ASA and duoneb prior to EMS arrival. Skin warm and dry, speaking in complete sentences, . AAOX4, NAD.

## 2023-10-14 NOTE — H&P ADULT - PROBLEM SELECTOR PLAN 5
-On home vicodin 7.5-325 mg daily for pain  -May continue with home medication -On home vicodin 7.5-325 mg daily for pain  -Spoke to pharmacy- will give oxycodone equivalent inpatient

## 2023-10-14 NOTE — H&P ADULT - PROBLEM SELECTOR PLAN 6
-SBP 130s  -c/w metoprolol succinate 50 mg qd and chlorthalidone 25 mg qd -SBP 130s  -c/w metoprolol succinate 50 mg qd

## 2023-10-14 NOTE — H&P ADULT - NSHPLABSRESULTS_GEN_ALL_CORE
LABS:                          11.9   7.28  )-----------( 229      ( 14 Oct 2023 12:03 )             37.0     10-14    135  |  95<L>  |  11  ----------------------------<  244<H>  3.6   |  25  |  0.72    Ca    9.6      14 Oct 2023 12:03    Urinalysis Basic - ( 14 Oct 2023 12:03 )    Color: x / Appearance: x / SG: x / pH: x  Gluc: 244 mg/dL / Ketone: x  / Bili: x / Urobili: x   Blood: x / Protein: x / Nitrite: x   Leuk Esterase: x / RBC: x / WBC x   Sq Epi: x / Non Sq Epi: x / Bacteria: x

## 2023-10-14 NOTE — ED PROVIDER NOTE - PHYSICAL EXAMINATION

## 2023-10-14 NOTE — H&P ADULT - PROBLEM SELECTOR PLAN 1
-progressive SOB w/ exertion, exertional fatigue and CP symptoms since stent placement in August. States recently SOB has progressed with only half a block duration  -Hx of bioAVR/ascending aorta replacement and CABG SVG-PDA 05/2017  -Clinically no evidence of volume overload. pro-.   -EKG NSR, LAD, old inferior infarct, poor R wave progression. HsTrop T 12.   -Hx of asthma/COPD. c/w nebulizer tx, inhalers.   -TTE 08/30/23: LVEF 64%, normal LVSF, bioprosthetic aortic valve with normal function. trivial pericardial effusion  -Continue telemetry monitoring.   -Will consider inpatient ischemic evaluation to re-evaluate coronary anatomy given persistent anginal sx

## 2023-10-14 NOTE — H&P ADULT - ASSESSMENT
64 y/o female, former smoker, with FHx MI, PMHx of asthma/COPD, sciatica/herniated disc, HTN, HLD, DM2, obesity, mod-severe AS s/p bioAVR/ascending aorta replacement and CABG SVG-PDA 05/2017 @ [facility], s/p recent dx Cath 8/30/23 w/ Dr Barrios: s/p PTCA/DRAKE x 1 OM1 70% stenosis (FFR positive 0.76), pRCA 70% stenosis, mRCA 99% stenosis, RPDA-filled by patent SVG-RPDA graft, pLAD 40% (FFR negative 0.92) presents to [facility] reporting progressive dyspnea with minimal exertion when walking half a block and performing ADLs. Pt reports she has not felt improved symptoms since stent placement in August. In addition, she admits to exertional fatigue and chest pain intermittently with activity. Denies orthopnea, cough, PND, fever, chills, edema, n/v. diaphoresis. Pt is compliant with DAPT therapy. Pt recently came to [facility] ER for these same symptoms and was diagnosed with asthma exacerbation; pt was started on duoneb tx, inhalers and prednisone therapy with no relief. Pt is admitted to cardiology telemetry unit for further monitoring and management.

## 2023-10-14 NOTE — H&P ADULT - PROBLEM SELECTOR PLAN 2
-Recent Cath 8/30/23 w/ Dr Barrios: s/p PTCA/DRAKE x 1 OM1 70% stenosis (FFR positive 0.76), pRCA 70% stenosis, mRCA 99% stenosis, RPDA-filled by patent SVG-RPDA graft, pLAD 40% (FFR negative 0.92).   -EKG: NSR, LAD, old inferior infarct, poor r wave progression.   -HsTrop T 12. Trend trop to r/o ACS  -Repeat ECHO  -C/w Aspirin 81 mg QD and plavix 75 mg QD UNINTERRUPTED given recent PCI  -C/w Atorvastatin 80 mg qd and metoprolol succinate 50 mg qd

## 2023-10-14 NOTE — PATIENT PROFILE ADULT - FALL HARM RISK - HARM RISK INTERVENTIONS

## 2023-10-14 NOTE — H&P ADULT - PROBLEM SELECTOR PLAN 7
-Pt reports panic attacks and anxiety  -c/w diazepam home medication    #GERD  -c/w omeprazole 40 mg qd    #Asthma/COPD  -Not in exacerbation  -C/w inhalers, neb tx    F: None  E: Keep K >4, MG >2  DVT ppx: Ambulatory  Dispo: Pending clinical progression good, to achieve stated therapy goals -Pt reports panic attacks and anxiety  -c/w diazepam home medication prn    #GERD  -c/w omeprazole 40 mg qd    #Asthma/COPD  -Not in exacerbation  -C/w inhalers, neb tx    F: None  E: Keep K >4, MG >2  DVT ppx: Ambulatory  Dispo: Pending clinical progression

## 2023-10-15 LAB
A1C WITH ESTIMATED AVERAGE GLUCOSE RESULT: 6.8 % — HIGH (ref 4–5.6)
ANION GAP SERPL CALC-SCNC: 17 MMOL/L — SIGNIFICANT CHANGE UP (ref 5–17)
BUN SERPL-MCNC: 15 MG/DL — SIGNIFICANT CHANGE UP (ref 7–23)
CALCIUM SERPL-MCNC: 10.2 MG/DL — SIGNIFICANT CHANGE UP (ref 8.4–10.5)
CHLORIDE SERPL-SCNC: 93 MMOL/L — LOW (ref 96–108)
CHOLEST SERPL-MCNC: 100 MG/DL — SIGNIFICANT CHANGE UP
CO2 SERPL-SCNC: 24 MMOL/L — SIGNIFICANT CHANGE UP (ref 22–31)
CREAT SERPL-MCNC: 0.71 MG/DL — SIGNIFICANT CHANGE UP (ref 0.5–1.3)
EGFR: 94 ML/MIN/1.73M2 — SIGNIFICANT CHANGE UP
ESTIMATED AVERAGE GLUCOSE: 148 MG/DL — HIGH (ref 68–114)
GLUCOSE BLDC GLUCOMTR-MCNC: 177 MG/DL — HIGH (ref 70–99)
GLUCOSE BLDC GLUCOMTR-MCNC: 180 MG/DL — HIGH (ref 70–99)
GLUCOSE BLDC GLUCOMTR-MCNC: 229 MG/DL — HIGH (ref 70–99)
GLUCOSE BLDC GLUCOMTR-MCNC: 239 MG/DL — HIGH (ref 70–99)
GLUCOSE SERPL-MCNC: 204 MG/DL — HIGH (ref 70–99)
HCT VFR BLD CALC: 42.2 % — SIGNIFICANT CHANGE UP (ref 34.5–45)
HDLC SERPL-MCNC: 46 MG/DL — LOW
HGB BLD-MCNC: 13.1 G/DL — SIGNIFICANT CHANGE UP (ref 11.5–15.5)
LIPID PNL WITH DIRECT LDL SERPL: 8 MG/DL — SIGNIFICANT CHANGE UP
MAGNESIUM SERPL-MCNC: 1.5 MG/DL — LOW (ref 1.6–2.6)
MCHC RBC-ENTMCNC: 25.5 PG — LOW (ref 27–34)
MCHC RBC-ENTMCNC: 31 GM/DL — LOW (ref 32–36)
MCV RBC AUTO: 82.3 FL — SIGNIFICANT CHANGE UP (ref 80–100)
NON HDL CHOLESTEROL: 54 MG/DL — SIGNIFICANT CHANGE UP
NRBC # BLD: 0 /100 WBCS — SIGNIFICANT CHANGE UP (ref 0–0)
PLATELET # BLD AUTO: 310 K/UL — SIGNIFICANT CHANGE UP (ref 150–400)
POTASSIUM SERPL-MCNC: 3.8 MMOL/L — SIGNIFICANT CHANGE UP (ref 3.5–5.3)
POTASSIUM SERPL-SCNC: 3.8 MMOL/L — SIGNIFICANT CHANGE UP (ref 3.5–5.3)
RBC # BLD: 5.13 M/UL — SIGNIFICANT CHANGE UP (ref 3.8–5.2)
RBC # FLD: 13.8 % — SIGNIFICANT CHANGE UP (ref 10.3–14.5)
SODIUM SERPL-SCNC: 134 MMOL/L — LOW (ref 135–145)
TRIGL SERPL-MCNC: 230 MG/DL — HIGH
WBC # BLD: 10.38 K/UL — SIGNIFICANT CHANGE UP (ref 3.8–10.5)
WBC # FLD AUTO: 10.38 K/UL — SIGNIFICANT CHANGE UP (ref 3.8–10.5)

## 2023-10-15 PROCEDURE — 99223 1ST HOSP IP/OBS HIGH 75: CPT

## 2023-10-15 RX ORDER — MAGNESIUM SULFATE 500 MG/ML
2 VIAL (ML) INJECTION ONCE
Refills: 0 | Status: COMPLETED | OUTPATIENT
Start: 2023-10-15 | End: 2023-10-15

## 2023-10-15 RX ORDER — ACETAMINOPHEN 500 MG
650 TABLET ORAL ONCE
Refills: 0 | Status: COMPLETED | OUTPATIENT
Start: 2023-10-15 | End: 2023-10-15

## 2023-10-15 RX ORDER — POTASSIUM CHLORIDE 20 MEQ
40 PACKET (EA) ORAL ONCE
Refills: 0 | Status: COMPLETED | OUTPATIENT
Start: 2023-10-15 | End: 2023-10-15

## 2023-10-15 RX ORDER — RANOLAZINE 500 MG/1
500 TABLET, FILM COATED, EXTENDED RELEASE ORAL
Refills: 0 | Status: DISCONTINUED | OUTPATIENT
Start: 2023-10-15 | End: 2023-10-17

## 2023-10-15 RX ORDER — SODIUM CHLORIDE 9 MG/ML
500 INJECTION INTRAMUSCULAR; INTRAVENOUS; SUBCUTANEOUS
Refills: 0 | Status: DISCONTINUED | OUTPATIENT
Start: 2023-10-15 | End: 2023-10-16

## 2023-10-15 RX ADMIN — Medication 81 MILLIGRAM(S): at 12:18

## 2023-10-15 RX ADMIN — ALBUTEROL 2 PUFF(S): 90 AEROSOL, METERED ORAL at 06:01

## 2023-10-15 RX ADMIN — Medication 25 GRAM(S): at 13:47

## 2023-10-15 RX ADMIN — Medication 2: at 07:58

## 2023-10-15 RX ADMIN — PANTOPRAZOLE SODIUM 40 MILLIGRAM(S): 20 TABLET, DELAYED RELEASE ORAL at 07:58

## 2023-10-15 RX ADMIN — RANOLAZINE 500 MILLIGRAM(S): 500 TABLET, FILM COATED, EXTENDED RELEASE ORAL at 17:51

## 2023-10-15 RX ADMIN — CLOPIDOGREL BISULFATE 75 MILLIGRAM(S): 75 TABLET, FILM COATED ORAL at 12:18

## 2023-10-15 RX ADMIN — Medication 50 MILLIGRAM(S): at 07:59

## 2023-10-15 RX ADMIN — Medication 2: at 17:51

## 2023-10-15 RX ADMIN — Medication 4: at 12:18

## 2023-10-15 RX ADMIN — ALBUTEROL 2 PUFF(S): 90 AEROSOL, METERED ORAL at 21:13

## 2023-10-15 RX ADMIN — SODIUM CHLORIDE 75 MILLILITER(S): 9 INJECTION INTRAMUSCULAR; INTRAVENOUS; SUBCUTANEOUS at 20:46

## 2023-10-15 RX ADMIN — Medication 4: at 22:43

## 2023-10-15 RX ADMIN — ATORVASTATIN CALCIUM 80 MILLIGRAM(S): 80 TABLET, FILM COATED ORAL at 22:43

## 2023-10-15 RX ADMIN — Medication 650 MILLIGRAM(S): at 07:58

## 2023-10-15 RX ADMIN — Medication 100 MILLIGRAM(S): at 22:43

## 2023-10-15 RX ADMIN — Medication 40 MILLIEQUIVALENT(S): at 23:21

## 2023-10-15 RX ADMIN — Medication 40 MILLIEQUIVALENT(S): at 13:46

## 2023-10-15 RX ADMIN — Medication 650 MILLIGRAM(S): at 08:30

## 2023-10-15 NOTE — PROGRESS NOTE ADULT - PROBLEM SELECTOR PLAN 2
-Recent Cath 8/30/23 w/ Dr Barrios: s/p PTCA/DRAKE x 1 OM1 70% stenosis (FFR positive 0.76), pRCA 70% stenosis, mRCA 99% stenosis, RPDA-filled by patent SVG-RPDA graft, pLAD 40% (FFR negative 0.92).   -EKG: NSR, LAD, old inferior infarct, poor r wave progression.   -HsTrop T 12. Trend trop to r/o ACS  -Repeat ECHO  -C/w Aspirin 81 mg QD and plavix 75 mg QD UNINTERRUPTED given recent PCI  -C/w Atorvastatin 80 mg qd and metoprolol succinate 50 mg qd  -Suggest addition of ranexa 500 mg BID for antianginal therapy

## 2023-10-15 NOTE — PROGRESS NOTE ADULT - PROBLEM SELECTOR PLAN 1
-progressive SOB w/ exertion, exertional fatigue and CP symptoms since stent placement in August. States recently SOB has progressed with only half a block duration  -Hx of bioAVR/ascending aorta replacement and CABG SVG-PDA 05/2017  -Clinically no evidence of volume overload. pro-.   -EKG NSR, LAD, old inferior infarct, poor R wave progression. HsTrop T 12.   -Hx of asthma/COPD. c/w nebulizer tx, inhalers.   -TTE 08/30/23: LVEF 64%, normal LVSF, bioprosthetic aortic valve with normal function. trivial pericardial effusion  -Continue telemetry monitoring.   -With progressive anginal sx concerning for UA, plan for [facility] on 10/16/23. Keep NPO after MN. Precath fluids ordered. Consent obtained.

## 2023-10-15 NOTE — PROGRESS NOTE ADULT - ASSESSMENT
64 y/o female, former smoker, with FHx MI, PMHx of asthma/COPD, sciatica/herniated disc, HTN, HLD, DM2, obesity, mod-severe AS s/p bioAVR/ascending aorta replacement and CABG SVG-PDA 05/2017 @ [facility], s/p recent dx Cath 8/30/23 w/ Dr Barrios: s/p PTCA/DRAKE x 1 OM1 70% stenosis (FFR positive 0.76), pRCA 70% stenosis, mRCA 99% stenosis, RPDA-filled by patent SVG-RPDA graft, pLAD 40% (FFR negative 0.92) presents to [facility] reporting progressive dyspnea with minimal exertion when walking half a block and performing ADLs. Pt reports she has not felt improved symptoms since stent placement in August. In addition, she admits to exertional fatigue and chest pain intermittently with activity. Pt is compliant with DAPT therapy. . Pt is admitted to cardiology telemetry unit for further monitoring and management.

## 2023-10-15 NOTE — PROGRESS NOTE ADULT - SUBJECTIVE AND OBJECTIVE BOX
Cardiology PA Adult Progress Note    SUBJECTIVE ASSESSMENT:  	  MEDICATIONS:  metoprolol succinate ER 50 milliGRAM(s) Oral daily  albuterol    90 MICROgram(s) HFA Inhaler 2 Puff(s) Inhalation every 6 hours PRN  diazepam    Tablet 5 milliGRAM(s) Oral daily PRN  oxycodone    5 mG/acetaminophen 325 mG 1 Tablet(s) Oral daily  pantoprazole    Tablet 40 milliGRAM(s) Oral before breakfast  atorvastatin 80 milliGRAM(s) Oral at bedtime  dextrose 50% Injectable 12.5 Gram(s) IV Push once  dextrose 50% Injectable 25 Gram(s) IV Push once  dextrose 50% Injectable 25 Gram(s) IV Push once  dextrose Oral Gel 15 Gram(s) Oral once PRN  glucagon  Injectable 1 milliGRAM(s) IntraMuscular once  insulin lispro (ADMELOG) corrective regimen sliding scale   SubCutaneous Before meals and at bedtime  aspirin enteric coated 81 milliGRAM(s) Oral daily  clopidogrel Tablet 75 milliGRAM(s) Oral daily  dextrose 5%. 1000 milliLiter(s) IV Continuous <Continuous>  dextrose 5%. 1000 milliLiter(s) IV Continuous <Continuous>  potassium chloride    Tablet ER 40 milliEquivalent(s) Oral once    	  VITAL SIGNS:  T(C): 36.8 (10-15-23 @ 08:30), Max: 37 (10-15-23 @ 00:54)  HR: 84 (10-15-23 @ 08:30) (81 - 108)  BP: 94/54 (10-15-23 @ 08:30) (85/53 - 137/63)  RR: 18 (10-15-23 @ 08:30) (18 - 18)  SpO2: 96% (10-15-23 @ 08:30) (96% - 100%)  Wt(kg): --    I&O's Summary    14 Oct 2023 07:01  -  15 Oct 2023 07:00  --------------------------------------------------------  IN: 800 mL / OUT: 0 mL / NET: 800 mL      Height (cm): 160 (10-14 @ 11:46)  Weight (kg): 81.6 (10-14 @ 11:46)  BMI (kg/m2): 31.9 (10-14 @ 11:46)  BSA (m2): 1.85 (10-14 @ 11:46)                                     PHYSICAL EXAM:  Appearance: Normal	  HEENT: Normal oral mucosa, PERRL, EOMI	  Neck: Supple, + JVD/ - JVD; ___ Carotid Bruit   Cardiovascular: Normal S1 S2, No murmurs  Respiratory: Lungs clear to auscultation/Decreased Breath Sounds/No Rales, Rhonchi, Wheezing	  Gastrointestinal:  Soft, Non-tender, + BS	  Skin: No rashes, No ecchymoses, No cyanosis  Extremities: Normal range of motion, No clubbing, cyanosis or edema  Vascular: Peripheral pulses palpable 2+ bilaterally  Neurologic: Non-focal  Psychiatry: A & O x 3, Mood & affect appropriate    LABS:	 	                        13.1   10.38 )-----------( 310      ( 15 Oct 2023 05:30 )             42.2     10-15    134<L>  |  93<L>  |  15  ----------------------------<  204<H>  3.8   |  24  |  0.71    Ca    10.2      15 Oct 2023 05:30  Mg     1.5     10-15      proBNP:   Lipid Profile:   HgA1c:   TSH:    Cardiology PA Adult Progress Note    SUBJECTIVE ASSESSMENT: Seen and examined at bedside. Denies CP, SOB or palpitations presently. Pt states when she walked this morning she felt SOB that resolved at rest. Slept well overnight.  	  MEDICATIONS:  metoprolol succinate ER 50 milliGRAM(s) Oral daily  albuterol    90 MICROgram(s) HFA Inhaler 2 Puff(s) Inhalation every 6 hours PRN  diazepam    Tablet 5 milliGRAM(s) Oral daily PRN  oxycodone    5 mG/acetaminophen 325 mG 1 Tablet(s) Oral daily  pantoprazole    Tablet 40 milliGRAM(s) Oral before breakfast  atorvastatin 80 milliGRAM(s) Oral at bedtime  dextrose 50% Injectable 12.5 Gram(s) IV Push once  dextrose 50% Injectable 25 Gram(s) IV Push once  dextrose 50% Injectable 25 Gram(s) IV Push once  dextrose Oral Gel 15 Gram(s) Oral once PRN  glucagon  Injectable 1 milliGRAM(s) IntraMuscular once  insulin lispro (ADMELOG) corrective regimen sliding scale   SubCutaneous Before meals and at bedtime  aspirin enteric coated 81 milliGRAM(s) Oral daily  clopidogrel Tablet 75 milliGRAM(s) Oral daily  dextrose 5%. 1000 milliLiter(s) IV Continuous <Continuous>  dextrose 5%. 1000 milliLiter(s) IV Continuous <Continuous>  potassium chloride    Tablet ER 40 milliEquivalent(s) Oral once    	  VITAL SIGNS:  T(C): 36.8 (10-15-23 @ 08:30), Max: 37 (10-15-23 @ 00:54)  HR: 84 (10-15-23 @ 08:30) (81 - 108)  BP: 94/54 (10-15-23 @ 08:30) (85/53 - 137/63)  RR: 18 (10-15-23 @ 08:30) (18 - 18)  SpO2: 96% (10-15-23 @ 08:30) (96% - 100%)  Wt(kg): --    I&O's Summary    14 Oct 2023 07:01  -  15 Oct 2023 07:00  --------------------------------------------------------  IN: 800 mL / OUT: 0 mL / NET: 800 mL      Height (cm): 160 (10-14 @ 11:46)  Weight (kg): 81.6 (10-14 @ 11:46)  BMI (kg/m2): 31.9 (10-14 @ 11:46)  BSA (m2): 1.85 (10-14 @ 11:46)                                     PHYSICAL EXAM:  Appearance: Normal	  HEENT: Normal oral mucosa, PERRL, EOMI	  Neck: Supple, + JVD/ - JVD; ___ Carotid Bruit   Cardiovascular: Normal S1 S2, No murmurs  Respiratory: Lungs clear to auscultation/Decreased Breath Sounds/No Rales, Rhonchi, Wheezing	  Gastrointestinal:  Soft, Non-tender, + BS	  Skin: No rashes, No ecchymoses, No cyanosis  Extremities: Normal range of motion, No clubbing, cyanosis or edema  Vascular: Peripheral pulses palpable 2+ bilaterally  Neurologic: Non-focal  Psychiatry: A & O x 3, Mood & affect appropriate    LABS:	 	                        13.1   10.38 )-----------( 310      ( 15 Oct 2023 05:30 )             42.2     10-15    134<L>  |  93<L>  |  15  ----------------------------<  204<H>  3.8   |  24  |  0.71    Ca    10.2      15 Oct 2023 05:30  Mg     1.5     10-15      proBNP:   Lipid Profile:   HgA1c:   TSH:    Cardiology PA Adult Progress Note    SUBJECTIVE ASSESSMENT: Seen and examined at bedside. Denies CP, SOB or palpitations presently. Pt states when she walked this morning she felt SOB that resolved at rest. Slept well overnight.  	  MEDICATIONS:  metoprolol succinate ER 50 milliGRAM(s) Oral daily  albuterol    90 MICROgram(s) HFA Inhaler 2 Puff(s) Inhalation every 6 hours PRN  diazepam    Tablet 5 milliGRAM(s) Oral daily PRN  oxycodone    5 mG/acetaminophen 325 mG 1 Tablet(s) Oral daily  pantoprazole    Tablet 40 milliGRAM(s) Oral before breakfast  atorvastatin 80 milliGRAM(s) Oral at bedtime  dextrose 50% Injectable 12.5 Gram(s) IV Push once  dextrose 50% Injectable 25 Gram(s) IV Push once  dextrose 50% Injectable 25 Gram(s) IV Push once  dextrose Oral Gel 15 Gram(s) Oral once PRN  glucagon  Injectable 1 milliGRAM(s) IntraMuscular once  insulin lispro (ADMELOG) corrective regimen sliding scale   SubCutaneous Before meals and at bedtime  aspirin enteric coated 81 milliGRAM(s) Oral daily  clopidogrel Tablet 75 milliGRAM(s) Oral daily  dextrose 5%. 1000 milliLiter(s) IV Continuous <Continuous>  dextrose 5%. 1000 milliLiter(s) IV Continuous <Continuous>  potassium chloride    Tablet ER 40 milliEquivalent(s) Oral once    	  VITAL SIGNS:  T(C): 36.8 (10-15-23 @ 08:30), Max: 37 (10-15-23 @ 00:54)  HR: 84 (10-15-23 @ 08:30) (81 - 108)  BP: 94/54 (10-15-23 @ 08:30) (85/53 - 137/63)  RR: 18 (10-15-23 @ 08:30) (18 - 18)  SpO2: 96% (10-15-23 @ 08:30) (96% - 100%)  Wt(kg): --    I&O's Summary    14 Oct 2023 07:01  -  15 Oct 2023 07:00  --------------------------------------------------------  IN: 800 mL / OUT: 0 mL / NET: 800 mL      Height (cm): 160 (10-14 @ 11:46)  Weight (kg): 81.6 (10-14 @ 11:46)  BMI (kg/m2): 31.9 (10-14 @ 11:46)  BSA (m2): 1.85 (10-14 @ 11:46)                                     PHYSICAL EXAM:  Appearance: Normal	  HEENT: Normal oral mucosa, PERRL, EOMI	  Neck: Supple,  - JVD; No Carotid Bruit   Cardiovascular: Normal S1 S2, No murmurs  Respiratory: Lungs clear to auscultation  Gastrointestinal:  Soft, Non-tender, + BS	  Skin: No rashes, No ecchymoses, No cyanosis  Extremities: Normal range of motion, No clubbing, cyanosis or edema  Vascular: Peripheral pulses palpable 2+ bilaterally  Neurologic: Non-focal  Psychiatry: A & O x 3, Mood & affect appropriate    LABS:	 	                        13.1   10.38 )-----------( 310      ( 15 Oct 2023 05:30 )             42.2     10-15    134<L>  |  93<L>  |  15  ----------------------------<  204<H>  3.8   |  24  |  0.71    Ca    10.2      15 Oct 2023 05:30  Mg     1.5     10-15

## 2023-10-15 NOTE — PROGRESS NOTE ADULT - PROBLEM SELECTOR PLAN 7
-Pt reports panic attacks and anxiety  -c/w diazepam home medication prn    #GERD  -c/w omeprazole 40 mg qd    #Asthma/COPD  -Not in exacerbation  -C/w inhalers, neb tx    F: None  E: Keep K >4, MG >2  DVT ppx: Ambulatory  Dispo: Plan for [facility] on 10/16/23. Keep NPO after MN. Precath fluids. Consent obtained and added to schedule.

## 2023-10-16 LAB
ANION GAP SERPL CALC-SCNC: 10 MMOL/L — SIGNIFICANT CHANGE UP (ref 5–17)
APTT BLD: 30.3 SEC — SIGNIFICANT CHANGE UP (ref 24.5–35.6)
BUN SERPL-MCNC: 17 MG/DL — SIGNIFICANT CHANGE UP (ref 7–23)
CALCIUM SERPL-MCNC: 9.1 MG/DL — SIGNIFICANT CHANGE UP (ref 8.4–10.5)
CHLORIDE SERPL-SCNC: 102 MMOL/L — SIGNIFICANT CHANGE UP (ref 96–108)
CO2 SERPL-SCNC: 27 MMOL/L — SIGNIFICANT CHANGE UP (ref 22–31)
CREAT SERPL-MCNC: 0.74 MG/DL — SIGNIFICANT CHANGE UP (ref 0.5–1.3)
EGFR: 90 ML/MIN/1.73M2 — SIGNIFICANT CHANGE UP
GLUCOSE BLDC GLUCOMTR-MCNC: 127 MG/DL — HIGH (ref 70–99)
GLUCOSE BLDC GLUCOMTR-MCNC: 135 MG/DL — HIGH (ref 70–99)
GLUCOSE BLDC GLUCOMTR-MCNC: 135 MG/DL — HIGH (ref 70–99)
GLUCOSE BLDC GLUCOMTR-MCNC: 173 MG/DL — HIGH (ref 70–99)
GLUCOSE BLDC GLUCOMTR-MCNC: 188 MG/DL — HIGH (ref 70–99)
GLUCOSE BLDC GLUCOMTR-MCNC: 188 MG/DL — HIGH (ref 70–99)
GLUCOSE SERPL-MCNC: 128 MG/DL — HIGH (ref 70–99)
HCT VFR BLD CALC: 39.6 % — SIGNIFICANT CHANGE UP (ref 34.5–45)
HGB BLD-MCNC: 12.1 G/DL — SIGNIFICANT CHANGE UP (ref 11.5–15.5)
INR BLD: 0.98 — SIGNIFICANT CHANGE UP (ref 0.85–1.18)
MAGNESIUM SERPL-MCNC: 1.7 MG/DL — SIGNIFICANT CHANGE UP (ref 1.6–2.6)
MCHC RBC-ENTMCNC: 25.7 PG — LOW (ref 27–34)
MCHC RBC-ENTMCNC: 30.6 GM/DL — LOW (ref 32–36)
MCV RBC AUTO: 84.3 FL — SIGNIFICANT CHANGE UP (ref 80–100)
NRBC # BLD: 0 /100 WBCS — SIGNIFICANT CHANGE UP (ref 0–0)
PLATELET # BLD AUTO: 263 K/UL — SIGNIFICANT CHANGE UP (ref 150–400)
POTASSIUM SERPL-MCNC: 4.1 MMOL/L — SIGNIFICANT CHANGE UP (ref 3.5–5.3)
POTASSIUM SERPL-SCNC: 4.1 MMOL/L — SIGNIFICANT CHANGE UP (ref 3.5–5.3)
PROTHROM AB SERPL-ACNC: 11.2 SEC — SIGNIFICANT CHANGE UP (ref 9.5–13)
RBC # BLD: 4.7 M/UL — SIGNIFICANT CHANGE UP (ref 3.8–5.2)
RBC # FLD: 14.1 % — SIGNIFICANT CHANGE UP (ref 10.3–14.5)
SODIUM SERPL-SCNC: 139 MMOL/L — SIGNIFICANT CHANGE UP (ref 135–145)
WBC # BLD: 8.55 K/UL — SIGNIFICANT CHANGE UP (ref 3.8–10.5)
WBC # FLD AUTO: 8.55 K/UL — SIGNIFICANT CHANGE UP (ref 3.8–10.5)

## 2023-10-16 PROCEDURE — 93459 L HRT ART/GRFT ANGIO: CPT | Mod: 26,59

## 2023-10-16 PROCEDURE — 99152 MOD SED SAME PHYS/QHP 5/>YRS: CPT

## 2023-10-16 PROCEDURE — 92928 PRQ TCAT PLMT NTRAC ST 1 LES: CPT | Mod: LD

## 2023-10-16 RX ORDER — SODIUM CHLORIDE 9 MG/ML
1000 INJECTION INTRAMUSCULAR; INTRAVENOUS; SUBCUTANEOUS
Refills: 0 | Status: DISCONTINUED | OUTPATIENT
Start: 2023-10-16 | End: 2023-10-17

## 2023-10-16 RX ORDER — MAGNESIUM OXIDE 400 MG ORAL TABLET 241.3 MG
800 TABLET ORAL ONCE
Refills: 0 | Status: COMPLETED | OUTPATIENT
Start: 2023-10-16 | End: 2023-10-16

## 2023-10-16 RX ADMIN — MAGNESIUM OXIDE 400 MG ORAL TABLET 800 MILLIGRAM(S): 241.3 TABLET ORAL at 11:14

## 2023-10-16 RX ADMIN — PANTOPRAZOLE SODIUM 40 MILLIGRAM(S): 20 TABLET, DELAYED RELEASE ORAL at 06:15

## 2023-10-16 RX ADMIN — Medication 2: at 12:32

## 2023-10-16 RX ADMIN — ATORVASTATIN CALCIUM 80 MILLIGRAM(S): 80 TABLET, FILM COATED ORAL at 21:50

## 2023-10-16 RX ADMIN — RANOLAZINE 500 MILLIGRAM(S): 500 TABLET, FILM COATED, EXTENDED RELEASE ORAL at 06:15

## 2023-10-16 RX ADMIN — Medication 2: at 22:44

## 2023-10-16 RX ADMIN — SODIUM CHLORIDE 160 MILLILITER(S): 9 INJECTION INTRAMUSCULAR; INTRAVENOUS; SUBCUTANEOUS at 19:02

## 2023-10-16 RX ADMIN — CLOPIDOGREL BISULFATE 75 MILLIGRAM(S): 75 TABLET, FILM COATED ORAL at 09:13

## 2023-10-16 RX ADMIN — Medication 81 MILLIGRAM(S): at 09:13

## 2023-10-16 RX ADMIN — SODIUM CHLORIDE 160 MILLILITER(S): 9 INJECTION INTRAMUSCULAR; INTRAVENOUS; SUBCUTANEOUS at 17:30

## 2023-10-16 RX ADMIN — RANOLAZINE 500 MILLIGRAM(S): 500 TABLET, FILM COATED, EXTENDED RELEASE ORAL at 19:02

## 2023-10-16 NOTE — PROGRESS NOTE ADULT - SUBJECTIVE AND OBJECTIVE BOX
Interventional Cardiology PA Adult Progress Note    Subjective Assessment:  Patient seen and examined at bedside. Patient continues to endorse shortness of breath with exertion. Denied chest pain, palpitations, syncope, leg pain, leg swelling, orthopnea.     ROS Negative except as per Subjective and HPI  	  MEDICATIONS:  metoprolol succinate ER 50 milliGRAM(s) Oral daily  ranolazine 500 milliGRAM(s) Oral two times a day  albuterol    90 MICROgram(s) HFA Inhaler 2 Puff(s) Inhalation every 6 hours PRN  guaiFENesin Oral Liquid (Sugar-Free) 100 milliGRAM(s) Oral every 6 hours PRN  diazepam    Tablet 5 milliGRAM(s) Oral daily PRN  oxycodone    5 mG/acetaminophen 325 mG 1 Tablet(s) Oral daily  pantoprazole    Tablet 40 milliGRAM(s) Oral before breakfast  atorvastatin 80 milliGRAM(s) Oral at bedtime  dextrose 50% Injectable 25 Gram(s) IV Push once  dextrose 50% Injectable 12.5 Gram(s) IV Push once  dextrose 50% Injectable 25 Gram(s) IV Push once  dextrose Oral Gel 15 Gram(s) Oral once PRN  glucagon  Injectable 1 milliGRAM(s) IntraMuscular once  insulin lispro (ADMELOG) corrective regimen sliding scale   SubCutaneous Before meals and at bedtime  aspirin enteric coated 81 milliGRAM(s) Oral daily  clopidogrel Tablet 75 milliGRAM(s) Oral daily  dextrose 5%. 1000 milliLiter(s) IV Continuous <Continuous>  dextrose 5%. 1000 milliLiter(s) IV Continuous <Continuous>  sodium chloride 0.9%. 500 milliLiter(s) IV Continuous <Continuous>      	    [PHYSICAL EXAM:  TELEMETRY:  T(C): 36.9 (10-16-23 @ 08:32), Max: 37 (10-16-23 @ 05:39)  HR: 78 (10-16-23 @ 08:32) (72 - 88)  BP: 107/56 (10-16-23 @ 08:32) (91/55 - 127/66)  RR: 18 (10-16-23 @ 08:32) (18 - 19)  SpO2: 95% (10-16-23 @ 08:32) (95% - 100%)  Wt(kg): --  I&O's Summary    15 Oct 2023 07:01  -  16 Oct 2023 07:00  --------------------------------------------------------  IN: 1260 mL / OUT: 0 mL / NET: 1260 mL    16 Oct 2023 07:01  -  16 Oct 2023 11:37  --------------------------------------------------------  IN: 0 mL / OUT: 0 mL / NET: 0 mL      Appearance: Normal	  HEENT:   Normal oral mucosa, PERRL, EOMI	  Neck: Supple, - JVD; Carotid Bruit   Cardiovascular: Normal S1 S2, No JVD, No murmurs,   Respiratory: Lungs clear to auscultation, No Rales, Rhonchi, Wheezing	  Gastrointestinal:  Soft, Non-tender, + BS	  Extremities: Normal range of motion, No clubbing, cyanosis or edema  Vascular: Peripheral pulses palpable 1+ bilaterally  Neurologic: Non-focal  Psychiatry: A & O x 3, Mood & affect appropriate                            12.1   8.55  )-----------( 263      ( 16 Oct 2023 05:30 )             39.6     10-16    139  |  102  |  17  ----------------------------<  128<H>  4.1   |  27  |  0.74    Ca    9.1      16 Oct 2023 05:30  Mg     1.7     10-16      PT/INR - ( 16 Oct 2023 05:30 )   PT: 11.2 sec;   INR: 0.98          PTT - ( 16 Oct 2023 05:30 )  PTT:30.3 sec

## 2023-10-16 NOTE — PROGRESS NOTE ADULT - PROBLEM SELECTOR PLAN 5
-On home vicodin 7.5-325 mg daily for pain  -Spoke to pharmacy- c/w oxycodone equivalent inpatient
-On home vicodin 7.5-325 mg daily for pain  -Spoke to pharmacy- c/w oxycodone equivalent inpatient

## 2023-10-16 NOTE — PROGRESS NOTE ADULT - ASSESSMENT
62 y/o female, former smoker, with FHx MI, PMHx of asthma/COPD, sciatica/herniated disc, HTN, HLD, DM2, obesity, mod-severe AS s/p bioAVR/ascending aorta replacement and CABG SVG-PDA 05/2017 @ [facility], s/p recent dx Cath 8/30/23 w/ Dr Barrios: s/p PTCA/DRAKE x 1 OM1 70% stenosis (FFR positive 0.76), pRCA 70% stenosis, mRCA 99% stenosis, RPDA-filled by patent SVG-RPDA graft, pLAD 40% (FFR negative 0.92) presents to [facility] reporting progressive dyspnea with minimal exertion when walking half a block and performing ADLs. Pt reports she has not felt improved symptoms since stent placement in August. In addition, she admits to exertional fatigue and chest pain intermittently with activity. Pt is compliant with DAPT therapy. . Pt is admitted to cardiology telemetry unit for further monitoring and management. Cath for today.  64 y/o female, former smoker, with FHx MI, PMHx of asthma/COPD, sciatica/herniated disc, HTN, HLD, DM2, obesity, mod-severe AS s/p bioAVR/ascending aorta replacement and CABG SVG-PDA 05/2017 @ [facility], s/p recent dx Cath 8/30/23 w/ Dr Barrios: s/p PTCA/DRAKE x 1 OM1 70% stenosis (FFR positive 0.76), pRCA 70% stenosis, mRCA 99% stenosis, RPDA-filled by patent SVG-RPDA graft, pLAD 40% (FFR negative 0.92) presents to [facility] reporting progressive dyspnea with minimal exertion when walking half a block and performing ADLs. Pt reports she has not felt improved symptoms since stent placement in August. In addition, she admits to exertional fatigue and chest pain intermittently with activity. Pt is compliant with DAPT therapy. Now s/p Cardiac Cath by Dr. Phillips/Keyon with DRAKE x 2 (60-70%).

## 2023-10-16 NOTE — PROGRESS NOTE ADULT - PROBLEM SELECTOR PLAN 2
-Recent Cath 8/30/23 w/ Dr Barrios: s/p PTCA/DRAKE x 1 OM1 70% stenosis (FFR positive 0.76), pRCA 70% stenosis, mRCA 99% stenosis, RPDA-filled by patent SVG-RPDA graft, pLAD 40% (FFR negative 0.92).   -EKG: NSR, LAD, old inferior infarct, poor r wave progression.   -HsTrop T 12. Trend trop to r/o ACS  -Repeat ECHO  -C/w Aspirin 81 mg QD and plavix 75 mg QD UNINTERRUPTED given recent PCI  -C/w Atorvastatin 80 mg qd and metoprolol succinate 50 mg qd  -Suggest addition of ranexa 500 mg BID for antianginal therapy -Recent Cath 8/30/23 w/ Dr Barrios: s/p PTCA/DRAKE x 1 OM1 70% stenosis (FFR positive 0.76), pRCA 70% stenosis, mRCA 99% stenosis, RPDA-filled by patent SVG-RPDA graft, pLAD 40% (FFR negative 0.92).   -EKG: NSR, LAD, old inferior infarct, poor r wave progression.   -HsTrop T 12. Trend trop to r/o ACS  -Repeat ECHO  -C/w Aspirin 81 mg QD and plavix 75 mg QD UNINTERRUPTED given recent PCI  -C/w Atorvastatin 80 mg qd and metoprolol succinate 50 mg qd  -Suggest addition of ranexa 500 mg BID for antianginal therapy  - s/p Cardiac Cath (Kodra/Keyon) R TR - pLAD 60-70% --> s/p DRAKE x 2, patent prior OM stent, patent SVG-RPDA graft  no EDP -Recent Cath 8/30/23 w/ Dr Barrios: s/p PTCA/DRAKE x 1 OM1 70% stenosis (FFR positive 0.76), pRCA 70% stenosis, mRCA 99% stenosis, RPDA-filled by patent SVG-RPDA graft, pLAD 40% (FFR negative 0.92).   -EKG: NSR, LAD, old inferior infarct, poor r wave progression.   -HsTrop T 12. Trend trop to r/o ACS  -Repeat ECHO  -C/w Aspirin 81 mg QD and plavix 75 mg QD UNINTERRUPTED given recent PCI  -C/w Atorvastatin 80 mg qd and metoprolol succinate 50 mg qd  -Suggest addition of ranexa 500 mg BID for antianginal therapy  - Cardiac Cath as above.

## 2023-10-16 NOTE — PROGRESS NOTE ADULT - PROBLEM SELECTOR PLAN 7
-Pt reports panic attacks and anxiety  -c/w diazepam home medication prn    #GERD  -c/w omeprazole 40 mg qd    #Asthma/COPD  -Not in exacerbation  -C/w inhalers, neb tx    F: None  E: Keep K >4, MG >2  DVT ppx: Ambulatory  Dispo: Plan for [facility] on 10/16/23. Keep NPO after MN. Precath fluids. Consent obtained and added to schedule. -Pt reports panic attacks and anxiety  -c/w diazepam home medication prn    #GERD  -c/w omeprazole 40 mg qd    #Asthma/COPD  -Not in exacerbation  -C/w inhalers, neb tx    F: None  E: Keep K >4, MG >2  DVT ppx: Ambulatory  Dispo: pending

## 2023-10-16 NOTE — PROGRESS NOTE ADULT - PROBLEM SELECTOR PLAN 1
-progressive SOB w/ exertion, exertional fatigue and CP symptoms since stent placement in August. States recently SOB has progressed with only half a block duration  -Hx of bioAVR/ascending aorta replacement and CABG SVG-PDA 05/2017  -Clinically no evidence of volume overload. pro-.   -EKG NSR, LAD, old inferior infarct, poor R wave progression. HsTrop T 12.   -Hx of asthma/COPD. c/w nebulizer tx, inhalers.   -TTE 08/30/23: LVEF 64%, normal LVSF, bioprosthetic aortic valve with normal function. trivial pericardial effusion  -Continue telemetry monitoring.   -Cardiac Cath by Dr. Prara (10/16/23): pending -progressive SOB w/ exertion, exertional fatigue and CP symptoms since stent placement in August. States recently SOB has progressed with only half a block duration  -Hx of bioAVR/ascending aorta replacement and CABG SVG-PDA 05/2017  -Clinically no evidence of volume overload. pro-.   -EKG NSR, LAD, old inferior infarct, poor R wave progression. HsTrop T 12.   -Hx of asthma/COPD. c/w nebulizer tx, inhalers.   -TTE 08/30/23: LVEF 64%, normal LVSF, bioprosthetic aortic valve with normal function. trivial pericardial effusion  -Continue telemetry monitoring.   - s/p Cardiac Cath (Kodra/Keyon) R TR - pLAD 60-70% --> s/p DRAKE x 2, patent prior OM stent, patent SVG-RPDA graft  no EDP.

## 2023-10-17 ENCOUNTER — TRANSCRIPTION ENCOUNTER (OUTPATIENT)
Age: 63
End: 2023-10-17

## 2023-10-17 VITALS
RESPIRATION RATE: 18 BRPM | OXYGEN SATURATION: 98 % | DIASTOLIC BLOOD PRESSURE: 60 MMHG | HEART RATE: 82 BPM | SYSTOLIC BLOOD PRESSURE: 109 MMHG | TEMPERATURE: 97 F

## 2023-10-17 LAB
ALBUMIN SERPL ELPH-MCNC: 3.7 G/DL — SIGNIFICANT CHANGE UP (ref 3.3–5)
ALBUMIN SERPL ELPH-MCNC: 3.7 G/DL — SIGNIFICANT CHANGE UP (ref 3.3–5)
ALP SERPL-CCNC: 28 U/L — LOW (ref 40–120)
ALP SERPL-CCNC: 28 U/L — LOW (ref 40–120)
ALT FLD-CCNC: 25 U/L — SIGNIFICANT CHANGE UP (ref 10–45)
ALT FLD-CCNC: 25 U/L — SIGNIFICANT CHANGE UP (ref 10–45)
ANION GAP SERPL CALC-SCNC: 11 MMOL/L — SIGNIFICANT CHANGE UP (ref 5–17)
ANION GAP SERPL CALC-SCNC: 11 MMOL/L — SIGNIFICANT CHANGE UP (ref 5–17)
AST SERPL-CCNC: 40 U/L — SIGNIFICANT CHANGE UP (ref 10–40)
AST SERPL-CCNC: 40 U/L — SIGNIFICANT CHANGE UP (ref 10–40)
BILIRUB SERPL-MCNC: 0.2 MG/DL — SIGNIFICANT CHANGE UP (ref 0.2–1.2)
BILIRUB SERPL-MCNC: 0.2 MG/DL — SIGNIFICANT CHANGE UP (ref 0.2–1.2)
BUN SERPL-MCNC: 12 MG/DL — SIGNIFICANT CHANGE UP (ref 7–23)
BUN SERPL-MCNC: 12 MG/DL — SIGNIFICANT CHANGE UP (ref 7–23)
CALCIUM SERPL-MCNC: 9.4 MG/DL — SIGNIFICANT CHANGE UP (ref 8.4–10.5)
CALCIUM SERPL-MCNC: 9.4 MG/DL — SIGNIFICANT CHANGE UP (ref 8.4–10.5)
CHLORIDE SERPL-SCNC: 100 MMOL/L — SIGNIFICANT CHANGE UP (ref 96–108)
CHLORIDE SERPL-SCNC: 100 MMOL/L — SIGNIFICANT CHANGE UP (ref 96–108)
CHOLEST SERPL-MCNC: 98 MG/DL — SIGNIFICANT CHANGE UP
CHOLEST SERPL-MCNC: 98 MG/DL — SIGNIFICANT CHANGE UP
CO2 SERPL-SCNC: 27 MMOL/L — SIGNIFICANT CHANGE UP (ref 22–31)
CO2 SERPL-SCNC: 27 MMOL/L — SIGNIFICANT CHANGE UP (ref 22–31)
CREAT SERPL-MCNC: 0.69 MG/DL — SIGNIFICANT CHANGE UP (ref 0.5–1.3)
CREAT SERPL-MCNC: 0.69 MG/DL — SIGNIFICANT CHANGE UP (ref 0.5–1.3)
EGFR: 96 ML/MIN/1.73M2 — SIGNIFICANT CHANGE UP
EGFR: 96 ML/MIN/1.73M2 — SIGNIFICANT CHANGE UP
GLUCOSE BLDC GLUCOMTR-MCNC: 134 MG/DL — HIGH (ref 70–99)
GLUCOSE BLDC GLUCOMTR-MCNC: 134 MG/DL — HIGH (ref 70–99)
GLUCOSE BLDC GLUCOMTR-MCNC: 220 MG/DL — HIGH (ref 70–99)
GLUCOSE BLDC GLUCOMTR-MCNC: 220 MG/DL — HIGH (ref 70–99)
GLUCOSE SERPL-MCNC: 136 MG/DL — HIGH (ref 70–99)
GLUCOSE SERPL-MCNC: 136 MG/DL — HIGH (ref 70–99)
HCT VFR BLD CALC: 39.2 % — SIGNIFICANT CHANGE UP (ref 34.5–45)
HCT VFR BLD CALC: 39.2 % — SIGNIFICANT CHANGE UP (ref 34.5–45)
HDLC SERPL-MCNC: 42 MG/DL — LOW
HDLC SERPL-MCNC: 42 MG/DL — LOW
HGB BLD-MCNC: 12.3 G/DL — SIGNIFICANT CHANGE UP (ref 11.5–15.5)
HGB BLD-MCNC: 12.3 G/DL — SIGNIFICANT CHANGE UP (ref 11.5–15.5)
LIPID PNL WITH DIRECT LDL SERPL: 24 MG/DL — SIGNIFICANT CHANGE UP
LIPID PNL WITH DIRECT LDL SERPL: 24 MG/DL — SIGNIFICANT CHANGE UP
MAGNESIUM SERPL-MCNC: 1.7 MG/DL — SIGNIFICANT CHANGE UP (ref 1.6–2.6)
MAGNESIUM SERPL-MCNC: 1.7 MG/DL — SIGNIFICANT CHANGE UP (ref 1.6–2.6)
MCHC RBC-ENTMCNC: 26.3 PG — LOW (ref 27–34)
MCHC RBC-ENTMCNC: 26.3 PG — LOW (ref 27–34)
MCHC RBC-ENTMCNC: 31.4 GM/DL — LOW (ref 32–36)
MCHC RBC-ENTMCNC: 31.4 GM/DL — LOW (ref 32–36)
MCV RBC AUTO: 83.8 FL — SIGNIFICANT CHANGE UP (ref 80–100)
MCV RBC AUTO: 83.8 FL — SIGNIFICANT CHANGE UP (ref 80–100)
NON HDL CHOLESTEROL: 56 MG/DL — SIGNIFICANT CHANGE UP
NON HDL CHOLESTEROL: 56 MG/DL — SIGNIFICANT CHANGE UP
NRBC # BLD: 0 /100 WBCS — SIGNIFICANT CHANGE UP (ref 0–0)
NRBC # BLD: 0 /100 WBCS — SIGNIFICANT CHANGE UP (ref 0–0)
PLATELET # BLD AUTO: 254 K/UL — SIGNIFICANT CHANGE UP (ref 150–400)
PLATELET # BLD AUTO: 254 K/UL — SIGNIFICANT CHANGE UP (ref 150–400)
POTASSIUM SERPL-MCNC: 3.9 MMOL/L — SIGNIFICANT CHANGE UP (ref 3.5–5.3)
POTASSIUM SERPL-MCNC: 3.9 MMOL/L — SIGNIFICANT CHANGE UP (ref 3.5–5.3)
POTASSIUM SERPL-SCNC: 3.9 MMOL/L — SIGNIFICANT CHANGE UP (ref 3.5–5.3)
POTASSIUM SERPL-SCNC: 3.9 MMOL/L — SIGNIFICANT CHANGE UP (ref 3.5–5.3)
PROT SERPL-MCNC: 6.5 G/DL — SIGNIFICANT CHANGE UP (ref 6–8.3)
PROT SERPL-MCNC: 6.5 G/DL — SIGNIFICANT CHANGE UP (ref 6–8.3)
RBC # BLD: 4.68 M/UL — SIGNIFICANT CHANGE UP (ref 3.8–5.2)
RBC # BLD: 4.68 M/UL — SIGNIFICANT CHANGE UP (ref 3.8–5.2)
RBC # FLD: 14.6 % — HIGH (ref 10.3–14.5)
RBC # FLD: 14.6 % — HIGH (ref 10.3–14.5)
SODIUM SERPL-SCNC: 138 MMOL/L — SIGNIFICANT CHANGE UP (ref 135–145)
SODIUM SERPL-SCNC: 138 MMOL/L — SIGNIFICANT CHANGE UP (ref 135–145)
TRIGL SERPL-MCNC: 288 MG/DL — HIGH
TRIGL SERPL-MCNC: 288 MG/DL — HIGH
WBC # BLD: 6.24 K/UL — SIGNIFICANT CHANGE UP (ref 3.8–10.5)
WBC # BLD: 6.24 K/UL — SIGNIFICANT CHANGE UP (ref 3.8–10.5)
WBC # FLD AUTO: 6.24 K/UL — SIGNIFICANT CHANGE UP (ref 3.8–10.5)
WBC # FLD AUTO: 6.24 K/UL — SIGNIFICANT CHANGE UP (ref 3.8–10.5)

## 2023-10-17 PROCEDURE — 93010 ELECTROCARDIOGRAM REPORT: CPT

## 2023-10-17 PROCEDURE — 93306 TTE W/DOPPLER COMPLETE: CPT | Mod: 26

## 2023-10-17 RX ORDER — ASPIRIN/CALCIUM CARB/MAGNESIUM 324 MG
1 TABLET ORAL
Qty: 30 | Refills: 11
Start: 2023-10-17 | End: 2024-10-10

## 2023-10-17 RX ORDER — CLOPIDOGREL BISULFATE 75 MG/1
1 TABLET, FILM COATED ORAL
Qty: 30 | Refills: 11
Start: 2023-10-17 | End: 2024-10-10

## 2023-10-17 RX ORDER — POTASSIUM CHLORIDE 20 MEQ
10 PACKET (EA) ORAL ONCE
Refills: 0 | Status: COMPLETED | OUTPATIENT
Start: 2023-10-17 | End: 2023-10-17

## 2023-10-17 RX ORDER — MAGNESIUM OXIDE 400 MG ORAL TABLET 241.3 MG
800 TABLET ORAL ONCE
Refills: 0 | Status: COMPLETED | OUTPATIENT
Start: 2023-10-17 | End: 2023-10-17

## 2023-10-17 RX ADMIN — CLOPIDOGREL BISULFATE 75 MILLIGRAM(S): 75 TABLET, FILM COATED ORAL at 12:46

## 2023-10-17 RX ADMIN — PANTOPRAZOLE SODIUM 40 MILLIGRAM(S): 20 TABLET, DELAYED RELEASE ORAL at 06:07

## 2023-10-17 RX ADMIN — Medication 50 MILLIGRAM(S): at 06:07

## 2023-10-17 RX ADMIN — Medication 10 MILLIEQUIVALENT(S): at 12:46

## 2023-10-17 RX ADMIN — RANOLAZINE 500 MILLIGRAM(S): 500 TABLET, FILM COATED, EXTENDED RELEASE ORAL at 06:07

## 2023-10-17 RX ADMIN — Medication 4: at 12:49

## 2023-10-17 RX ADMIN — Medication 81 MILLIGRAM(S): at 12:46

## 2023-10-17 RX ADMIN — Medication 100 MILLIGRAM(S): at 12:49

## 2023-10-17 RX ADMIN — MAGNESIUM OXIDE 400 MG ORAL TABLET 800 MILLIGRAM(S): 241.3 TABLET ORAL at 12:46

## 2023-10-17 NOTE — DISCHARGE NOTE PROVIDER - NSDCFUSCHEDAPPT_GEN_ALL_CORE_FT
Momo Gamez Physician [facility]  [facility] [address]  Scheduled Appointment: 11/15/2023     Momo Gamez Physician [facility]  [facility]ART[facility] [address]  Scheduled Appointment: 10/23/2023

## 2023-10-17 NOTE — DISCHARGE NOTE PROVIDER - CARE PROVIDER_API CALL
Momo Gamez  Cardiology  [address] [place] [address]  Phone: [phone]  Fax: [phone]  Follow Up Time:    Momo Gamez  Cardiology  [address] [place] [address]  Phone: [phone]  Fax: [phone]  Established Patient  Follow Up Time: 1 week

## 2023-10-17 NOTE — DISCHARGE NOTE PROVIDER - PROVIDER TOKENS
PROVIDER:[TOKEN:[[id number]]] PROVIDER:[TOKEN:[[id number]],FOLLOWUP:[1 week],ESTABLISHEDPATIENT:[T]]

## 2023-10-17 NOTE — DISCHARGE NOTE PROVIDER - NSDCCPCAREPLAN_GEN_ALL_CORE_FT
PRINCIPAL DISCHARGE DIAGNOSIS  Diagnosis: Dyspnea  Assessment and Plan of Treatment: You presented to the hospital endorsing symptomsins concerning for coronary artery disease, indicating you may need another stent placed. You underwent a cardiac angiogram and received two (2) stents to your proximal left anterior descending artery (pLAD). PLEASE CONTINUE ASPIRIN 81MG DAILY AND PLAVIX 75MG DAILY. DO NOT STOP THESE MEDICATIONS FOR ANY REASON AS THEY ARE KEEPING YOUR STENT OPEN AND PREVENTING A HEART ATTACK. Avoid strenuous activity or heavy lifting for the next five days. Do not take a bath or swim for the next five days; you may shower. For any bleeding or hematoma formation (hardened blood collection under the skin) at the access site of RIGHT WRIST please hold pressure and go to the emergency room. Please follow up with Dr. Gamez in 1-2 weeks. For recurrent chest pain, please call your doctor or go to the emergency room.      SECONDARY DISCHARGE DIAGNOSES  Diagnosis: Encounter for cardiac rehabilitation  Assessment and Plan of Treatment: We have provided you with a prescription for cardiac rehab which is medically supervised exercise program for your heart and has been shown to improve the quantity and quality of life of people with heart disease like yours. You should attend cardiac rehab 3 times per week for 12 weeks. We have provided you with a list of nearby facilities. Please call your insurance carrier to determine which of these facilities are covered under your plan. Please bring this prescription with you to your follow up appointment with your cardiologist who can then further assist you to enroll into a cardiac rehab program.    Diagnosis: HTN (hypertension)  Assessment and Plan of Treatment: Please continue medications as listed to keep your blood pressure controlled. For blood pressure that is too high or too low please see your doctor or go to the emergency room as necessary.    Diagnosis: HLD (hyperlipidemia)  Assessment and Plan of Treatment: Please continue Rosuvastatin 40mg once daily and Zetia to keep your cholesterol low. High cholesterol contributes to heart disease.    Diagnosis: DM (diabetes mellitus)  Assessment and Plan of Treatment: Please continue medications as listed for diabetes. Maintain a low carbohydrate, low sugar diet. Check for your blood sugars regularly.   Please RESTART METFORMIN on THURSDAY 10/19/23.     PRINCIPAL DISCHARGE DIAGNOSIS  Diagnosis: Dyspnea  Assessment and Plan of Treatment: You presented to the hospital endorsing symptomsins concerning for coronary artery disease, indicating you may need another stent placed. You underwent a cardiac angiogram and received two (2) stents to your proximal left anterior descending artery (pLAD).   PLEASE CONTINUE ASPIRIN 81MG DAILY AND PLAVIX 75MG DAILY. DO NOT STOP THESE MEDICATIONS FOR ANY REASON AS THEY ARE KEEPING YOUR STENT OPEN AND PREVENTING A HEART ATTACK.   Avoid strenuous activity or heavy lifting for the next five days. Do not take a bath or swim for the next five days; you may shower. For any bleeding or hematoma formation (hardened blood collection under the skin) at the access site of RIGHT WRIST please hold pressure and go to the emergency room.   Please follow up with Dr. Gamez in 1-2 weeks. For recurrent chest pain, please call your doctor or go to the emergency room.      SECONDARY DISCHARGE DIAGNOSES  Diagnosis: Encounter for cardiac rehabilitation  Assessment and Plan of Treatment: We have provided you with a prescription for cardiac rehab which is medically supervised exercise program for your heart and has been shown to improve the quantity and quality of life of people with heart disease like yours. You should attend cardiac rehab 3 times per week for 12 weeks. We have provided you with a list of nearby facilities. Please call your insurance carrier to determine which of these facilities are covered under your plan. Please bring this prescription with you to your follow up appointment with your cardiologist who can then further assist you to enroll into a cardiac rehab program.    Diagnosis: HTN (hypertension)  Assessment and Plan of Treatment: Please continue medications as listed to keep your blood pressure controlled. For blood pressure that is too high or too low please see your doctor or go to the emergency room as necessary.    Diagnosis: HLD (hyperlipidemia)  Assessment and Plan of Treatment: Please continue Rosuvastatin 40mg once daily and Zetia to keep your cholesterol low. High cholesterol contributes to heart disease.    Diagnosis: DM (diabetes mellitus)  Assessment and Plan of Treatment: Please continue medications as listed for diabetes. Maintain a low carbohydrate, low sugar diet. Check for your blood sugars regularly.   Please RESTART METFORMIN on THURSDAY 10/19/23.

## 2023-10-17 NOTE — DISCHARGE NOTE NURSING/CASE MANAGEMENT/SOCIAL WORK - PATIENT PORTAL LINK FT
You can access the FollowMyHealth Patient Portal offered by [facility] by registering at the following website: [email or web address]. By joining [facility]’s FollowMyHealth portal, you will also be able to view your health information using other applications (apps) compatible with our system.

## 2023-10-17 NOTE — DISCHARGE NOTE PROVIDER - NSDCMRMEDTOKEN_GEN_ALL_CORE_FT
aspirin 81 mg oral delayed release tablet: 1 tab(s) orally once a day  chlorthalidone 25 mg oral tablet: 1 tab(s) orally once a day  clopidogrel 75 mg oral tablet: 1 tab(s) orally once a day  Crestor 40 mg oral tablet: 1 tab(s) orally once a day  diazePAM 5 mg oral tablet: 0.5 tab(s) orally once a day  hydrocodone-acetaminophen 7.5 mg-325 mg oral tablet: 1 tab(s) orally once a day  MetFORMIN (Eqv-Fortamet) 500 mg oral tablet, extended release: 2 tab(s) orally once a day in morning   metFORMIN 500 mg oral tablet: 1 tab(s) orally once a day (at bedtime)  metoprolol succinate 50 mg oral tablet, extended release: 1 orally once a day  omeprazole 40 mg oral delayed release capsule: 1 cap(s) orally once a day  Proventil HFA 90 mcg/inh inhalation aerosol: 2 puff(s) inhaled every 4 hours  Zetia 10 mg oral tablet: 1 tab(s) orally once a day   aspirin 81 mg oral delayed release tablet: 1 tab(s) orally once a day  Cardiac Rehab: You should attend cardiac rehab 3 times per week for 12 weeks. We have provided you with a list of nearby facilities. Please call your insurance carrier to determine which of these facilities are covered under your plan.  chlorthalidone 25 mg oral tablet: 1 tab(s) orally once a day  clopidogrel 75 mg oral tablet: 1 tab(s) orally once a day  Crestor 40 mg oral tablet: 1 tab(s) orally once a day  diazePAM 5 mg oral tablet: 0.5 tab(s) orally once a day  hydrocodone-acetaminophen 7.5 mg-325 mg oral tablet: 1 tab(s) orally once a day  MetFORMIN (Eqv-Fortamet) 500 mg oral tablet, extended release: 2 tab(s) orally once a day in morning   metFORMIN 500 mg oral tablet: 1 tab(s) orally once a day (at bedtime)  metoprolol succinate 50 mg oral tablet, extended release: 1 orally once a day  omeprazole 40 mg oral delayed release capsule: 1 cap(s) orally once a day  Proventil HFA 90 mcg/inh inhalation aerosol: 2 puff(s) inhaled every 4 hours  Zetia 10 mg oral tablet: 1 tab(s) orally once a day

## 2023-10-17 NOTE — DISCHARGE NOTE PROVIDER - HOSPITAL COURSE
Incomplete / in progress note   62 y/o female, former smoker, with FHx MI, PMHx of asthma/COPD, sciatica/herniated disc, HTN, HLD, DM2, obesity, mod-severe AS s/p bioAVR/ascending aorta replacement and CABG SVG-PDA 05/2017 @ [facility], s/p recent dx Cath 8/30/23 w/ Dr Barrios: s/p PTCA/DRAKE x 1 OM1 70% stenosis (FFR positive 0.76), pRCA 70% stenosis, mRCA 99% stenosis, RPDA-filled by patent SVG-RPDA graft, pLAD 40% (FFR negative 0.92) presents to [facility] reporting progressive dyspnea with minimal exertion when walking half a block and performing ADLs. Pt reports she has not felt improved symptoms since stent placement in August. In addition, she admits to exertional fatigue and chest pain intermittently with activity. Pt is compliant with DAPT therapy. Patient now s/p cardiac catheterization (10/16/23) w/ Dr. Phillips w/ DRAKE x2 pLAD, OM stent patient. ACCESS: Right radial artery.     Pt evaluated prior to discharge. VSS, labs unremarkable, no events on telemetry, and physical exam remains benign with right radial access site stable without hematoma/bleeding. Hospital course reviewed with attending cardiologist and patient deemed stable for discharge. Pt to follow up within 1-2 weeks of discharge with outpatient cardiologist, Dr. Gamez. DAPT: ASA/Plavix. STATIN: Rosuvastatin 40mg PO QD. CARDIAC REHAB prescription provided with patient on discharge. Discharge instructions reviewed with patient and prescriptions sent to patient's preferred pharmacy.      62 y/o female, former smoker, with FHx MI, PMHx of asthma/COPD, sciatica/herniated disc, HTN, HLD, DM2, obesity, mod-severe AS s/p bioAVR/ascending aorta replacement and CABG SVG-PDA 05/2017 @ [facility], s/p recent dx Cath 8/30/23 w/ Dr Barrios: s/p PTCA/DRAKE x 1 OM1 70% stenosis (FFR positive 0.76), pRCA 70% stenosis, mRCA 99% stenosis, RPDA-filled by patent SVG-RPDA graft, pLAD 40% (FFR negative 0.92) presents to [facility] reporting progressive dyspnea with minimal exertion when walking half a block and performing ADLs. Pt reports she has not felt improved symptoms since stent placement in August. In addition, she admits to exertional fatigue and chest pain intermittently with activity. Pt is compliant with DAPT therapy. Patient now s/p cardiac catheterization (10/16/23) w/ Dr. Phillips w/ DRAKE x2 pLAD, OM stent patient. ACCESS: Right radial artery.     TTE (10/17/23): EF 63%, normal LV/RF SF, bioprosthetic valve, no valvular disease, no pericardial effusion.     Pt evaluated prior to discharge. VSS, labs unremarkable, no events on telemetry, and physical exam remains benign with right radial access site stable without hematoma/bleeding. Hospital course reviewed with attending cardiologist and patient deemed stable for discharge. Pt to follow up within 1-2 weeks of discharge with outpatient cardiologist, Dr. Gamez. DAPT: ASA/Plavix. STATIN: Rosuvastatin 40mg PO QD. CARDIAC REHAB prescription provided with patient on discharge. Discharge instructions reviewed with patient and prescriptions sent to patient's preferred pharmacy.

## 2023-10-18 ENCOUNTER — NON-APPOINTMENT (OUTPATIENT)
Age: 63
End: 2023-10-18

## 2023-10-23 ENCOUNTER — APPOINTMENT (OUTPATIENT)
Dept: HEART AND VASCULAR | Facility: CLINIC | Age: 63
End: 2023-10-23
Payer: MEDICARE

## 2023-10-23 ENCOUNTER — NON-APPOINTMENT (OUTPATIENT)
Age: 63
End: 2023-10-23

## 2023-10-23 VITALS
HEIGHT: 63 IN | SYSTOLIC BLOOD PRESSURE: 110 MMHG | OXYGEN SATURATION: 98 % | DIASTOLIC BLOOD PRESSURE: 70 MMHG | HEART RATE: 96 BPM | BODY MASS INDEX: 32.43 KG/M2 | WEIGHT: 183 LBS

## 2023-10-23 DIAGNOSIS — Z23 ENCOUNTER FOR IMMUNIZATION: ICD-10-CM

## 2023-10-23 PROCEDURE — G0008: CPT

## 2023-10-23 PROCEDURE — 99496 TRANSJ CARE MGMT HIGH F2F 7D: CPT

## 2023-10-23 PROCEDURE — 90662 IIV NO PRSV INCREASED AG IM: CPT

## 2023-10-23 PROCEDURE — 93000 ELECTROCARDIOGRAM COMPLETE: CPT

## 2023-10-24 DIAGNOSIS — E11.9 TYPE 2 DIABETES MELLITUS WITHOUT COMPLICATIONS: ICD-10-CM

## 2023-10-24 DIAGNOSIS — Z87.891 PERSONAL HISTORY OF NICOTINE DEPENDENCE: ICD-10-CM

## 2023-10-24 DIAGNOSIS — E66.9 OBESITY, UNSPECIFIED: ICD-10-CM

## 2023-10-24 DIAGNOSIS — Z95.1 PRESENCE OF AORTOCORONARY BYPASS GRAFT: ICD-10-CM

## 2023-10-24 DIAGNOSIS — I25.10 ATHEROSCLEROTIC HEART DISEASE OF NATIVE CORONARY ARTERY WITHOUT ANGINA PECTORIS: ICD-10-CM

## 2023-10-24 DIAGNOSIS — F41.9 ANXIETY DISORDER, UNSPECIFIED: ICD-10-CM

## 2023-10-24 DIAGNOSIS — J44.9 CHRONIC OBSTRUCTIVE PULMONARY DISEASE, UNSPECIFIED: ICD-10-CM

## 2023-10-24 DIAGNOSIS — I10 ESSENTIAL (PRIMARY) HYPERTENSION: ICD-10-CM

## 2023-10-24 DIAGNOSIS — Z95.3 PRESENCE OF XENOGENIC HEART VALVE: ICD-10-CM

## 2023-10-24 DIAGNOSIS — Z82.49 FAMILY HISTORY OF ISCHEMIC HEART DISEASE AND OTHER DISEASES OF THE CIRCULATORY SYSTEM: ICD-10-CM

## 2023-10-24 DIAGNOSIS — E78.5 HYPERLIPIDEMIA, UNSPECIFIED: ICD-10-CM

## 2023-11-09 NOTE — ED ADULT NURSE NOTE - TEMPLATE
General Opioid Pregnancy And Lactation Text: These medications can lead to premature delivery and should be avoided during pregnancy. These medications are also present in breast milk in small amounts.

## 2023-11-13 PROCEDURE — 80053 COMPREHEN METABOLIC PANEL: CPT

## 2023-11-13 PROCEDURE — C1894: CPT

## 2023-11-13 PROCEDURE — C1725: CPT

## 2023-11-13 PROCEDURE — 99285 EMERGENCY DEPT VISIT HI MDM: CPT

## 2023-11-13 PROCEDURE — 82962 GLUCOSE BLOOD TEST: CPT

## 2023-11-13 PROCEDURE — C1769: CPT

## 2023-11-13 PROCEDURE — 94640 AIRWAY INHALATION TREATMENT: CPT

## 2023-11-13 PROCEDURE — 85379 FIBRIN DEGRADATION QUANT: CPT

## 2023-11-13 PROCEDURE — 85025 COMPLETE CBC W/AUTO DIFF WBC: CPT

## 2023-11-13 PROCEDURE — 85027 COMPLETE CBC AUTOMATED: CPT

## 2023-11-13 PROCEDURE — C1887: CPT

## 2023-11-13 PROCEDURE — C1874: CPT

## 2023-11-13 PROCEDURE — 36415 COLL VENOUS BLD VENIPUNCTURE: CPT

## 2023-11-13 PROCEDURE — 0225U NFCT DS DNA&RNA 21 SARSCOV2: CPT

## 2023-11-13 PROCEDURE — 83036 HEMOGLOBIN GLYCOSYLATED A1C: CPT

## 2023-11-13 PROCEDURE — 71045 X-RAY EXAM CHEST 1 VIEW: CPT

## 2023-11-13 PROCEDURE — 84484 ASSAY OF TROPONIN QUANT: CPT

## 2023-11-13 PROCEDURE — 96374 THER/PROPH/DIAG INJ IV PUSH: CPT

## 2023-11-13 PROCEDURE — 93005 ELECTROCARDIOGRAM TRACING: CPT

## 2023-11-13 PROCEDURE — 83735 ASSAY OF MAGNESIUM: CPT

## 2023-11-13 PROCEDURE — C8929: CPT

## 2023-11-13 PROCEDURE — 83880 ASSAY OF NATRIURETIC PEPTIDE: CPT

## 2023-11-13 PROCEDURE — 80048 BASIC METABOLIC PNL TOTAL CA: CPT

## 2023-11-13 PROCEDURE — 85730 THROMBOPLASTIN TIME PARTIAL: CPT

## 2023-11-13 PROCEDURE — 85610 PROTHROMBIN TIME: CPT

## 2023-11-13 PROCEDURE — 80061 LIPID PANEL: CPT

## 2023-11-27 ENCOUNTER — APPOINTMENT (OUTPATIENT)
Dept: HEART AND VASCULAR | Facility: CLINIC | Age: 63
End: 2023-11-27
Payer: MEDICARE

## 2023-11-27 ENCOUNTER — NON-APPOINTMENT (OUTPATIENT)
Age: 63
End: 2023-11-27

## 2023-11-27 VITALS
HEIGHT: 63 IN | WEIGHT: 190 LBS | DIASTOLIC BLOOD PRESSURE: 70 MMHG | TEMPERATURE: 98.3 F | HEART RATE: 84 BPM | BODY MASS INDEX: 33.66 KG/M2 | OXYGEN SATURATION: 95 % | SYSTOLIC BLOOD PRESSURE: 120 MMHG

## 2023-11-27 PROCEDURE — 93000 ELECTROCARDIOGRAM COMPLETE: CPT

## 2023-11-27 PROCEDURE — 99214 OFFICE O/P EST MOD 30 MIN: CPT | Mod: 25

## 2023-12-28 ENCOUNTER — INPATIENT (INPATIENT)
Facility: HOSPITAL | Age: 63
LOS: 0 days | Discharge: HOME | DRG: 316 | End: 2023-12-29
Attending: INTERNAL MEDICINE | Admitting: INTERNAL MEDICINE
Payer: MEDICARE

## 2023-12-28 VITALS
TEMPERATURE: 99 F | DIASTOLIC BLOOD PRESSURE: 79 MMHG | OXYGEN SATURATION: 100 % | WEIGHT: 179.9 LBS | HEART RATE: 81 BPM | SYSTOLIC BLOOD PRESSURE: 122 MMHG | RESPIRATION RATE: 18 BRPM

## 2023-12-28 DIAGNOSIS — Z95.2 PRESENCE OF PROSTHETIC HEART VALVE: Chronic | ICD-10-CM

## 2023-12-28 DIAGNOSIS — J44.9 CHRONIC OBSTRUCTIVE PULMONARY DISEASE, UNSPECIFIED: ICD-10-CM

## 2023-12-28 DIAGNOSIS — Z98.890 OTHER SPECIFIED POSTPROCEDURAL STATES: Chronic | ICD-10-CM

## 2023-12-28 DIAGNOSIS — I25.10 ATHEROSCLEROTIC HEART DISEASE OF NATIVE CORONARY ARTERY WITHOUT ANGINA PECTORIS: ICD-10-CM

## 2023-12-28 DIAGNOSIS — Z90.49 ACQUIRED ABSENCE OF OTHER SPECIFIED PARTS OF DIGESTIVE TRACT: Chronic | ICD-10-CM

## 2023-12-28 DIAGNOSIS — E11.9 TYPE 2 DIABETES MELLITUS WITHOUT COMPLICATIONS: ICD-10-CM

## 2023-12-28 DIAGNOSIS — E78.5 HYPERLIPIDEMIA, UNSPECIFIED: ICD-10-CM

## 2023-12-28 DIAGNOSIS — I10 ESSENTIAL (PRIMARY) HYPERTENSION: ICD-10-CM

## 2023-12-28 DIAGNOSIS — D25.9 LEIOMYOMA OF UTERUS, UNSPECIFIED: Chronic | ICD-10-CM

## 2023-12-28 DIAGNOSIS — F41.9 ANXIETY DISORDER, UNSPECIFIED: ICD-10-CM

## 2023-12-28 DIAGNOSIS — M54.9 DORSALGIA, UNSPECIFIED: ICD-10-CM

## 2023-12-28 DIAGNOSIS — G56.00 CARPAL TUNNEL SYNDROME, UNSPECIFIED UPPER LIMB: Chronic | ICD-10-CM

## 2023-12-28 LAB
ALBUMIN SERPL ELPH-MCNC: 4.5 G/DL — SIGNIFICANT CHANGE UP (ref 3.3–5)
ALBUMIN SERPL ELPH-MCNC: 4.5 G/DL — SIGNIFICANT CHANGE UP (ref 3.3–5)
ALP SERPL-CCNC: 41 U/L — SIGNIFICANT CHANGE UP (ref 40–120)
ALP SERPL-CCNC: 41 U/L — SIGNIFICANT CHANGE UP (ref 40–120)
ALT FLD-CCNC: 36 U/L — SIGNIFICANT CHANGE UP (ref 10–45)
ALT FLD-CCNC: 36 U/L — SIGNIFICANT CHANGE UP (ref 10–45)
ANION GAP SERPL CALC-SCNC: 14 MMOL/L — SIGNIFICANT CHANGE UP (ref 5–17)
ANION GAP SERPL CALC-SCNC: 14 MMOL/L — SIGNIFICANT CHANGE UP (ref 5–17)
APTT BLD: 32.3 SEC — SIGNIFICANT CHANGE UP (ref 24.5–35.6)
APTT BLD: 32.3 SEC — SIGNIFICANT CHANGE UP (ref 24.5–35.6)
AST SERPL-CCNC: 41 U/L — HIGH (ref 10–40)
AST SERPL-CCNC: 41 U/L — HIGH (ref 10–40)
BASOPHILS # BLD AUTO: 0.03 K/UL — SIGNIFICANT CHANGE UP (ref 0–0.2)
BASOPHILS # BLD AUTO: 0.03 K/UL — SIGNIFICANT CHANGE UP (ref 0–0.2)
BASOPHILS NFR BLD AUTO: 0.3 % — SIGNIFICANT CHANGE UP (ref 0–2)
BASOPHILS NFR BLD AUTO: 0.3 % — SIGNIFICANT CHANGE UP (ref 0–2)
BILIRUB SERPL-MCNC: 0.2 MG/DL — SIGNIFICANT CHANGE UP (ref 0.2–1.2)
BILIRUB SERPL-MCNC: 0.2 MG/DL — SIGNIFICANT CHANGE UP (ref 0.2–1.2)
BUN SERPL-MCNC: 14 MG/DL — SIGNIFICANT CHANGE UP (ref 7–23)
BUN SERPL-MCNC: 14 MG/DL — SIGNIFICANT CHANGE UP (ref 7–23)
CALCIUM SERPL-MCNC: 10.4 MG/DL — SIGNIFICANT CHANGE UP (ref 8.4–10.5)
CALCIUM SERPL-MCNC: 10.4 MG/DL — SIGNIFICANT CHANGE UP (ref 8.4–10.5)
CHLORIDE SERPL-SCNC: 98 MMOL/L — SIGNIFICANT CHANGE UP (ref 96–108)
CHLORIDE SERPL-SCNC: 98 MMOL/L — SIGNIFICANT CHANGE UP (ref 96–108)
CK MB CFR SERPL CALC: 2.1 NG/ML — SIGNIFICANT CHANGE UP (ref 0–6.7)
CK MB CFR SERPL CALC: 2.1 NG/ML — SIGNIFICANT CHANGE UP (ref 0–6.7)
CK SERPL-CCNC: 68 U/L — SIGNIFICANT CHANGE UP (ref 25–170)
CK SERPL-CCNC: 68 U/L — SIGNIFICANT CHANGE UP (ref 25–170)
CO2 SERPL-SCNC: 27 MMOL/L — SIGNIFICANT CHANGE UP (ref 22–31)
CO2 SERPL-SCNC: 27 MMOL/L — SIGNIFICANT CHANGE UP (ref 22–31)
CREAT SERPL-MCNC: 0.77 MG/DL — SIGNIFICANT CHANGE UP (ref 0.5–1.3)
CREAT SERPL-MCNC: 0.77 MG/DL — SIGNIFICANT CHANGE UP (ref 0.5–1.3)
CRP SERPL-MCNC: <3 MG/L — SIGNIFICANT CHANGE UP (ref 0–4)
CRP SERPL-MCNC: <3 MG/L — SIGNIFICANT CHANGE UP (ref 0–4)
D DIMER BLD IA.RAPID-MCNC: 279 NG/ML DDU — HIGH
D DIMER BLD IA.RAPID-MCNC: 279 NG/ML DDU — HIGH
EGFR: 86 ML/MIN/1.73M2 — SIGNIFICANT CHANGE UP
EGFR: 86 ML/MIN/1.73M2 — SIGNIFICANT CHANGE UP
EOSINOPHIL # BLD AUTO: 0.13 K/UL — SIGNIFICANT CHANGE UP (ref 0–0.5)
EOSINOPHIL # BLD AUTO: 0.13 K/UL — SIGNIFICANT CHANGE UP (ref 0–0.5)
EOSINOPHIL NFR BLD AUTO: 1.5 % — SIGNIFICANT CHANGE UP (ref 0–6)
EOSINOPHIL NFR BLD AUTO: 1.5 % — SIGNIFICANT CHANGE UP (ref 0–6)
ERYTHROCYTE [SEDIMENTATION RATE] IN BLOOD: 25 MM/HR — HIGH
ERYTHROCYTE [SEDIMENTATION RATE] IN BLOOD: 25 MM/HR — HIGH
GLUCOSE BLDC GLUCOMTR-MCNC: 111 MG/DL — HIGH (ref 70–99)
GLUCOSE BLDC GLUCOMTR-MCNC: 111 MG/DL — HIGH (ref 70–99)
GLUCOSE SERPL-MCNC: 126 MG/DL — HIGH (ref 70–99)
GLUCOSE SERPL-MCNC: 126 MG/DL — HIGH (ref 70–99)
HCT VFR BLD CALC: 43.8 % — SIGNIFICANT CHANGE UP (ref 34.5–45)
HCT VFR BLD CALC: 43.8 % — SIGNIFICANT CHANGE UP (ref 34.5–45)
HGB BLD-MCNC: 13.6 G/DL — SIGNIFICANT CHANGE UP (ref 11.5–15.5)
HGB BLD-MCNC: 13.6 G/DL — SIGNIFICANT CHANGE UP (ref 11.5–15.5)
IMM GRANULOCYTES NFR BLD AUTO: 0.9 % — SIGNIFICANT CHANGE UP (ref 0–0.9)
IMM GRANULOCYTES NFR BLD AUTO: 0.9 % — SIGNIFICANT CHANGE UP (ref 0–0.9)
INR BLD: 1.02 — SIGNIFICANT CHANGE UP (ref 0.85–1.18)
INR BLD: 1.02 — SIGNIFICANT CHANGE UP (ref 0.85–1.18)
LYMPHOCYTES # BLD AUTO: 2.21 K/UL — SIGNIFICANT CHANGE UP (ref 1–3.3)
LYMPHOCYTES # BLD AUTO: 2.21 K/UL — SIGNIFICANT CHANGE UP (ref 1–3.3)
LYMPHOCYTES # BLD AUTO: 24.7 % — SIGNIFICANT CHANGE UP (ref 13–44)
LYMPHOCYTES # BLD AUTO: 24.7 % — SIGNIFICANT CHANGE UP (ref 13–44)
MAGNESIUM SERPL-MCNC: 1.6 MG/DL — SIGNIFICANT CHANGE UP (ref 1.6–2.6)
MAGNESIUM SERPL-MCNC: 1.6 MG/DL — SIGNIFICANT CHANGE UP (ref 1.6–2.6)
MCHC RBC-ENTMCNC: 25 PG — LOW (ref 27–34)
MCHC RBC-ENTMCNC: 25 PG — LOW (ref 27–34)
MCHC RBC-ENTMCNC: 31.1 GM/DL — LOW (ref 32–36)
MCHC RBC-ENTMCNC: 31.1 GM/DL — LOW (ref 32–36)
MCV RBC AUTO: 80.4 FL — SIGNIFICANT CHANGE UP (ref 80–100)
MCV RBC AUTO: 80.4 FL — SIGNIFICANT CHANGE UP (ref 80–100)
MONOCYTES # BLD AUTO: 0.73 K/UL — SIGNIFICANT CHANGE UP (ref 0–0.9)
MONOCYTES # BLD AUTO: 0.73 K/UL — SIGNIFICANT CHANGE UP (ref 0–0.9)
MONOCYTES NFR BLD AUTO: 8.2 % — SIGNIFICANT CHANGE UP (ref 2–14)
MONOCYTES NFR BLD AUTO: 8.2 % — SIGNIFICANT CHANGE UP (ref 2–14)
NEUTROPHILS # BLD AUTO: 5.75 K/UL — SIGNIFICANT CHANGE UP (ref 1.8–7.4)
NEUTROPHILS # BLD AUTO: 5.75 K/UL — SIGNIFICANT CHANGE UP (ref 1.8–7.4)
NEUTROPHILS NFR BLD AUTO: 64.4 % — SIGNIFICANT CHANGE UP (ref 43–77)
NEUTROPHILS NFR BLD AUTO: 64.4 % — SIGNIFICANT CHANGE UP (ref 43–77)
NRBC # BLD: 0 /100 WBCS — SIGNIFICANT CHANGE UP (ref 0–0)
NRBC # BLD: 0 /100 WBCS — SIGNIFICANT CHANGE UP (ref 0–0)
NT-PROBNP SERPL-SCNC: 130 PG/ML — SIGNIFICANT CHANGE UP (ref 0–300)
NT-PROBNP SERPL-SCNC: 130 PG/ML — SIGNIFICANT CHANGE UP (ref 0–300)
PHOSPHATE SERPL-MCNC: 3.2 MG/DL — SIGNIFICANT CHANGE UP (ref 2.5–4.5)
PHOSPHATE SERPL-MCNC: 3.2 MG/DL — SIGNIFICANT CHANGE UP (ref 2.5–4.5)
PLATELET # BLD AUTO: 332 K/UL — SIGNIFICANT CHANGE UP (ref 150–400)
PLATELET # BLD AUTO: 332 K/UL — SIGNIFICANT CHANGE UP (ref 150–400)
POTASSIUM SERPL-MCNC: 3.7 MMOL/L — SIGNIFICANT CHANGE UP (ref 3.5–5.3)
POTASSIUM SERPL-MCNC: 3.7 MMOL/L — SIGNIFICANT CHANGE UP (ref 3.5–5.3)
POTASSIUM SERPL-SCNC: 3.7 MMOL/L — SIGNIFICANT CHANGE UP (ref 3.5–5.3)
POTASSIUM SERPL-SCNC: 3.7 MMOL/L — SIGNIFICANT CHANGE UP (ref 3.5–5.3)
PROT SERPL-MCNC: 8 G/DL — SIGNIFICANT CHANGE UP (ref 6–8.3)
PROT SERPL-MCNC: 8 G/DL — SIGNIFICANT CHANGE UP (ref 6–8.3)
PROTHROM AB SERPL-ACNC: 11.6 SEC — SIGNIFICANT CHANGE UP (ref 9.5–13)
PROTHROM AB SERPL-ACNC: 11.6 SEC — SIGNIFICANT CHANGE UP (ref 9.5–13)
RBC # BLD: 5.45 M/UL — HIGH (ref 3.8–5.2)
RBC # BLD: 5.45 M/UL — HIGH (ref 3.8–5.2)
RBC # FLD: 13.7 % — SIGNIFICANT CHANGE UP (ref 10.3–14.5)
RBC # FLD: 13.7 % — SIGNIFICANT CHANGE UP (ref 10.3–14.5)
SODIUM SERPL-SCNC: 139 MMOL/L — SIGNIFICANT CHANGE UP (ref 135–145)
SODIUM SERPL-SCNC: 139 MMOL/L — SIGNIFICANT CHANGE UP (ref 135–145)
TROPONIN T, HIGH SENSITIVITY RESULT: 14 NG/L — SIGNIFICANT CHANGE UP (ref 0–51)
TROPONIN T, HIGH SENSITIVITY RESULT: 14 NG/L — SIGNIFICANT CHANGE UP (ref 0–51)
TROPONIN T, HIGH SENSITIVITY RESULT: 15 NG/L — SIGNIFICANT CHANGE UP (ref 0–51)
TROPONIN T, HIGH SENSITIVITY RESULT: 15 NG/L — SIGNIFICANT CHANGE UP (ref 0–51)
WBC # BLD: 8.93 K/UL — SIGNIFICANT CHANGE UP (ref 3.8–10.5)
WBC # BLD: 8.93 K/UL — SIGNIFICANT CHANGE UP (ref 3.8–10.5)
WBC # FLD AUTO: 8.93 K/UL — SIGNIFICANT CHANGE UP (ref 3.8–10.5)
WBC # FLD AUTO: 8.93 K/UL — SIGNIFICANT CHANGE UP (ref 3.8–10.5)

## 2023-12-28 PROCEDURE — 71045 X-RAY EXAM CHEST 1 VIEW: CPT | Mod: 26

## 2023-12-28 PROCEDURE — 93010 ELECTROCARDIOGRAM REPORT: CPT

## 2023-12-28 PROCEDURE — 99223 1ST HOSP IP/OBS HIGH 75: CPT

## 2023-12-28 PROCEDURE — 99222 1ST HOSP IP/OBS MODERATE 55: CPT

## 2023-12-28 PROCEDURE — 99238 HOSP IP/OBS DSCHRG MGMT 30/<: CPT

## 2023-12-28 PROCEDURE — 99285 EMERGENCY DEPT VISIT HI MDM: CPT

## 2023-12-28 RX ORDER — POTASSIUM CHLORIDE 20 MEQ
40 PACKET (EA) ORAL ONCE
Refills: 0 | Status: COMPLETED | OUTPATIENT
Start: 2023-12-28 | End: 2023-12-28

## 2023-12-28 RX ORDER — DEXTROSE 50 % IN WATER 50 %
25 SYRINGE (ML) INTRAVENOUS ONCE
Refills: 0 | Status: DISCONTINUED | OUTPATIENT
Start: 2023-12-28 | End: 2023-12-29

## 2023-12-28 RX ORDER — DEXTROSE 50 % IN WATER 50 %
15 SYRINGE (ML) INTRAVENOUS ONCE
Refills: 0 | Status: DISCONTINUED | OUTPATIENT
Start: 2023-12-28 | End: 2023-12-29

## 2023-12-28 RX ORDER — INSULIN LISPRO 100/ML
VIAL (ML) SUBCUTANEOUS
Refills: 0 | Status: DISCONTINUED | OUTPATIENT
Start: 2023-12-28 | End: 2023-12-29

## 2023-12-28 RX ORDER — METOPROLOL TARTRATE 50 MG
1 TABLET ORAL
Refills: 0 | DISCHARGE

## 2023-12-28 RX ORDER — EZETIMIBE 10 MG/1
1 TABLET ORAL
Qty: 0 | Refills: 0 | DISCHARGE

## 2023-12-28 RX ORDER — PANTOPRAZOLE SODIUM 20 MG/1
40 TABLET, DELAYED RELEASE ORAL
Refills: 0 | Status: DISCONTINUED | OUTPATIENT
Start: 2023-12-28 | End: 2023-12-29

## 2023-12-28 RX ORDER — CLOPIDOGREL BISULFATE 75 MG/1
75 TABLET, FILM COATED ORAL DAILY
Refills: 0 | Status: DISCONTINUED | OUTPATIENT
Start: 2023-12-29 | End: 2023-12-29

## 2023-12-28 RX ORDER — DEXTROSE 50 % IN WATER 50 %
12.5 SYRINGE (ML) INTRAVENOUS ONCE
Refills: 0 | Status: DISCONTINUED | OUTPATIENT
Start: 2023-12-28 | End: 2023-12-29

## 2023-12-28 RX ORDER — ASPIRIN/CALCIUM CARB/MAGNESIUM 324 MG
81 TABLET ORAL DAILY
Refills: 0 | Status: DISCONTINUED | OUTPATIENT
Start: 2023-12-29 | End: 2023-12-29

## 2023-12-28 RX ORDER — DIAZEPAM 5 MG
2 TABLET ORAL DAILY
Refills: 0 | Status: DISCONTINUED | OUTPATIENT
Start: 2023-12-29 | End: 2023-12-29

## 2023-12-28 RX ORDER — ALBUTEROL 90 UG/1
2 AEROSOL, METERED ORAL EVERY 6 HOURS
Refills: 0 | Status: DISCONTINUED | OUTPATIENT
Start: 2023-12-28 | End: 2023-12-29

## 2023-12-28 RX ORDER — MAGNESIUM SULFATE 500 MG/ML
2 VIAL (ML) INJECTION ONCE
Refills: 0 | Status: COMPLETED | OUTPATIENT
Start: 2023-12-28 | End: 2023-12-28

## 2023-12-28 RX ORDER — SODIUM CHLORIDE 9 MG/ML
1000 INJECTION, SOLUTION INTRAVENOUS
Refills: 0 | Status: DISCONTINUED | OUTPATIENT
Start: 2023-12-28 | End: 2023-12-29

## 2023-12-28 RX ORDER — ATORVASTATIN CALCIUM 80 MG/1
80 TABLET, FILM COATED ORAL AT BEDTIME
Refills: 0 | Status: DISCONTINUED | OUTPATIENT
Start: 2023-12-28 | End: 2023-12-29

## 2023-12-28 RX ORDER — METFORMIN HYDROCHLORIDE 850 MG/1
2 TABLET ORAL
Qty: 0 | Refills: 0 | DISCHARGE

## 2023-12-28 RX ORDER — GLUCAGON INJECTION, SOLUTION 0.5 MG/.1ML
1 INJECTION, SOLUTION SUBCUTANEOUS ONCE
Refills: 0 | Status: DISCONTINUED | OUTPATIENT
Start: 2023-12-28 | End: 2023-12-29

## 2023-12-28 RX ORDER — CHLORTHALIDONE 50 MG
25 TABLET ORAL DAILY
Refills: 0 | Status: DISCONTINUED | OUTPATIENT
Start: 2023-12-28 | End: 2023-12-29

## 2023-12-28 RX ORDER — METOPROLOL TARTRATE 50 MG
50 TABLET ORAL DAILY
Refills: 0 | Status: DISCONTINUED | OUTPATIENT
Start: 2023-12-28 | End: 2023-12-29

## 2023-12-28 RX ORDER — DIAZEPAM 5 MG
0.5 TABLET ORAL
Qty: 0 | Refills: 0 | DISCHARGE

## 2023-12-28 RX ADMIN — Medication 25 GRAM(S): at 22:47

## 2023-12-28 RX ADMIN — ATORVASTATIN CALCIUM 80 MILLIGRAM(S): 80 TABLET, FILM COATED ORAL at 22:25

## 2023-12-28 RX ADMIN — Medication 40 MILLIEQUIVALENT(S): at 20:02

## 2023-12-28 NOTE — H&P ADULT - NSHPADDITIONALINFOADULT_GEN_ALL_CORE
Attending Attestation:  I was physically present for the key portions of the evaluation and management (E/M) service provided.  I agree with the above history, physical, and plan which I have reviewed with the following edits/addendum:    High suspicion for pericarditis - treat with colchicine for 3 months (normal renal function). Follow up closely with Dr Bird.    Long Aguirre MD  Cardiology

## 2023-12-28 NOTE — ED ADULT NURSE NOTE - ISAR MEMORY
[address] [facility] [address]                              CARDIAC STRESS TEST    PATIENT NAME: Carlota Acosta                   :        1971  MED REC NO:   [id number]                             ROOM:       0535  ACCOUNT NO:   [de-identified]                           ADMIT DATE: 2023  PROVIDER:     Henry Ravi    DATE OF STUDY:  06/10/2023  Ordering Provider: Mamadou Malone MD    Primary Care Provider: Gaby Marshall MD    Interpreting Physician: Rafal Flynn md  Lexiscan 06/10/2023 13:30:27     MT/ISABELLA  Job#: [id number]     Doc#: Unknown    CC: No

## 2023-12-28 NOTE — ED PROVIDER NOTE - OBJECTIVE STATEMENT
63 year old female with history of asthma/COPD (no home O2), sciatica/herniated disc, HTN, HLD, DM2, obesity, mod-severe AS s/p bioAVR/ascending aorta replacement and CAD s/p CABG in 2017 and recent stent placement x 2 (last cath October) presenting with chest pain. 63 year old female with history of asthma/COPD (no home O2), sciatica/herniated disc, HTN, HLD, DM2, obesity, mod-severe AS s/p bioAVR/ascending aorta replacement and CAD s/p CABG in 2017 and recent stent placement x 2 (last cath October) presenting with chest pain. States having substernal chest pain since this AM associated with palpitations, anxiety, and intermittent sob, currently pain mild. No fevers, chills, cough, congestion, sore throat, n/v/d. Has been adherent to DAPT regimen.

## 2023-12-28 NOTE — ED ADULT NURSE NOTE - OBJECTIVE STATEMENT
63y F pmHx asthma, COPD (not on home O2), cardiac stent placement x3 (last placement october 2023), presents to ED c/o midsternal CP, palpitations,  intermittent SOB since this AM. Endorses midsternal nonradiating CP a/w palpitations, intermittent SOB. Denies N/V, dizziness, current SOB, syncope, abdominal pain, cough/congestion, fever/chills. Pt reports compliance with AC use. Pt AAOx4, speaking in full and clear sentences, NAD at this time. MD Dc at bedside. Continuous cardiac/pulse oximetry monitoring initiated.

## 2023-12-28 NOTE — H&P ADULT - HISTORY OF PRESENT ILLNESS
64 yo female, former smoker, with FHx MI, PMHx of asthma/COPD, sciatica/herniated disc, HTN, HLD, DM2, obesity, mod-severe AS s/p bioAVR/ascending aorta replacement and CABG SVG-PDA 05/2017 @ [facility] and subsequent PCIs (most recent 10/16/23 DESx2 pLAD) who presents to [facility] ED complaining of SSCP onset this morning after awaking associated with palpitations, anxiety, and intermittent SOB. SHe denies any associated fevers, chills, cough, congestion, fever,chills, n/v/d. Pt is compliant with DAPT therapy since prior stent.     Vitals in the ED: 122/79 mmHg, T 98.7 F, HR 81 bpm, RR 18, SpO2 97% on RA.   Labs significant for: K 3.7, Trop T 14, otherwise unremarkable.  EKG NSR 79, LAD, TWI aVL, V1, V2. CXR clear.     Pt now admitted to cardiac tele for further management of unstable angina.    64 yo female, former smoker, with FHx MI, PMHx of asthma/COPD, sciatica/herniated disc, HTN, HLD, DM2, obesity, mod-severe AS s/p bioAVR/ascending aorta replacement and CABG SVG-PDA 05/2017 @ [facility] and subsequent PCIs (most recent 10/16/23 DESx2 pLAD) who presents to [facility] ED complaining of SSCP onset this morning after awaking associated with palpitations, anxiety, and intermittent SOB. SHe denies any associated fevers, chills, cough, congestion, fever,chills, n/v/d. Pt is compliant with DAPT therapy since prior stent.     Vitals in the ED: 122/79 mmHg, T 98.7 F, HR 81 bpm, RR 18, SpO2 97% on RA.   Labs significant for: K 3.7, Trop T 14, otherwise unremarkable.  EKG NSR 79, LAD, TWI aVL, V1, V2. CXR clear.     Pt now admitted to cardiac tele for further management of unstable angina.    62 yo female, former smoker, with FHx MI, PMHx of asthma/COPD, sciatica/herniated disc, HTN, HLD, DM2, obesity, mod-severe AS s/p bioAVR/ascending aorta replacement and CABG SVG-PDA 05/2017 @ [facility] and subsequent PCIs (most recent 10/16/23 DESx2 pLAD) who presents to [facility] ED complaining of SSCP onset this morning after awaking associated with palpitations, anxiety, and intermittent SOB. She denies any associated fevers, chills, cough, congestion, fever, chills, n/v/d. Pt is compliant with DAPT therapy since prior stent.     Vitals in the ED: 122/79 mmHg, T 98.7 F, HR 81 bpm, RR 18, SpO2 97% on RA.   Labs significant for: K 3.7, Trop T 14, otherwise unremarkable.  EKG NSR 79, LAD, TWI aVL, V1, V2. CXR clear.     Pt now admitted to cardiac tele for further management of unstable angina.    64 yo female, former smoker, with FHx MI, PMHx of asthma/COPD, sciatica/herniated disc, HTN, HLD, DM2, obesity, mod-severe AS s/p bioAVR/ascending aorta replacement and CABG SVG-PDA 05/2017 @ [facility] and subsequent PCIs (most recent 10/16/23 DESx2 pLAD) who presents to [facility] ED complaining of SSCP onset this morning after awaking associated with palpitations, anxiety, and intermittent SOB. She denies any associated fevers, chills, cough, congestion, fever, chills, n/v/d. Pt is compliant with DAPT therapy since prior stent.     Vitals in the ED: 122/79 mmHg, T 98.7 F, HR 81 bpm, RR 18, SpO2 97% on RA.   Labs significant for: K 3.7, Trop T 14, otherwise unremarkable.  EKG NSR 79, LAD, TWI aVL, V1, V2. CXR clear.     Pt now admitted to cardiac tele for further management of unstable angina.    62 yo female, former smoker, with FHx MI, PMHx of asthma/COPD, sciatica/herniated disc, HTN, HLD, DM2, obesity, mod-severe AS s/p bioAVR/ascending aorta replacement and CABG SVG-PDA 05/2017 @ [facility] and subsequent PCIs (most recent 10/16/23 DESx2 pLAD) who presents to [facility] ED complaining of an episode of sharp substernal chest pain onset this morning lasting for a few hours until resolving on its own associated with palpitations, anxiety, and some SOB. She states that before onset of her pain she felt short of breath similar to her COPD sx. Currently, she states she has mild chest pain upon taking a deep breath but otherwise remains chest pain free. She adds that this is the first episode of pain since her prior stent placement, dissimilar to the pain she experienced before her most recent stent which was at that time described as a pressure sensation. She further denies any associated fevers, chills, cough, congestion, fever, chills, n/v/d. Pt is compliant with DAPT therapy since prior stent.     Vitals in the ED: 122/79 mmHg, T 98.7 F, HR 81 bpm, RR 18, SpO2 97% on RA.   Labs significant for: K 3.7, Trop T 14, otherwise unremarkable.  EKG NSR 79, LAD, TWI aVL, V1, V2. CXR clear.     Pt now admitted to cardiac tele for further management of chest pain.  62 yo female, former smoker, with FHx MI, PMHx of asthma/COPD, sciatica/herniated disc, HTN, HLD, DM2, obesity, mod-severe AS s/p bioAVR/ascending aorta replacement and CABG SVG-PDA 05/2017 @ [facility] and subsequent PCIs (most recent 10/16/23 DESx2 pLAD) who presents to [facility] ED complaining of an episode of sharp substernal chest pain onset this morning lasting for a few hours until resolving on its own associated with palpitations, anxiety, and some SOB. She states that before onset of her pain she felt short of breath similar to her COPD sx. Currently, she states she has mild chest pain upon taking a deep breath but otherwise remains chest pain free. She adds that this is the first episode of pain since her prior stent placement, dissimilar to the pain she experienced before her most recent stent which was at that time described as a pressure sensation. She further denies any associated fevers, chills, cough, congestion, fever, chills, n/v/d. Pt is compliant with DAPT therapy since prior stent.     Vitals in the ED: 122/79 mmHg, T 98.7 F, HR 81 bpm, RR 18, SpO2 97% on RA.   Labs significant for: K 3.7, Trop T 14, otherwise unremarkable.  EKG NSR 79, LAD, TWI aVL, V1, V2. CXR clear.     Pt now admitted to cardiac tele for further management of chest pain.  62 yo female, former smoker, with FHx MI, PMHx of asthma/COPD, sciatica/herniated disc, HTN, HLD, DM2, obesity, mod-severe AS s/p bioAVR/ascending aorta replacement and CABG SVG-PDA 05/2017 @ [facility] and subsequent PCIs (most recent 10/16/23 DESx2 pLAD) who presents to [facility] ED complaining of an episode of sharp substernal chest pain onset this morning lasting for a few hours until resolving on its own associated with palpitations, anxiety, and some SOB. She states that before onset of her pain she felt short of breath similar to her COPD sx and she took a diazepam for anxiety sx. Currently, she states she has mild chest pain upon taking a deep breath but otherwise remains chest pain free. She adds that this is the first episode of pain since her prior stent placement, dissimilar to the pain she experienced before her most recent stent which was at that time described as a pressure sensation. She further denies any associated CARRILLO/PND, LE edema, orthopnea, fevers, chills, cough, congestion, fever, chills, n/v/d. Pt is compliant with DAPT therapy since prior stent.     Vitals in the ED: 122/79 mmHg, T 98.7 F, HR 81 bpm, RR 18, SpO2 97% on RA.   Labs significant for: K 3.7, Mg 1.6, Trop T 14, CK/CKMB neg, otherwise unremarkable.  EKG NSR 79, LAD, TWI aVL, V1, V2. CXR clear.     Pt now admitted to cardiac tele for further management of chest pain.  64 yo female, former smoker, with FHx MI, PMHx of asthma/COPD, sciatica/herniated disc, HTN, HLD, DM2, obesity, mod-severe AS s/p bioAVR/ascending aorta replacement and CABG SVG-PDA 05/2017 @ [facility] and subsequent PCIs (most recent 10/16/23 DESx2 pLAD) who presents to [facility] ED complaining of an episode of sharp substernal chest pain onset this morning lasting for a few hours until resolving on its own associated with palpitations, anxiety, and some SOB. She states that before onset of her pain she felt short of breath similar to her COPD sx and she took a diazepam for anxiety sx. Currently, she states she has mild chest pain upon taking a deep breath but otherwise remains chest pain free. She adds that this is the first episode of pain since her prior stent placement, dissimilar to the pain she experienced before her most recent stent which was at that time described as a pressure sensation. She further denies any associated CARRILLO/PND, LE edema, orthopnea, fevers, chills, cough, congestion, fever, chills, n/v/d. Pt is compliant with DAPT therapy since prior stent.     Vitals in the ED: 122/79 mmHg, T 98.7 F, HR 81 bpm, RR 18, SpO2 97% on RA.   Labs significant for: K 3.7, Mg 1.6, Trop T 14, CK/CKMB neg, otherwise unremarkable.  EKG NSR 79, LAD, TWI aVL, V1, V2. CXR clear.     Pt now admitted to cardiac tele for further management of chest pain.

## 2023-12-28 NOTE — PATIENT PROFILE ADULT - FALL HARM RISK - UNIVERSAL INTERVENTIONS
Bed in lowest position, wheels locked, appropriate side rails in place/Call bell, personal items and telephone in reach/Instruct patient to call for assistance before getting out of bed or chair/Non-slip footwear when patient is out of bed/[place] to call system/Physically safe environment - no spills, clutter or unnecessary equipment/Purposeful Proactive Rounding/Room/bathroom lighting operational, light cord in reach Bed in lowest position, wheels locked, appropriate side rails in place/Call bell, personal items and telephone in reach/Instruct patient to call for assistance before getting out of bed or chair/Non-slip footwear when patient is out of bed/[place] to call system/Physically safe environment - no spills, clutter or unnecessary equipment/Purposeful Proactive Rounding/Room/bathroom lighting operational, light cord in reach

## 2023-12-28 NOTE — ED PROVIDER NOTE - PHYSICAL EXAMINATION
Gen - NAD; well-appearing; A+Ox3   HEENT - NCAT, EOMI, moist mucous membranes  Neck - supple  Resp - CTAB, no increased WOB/tachypneia  CV -  RRR, no m/r/g  Abd - soft, NT, ND; no guarding or rebound  MSK - FROM of b/l UE and LE, no gross deformities  Extrem - no LE edema/erythema/tenderness  Neuro - no focal motor or sensation deficits  Skin - warm, well perfused

## 2023-12-28 NOTE — H&P ADULT - ASSESSMENT
62 yo female, former smoker, with FHx MI, PMHx of asthma/COPD, sciatica/herniated disc, HTN, HLD, DM2, obesity, anxiety, AS s/p bioAVR/ascending aorta replacement and CABG SVG-PDA 05/2017 @ [facility] w subsequent PCIs (most recent 10/16/23 DESx2 pLAD) who presents to [facility] ED after an episode of sharp SSBP onset this morning lasting for a few hours until resolving on its own associated with palpitations, anxiety, and mild SOB. Pt now admitted to cardiac tele for further management of chest pain.    62 yo female, former smoker, with FHx MI, PMHx of asthma/COPD, sciatica/herniated disc, HTN, HLD, DM2, obesity, anxiety, AS s/p bioAVR/ascending aorta replacement and CABG SVG-PDA 05/2017 @ [facility] w subsequent PCIs (most recent 10/16/23 DESx2 pLAD) who presents to [facility] ED after an episode of sharp SSBP onset this morning lasting for a few hours until resolving on its own associated with palpitations, anxiety, and mild SOB. Pt now admitted to cardiac tele for further management of chest pain.

## 2023-12-28 NOTE — ED PROVIDER NOTE - TOBACCO USE
[FreeTextEntry1] : The previous encounters have been reviewed and there are no significant changes other those those items which were stated above. The patient's current medical status is stable for the procedure(s) to be performed today.\par  Former smoker

## 2023-12-28 NOTE — ED ADULT NURSE NOTE - CHPI ED NUR SYMPTOMS POS
aerobic capacity/endurance/gait, locomotion, and balance/muscle strength CHEST PAIN/PALPITATIONS/SHORTNESS OF BREATH

## 2023-12-28 NOTE — ED ADULT NURSE NOTE - NS ED NURSE LEVEL OF CONSCIOUSNESS MENTAL STATUS
---------------------  From: Roma Boswell CMA (Phone Messages Pool ([id number]_[facility])   To: Ashli Welsh PA-C;     Sent: 10/14/2019 2:03:17 PM CDT  Subject: update     Pt's mom LM at 1351 wanting to give BAM update since she saw pt 2x this weekend. They went to [place]'s ED and peritonsillar I&D done along with IV fluids. Pt d/c'd with steroids and clindamycin. Pt doing much better and she wanted to thank you for the care you gave her daughter.    If any other concerns, can call mom (no name left) at [phone].---------------------  From: Ashli Welsh PA-C   To: Phone Messages Pool ([id number]_[place] [facility] [place]);     Sent: 10/14/2019 2:26:19 PM CDT  Subject: RE: update     Noted Thank you!   Awake/Alert

## 2023-12-28 NOTE — H&P ADULT - PROBLEM SELECTOR PLAN 1
P/w SSCP associated w palpitations. Hx of bioAVR/ascending aorta replacement, CABG SVG-PDA, and recent DESx2 pLAD 10/2023  - Currently CP free. HD stable.   - EKG NSR 79, LAD, TWI aVL, V1, V2. CXR clear.   - Cardiac cath 10/16/23: DRAKE x2 pLAD, OM stent patient, patent SVG-RPDA graft  - TTE 10/17/23: EF 63%, normal LV/RV. Bioprosthetic AV, no other valve disease, no pericardial effusion.  - Continue ASA/Plavix/Statin/BB  - NPO at [facility] for possible repeat ischemic eval given persistent anginal sx after recent revascularization P/w SSCP associated w palpitations. Hx of bioAVR/ascending aorta replacement, CABG SVG-PDA, and recent DESx2 pLAD 10/2023  - Currently CP free. HD stable  - Trop 14>15, CK/CKMB neg  - EKG NSR 79, LAD, TWI aVL, V1, V2. CXR clear.   - Cardiac cath 10/16/23: DRAKE x2 pLAD, OM stent patient, patent SVG-RPDA graft  - TTE 10/17/23: EF 63%, normal LV/RV. Bioprosthetic AV, no other valve disease, no pericardial effusion.  - Continue ASA/Plavix/Statin/BB  - NPO at [facility] for possible repeat ischemic eval given persistent anginal sx after recent revascularization P/w sharp SSCP associated w anxiety/SOB resolved on own. Currently c/o very mild CP upon a deep breath but otherwise CP free. HD stable. Compliant with DAPT.  - hx of bioAVR/ascending aorta replacement, CABG SVG-PDA, and recent DESx2 pLAD 10/2023  - Trop 14>15, CK/CKMB neg, ESR/CRP nl   - EKG NSR 79, LAD, TWI aVL, V1, V2. CXR clear.   - Cardiac cath 10/16/23: DRAKE x2 pLAD, OM stent patient, patent SVG-RPDA graft  - TTE 10/17/23: EF 63%, normal LV/RV. Bioprosthetic AV, no other valve disease, no pericardial effusion.  - Continue ASA/Plavix/Statin/BB  - Monitor for CP overnight  - NPO at [facility] for possible repeat ischemic eval given anginal sx after recent revascularization

## 2023-12-28 NOTE — H&P ADULT - NSHPLABSRESULTS_GEN_ALL_CORE
EKG: NSR 79, LAD, TWI aVL, V2. EKG: NSR 79, LAD, TWI aVL, V2.                          13.6   8.93  )-----------( 332      ( 28 Dec 2023 16:54 )             43.8       12-28    139  |  98  |  14  ----------------------------<  126<H>  3.7   |  27  |  0.77    Ca    10.4      28 Dec 2023 16:54  Phos  3.2     12-28  Mg     1.6     12-28    TPro  8.0  /  Alb  4.5  /  TBili  0.2  /  DBili  x   /  AST  41<H>  /  ALT  36  /  AlkPhos  41  12-28      PT/INR - ( 28 Dec 2023 16:54 )   PT: 11.6 sec;   INR: 1.02          PTT - ( 28 Dec 2023 16:54 )  PTT:32.3 sec    CARDIAC MARKERS ( 28 Dec 2023 18:08 )  x     / x     / 68 U/L / x     / 2.1 ng/mL    Urinalysis Basic - ( 28 Dec 2023 16:54 )    Color: x / Appearance: x / SG: x / pH: x  Gluc: 126 mg/dL / Ketone: x  / Bili: x / Urobili: x   Blood: x / Protein: x / Nitrite: x   Leuk Esterase: x / RBC: x / WBC x   Sq Epi: x / Non Sq Epi: x / Bacteria: x

## 2023-12-28 NOTE — PATIENT PROFILE ADULT - FUNCTIONAL ASSESSMENT - DAILY ACTIVITY SECTION LABEL
Problem: Patient Education: Go to Patient Education Activity  Goal: Patient/Family Education  Outcome: Resolved/Met     Problem: Chronic Obstructive Pulmonary Disease (COPD)  Goal: *Oxygen saturation during activity within specified parameters  Outcome: Resolved/Met  Goal: *Able to remain out of bed as prescribed  Outcome: Resolved/Met  Goal: *Absence of hypoxia  Outcome: Resolved/Met  Goal: *Optimize nutritional status  Outcome: Resolved/Met     Problem: Patient Education:  Go to Education Activity  Goal: Patient/Family Education  Outcome: Resolved/Met     Problem: Patient Education: Go to Patient Education Activity  Goal: Patient/Family Education  Outcome: Resolved/Met     Problem: Pressure Injury - Risk of  Goal: *Prevention of pressure injury  Description: Document Jeffrey Scale and appropriate interventions in the flowsheet. Outcome: Resolved/Met     Problem: Patient Education: Go to Patient Education Activity  Goal: Patient/Family Education  Outcome: Resolved/Met     Problem: Falls - Risk of  Goal: *Absence of Falls  Description: Document Yu Fall Risk and appropriate interventions in the flowsheet.   Outcome: Resolved/Met .

## 2023-12-28 NOTE — ED ADULT NURSE NOTE - NSFALLUNIVINTERV_ED_ALL_ED
Bed/Stretcher in lowest position, wheels locked, appropriate side rails in place/Call bell, personal items and telephone in reach/Instruct patient to call for assistance before getting out of bed/chair/stretcher/Non-slip footwear applied when patient is off stretcher/[place] to call system/Physically safe environment - no spills, clutter or unnecessary equipment/Purposeful proactive rounding/Room/bathroom lighting operational, light cord in reach Bed/Stretcher in lowest position, wheels locked, appropriate side rails in place/Call bell, personal items and telephone in reach/Instruct patient to call for assistance before getting out of bed/chair/stretcher/Non-slip footwear applied when patient is off stretcher/[place] to call system/Physically safe environment - no spills, clutter or unnecessary equipment/Purposeful proactive rounding/Room/bathroom lighting operational, light cord in reach

## 2023-12-28 NOTE — H&P ADULT - PROBLEM SELECTOR PLAN 7
- F/u AM lipid panel  - Continue atorva 80mg qhs      F: None  E: Replete if K<4 or Mag<2  N: DASH Diet  GIppx: Pantoprazole  VTEppx: subq heparin  Dispo: Cardiac tele - F/u AM lipid panel  - Continue atorva 80mg qhs      F: None  E: Replete if K<4 or Mag<2  N: DASH Diet  GIppx: Pantoprazole  VTEppx: Ambulatory, SCDs while in bed  Dispo: Cardiac tele

## 2023-12-28 NOTE — ED PROVIDER NOTE - CLINICAL SUMMARY MEDICAL DECISION MAKING FREE TEXT BOX
63 year old female with history of asthma/COPD (no home O2), sciatica/herniated disc, HTN, HLD, DM2, obesity, mod-severe AS s/p bioAVR/ascending aorta replacement and CAD s/p CABG in 2017 and recent stent placement x 2 (last cath October) presenting with chest pain. 63 year old female with history of asthma/COPD (no home O2), sciatica/herniated disc, HTN, HLD, DM2, obesity, mod-severe AS s/p bioAVR/ascending aorta replacement and CAD s/p CABG in 2017 and recent stent placement x 2 (last cath October) presenting with chest pain x 1d. Well appearing overall, arrives to ED on 3L NC but removed at time of my eval and patient maintaining O2 sat >96%. Lungs clear b/l. EKG sinus/nonischemic but high suspicion for ACS / possible stent related complication given high risk and recent stent. Anticipate will need admission for further ischemic eval.

## 2023-12-28 NOTE — H&P ADULT - PROBLEM SELECTOR PLAN 5
C/w - On home Vicodin 7.5-325 mg q12 PRN for pain. Will continue with Percocet 5-325 mg q12 PRN while inpatient (TIC per pharmacy)

## 2023-12-28 NOTE — PATIENT PROFILE ADULT - FALL HARM RISK - FALLEN IN PAST
No
normal/ROM intact/normal gait/strength 5/5 bilateral upper extremities/strength 5/5 bilateral lower extremities

## 2023-12-29 ENCOUNTER — TRANSCRIPTION ENCOUNTER (OUTPATIENT)
Age: 63
End: 2023-12-29

## 2023-12-29 VITALS
HEART RATE: 62 BPM | SYSTOLIC BLOOD PRESSURE: 111 MMHG | TEMPERATURE: 98 F | DIASTOLIC BLOOD PRESSURE: 70 MMHG | OXYGEN SATURATION: 96 % | RESPIRATION RATE: 18 BRPM

## 2023-12-29 LAB
A1C WITH ESTIMATED AVERAGE GLUCOSE RESULT: 6.5 % — HIGH (ref 4–5.6)
A1C WITH ESTIMATED AVERAGE GLUCOSE RESULT: 6.5 % — HIGH (ref 4–5.6)
ANION GAP SERPL CALC-SCNC: 14 MMOL/L — SIGNIFICANT CHANGE UP (ref 5–17)
ANION GAP SERPL CALC-SCNC: 14 MMOL/L — SIGNIFICANT CHANGE UP (ref 5–17)
BUN SERPL-MCNC: 19 MG/DL — SIGNIFICANT CHANGE UP (ref 7–23)
BUN SERPL-MCNC: 19 MG/DL — SIGNIFICANT CHANGE UP (ref 7–23)
CALCIUM SERPL-MCNC: 9.8 MG/DL — SIGNIFICANT CHANGE UP (ref 8.4–10.5)
CALCIUM SERPL-MCNC: 9.8 MG/DL — SIGNIFICANT CHANGE UP (ref 8.4–10.5)
CHLORIDE SERPL-SCNC: 98 MMOL/L — SIGNIFICANT CHANGE UP (ref 96–108)
CHLORIDE SERPL-SCNC: 98 MMOL/L — SIGNIFICANT CHANGE UP (ref 96–108)
CHOLEST SERPL-MCNC: 88 MG/DL — SIGNIFICANT CHANGE UP
CHOLEST SERPL-MCNC: 88 MG/DL — SIGNIFICANT CHANGE UP
CO2 SERPL-SCNC: 26 MMOL/L — SIGNIFICANT CHANGE UP (ref 22–31)
CO2 SERPL-SCNC: 26 MMOL/L — SIGNIFICANT CHANGE UP (ref 22–31)
CREAT SERPL-MCNC: 0.76 MG/DL — SIGNIFICANT CHANGE UP (ref 0.5–1.3)
CREAT SERPL-MCNC: 0.76 MG/DL — SIGNIFICANT CHANGE UP (ref 0.5–1.3)
CRP SERPL-MCNC: <3 MG/L — SIGNIFICANT CHANGE UP (ref 0–4)
CRP SERPL-MCNC: <3 MG/L — SIGNIFICANT CHANGE UP (ref 0–4)
EGFR: 87 ML/MIN/1.73M2 — SIGNIFICANT CHANGE UP
EGFR: 87 ML/MIN/1.73M2 — SIGNIFICANT CHANGE UP
ERYTHROCYTE [SEDIMENTATION RATE] IN BLOOD: 12 MM/HR — SIGNIFICANT CHANGE UP
ERYTHROCYTE [SEDIMENTATION RATE] IN BLOOD: 12 MM/HR — SIGNIFICANT CHANGE UP
ESTIMATED AVERAGE GLUCOSE: 140 MG/DL — HIGH (ref 68–114)
ESTIMATED AVERAGE GLUCOSE: 140 MG/DL — HIGH (ref 68–114)
GLUCOSE BLDC GLUCOMTR-MCNC: 104 MG/DL — HIGH (ref 70–99)
GLUCOSE BLDC GLUCOMTR-MCNC: 104 MG/DL — HIGH (ref 70–99)
GLUCOSE BLDC GLUCOMTR-MCNC: 119 MG/DL — HIGH (ref 70–99)
GLUCOSE BLDC GLUCOMTR-MCNC: 119 MG/DL — HIGH (ref 70–99)
GLUCOSE SERPL-MCNC: 118 MG/DL — HIGH (ref 70–99)
GLUCOSE SERPL-MCNC: 118 MG/DL — HIGH (ref 70–99)
HCT VFR BLD CALC: 38.6 % — SIGNIFICANT CHANGE UP (ref 34.5–45)
HCT VFR BLD CALC: 38.6 % — SIGNIFICANT CHANGE UP (ref 34.5–45)
HDLC SERPL-MCNC: 36 MG/DL — LOW
HDLC SERPL-MCNC: 36 MG/DL — LOW
HGB BLD-MCNC: 11.9 G/DL — SIGNIFICANT CHANGE UP (ref 11.5–15.5)
HGB BLD-MCNC: 11.9 G/DL — SIGNIFICANT CHANGE UP (ref 11.5–15.5)
LIPID PNL WITH DIRECT LDL SERPL: 7 MG/DL — SIGNIFICANT CHANGE UP
LIPID PNL WITH DIRECT LDL SERPL: 7 MG/DL — SIGNIFICANT CHANGE UP
MAGNESIUM SERPL-MCNC: 2 MG/DL — SIGNIFICANT CHANGE UP (ref 1.6–2.6)
MAGNESIUM SERPL-MCNC: 2 MG/DL — SIGNIFICANT CHANGE UP (ref 1.6–2.6)
MCHC RBC-ENTMCNC: 24.9 PG — LOW (ref 27–34)
MCHC RBC-ENTMCNC: 24.9 PG — LOW (ref 27–34)
MCHC RBC-ENTMCNC: 30.8 GM/DL — LOW (ref 32–36)
MCHC RBC-ENTMCNC: 30.8 GM/DL — LOW (ref 32–36)
MCV RBC AUTO: 80.9 FL — SIGNIFICANT CHANGE UP (ref 80–100)
MCV RBC AUTO: 80.9 FL — SIGNIFICANT CHANGE UP (ref 80–100)
NON HDL CHOLESTEROL: 52 MG/DL — SIGNIFICANT CHANGE UP
NON HDL CHOLESTEROL: 52 MG/DL — SIGNIFICANT CHANGE UP
NRBC # BLD: 0 /100 WBCS — SIGNIFICANT CHANGE UP (ref 0–0)
NRBC # BLD: 0 /100 WBCS — SIGNIFICANT CHANGE UP (ref 0–0)
PLATELET # BLD AUTO: 292 K/UL — SIGNIFICANT CHANGE UP (ref 150–400)
PLATELET # BLD AUTO: 292 K/UL — SIGNIFICANT CHANGE UP (ref 150–400)
POTASSIUM SERPL-MCNC: 3.7 MMOL/L — SIGNIFICANT CHANGE UP (ref 3.5–5.3)
POTASSIUM SERPL-MCNC: 3.7 MMOL/L — SIGNIFICANT CHANGE UP (ref 3.5–5.3)
POTASSIUM SERPL-SCNC: 3.7 MMOL/L — SIGNIFICANT CHANGE UP (ref 3.5–5.3)
POTASSIUM SERPL-SCNC: 3.7 MMOL/L — SIGNIFICANT CHANGE UP (ref 3.5–5.3)
RBC # BLD: 4.77 M/UL — SIGNIFICANT CHANGE UP (ref 3.8–5.2)
RBC # BLD: 4.77 M/UL — SIGNIFICANT CHANGE UP (ref 3.8–5.2)
RBC # FLD: 13.9 % — SIGNIFICANT CHANGE UP (ref 10.3–14.5)
RBC # FLD: 13.9 % — SIGNIFICANT CHANGE UP (ref 10.3–14.5)
SODIUM SERPL-SCNC: 138 MMOL/L — SIGNIFICANT CHANGE UP (ref 135–145)
SODIUM SERPL-SCNC: 138 MMOL/L — SIGNIFICANT CHANGE UP (ref 135–145)
TRIGL SERPL-MCNC: 226 MG/DL — HIGH
TRIGL SERPL-MCNC: 226 MG/DL — HIGH
WBC # BLD: 8.19 K/UL — SIGNIFICANT CHANGE UP (ref 3.8–10.5)
WBC # BLD: 8.19 K/UL — SIGNIFICANT CHANGE UP (ref 3.8–10.5)
WBC # FLD AUTO: 8.19 K/UL — SIGNIFICANT CHANGE UP (ref 3.8–10.5)
WBC # FLD AUTO: 8.19 K/UL — SIGNIFICANT CHANGE UP (ref 3.8–10.5)

## 2023-12-29 PROCEDURE — 86140 C-REACTIVE PROTEIN: CPT

## 2023-12-29 PROCEDURE — 83880 ASSAY OF NATRIURETIC PEPTIDE: CPT

## 2023-12-29 PROCEDURE — 82550 ASSAY OF CK (CPK): CPT

## 2023-12-29 PROCEDURE — 84484 ASSAY OF TROPONIN QUANT: CPT

## 2023-12-29 PROCEDURE — 85025 COMPLETE CBC W/AUTO DIFF WBC: CPT

## 2023-12-29 PROCEDURE — 82553 CREATINE MB FRACTION: CPT

## 2023-12-29 PROCEDURE — 85730 THROMBOPLASTIN TIME PARTIAL: CPT

## 2023-12-29 PROCEDURE — 85379 FIBRIN DEGRADATION QUANT: CPT

## 2023-12-29 PROCEDURE — 85027 COMPLETE CBC AUTOMATED: CPT

## 2023-12-29 PROCEDURE — 36415 COLL VENOUS BLD VENIPUNCTURE: CPT

## 2023-12-29 PROCEDURE — 71045 X-RAY EXAM CHEST 1 VIEW: CPT

## 2023-12-29 PROCEDURE — 75574 CT ANGIO HRT W/3D IMAGE: CPT | Mod: 26

## 2023-12-29 PROCEDURE — 75574 CT ANGIO HRT W/3D IMAGE: CPT

## 2023-12-29 PROCEDURE — 82962 GLUCOSE BLOOD TEST: CPT

## 2023-12-29 PROCEDURE — 99239 HOSP IP/OBS DSCHRG MGMT >30: CPT

## 2023-12-29 PROCEDURE — 85652 RBC SED RATE AUTOMATED: CPT

## 2023-12-29 PROCEDURE — 83036 HEMOGLOBIN GLYCOSYLATED A1C: CPT

## 2023-12-29 PROCEDURE — 71275 CT ANGIOGRAPHY CHEST: CPT | Mod: 26

## 2023-12-29 PROCEDURE — 71275 CT ANGIOGRAPHY CHEST: CPT

## 2023-12-29 PROCEDURE — 85610 PROTHROMBIN TIME: CPT

## 2023-12-29 PROCEDURE — 93005 ELECTROCARDIOGRAM TRACING: CPT

## 2023-12-29 PROCEDURE — 80061 LIPID PANEL: CPT

## 2023-12-29 PROCEDURE — 80053 COMPREHEN METABOLIC PANEL: CPT

## 2023-12-29 PROCEDURE — 83735 ASSAY OF MAGNESIUM: CPT

## 2023-12-29 PROCEDURE — 99285 EMERGENCY DEPT VISIT HI MDM: CPT

## 2023-12-29 PROCEDURE — 80048 BASIC METABOLIC PNL TOTAL CA: CPT

## 2023-12-29 PROCEDURE — 84100 ASSAY OF PHOSPHORUS: CPT

## 2023-12-29 RX ORDER — COLCHICINE 0.6 MG
1 TABLET ORAL
Qty: 60 | Refills: 0
Start: 2023-12-29 | End: 2024-01-27

## 2023-12-29 RX ORDER — COLCHICINE 0.6 MG
0.6 TABLET ORAL ONCE
Refills: 0 | Status: COMPLETED | OUTPATIENT
Start: 2023-12-29 | End: 2023-12-29

## 2023-12-29 RX ORDER — ALBUTEROL 90 UG/1
2 AEROSOL, METERED ORAL
Qty: 1 | Refills: 0
Start: 2023-12-29

## 2023-12-29 RX ORDER — COLCHICINE 0.6 MG
1 TABLET ORAL
Qty: 60 | Refills: 1
Start: 2023-12-29 | End: 2024-02-26

## 2023-12-29 RX ADMIN — Medication 50 MILLIGRAM(S): at 13:45

## 2023-12-29 RX ADMIN — Medication 2 MILLIGRAM(S): at 00:48

## 2023-12-29 RX ADMIN — PANTOPRAZOLE SODIUM 40 MILLIGRAM(S): 20 TABLET, DELAYED RELEASE ORAL at 06:12

## 2023-12-29 RX ADMIN — Medication 81 MILLIGRAM(S): at 13:44

## 2023-12-29 RX ADMIN — Medication 0.6 MILLIGRAM(S): at 15:30

## 2023-12-29 RX ADMIN — CLOPIDOGREL BISULFATE 75 MILLIGRAM(S): 75 TABLET, FILM COATED ORAL at 13:45

## 2023-12-29 NOTE — DISCHARGE NOTE PROVIDER - NSCORESITESY/N_GEN_A_CORE_RD
Bon is a 78 year old who is being evaluated via a billable telephone visit.      What phone number would you like to be contacted at? [phone]  How would you like to obtain your AVS? [facility]  Distant Location (provider location):  On-site  Phone call duration: 23 minutes  December 16, 2022  Bib Alcantara MD  Rheumatology     No

## 2023-12-29 NOTE — DISCHARGE NOTE PROVIDER - NSDCCPCAREPLAN_GEN_ALL_CORE_FT
PRINCIPAL DISCHARGE DIAGNOSIS  Diagnosis: Pericarditis  Assessment and Plan of Treatment: You presented to the hospital with chest pain worse with taking a deep breath. Your heart enzymes were negative and your EKG was unchanged from prior admission. You likely have an inflammation of the sac that holds the heart (pericardium) called pericarditis. Please take Colchicine 0.6 mg By Mouth Twice Daily x 3 months. Follow-up with Dr. Gamez in 1-2 weeks.      SECONDARY DISCHARGE DIAGNOSES  Diagnosis: CAD (coronary artery disease)  Assessment and Plan of Treatment: Continue Aspirin, Plavix to keep your stents open that were placed in October 2023.    Diagnosis: Hypertension  Assessment and Plan of Treatment: Please continue Chlorthalidone and Toprol XL to keep your blood pressure controlled. For blood pressure that is too high or too low please see your doctor or go to the emergency room as necessary.      Diagnosis: Hyperlipidemia  Assessment and Plan of Treatment: Please continue Lipitor at this time to keep your cholesterol low. High cholesterol contributes to heart disease.      Diagnosis: Diabetes mellitus type II, non insulin dependent  Assessment and Plan of Treatment: Your Hemoglobin A1c is 6.5%  and your goal A1c is less than 7.0%. This number measures your average blood sugar level over the last three months.   Maintain a low carbohydrate, low sugar diet, exercise, monitor your fingerstick blood sugars regarly and follow up with your Endocrinologist/Primary Care Doctor. Please do not take your metformin for 2 days to prevent interaction with the contrast you recieved. RESTART METFORMIN ON SUNDAY 12/31/23.

## 2023-12-29 NOTE — DISCHARGE NOTE NURSING/CASE MANAGEMENT/SOCIAL WORK - PATIENT PORTAL LINK FT
You can access the FollowMyHealth Patient Portal offered by [facility] by registering at the following website: [email or web address]. By joining [facility]’s FollowMyHealth portal, you will also be able to view your health information using other applications (apps) compatible with our system. You can access the FollowMyHealth Patient Portal offered by [facility] by registering at the following website: [email or web address]. By joining [facility]’s FollowMyHealth portal, you will also be able to view your health information using other applications (apps) compatible with our system.

## 2023-12-29 NOTE — DISCHARGE NOTE PROVIDER - PROVIDER TOKENS
PROVIDER:[TOKEN:[[id number]],FOLLOWUP:[1 week],ESTABLISHEDPATIENT:[T]] PROVIDER:[TOKEN:[[id number]],FOLLOWUP:[1 week],ESTABLISHEDPATIENT:[T]]

## 2023-12-29 NOTE — DISCHARGE NOTE PROVIDER - NSDCMRMEDTOKEN_GEN_ALL_CORE_FT
aspirin 81 mg oral delayed release tablet: 1 tab(s) orally once a day  chlorthalidone 25 mg oral tablet: 1 tab(s) orally once a day  clopidogrel 75 mg oral tablet: 1 tab(s) orally once a day  colchicine 0.6 mg oral tablet: 1 tab(s) orally 2 times a day  Crestor 40 mg oral tablet: 1 tab(s) orally once a day  diazePAM 5 mg oral tablet: 0.5 tab(s) orally once a day  hydrocodone-acetaminophen 7.5 mg-325 mg oral tablet: 1 tab(s) orally once a day  MetFORMIN (Eqv-Fortamet) 500 mg oral tablet, extended release: 2 tab(s) orally once a day in morning   metFORMIN 500 mg oral tablet: 1 tab(s) orally once a day (at bedtime)  metoprolol succinate 50 mg oral tablet, extended release: 1 orally once a day  omeprazole 40 mg oral delayed release capsule: 1 cap(s) orally once a day  Zetia 10 mg oral tablet: 1 tab(s) orally once a day

## 2023-12-29 NOTE — DISCHARGE NOTE PROVIDER - CARE PROVIDER_API CALL
Momo Gamez  Cardiology  [address] [place] [address]  Phone: [phone]  Fax: [phone]  Established Patient  Follow Up Time: 1 week   Momo Gamez  Cardiology  [address] [place] [address]  Phone: [phone]  Fax: [phone]  Established Patient  Follow Up Time: 1 week

## 2023-12-29 NOTE — DISCHARGE NOTE PROVIDER - NSDCFUSCHEDAPPT_GEN_ALL_CORE_FT
Momo Gamez Physician [facility]  [facility] [address]  Scheduled Appointment: 02/27/2024     Momo Gamez Physician [facility]  [facility] [address]  Scheduled Appointment: 02/27/2024

## 2023-12-29 NOTE — DISCHARGE NOTE NURSING/CASE MANAGEMENT/SOCIAL WORK - NSDCPEFALRISK_GEN_ALL_CORE
For information on Fall & Injury Prevention, visit: [email or web address] OR  [email or web address] OR  https://www.cdc.gov/steadi/patient.html For information on Fall & Injury Prevention, visit: [email or web address] OR  [email or web address] OR  https://www.cdc.gov/steadi/patient.html

## 2023-12-29 NOTE — DISCHARGE NOTE PROVIDER - ATTENDING DISCHARGE PHYSICAL EXAMINATION:
Euvolemic. No rub on cardiac auscultation. Good spirits. Ambulating without difficulty. Chest pain free.

## 2023-12-29 NOTE — DISCHARGE NOTE PROVIDER - HOSPITAL COURSE
62 yo female, former smoker, with FHx MI, PMHx of asthma/COPD, sciatica/herniated disc, HTN, HLD, DM2, obesity, anxiety, AS s/p bioAVR/ascending aorta replacement and CABG SVG-PDA 05/2017 @ [facility] w subsequent PCIs (most recent 10/16/23 DESx2 pLAD) who presented to [facility] ED 12/28/23 after an episode of sharp SSBP onset this morning lasting for a few hours until resolving on its own associated with palpitations, anxiety, and mild SOB. Pt now admitted to cardiac tele for further management of chest pain. Troponin 14-->15  (negative) and EKG revealed NSR 79 bpm with LAD, no ST changes, TWI V1-V2, AVL (unchanged from prior admission October 2023). ESR 25-->12. CRP negative. Colchicine 0.6 mg PO x 1 given prior to discharge and patient instructed to take Colchicine 0.6 mg PO BID x 3 months. CTA Chest 12/29/23 negative for PE. CTA Coronaries 12/29/23       . Pt seen and examined at bedside this AM without any complaints or events overnight, VSS, labs and telemetry reviewed and pt stable for discharge as discussed with  ___. Pt has received appropriate discharge instructions, including medication regimen, access site management and follow up with  __ in 1-2 weeks.    Discharge medications: ASA 81mg QD, Plavix 75mg QD, ______________.    Cardiac Rehab (Post PCI): Education on benefits of Cardiac Rehab provided to patient: Yes, Referral and Prescription Given for Cardiac Rehab: Yes, Pt given list of locations & instructed to contact their insurance company to review list of participating providers.    Statin Prescribed (STEMI/NSTEMI/UA &/OR PCI this admission):  Yes    DAPT: Prescriptions for Aspirin/Plavix/Brilinta/Effient e-prescribed to patient's pharmacy Yes    GLP-1 receptor agonist/SGLT2 inhibitor meds discussed w/ patients and encouraged to discuss further with PMD or Endocrinologist at next visit.     Pt discharge copies detail cardiovascular history, medications, testing/treatments, OR patient has created a patient portal account and instructed to provide their records at their 1st appointment.   64 yo female, former smoker, with FHx MI, PMHx of asthma/COPD, sciatica/herniated disc, HTN, HLD, DM2, obesity, anxiety, AS s/p bioAVR/ascending aorta replacement and CABG SVG-PDA 05/2017 @ [facility] w subsequent PCIs (most recent 10/16/23 DESx2 pLAD) who presented to [facility] ED 12/28/23 after an episode of sharp SSBP onset this morning lasting for a few hours until resolving on its own associated with palpitations, anxiety, and mild SOB. Pt now admitted to cardiac tele for further management of chest pain. Troponin 14-->15  (negative) and EKG revealed NSR 79 bpm with LAD, no ST changes, TWI V1-V2, AVL (unchanged from prior admission October 2023). ESR 25-->12. CRP negative. Colchicine 0.6 mg PO x 1 given prior to discharge and patient instructed to take Colchicine 0.6 mg PO BID x 3 months. CTA Chest 12/29/23 negative for PE. CTA Coronaries 12/29/23       . Pt seen and examined at bedside this AM without any complaints or events overnight, VSS, labs and telemetry reviewed and pt stable for discharge as discussed with  ___. Pt has received appropriate discharge instructions, including medication regimen, access site management and follow up with  __ in 1-2 weeks.    Discharge medications: ASA 81mg QD, Plavix 75mg QD, ______________.    Cardiac Rehab (Post PCI): Education on benefits of Cardiac Rehab provided to patient: Yes, Referral and Prescription Given for Cardiac Rehab: Yes, Pt given list of locations & instructed to contact their insurance company to review list of participating providers.    Statin Prescribed (STEMI/NSTEMI/UA &/OR PCI this admission):  Yes    DAPT: Prescriptions for Aspirin/Plavix/Brilinta/Effient e-prescribed to patient's pharmacy Yes    GLP-1 receptor agonist/SGLT2 inhibitor meds discussed w/ patients and encouraged to discuss further with PMD or Endocrinologist at next visit.     Pt discharge copies detail cardiovascular history, medications, testing/treatments, OR patient has created a patient portal account and instructed to provide their records at their 1st appointment.   64 yo female, former smoker, with FHx MI, PMHx of asthma/COPD, sciatica/herniated disc, HTN, HLD, DM2, obesity, anxiety, AS s/p bioAVR/ascending aorta replacement and CABG SVG-PDA 05/2017 @ [facility] w subsequent PCIs (most recent 10/16/23 DESx2 pLAD) who presented to [facility] ED 12/28/23 after an episode of sharp SSBP onset this morning lasting for a few hours until resolving on its own associated with palpitations, anxiety, and mild SOB. Pt now admitted to cardiac tele for further management of chest pain. Troponin 14-->15  (negative) and EKG revealed NSR 79 bpm with LAD, no ST changes, TWI V1-V2, AVL (unchanged from prior admission October 2023). ESR 25-->12. CRP negative. Colchicine 0.6 mg PO x 1 given prior to discharge and patient instructed to take Colchicine 0.6 mg PO BID x 3 months. CTA Chest 12/29/23 negative for PE. CTA Coronaries 12/29/23 revealed  SVG to RPDA appears patent 2.  Stent in proximal to mid LAD appears patent.  Mild to moderate disease in the distal LAD vessel 3.  OM2 stent appears patent.  4.  Non-obstructive disease in the diagonal vessels, LCx, and left main    . Pt seen and examined at bedside this AM without any complaints or events overnight, VSS, labs and telemetry reviewed and pt stable for discharge as discussed with Dr. Aguirre. Pt has received appropriate discharge instructions, including medication regimen, access site management and follow up with Dr. Gamez in 1-2 weeks.    Discharge medications: ASA 81mg Daily, Plavix 75 mg daily, Atorvastatin 80 mg daily, Metoprolol Succinate ER 25 mg PO Daily,     Cardiac Rehab (Post PCI): Education on benefits of Cardiac Rehab provided to patient: Yes, Referral and Prescription Given for Cardiac Rehab: Yes, Pt given list of locations & instructed to contact their insurance company to review list of participating providers.    Statin Prescribed (STEMI/NSTEMI/UA &/OR PCI this admission):  Yes    DAPT: Prescriptions for Aspirin/Plavix/Brilinta/Effient e-prescribed to patient's pharmacy Yes    GLP-1 receptor agonist/SGLT2 inhibitor meds discussed w/ patients and encouraged to discuss further with PMD or Endocrinologist at next visit.     Pt discharge copies detail cardiovascular history, medications, testing/treatments, OR patient has created a patient portal account and instructed to provide their records at their 1st appointment.   62 yo female, former smoker, with FHx MI, PMHx of asthma/COPD, sciatica/herniated disc, HTN, HLD, DM2, obesity, anxiety, AS s/p bioAVR/ascending aorta replacement and CABG SVG-PDA 05/2017 @ [facility] w subsequent PCIs (most recent 10/16/23 DESx2 pLAD) who presented to [facility] ED 12/28/23 after an episode of sharp SSBP onset this morning lasting for a few hours until resolving on its own associated with palpitations, anxiety, and mild SOB. Pt now admitted to cardiac tele for further management of chest pain. Troponin 14-->15  (negative) and EKG revealed NSR 79 bpm with LAD, no ST changes, TWI V1-V2, AVL (unchanged from prior admission October 2023). ESR 25-->12. CRP negative. Colchicine 0.6 mg PO x 1 given prior to discharge and patient instructed to take Colchicine 0.6 mg PO BID x 3 months. CTA Chest 12/29/23 negative for PE. CTA Coronaries 12/29/23 revealed  SVG to RPDA appears patent 2.  Stent in proximal to mid LAD appears patent.  Mild to moderate disease in the distal LAD vessel 3.  OM2 stent appears patent.  4.  Non-obstructive disease in the diagonal vessels, LCx, and left main    . Pt seen and examined at bedside this AM without any complaints or events overnight, VSS, labs and telemetry reviewed and pt stable for discharge as discussed with Dr. Aguirre. Pt has received appropriate discharge instructions, including medication regimen, access site management and follow up with Dr. Gamez in 1-2 weeks.    Discharge medications: ASA 81mg Daily, Plavix 75 mg daily, Atorvastatin 80 mg daily, Metoprolol Succinate ER 25 mg PO Daily,     Cardiac Rehab (Post PCI): Education on benefits of Cardiac Rehab provided to patient: Yes, Referral and Prescription Given for Cardiac Rehab: Yes, Pt given list of locations & instructed to contact their insurance company to review list of participating providers.    Statin Prescribed (STEMI/NSTEMI/UA &/OR PCI this admission):  Yes    DAPT: Prescriptions for Aspirin/Plavix/Brilinta/Effient e-prescribed to patient's pharmacy Yes    GLP-1 receptor agonist/SGLT2 inhibitor meds discussed w/ patients and encouraged to discuss further with PMD or Endocrinologist at next visit.     Pt discharge copies detail cardiovascular history, medications, testing/treatments, OR patient has created a patient portal account and instructed to provide their records at their 1st appointment.   62 yo female, former smoker, with FHx MI, PMHx of asthma/COPD, sciatica/herniated disc, HTN, HLD, DM2, obesity, anxiety, AS s/p bioAVR/ascending aorta replacement and CABG SVG-PDA 05/2017 @ [facility] w subsequent PCIs (most recent 10/16/23 DESx2 pLAD) who presented to [facility] ED 12/28/23 after an episode of sharp SSBP onset this morning lasting for a few hours until resolving on its own associated with palpitations, anxiety, and mild SOB. Pt now admitted to cardiac tele for further management of chest pain. Troponin 14-->15  (negative) and EKG revealed NSR 79 bpm with LAD, no ST changes, TWI V1-V2, AVL (unchanged from prior admission October 2023). ESR 25-->12. CRP negative. Colchicine 0.6 mg PO x 1 given prior to discharge and patient instructed to take Colchicine 0.6 mg PO BID x 3 months. CTA Chest 12/29/23 negative for PE. CTA Coronaries 12/29/23 revealed  SVG to RPDA appears patent 2.  Stent in proximal to mid LAD appears patent.  Mild to moderate disease in the distal LAD vessel 3.  OM2 stent appears patent.  4.  Non-obstructive disease in the diagonal vessels, LCx, and left main    . Pt seen and examined at bedside this AM without any complaints or events overnight, VSS, labs and telemetry reviewed and pt stable for discharge as discussed with Dr. Aguirre. Pt has received appropriate discharge instructions, including medication regimen, access site management and follow up with Dr. Gamez in 1-2 weeks.    Discharge medications: ASA 81mg Daily, Plavix 75 mg daily, Atorvastatin 80 mg daily, Metoprolol Succinate ER 50 mg PO Daily, Chlorthalidone 25 mg Daily     Cardiac Rehab (Post PCI): Prescribed on prior admission 10/2023     Statin Prescribed (STEMI/NSTEMI/UA &/OR PCI this admission):  Yes    DAPT: Prescriptions for Aspirin/Plavix/Brilinta/Effient e-prescribed to patient's pharmacy Yes    GLP-1 receptor agonist/SGLT2 inhibitor meds discussed w/ patients and encouraged to discuss further with PMD or Endocrinologist at next visit.     Pt discharge copies detail cardiovascular history, medications, testing/treatments, OR patient has created a patient portal account and instructed to provide their records at their 1st appointment.   62 yo female, former smoker, with FHx MI, PMHx of asthma/COPD, sciatica/herniated disc, HTN, HLD, DM2, obesity, anxiety, AS s/p bioAVR/ascending aorta replacement and CABG SVG-PDA 05/2017 @ [facility] w subsequent PCIs (most recent 10/16/23 DESx2 pLAD) who presented to [facility] ED 12/28/23 after an episode of sharp SSBP onset this morning lasting for a few hours until resolving on its own associated with palpitations, anxiety, and mild SOB. Pt now admitted to cardiac tele for further management of chest pain. Troponin 14-->15  (negative) and EKG revealed NSR 79 bpm with LAD, no ST changes, TWI V1-V2, AVL (unchanged from prior admission October 2023). ESR 25-->12. CRP negative. Colchicine 0.6 mg PO x 1 given prior to discharge and patient instructed to take Colchicine 0.6 mg PO BID x 3 months. CTA Chest 12/29/23 negative for PE. CTA Coronaries 12/29/23 revealed  SVG to RPDA appears patent 2.  Stent in proximal to mid LAD appears patent.  Mild to moderate disease in the distal LAD vessel 3.  OM2 stent appears patent.  4.  Non-obstructive disease in the diagonal vessels, LCx, and left main    . Pt seen and examined at bedside this AM without any complaints or events overnight, VSS, labs and telemetry reviewed and pt stable for discharge as discussed with Dr. Aguirre. Pt has received appropriate discharge instructions, including medication regimen, access site management and follow up with Dr. Gamez in 1-2 weeks.    Discharge medications: ASA 81mg Daily, Plavix 75 mg daily, Atorvastatin 80 mg daily, Metoprolol Succinate ER 50 mg PO Daily, Chlorthalidone 25 mg Daily     Cardiac Rehab (Post PCI): Prescribed on prior admission 10/2023     Statin Prescribed (STEMI/NSTEMI/UA &/OR PCI this admission):  Yes    DAPT: Prescriptions for Aspirin/Plavix/Brilinta/Effient e-prescribed to patient's pharmacy Yes    GLP-1 receptor agonist/SGLT2 inhibitor meds discussed w/ patients and encouraged to discuss further with PMD or Endocrinologist at next visit.     Pt discharge copies detail cardiovascular history, medications, testing/treatments, OR patient has created a patient portal account and instructed to provide their records at their 1st appointment.   62 yo female, former smoker, with FHx MI, PMHx of asthma/COPD, sciatica/herniated disc, HTN, HLD, DM2, obesity, anxiety, AS s/p bioAVR/ascending aorta replacement and CABG SVG-PDA 05/2017 @ [facility] w subsequent PCIs (most recent 10/16/23 DESx2 pLAD) who presented to [facility] ED 12/28/23 after an episode of sharp SSBP onset this morning lasting for a few hours until resolving on its own associated with palpitations, anxiety, and mild SOB. Pt now admitted to cardiac tele for further management of chest pain. Troponin 14-->15  (negative) and EKG revealed NSR 79 bpm with LAD, no ST changes, TWI V1-V2, AVL (unchanged from prior admission October 2023). ESR 25-->12. CRP negative. Colchicine 0.6 mg PO x 1 given prior to discharge and patient instructed to take Colchicine 0.6 mg PO BID x 3 months. CTA Chest 12/29/23 negative for PE. CTA Coronaries 12/29/23 revealed  SVG to RPDA appears patent 2.  Stent in proximal to mid LAD appears patent.  Mild to moderate disease in the distal LAD vessel 3.  OM2 stent appears patent.  4.  Non-obstructive disease in the diagonal vessels, LCx, and left main    . Pt seen and examined at bedside this AM without any complaints or events overnight, VSS, labs and telemetry reviewed and pt stable for discharge as discussed with Dr. Aguirre. Pt has received appropriate discharge instructions, including medication regimen, access site management and follow up with Dr. Gamez in 1-2 weeks.    Discharge medications: ASA 81mg Daily, Plavix 75 mg daily, Atorvastatin 80 mg daily, Metoprolol Succinate ER 50 mg PO Daily, Chlorthalidone 25 mg Daily, Colchicine 0.6 mg BID    Cardiac Rehab (Post PCI): Prescribed on prior admission 10/2023     Statin Prescribed (STEMI/NSTEMI/UA &/OR PCI this admission):  Yes    DAPT: Prescriptions for Aspirin/Plavix/Brilinta/Effient e-prescribed to patient's pharmacy Yes    GLP-1 receptor agonist/SGLT2 inhibitor meds discussed w/ patients and encouraged to discuss further with PMD or Endocrinologist at next visit.     Pt discharge copies detail cardiovascular history, medications, testing/treatments, OR patient has created a patient portal account and instructed to provide their records at their 1st appointment.   64 yo female, former smoker, with FHx MI, PMHx of asthma/COPD, sciatica/herniated disc, HTN, HLD, DM2, obesity, anxiety, AS s/p bioAVR/ascending aorta replacement and CABG SVG-PDA 05/2017 @ [facility] w subsequent PCIs (most recent 10/16/23 DESx2 pLAD) who presented to [facility] ED 12/28/23 after an episode of sharp SSBP onset this morning lasting for a few hours until resolving on its own associated with palpitations, anxiety, and mild SOB. Pt now admitted to cardiac tele for further management of chest pain. Troponin 14-->15  (negative) and EKG revealed NSR 79 bpm with LAD, no ST changes, TWI V1-V2, AVL (unchanged from prior admission October 2023). ESR 25-->12. CRP negative. Colchicine 0.6 mg PO x 1 given prior to discharge and patient instructed to take Colchicine 0.6 mg PO BID x 3 months. CTA Chest 12/29/23 negative for PE. CTA Coronaries 12/29/23 revealed  SVG to RPDA appears patent 2.  Stent in proximal to mid LAD appears patent.  Mild to moderate disease in the distal LAD vessel 3.  OM2 stent appears patent.  4.  Non-obstructive disease in the diagonal vessels, LCx, and left main    . Pt seen and examined at bedside this AM without any complaints or events overnight, VSS, labs and telemetry reviewed and pt stable for discharge as discussed with Dr. Aguirre. Pt has received appropriate discharge instructions, including medication regimen, access site management and follow up with Dr. Gamez in 1-2 weeks.    Discharge medications: ASA 81mg Daily, Plavix 75 mg daily, Atorvastatin 80 mg daily, Metoprolol Succinate ER 50 mg PO Daily, Chlorthalidone 25 mg Daily, Colchicine 0.6 mg BID    Cardiac Rehab (Post PCI): Prescribed on prior admission 10/2023     Statin Prescribed (STEMI/NSTEMI/UA &/OR PCI this admission):  Yes    DAPT: Prescriptions for Aspirin/Plavix/Brilinta/Effient e-prescribed to patient's pharmacy Yes    GLP-1 receptor agonist/SGLT2 inhibitor meds discussed w/ patients and encouraged to discuss further with PMD or Endocrinologist at next visit.     Pt discharge copies detail cardiovascular history, medications, testing/treatments, OR patient has created a patient portal account and instructed to provide their records at their 1st appointment.   64 yo female, former smoker, with FHx MI, PMHx of asthma/COPD, sciatica/herniated disc, HTN, HLD, DM2, obesity, anxiety, AS s/p bioAVR/ascending aorta replacement and CABG SVG-PDA 05/2017 @ [facility] w subsequent PCIs (most recent 10/16/23 DESx2 pLAD) who presented to [facility] ED 12/28/23 after an episode of sharp SSBP onset this morning lasting for a few hours until resolving on its own associated with palpitations, anxiety, and mild SOB. Pt now admitted to cardiac tele for further management of chest pain. Troponin 14-->15  (negative) and EKG revealed NSR 79 bpm with LAD, no ST changes, TWI V1-V2, AVL (unchanged from prior admission October 2023). ESR 25-->12. CRP negative. Colchicine 0.6 mg PO x 1 given prior to discharge and patient instructed to take Colchicine 0.6 mg PO BID x 3 months. CTA Chest 12/29/23 negative for PE. CTA Coronaries 12/29/23 revealed  SVG to RPDA appears patent 2.  Stent in proximal to mid LAD appears patent.  Mild to moderate disease in the distal LAD vessel 3.  OM2 stent appears patent.  4.  Non-obstructive disease in the diagonal vessels, LCx, and left main    . Pt seen and examined at bedside this AM without any complaints or events overnight, VSS, labs and telemetry reviewed and pt stable for discharge as discussed with Dr. Aguirre. Pt has received appropriate discharge instructions, including medication regimen, access site management and follow up with Dr. Gamez in 1-2 weeks.    Discharge medications: ASA 81mg Daily, Plavix 75 mg daily, Atorvastatin 80 mg daily, Metoprolol Succinate ER 50 mg PO Daily, Chlorthalidone 25 mg Daily, Colchicine 0.6 mg BID (discussed risks and benefits of treatment given symptoms consistent with pericarditis)    Cardiac Rehab (Post PCI): Prescribed on prior admission 10/2023     Statin Prescribed (STEMI/NSTEMI/UA &/OR PCI this admission):  Yes    DAPT: Prescriptions for Aspirin/Plavix/Brilinta/Effient e-prescribed to patient's pharmacy Yes    GLP-1 receptor agonist/SGLT2 inhibitor meds discussed w/ patients and encouraged to discuss further with PMD or Endocrinologist at next visit.     Pt discharge copies detail cardiovascular history, medications, testing/treatments, OR patient has created a patient portal account and instructed to provide their records at their 1st appointment.   62 yo female, former smoker, with FHx MI, PMHx of asthma/COPD, sciatica/herniated disc, HTN, HLD, DM2, obesity, anxiety, AS s/p bioAVR/ascending aorta replacement and CABG SVG-PDA 05/2017 @ [facility] w subsequent PCIs (most recent 10/16/23 DESx2 pLAD) who presented to [facility] ED 12/28/23 after an episode of sharp SSBP onset this morning lasting for a few hours until resolving on its own associated with palpitations, anxiety, and mild SOB. Pt now admitted to cardiac tele for further management of chest pain. Troponin 14-->15  (negative) and EKG revealed NSR 79 bpm with LAD, no ST changes, TWI V1-V2, AVL (unchanged from prior admission October 2023). ESR 25-->12. CRP negative. Colchicine 0.6 mg PO x 1 given prior to discharge and patient instructed to take Colchicine 0.6 mg PO BID x 3 months. CTA Chest 12/29/23 negative for PE. CTA Coronaries 12/29/23 revealed  SVG to RPDA appears patent 2.  Stent in proximal to mid LAD appears patent.  Mild to moderate disease in the distal LAD vessel 3.  OM2 stent appears patent.  4.  Non-obstructive disease in the diagonal vessels, LCx, and left main    . Pt seen and examined at bedside this AM without any complaints or events overnight, VSS, labs and telemetry reviewed and pt stable for discharge as discussed with Dr. Aguirre. Pt has received appropriate discharge instructions, including medication regimen, access site management and follow up with Dr. Gamez in 1-2 weeks.    Discharge medications: ASA 81mg Daily, Plavix 75 mg daily, Atorvastatin 80 mg daily, Metoprolol Succinate ER 50 mg PO Daily, Chlorthalidone 25 mg Daily, Colchicine 0.6 mg BID (discussed risks and benefits of treatment given symptoms consistent with pericarditis)    Cardiac Rehab (Post PCI): Prescribed on prior admission 10/2023     Statin Prescribed (STEMI/NSTEMI/UA &/OR PCI this admission):  Yes    DAPT: Prescriptions for Aspirin/Plavix/Brilinta/Effient e-prescribed to patient's pharmacy Yes    GLP-1 receptor agonist/SGLT2 inhibitor meds discussed w/ patients and encouraged to discuss further with PMD or Endocrinologist at next visit.     Pt discharge copies detail cardiovascular history, medications, testing/treatments, OR patient has created a patient portal account and instructed to provide their records at their 1st appointment.

## 2024-01-03 DIAGNOSIS — I10 ESSENTIAL (PRIMARY) HYPERTENSION: ICD-10-CM

## 2024-01-03 DIAGNOSIS — E66.9 OBESITY, UNSPECIFIED: ICD-10-CM

## 2024-01-03 DIAGNOSIS — F41.9 ANXIETY DISORDER, UNSPECIFIED: ICD-10-CM

## 2024-01-03 DIAGNOSIS — Z88.1 ALLERGY STATUS TO OTHER ANTIBIOTIC AGENTS STATUS: ICD-10-CM

## 2024-01-03 DIAGNOSIS — I25.10 ATHEROSCLEROTIC HEART DISEASE OF NATIVE CORONARY ARTERY WITHOUT ANGINA PECTORIS: ICD-10-CM

## 2024-01-03 DIAGNOSIS — G89.29 OTHER CHRONIC PAIN: ICD-10-CM

## 2024-01-03 DIAGNOSIS — E83.42 HYPOMAGNESEMIA: ICD-10-CM

## 2024-01-03 DIAGNOSIS — Z79.82 LONG TERM (CURRENT) USE OF ASPIRIN: ICD-10-CM

## 2024-01-03 DIAGNOSIS — E78.5 HYPERLIPIDEMIA, UNSPECIFIED: ICD-10-CM

## 2024-01-03 DIAGNOSIS — R07.9 CHEST PAIN, UNSPECIFIED: ICD-10-CM

## 2024-01-03 DIAGNOSIS — Z95.3 PRESENCE OF XENOGENIC HEART VALVE: ICD-10-CM

## 2024-01-03 DIAGNOSIS — Z87.891 PERSONAL HISTORY OF NICOTINE DEPENDENCE: ICD-10-CM

## 2024-01-03 DIAGNOSIS — Z95.5 PRESENCE OF CORONARY ANGIOPLASTY IMPLANT AND GRAFT: ICD-10-CM

## 2024-01-03 DIAGNOSIS — I31.9 DISEASE OF PERICARDIUM, UNSPECIFIED: ICD-10-CM

## 2024-01-03 DIAGNOSIS — E11.9 TYPE 2 DIABETES MELLITUS WITHOUT COMPLICATIONS: ICD-10-CM

## 2024-01-03 DIAGNOSIS — M54.9 DORSALGIA, UNSPECIFIED: ICD-10-CM

## 2024-01-03 DIAGNOSIS — Z95.1 PRESENCE OF AORTOCORONARY BYPASS GRAFT: ICD-10-CM

## 2024-01-03 DIAGNOSIS — J44.9 CHRONIC OBSTRUCTIVE PULMONARY DISEASE, UNSPECIFIED: ICD-10-CM

## 2024-01-03 DIAGNOSIS — Z79.02 LONG TERM (CURRENT) USE OF ANTITHROMBOTICS/ANTIPLATELETS: ICD-10-CM

## 2024-01-03 DIAGNOSIS — Z87.442 PERSONAL HISTORY OF URINARY CALCULI: ICD-10-CM

## 2024-01-03 DIAGNOSIS — Z79.84 LONG TERM (CURRENT) USE OF ORAL HYPOGLYCEMIC DRUGS: ICD-10-CM

## 2024-01-03 DIAGNOSIS — K21.9 GASTRO-ESOPHAGEAL REFLUX DISEASE WITHOUT ESOPHAGITIS: ICD-10-CM

## 2024-01-05 NOTE — H&P ADULT - NS NEC GEN PE MLT EXAM PC
01/05/24 10:06 AM    Patient contacted post ED visit, VBI phone outreaches documented. Patient called practice and scheduled a follow-up ED visit.     Current- Admission 1-5-24    Thank you.  Amina Chauhan  [facility]       detailed exam

## 2024-01-08 ENCOUNTER — APPOINTMENT (OUTPATIENT)
Dept: HEART AND VASCULAR | Facility: CLINIC | Age: 64
End: 2024-01-08
Payer: MEDICARE

## 2024-01-08 ENCOUNTER — NON-APPOINTMENT (OUTPATIENT)
Age: 64
End: 2024-01-08

## 2024-01-08 VITALS
TEMPERATURE: 97.5 F | WEIGHT: 183 LBS | SYSTOLIC BLOOD PRESSURE: 120 MMHG | HEIGHT: 63 IN | DIASTOLIC BLOOD PRESSURE: 83 MMHG | HEART RATE: 78 BPM | OXYGEN SATURATION: 96 % | BODY MASS INDEX: 32.43 KG/M2

## 2024-01-08 DIAGNOSIS — E11.9 TYPE 2 DIABETES MELLITUS W/OUT COMPLICATIONS: ICD-10-CM

## 2024-01-08 DIAGNOSIS — I25.10 ATHEROSCLEROTIC HEART DISEASE OF NATIVE CORONARY ARTERY W/OUT ANGINA PECTORIS: ICD-10-CM

## 2024-01-08 PROCEDURE — 93000 ELECTROCARDIOGRAM COMPLETE: CPT

## 2024-01-08 PROCEDURE — 99214 OFFICE O/P EST MOD 30 MIN: CPT | Mod: 25

## 2024-01-08 NOTE — ASSESSMENT
[FreeTextEntry1] : EKG NSR low voltage PRWP (no change from 10/23)  A/P 63 yr old obese F with DM, HTN, HLD, asthma, ex-smoker ( 1/2 ppd, quit 9/2023), mod- severe AS s/p post AVR/ascending aortic replacement, CABG x1 ( SVG-PDA in 2017 with recent recurrent hospitalization for unstable angina s/p OM1 stent and with patent graft, now LAD PCI  Symptoms previously improved w COPD treatments cardiac status appears stable As main symptoms appear COPD related, refer Pulm  In terms of existing cardiac conditions  #CAD, native vessel, native heart, no angina # s/p DRAKE circumflex, DRAKE LAD - remains angina free continue risk modification c/w ASA, Plavix and Toprol 50mg qd   #Hyperlipidemia Target LDL < 70 - LDL 24 as above. c/w Crestor 40mg qd and Zetia 10mg qd - states she has completed smoking cessation - PMD to recheck lipids   # HTN BP at goal today - c/w chlorthalidone 25mg qd  # DM A1c 6.5% - c/w metformin  # abnormal EKG LVEF wnl as noted PRPW likely related to COPD No changes from baseline Follow for now  #AS # s/p AVR hx as above follow echo spring 2024

## 2024-01-08 NOTE — PHYSICAL EXAM
[Well Developed] : well developed [Well Nourished] : well nourished [No Acute Distress] : no acute distress [Normal Conjunctiva] : normal conjunctiva [Normal Venous Pressure] : normal venous pressure [No Carotid Bruit] : no carotid bruit [Normal S1, S2] : normal S1, S2 [No Rub] : no rub [No Gallop] : no gallop [Clear Lung Fields] : clear lung fields [Good Air Entry] : good air entry [No Respiratory Distress] : no respiratory distress  [Soft] : abdomen soft [Non Tender] : non-tender [No Masses/organomegaly] : no masses/organomegaly [Normal Bowel Sounds] : normal bowel sounds [Normal Gait] : normal gait [No Edema] : no edema [No Cyanosis] : no cyanosis [No Clubbing] : no clubbing [No Varicosities] : no varicosities [No Rash] : no rash [No Skin Lesions] : no skin lesions [Moves all extremities] : moves all extremities [No Focal Deficits] : no focal deficits [Normal Speech] : normal speech [Alert and Oriented] : alert and oriented [Normal memory] : normal memory [de-identified] : A2 preserved, 2/6 MONIQUE RUSB

## 2024-02-01 ENCOUNTER — EMERGENCY (EMERGENCY)
Facility: HOSPITAL | Age: 64
LOS: 1 days | Discharge: HOME | End: 2024-02-01
Attending: STUDENT IN AN ORGANIZED HEALTH CARE EDUCATION/TRAINING PROGRAM | Admitting: STUDENT IN AN ORGANIZED HEALTH CARE EDUCATION/TRAINING PROGRAM
Payer: MEDICARE

## 2024-02-01 VITALS
RESPIRATION RATE: 18 BRPM | OXYGEN SATURATION: 99 % | HEIGHT: 63 IN | HEART RATE: 99 BPM | WEIGHT: 179.9 LBS | TEMPERATURE: 98 F | DIASTOLIC BLOOD PRESSURE: 71 MMHG | SYSTOLIC BLOOD PRESSURE: 129 MMHG

## 2024-02-01 VITALS
OXYGEN SATURATION: 99 % | HEART RATE: 70 BPM | SYSTOLIC BLOOD PRESSURE: 110 MMHG | TEMPERATURE: 98 F | DIASTOLIC BLOOD PRESSURE: 70 MMHG | RESPIRATION RATE: 18 BRPM

## 2024-02-01 DIAGNOSIS — K52.9 NONINFECTIVE GASTROENTERITIS AND COLITIS, UNSPECIFIED: ICD-10-CM

## 2024-02-01 DIAGNOSIS — Z90.49 ACQUIRED ABSENCE OF OTHER SPECIFIED PARTS OF DIGESTIVE TRACT: ICD-10-CM

## 2024-02-01 DIAGNOSIS — E11.9 TYPE 2 DIABETES MELLITUS WITHOUT COMPLICATIONS: ICD-10-CM

## 2024-02-01 DIAGNOSIS — E78.5 HYPERLIPIDEMIA, UNSPECIFIED: ICD-10-CM

## 2024-02-01 DIAGNOSIS — J44.9 CHRONIC OBSTRUCTIVE PULMONARY DISEASE, UNSPECIFIED: ICD-10-CM

## 2024-02-01 DIAGNOSIS — Z88.1 ALLERGY STATUS TO OTHER ANTIBIOTIC AGENTS STATUS: ICD-10-CM

## 2024-02-01 DIAGNOSIS — I25.10 ATHEROSCLEROTIC HEART DISEASE OF NATIVE CORONARY ARTERY WITHOUT ANGINA PECTORIS: ICD-10-CM

## 2024-02-01 DIAGNOSIS — Z90.49 ACQUIRED ABSENCE OF OTHER SPECIFIED PARTS OF DIGESTIVE TRACT: Chronic | ICD-10-CM

## 2024-02-01 DIAGNOSIS — Z95.1 PRESENCE OF AORTOCORONARY BYPASS GRAFT: ICD-10-CM

## 2024-02-01 DIAGNOSIS — Z95.2 PRESENCE OF PROSTHETIC HEART VALVE: Chronic | ICD-10-CM

## 2024-02-01 DIAGNOSIS — G56.00 CARPAL TUNNEL SYNDROME, UNSPECIFIED UPPER LIMB: Chronic | ICD-10-CM

## 2024-02-01 DIAGNOSIS — Z87.19 PERSONAL HISTORY OF OTHER DISEASES OF THE DIGESTIVE SYSTEM: ICD-10-CM

## 2024-02-01 DIAGNOSIS — Z95.5 PRESENCE OF CORONARY ANGIOPLASTY IMPLANT AND GRAFT: ICD-10-CM

## 2024-02-01 DIAGNOSIS — D25.9 LEIOMYOMA OF UTERUS, UNSPECIFIED: Chronic | ICD-10-CM

## 2024-02-01 DIAGNOSIS — I10 ESSENTIAL (PRIMARY) HYPERTENSION: ICD-10-CM

## 2024-02-01 DIAGNOSIS — Z87.891 PERSONAL HISTORY OF NICOTINE DEPENDENCE: ICD-10-CM

## 2024-02-01 DIAGNOSIS — Z98.890 OTHER SPECIFIED POSTPROCEDURAL STATES: Chronic | ICD-10-CM

## 2024-02-01 DIAGNOSIS — N39.0 URINARY TRACT INFECTION, SITE NOT SPECIFIED: ICD-10-CM

## 2024-02-01 DIAGNOSIS — R10.31 RIGHT LOWER QUADRANT PAIN: ICD-10-CM

## 2024-02-01 LAB
ALBUMIN SERPL ELPH-MCNC: 3.8 G/DL — SIGNIFICANT CHANGE UP (ref 3.3–5)
ALBUMIN SERPL ELPH-MCNC: 4.4 G/DL — SIGNIFICANT CHANGE UP (ref 3.3–5)
ALP SERPL-CCNC: 32 U/L — LOW (ref 40–120)
ALP SERPL-CCNC: 34 U/L — LOW (ref 40–120)
ALT FLD-CCNC: 40 U/L — SIGNIFICANT CHANGE UP (ref 10–45)
ALT FLD-CCNC: SIGNIFICANT CHANGE UP (ref 10–45)
ANION GAP SERPL CALC-SCNC: 14 MMOL/L — SIGNIFICANT CHANGE UP (ref 5–17)
ANION GAP SERPL CALC-SCNC: 16 MMOL/L — SIGNIFICANT CHANGE UP (ref 5–17)
APPEARANCE UR: ABNORMAL
AST SERPL-CCNC: 38 U/L — SIGNIFICANT CHANGE UP (ref 10–40)
AST SERPL-CCNC: SIGNIFICANT CHANGE UP (ref 10–40)
BACTERIA # UR AUTO: ABNORMAL /HPF
BASOPHILS # BLD AUTO: 0.04 K/UL — SIGNIFICANT CHANGE UP (ref 0–0.2)
BASOPHILS NFR BLD AUTO: 0.4 % — SIGNIFICANT CHANGE UP (ref 0–2)
BILIRUB SERPL-MCNC: 0.2 MG/DL — SIGNIFICANT CHANGE UP (ref 0.2–1.2)
BILIRUB SERPL-MCNC: 0.3 MG/DL — SIGNIFICANT CHANGE UP (ref 0.2–1.2)
BILIRUB UR-MCNC: ABNORMAL
BUN SERPL-MCNC: 16 MG/DL — SIGNIFICANT CHANGE UP (ref 7–23)
BUN SERPL-MCNC: 17 MG/DL — SIGNIFICANT CHANGE UP (ref 7–23)
CALCIUM SERPL-MCNC: 10.5 MG/DL — SIGNIFICANT CHANGE UP (ref 8.4–10.5)
CALCIUM SERPL-MCNC: 9.2 MG/DL — SIGNIFICANT CHANGE UP (ref 8.4–10.5)
CAST: 19 /LPF — HIGH (ref 0–4)
CHLORIDE SERPL-SCNC: 95 MMOL/L — LOW (ref 96–108)
CHLORIDE SERPL-SCNC: 98 MMOL/L — SIGNIFICANT CHANGE UP (ref 96–108)
CO2 SERPL-SCNC: 23 MMOL/L — SIGNIFICANT CHANGE UP (ref 22–31)
CO2 SERPL-SCNC: 24 MMOL/L — SIGNIFICANT CHANGE UP (ref 22–31)
COARSE GRAN CASTS #/AREA URNS AUTO: PRESENT
COLOR SPEC: SIGNIFICANT CHANGE UP
CREAT SERPL-MCNC: 0.92 MG/DL — SIGNIFICANT CHANGE UP (ref 0.5–1.3)
CREAT SERPL-MCNC: 0.92 MG/DL — SIGNIFICANT CHANGE UP (ref 0.5–1.3)
DIFF PNL FLD: ABNORMAL
EGFR: 69 ML/MIN/1.73M2 — SIGNIFICANT CHANGE UP
EGFR: 69 ML/MIN/1.73M2 — SIGNIFICANT CHANGE UP
EOSINOPHIL # BLD AUTO: 0.35 K/UL — SIGNIFICANT CHANGE UP (ref 0–0.5)
EOSINOPHIL NFR BLD AUTO: 3.4 % — SIGNIFICANT CHANGE UP (ref 0–6)
GLUCOSE SERPL-MCNC: 114 MG/DL — HIGH (ref 70–99)
GLUCOSE SERPL-MCNC: 145 MG/DL — HIGH (ref 70–99)
GLUCOSE UR QL: NEGATIVE MG/DL — SIGNIFICANT CHANGE UP
HCT VFR BLD CALC: 47.5 % — HIGH (ref 34.5–45)
HGB BLD-MCNC: 15.1 G/DL — SIGNIFICANT CHANGE UP (ref 11.5–15.5)
IMM GRANULOCYTES NFR BLD AUTO: 0.6 % — SIGNIFICANT CHANGE UP (ref 0–0.9)
KETONES UR-MCNC: 15 MG/DL
LEUKOCYTE ESTERASE UR-ACNC: ABNORMAL
LIDOCAIN IGE QN: 29 U/L — SIGNIFICANT CHANGE UP (ref 7–60)
LYMPHOCYTES # BLD AUTO: 2.89 K/UL — SIGNIFICANT CHANGE UP (ref 1–3.3)
LYMPHOCYTES # BLD AUTO: 28 % — SIGNIFICANT CHANGE UP (ref 13–44)
MCHC RBC-ENTMCNC: 25.9 PG — LOW (ref 27–34)
MCHC RBC-ENTMCNC: 31.8 GM/DL — LOW (ref 32–36)
MCV RBC AUTO: 81.3 FL — SIGNIFICANT CHANGE UP (ref 80–100)
MONOCYTES # BLD AUTO: 0.77 K/UL — SIGNIFICANT CHANGE UP (ref 0–0.9)
MONOCYTES NFR BLD AUTO: 7.5 % — SIGNIFICANT CHANGE UP (ref 2–14)
MUCOUS THREADS # UR AUTO: PRESENT
NEUTROPHILS # BLD AUTO: 6.21 K/UL — SIGNIFICANT CHANGE UP (ref 1.8–7.4)
NEUTROPHILS NFR BLD AUTO: 60.1 % — SIGNIFICANT CHANGE UP (ref 43–77)
NITRITE UR-MCNC: NEGATIVE — SIGNIFICANT CHANGE UP
NRBC # BLD: 0 /100 WBCS — SIGNIFICANT CHANGE UP (ref 0–0)
PH UR: 5.5 — SIGNIFICANT CHANGE UP (ref 5–8)
PLATELET # BLD AUTO: 328 K/UL — SIGNIFICANT CHANGE UP (ref 150–400)
POTASSIUM SERPL-MCNC: 4 MMOL/L — SIGNIFICANT CHANGE UP (ref 3.5–5.3)
POTASSIUM SERPL-MCNC: SIGNIFICANT CHANGE UP (ref 3.5–5.3)
POTASSIUM SERPL-SCNC: 4 MMOL/L — SIGNIFICANT CHANGE UP (ref 3.5–5.3)
POTASSIUM SERPL-SCNC: SIGNIFICANT CHANGE UP (ref 3.5–5.3)
PROT SERPL-MCNC: 6.1 G/DL — SIGNIFICANT CHANGE UP (ref 6–8.3)
PROT SERPL-MCNC: 7.5 G/DL — SIGNIFICANT CHANGE UP (ref 6–8.3)
PROT UR-MCNC: 100 MG/DL
RBC # BLD: 5.84 M/UL — HIGH (ref 3.8–5.2)
RBC # FLD: 14.5 % — SIGNIFICANT CHANGE UP (ref 10.3–14.5)
RBC CASTS # UR COMP ASSIST: 24 /HPF — HIGH (ref 0–4)
SODIUM SERPL-SCNC: 135 MMOL/L — SIGNIFICANT CHANGE UP (ref 135–145)
SODIUM SERPL-SCNC: 135 MMOL/L — SIGNIFICANT CHANGE UP (ref 135–145)
SP GR SPEC: >1.03 — HIGH (ref 1–1.03)
SQUAMOUS # UR AUTO: 25 /HPF — HIGH (ref 0–5)
UROBILINOGEN FLD QL: 1 MG/DL — SIGNIFICANT CHANGE UP (ref 0.2–1)
WBC # BLD: 10.32 K/UL — SIGNIFICANT CHANGE UP (ref 3.8–10.5)
WBC # FLD AUTO: 10.32 K/UL — SIGNIFICANT CHANGE UP (ref 3.8–10.5)
WBC CLUMPS # UR AUTO: PRESENT
WBC UR QL: 52 /HPF — HIGH (ref 0–5)

## 2024-02-01 PROCEDURE — 99285 EMERGENCY DEPT VISIT HI MDM: CPT

## 2024-02-01 PROCEDURE — 74177 CT ABD & PELVIS W/CONTRAST: CPT | Mod: 26,MA

## 2024-02-01 RX ORDER — IOHEXOL 300 MG/ML
30 INJECTION, SOLUTION INTRAVENOUS ONCE
Refills: 0 | Status: COMPLETED | OUTPATIENT
Start: 2024-02-01 | End: 2024-02-01

## 2024-02-01 RX ORDER — SODIUM CHLORIDE 9 MG/ML
1000 INJECTION INTRAMUSCULAR; INTRAVENOUS; SUBCUTANEOUS ONCE
Refills: 0 | Status: COMPLETED | OUTPATIENT
Start: 2024-02-01 | End: 2024-02-01

## 2024-02-01 RX ORDER — MORPHINE SULFATE 50 MG/1
4 CAPSULE, EXTENDED RELEASE ORAL ONCE
Refills: 0 | Status: DISCONTINUED | OUTPATIENT
Start: 2024-02-01 | End: 2024-02-01

## 2024-02-01 RX ORDER — SODIUM CHLORIDE 9 MG/ML
500 INJECTION INTRAMUSCULAR; INTRAVENOUS; SUBCUTANEOUS ONCE
Refills: 0 | Status: COMPLETED | OUTPATIENT
Start: 2024-02-01 | End: 2024-02-01

## 2024-02-01 RX ORDER — ACETAMINOPHEN 500 MG
1000 TABLET ORAL ONCE
Refills: 0 | Status: COMPLETED | OUTPATIENT
Start: 2024-02-01 | End: 2024-02-01

## 2024-02-01 RX ADMIN — SODIUM CHLORIDE 1000 MILLILITER(S): 9 INJECTION INTRAMUSCULAR; INTRAVENOUS; SUBCUTANEOUS at 21:35

## 2024-02-01 RX ADMIN — SODIUM CHLORIDE 500 MILLILITER(S): 9 INJECTION INTRAMUSCULAR; INTRAVENOUS; SUBCUTANEOUS at 23:03

## 2024-02-01 RX ADMIN — Medication 1000 MILLIGRAM(S): at 22:10

## 2024-02-01 RX ADMIN — MORPHINE SULFATE 4 MILLIGRAM(S): 50 CAPSULE, EXTENDED RELEASE ORAL at 22:10

## 2024-02-01 RX ADMIN — Medication 400 MILLIGRAM(S): at 21:50

## 2024-02-01 RX ADMIN — MORPHINE SULFATE 4 MILLIGRAM(S): 50 CAPSULE, EXTENDED RELEASE ORAL at 21:35

## 2024-02-01 RX ADMIN — IOHEXOL 30 MILLILITER(S): 300 INJECTION, SOLUTION INTRAVENOUS at 21:38

## 2024-02-01 NOTE — ED ADULT NURSE NOTE - NSFALLUNIVINTERV_ED_ALL_ED
Bed/Stretcher in lowest position, wheels locked, appropriate side rails in place/Call bell, personal items and telephone in reach/Instruct patient to call for assistance before getting out of bed/chair/stretcher/Non-slip footwear applied when patient is off stretcher/[place] to call system/Physically safe environment - no spills, clutter or unnecessary equipment/Purposeful proactive rounding/Room/bathroom lighting operational, light cord in reach

## 2024-02-01 NOTE — ED PROVIDER NOTE - CARE PLAN
1 Principal Discharge DX:	Abdominal pain   Principal Discharge DX:	Abdominal pain  Secondary Diagnosis:	Acute UTI  Secondary Diagnosis:	Enteritis

## 2024-02-01 NOTE — ED PROVIDER NOTE - CLINICAL SUMMARY MEDICAL DECISION MAKING FREE TEXT BOX
63 year old female, former smoker, with history of asthma/COPD, sciatica/herniated disc, HTN, HLD, DM2, obesity, anxiety, AS s/p bioAVR/ascending aorta replacement, CAD s/p CABG and multiple stents, presenting with abdominal pain. 63 year old female, former smoker, with history of asthma/COPD, sciatica/herniated disc, HTN, HLD, DM2, obesity, anxiety, AS s/p bioAVR/ascending aorta replacement, CAD s/p CABG and multiple stents, presenting with abdominal pain. Overall nontoxic with good vitals but diffusely tender to palpation on exam in setting of significant comorbidities and advanced age--will obtain CT imaging to r/o acute process e.g. perforation vs obstruction vs pancreatitis vs cholecystitis vs diverticular abscess. IVF/morphine/ofirmev for pain control. Labs to include lipase. UA.

## 2024-02-01 NOTE — ED PROVIDER NOTE - PATIENT PORTAL LINK FT
You can access the FollowMyHealth Patient Portal offered by [facility] by registering at the following website: [email or web address]. By joining [employer]’s FollowMyHealth portal, you will also be able to view your health information using other applications (apps) compatible with our system.

## 2024-02-01 NOTE — ED ADULT NURSE NOTE - CAS DISCH CONDITION
Plan: Patient reassured that these moles appear to be benign and are not medically necessary to remove. Also discussed that she would trade the mole for a scar which may be more noticeable than mole. The patient will monitor lesions and follow up with changes, but she declines removal today as not medically necessary. Detail Level: Zone Stable

## 2024-02-01 NOTE — ED PROVIDER NOTE - PHYSICAL EXAMINATION
Gen - NAD; well-appearing; A+Ox3   HEENT - NCAT, EOMI, moist mucous membranes, clear oropharynx  Neck - supple  Resp - CTAB, no increased WOB  CV -  RRR, no m/r/g  Abd - soft, ND, diffusely tender greater in lower abdomen; no guarding or rebound  Back - no CVA tenderness  MSK - FROM of b/l UE and LE, no gross deformities  Extrem - no LE edema/erythema/tenderness  Neuro - no focal motor or sensation deficits  Skin - warm, well perfused

## 2024-02-01 NOTE — ED PROVIDER NOTE - OBJECTIVE STATEMENT
63 year old female, former smoker, with history of asthma/COPD, sciatica/herniated disc, HTN, HLD, DM2, obesity, anxiety, AS s/p bioAVR/ascending aorta replacement, CAD s/p CABG and multiple stents, presenting with abdominal pain. 63 year old female, former smoker, with history of asthma/COPD, sciatica/herniated disc, HTN, HLD, DM2, obesity, anxiety, AS s/p bioAVR/ascending aorta replacement, CAD s/p CABG and multiple stents, presenting with abdominal pain. States having 3d of crampy b/l lower quadrant abdominal pain that is severe at times associated with loose BMs, denies fevers, chills, chest pain, sob, n/v, bloody stools, dysuria. Took vicodin at home. SHx of resection for diverticulitis.

## 2024-02-01 NOTE — ED ADULT TRIAGE NOTE - CHIEF COMPLAINT QUOTE
Pt presents to the ED with complaints of abdominal pain for the past 3 days with intermittent diarrhea, denies nausea, vomiting, fevers, or urinary symptoms.

## 2024-02-01 NOTE — ED PROVIDER NOTE - NSFOLLOWUPINSTRUCTIONS_ED_ALL_ED_FT
Thank you for visiting [facility] Emergency Department.      Please know that no emergency visit is complete without follow-up with your primary care provider in 1 week.  Please bring copies of all discharge papers and results and show to your doctor.    Please continue taking all previous medications as instructed unless we discussed otherwise.   I appreciated your patience and hope you feel better soon.   Return to ER immediately if you develop fevers, chills, chest pain, shortness of breath, worsening dizziness, and/or any concerning symptoms.  Urinary Tract Infection, Adult    A urinary tract infection (UTI) is an infection of any part of the urinary tract. The urinary tract includes the kidneys, ureters, bladder, and urethra. These organs make, store, and get rid of urine in the body.    An upper UTI affects the ureters and kidneys. A lower UTI affects the bladder and urethra.    What are the causes?  Most urinary tract infections are caused by bacteria in your genital area around your urethra, where urine leaves your body. These bacteria grow and cause inflammation of your urinary tract.    What increases the risk?  You are more likely to develop this condition if:  You have a urinary catheter that stays in place.  You are not able to control when you urinate or have a bowel movement (incontinence).  You are female and you:  Use a spermicide or diaphragm for birth control.  Have low estrogen levels.  Are pregnant.  You have certain genes that increase your risk.  You are sexually active.  You take antibiotic medicines.  You have a condition that causes your flow of urine to slow down, such as:  An enlarged prostate, if you are male.  Blockage in your urethra.  A kidney stone.  A nerve condition that affects your bladder control (neurogenic bladder).  Not getting enough to drink, or not urinating often.  You have certain medical conditions, such as:  Diabetes.  A weak disease-fighting system (immunesystem).  Sickle cell disease.  Gout.  Spinal cord injury.  What are the signs or symptoms?  Symptoms of this condition include:  Needing to urinate right away (urgency).  Frequent urination. This may include small amounts of urine each time you urinate.  Pain or burning with urination.  Blood in the urine.  Urine that smells bad or unusual.  Trouble urinating.  Cloudy urine.  Vaginal discharge, if you are female.  Pain in the abdomen or the lower back.  You may also have:  Vomiting or a decreased appetite.  Confusion.  Irritability or tiredness.  A fever or chills.  Diarrhea.  The first symptom in older adults may be confusion. In some cases, they may not have any symptoms until the infection has worsened.    How is this diagnosed?  This condition is diagnosed based on your medical history and a physical exam. You may also have other tests, including:  Urine tests.  Blood tests.  Tests for STIs (sexually transmitted infections).  If you have had more than one UTI, a cystoscopy or imaging studies may be done to determine the cause of the infections.    How is this treated?  Treatment for this condition includes:  Antibiotic medicine.  Over-the-counter medicines to treat discomfort.  Drinking enough water to stay hydrated.  If you have frequent infections or have other conditions such as a kidney stone, you may need to see a health care provider who specializes in the urinary tract (urologist).    In rare cases, urinary tract infections can cause sepsis. Sepsis is a life-threatening condition that occurs when the body responds to an infection. Sepsis is treated in the hospital with IV antibiotics, fluids, and other medicines.    Follow these instructions at home:    Medicines    Take over-the-counter and prescription medicines only as told by your health care provider.  If you were prescribed an antibiotic medicine, take it as told by your health care provider. Do not stop using the antibiotic even if you start to feel better.  General instructions    Make sure you:  Empty your bladder often and completely. Do not hold urine for long periods of time.  Empty your bladder after sex.  Wipe from front to back after urinating or having a bowel movement if you are female. Use each tissue only one time when you wipe.  Drink enough fluid to keep your urine pale yellow.  Keep all follow-up visits. This is important.  Contact a health care provider if:  Your symptoms do not get better after 1–2 days.  Your symptoms go away and then return.  Get help right away if:  You have severe pain in your back or your lower abdomen.  You have a fever or chills.  You have nausea or vomiting.  Summary  A urinary tract infection (UTI) is an infection of any part of the urinary tract, which includes the kidneys, ureters, bladder, and urethra.  Most urinary tract infections are caused by bacteria in your genital area.  Treatment for this condition often includes antibiotic medicines.  If you were prescribed an antibiotic medicine, take it as told by your health care provider. Do not stop using the antibiotic even if you start to feel better.  Keep all follow-up visits. This is important.  This information is not intended to replace advice given to you by your health care provider. Make sure you discuss any questions you have with your health care provider.    Enteritis    AMBULATORY CARE:    Enteritis is inflammation of the small intestine. It may be caused by eating foods or drinking liquids contaminated with a virus, bacteria, or parasites. It may also be caused by certain medicines, damage from radiation, and medical conditions such as Crohn disease.    Common signs and symptoms include the following:    Diarrhea    Blood or mucus in your bowel movements    Nausea and vomiting    Fever    Abdominal pain  Seek care immediately if:    You cannot stop vomiting.    You have not urinated for 12 hours.  Contact your healthcare provider if:    You have a fever over 101.5.    You have blood or mucus in your bowel movements.    You continue to vomit or have diarrhea for more than 3 days, even after treatment.    You have a dry mouth and eyes, you are urinating less than usual, and you feel dizzy when you stand up.    Your mouth or eyes are dry. You are not urinating as much or as often.    You are losing weight without trying.    You have questions or concerns about your condition or care.  Treatment for enteritis depends on the cause. Enteritis may get better on its own, or you may need any of the following to treat and manage enteritis:    Medicines may be given to fight an infection caused by bacteria or a parasite. You may also need medicines to slow or stop your diarrhea or vomiting. Do not take these medicines unless your healthcare provider say it is okay. Other medicines may be needed to treat medical conditions that are causing enteritis.    Eat foods that help to decrease symptoms. Limit or avoid foods and liquids that are high in sugar, fat, and fiber to help relieve diarrhea. It may be helpful to avoid lactose. Lactose is a type of sugar that is found in milk products. You may be able to tolerate soups, broths, well-cooked vegetables, canned fruit, and baked or broiled meats. Ask your dietitian or healthcare provider if you should follow a special diet. You may need to avoid other foods if you have certain medical conditions such as celiac disease.    Drink liquids as directed. Ask how much liquid to drink each day and which liquids are best for you. It is important to prevent or treat dehydration. Even if you have been vomiting, suck on ice chips or take small sips of clear liquids often. Slowly increase the amount of clear liquids you drink. If you become dehydrated, you may need IV liquids.    Drink an oral rehydration solution (ORS) as directed. An ORS contains water, salts, and sugar that are needed to replace lost body fluids. Ask what kind of ORS to use, how much to drink, and where to get it.  Prevent enteritis: Enteritis that is caused by bacteria, parasites, or viruses can be prevented. The following may help to prevent this type of enteritis:    Wash your hands often. Use soap and water. Wash your hands after you use the bathroom, change a child's diapers, or sneeze. Wash your hands before you prepare or eat food.  Handwashing      Clean surfaces and do laundry often. Wash your clothes and towels separately from the rest of the laundry. Clean surfaces in your home with antibacterial  or bleach.    Clean food thoroughly and cook safely. Wash raw vegetables before you cook. Cook meat, fish, and eggs fully. Do not use the same dishes for raw meat as you do for other foods. Refrigerate any leftover food immediately.    Be aware when you camp or travel. Drink only clean water. Do not drink from rivers or lakes unless you purify or boil the water first. When you travel, drink bottled water and do not add ice. Do not eat fruit that has not been peeled. Do not eat raw fish or meat that is not fully cooked.

## 2024-02-01 NOTE — ED ADULT NURSE NOTE - OBJECTIVE STATEMENT
63yoF PMH of diverticulitis s/p resection surgery 15 years ago came to ED c/o abdominal pain. Pt states it feels like labor contractions all along her lower abdomen. Pt denies fever, chest pain, SOB, no nausea/vomiting but had a few episodes of diarrhea. Pt denies bloody diarrhea.

## 2024-02-02 PROCEDURE — 87086 URINE CULTURE/COLONY COUNT: CPT

## 2024-02-02 PROCEDURE — 96375 TX/PRO/DX INJ NEW DRUG ADDON: CPT

## 2024-02-02 PROCEDURE — 96374 THER/PROPH/DIAG INJ IV PUSH: CPT | Mod: XU

## 2024-02-02 PROCEDURE — 83690 ASSAY OF LIPASE: CPT

## 2024-02-02 PROCEDURE — 81001 URINALYSIS AUTO W/SCOPE: CPT

## 2024-02-02 PROCEDURE — 74177 CT ABD & PELVIS W/CONTRAST: CPT | Mod: MA

## 2024-02-02 PROCEDURE — 85025 COMPLETE CBC W/AUTO DIFF WBC: CPT

## 2024-02-02 PROCEDURE — 99284 EMERGENCY DEPT VISIT MOD MDM: CPT | Mod: 25

## 2024-02-02 PROCEDURE — 80053 COMPREHEN METABOLIC PANEL: CPT

## 2024-02-02 PROCEDURE — 96376 TX/PRO/DX INJ SAME DRUG ADON: CPT

## 2024-02-02 PROCEDURE — 36415 COLL VENOUS BLD VENIPUNCTURE: CPT

## 2024-02-02 RX ORDER — CEFPODOXIME PROXETIL 100 MG
1 TABLET ORAL
Qty: 14 | Refills: 0
Start: 2024-02-02 | End: 2024-02-08

## 2024-02-02 RX ORDER — CEFTRIAXONE 500 MG/1
1000 INJECTION, POWDER, FOR SOLUTION INTRAMUSCULAR; INTRAVENOUS ONCE
Refills: 0 | Status: COMPLETED | OUTPATIENT
Start: 2024-02-02 | End: 2024-02-02

## 2024-02-02 RX ORDER — MORPHINE SULFATE 50 MG/1
4 CAPSULE, EXTENDED RELEASE ORAL ONCE
Refills: 0 | Status: DISCONTINUED | OUTPATIENT
Start: 2024-02-02 | End: 2024-02-02

## 2024-02-02 RX ADMIN — MORPHINE SULFATE 4 MILLIGRAM(S): 50 CAPSULE, EXTENDED RELEASE ORAL at 00:19

## 2024-02-02 RX ADMIN — CEFTRIAXONE 100 MILLIGRAM(S): 500 INJECTION, POWDER, FOR SOLUTION INTRAMUSCULAR; INTRAVENOUS at 00:19

## 2024-02-02 NOTE — ED ADULT NURSE REASSESSMENT NOTE - NS ED NURSE REASSESS COMMENT FT1
Received pt in no acute distress, breathing with ease on room air, 20g heplock to LAC, intact, no edema or redness noted, flushing well. Pt c/o mild abdominal pain 3/10, denies N/V. AYDEE Guo made aware. Pt pending CT scan results and MD disposition. Assisted with all needs.

## 2024-02-03 LAB
CULTURE RESULTS: SIGNIFICANT CHANGE UP
SPECIMEN SOURCE: SIGNIFICANT CHANGE UP

## 2024-02-08 ENCOUNTER — APPOINTMENT (OUTPATIENT)
Dept: HEART AND VASCULAR | Facility: CLINIC | Age: 64
End: 2024-02-08

## 2024-02-08 NOTE — H&P ADULT - WEIGHT IN LBS
Initiate Treatment: Cephalexin 500 mg take one capsule three times daily for one week.
Continue Regimen: Continue OTC pain medication PRN without exceeding recommended dosages
Render In Strict Bullet Format?: No
Detail Level: Zone
179.8

## 2024-02-29 ENCOUNTER — EMERGENCY (EMERGENCY)
Facility: HOSPITAL | Age: 64
LOS: 1 days | Discharge: HOME | End: 2024-02-29
Attending: EMERGENCY MEDICINE | Admitting: EMERGENCY MEDICINE
Payer: MEDICARE

## 2024-02-29 VITALS
TEMPERATURE: 98 F | SYSTOLIC BLOOD PRESSURE: 105 MMHG | HEART RATE: 82 BPM | DIASTOLIC BLOOD PRESSURE: 72 MMHG | WEIGHT: 179.9 LBS | OXYGEN SATURATION: 96 % | RESPIRATION RATE: 16 BRPM | HEIGHT: 63 IN

## 2024-02-29 VITALS
DIASTOLIC BLOOD PRESSURE: 76 MMHG | TEMPERATURE: 98 F | OXYGEN SATURATION: 99 % | HEART RATE: 79 BPM | RESPIRATION RATE: 18 BRPM | SYSTOLIC BLOOD PRESSURE: 121 MMHG

## 2024-02-29 DIAGNOSIS — Z95.1 PRESENCE OF AORTOCORONARY BYPASS GRAFT: ICD-10-CM

## 2024-02-29 DIAGNOSIS — J44.9 CHRONIC OBSTRUCTIVE PULMONARY DISEASE, UNSPECIFIED: ICD-10-CM

## 2024-02-29 DIAGNOSIS — Z90.49 ACQUIRED ABSENCE OF OTHER SPECIFIED PARTS OF DIGESTIVE TRACT: Chronic | ICD-10-CM

## 2024-02-29 DIAGNOSIS — I10 ESSENTIAL (PRIMARY) HYPERTENSION: ICD-10-CM

## 2024-02-29 DIAGNOSIS — R10.13 EPIGASTRIC PAIN: ICD-10-CM

## 2024-02-29 DIAGNOSIS — D25.9 LEIOMYOMA OF UTERUS, UNSPECIFIED: Chronic | ICD-10-CM

## 2024-02-29 DIAGNOSIS — E11.9 TYPE 2 DIABETES MELLITUS WITHOUT COMPLICATIONS: ICD-10-CM

## 2024-02-29 DIAGNOSIS — E78.5 HYPERLIPIDEMIA, UNSPECIFIED: ICD-10-CM

## 2024-02-29 DIAGNOSIS — R19.7 DIARRHEA, UNSPECIFIED: ICD-10-CM

## 2024-02-29 DIAGNOSIS — K76.0 FATTY (CHANGE OF) LIVER, NOT ELSEWHERE CLASSIFIED: ICD-10-CM

## 2024-02-29 DIAGNOSIS — Z88.0 ALLERGY STATUS TO PENICILLIN: ICD-10-CM

## 2024-02-29 DIAGNOSIS — G56.00 CARPAL TUNNEL SYNDROME, UNSPECIFIED UPPER LIMB: Chronic | ICD-10-CM

## 2024-02-29 DIAGNOSIS — Z98.890 OTHER SPECIFIED POSTPROCEDURAL STATES: Chronic | ICD-10-CM

## 2024-02-29 DIAGNOSIS — Z87.891 PERSONAL HISTORY OF NICOTINE DEPENDENCE: ICD-10-CM

## 2024-02-29 DIAGNOSIS — Z95.2 PRESENCE OF PROSTHETIC HEART VALVE: Chronic | ICD-10-CM

## 2024-02-29 DIAGNOSIS — F41.9 ANXIETY DISORDER, UNSPECIFIED: ICD-10-CM

## 2024-02-29 LAB
ALBUMIN SERPL ELPH-MCNC: 4.7 G/DL — SIGNIFICANT CHANGE UP (ref 3.3–5)
ALP SERPL-CCNC: 58 U/L — SIGNIFICANT CHANGE UP (ref 40–120)
ALT FLD-CCNC: 70 U/L — HIGH (ref 10–45)
ANION GAP SERPL CALC-SCNC: 18 MMOL/L — HIGH (ref 5–17)
APPEARANCE UR: ABNORMAL
AST SERPL-CCNC: 66 U/L — HIGH (ref 10–40)
BACTERIA # UR AUTO: NEGATIVE /HPF — SIGNIFICANT CHANGE UP
BASOPHILS # BLD AUTO: 0.03 K/UL — SIGNIFICANT CHANGE UP (ref 0–0.2)
BASOPHILS NFR BLD AUTO: 0.8 % — SIGNIFICANT CHANGE UP (ref 0–2)
BILIRUB SERPL-MCNC: 0.3 MG/DL — SIGNIFICANT CHANGE UP (ref 0.2–1.2)
BILIRUB UR-MCNC: NEGATIVE — SIGNIFICANT CHANGE UP
BUN SERPL-MCNC: 19 MG/DL — SIGNIFICANT CHANGE UP (ref 7–23)
CALCIUM SERPL-MCNC: 10 MG/DL — SIGNIFICANT CHANGE UP (ref 8.4–10.5)
CAST: 25 /LPF — HIGH (ref 0–4)
CHLORIDE SERPL-SCNC: 96 MMOL/L — SIGNIFICANT CHANGE UP (ref 96–108)
CO2 SERPL-SCNC: 24 MMOL/L — SIGNIFICANT CHANGE UP (ref 22–31)
COARSE GRAN CASTS #/AREA URNS AUTO: PRESENT
COLOR SPEC: SIGNIFICANT CHANGE UP
CREAT SERPL-MCNC: 1.13 MG/DL — SIGNIFICANT CHANGE UP (ref 0.5–1.3)
DIFF PNL FLD: NEGATIVE — SIGNIFICANT CHANGE UP
EGFR: 54 ML/MIN/1.73M2 — LOW
EOSINOPHIL # BLD AUTO: 0.37 K/UL — SIGNIFICANT CHANGE UP (ref 0–0.5)
EOSINOPHIL NFR BLD AUTO: 10.3 % — HIGH (ref 0–6)
FINE GRAN CASTS #/AREA URNS AUTO: PRESENT
GLUCOSE SERPL-MCNC: 118 MG/DL — HIGH (ref 70–99)
GLUCOSE UR QL: NEGATIVE MG/DL — SIGNIFICANT CHANGE UP
HCT VFR BLD CALC: 44.8 % — SIGNIFICANT CHANGE UP (ref 34.5–45)
HGB BLD-MCNC: 14.2 G/DL — SIGNIFICANT CHANGE UP (ref 11.5–15.5)
HYALINE CASTS # UR AUTO: PRESENT
IMM GRANULOCYTES NFR BLD AUTO: 0.6 % — SIGNIFICANT CHANGE UP (ref 0–0.9)
KETONES UR-MCNC: ABNORMAL MG/DL
LEUKOCYTE ESTERASE UR-ACNC: ABNORMAL
LIDOCAIN IGE QN: 33 U/L — SIGNIFICANT CHANGE UP (ref 7–60)
LYMPHOCYTES # BLD AUTO: 1.19 K/UL — SIGNIFICANT CHANGE UP (ref 1–3.3)
LYMPHOCYTES # BLD AUTO: 33.1 % — SIGNIFICANT CHANGE UP (ref 13–44)
MCHC RBC-ENTMCNC: 25.8 PG — LOW (ref 27–34)
MCHC RBC-ENTMCNC: 31.7 GM/DL — LOW (ref 32–36)
MCV RBC AUTO: 81.5 FL — SIGNIFICANT CHANGE UP (ref 80–100)
MONOCYTES # BLD AUTO: 0.54 K/UL — SIGNIFICANT CHANGE UP (ref 0–0.9)
MONOCYTES NFR BLD AUTO: 15 % — HIGH (ref 2–14)
NEUTROPHILS # BLD AUTO: 1.45 K/UL — LOW (ref 1.8–7.4)
NEUTROPHILS NFR BLD AUTO: 40.2 % — LOW (ref 43–77)
NITRITE UR-MCNC: NEGATIVE — SIGNIFICANT CHANGE UP
NRBC # BLD: 0 /100 WBCS — SIGNIFICANT CHANGE UP (ref 0–0)
PH UR: 5 — SIGNIFICANT CHANGE UP (ref 5–8)
PLATELET # BLD AUTO: 207 K/UL — SIGNIFICANT CHANGE UP (ref 150–400)
POTASSIUM SERPL-MCNC: 3.7 MMOL/L — SIGNIFICANT CHANGE UP (ref 3.5–5.3)
POTASSIUM SERPL-SCNC: 3.7 MMOL/L — SIGNIFICANT CHANGE UP (ref 3.5–5.3)
PROT SERPL-MCNC: 7.9 G/DL — SIGNIFICANT CHANGE UP (ref 6–8.3)
PROT UR-MCNC: 100 MG/DL
RBC # BLD: 5.5 M/UL — HIGH (ref 3.8–5.2)
RBC # FLD: 14.1 % — SIGNIFICANT CHANGE UP (ref 10.3–14.5)
RBC CASTS # UR COMP ASSIST: 7 /HPF — HIGH (ref 0–4)
SODIUM SERPL-SCNC: 138 MMOL/L — SIGNIFICANT CHANGE UP (ref 135–145)
SP GR SPEC: 1.02 — SIGNIFICANT CHANGE UP (ref 1–1.03)
SQUAMOUS # UR AUTO: 10 /HPF — HIGH (ref 0–5)
UROBILINOGEN FLD QL: 1 MG/DL — SIGNIFICANT CHANGE UP (ref 0.2–1)
WBC # BLD: 3.6 K/UL — LOW (ref 3.8–10.5)
WBC # FLD AUTO: 3.6 K/UL — LOW (ref 3.8–10.5)
WBC UR QL: 4 /HPF — SIGNIFICANT CHANGE UP (ref 0–5)

## 2024-02-29 PROCEDURE — 36415 COLL VENOUS BLD VENIPUNCTURE: CPT

## 2024-02-29 PROCEDURE — 80053 COMPREHEN METABOLIC PANEL: CPT

## 2024-02-29 PROCEDURE — 96375 TX/PRO/DX INJ NEW DRUG ADDON: CPT

## 2024-02-29 PROCEDURE — 76705 ECHO EXAM OF ABDOMEN: CPT | Mod: 26

## 2024-02-29 PROCEDURE — 99284 EMERGENCY DEPT VISIT MOD MDM: CPT | Mod: 25

## 2024-02-29 PROCEDURE — 85025 COMPLETE CBC W/AUTO DIFF WBC: CPT

## 2024-02-29 PROCEDURE — 99285 EMERGENCY DEPT VISIT HI MDM: CPT

## 2024-02-29 PROCEDURE — 83690 ASSAY OF LIPASE: CPT

## 2024-02-29 PROCEDURE — 76705 ECHO EXAM OF ABDOMEN: CPT

## 2024-02-29 PROCEDURE — 96374 THER/PROPH/DIAG INJ IV PUSH: CPT

## 2024-02-29 PROCEDURE — 87086 URINE CULTURE/COLONY COUNT: CPT

## 2024-02-29 PROCEDURE — 81001 URINALYSIS AUTO W/SCOPE: CPT

## 2024-02-29 RX ORDER — LIDOCAINE 4 G/100G
10 CREAM TOPICAL ONCE
Refills: 0 | Status: COMPLETED | OUTPATIENT
Start: 2024-02-29 | End: 2024-02-29

## 2024-02-29 RX ORDER — ACETAMINOPHEN 500 MG
1000 TABLET ORAL ONCE
Refills: 0 | Status: COMPLETED | OUTPATIENT
Start: 2024-02-29 | End: 2024-02-29

## 2024-02-29 RX ORDER — SODIUM CHLORIDE 9 MG/ML
500 INJECTION INTRAMUSCULAR; INTRAVENOUS; SUBCUTANEOUS ONCE
Refills: 0 | Status: COMPLETED | OUTPATIENT
Start: 2024-02-29 | End: 2024-02-29

## 2024-02-29 RX ORDER — ONDANSETRON 8 MG/1
4 TABLET, FILM COATED ORAL ONCE
Refills: 0 | Status: COMPLETED | OUTPATIENT
Start: 2024-02-29 | End: 2024-02-29

## 2024-02-29 RX ORDER — FAMOTIDINE 10 MG/ML
20 INJECTION INTRAVENOUS ONCE
Refills: 0 | Status: COMPLETED | OUTPATIENT
Start: 2024-02-29 | End: 2024-02-29

## 2024-02-29 RX ADMIN — Medication 400 MILLIGRAM(S): at 14:18

## 2024-02-29 RX ADMIN — Medication 30 MILLILITER(S): at 14:17

## 2024-02-29 RX ADMIN — ONDANSETRON 4 MILLIGRAM(S): 8 TABLET, FILM COATED ORAL at 14:26

## 2024-02-29 RX ADMIN — SODIUM CHLORIDE 1000 MILLILITER(S): 9 INJECTION INTRAMUSCULAR; INTRAVENOUS; SUBCUTANEOUS at 14:18

## 2024-02-29 RX ADMIN — LIDOCAINE 10 MILLILITER(S): 4 CREAM TOPICAL at 14:17

## 2024-02-29 RX ADMIN — FAMOTIDINE 20 MILLIGRAM(S): 10 INJECTION INTRAVENOUS at 14:17

## 2024-02-29 NOTE — ED PROVIDER NOTE - OBJECTIVE STATEMENT
Pt is a former smoker, with FHx MI, PMHx of asthma/COPD, sciatica/herniated disc, HTN, HLD, DM2, obesity, anxiety, AS s/p bioAVR/ascending aorta replacement and CABG SVG-PDA 05/2017 @ [facility] w subsequent PCIs (most recent 10/16/23 DESx2 pLAD) recent admission to [facility] Dec 28-29, 2023 for chest pain w/ neg trop x 2, neg CCTA for acute findings, dx'd w/ pericarditis discharged on Colchicine 0.6 mg BID x 3 months (still taking), recent dx of H Pylori, now on PPI + Amoxicillin 1g BID x 14 days, Clarithomycin 500 mg BID x 14 days, now p/w 2 days of intermittent epigastric pain, sharp, lasts seconds at a time. Sometimes it is burning. No clear provocations or ameliorations. Dec PO intake. She is having intermittent watery, non bloody diarrhea. No CP, SOB. No f/c. She was recently seen in the [facility] ED on 2/1 for lower abd pain, diarrhea, labs negative at that time. SHe had CT a/p w/ PO and IV contrast which showed enteritis. Her urinalysis was + and was tx'd w/ Cefpodoxime x 7 days. It was after this she saw her PCP and tested + for H Pylori and was started on aforementioned meds. She reports she also had intermittent sharp pains in her vagina w/o bleeding or discharge, which has since resolved. She has not been sexually active in 5 years. She notes her urine is dark.

## 2024-02-29 NOTE — ED ADULT NURSE NOTE - HOW PATIENT ADDRESSED, PROFILE

## 2024-02-29 NOTE — ED PROVIDER NOTE - NS ED ROS FT
Constitutional: No fever or chills.   Eyes: No pain, blurry vision, or discharge.  ENMT: No hearing changes, pain, discharge or infections. No neck pain or stiffness.  Cardiac: No chest pain, SOB or edema. No chest pain with exertion.  Respiratory: No cough or respiratory distress. No hemoptysis. No history of asthma or RAD.  GI: See HPI  : No dysuria, frequency or burning. + dark urine  MS: No myalgia, muscle weakness, joint pain or back pain.  Neuro: No headache or weakness. No LOC.  Skin: No skin rash.   Endocrine: No history of thyroid disease or diabetes.  Except as documented in the HPI, all other systems are negative.

## 2024-02-29 NOTE — ED ADULT NURSE NOTE - NSFALLHARMRISKINTERV_ED_ALL_ED

## 2024-02-29 NOTE — ED ADULT TRIAGE NOTE - CHIEF COMPLAINT QUOTE
Pt presents to ED C/O " pinching sensation in chest" starting yesterday. EMS states, " She also c/o pinching feeling in genital area a few days ago with diarrhea". EKG in progress.

## 2024-02-29 NOTE — ED ADULT NURSE NOTE - OBJECTIVE STATEMENT
Pt is a 64y/o F presenting to the ED w/ c/o of generalized pain, a "pinching sensation" in chest/genital area (worse w/ eating this AM), headaches, diarrhea xmultiple days. Pt w/ recent hx C. diff dx @ PCP, UTI (still taking ABX), CAD, cardiac stents (+thinners), HTN, HLD, kidney stones, COPD (does NOT wear oxygen @ home), GERD, asthma, DM. Pt denies CP, SOB, fever/chills, nausea/vomiting, numbness/tingling, weakness, recent falls @ home, dizziness/lightheadedness. Pt A/Ox3, speaking in clear/complete sentences. Respirations easy/even and unlabored on RA. Pt ambulates independently w/ steady gait. EKG completed in triage, IV placed, labs drawn.

## 2024-02-29 NOTE — ED PROVIDER NOTE - PATIENT PORTAL LINK FT
You can access the FollowMyHealth Patient Portal offered by [facility] by registering at the following website: [email or web address]. By joining [facility]’s FollowMyHealth portal, you will also be able to view your health information using other applications (apps) compatible with our system.

## 2024-02-29 NOTE — ED PROVIDER NOTE - NSFOLLOWUPINSTRUCTIONS_ED_ALL_ED_FT
Your blood work showed mildly elevated liver enzymes. Your ultrasound showed fatty liver. This is due to fatty, greasy diet, and / or alcohol use. Decrease these in your diet. Follow up with your gastroenterologist.     Fatty Liver Disease    The liver converts food into energy, removes toxic material from the blood, makes important proteins, and absorbs necessary vitamins from food. Fatty liver disease occurs when too much fat has built up in your liver cells. Fatty liver disease is also called hepatic steatosis.    In many cases, fatty liver disease does not cause symptoms or problems. It is often diagnosed when tests are being done for other reasons. However, over time, fatty liver can cause inflammation that may lead to more serious liver problems, such as scarring of the liver (cirrhosis) and liver failure.    Fatty liver is associated with insulin resistance, increased body fat, high blood pressure (hypertension), and high cholesterol. These are features of metabolic syndrome and increase your risk for stroke, diabetes, and heart disease.    What are the causes?  This condition may be caused by components of metabolic syndrome:  Obesity.  Insulin resistance.  High cholesterol.  Other causes:  Alcohol abuse.  Poor nutrition.  Cushing syndrome.  Pregnancy.  Certain drugs.  Poisons.  Some viral infections.  What increases the risk?  You are more likely to develop this condition if you:  Abuse alcohol.  Are overweight.  Have diabetes.  Have hepatitis.  Have a high triglyceride level.  Are pregnant.  What are the signs or symptoms?  Fatty liver disease often does not cause symptoms. If symptoms do develop, they can include:  Fatigue and weakness.  Weight loss.  Confusion.  Nausea, vomiting, or abdominal pain.  Yellowing of your skin and the white parts of your eyes (jaundice).  Itchy skin.  How is this diagnosed?  This condition may be diagnosed by:  A physical exam and your medical history.  Blood tests.  Imaging tests, such as an ultrasound, CT scan, or MRI.  A liver biopsy. A small sample of liver tissue is removed using a needle. The sample is then looked at under a microscope.  How is this treated?  Fatty liver disease is often caused by other health conditions. Treatment for fatty liver may involve medicines and lifestyle changes to manage conditions such as:  Alcoholism.  High cholesterol.  Diabetes.  Being overweight or obese.  Follow these instructions at home:    Do not drink alcohol. If you have trouble quitting, ask your health care provider how to safely quit with the help of medicine or a supervised program. This is important to keep your condition from getting worse.  Eat a healthy diet as told by your health care provider. Ask your health care provider about working with a dietitian to develop an eating plan.  Exercise regularly. This can help you lose weight and control your cholesterol and diabetes. Talk to your health care provider about an exercise plan and which activities are best for you.  Take over-the-counter and prescription medicines only as told by your health care provider.  Keep all follow-up visits. This is important.  Contact a health care provider if:  You have trouble controlling your:  Blood sugar. This is especially important if you have diabetes.  Cholesterol.  Drinking of alcohol.  Get help right away if:  You have abdominal pain.  You have jaundice.  You have nausea and are vomiting.  You vomit blood or material that looks like coffee grounds.  You have stools that are black, tar-like, or bloody.  Summary  Fatty liver disease develops when too much fat builds up in the cells of your liver.  Fatty liver disease often causes no symptoms or problems. However, over time, fatty liver can cause inflammation that may lead to more serious liver problems, such as scarring of the liver (cirrhosis).  You are more likely to develop this condition if you abuse alcohol, are pregnant, are overweight, have diabetes, have hepatitis, or have high triglyceride or cholesterol levels.  Contact your health care provider if you have trouble controlling your blood sugar, cholesterol, or drinking of alcohol.  This information is not intended to replace advice given to you by your health care provider. Make sure you discuss any questions you have with your health care provider.    Abdominal Pain    Many things can cause abdominal pain. Many times, abdominal pain is not caused by a disease and will improve without treatment. Your health care provider will do a physical exam to determine if there is a dangerous cause of your pain; blood tests and imaging may help determine the cause of your pain. However, in many cases, no cause may be found and you may need further testing as an outpatient. Monitor your abdominal pain for any changes.     SEEK IMMEDIATE MEDICAL CARE IF YOU HAVE ANY OF THE FOLLOWING SYMPTOMS: worsening abdominal pain, uncontrollable vomiting, profuse diarrhea, inability to have bowel movements or pass gas, black or bloody stools, fever accompanying chest pain or back pain, or fainting. These symptoms may represent a serious problem that is an emergency. Do not wait to see if the symptoms will go away. Get medical help right away. Call 911 and do not drive yourself to the hospital.

## 2024-02-29 NOTE — ED PROVIDER NOTE - PHYSICAL EXAMINATION
Constitutional: Well appearing, awake, alert, oriented to person, place, time/situation and in no apparent distress.  ENMT: Airway patent. Normal MM  Eyes: Clear bilaterally  Cardiac: Normal rate, regular rhythm.  Heart sounds S1, S2.  No murmurs, rubs or gallops.  Respiratory: Breaths sounds equal and clear b/l. No increased WOB, tachypnea, hypoxia, or accessory mm use. Pt speaks in full sentences.   Gastrointestinal: Abd soft, + mild ttp in the epigastric region only. No Polo's. obese, NABS. No guarding, rebound, or rigidity. No pulsatile abdominal masses. No organomegaly appreciated.   Musculoskeletal: Range of motion is not limited. No LE edema or calf ttp. legs symmtric in size.   Neuro: Alert and oriented x 3, face symmetric and speech fluent. Strength 5/5 x 4 ext and symmetric, nml gross motor movement, nml gait. No focal deficits noted.  Skin: Skin normal color for race, warm, dry and intact. No evidence of rash.  Psych: Alert and oriented to person, place, time/situation. normal mood and affect. no apparent risk to self or others.

## 2024-02-29 NOTE — ED PROVIDER NOTE - CLINICAL SUMMARY MEDICAL DECISION MAKING FREE TEXT BOX
Pt p/w epigastric pain, intermittent diarrhea. Benign, abdomen. No RUQ ttp or Polo's. No peritoneal signs. No CP, SOB. EKG unchanged from prior w/ neg cardiac w/u concerning for further CAD. On Cochicine + abx for  H pylori, likely resulting in int diarrhea. Recent ED w/u and CT reviewed. Suspect sx 2/2 H pylori. DDx includes but not limited to dyspepsia, Gastritis, GERD, medication adverse effects, dehydration, recurrent UTI, less likely pancreatitis, biliary colic, cholecystitis, enteritis, other pathology. Check labs, trial of GI cocktail, re-eval. Dispo pending w/u and clinical status

## 2024-03-01 LAB
CULTURE RESULTS: NO GROWTH — SIGNIFICANT CHANGE UP
SPECIMEN SOURCE: SIGNIFICANT CHANGE UP

## 2024-03-05 ENCOUNTER — INPATIENT (INPATIENT)
Facility: HOSPITAL | Age: 64
LOS: 6 days | Discharge: HOME | DRG: 372 | End: 2024-03-12
Attending: GENERAL ACUTE CARE HOSPITAL | Admitting: INTERNAL MEDICINE
Payer: MEDICARE

## 2024-03-05 VITALS
DIASTOLIC BLOOD PRESSURE: 85 MMHG | HEART RATE: 93 BPM | WEIGHT: 179.9 LBS | SYSTOLIC BLOOD PRESSURE: 122 MMHG | RESPIRATION RATE: 20 BRPM | HEIGHT: 63 IN | TEMPERATURE: 98 F | OXYGEN SATURATION: 100 %

## 2024-03-05 DIAGNOSIS — Z29.9 ENCOUNTER FOR PROPHYLACTIC MEASURES, UNSPECIFIED: ICD-10-CM

## 2024-03-05 DIAGNOSIS — Z87.09 PERSONAL HISTORY OF OTHER DISEASES OF THE RESPIRATORY SYSTEM: ICD-10-CM

## 2024-03-05 DIAGNOSIS — Z87.39 PERSONAL HISTORY OF OTHER DISEASES OF THE MUSCULOSKELETAL SYSTEM AND CONNECTIVE TISSUE: ICD-10-CM

## 2024-03-05 DIAGNOSIS — R74.01 ELEVATION OF LEVELS OF LIVER TRANSAMINASE LEVELS: ICD-10-CM

## 2024-03-05 DIAGNOSIS — Z90.49 ACQUIRED ABSENCE OF OTHER SPECIFIED PARTS OF DIGESTIVE TRACT: Chronic | ICD-10-CM

## 2024-03-05 DIAGNOSIS — G56.00 CARPAL TUNNEL SYNDROME, UNSPECIFIED UPPER LIMB: Chronic | ICD-10-CM

## 2024-03-05 DIAGNOSIS — D25.9 LEIOMYOMA OF UTERUS, UNSPECIFIED: Chronic | ICD-10-CM

## 2024-03-05 DIAGNOSIS — Z95.2 PRESENCE OF PROSTHETIC HEART VALVE: Chronic | ICD-10-CM

## 2024-03-05 DIAGNOSIS — E11.9 TYPE 2 DIABETES MELLITUS WITHOUT COMPLICATIONS: ICD-10-CM

## 2024-03-05 DIAGNOSIS — E78.5 HYPERLIPIDEMIA, UNSPECIFIED: ICD-10-CM

## 2024-03-05 DIAGNOSIS — Z98.890 OTHER SPECIFIED POSTPROCEDURAL STATES: Chronic | ICD-10-CM

## 2024-03-05 DIAGNOSIS — I25.10 ATHEROSCLEROTIC HEART DISEASE OF NATIVE CORONARY ARTERY WITHOUT ANGINA PECTORIS: ICD-10-CM

## 2024-03-05 DIAGNOSIS — F41.9 ANXIETY DISORDER, UNSPECIFIED: ICD-10-CM

## 2024-03-05 DIAGNOSIS — K59.00 CONSTIPATION, UNSPECIFIED: ICD-10-CM

## 2024-03-05 DIAGNOSIS — Z86.79 PERSONAL HISTORY OF OTHER DISEASES OF THE CIRCULATORY SYSTEM: ICD-10-CM

## 2024-03-05 DIAGNOSIS — I10 ESSENTIAL (PRIMARY) HYPERTENSION: ICD-10-CM

## 2024-03-05 DIAGNOSIS — R10.13 EPIGASTRIC PAIN: ICD-10-CM

## 2024-03-05 LAB
ADD ON TEST-SPECIMEN IN LAB: SIGNIFICANT CHANGE UP
ALBUMIN SERPL ELPH-MCNC: 4.4 G/DL — SIGNIFICANT CHANGE UP (ref 3.3–5)
ALP SERPL-CCNC: 57 U/L — SIGNIFICANT CHANGE UP (ref 40–120)
ALT FLD-CCNC: 105 U/L — HIGH (ref 10–45)
ANION GAP SERPL CALC-SCNC: 20 MMOL/L — HIGH (ref 5–17)
ANISOCYTOSIS BLD QL: SLIGHT — SIGNIFICANT CHANGE UP
AST SERPL-CCNC: 100 U/L — HIGH (ref 10–40)
BASOPHILS # BLD AUTO: 0.08 K/UL — SIGNIFICANT CHANGE UP (ref 0–0.2)
BASOPHILS NFR BLD AUTO: 1.7 % — SIGNIFICANT CHANGE UP (ref 0–2)
BILIRUB SERPL-MCNC: 0.4 MG/DL — SIGNIFICANT CHANGE UP (ref 0.2–1.2)
BUN SERPL-MCNC: 15 MG/DL — SIGNIFICANT CHANGE UP (ref 7–23)
CALCIUM SERPL-MCNC: 10.8 MG/DL — HIGH (ref 8.4–10.5)
CHLORIDE SERPL-SCNC: 91 MMOL/L — LOW (ref 96–108)
CO2 SERPL-SCNC: 26 MMOL/L — SIGNIFICANT CHANGE UP (ref 22–31)
CREAT SERPL-MCNC: 0.86 MG/DL — SIGNIFICANT CHANGE UP (ref 0.5–1.3)
EGFR: 75 ML/MIN/1.73M2 — SIGNIFICANT CHANGE UP
EOSINOPHIL # BLD AUTO: 0.17 K/UL — SIGNIFICANT CHANGE UP (ref 0–0.5)
EOSINOPHIL NFR BLD AUTO: 3.5 % — SIGNIFICANT CHANGE UP (ref 0–6)
FLUAV AG NPH QL: SIGNIFICANT CHANGE UP
FLUBV AG NPH QL: SIGNIFICANT CHANGE UP
GLUCOSE BLDC GLUCOMTR-MCNC: 135 MG/DL — HIGH (ref 70–99)
GLUCOSE SERPL-MCNC: 121 MG/DL — HIGH (ref 70–99)
HCT VFR BLD CALC: 43.2 % — SIGNIFICANT CHANGE UP (ref 34.5–45)
HGB BLD-MCNC: 14.2 G/DL — SIGNIFICANT CHANGE UP (ref 11.5–15.5)
HYPOCHROMIA BLD QL: SIGNIFICANT CHANGE UP
LIDOCAIN IGE QN: 20 U/L — SIGNIFICANT CHANGE UP (ref 7–60)
LYMPHOCYTES # BLD AUTO: 1.66 K/UL — SIGNIFICANT CHANGE UP (ref 1–3.3)
LYMPHOCYTES # BLD AUTO: 33.9 % — SIGNIFICANT CHANGE UP (ref 13–44)
MANUAL SMEAR VERIFICATION: SIGNIFICANT CHANGE UP
MCHC RBC-ENTMCNC: 25.4 PG — LOW (ref 27–34)
MCHC RBC-ENTMCNC: 32.9 GM/DL — SIGNIFICANT CHANGE UP (ref 32–36)
MCV RBC AUTO: 77.4 FL — LOW (ref 80–100)
MICROCYTES BLD QL: SLIGHT — SIGNIFICANT CHANGE UP
MONOCYTES # BLD AUTO: 0.85 K/UL — SIGNIFICANT CHANGE UP (ref 0–0.9)
MONOCYTES NFR BLD AUTO: 17.4 % — HIGH (ref 2–14)
NEUTROPHILS # BLD AUTO: 1.75 K/UL — LOW (ref 1.8–7.4)
NEUTROPHILS NFR BLD AUTO: 35.7 % — LOW (ref 43–77)
NRBC # BLD: 1 /100 WBCS — HIGH (ref 0–0)
NRBC # BLD: SIGNIFICANT CHANGE UP /100 WBCS (ref 0–0)
NT-PROBNP SERPL-SCNC: 555 PG/ML — HIGH (ref 0–300)
OVALOCYTES BLD QL SMEAR: SLIGHT — SIGNIFICANT CHANGE UP
PLAT MORPH BLD: ABNORMAL
PLATELET # BLD AUTO: 181 K/UL — SIGNIFICANT CHANGE UP (ref 150–400)
POIKILOCYTOSIS BLD QL AUTO: SLIGHT — SIGNIFICANT CHANGE UP
POLYCHROMASIA BLD QL SMEAR: SLIGHT — SIGNIFICANT CHANGE UP
POTASSIUM SERPL-MCNC: 4.1 MMOL/L — SIGNIFICANT CHANGE UP (ref 3.5–5.3)
POTASSIUM SERPL-SCNC: 4.1 MMOL/L — SIGNIFICANT CHANGE UP (ref 3.5–5.3)
PROT SERPL-MCNC: 7.8 G/DL — SIGNIFICANT CHANGE UP (ref 6–8.3)
RBC # BLD: 5.58 M/UL — HIGH (ref 3.8–5.2)
RBC # FLD: 14 % — SIGNIFICANT CHANGE UP (ref 10.3–14.5)
RBC BLD AUTO: ABNORMAL
RSV RNA NPH QL NAA+NON-PROBE: SIGNIFICANT CHANGE UP
SARS-COV-2 RNA SPEC QL NAA+PROBE: SIGNIFICANT CHANGE UP
SODIUM SERPL-SCNC: 137 MMOL/L — SIGNIFICANT CHANGE UP (ref 135–145)
STOMATOCYTES BLD QL SMEAR: SLIGHT — SIGNIFICANT CHANGE UP
TROPONIN T, HIGH SENSITIVITY RESULT: 18 NG/L — SIGNIFICANT CHANGE UP (ref 0–51)
TROPONIN T, HIGH SENSITIVITY RESULT: 18 NG/L — SIGNIFICANT CHANGE UP (ref 0–51)
VARIANT LYMPHS # BLD: 7.8 % — HIGH (ref 0–6)
WBC # BLD: 4.9 K/UL — SIGNIFICANT CHANGE UP (ref 3.8–10.5)
WBC # FLD AUTO: 4.9 K/UL — SIGNIFICANT CHANGE UP (ref 3.8–10.5)

## 2024-03-05 PROCEDURE — 71275 CT ANGIOGRAPHY CHEST: CPT | Mod: 26,MC

## 2024-03-05 PROCEDURE — 99222 1ST HOSP IP/OBS MODERATE 55: CPT

## 2024-03-05 PROCEDURE — 93010 ELECTROCARDIOGRAM REPORT: CPT | Mod: 76

## 2024-03-05 PROCEDURE — 99285 EMERGENCY DEPT VISIT HI MDM: CPT

## 2024-03-05 PROCEDURE — 74174 CTA ABD&PLVS W/CONTRAST: CPT | Mod: 26,MC

## 2024-03-05 RX ORDER — SODIUM CHLORIDE 9 MG/ML
500 INJECTION INTRAMUSCULAR; INTRAVENOUS; SUBCUTANEOUS ONCE
Refills: 0 | Status: COMPLETED | OUTPATIENT
Start: 2024-03-05 | End: 2024-03-05

## 2024-03-05 RX ORDER — ATORVASTATIN CALCIUM 80 MG/1
80 TABLET, FILM COATED ORAL AT BEDTIME
Refills: 0 | Status: DISCONTINUED | OUTPATIENT
Start: 2024-03-05 | End: 2024-03-06

## 2024-03-05 RX ORDER — DIAZEPAM 5 MG
2 TABLET ORAL AT BEDTIME
Refills: 0 | Status: DISCONTINUED | OUTPATIENT
Start: 2024-03-05 | End: 2024-03-11

## 2024-03-05 RX ORDER — MORPHINE SULFATE 50 MG/1
4 CAPSULE, EXTENDED RELEASE ORAL ONCE
Refills: 0 | Status: DISCONTINUED | OUTPATIENT
Start: 2024-03-05 | End: 2024-03-05

## 2024-03-05 RX ORDER — CLOPIDOGREL BISULFATE 75 MG/1
75 TABLET, FILM COATED ORAL DAILY
Refills: 0 | Status: DISCONTINUED | OUTPATIENT
Start: 2024-03-06 | End: 2024-03-12

## 2024-03-05 RX ORDER — SODIUM CHLORIDE 9 MG/ML
1000 INJECTION, SOLUTION INTRAVENOUS
Refills: 0 | Status: DISCONTINUED | OUTPATIENT
Start: 2024-03-05 | End: 2024-03-12

## 2024-03-05 RX ORDER — DEXTROSE 50 % IN WATER 50 %
25 SYRINGE (ML) INTRAVENOUS ONCE
Refills: 0 | Status: DISCONTINUED | OUTPATIENT
Start: 2024-03-05 | End: 2024-03-12

## 2024-03-05 RX ORDER — INSULIN LISPRO 100/ML
VIAL (ML) SUBCUTANEOUS
Refills: 0 | Status: DISCONTINUED | OUTPATIENT
Start: 2024-03-05 | End: 2024-03-12

## 2024-03-05 RX ORDER — PANTOPRAZOLE SODIUM 20 MG/1
40 TABLET, DELAYED RELEASE ORAL
Refills: 0 | Status: DISCONTINUED | OUTPATIENT
Start: 2024-03-05 | End: 2024-03-06

## 2024-03-05 RX ORDER — DEXTROSE 50 % IN WATER 50 %
15 SYRINGE (ML) INTRAVENOUS ONCE
Refills: 0 | Status: DISCONTINUED | OUTPATIENT
Start: 2024-03-05 | End: 2024-03-12

## 2024-03-05 RX ORDER — SENNA PLUS 8.6 MG/1
2 TABLET ORAL AT BEDTIME
Refills: 0 | Status: DISCONTINUED | OUTPATIENT
Start: 2024-03-05 | End: 2024-03-12

## 2024-03-05 RX ORDER — DEXTROSE 50 % IN WATER 50 %
12.5 SYRINGE (ML) INTRAVENOUS ONCE
Refills: 0 | Status: DISCONTINUED | OUTPATIENT
Start: 2024-03-05 | End: 2024-03-12

## 2024-03-05 RX ORDER — OXYCODONE HYDROCHLORIDE 5 MG/1
5 TABLET ORAL EVERY 12 HOURS
Refills: 0 | Status: DISCONTINUED | OUTPATIENT
Start: 2024-03-05 | End: 2024-03-08

## 2024-03-05 RX ORDER — ASPIRIN/CALCIUM CARB/MAGNESIUM 324 MG
81 TABLET ORAL DAILY
Refills: 0 | Status: DISCONTINUED | OUTPATIENT
Start: 2024-03-05 | End: 2024-03-12

## 2024-03-05 RX ORDER — ASPIRIN/CALCIUM CARB/MAGNESIUM 324 MG
162 TABLET ORAL ONCE
Refills: 0 | Status: COMPLETED | OUTPATIENT
Start: 2024-03-05 | End: 2024-03-05

## 2024-03-05 RX ORDER — POLYETHYLENE GLYCOL 3350 17 G/17G
17 POWDER, FOR SOLUTION ORAL ONCE
Refills: 0 | Status: COMPLETED | OUTPATIENT
Start: 2024-03-06 | End: 2024-03-06

## 2024-03-05 RX ORDER — ACETAMINOPHEN 500 MG
650 TABLET ORAL EVERY 6 HOURS
Refills: 0 | Status: DISCONTINUED | OUTPATIENT
Start: 2024-03-05 | End: 2024-03-06

## 2024-03-05 RX ORDER — ASPIRIN/CALCIUM CARB/MAGNESIUM 324 MG
162 TABLET ORAL ONCE
Refills: 0 | Status: DISCONTINUED | OUTPATIENT
Start: 2024-03-05 | End: 2024-03-05

## 2024-03-05 RX ORDER — ONDANSETRON 8 MG/1
4 TABLET, FILM COATED ORAL ONCE
Refills: 0 | Status: COMPLETED | OUTPATIENT
Start: 2024-03-05 | End: 2024-03-05

## 2024-03-05 RX ORDER — METOPROLOL TARTRATE 50 MG
50 TABLET ORAL DAILY
Refills: 0 | Status: DISCONTINUED | OUTPATIENT
Start: 2024-03-05 | End: 2024-03-12

## 2024-03-05 RX ORDER — LANOLIN ALCOHOL/MO/W.PET/CERES
3 CREAM (GRAM) TOPICAL AT BEDTIME
Refills: 0 | Status: DISCONTINUED | OUTPATIENT
Start: 2024-03-05 | End: 2024-03-12

## 2024-03-05 RX ORDER — GLUCAGON INJECTION, SOLUTION 0.5 MG/.1ML
1 INJECTION, SOLUTION SUBCUTANEOUS ONCE
Refills: 0 | Status: DISCONTINUED | OUTPATIENT
Start: 2024-03-05 | End: 2024-03-12

## 2024-03-05 RX ORDER — COLCHICINE 0.6 MG
0.6 TABLET ORAL
Refills: 0 | Status: DISCONTINUED | OUTPATIENT
Start: 2024-03-05 | End: 2024-03-06

## 2024-03-05 RX ADMIN — SODIUM CHLORIDE 500 MILLILITER(S): 9 INJECTION INTRAMUSCULAR; INTRAVENOUS; SUBCUTANEOUS at 21:27

## 2024-03-05 RX ADMIN — Medication 0.6 MILLIGRAM(S): at 22:18

## 2024-03-05 RX ADMIN — ONDANSETRON 4 MILLIGRAM(S): 8 TABLET, FILM COATED ORAL at 15:31

## 2024-03-05 RX ADMIN — MORPHINE SULFATE 4 MILLIGRAM(S): 50 CAPSULE, EXTENDED RELEASE ORAL at 15:31

## 2024-03-05 RX ADMIN — Medication 650 MILLIGRAM(S): at 19:16

## 2024-03-05 RX ADMIN — Medication 2 MILLIGRAM(S): at 23:43

## 2024-03-05 RX ADMIN — Medication 162 MILLIGRAM(S): at 17:12

## 2024-03-05 RX ADMIN — ATORVASTATIN CALCIUM 80 MILLIGRAM(S): 80 TABLET, FILM COATED ORAL at 22:18

## 2024-03-05 RX ADMIN — OXYCODONE HYDROCHLORIDE 5 MILLIGRAM(S): 5 TABLET ORAL at 22:19

## 2024-03-05 RX ADMIN — MORPHINE SULFATE 4 MILLIGRAM(S): 50 CAPSULE, EXTENDED RELEASE ORAL at 16:01

## 2024-03-05 RX ADMIN — SENNA PLUS 2 TABLET(S): 8.6 TABLET ORAL at 22:19

## 2024-03-05 RX ADMIN — OXYCODONE HYDROCHLORIDE 5 MILLIGRAM(S): 5 TABLET ORAL at 23:19

## 2024-03-05 RX ADMIN — Medication 81 MILLIGRAM(S): at 22:19

## 2024-03-05 NOTE — H&P ADULT - ATTENDING COMMENTS
62 yo F with PMHx asthma/COPD, HTN, HLD, DM, AS (s/p AVR), AAA (s/p graft replacement), CAD (s/p CABG, PCI), H. Pylori (on triple tx), pericarditis (Colchicine) BIBEMS from home with 5d hx of sharp epigastric and back pain admitted for further evaluation/management of pain.      #Epigastric pain – Multiple recent ED presentations over the past month for similar complaints of epigastric/chest/back pain. Currently being tx for H. Pylori (triple tx) and pericarditis (Colchicine). Afebrile. HR 90-110s. BP stable. CBC without leukocytosis/bandemia. Reactive lymphocytes 7.8%. Troponin T negative x2. AST//105. Lipase negative. Diffuse TTP throughout epigastric region, reproducible chest pain, and generalized paraspinal TTP. No focal neurological deficits. Cardiac/respiratory exam otherwise stable. CTA Chest Aorta showing stable appearance post graft replacement of the ascending aorta with no aortic dissection or aneurysm. CTAP otherwise without abnormal findings. No acute pathology. Unclear etiology of pain at this time. May be related to constipation vs current H. Pylori and pericarditis diagnoses. Anti-emetic PRN. Tolerating PO diet. Bowel regimen.      #Elevated liver enzymes – Uptrending from prior labs in 2/2024. RUQ US at that time showing borderline enlarged, steatotic liver. CTAP in ED with benign findings. F/U hepatitis panel, UTox to be complete.      Agree with remainder of resident plan as above.

## 2024-03-05 NOTE — H&P ADULT - PROBLEM SELECTOR PLAN 5
SpO2 100% on RA, no new or change in cough or sputum production, no CARRILLO, no wheeze. Not on home O2.   - confirm home inhalers in AM

## 2024-03-05 NOTE — H&P ADULT - PROBLEM SELECTOR PLAN 2
AST/ALT elevations, uptrending since discharge in 12/2023. Polo's sign negative on admission. CTAP without evidence of galbladder obstruction however layering sludge without wall thickening or pericholecystic fluid present on exam.  - f/u hepatitis studies  - f/u RUQ US AST/ALT elevations, uptrending since discharge in 12/2023. Polo's sign negative on admission. CTAP without evidence of galbladder obstruction however layering sludge without wall thickening or pericholecystic fluid present on exam.  - f/u hepatitis studies

## 2024-03-05 NOTE — PATIENT PROFILE ADULT - FALL HARM RISK - UNIVERSAL INTERVENTIONS
Bed in lowest position, wheels locked, appropriate side rails in place/Call bell, personal items and telephone in reach/Instruct patient to call for assistance before getting out of bed or chair/Non-slip footwear when patient is out of bed/[place] to call system/Physically safe environment - no spills, clutter or unnecessary equipment/Purposeful Proactive Rounding/Room/bathroom lighting operational, light cord in reach

## 2024-03-05 NOTE — ED ADULT NURSE NOTE - HISTORY OF COVID-19 VACCINATION
Vaccine status unknown Actinic keratosis    Aortic valve stenosis    Back pain    Dehydration    Dry eyes    Fatigue    GERD (gastroesophageal reflux disease)    Hard of hearing    HLD (hyperlipidemia)    HTN (hypertension)    Hypothyroid    Intraventricular block    MR (mitral regurgitation)    Nocturia    Osteoarthritis of back    Subdural hematoma    Syncope and collapse    Urine incontinence

## 2024-03-05 NOTE — H&P ADULT - PROBLEM/PLAN-9
DISPLAY PLAN FREE TEXT Hide Additional Notes?: No Include Location In Plan?: Yes Detail Level: Detailed

## 2024-03-05 NOTE — ED PROVIDER NOTE - CARE PLAN
Principal Discharge DX:	Chest pain  Secondary Diagnosis:	Back pain   1 Principal Discharge DX:	Chest pain  Secondary Diagnosis:	Back pain  Secondary Diagnosis:	Weakness

## 2024-03-05 NOTE — H&P ADULT - NSHPLABSRESULTS_GEN_ALL_CORE
14.2   4.90  )-----------( 181      ( 05 Mar 2024 14:14 )             43.2       03-05    137  |  91<L>  |  15  ----------------------------<  121<H>  4.1   |  26  |  0.86    Ca    10.8<H>      05 Mar 2024 14:14    TPro  7.8  /  Alb  4.4  /  TBili  0.4  /  DBili  x   /  AST  100<H>  /  ALT  105<H>  /  AlkPhos  57  03-05              Urinalysis Basic - ( 05 Mar 2024 14:14 )    Color: x / Appearance: x / SG: x / pH: x  Gluc: 121 mg/dL / Ketone: x  / Bili: x / Urobili: x   Blood: x / Protein: x / Nitrite: x   Leuk Esterase: x / RBC: x / WBC x   Sq Epi: x / Non Sq Epi: x / Bacteria: x            Lactate Trend            CAPILLARY BLOOD GLUCOSE            Culture Results:   No growth (02-29 @ 13:58)

## 2024-03-05 NOTE — H&P ADULT - PROBLEM SELECTOR PLAN 9
On home vicodin 7.5-325 mg q12 PRN for pain.   - c/w oxycodone 5mg PO q12h PRN for severe pain   - c/w tylenol 625mg PO q6h for pain/fever

## 2024-03-05 NOTE — ED PROVIDER NOTE - PHYSICAL EXAMINATION
Constitutional: Well appearing, awake, alert, oriented to person, place, time/situation and in no apparent distress.  ENMT: Airway patent. Normal MM  Eyes: Clear bilaterally  Cardiac: Normal rate, regular rhythm.  Heart sounds S1, S2.  No murmurs, rubs or gallops. symmetric pulses, LUE /80, RUE /81  Respiratory: Breaths sounds equal and clear b/l. No increased WOB, tachypnea, hypoxia, or accessory mm use. Pt speaks in full sentences.   Gastrointestinal: Abd soft, NT, ND, NABS. No guarding, rebound, or rigidity. No pulsatile abdominal masses. No organomegaly appreciated. No CVAT   Musculoskeletal: Range of motion is not limited. No LE edema. legs symmetric in size  Neuro: Alert and oriented x 3, face symmetric and speech fluent. Strength 5/5 x 4 ext and symmetric, nml gross motor movement, nml gait. No focal deficits noted.  Skin: Pale, clammy, intact. No evidence of rash. cool extremities  Psych: Alert and oriented to person, place, time/situation. normal mood and affect. no apparent risk to self or others. Constitutional: Well appearing, awake, alert, oriented to person, place, time/situation and in no apparent distress.  ENMT: Airway patent. Normal MM  Eyes: Clear bilaterally  Cardiac: Normal rate, regular rhythm.  Heart sounds S1, S2.  No murmurs, rubs or gallops. symmetric pulses, LUE /80, RUE /81  Respiratory: Breaths sounds equal and clear b/l. No increased WOB, tachypnea, hypoxia, or accessory mm use. Pt speaks in full sentences.   Gastrointestinal: Abd soft, NT, ND, NABS. No guarding, rebound, or rigidity. No pulsatile abdominal masses. No organomegaly appreciated.  Musculoskeletal: Range of motion is not limited. No LE edema. legs symmetric in size. no calf ttp. normal sensation. 2+ pulses x 4 ext. 5- strength against gravity w/ b/l LE w/ SLR. otherwise, 5/5 strength in all mm   Neuro: Alert and oriented x 3, face symmetric and speech fluent. Strength 5/5 x 4 ext and symmetric, nml gross motor movement, nml gait. No focal deficits noted.  Skin: Pale, clammy, intact. No evidence of rash. cool extremities  Psych: Alert and oriented to person, place, time/situation. normal mood and affect. no apparent risk to self or others. Constitutional: Well appearing, awake, alert, oriented to person, place, time/situation and ill-appearing   ENMT: Airway patent. Normal MM  Eyes: Clear bilaterally  Cardiac: Normal rate, regular rhythm.  Heart sounds S1, S2.  No murmurs, rubs or gallops. symmetric pulses, LUE /80, RUE /81  Respiratory: Breaths sounds equal and clear b/l. No increased WOB, tachypnea, hypoxia, or accessory mm use. Pt speaks in full sentences.   Gastrointestinal: Abd soft, NT, ND, NABS. No guarding, rebound, or rigidity. No pulsatile abdominal masses. No organomegaly appreciated.  Musculoskeletal: Range of motion is not limited. spine appears normal. no midline spinal ttp. diffuse b/l upper and lower paraspinal mm ttp. No LE edema. legs symmetric in size. no calf ttp. normal sensation. 2+ pulses x 4 ext. 5- strength against gravity w/ b/l LE w/ SLR. otherwise, 5/5 strength in all mm   Neuro: Alert and oriented x 3, face symmetric and speech fluent. Strength 5/5 x 4 ext and symmetric, nml gross motor movement, nml gait. No focal deficits noted.  Skin: Pale, clammy, diaphoretic. skin intact. No evidence of rash. cool extremities  Psych: Alert and oriented to person, place, time/situation. normal mood and affect. no apparent risk to self or others.

## 2024-03-05 NOTE — H&P ADULT - NSHPPHYSICALEXAM_GEN_ALL_CORE
.  VITAL SIGNS:  T(C): 35.2 (03-05-24 @ 21:17), Max: 37.6 (03-05-24 @ 15:38)  T(F): 95.3 (03-05-24 @ 21:17), Max: 99.6 (03-05-24 @ 15:38)  HR: 108 (03-05-24 @ 21:17) (80 - 111)  BP: 114/66 (03-05-24 @ 21:17) (114/66 - 146/89)  BP(mean): --  RR: 18 (03-05-24 @ 21:17) (18 - 20)  SpO2: 98% (03-05-24 @ 21:17) (98% - 100%)  Wt(kg): --    PHYSICAL EXAM:    Constitutional: WDWN resting comfortably in bed; NAD  Head: NC/AT  Eyes: PERRL, EOMI, anicteric sclera  ENT: no nasal discharge; uvula midline, no oropharyngeal erythema or exudates; MM dry   Neck: supple; no JVD or thyromegaly  Respiratory: CTA B/L; no W/R/R, no retractions  Cardiac: +S1/S2; RRR; no M/R/G  Gastrointestinal: abdomen soft, NT, distended; no rebound or guarding; +BSx4  Back: spine midline, no bony tenderness or step-offs; no CVAT B/L  Extremities: WWP, no clubbing or cyanosis; no peripheral edema  Musculoskeletal: NROM x4; no joint swelling, tenderness or erythema  Vascular: 2+ radial, femoral, DP/PT pulses B/L  Dermatologic: skin warm, dry and intact; no rashes, wounds, or scars  Neurologic: AAOx3; CNII-XII grossly intact; no focal deficits  Psychiatric: affect and characteristics of appearance, verbalizations, behaviors are appropriate

## 2024-03-05 NOTE — H&P ADULT - PROBLEM SELECTOR PLAN 10
on home metoprolol succinate 50mg qd + chlorthalidone 25mg PO qd   - c/w toprol   - confirm chlorthalidone home med

## 2024-03-05 NOTE — ED PROVIDER NOTE - CLINICAL SUMMARY MEDICAL DECISION MAKING FREE TEXT BOX
Pt p/w SSCP, diffuse back pain. Back pain appears more MSK, diffusely ttp. Cool, pale, diaphoretic on exam. Asymmetric BP's, symmetric pulses. Pt also reports diffuse weakness. EKG abnormal, but unchanged from prior. DDx includes but not limited to dissection, PE, ACS, angina, MSK pain, other pathology. Get serial EKG / trop, CTA chest. Afebrile rectally. Viral swab neg. D/w cardiology - given recent w/u in Dec, atypical pain, if serial trop neg, admit to cardiology.

## 2024-03-05 NOTE — H&P ADULT - ASSESSMENT
63F former smoker, with FHx MI, PMHx of asthma/COPD, sciatica/herniated disc, HTN, HLD, DM2, obesity, anxiety, AS s/p bioAVR/ascending aorta replacement and CABG SVG-PDA 05/2017 @ [facility] w subsequent PCIs (most recent 10/16/23 DESx2 pLAD) recent admission to [facility] Dec 28-29, 2023 for chest pain w/ neg trop x 2, neg CCTA for acute findings, dx'd w/ pericarditis discharged on Colchicine 0.6 mg BID x 3 months (still taking), recent dx of H Pylori, now on PPI + Amoxicillin 1g BID x 14 days, Clarithromycin 500 mg BID x 14 days, a/f epigastric abdominal pain.

## 2024-03-05 NOTE — ED PROVIDER NOTE - OBJECTIVE STATEMENT
Pt is a former smoker, with FHx MI, PMHx of asthma/COPD, sciatica/herniated disc, HTN, HLD, DM2, obesity, anxiety, AS s/p bioAVR/ascending aorta replacement and CABG SVG-PDA 05/2017 @ [facility] w subsequent PCIs (most recent 10/16/23 DESx2 pLAD) recent admission to [facility] Dec 28-29, 2023 for chest pain w/ neg trop x 2, neg CCTA for acute findings, dx'd w/ pericarditis discharged on Colchicine 0.6 mg BID x 3 months (still taking), recent dx of H Pylori, now on PPI + Amoxicillin 1g BID x 14 days, Clarithromycin 500 mg BID x 14 days, recently seen in the ED by myself on 2/29 for epigastric pain, T&R'd, states she was then again seen at [facility] on 3/3, states she had CT a/p w/ IV contrast that showed fatty liver (c/w prior ED w/u-had US at that time), and was discharged home. She presents today w/ diffuse back pain, SSCP described as tightness, worse w/ deep  breathing, sweating, not feeling well, gen weakness. No f/c. CP 5/10, back pain 7/10. Pt is a former smoker, with FHx MI, PMHx of asthma/COPD, sciatica/herniated disc, HTN, HLD, DM2, obesity, anxiety, AS s/p bioAVR/ascending aorta replacement and CABG SVG-PDA 05/2017 @ [facility] w subsequent PCIs (most recent 10/16/23 DESx2 pLAD) recent admission to [facility] Dec 28-29, 2023 for chest pain w/ neg trop x 2, neg CCTA for acute findings, dx'd w/ pericarditis discharged on Colchicine 0.6 mg BID x 3 months (still taking), recent dx of H Pylori, now on PPI + Amoxicillin 1g BID x 14 days, Clarithromycin 500 mg BID x 14 days, recently seen in the ED by myself on 2/29 for epigastric pain, T&R'd, states she was then again seen at [facility] on 3/3, states she had CT a/p w/ IV contrast that showed fatty liver (c/w prior ED w/u-had US at that time), and was discharged home. She presents today w/ diffuse back pain, SSCP described as tightness, worse w/ deep  breathing, intermittent, associated sweating, not feeling well, gen weakness. No f/c. CP 5/10, back pain 7/10. States she feels similar to prior to her open heart surgery

## 2024-03-05 NOTE — ED PROVIDER NOTE - PROGRESS NOTE DETAILS
D/w pt's cardiologist Dr Gamez - agrees w/ above D/w cardiac tele Dr Valdivia. Requesting rpt trop, if neg / stable admit to medicine Recurrent CP, EKG unchanged, stable troponin Recurrent CP, EKG unchanged, stable troponin. Will admit to medicine

## 2024-03-05 NOTE — H&P ADULT - PROBLEM SELECTOR PLAN 12
F: s/p 500cc NS bolus   E: Replete if K<4 or Mag<2  N: DASH Diet  GI ppx: Pantoprazole  VTE ppx: Ambulatory, SCDs while in bed  Dispo: RMF

## 2024-03-05 NOTE — ED ADULT NURSE NOTE - OBJECTIVE STATEMENT
63 y.o. Female BIBA c/o SOB, midsternal to R sided chest pressure, headache, generalized body aches, weakness, chronic back pain x5 days. arrived on 2L O2 via NC RA sat 100% on arrival. PMHx CAD with x3 stents placed, afib. does not use oxygen at home. +AC use. Denies fevers, vomiting, diarrhea, dizziness. 63 y.o. Female BIBA c/o SOB, midsternal to R sided chest pressure, headache, generalized body aches, weakness, chronic back pain x5 days. arrived on 2L O2 via NC RA sat 100% on arrival. PMHx CAD with x3 stents placed, afib. does not use oxygen at home. +AC use. recently evaluated on 2/29 in [facility] ED for epigastric discomfort, and then [facility] as well received a CT abd/pevlis with IV contrast "I was told I have a fatty liver" Denies fevers, vomiting, diarrhea, dizziness.

## 2024-03-05 NOTE — ED ADULT TRIAGE NOTE - CHIEF COMPLAINT QUOTE
Pt presents to ED by EMS C/O SOB full body pain x 5 days. On NC by EMS. Hx AFIB, on room air at home. EKG in progress.

## 2024-03-05 NOTE — H&P ADULT - PROBLEM SELECTOR PLAN 1
patient with characteristic epigastric abdominal pain radiating to back however CTAP without evidence of pancreatitis and lipase 20 on admission therefore junior xriteria 1/3 not consistent with acute pancriatitis. Likely that pain is acute i/s/o constipation and chronic back pain requiring daily opioid administration v H. pylori infection.   - c/w pain regimen below  - c/t monitor pain    #H. pylori inf: on PPI + Amoxicillin 1g BID x 14 days, Clarithromycin 500 mg BID x 14 days.   - confirm ABx start date and reinstate per clinical necessity     #anion gap: AG 20 with bicarb 26. patient reports poor PO intake i/s/o pain. Ketosis likely driving AG i/s/o poor PO intake    - f/u UA  - BS q6h until urinarating (precuationary i/s/o constipation)

## 2024-03-05 NOTE — ED ADULT NURSE NOTE - NSFALLHARMRISKINTERV_ED_ALL_ED

## 2024-03-05 NOTE — H&P ADULT - HISTORY OF PRESENT ILLNESS
63F former smoker, with FHx MI, PMHx of asthma/COPD, sciatica/herniated disc, HTN, HLD, DM2, obesity, anxiety, AS s/p bioAVR/ascending aorta replacement and CABG SVG-PDA 05/2017 @ [facility] w subsequent PCIs (most recent 10/16/23 DESx2 pLAD) recent admission to [facility] Dec 28-29, 2023 for chest pain w/ neg trop x 2, neg CCTA for acute findings, dx'd w/ pericarditis discharged on Colchicine 0.6 mg BID x 3 months (still taking), recent dx of H Pylori, now on PPI + Amoxicillin 1g BID x 14 days, Clarithromycin 500 mg BID x 14 days, recently seen in the ED on 2/29 for epigastric pain, T&R'd, states to have been seen at [facility] on 3/3, where patient had CTAP w/ IV contrast that showed fatty liver (c/w prior ED w/u-had US at that time), and was discharged home. Patient presents today w/ diffuse back pain, described as tightness, worse w/ deep breathing, sweating, not feeling well, gen weakness. No f/c. CP 5/10, back pain 7/10. Patient reports to have stopped smoking in 08/2023, previous smoked 1.5 packs daily, tpy roughly 60. Patient reports that last BM was 3 days prior to admission and is not currently on bowel regimen while on opioid pain meds.     ED COURSE:   Vitals: T 98 to 99.6, HR 80 to 111, /83 to 146/86, RR 18, SpO2 100% on room air    Significant Labs: WBC 4.9, Hgb 14.2, Plt 181, Cl 91, AG 20 with bicarb 26, Calcium 10.8, ,     EKG: Q waves V2 - V6    CTA chest and A/P: Stable appearance post graft replacement of the ascending aorta. No aortic dissection or aneurysm.    Inverventions: ASA 162mg, Morphine 4mg IV, zofran 4mg IV

## 2024-03-05 NOTE — ED PROVIDER NOTE - CRITERIA ADMIT PEDS PT
No

## 2024-03-05 NOTE — H&P ADULT - PROBLEM SELECTOR PLAN 3
#CAD (coronary artery disease): P/w sharp SSCP associated w anxiety/SOB resolved on own. Currently c/o very mild CP upon a deep breath but otherwise CP free. HD stable. Compliant with DAPT. hx of bioAVR/ascending aorta replacement, CABG SVG-PDA, and recent DESx2 pLAD 10/2023. Trop WNL on admission. EKG Q waves V2 - V6. Cardiac cath 10/16/23: DRAKE x2 pLAD, OM stent patient, patent SVG-RPDA graft. TTE 10/17/23: EF 63%, normal LV/RV. Bioprosthetic AV, no other valve disease, no pericardial effusion.  - Continue ASA/Plavix/Statin/BB  - Monitor for CP overnight

## 2024-03-06 ENCOUNTER — RESULT REVIEW (OUTPATIENT)
Age: 64
End: 2024-03-06

## 2024-03-06 LAB
A1C WITH ESTIMATED AVERAGE GLUCOSE RESULT: 6.7 % — HIGH (ref 4–5.6)
ADD ON TEST-SPECIMEN IN LAB: SIGNIFICANT CHANGE UP
ALBUMIN SERPL ELPH-MCNC: 4.2 G/DL — SIGNIFICANT CHANGE UP (ref 3.3–5)
ALP SERPL-CCNC: 59 U/L — SIGNIFICANT CHANGE UP (ref 40–120)
ALT FLD-CCNC: 100 U/L — HIGH (ref 10–45)
ANION GAP SERPL CALC-SCNC: 16 MMOL/L — SIGNIFICANT CHANGE UP (ref 5–17)
AST SERPL-CCNC: 106 U/L — HIGH (ref 10–40)
BASOPHILS # BLD AUTO: 0.03 K/UL — SIGNIFICANT CHANGE UP (ref 0–0.2)
BASOPHILS NFR BLD AUTO: 0.5 % — SIGNIFICANT CHANGE UP (ref 0–2)
BILIRUB SERPL-MCNC: 0.4 MG/DL — SIGNIFICANT CHANGE UP (ref 0.2–1.2)
BUN SERPL-MCNC: 19 MG/DL — SIGNIFICANT CHANGE UP (ref 7–23)
CALCIUM SERPL-MCNC: 10.4 MG/DL — SIGNIFICANT CHANGE UP (ref 8.4–10.5)
CALCIUM SERPL-MCNC: 9.5 MG/DL — SIGNIFICANT CHANGE UP (ref 8.4–10.5)
CHLORIDE SERPL-SCNC: 90 MMOL/L — LOW (ref 96–108)
CHOLEST SERPL-MCNC: 67 MG/DL — SIGNIFICANT CHANGE UP
CK SERPL-CCNC: 770 U/L — HIGH (ref 25–170)
CO2 SERPL-SCNC: 26 MMOL/L — SIGNIFICANT CHANGE UP (ref 22–31)
CREAT SERPL-MCNC: 0.85 MG/DL — SIGNIFICANT CHANGE UP (ref 0.5–1.3)
CRP SERPL-MCNC: <3 MG/L — SIGNIFICANT CHANGE UP (ref 0–4)
EGFR: 76 ML/MIN/1.73M2 — SIGNIFICANT CHANGE UP
EOSINOPHIL # BLD AUTO: 0.1 K/UL — SIGNIFICANT CHANGE UP (ref 0–0.5)
EOSINOPHIL NFR BLD AUTO: 1.7 % — SIGNIFICANT CHANGE UP (ref 0–6)
ERYTHROCYTE [SEDIMENTATION RATE] IN BLOOD: 26 MM/HR — HIGH
ESTIMATED AVERAGE GLUCOSE: 146 MG/DL — HIGH (ref 68–114)
GLUCOSE BLDC GLUCOMTR-MCNC: 140 MG/DL — HIGH (ref 70–99)
GLUCOSE BLDC GLUCOMTR-MCNC: 142 MG/DL — HIGH (ref 70–99)
GLUCOSE BLDC GLUCOMTR-MCNC: 145 MG/DL — HIGH (ref 70–99)
GLUCOSE BLDC GLUCOMTR-MCNC: 151 MG/DL — HIGH (ref 70–99)
GLUCOSE SERPL-MCNC: 134 MG/DL — HIGH (ref 70–99)
HAV IGM SER-ACNC: SIGNIFICANT CHANGE UP
HBV CORE AB SER-ACNC: REACTIVE
HBV CORE IGM SER-ACNC: SIGNIFICANT CHANGE UP
HBV SURFACE AB SER-ACNC: REACTIVE
HBV SURFACE AG SER-ACNC: SIGNIFICANT CHANGE UP
HCT VFR BLD CALC: 41.1 % — SIGNIFICANT CHANGE UP (ref 34.5–45)
HCV AB S/CO SERPL IA: 0.03 S/CO — SIGNIFICANT CHANGE UP
HCV AB SERPL-IMP: SIGNIFICANT CHANGE UP
HDLC SERPL-MCNC: 24 MG/DL — LOW
HGB BLD-MCNC: 13.6 G/DL — SIGNIFICANT CHANGE UP (ref 11.5–15.5)
IMM GRANULOCYTES NFR BLD AUTO: 0.7 % — SIGNIFICANT CHANGE UP (ref 0–0.9)
LDLC SERPL DIRECT ASSAY-MCNC: 10 MG/DL — SIGNIFICANT CHANGE UP
LIPID PNL WITH DIRECT LDL SERPL: SIGNIFICANT CHANGE UP MG/DL
LYMPHOCYTES # BLD AUTO: 2.15 K/UL — SIGNIFICANT CHANGE UP (ref 1–3.3)
LYMPHOCYTES # BLD AUTO: 36 % — SIGNIFICANT CHANGE UP (ref 13–44)
MAGNESIUM SERPL-MCNC: 1.7 MG/DL — SIGNIFICANT CHANGE UP (ref 1.6–2.6)
MCHC RBC-ENTMCNC: 26.2 PG — LOW (ref 27–34)
MCHC RBC-ENTMCNC: 33.1 GM/DL — SIGNIFICANT CHANGE UP (ref 32–36)
MCV RBC AUTO: 79.2 FL — LOW (ref 80–100)
MONOCYTES # BLD AUTO: 1.17 K/UL — HIGH (ref 0–0.9)
MONOCYTES NFR BLD AUTO: 19.6 % — HIGH (ref 2–14)
NEUTROPHILS # BLD AUTO: 2.48 K/UL — SIGNIFICANT CHANGE UP (ref 1.8–7.4)
NEUTROPHILS NFR BLD AUTO: 41.5 % — LOW (ref 43–77)
NON HDL CHOLESTEROL: 43 MG/DL — SIGNIFICANT CHANGE UP
NRBC # BLD: 0 /100 WBCS — SIGNIFICANT CHANGE UP (ref 0–0)
PHOSPHATE SERPL-MCNC: 5.3 MG/DL — HIGH (ref 2.5–4.5)
PLATELET # BLD AUTO: 191 K/UL — SIGNIFICANT CHANGE UP (ref 150–400)
POTASSIUM SERPL-MCNC: 3.2 MMOL/L — LOW (ref 3.5–5.3)
POTASSIUM SERPL-SCNC: 3.2 MMOL/L — LOW (ref 3.5–5.3)
PROT SERPL-MCNC: 7.3 G/DL — SIGNIFICANT CHANGE UP (ref 6–8.3)
PTH-INTACT FLD-MCNC: 32 PG/ML — SIGNIFICANT CHANGE UP (ref 15–65)
RBC # BLD: 5.19 M/UL — SIGNIFICANT CHANGE UP (ref 3.8–5.2)
RBC # FLD: 13.8 % — SIGNIFICANT CHANGE UP (ref 10.3–14.5)
SODIUM SERPL-SCNC: 132 MMOL/L — LOW (ref 135–145)
TRIGL SERPL-MCNC: 256 MG/DL — HIGH
WBC # BLD: 5.97 K/UL — SIGNIFICANT CHANGE UP (ref 3.8–10.5)
WBC # FLD AUTO: 5.97 K/UL — SIGNIFICANT CHANGE UP (ref 3.8–10.5)

## 2024-03-06 PROCEDURE — 99233 SBSQ HOSP IP/OBS HIGH 50: CPT | Mod: GC

## 2024-03-06 PROCEDURE — 99222 1ST HOSP IP/OBS MODERATE 55: CPT | Mod: GC

## 2024-03-06 PROCEDURE — 93306 TTE W/DOPPLER COMPLETE: CPT | Mod: 26

## 2024-03-06 PROCEDURE — 99223 1ST HOSP IP/OBS HIGH 75: CPT

## 2024-03-06 RX ORDER — POLYETHYLENE GLYCOL 3350 17 G/17G
17 POWDER, FOR SOLUTION ORAL
Refills: 0 | Status: DISCONTINUED | OUTPATIENT
Start: 2024-03-06 | End: 2024-03-12

## 2024-03-06 RX ORDER — POTASSIUM CHLORIDE 20 MEQ
40 PACKET (EA) ORAL ONCE
Refills: 0 | Status: COMPLETED | OUTPATIENT
Start: 2024-03-06 | End: 2024-03-06

## 2024-03-06 RX ORDER — ACETAMINOPHEN 500 MG
975 TABLET ORAL EVERY 6 HOURS
Refills: 0 | Status: DISCONTINUED | OUTPATIENT
Start: 2024-03-06 | End: 2024-03-09

## 2024-03-06 RX ORDER — PANTOPRAZOLE SODIUM 20 MG/1
40 TABLET, DELAYED RELEASE ORAL EVERY 12 HOURS
Refills: 0 | Status: DISCONTINUED | OUTPATIENT
Start: 2024-03-06 | End: 2024-03-12

## 2024-03-06 RX ORDER — METRONIDAZOLE 500 MG
250 TABLET ORAL EVERY 6 HOURS
Refills: 0 | Status: DISCONTINUED | OUTPATIENT
Start: 2024-03-06 | End: 2024-03-12

## 2024-03-06 RX ORDER — METRONIDAZOLE 500 MG
500 TABLET ORAL EVERY 6 HOURS
Refills: 0 | Status: DISCONTINUED | OUTPATIENT
Start: 2024-03-06 | End: 2024-03-06

## 2024-03-06 RX ORDER — MAGNESIUM SULFATE 500 MG/ML
2 VIAL (ML) INJECTION ONCE
Refills: 0 | Status: COMPLETED | OUTPATIENT
Start: 2024-03-06 | End: 2024-03-06

## 2024-03-06 RX ADMIN — PANTOPRAZOLE SODIUM 40 MILLIGRAM(S): 20 TABLET, DELAYED RELEASE ORAL at 18:15

## 2024-03-06 RX ADMIN — PANTOPRAZOLE SODIUM 40 MILLIGRAM(S): 20 TABLET, DELAYED RELEASE ORAL at 06:39

## 2024-03-06 RX ADMIN — Medication 1 CAPSULE(S): at 21:23

## 2024-03-06 RX ADMIN — POLYETHYLENE GLYCOL 3350 17 GRAM(S): 17 POWDER, FOR SOLUTION ORAL at 18:17

## 2024-03-06 RX ADMIN — Medication 250 MILLIGRAM(S): at 18:15

## 2024-03-06 RX ADMIN — OXYCODONE HYDROCHLORIDE 5 MILLIGRAM(S): 5 TABLET ORAL at 23:36

## 2024-03-06 RX ADMIN — Medication 250 MILLIGRAM(S): at 23:35

## 2024-03-06 RX ADMIN — Medication 500 MILLIGRAM(S): at 12:54

## 2024-03-06 RX ADMIN — Medication 300 MILLIGRAM(S): at 23:35

## 2024-03-06 RX ADMIN — POLYETHYLENE GLYCOL 3350 17 GRAM(S): 17 POWDER, FOR SOLUTION ORAL at 07:11

## 2024-03-06 RX ADMIN — OXYCODONE HYDROCHLORIDE 5 MILLIGRAM(S): 5 TABLET ORAL at 10:44

## 2024-03-06 RX ADMIN — Medication 40 MILLIEQUIVALENT(S): at 14:20

## 2024-03-06 RX ADMIN — Medication 81 MILLIGRAM(S): at 12:54

## 2024-03-06 RX ADMIN — Medication 250 MILLIGRAM(S): at 12:54

## 2024-03-06 RX ADMIN — Medication 650 MILLIGRAM(S): at 04:29

## 2024-03-06 RX ADMIN — OXYCODONE HYDROCHLORIDE 5 MILLIGRAM(S): 5 TABLET ORAL at 09:44

## 2024-03-06 RX ADMIN — Medication 500 MILLIGRAM(S): at 23:35

## 2024-03-06 RX ADMIN — Medication 30 MILLILITER(S): at 12:54

## 2024-03-06 RX ADMIN — Medication 2 MILLIGRAM(S): at 21:23

## 2024-03-06 RX ADMIN — Medication 300 MILLIGRAM(S): at 12:54

## 2024-03-06 RX ADMIN — Medication 1 CAPSULE(S): at 22:23

## 2024-03-06 RX ADMIN — Medication 500 MILLIGRAM(S): at 18:15

## 2024-03-06 RX ADMIN — Medication 0.6 MILLIGRAM(S): at 09:44

## 2024-03-06 RX ADMIN — CLOPIDOGREL BISULFATE 75 MILLIGRAM(S): 75 TABLET, FILM COATED ORAL at 12:54

## 2024-03-06 RX ADMIN — Medication 975 MILLIGRAM(S): at 18:21

## 2024-03-06 RX ADMIN — SENNA PLUS 2 TABLET(S): 8.6 TABLET ORAL at 21:23

## 2024-03-06 RX ADMIN — Medication 50 MILLIGRAM(S): at 06:39

## 2024-03-06 RX ADMIN — Medication 25 GRAM(S): at 14:32

## 2024-03-06 RX ADMIN — Medication 300 MILLIGRAM(S): at 18:16

## 2024-03-06 NOTE — CONSULT NOTE ADULT - ASSESSMENT
63F former smoker (Quit 8/2023), CAD s/p CABG (2017) and AS s/p bioAVR/ascending aorta replacement and PCI (most recent 0/16/23 DESx2 pLAD on plavix/ aspirin, asthma/COPD, HTN, HLD, DM2, pericarditis 12/2023 (on Colchicine 0.6 mg BID x 3 months (still taking), recent dx of H Pylori (unable to tolerate triple therapy), presenting for generalized pain and fatigue.     #Abdominal pain  #H. Pylori positive  #Elevated LFTs  Patient with generalized pain of unclear etiology and w/u for pancreaticobiliary pathology negative. DDx includes H.pylori infection, gastritis, ulcers, medications (h.pylori treatment, colchicine constipation. She has a known HP infection and was recently treated with triple therapy, but stopped due to intolerability of side effects and presently has been restarted on quadruple therapy. While ulcer or gastritis/ esophagitis are on the ddx, she is currently on PPI BID and being treated for HP, has no evidence of bleeding, and CT without alarming features. Would continue medical management for now and defer EGD especially in setting of recent stents.    Of notes, she also have elevated lfts 2-3x ULN x 3 months with steatosis on imaging. Likely medication induced colchicine v alvarenga v cardiac. Hep C neg. Labs suggestive of prior Hep B infection (no prior vaccination per patient). No supplements/ herbals or alcohol use. Complete work up with testing for autoimmune.    Recommendations:  - Supportive care per primary team  - Agree with plan to repeat TTE  - Continue H.Pylori quadruple therapy  - Treat constipation with Miralax BID + PRN senna  - Avoid hepatoxic medication  - Consider stopping Colchicine  - Please obtain GEE, LARISA, AMA, IgG    Mckenzie Kuhn MD  Gastroenterology Fellow, PGY -6  Weekday Pager [phone]

## 2024-03-06 NOTE — PHYSICAL THERAPY INITIAL EVALUATION ADULT - PERTINENT HX OF CURRENT PROBLEM, REHAB EVAL
63F former smoker, with FHx MI, PMHx of asthma/COPD, sciatica/herniated disc, HTN, HLD, DM2, obesity, anxiety, AS s/p bioAVR/ascending aorta replacement and CABG SVG-PDA 05/2017 @ [facility] w subsequent PCIs (most recent 10/16/23 DESx2 pLAD) recent admission to [facility] Dec 28-29, 2023 for chest pain w/ neg trop x 2, neg CCTA for acute findings, dx'd w/ pericarditis discharged on Colchicine 0.6 mg BID x 3 months (still taking), recent dx of H Pylori, now on PPI + Amoxicillin 1g BID x 14 days, Clarithromycin 500 mg BID x 14 days, a/f epigastric abdominal pain.

## 2024-03-06 NOTE — CONSULT NOTE ADULT - ASSESSMENT
I M    63 y o female former smoker, with FHx MI, PMHx of asthma/COPD, sciatica/herniated disc, HTN, HLD, DM2, obesity, anxiety, AS s/p bioAVR/ascending aorta replacement and CABG SVG-PDA 05/2017 @ [facility] w subsequent PCIs (most recent 10/16/23 DESx2 pLAD) recent admission to [facility] Dec 28-29, 2023 for chest pain w/ neg trop x 2, neg CCTA for acute findings, dx'd w/ pericarditis discharged on Colchicine 0.6 mg BID x 3 months (still taking), recent dx of H Pylori, now on PPI + Amoxicillin 1g BID x 14 days, Clarithromycin 500 mg BID x 14 days, a/f epigastric abdominal pain.     Problem/Plan - 1:  ·  Problem: Epigastric abdominal pain.   ·  Plan: patient with characteristic epigastric abdominal pain radiating to back however CTAP without evidence of pancreatitis and lipase 20 on admission therefore Harrison xriteria 1/3 not consistent with acute pancriatitis. Likely that pain is acute i/s/o constipation and chronic back pain requiring daily opioid administration v H. pylori infection.   - c/w pain regimen below  - c/t monitor pain    #H. pylori inf: on PPI + Amoxicillin 1g BID x 14 days, Clarithromycin 500 mg BID x 14 days.   - confirm ABx start date and reinstate per clinical necessity     #anion gap: AG 20 with bicarb 26. patient reports poor PO intake i/s/o pain. Ketosis likely driving AG i/s/o poor PO intake    - f/u UA  - BS q6h until urinarating (precuationary i/s/o constipation).    Problem/Plan - 2:  ·  Problem: Transaminitis.   ·  Plan: AST/ALT elevations, uptrending since discharge in 12/2023. Polo's sign negative on admission. CTAP without evidence of galbladder obstruction however layering sludge without wall thickening or pericholecystic fluid present on exam.  - f/u hepatitis studies.    Problem/Plan - 3:  ·  Problem: CAD (coronary artery disease).   ·  Plan: #CAD (coronary artery disease): P/w sharp SSCP associated w anxiety/SOB resolved on own. Currently c/o very mild CP upon a deep breath but otherwise CP free. HD stable. Compliant with DAPT. hx of bioAVR/ascending aorta replacement, CABG SVG-PDA, and recent DESx2 pLAD 10/2023. Trop WNL on admission. EKG Q waves V2 - V6. Cardiac cath 10/16/23: DRAKE x2 pLAD, OM stent patient, patent SVG-RPDA graft. TTE 10/17/23: EF 63%, normal LV/RV. Bioprosthetic AV, no other valve disease, no pericardial effusion.  - Continue ASA/Plavix/Statin/BB  - Monitor for CP overnight.    Problem/Plan - 4:  ·  Problem: H/O pericarditis.   ·  Plan: discharged 12/29 on Colchicine 0.6 mg BID x 3 months  - c/w colchicine 0.6mg PO BID.    Problem/Plan - 5:  ·  Problem: History of COPD.   ·  Plan: SpO2 100% on RA, no new or change in cough or sputum production, no CARRILLO, no wheeze. Not on home O2.   - confirm home inhalers in AM.    Problem/Plan - 6:  ·  Problem: Constipation.   ·  Plan: last BM 3 days prior to admission   - start miralax + senna.    Problem/Plan - 7:  ·  Problem: Anxiety.   ·  Plan: - Continue home diazepam qHS.    Problem/Plan - 8:  ·  Problem: Diabetes mellitus.   ·  Plan: on home metformin 1000mg PO qAM & 500mg PO qHS  - c/w ISS, FS.    Problem/Plan - 9:  ·  Problem: History of chronic back pain.   ·  Plan: On home vicodin 7.5-325 mg q12 PRN for pain.   - c/w oxycodone 5mg PO q12h PRN for severe pain   - c/w tylenol 625mg PO q6h for pain/fever.    Problem/Plan - 10:  ·  Problem: Hypertension.   ·  Plan; on home metoprolol succinate 50mg qd + chlorthalidone 25mg PO qd   - c/w toprol   - confirm chlorthalidone home med.    Problem/Plan - 11:  ·  Problem: Hyperlipidemia.   ·  Plan: on home crestor 40mg PO qHS + zetia 10mg PO qd  - c/w therapeutic interchange.    Problem/Plan - 12:  ·  Problem: Need for prophylactic measure.   ·  Plan: F: s/p 500cc NS bolus   E: Replete if K<4 or Mag<2  N: DASH Diet  GI ppx: Pantoprazole  VTE ppx: Ambulatory, SCDs while in bed  Dispo: F.

## 2024-03-06 NOTE — CONSULT NOTE ADULT - SUBJECTIVE AND OBJECTIVE BOX
Patient is a 63y old  Female who presents with a chief complaint of abdominal pain (06 Mar 2024 06:38)      HPI:  63F former smoker, with FHx MI, PMHx of asthma/COPD, sciatica/herniated disc, HTN, HLD, DM2, obesity, anxiety, AS s/p bioAVR/ascending aorta replacement and CABG SVG-PDA 05/2017 @ [facility] w subsequent PCIs (most recent 10/16/23 DESx2 pLAD) recent admission to [facility] Dec 28-29, 2023 for chest pain w/ neg trop x 2, neg CCTA for acute findings, dx'd w/ pericarditis discharged on Colchicine 0.6 mg BID x 3 months (still taking), recent dx of H Pylori, now on PPI + Amoxicillin 1g BID x 14 days, Clarithromycin 500 mg BID x 14 days, recently seen in the ED on 2/29 for epigastric pain, T&R'd, states to have been seen at [facility] on 3/3, where patient had CTAP w/ IV contrast that showed fatty liver (c/w prior ED w/u-had US at that time), and was discharged home. Patient presents today w/ diffuse back pain, described as tightness, worse w/ deep breathing, sweating, not feeling well, gen weakness. No f/c. CP 5/10, back pain 7/10. Patient reports to have stopped smoking in 08/2023, previous smoked 1.5 packs daily, tpy roughly 60. Patient reports that last BM was 3 days prior to admission and is not currently on bowel regimen while on opioid pain meds.     ED COURSE:   Vitals: T 98 to 99.6, HR 80 to 111, /83 to 146/86, RR 18, SpO2 100% on room air    Significant Labs: WBC 4.9, Hgb 14.2, Plt 181, Cl 91, AG 20 with bicarb 26, Calcium 10.8, ,     EKG: Q waves V2 - V6    CTA chest and A/P: Stable appearance post graft replacement of the ascending aorta. No aortic dissection or aneurysm.    Inverventions: ASA 162mg, Morphine 4mg IV, zofran 4mg IV   (05 Mar 2024 20:57)    PAST MEDICAL & SURGICAL HISTORY:  Hypertension      Hyperlipidemia      Diabetes mellitus      Asthma      GERD (gastroesophageal reflux disease)      Kidney stone      Back injury  lumbar, work related      CAD (coronary artery disease)      Carpal tunnel syndrome      Status post small bowel resection      Uterine fibroid  removal      Status post laser lithotripsy of ureteral calculus      H/O aortic valve replacement        MEDICATIONS  (STANDING):  aspirin enteric coated 81 milliGRAM(s) Oral daily  atorvastatin 80 milliGRAM(s) Oral at bedtime  clopidogrel Tablet 75 milliGRAM(s) Oral daily  colchicine 0.6 milliGRAM(s) Oral two times a day  dextrose 5%. 1000 milliLiter(s) (100 mL/Hr) IV Continuous <Continuous>  dextrose 5%. 1000 milliLiter(s) (50 mL/Hr) IV Continuous <Continuous>  dextrose 50% Injectable 25 Gram(s) IV Push once  dextrose 50% Injectable 25 Gram(s) IV Push once  dextrose 50% Injectable 12.5 Gram(s) IV Push once  diazepam    Tablet 2 milliGRAM(s) Oral at bedtime  glucagon  Injectable 1 milliGRAM(s) IntraMuscular once  insulin lispro (ADMELOG) corrective regimen sliding scale   SubCutaneous Before meals and at bedtime  metoprolol succinate ER 50 milliGRAM(s) Oral daily  pantoprazole    Tablet 40 milliGRAM(s) Oral before breakfast  polyethylene glycol 3350 17 Gram(s) Oral two times a day  senna 2 Tablet(s) Oral at bedtime    MEDICATIONS  (PRN):  acetaminophen     Tablet .. 650 milliGRAM(s) Oral every 6 hours PRN Temp greater or equal to 38C (100.4F), Mild Pain (1 - 3)  aluminum hydroxide/magnesium hydroxide/simethicone Suspension 30 milliLiter(s) Oral every 4 hours PRN Dyspepsia  dextrose Oral Gel 15 Gram(s) Oral once PRN Blood Glucose LESS THAN 70 milliGRAM(s)/deciliter  melatonin 3 milliGRAM(s) Oral at bedtime PRN Insomnia  oxyCODONE    IR 5 milliGRAM(s) Oral every 12 hours PRN Severe Pain (7 - 10)              FAMILY HISTORY:  Family history of atrial fibrillation (Mother)    Family history of cardiac pacemaker (Mother)    Family history of asthma (Mother)    Family history of MI (myocardial infarction) (Mother)    Family history of pulmonary embolism (Mother)        CBC Full  -  ( 06 Mar 2024 05:30 )  WBC Count : 5.97 K/uL  RBC Count : 5.19 M/uL  Hemoglobin : 13.6 g/dL  Hematocrit : 41.1 %  Platelet Count - Automated : 191 K/uL  Mean Cell Volume : 79.2 fl  Mean Cell Hemoglobin : 26.2 pg  Mean Cell Hemoglobin Concentration : 33.1 gm/dL  Auto Neutrophil # : 2.48 K/uL  Auto Lymphocyte # : 2.15 K/uL  Auto Monocyte # : 1.17 K/uL  Auto Eosinophil # : 0.10 K/uL  Auto Basophil # : 0.03 K/uL  Auto Neutrophil % : 41.5 %  Auto Lymphocyte % : 36.0 %  Auto Monocyte % : 19.6 %  Auto Eosinophil % : 1.7 %  Auto Basophil % : 0.5 %      03-06    132<L>  |  90<L>  |  19  ----------------------------<  134<H>  3.2<L>   |  26  |  0.85    Ca    9.5      06 Mar 2024 05:30  Phos  5.3     03-06  Mg     1.7     03-06    TPro  7.3  /  Alb  4.2  /  TBili  0.4  /  DBili  x   /  AST  106<H>  /  ALT  100<H>  /  AlkPhos  59  03-06      Urinalysis Basic - ( 06 Mar 2024 05:30 )    Color: x / Appearance: x / SG: x / pH: x  Gluc: 134 mg/dL / Ketone: x  / Bili: x / Urobili: x   Blood: x / Protein: x / Nitrite: x   Leuk Esterase: x / RBC: x / WBC x   Sq Epi: x / Non Sq Epi: x / Bacteria: x        Radiology :         < from: CT Angio Chest Aorta w/wo IV Cont (03.05.24 @ 14:44) >    ACC: [id number] EXAM:  CT ANGIO CHEST AORTA WAWIC   ORDERED BY: JEISON RILEY     ACC: [id number] EXAM:  CT ANGIO ABD PELV (W)AW IC   ORDERED BY: JEISON RILEY     PROCEDURE DATE:  03/05/2024          INTERPRETATION:  CLINICAL INFORMATION: History of aortic valve   replacement and aortic arch replacement, CABG. Chest and back pain.    COMPARISON: CT abdomen/pelvis 2/1/2024, CTA chest 12/29/2023    CONTRAST/COMPLICATIONS:  IV Contrast: Isovue 370  90 cc administered   10 cc discarded  Oral Contrast: NONE  Complications: None reported at time of study completion    PROCEDURE:  CT Angiography of the Chest, Abdomen and Pelvis.  Precontrast imaging was performed through the chest followed by arterial   phase imaging of the chest, abdomen and pelvis.  Sagittal and coronal reformats were performed as well as 3D (MIP)   reconstructions.    FINDINGS:  CHEST:  LUNGS AND LARGE AIRWAYS: Patent central airways. No consolidation or   suspicious nodule. Mild mosaic attenuation in the lung bases, may be due   to air trapping.  PLEURA: No pleural effusion.  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   No thoracic aortic aortic dissection or aneurysm.  HEART: Heart size is normal. Aortic valve replacement. No pericardial   effusion.  MEDIASTINUM AND BHARAT: No lymphadenopathy.  CHEST WALL AND LOWER NECK: Median sternotomy.    ABDOMEN AND PELVIS:  LIVER: Within normal limits.  BILE DUCTS: Normal caliber.  GALLBLADDER: Layering sludge. No wall thickening or pericholecystic fluid.  SPLEEN: Within normal limits.  PANCREAS: Within normal limits.  ADRENALS: Within normal limits.  KIDNEYS/URETERS: Within normal limits.    BLADDER: Within normal limits.  REPRODUCTIVE ORGANS: Uterus and adnexa within normal limits.    BOWEL: No bowel obstruction. Appendix is normal.  PERITONEUM: No ascites.  VESSELS: Atherosclerotic changes. No abdominal aortic dissection or   aneurysm.  RETROPERITONEUM/LYMPH NODES: No lymphadenopathy.  ABDOMINAL WALL: Within normal limits.  BONES: Degenerative changes.    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 No   aortic dissection or aneurysm.       Review of Systems : per HPI         Vital Signs Last 24 Hrs  T(C): 36.8 (06 Mar 2024 06:10), Max: 37.6 (05 Mar 2024 15:38)  T(F): 98.3 (06 Mar 2024 06:10), Max: 99.6 (05 Mar 2024 15:38)  HR: 90 (06 Mar 2024 06:10) (80 - 111)  BP: 129/85 (06 Mar 2024 06:10) (114/66 - 146/89)  BP(mean): --  RR: 17 (06 Mar 2024 06:10) (17 - 20)  SpO2: 98% (06 Mar 2024 06:10) (98% - 100%)    Parameters below as of 06 Mar 2024 06:10  Patient On (Oxygen Delivery Method): room air            Physical Exam:  obese 63 y o woman lying comfortably in semi Dailey's position , awake , alert , no new complaints     Head: normocephalic , atraumatic    Eyes: PERRLA , EOMI , no nystagmus , sclera anicteric    ENT / FACE: neg nasal discharge , uvula midline , no oropharyngeal erythema / exudate    Neck: supple , negative JVD , negative carotid bruits , no thyromegaly    Chest: CTA bilaterally , neg wheeze / rhonchi / rales / crackles / egophany    Cardiovascular: regular rate and rhythm , neg murmurs / rubs / gallops    Abdomen: soft ,  distended , mild epig ttp  ,  neg r/g, normal bowel sounds     Extremities: WWP , neg cyanosis /clubbing / edema     Neurologic Exam:     Alert and oriented x 3        Motor Exam:        > 4/5 x 4 extremities        Sensation:         intact to light touch x 4 extremities     DTR:           biceps/brachioradialis: equal                            patella/ankle: equal      Gait:  not tested          PM&R Impression: admitted for abdominal pain    - no acute pathology on CT imaging          Recommendations / Plan:       1) Physical / Occupational therapy focusing on therapeutic exercises , equipment evaluation , bed mobility/transfer out of bed evaluation , progressive ambulation with assistive devices prn .    2) Current disposition plan recommendation:    pending functional progress

## 2024-03-06 NOTE — PROGRESS NOTE ADULT - PROBLEM SELECTOR PLAN 1
patient with characteristic epigastric abdominal pain radiating to back however CTAP without evidence of pancreatitis and lipase 20 on admission therefore junior xriteria 1/3 not consistent with acute pancriatitis. Likely that pain is acute i/s/o constipation and chronic back pain requiring daily opioid administration v H. pylori infection.   - c/w pain regimen below  - c/t monitor pain    #H. pylori inf: on PPI + Amoxicillin 1g BID x 14 days, Clarithromycin 500 mg BID x 14 days.   - confirm ABx start date and reinstate per clinical necessity     #anion gap: AG 20 with bicarb 26. patient reports poor PO intake i/s/o pain. Ketosis likely driving AG i/s/o poor PO intake    - f/u UA  - BS q6h until urinarating (precuationary i/s/o constipation) patient with characteristic epigastric abdominal pain radiating to back however CTAP without evidence of pancreatitis and lipase 20 on admission therefore junior xriteria 1/3 not consistent with acute pancriatitis. Likely that pain is acute i/s/o constipation and chronic back pain requiring daily opioid administration v H. pylori infection.   - c/w pain regimen below  - c/t monitor pain    #H. pylori inf: on PPI + Amoxicillin 1g BID x 14 days, Clarithromycin 500 mg BID x 14 days.   - confirm ABx start date and reinstate per clinical necessity  - Switch patient to quadruple therapy due to existing patient allergy to clarithromycin and possible side effect of clarithromycin being abdominal pain    #anion gap: AG 20 with bicarb 26. patient reports poor PO intake i/s/o pain. Ketosis likely driving AG i/s/o poor PO intake    - f/u UA  - BS q6h until urinarating (precuationary i/s/o constipation)

## 2024-03-06 NOTE — CONSULT NOTE ADULT - SUBJECTIVE AND OBJECTIVE BOX
GASTROENTEROLOGY CONSULT NOTE  HPI:  63F former smoker (Quit 8/2023), CAD s/p CABG (2017) and AS s/p bioAVR/ascending aorta replacement and PCI (most recent 0/16/23 DESx2 pLAD on plavix/ aspirin , asthma/COPD, sciatica/herniated disc, HTN, HLD, DM2, obesity, anxiety, most recently with pericarditis (on Colchicine 0.6 mg BID x 3 months (still taking), recent dx of H Pylori (unable to tolerate triple therapy) recently seen in the ED on 2/29 for epigastric pain, T&R'd, states to have been seen at [facility] on 3/3, where patient had CTAP w/ IV contrast that showed fatty liver (c/w prior ED w/u-had US at that time), and was discharged home.    Patient presented with generalized pain. She states her pain starts in the upper abdomen and radiates to the rest of her body. She has diffuse back pain, described as tightness, worse w/ deep breathing, sweating. States her legs feel heavy like lead. She has loss of appetite, not feeling well, gen weakness. No f/c. CP 5/10, back pain 7/10. States  Patient reports that last BM was 3 days prior to admission and is not currently on bowel regimen while on opioid pain meds.     Denies nausea, vomiting, melena    Supplements/ OTC: none  NSAIDS: ASA, cochicine  AC: Plavix  Tobacco:  08/2023, previous smoked 1.5 packs daily, tpy roughly 60  ETOH: denies  Drug use: denies  FHx: No gi malignancy  Prior EGD: never  Prior colonoscopy: 5 years ago at [facility]    On review of labs - no evidence of anemia. LFTs elevated since Oct. No herbals/ supplements. Hep Bs Ab + and core + suggestive of prior hepatitis infection - no known hx of vaccination.  RUQ US with steatosis.  CTA chest and A/P: Stable appearance post graft replacement of the ascending aorta. No aortic dissection or aneurysm.  No obvious GI pathology on CT  Currently feeling better with GI cocktail      Allergies    Biaxin (Hives)    Intolerances      Home Medications:  chlorthalidone 25 mg oral tablet: 1 tab(s) orally once a day (02 Feb 2024 01:57)  Crestor 40 mg oral tablet: 1 tab(s) orally once a day (28 Dec 2023 19:46)  diazePAM 5 mg oral tablet: 0.5 tab(s) orally once a day (28 Dec 2023 19:46)  hydrocodone-acetaminophen 7.5 mg-325 mg oral tablet: 1 tab(s) orally once a day (28 Dec 2023 19:46)  MetFORMIN (Eqv-Fortamet) 500 mg oral tablet, extended release: 2 tab(s) orally once a day in morning  (28 Dec 2023 19:46)  metFORMIN 500 mg oral tablet: 1 tab(s) orally once a day (at bedtime) (28 Dec 2023 19:46)  metoprolol succinate 50 mg oral tablet, extended release: 1 orally once a day (28 Dec 2023 19:46)  omeprazole 40 mg oral delayed release capsule: 1 cap(s) orally once a day (28 Dec 2023 19:46)  Zetia 10 mg oral tablet: 1 tab(s) orally once a day (28 Dec 2023 19:46)    MEDICATIONS:  MEDICATIONS  (STANDING):  aspirin enteric coated 81 milliGRAM(s) Oral daily  bismuth subsalicylate Liquid 300 milliGRAM(s) Oral every 6 hours  clopidogrel Tablet 75 milliGRAM(s) Oral daily  dextrose 5%. 1000 milliLiter(s) (100 mL/Hr) IV Continuous <Continuous>  dextrose 5%. 1000 milliLiter(s) (50 mL/Hr) IV Continuous <Continuous>  dextrose 50% Injectable 25 Gram(s) IV Push once  dextrose 50% Injectable 25 Gram(s) IV Push once  dextrose 50% Injectable 12.5 Gram(s) IV Push once  diazepam    Tablet 2 milliGRAM(s) Oral at bedtime  glucagon  Injectable 1 milliGRAM(s) IntraMuscular once  insulin lispro (ADMELOG) corrective regimen sliding scale   SubCutaneous Before meals and at bedtime  metoprolol succinate ER 50 milliGRAM(s) Oral daily  metroNIDAZOLE    Tablet 250 milliGRAM(s) Oral every 6 hours  pantoprazole    Tablet 40 milliGRAM(s) Oral every 12 hours  polyethylene glycol 3350 17 Gram(s) Oral two times a day  senna 2 Tablet(s) Oral at bedtime  tetracycline 500 milliGRAM(s) Oral four times a day    MEDICATIONS  (PRN):  acetaminophen     Tablet .. 975 milliGRAM(s) Oral every 6 hours PRN Mild Pain (1 - 3)  acetaminophen 300 mG/butalbital 50 mG/ caffeine 40 mG 1 Capsule(s) Oral every 6 hours PRN headache  aluminum hydroxide/magnesium hydroxide/simethicone Suspension 30 milliLiter(s) Oral every 4 hours PRN Dyspepsia  aluminum hydroxide/magnesium hydroxide/simethicone Suspension 30 milliLiter(s) Oral every 4 hours PRN Dyspepsia  dextrose Oral Gel 15 Gram(s) Oral once PRN Blood Glucose LESS THAN 70 milliGRAM(s)/deciliter  melatonin 3 milliGRAM(s) Oral at bedtime PRN Insomnia  oxyCODONE    IR 5 milliGRAM(s) Oral every 12 hours PRN Severe Pain (7 - 10)    PAST MEDICAL & SURGICAL HISTORY:  Hypertension      Hyperlipidemia      Diabetes mellitus      Asthma      GERD (gastroesophageal reflux disease)      Kidney stone      Back injury  lumbar, work related      CAD (coronary artery disease)      Carpal tunnel syndrome      Status post small bowel resection      Uterine fibroid  removal      Status post laser lithotripsy of ureteral calculus      H/O aortic valve replacement        FAMILY HISTORY:  Family history of atrial fibrillation (Mother)    Family history of cardiac pacemaker (Mother)    Family history of asthma (Mother)    Family history of MI (myocardial infarction) (Mother)    Family history of pulmonary embolism (Mother)      SOCIAL HISTORY:  Tobacco: [ ] Current, [ ] Former, [ ] Never; Pack Years:  Alcohol:  Illicit Drugs:    REVIEW OF SYSTEMS:  CONSTITUTIONAL: No weakness, fevers or chills  HEENT: No visual changes; No vertigo or throat pain   NECK: No pain or stiffness  RESPIRATORY: No cough, wheezing, hemoptysis; No shortness of breath  CARDIOVASCULAR: No chest pain or palpitations  GASTROINTESTINAL: As above.  GENITOURINARY: No dysuria, frequency or hematuria  NEUROLOGICAL: No numbness or weakness  SKIN: No itching, burning, rashes, or lesions   All other 10 review of systems is negative unless indicated above.    Vital Signs Last 24 Hrs  T(C): 36.7 (06 Mar 2024 13:47), Max: 37.6 (05 Mar 2024 15:38)  T(F): 98 (06 Mar 2024 13:47), Max: 99.6 (05 Mar 2024 15:38)  HR: 88 (06 Mar 2024 13:47) (80 - 111)  BP: 146/82 (06 Mar 2024 13:47) (114/66 - 153/95)  BP(mean): --  RR: 17 (06 Mar 2024 13:47) (17 - 18)  SpO2: 99% (06 Mar 2024 13:47) (98% - 100%)    Parameters below as of 06 Mar 2024 13:47  Patient On (Oxygen Delivery Method): room air          PHYSICAL EXAM:    General: in no acute distress  Eyes: Anicteric sclerae, moist conjunctivae  HENT: Moist mucous membranes  Neck: Trachea midline, supple  Lungs: Normal respiratory effort, no intercostal retractions  Cardiovascular: RRR  Abdomen: Soft, upper quadrant tenderness non-distended; No rebound or guarding  Extremities: Normal range of motion, No clubbing, cyanosis or edema  Neurological: Alert and oriented x3  Skin: Warm and dry. No obvious rash    LABS:                        13.6   5.97  )-----------( 191      ( 06 Mar 2024 05:30 )             41.1     03-06    132<L>  |  90<L>  |  19  ----------------------------<  134<H>  3.2<L>   |  26  |  0.85    Ca    9.5      06 Mar 2024 05:30  Phos  5.3     03-06  Mg     1.7     03-06    TPro  7.3  /  Alb  4.2  /  TBili  0.4  /  DBili  x   /  AST  106<H>  /  ALT  100<H>  /  AlkPhos  59  03-06            RADIOLOGY & ADDITIONAL STUDIES:     Reviewed

## 2024-03-06 NOTE — CONSULT NOTE ADULT - TIME BILLING
Time spent includes direct patient care  (interview and examination of patient), discussion with other providers, support staff and/or patient's family members, review of medical records, ordering diagnostic tests and analyzing results, and documentation.
As above

## 2024-03-06 NOTE — PHYSICAL THERAPY INITIAL EVALUATION ADULT - MODALITIES TREATMENT COMMENTS
Pt reports severe headache throughout treatment session and states that her legs feel like lead. No neuro deficits observed, Pt AOx 4, clear speech, visual acuity and tracking intact, symmetric facial movements, symmetric UE/LE strength, no sensory deficits. CAM Diane and MD Juarez made aware of Pt headache.

## 2024-03-06 NOTE — PROGRESS NOTE ADULT - PROBLEM SELECTOR PLAN 4
discharged 12/29 on Colchicine 0.6 mg BID x 3 months  - c/w colchicine 0.6mg PO BID discharged 12/29 on Colchicine 0.6 mg BID x 3 months  - c/w colchicine 0.6mg PO BID  - Consulting cardiology due to risk of colchicine contributing to abdominal discomfort

## 2024-03-06 NOTE — PROGRESS NOTE ADULT - SUBJECTIVE AND OBJECTIVE BOX
O/N Events:    Subjective/ROS: Patient seen and examined at bedside.     Denies Fever/Chills, HA, CP, SOB, n/v, changes in bowel/urinary habits.  12pt ROS otherwise negative.    VITALS  Vital Signs Last 24 Hrs  T(C): 36.8 (06 Mar 2024 06:10), Max: 37.6 (05 Mar 2024 15:38)  T(F): 98.3 (06 Mar 2024 06:10), Max: 99.6 (05 Mar 2024 15:38)  HR: 90 (06 Mar 2024 06:10) (80 - 111)  BP: 129/85 (06 Mar 2024 06:10) (114/66 - 146/89)  BP(mean): --  RR: 17 (06 Mar 2024 06:10) (17 - 20)  SpO2: 98% (06 Mar 2024 06:10) (98% - 100%)    Parameters below as of 06 Mar 2024 06:10  Patient On (Oxygen Delivery Method): room air        CAPILLARY BLOOD GLUCOSE      POCT Blood Glucose.: 135 mg/dL (05 Mar 2024 22:25)      PHYSICAL EXAM  General: NAD  Head: NC/AT; MMM; PERRL; EOMI;  Neck: Supple; no JVD  Respiratory: CTAB; no wheezes/rales/rhonchi  Cardiovascular: Regular rhythm/rate; S1/S2+, no murmurs, rubs gallops   Gastrointestinal: Soft; NTND; bowel sounds normal and present  Extremities: WWP; no edema/cyanosis  Neurological: A&Ox3, CNII-XII grossly intact; no obvious focal deficits    MEDICATIONS  (STANDING):  aspirin enteric coated 81 milliGRAM(s) Oral daily  atorvastatin 80 milliGRAM(s) Oral at bedtime  clopidogrel Tablet 75 milliGRAM(s) Oral daily  colchicine 0.6 milliGRAM(s) Oral two times a day  dextrose 5%. 1000 milliLiter(s) (50 mL/Hr) IV Continuous <Continuous>  dextrose 5%. 1000 milliLiter(s) (100 mL/Hr) IV Continuous <Continuous>  dextrose 50% Injectable 12.5 Gram(s) IV Push once  dextrose 50% Injectable 25 Gram(s) IV Push once  dextrose 50% Injectable 25 Gram(s) IV Push once  diazepam    Tablet 2 milliGRAM(s) Oral at bedtime  glucagon  Injectable 1 milliGRAM(s) IntraMuscular once  insulin lispro (ADMELOG) corrective regimen sliding scale   SubCutaneous three times a day before meals  metoprolol succinate ER 50 milliGRAM(s) Oral daily  pantoprazole    Tablet 40 milliGRAM(s) Oral before breakfast  polyethylene glycol 3350 17 Gram(s) Oral once  senna 2 Tablet(s) Oral at bedtime    MEDICATIONS  (PRN):  acetaminophen     Tablet .. 650 milliGRAM(s) Oral every 6 hours PRN Temp greater or equal to 38C (100.4F), Mild Pain (1 - 3)  aluminum hydroxide/magnesium hydroxide/simethicone Suspension 30 milliLiter(s) Oral every 4 hours PRN Dyspepsia  dextrose Oral Gel 15 Gram(s) Oral once PRN Blood Glucose LESS THAN 70 milliGRAM(s)/deciliter  melatonin 3 milliGRAM(s) Oral at bedtime PRN Insomnia  oxyCODONE    IR 5 milliGRAM(s) Oral every 12 hours PRN Severe Pain (7 - 10)      Biaxin (Hives)      LABS                        14.2   4.90  )-----------( 181      ( 05 Mar 2024 14:14 )             43.2     03-05    137  |  91<L>  |  15  ----------------------------<  121<H>  4.1   |  26  |  0.86    Ca    10.8<H>      05 Mar 2024 14:14    TPro  7.8  /  Alb  4.4  /  TBili  0.4  /  DBili  x   /  AST  100<H>  /  ALT  105<H>  /  AlkPhos  57  03-05      Urinalysis Basic - ( 05 Mar 2024 14:14 )    Color: x / Appearance: x / SG: x / pH: x  Gluc: 121 mg/dL / Ketone: x  / Bili: x / Urobili: x   Blood: x / Protein: x / Nitrite: x   Leuk Esterase: x / RBC: x / WBC x   Sq Epi: x / Non Sq Epi: x / Bacteria: x              IMAGING/EKG/ETC   O/N Events:    Subjective/ROS: Patient seen and examined at bedside. Patient expresses diffuse bodily discomfort, with entire body feeling "like lead." She is weak and has no energy. She has a headache in the temples and neck, as well as band-like epigastric stomach pain; she confirms passing gas overnight which relieves some pain, but denies passing stool (day 3 of no stool). Patient states that she has not eaten much since symptom onset, but she palns to try eating and drinking today.    Denies Fever/Chills, HA, CP, SOB, n/v, changes in bowel/urinary habits.  12pt ROS otherwise negative.    VITALS  Vital Signs Last 24 Hrs  T(C): 36.8 (06 Mar 2024 06:10), Max: 37.6 (05 Mar 2024 15:38)  T(F): 98.3 (06 Mar 2024 06:10), Max: 99.6 (05 Mar 2024 15:38)  HR: 90 (06 Mar 2024 06:10) (80 - 111)  BP: 129/85 (06 Mar 2024 06:10) (114/66 - 146/89)  BP(mean): --  RR: 17 (06 Mar 2024 06:10) (17 - 20)  SpO2: 98% (06 Mar 2024 06:10) (98% - 100%)    Parameters below as of 06 Mar 2024 06:10  Patient On (Oxygen Delivery Method): room air        CAPILLARY BLOOD GLUCOSE      POCT Blood Glucose.: 135 mg/dL (05 Mar 2024 22:25)      PHYSICAL EXAM  General: NAD  Head: NC/AT; MMM; PERRL; EOMI;  Neck: Supple; no JVD. Enlarged neck with diffuse tenderness to palpation.  Respiratory: CTAB; no wheezes/rales/rhonchi  Cardiovascular: Regular rhythm/rate; S1/S2+, loud systolic murmur appreciated diffusely  Gastrointestinal: Pain in RUQ when palpated anywhere on the abdomen; Soft; bowel sounds normal and present  Extremities: WWP; no edema/cyanosis  Neurological: A&Ox3, CNII-XII grossly intact; no obvious focal deficits    MEDICATIONS  (STANDING):  aspirin enteric coated 81 milliGRAM(s) Oral daily  atorvastatin 80 milliGRAM(s) Oral at bedtime  clopidogrel Tablet 75 milliGRAM(s) Oral daily  colchicine 0.6 milliGRAM(s) Oral two times a day  dextrose 5%. 1000 milliLiter(s) (50 mL/Hr) IV Continuous <Continuous>  dextrose 5%. 1000 milliLiter(s) (100 mL/Hr) IV Continuous <Continuous>  dextrose 50% Injectable 12.5 Gram(s) IV Push once  dextrose 50% Injectable 25 Gram(s) IV Push once  dextrose 50% Injectable 25 Gram(s) IV Push once  diazepam    Tablet 2 milliGRAM(s) Oral at bedtime  glucagon  Injectable 1 milliGRAM(s) IntraMuscular once  insulin lispro (ADMELOG) corrective regimen sliding scale   SubCutaneous three times a day before meals  metoprolol succinate ER 50 milliGRAM(s) Oral daily  pantoprazole    Tablet 40 milliGRAM(s) Oral before breakfast  polyethylene glycol 3350 17 Gram(s) Oral once  senna 2 Tablet(s) Oral at bedtime    MEDICATIONS  (PRN):  acetaminophen     Tablet .. 650 milliGRAM(s) Oral every 6 hours PRN Temp greater or equal to 38C (100.4F), Mild Pain (1 - 3)  aluminum hydroxide/magnesium hydroxide/simethicone Suspension 30 milliLiter(s) Oral every 4 hours PRN Dyspepsia  dextrose Oral Gel 15 Gram(s) Oral once PRN Blood Glucose LESS THAN 70 milliGRAM(s)/deciliter  melatonin 3 milliGRAM(s) Oral at bedtime PRN Insomnia  oxyCODONE    IR 5 milliGRAM(s) Oral every 12 hours PRN Severe Pain (7 - 10)      Biaxin (Hives)      LABS                        14.2   4.90  )-----------( 181      ( 05 Mar 2024 14:14 )             43.2     03-05    137  |  91<L>  |  15  ----------------------------<  121<H>  4.1   |  26  |  0.86    Ca    10.8<H>      05 Mar 2024 14:14    TPro  7.8  /  Alb  4.4  /  TBili  0.4  /  DBili  x   /  AST  100<H>  /  ALT  105<H>  /  AlkPhos  57  03-05      Urinalysis Basic - ( 05 Mar 2024 14:14 )    Color: x / Appearance: x / SG: x / pH: x  Gluc: 121 mg/dL / Ketone: x  / Bili: x / Urobili: x   Blood: x / Protein: x / Nitrite: x   Leuk Esterase: x / RBC: x / WBC x   Sq Epi: x / Non Sq Epi: x / Bacteria: x              IMAGING/EKG/ETC   O/N Events:    Subjective/ROS: Patient seen and examined at bedside. Patient expresses diffuse bodily discomfort, with entire body feeling "like lead." She is weak and has no energy. She has a headache in the temples and neck, as well as band-like epigastric stomach pain; she confirms passing gas overnight which relieves some pain, but denies passing stool (day 3 of no stool). Patient states that she has not eaten much since symptom onset, but she plans to try eating and drinking today.    Denies Fever/Chills, HA, CP, SOB, n/v, changes in bowel/urinary habits.  12pt ROS otherwise negative.    VITALS  Vital Signs Last 24 Hrs  T(C): 36.8 (06 Mar 2024 06:10), Max: 37.6 (05 Mar 2024 15:38)  T(F): 98.3 (06 Mar 2024 06:10), Max: 99.6 (05 Mar 2024 15:38)  HR: 90 (06 Mar 2024 06:10) (80 - 111)  BP: 129/85 (06 Mar 2024 06:10) (114/66 - 146/89)  BP(mean): --  RR: 17 (06 Mar 2024 06:10) (17 - 20)  SpO2: 98% (06 Mar 2024 06:10) (98% - 100%)    Parameters below as of 06 Mar 2024 06:10  Patient On (Oxygen Delivery Method): room air        CAPILLARY BLOOD GLUCOSE      POCT Blood Glucose.: 135 mg/dL (05 Mar 2024 22:25)      PHYSICAL EXAM  General: NAD  Head: NC/AT; MMM; PERRL; EOMI;  Neck: Supple; no JVD. Enlarged neck with diffuse tenderness to palpation.  Respiratory: CTAB; no wheezes/rales/rhonchi  Cardiovascular: Regular rhythm/rate; S1/S2+, loud systolic murmur appreciated diffusely  Gastrointestinal: Pain in RUQ when palpated anywhere on the abdomen; Soft; bowel sounds normal and present  Extremities: WWP; no edema/cyanosis  Neurological: A&Ox3, CNII-XII grossly intact; no obvious focal deficits    MEDICATIONS  (STANDING):  aspirin enteric coated 81 milliGRAM(s) Oral daily  atorvastatin 80 milliGRAM(s) Oral at bedtime  clopidogrel Tablet 75 milliGRAM(s) Oral daily  colchicine 0.6 milliGRAM(s) Oral two times a day  dextrose 5%. 1000 milliLiter(s) (50 mL/Hr) IV Continuous <Continuous>  dextrose 5%. 1000 milliLiter(s) (100 mL/Hr) IV Continuous <Continuous>  dextrose 50% Injectable 12.5 Gram(s) IV Push once  dextrose 50% Injectable 25 Gram(s) IV Push once  dextrose 50% Injectable 25 Gram(s) IV Push once  diazepam    Tablet 2 milliGRAM(s) Oral at bedtime  glucagon  Injectable 1 milliGRAM(s) IntraMuscular once  insulin lispro (ADMELOG) corrective regimen sliding scale   SubCutaneous three times a day before meals  metoprolol succinate ER 50 milliGRAM(s) Oral daily  pantoprazole    Tablet 40 milliGRAM(s) Oral before breakfast  polyethylene glycol 3350 17 Gram(s) Oral once  senna 2 Tablet(s) Oral at bedtime    MEDICATIONS  (PRN):  acetaminophen     Tablet .. 650 milliGRAM(s) Oral every 6 hours PRN Temp greater or equal to 38C (100.4F), Mild Pain (1 - 3)  aluminum hydroxide/magnesium hydroxide/simethicone Suspension 30 milliLiter(s) Oral every 4 hours PRN Dyspepsia  dextrose Oral Gel 15 Gram(s) Oral once PRN Blood Glucose LESS THAN 70 milliGRAM(s)/deciliter  melatonin 3 milliGRAM(s) Oral at bedtime PRN Insomnia  oxyCODONE    IR 5 milliGRAM(s) Oral every 12 hours PRN Severe Pain (7 - 10)      Biaxin (Hives)      LABS                        14.2   4.90  )-----------( 181      ( 05 Mar 2024 14:14 )             43.2     03-05    137  |  91<L>  |  15  ----------------------------<  121<H>  4.1   |  26  |  0.86    Ca    10.8<H>      05 Mar 2024 14:14    TPro  7.8  /  Alb  4.4  /  TBili  0.4  /  DBili  x   /  AST  100<H>  /  ALT  105<H>  /  AlkPhos  57  03-05      Urinalysis Basic - ( 05 Mar 2024 14:14 )    Color: x / Appearance: x / SG: x / pH: x  Gluc: 121 mg/dL / Ketone: x  / Bili: x / Urobili: x   Blood: x / Protein: x / Nitrite: x   Leuk Esterase: x / RBC: x / WBC x   Sq Epi: x / Non Sq Epi: x / Bacteria: x              IMAGING/EKG/ETC

## 2024-03-06 NOTE — PHYSICAL THERAPY INITIAL EVALUATION ADULT - STANDING BALANCE: STATIC
Mr. Wu has called and have given his blood pressures from yesterday. He said that you could call him if you need to. [phone]    155/96  132/84  155/96  170/114  145/86   fair minus

## 2024-03-06 NOTE — PROGRESS NOTE ADULT - PROBLEM SELECTOR PLAN 2
AST/ALT elevations, uptrending since discharge in 12/2023. Polo's sign negative on admission. CTAP without evidence of galbladder obstruction however layering sludge without wall thickening or pericholecystic fluid present on exam.  - f/u hepatitis studies

## 2024-03-06 NOTE — PHYSICAL THERAPY INITIAL EVALUATION ADULT - GENERAL OBSERVATIONS, REHAB EVAL
Pt received semi supine in bed in NAD, +HEP lockCAM cleared Pt for PT evaluation. Pt reports generalized pain and headache but is agreeable to participate in PT session.

## 2024-03-06 NOTE — CONSULT NOTE ADULT - ASSESSMENT
Review of studies:  ECG 3/5/24: NSR, inferior infarct pattern, Left axis deviation, left atrial enlargement, poor R wave progression   TTE 10/17/23: Normal BiV function, bio AV with normal function (peak rafa 2.49 m/s, mean grad 15), no pericardial effusion   LHC:     #Pericarditis   Diagnosed with pericarditis in 12/2023 and   Suspicion for possible colchicine myopathy given pain in multiple muscle groups, elevated CPK and transaminitis. Current pain is not consistent with pericardial inflammation, more over ESR is mildly elevated and CRP is negative  - hold colchicine  - hold atorvastatin  - trend CPK    - obtain TTE, r/o new pericardial effusion      64 yo F PMHx of asthma/COPD, HTN, HLD, DM2, AS s/p bioAVR/ascending aorta replacement and CABG SVG-PDA 05/2017 @ [facility] w subsequent PCIs (most recent 10/16/23 DESx2 pLAD), recent diagnosis of pericarditis, and GERD 2/2 H pylori presenting with epigastric pain and pain in multiple muscle groups. Cardiology consulted for tx of pericarditis     Review of studies:  ECG 3/5/24: NSR, inferior infarct pattern, Left axis deviation, left atrial enlargement, poor R wave progression   TTE 10/17/23: Normal BiV function, bio AV with normal function (peak rafa 2.49 m/s, mean grad 15), no pericardial effusion   [facility] 10/16/23: LM normal, 60-70% stenosis s/p DRAKE x2, LCx: OM1 patent stent, RCA patent SVG-RPDA    Home meds: ASA 81, Plavix 75, Toprol 50mg po qd, chlorthalidone 25mg po qd, Crestor 40, Zetia 10     #Myopathy   Diagnosed with pericarditis in 12/2023 and treated with colchicine with plan for 3 months of therapy.   Suspicion for possible colchicine myopathy given pain in multiple muscle groups, elevated CPK and transaminitis. Current pain is not consistent with pericardial inflammation, more over ESR is mildly elevated and CRP is negative pointing away from pericardial inflammation.   - hold colchicine  - hold atorvastatin  - trend CPK    - obtain TTE, r/o new pericardial effusion     #Pericarditis  Description of pain not consistent with pericarditis   - d/c Colchicine as above, no indication to start high dose NSAID at this time     #CAD s/p SVG to RCA and PCI   c/w ASA 81, Plavix 75   c/w Toprol XL 50     #HTN  - Chlorthalidone held on admission, resume when able     will continue to follow, upon d/c follow up Dr. Gamez

## 2024-03-06 NOTE — CONSULT NOTE ADULT - SUBJECTIVE AND OBJECTIVE BOX
Cardiology Consult Note     MD MIKAELA AceA   Cardiology Fellow    Pager [phone]     Patient is a 63y old  Female who presents with a chief complaint of abdominal pain (06 Mar 2024 08:08)      HPI:  63F former smoker, with FHx MI, PMHx of asthma/COPD, sciatica/herniated disc, HTN, HLD, DM2, obesity, anxiety, AS s/p bioAVR/ascending aorta replacement and CABG SVG-PDA 05/2017 @ [facility] w subsequent PCIs (most recent 10/16/23 DESx2 pLAD) recent admission to [facility] Dec 28-29, 2023 for chest pain w/ neg trop x 2, neg CCTA for acute findings, dx'd w/ pericarditis discharged on Colchicine 0.6 mg BID x 3 months (still taking), recent dx of H Pylori, now on PPI + Amoxicillin 1g BID x 14 days, Clarithromycin 500 mg BID x 14 days, recently seen in the ED on 2/29 for epigastric pain, T&R'd, states to have been seen at [facility] on 3/3, where patient had CTAP w/ IV contrast that showed fatty liver (c/w prior ED w/u-had US at that time), and was discharged home. Patient presents today w/ diffuse back pain, described as tightness, worse w/ deep breathing, sweating, not feeling well, gen weakness. No f/c. CP 5/10, back pain 7/10. Patient reports to have stopped smoking in 08/2023, previous smoked 1.5 packs daily, tpy roughly 60. Patient reports that last BM was 3 days prior to admission and is not currently on bowel regimen while on opioid pain meds.     ED COURSE:   Vitals: T 98 to 99.6, HR 80 to 111, /83 to 146/86, RR 18, SpO2 100% on room air    Significant Labs: WBC 4.9, Hgb 14.2, Plt 181, Cl 91, AG 20 with bicarb 26, Calcium 10.8, ,     EKG: Q waves V2 - V6    CTA chest and A/P: Stable appearance post graft replacement of the ascending aorta. No aortic dissection or aneurysm.    Inverventions: ASA 162mg, Morphine 4mg IV, zofran 4mg IV   (05 Mar 2024 20:57)      REVIEW OF SYSTEMS:   12 pt ROS otherwise negative     PAST MEDICAL & SURGICAL HISTORY:  Hypertension      Hyperlipidemia      Diabetes mellitus      Asthma      GERD (gastroesophageal reflux disease)      Kidney stone      Back injury  lumbar, work related      CAD (coronary artery disease)      Carpal tunnel syndrome      Status post small bowel resection      Uterine fibroid  removal      Status post laser lithotripsy of ureteral calculus      H/O aortic valve replacement        SOCIAL HISTORY:  Allergies    Biaxin (Hives)    Intolerances        HOME MEDICATIONS:  chlorthalidone 25 mg oral tablet: 1 tab(s) orally once a day (02 Feb 2024 01:57)  Crestor 40 mg oral tablet: 1 tab(s) orally once a day (28 Dec 2023 19:46)  diazePAM 5 mg oral tablet: 0.5 tab(s) orally once a day (28 Dec 2023 19:46)  hydrocodone-acetaminophen 7.5 mg-325 mg oral tablet: 1 tab(s) orally once a day (28 Dec 2023 19:46)  MetFORMIN (Eqv-Fortamet) 500 mg oral tablet, extended release: 2 tab(s) orally once a day in morning  (28 Dec 2023 19:46)  metFORMIN 500 mg oral tablet: 1 tab(s) orally once a day (at bedtime) (28 Dec 2023 19:46)  metoprolol succinate 50 mg oral tablet, extended release: 1 orally once a day (28 Dec 2023 19:46)  omeprazole 40 mg oral delayed release capsule: 1 cap(s) orally once a day (28 Dec 2023 19:46)  Zetia 10 mg oral tablet: 1 tab(s) orally once a day (28 Dec 2023 19:46)      Vital Signs Last 24 Hrs  T(C): 36.7 (06 Mar 2024 13:47), Max: 37.6 (05 Mar 2024 15:38)  T(F): 98 (06 Mar 2024 13:47), Max: 99.6 (05 Mar 2024 15:38)  HR: 88 (06 Mar 2024 13:47) (80 - 111)  BP: 146/82 (06 Mar 2024 13:47) (114/66 - 153/95)  BP(mean): --  RR: 17 (06 Mar 2024 13:47) (17 - 18)  SpO2: 99% (06 Mar 2024 13:47) (98% - 100%)    Parameters below as of 06 Mar 2024 13:47  Patient On (Oxygen Delivery Method): room air      I&O's Summary      PHYSICAL EXAM:  GENERAL: NAD, well-developed  HEAD:  Atraumatic, Normocephalic  EYES: EOMI, conjunctiva and sclera clear  NECK: Supple, No JVD  CHEST/LUNG: Clear to auscultation bilaterally; No wheeze  HEART: Regular rate and rhythm; No murmurs, rubs, or gallops  ABDOMEN: Soft, Nontender, Nondistended; Bowel sounds present  EXTREMITIES:  2+ Peripheral Pulses, No clubbing, cyanosis, or edema  PSYCH: AAOx3  NEUROLOGY: non-focal  SKIN: No rashes or lesions    LABS                        13.6   5.97  )-----------( 191      ( 06 Mar 2024 05:30 )             41.1                         14.2   4.90  )-----------( 181      ( 05 Mar 2024 14:14 )             43.2     03-06    132<L>  |  90<L>  |  19  ----------------------------<  134<H>  3.2<L>   |  26  |  0.85  03-05    137  |  91<L>  |  15  ----------------------------<  121<H>  4.1   |  26  |  0.86    Ca    9.5      06 Mar 2024 05:30  Ca    10.8<H>      05 Mar 2024 14:14  Phos  5.3     03-06  Mg     1.7     03-06    TPro  7.3  /  Alb  4.2  /  TBili  0.4  /  DBili  x   /  AST  106<H>  /  ALT  100<H>  /  AlkPhos  59  03-06  TPro  7.8  /  Alb  4.4  /  TBili  0.4  /  DBili  x   /  AST  100<H>  /  ALT  105<H>  /  AlkPhos  57  03-05    CAPILLARY BLOOD GLUCOSE      POCT Blood Glucose.: 145 mg/dL (06 Mar 2024 13:32)  POCT Blood Glucose.: 151 mg/dL (06 Mar 2024 09:12)  POCT Blood Glucose.: 135 mg/dL (05 Mar 2024 22:25)       06 Mar 2024 05:30 Troponin x     /  U/L / CKMB x     / CKMB Units x                  Urinalysis Basic - ( 06 Mar 2024 05:30 )    Color: x / Appearance: x / SG: x / pH: x  Gluc: 134 mg/dL / Ketone: x  / Bili: x / Urobili: x   Blood: x / Protein: x / Nitrite: x   Leuk Esterase: x / RBC: x / WBC x   Sq Epi: x / Non Sq Epi: x / Bacteria: x      MEDICATIONS  (STANDING):  aspirin enteric coated 81 milliGRAM(s) Oral daily  atorvastatin 80 milliGRAM(s) Oral at bedtime  bismuth subsalicylate Liquid 300 milliGRAM(s) Oral every 6 hours  clopidogrel Tablet 75 milliGRAM(s) Oral daily  colchicine 0.6 milliGRAM(s) Oral two times a day  dextrose 5%. 1000 milliLiter(s) (50 mL/Hr) IV Continuous <Continuous>  dextrose 5%. 1000 milliLiter(s) (100 mL/Hr) IV Continuous <Continuous>  dextrose 50% Injectable 25 Gram(s) IV Push once  dextrose 50% Injectable 25 Gram(s) IV Push once  dextrose 50% Injectable 12.5 Gram(s) IV Push once  diazepam    Tablet 2 milliGRAM(s) Oral at bedtime  glucagon  Injectable 1 milliGRAM(s) IntraMuscular once  insulin lispro (ADMELOG) corrective regimen sliding scale   SubCutaneous Before meals and at bedtime  magnesium sulfate  IVPB 2 Gram(s) IV Intermittent once  metoprolol succinate ER 50 milliGRAM(s) Oral daily  metroNIDAZOLE    Tablet 250 milliGRAM(s) Oral every 6 hours  pantoprazole    Tablet 40 milliGRAM(s) Oral every 12 hours  polyethylene glycol 3350 17 Gram(s) Oral two times a day  potassium chloride    Tablet ER 40 milliEquivalent(s) Oral once  senna 2 Tablet(s) Oral at bedtime  tetracycline 500 milliGRAM(s) Oral four times a day    MEDICATIONS  (PRN):  acetaminophen     Tablet .. 975 milliGRAM(s) Oral every 6 hours PRN Mild Pain (1 - 3)  aluminum hydroxide/magnesium hydroxide/simethicone Suspension 30 milliLiter(s) Oral every 4 hours PRN Dyspepsia  aluminum hydroxide/magnesium hydroxide/simethicone Suspension 30 milliLiter(s) Oral every 4 hours PRN Dyspepsia  dextrose Oral Gel 15 Gram(s) Oral once PRN Blood Glucose LESS THAN 70 milliGRAM(s)/deciliter  melatonin 3 milliGRAM(s) Oral at bedtime PRN Insomnia  oxyCODONE    IR 5 milliGRAM(s) Oral every 12 hours PRN Severe Pain (7 - 10)      ECG:  ECHO FINDINGS:  RADIOLOGY & ADDITIONAL STUDIES:   Cardiology Consult Note     MD MIKAELA AceA   Cardiology Fellow    Pager [phone]     Patient is a 63y old  Female who presents with a chief complaint of abdominal pain (06 Mar 2024 08:08)    HPI:  63F former smoker, with FHx MI, PMHx of asthma/COPD, sciatica/herniated disc, HTN, HLD, DM2, obesity, anxiety, AS s/p bioAVR/ascending aorta replacement and CABG SVG-PDA 05/2017 @ [facility] w subsequent PCIs (most recent 10/16/23 DESx2 pLAD) recent admission to [facility] Dec 28-29, 2023 for chest pain w/ neg trop x 2, neg CCTA for acute findings, dx'd w/ pericarditis discharged on Colchicine 0.6 mg BID x 3 months (still taking), recent dx of H Pylori, now on PPI + Amoxicillin 1g BID x 14 days, Clarithromycin 500 mg BID x 14 days, recently seen in the ED on 2/29 for epigastric pain, T&R'd, states to have been seen at [facility] on 3/3, where patient had CTAP w/ IV contrast that showed fatty liver (c/w prior ED w/u-had US at that time), and was discharged home. Patient presents today w/ diffuse back pain, described as tightness, worse w/ deep breathing, sweating, not feeling well, gen weakness. No f/c. CP 5/10, back pain 7/10. Patient reports to have stopped smoking in 08/2023, previous smoked 1.5 packs daily, tpy roughly 60. Patient reports that last BM was 3 days prior to admission and is not currently on bowel regimen while on opioid pain meds.     ED COURSE:   Vitals: T 98 to 99.6, HR 80 to 111, /83 to 146/86, RR 18, SpO2 100% on room air  Significant Labs: WBC 4.9, Hgb 14.2, Plt 181, Cl 91, AG 20 with bicarb 26, Calcium 10.8, ,   EKG: Q waves V2 - V6  CTA chest and A/P: Stable appearance post graft replacement of the ascending aorta. No aortic dissection or aneurysm.  Inverventions: ASA 162mg, Morphine 4mg IV, zofran 4mg IV   (05 Mar 2024 20:57)    Consultant HPI  62 yo F PMHx of asthma/COPD, HTN, HLD, DM2, AS s/p bioAVR/ascending aorta replacement and CABG SVG-PDA 05/2017 @ [facility] w subsequent PCIs (most recent 10/16/23 DESx2 pLAD), recent diagnosis of pericarditis, and GERD 2/2 H pylori presenting with epigastric pain and pain in multiple muscle groups. She denies chest pain with inspiration, reports pain in her legs, back, and shoulders. Inpatient medical team switched her to quadruple therapy.     REVIEW OF SYSTEMS:   12 pt ROS otherwise negative     PAST MEDICAL & SURGICAL HISTORY:  Hypertension  Hyperlipidemia  Diabetes mellitus  Asthma  GERD (gastroesophageal reflux disease)  Kidney stone  Back injury  lumbar, work related  CAD (coronary artery disease)  Carpal tunnel syndrome  Status post small bowel resection  Uterine fibroid  removal  Status post laser lithotripsy of ureteral calculus  H/O aortic valve replacement    SOCIAL HISTORY:  Former smokers  Denies EtOH consumption     Allergies  Biaxin (Hives)    HOME MEDICATIONS:  chlorthalidone 25 mg oral tablet: 1 tab(s) orally once a day (02 Feb 2024 01:57)  Crestor 40 mg oral tablet: 1 tab(s) orally once a day (28 Dec 2023 19:46)  diazePAM 5 mg oral tablet: 0.5 tab(s) orally once a day (28 Dec 2023 19:46)  hydrocodone-acetaminophen 7.5 mg-325 mg oral tablet: 1 tab(s) orally once a day (28 Dec 2023 19:46)  MetFORMIN (Eqv-Fortamet) 500 mg oral tablet, extended release: 2 tab(s) orally once a day in morning  (28 Dec 2023 19:46)  metFORMIN 500 mg oral tablet: 1 tab(s) orally once a day (at bedtime) (28 Dec 2023 19:46)  metoprolol succinate 50 mg oral tablet, extended release: 1 orally once a day (28 Dec 2023 19:46)  omeprazole 40 mg oral delayed release capsule: 1 cap(s) orally once a day (28 Dec 2023 19:46)  Zetia 10 mg oral tablet: 1 tab(s) orally once a day (28 Dec 2023 19:46)    Vital Signs Last 24 Hrs  T(C): 36.7 (06 Mar 2024 13:47), Max: 37.6 (05 Mar 2024 15:38)  T(F): 98 (06 Mar 2024 13:47), Max: 99.6 (05 Mar 2024 15:38)  HR: 88 (06 Mar 2024 13:47) (80 - 111)  BP: 146/82 (06 Mar 2024 13:47) (114/66 - 153/95)  RR: 17 (06 Mar 2024 13:47) (17 - 18)  SpO2: 99% (06 Mar 2024 13:47) (98% - 100%)    Parameters below as of 06 Mar 2024 13:47  Patient On (Oxygen Delivery Method): room air    PHYSICAL EXAM:  GENERAL: NAD  HEAD:  Atraumatic, Normocephalic  EYES: EOMI, conjunctiva and sclera clear  NECK: Supple, No JVD  CHEST/LUNG: Clear to auscultation bilaterally; No wheeze  HEART: Regular rate and rhythm; No murmurs, rubs, or gallops  ABDOMEN: Soft, diffuse TTP, Nondistended; Bowel sounds present  EXTREMITIES:  2+ Peripheral Pulses, No clubbing, cyanosis, or edema  PSYCH: AAOx3  NEUROLOGY: non-focal  SKIN: No rashes or lesions    LABS                        13.6   5.97  )-----------( 191      ( 06 Mar 2024 05:30 )             41.1                         14.2   4.90  )-----------( 181      ( 05 Mar 2024 14:14 )             43.2     03-06    132<L>  |  90<L>  |  19  ----------------------------<  134<H>  3.2<L>   |  26  |  0.85  03-05    137  |  91<L>  |  15  ----------------------------<  121<H>  4.1   |  26  |  0.86    Ca    9.5      06 Mar 2024 05:30  Ca    10.8<H>      05 Mar 2024 14:14  Phos  5.3     03-06  Mg     1.7     03-06    TPro  7.3  /  Alb  4.2  /  TBili  0.4  /  DBili  x   /  AST  106<H>  /  ALT  100<H>  /  AlkPhos  59  03-06  TPro  7.8  /  Alb  4.4  /  TBili  0.4  /  DBili  x   /  AST  100<H>  /  ALT  105<H>  /  AlkPhos  57  03-05    CAPILLARY BLOOD GLUCOSE  POCT Blood Glucose.: 145 mg/dL (06 Mar 2024 13:32)  POCT Blood Glucose.: 151 mg/dL (06 Mar 2024 09:12)  POCT Blood Glucose.: 135 mg/dL (05 Mar 2024 22:25)     06 Mar 2024 05:30 Troponin x     /  U/L / CKMB x     / CKMB Units x        MEDICATIONS  (STANDING):  aspirin enteric coated 81 milliGRAM(s) Oral daily  atorvastatin 80 milliGRAM(s) Oral at bedtime  bismuth subsalicylate Liquid 300 milliGRAM(s) Oral every 6 hours  clopidogrel Tablet 75 milliGRAM(s) Oral daily  colchicine 0.6 milliGRAM(s) Oral two times a day  dextrose 5%. 1000 milliLiter(s) (50 mL/Hr) IV Continuous <Continuous>  dextrose 5%. 1000 milliLiter(s) (100 mL/Hr) IV Continuous <Continuous>  dextrose 50% Injectable 25 Gram(s) IV Push once  dextrose 50% Injectable 25 Gram(s) IV Push once  dextrose 50% Injectable 12.5 Gram(s) IV Push once  diazepam    Tablet 2 milliGRAM(s) Oral at bedtime  glucagon  Injectable 1 milliGRAM(s) IntraMuscular once  insulin lispro (ADMELOG) corrective regimen sliding scale   SubCutaneous Before meals and at bedtime  magnesium sulfate  IVPB 2 Gram(s) IV Intermittent once  metoprolol succinate ER 50 milliGRAM(s) Oral daily  metroNIDAZOLE    Tablet 250 milliGRAM(s) Oral every 6 hours  pantoprazole    Tablet 40 milliGRAM(s) Oral every 12 hours  polyethylene glycol 3350 17 Gram(s) Oral two times a day  potassium chloride    Tablet ER 40 milliEquivalent(s) Oral once  senna 2 Tablet(s) Oral at bedtime  tetracycline 500 milliGRAM(s) Oral four times a day    MEDICATIONS  (PRN):  acetaminophen     Tablet .. 975 milliGRAM(s) Oral every 6 hours PRN Mild Pain (1 - 3)  aluminum hydroxide/magnesium hydroxide/simethicone Suspension 30 milliLiter(s) Oral every 4 hours PRN Dyspepsia  aluminum hydroxide/magnesium hydroxide/simethicone Suspension 30 milliLiter(s) Oral every 4 hours PRN Dyspepsia  dextrose Oral Gel 15 Gram(s) Oral once PRN Blood Glucose LESS THAN 70 milliGRAM(s)/deciliter  melatonin 3 milliGRAM(s) Oral at bedtime PRN Insomnia  oxyCODONE    IR 5 milliGRAM(s) Oral every 12 hours PRN Severe Pain (7 - 10)

## 2024-03-06 NOTE — PHYSICAL THERAPY INITIAL EVALUATION ADULT - ADDITIONAL COMMENTS
Pt lives alone in an elevator access apartment building with 0 [place]. She reports being independent at baseline for all ADLs and functional mobility. Pt states that she sometimes ambulates with a SC due to mild balance deficit. Pt has a tub shower with shower chair, no other DME in the home.

## 2024-03-07 LAB
ALBUMIN SERPL ELPH-MCNC: 4.2 G/DL — SIGNIFICANT CHANGE UP (ref 3.3–5)
ALP SERPL-CCNC: 57 U/L — SIGNIFICANT CHANGE UP (ref 40–120)
ALT FLD-CCNC: 91 U/L — HIGH (ref 10–45)
ANION GAP SERPL CALC-SCNC: 18 MMOL/L — HIGH (ref 5–17)
ANTI-RIBONUCLEAR PROTEIN: <0.2 AI — SIGNIFICANT CHANGE UP
AST SERPL-CCNC: 86 U/L — HIGH (ref 10–40)
BASOPHILS # BLD AUTO: 0.03 K/UL — SIGNIFICANT CHANGE UP (ref 0–0.2)
BASOPHILS NFR BLD AUTO: 0.6 % — SIGNIFICANT CHANGE UP (ref 0–2)
BILIRUB SERPL-MCNC: 0.4 MG/DL — SIGNIFICANT CHANGE UP (ref 0.2–1.2)
BUN SERPL-MCNC: 17 MG/DL — SIGNIFICANT CHANGE UP (ref 7–23)
CALCIUM SERPL-MCNC: 9.7 MG/DL — SIGNIFICANT CHANGE UP (ref 8.4–10.5)
CHLORIDE SERPL-SCNC: 87 MMOL/L — LOW (ref 96–108)
CO2 SERPL-SCNC: 27 MMOL/L — SIGNIFICANT CHANGE UP (ref 22–31)
CREAT SERPL-MCNC: 0.73 MG/DL — SIGNIFICANT CHANGE UP (ref 0.5–1.3)
EGFR: 91 ML/MIN/1.73M2 — SIGNIFICANT CHANGE UP
ENA JO1 AB SER-ACNC: <0.2 AI — SIGNIFICANT CHANGE UP
EOSINOPHIL # BLD AUTO: 0.07 K/UL — SIGNIFICANT CHANGE UP (ref 0–0.5)
EOSINOPHIL NFR BLD AUTO: 1.3 % — SIGNIFICANT CHANGE UP (ref 0–6)
GLUCOSE BLDC GLUCOMTR-MCNC: 117 MG/DL — HIGH (ref 70–99)
GLUCOSE BLDC GLUCOMTR-MCNC: 146 MG/DL — HIGH (ref 70–99)
GLUCOSE BLDC GLUCOMTR-MCNC: 151 MG/DL — HIGH (ref 70–99)
GLUCOSE BLDC GLUCOMTR-MCNC: 162 MG/DL — HIGH (ref 70–99)
GLUCOSE SERPL-MCNC: 131 MG/DL — HIGH (ref 70–99)
HCT VFR BLD CALC: 42.1 % — SIGNIFICANT CHANGE UP (ref 34.5–45)
HGB BLD-MCNC: 13.6 G/DL — SIGNIFICANT CHANGE UP (ref 11.5–15.5)
IMM GRANULOCYTES NFR BLD AUTO: 0.7 % — SIGNIFICANT CHANGE UP (ref 0–0.9)
LYMPHOCYTES # BLD AUTO: 1.88 K/UL — SIGNIFICANT CHANGE UP (ref 1–3.3)
LYMPHOCYTES # BLD AUTO: 34.6 % — SIGNIFICANT CHANGE UP (ref 13–44)
MAGNESIUM SERPL-MCNC: 1.8 MG/DL — SIGNIFICANT CHANGE UP (ref 1.6–2.6)
MCHC RBC-ENTMCNC: 25.4 PG — LOW (ref 27–34)
MCHC RBC-ENTMCNC: 32.3 GM/DL — SIGNIFICANT CHANGE UP (ref 32–36)
MCV RBC AUTO: 78.5 FL — LOW (ref 80–100)
MONOCYTES # BLD AUTO: 1.21 K/UL — HIGH (ref 0–0.9)
MONOCYTES NFR BLD AUTO: 22.3 % — HIGH (ref 2–14)
NEUTROPHILS # BLD AUTO: 2.2 K/UL — SIGNIFICANT CHANGE UP (ref 1.8–7.4)
NEUTROPHILS NFR BLD AUTO: 40.5 % — LOW (ref 43–77)
NRBC # BLD: 0 /100 WBCS — SIGNIFICANT CHANGE UP (ref 0–0)
PHOSPHATE SERPL-MCNC: 4.3 MG/DL — SIGNIFICANT CHANGE UP (ref 2.5–4.5)
PLATELET # BLD AUTO: 203 K/UL — SIGNIFICANT CHANGE UP (ref 150–400)
POTASSIUM SERPL-MCNC: 3.3 MMOL/L — LOW (ref 3.5–5.3)
POTASSIUM SERPL-SCNC: 3.3 MMOL/L — LOW (ref 3.5–5.3)
PROT SERPL-MCNC: 7.4 G/DL — SIGNIFICANT CHANGE UP (ref 6–8.3)
RBC # BLD: 5.36 M/UL — HIGH (ref 3.8–5.2)
RBC # FLD: 13.9 % — SIGNIFICANT CHANGE UP (ref 10.3–14.5)
SODIUM SERPL-SCNC: 132 MMOL/L — LOW (ref 135–145)
WBC # BLD: 5.43 K/UL — SIGNIFICANT CHANGE UP (ref 3.8–10.5)
WBC # FLD AUTO: 5.43 K/UL — SIGNIFICANT CHANGE UP (ref 3.8–10.5)

## 2024-03-07 PROCEDURE — 99232 SBSQ HOSP IP/OBS MODERATE 35: CPT

## 2024-03-07 PROCEDURE — 99233 SBSQ HOSP IP/OBS HIGH 50: CPT | Mod: GC

## 2024-03-07 PROCEDURE — 99222 1ST HOSP IP/OBS MODERATE 55: CPT

## 2024-03-07 RX ORDER — SODIUM CHLORIDE 9 MG/ML
1000 INJECTION INTRAMUSCULAR; INTRAVENOUS; SUBCUTANEOUS
Refills: 0 | Status: COMPLETED | OUTPATIENT
Start: 2024-03-07 | End: 2024-03-07

## 2024-03-07 RX ORDER — POTASSIUM CHLORIDE 20 MEQ
40 PACKET (EA) ORAL ONCE
Refills: 0 | Status: COMPLETED | OUTPATIENT
Start: 2024-03-07 | End: 2024-03-07

## 2024-03-07 RX ADMIN — Medication 3 MILLIGRAM(S): at 21:45

## 2024-03-07 RX ADMIN — Medication 975 MILLIGRAM(S): at 22:39

## 2024-03-07 RX ADMIN — Medication 1 CAPSULE(S): at 19:53

## 2024-03-07 RX ADMIN — Medication 40 MILLIEQUIVALENT(S): at 10:00

## 2024-03-07 RX ADMIN — OXYCODONE HYDROCHLORIDE 5 MILLIGRAM(S): 5 TABLET ORAL at 12:32

## 2024-03-07 RX ADMIN — Medication 975 MILLIGRAM(S): at 21:39

## 2024-03-07 RX ADMIN — Medication 300 MILLIGRAM(S): at 06:19

## 2024-03-07 RX ADMIN — Medication 1 CAPSULE(S): at 04:30

## 2024-03-07 RX ADMIN — POLYETHYLENE GLYCOL 3350 17 GRAM(S): 17 POWDER, FOR SOLUTION ORAL at 06:20

## 2024-03-07 RX ADMIN — OXYCODONE HYDROCHLORIDE 5 MILLIGRAM(S): 5 TABLET ORAL at 23:50

## 2024-03-07 RX ADMIN — OXYCODONE HYDROCHLORIDE 5 MILLIGRAM(S): 5 TABLET ORAL at 00:36

## 2024-03-07 RX ADMIN — Medication 1 CAPSULE(S): at 05:30

## 2024-03-07 RX ADMIN — Medication 250 MILLIGRAM(S): at 06:19

## 2024-03-07 RX ADMIN — Medication 300 MILLIGRAM(S): at 23:50

## 2024-03-07 RX ADMIN — Medication 500 MILLIGRAM(S): at 11:33

## 2024-03-07 RX ADMIN — Medication 250 MILLIGRAM(S): at 19:53

## 2024-03-07 RX ADMIN — Medication 2: at 13:01

## 2024-03-07 RX ADMIN — Medication 250 MILLIGRAM(S): at 23:50

## 2024-03-07 RX ADMIN — POLYETHYLENE GLYCOL 3350 17 GRAM(S): 17 POWDER, FOR SOLUTION ORAL at 19:54

## 2024-03-07 RX ADMIN — Medication 50 MILLIGRAM(S): at 06:20

## 2024-03-07 RX ADMIN — OXYCODONE HYDROCHLORIDE 5 MILLIGRAM(S): 5 TABLET ORAL at 11:32

## 2024-03-07 RX ADMIN — Medication 500 MILLIGRAM(S): at 19:53

## 2024-03-07 RX ADMIN — Medication 2: at 22:10

## 2024-03-07 RX ADMIN — Medication 30 MILLILITER(S): at 04:30

## 2024-03-07 RX ADMIN — CLOPIDOGREL BISULFATE 75 MILLIGRAM(S): 75 TABLET, FILM COATED ORAL at 11:32

## 2024-03-07 RX ADMIN — Medication 2 MILLIGRAM(S): at 21:44

## 2024-03-07 RX ADMIN — Medication 81 MILLIGRAM(S): at 11:33

## 2024-03-07 RX ADMIN — Medication 300 MILLIGRAM(S): at 11:35

## 2024-03-07 RX ADMIN — Medication 500 MILLIGRAM(S): at 06:20

## 2024-03-07 RX ADMIN — PANTOPRAZOLE SODIUM 40 MILLIGRAM(S): 20 TABLET, DELAYED RELEASE ORAL at 06:20

## 2024-03-07 RX ADMIN — Medication 30 MILLILITER(S): at 11:32

## 2024-03-07 RX ADMIN — PANTOPRAZOLE SODIUM 40 MILLIGRAM(S): 20 TABLET, DELAYED RELEASE ORAL at 19:54

## 2024-03-07 RX ADMIN — SODIUM CHLORIDE 100 MILLILITER(S): 9 INJECTION INTRAMUSCULAR; INTRAVENOUS; SUBCUTANEOUS at 10:00

## 2024-03-07 RX ADMIN — Medication 300 MILLIGRAM(S): at 19:54

## 2024-03-07 RX ADMIN — SODIUM CHLORIDE 100 MILLILITER(S): 9 INJECTION INTRAMUSCULAR; INTRAVENOUS; SUBCUTANEOUS at 19:54

## 2024-03-07 RX ADMIN — Medication 250 MILLIGRAM(S): at 11:35

## 2024-03-07 RX ADMIN — Medication 500 MILLIGRAM(S): at 23:50

## 2024-03-07 NOTE — PROGRESS NOTE ADULT - PROBLEM SELECTOR PLAN 4
discharged 12/29 on Colchicine 0.6 mg BID x 3 months  - c/w colchicine 0.6mg PO BID  - Consulting cardiology due to risk of colchicine contributing to abdominal discomfort discharged 12/29 on Colchicine 0.6 mg BID x 3 months  - d/c colchicine 0.6mg PO BID per cardiology due to risk of colchicine contributing to abdominal discomfort

## 2024-03-07 NOTE — DISCHARGE NOTE PROVIDER - HOSPITAL COURSE
#Discharge: do not delete    RUSSEL DAVID is a 63y Female with a past medical history of _____    Presented with _____, found to have _____    Problem List/Main Diagnoses (system-based):   #     #     #    Patient was discharged to: (home/GATO/acute rehab/hospice, etc. and w/ home health/home PT/RN/home O2)    New medications:   Changes to old medications:  Medications that were stopped:    Items to follow up as outpatient:    Physical exam at the time of discharge:       LABS & STUDIES:  SARS-CoV-2: NotDetec (14 Oct 2023 12:03)  SARS-CoV-2: NotDetec (23 Sep 2023 12:35)   #Discharge: do not delete    63F former smoker, with FHx MI, PMHx of asthma/COPD, sciatica/herniated disc, HTN, HLD, DM2, obesity, anxiety, AS s/p bioAVR/ascending aorta replacement and CABG SVG-PDA 05/2017 @ [facility] w subsequent PCIs (most recent 10/16/23 DESx2 pLAD) recent admission to [facility] Dec 28-29, 2023 for chest pain w/ neg trop x 2, neg CCTA for acute findings, dx'd w/ pericarditis discharged on Colchicine 0.6 mg BID x 3 months, recent dx of H Pylori on triple therapy p/w epigastric pain. Colchicine  and statin was dc'd due to concern for worsening abdominal pain and pt was switched to quadruple therapy.    # Epigastric abdominal pain  atient with characteristic epigastric abdominal pain radiating to back however CTAP without evidence of pancreatitis and lipase 20 (does not meet criteria for pancreatitis). Possible PUD 2/2 to H Pylori? CTA C/A/P r/o dissection.  - Switch patient to quadruple therapy due to existing patient allergy to clarithromycin and possible side effect of clarithromycin being abdominal pain.  - Continue H.Pylori quadruple therapy until 3/19  - f/u repeat breath test 2-4 weeks after completing therapy to confirm eradication upon     # Transaminitis  AST/ALT elevations, uptrending since discharge in 12/2023. Polo's sign negative on admission. CTAP without evidence of galbladder obstruction however layering sludge without wall thickening or pericholecystic fluid present on exam.  - hepatitis panel with signs of past HepB infection (surface and core antibody positive)  - outpatient follow up    # Hyponatremia  On 3/8, patient found to have hyponatremic and improving with hypertonics from Urine studies c/w SIADH.  - resolved    #CAD (coronary artery disease).P/w sharp SSCP associated w anxiety/SOB resolved on own. Currently c/o very mild CP upon a deep breath but otherwise CP free. HD stable. Compliant with DAPT. hx of bioAVR/ascending aorta replacement, CABG SVG-PDA, and recent DESx2 pLAD 10/2023. Trop WNL on admission. EKG Q waves V2 - V6. Cardiac cath 10/16/23: DRAKE x2 pLAD, OM stent patient, patent SVG-RPDA graft. TTE 10/17/23: EF 63%, normal LV/RV. Bioprosthetic AV, no other valve disease, no pericardial effusion.  - Continue w/ ASA, plavix and BB    #H/O pericarditis  Discharged 12/29 on Colchicine 0.6 mg BID x 3 months.  - d/c colchicine 0.6mg PO BID per cardiology due to risk of colchicine contributing to abdominal discomfort.    #History of COPD  ·  Plan: SpO2 100% on RA, no new or change in cough or sputum production, no CARRILLO, no wheeze. Not on home O2.   - can continue with inhalers when confirmed.    # Anxiety   - Continue home diazepam qHS.    # Diabetes mellitus  on home metformin 1000mg PO qAM & 500mg PO qHS  - c/w home meds    #History of chronic back pain.   On home vicodin 7.5-325 mg q12 PRN for pain.   - oxycodone 7.5 mg PO q6h PRN for severe pain   - start standing tylenol.    # Hypertension  On home metoprolol succinate 50mg qd + chlorthalidone 25mg PO qd   - c/w toprol   - hold home chlorthalidone.    # Hyperlipidemia  ·  Plan: on home crestor 40mg PO qHS + zetia 10mg PO qd  - c/w therapeutic interchange          Patient was discharged to: (home/[facility]/acute rehab/hospice, etc. and w/ home health/home PT/RN/home O2)    New medications:   Changes to old medications:  Medications that were stopped:    Items to follow up as outpatient:    Physical exam at the time of discharge:       LABS & STUDIES:  SARS-CoV-2: NotDetec (14 Oct 2023 12:03)  SARS-CoV-2: NotDetec (23 Sep 2023 12:35)   #Discharge: do not delete    63F former smoker, with FHx MI, PMHx of asthma/COPD, sciatica/herniated disc, HTN, HLD, DM2, obesity, anxiety, AS s/p bioAVR/ascending aorta replacement and CABG SVG-PDA 05/2017 @ [facility] w subsequent PCIs (most recent 10/16/23 DESx2 pLAD) recent admission to [facility] Dec 28-29, 2023 for chest pain w/ neg trop x 2, neg CCTA for acute findings, dx'd w/ pericarditis discharged on Colchicine 0.6 mg BID x 3 months, recent dx of H Pylori on triple therapy p/w epigastric pain. Colchicine  and statin was dc'd due to concern for worsening abdominal pain and pt was switched to quadruple therapy.    # Epigastric abdominal pain  atient with characteristic epigastric abdominal pain radiating to back however CTAP without evidence of pancreatitis and lipase 20 (does not meet criteria for pancreatitis). Possible PUD 2/2 to H Pylori? CTA C/A/P r/o dissection.  - Switch patient to quadruple therapy due to existing patient allergy to clarithromycin and possible side effect of clarithromycin being abdominal pain.  - Continue H.Pylori quadruple therapy until 3/19  - f/u repeat breath test 2-4 weeks after completing therapy to confirm eradication upon     # Transaminitis  AST/ALT elevations, uptrending since discharge in 12/2023. Polo's sign negative on admission. CTAP without evidence of galbladder obstruction however layering sludge without wall thickening or pericholecystic fluid present on exam.  - hepatitis panel with signs of past HepB infection (surface and core antibody positive)  - outpatient follow up    # Hyponatremia  On 3/8, patient found to have hyponatremic and improving with hypertonics from Urine studies c/w SIADH.  - resolved    #CAD (coronary artery disease).P/w sharp SSCP associated w anxiety/SOB resolved on own. Currently c/o very mild CP upon a deep breath but otherwise CP free. HD stable. Compliant with DAPT. hx of bioAVR/ascending aorta replacement, CABG SVG-PDA, and recent DESx2 pLAD 10/2023. Trop WNL on admission. EKG Q waves V2 - V6. Cardiac cath 10/16/23: DRAKE x2 pLAD, OM stent patient, patent SVG-RPDA graft. TTE 10/17/23: EF 63%, normal LV/RV. Bioprosthetic AV, no other valve disease, no pericardial effusion.  - Continue w/ ASA, plavix and BB    #H/O pericarditis  Discharged 12/29 on Colchicine 0.6 mg BID x 3 months.  - d/c colchicine 0.6mg PO BID per cardiology due to risk of colchicine contributing to abdominal discomfort.    #History of COPD  ·  Plan: SpO2 100% on RA, no new or change in cough or sputum production, no CARRILLO, no wheeze. Not on home O2.   - can continue with inhalers when confirmed.    # Anxiety   - Continue home diazepam qHS.    # Diabetes mellitus  on home metformin 1000mg PO qAM & 500mg PO qHS  - c/w home meds    #History of chronic back pain.   On home vicodin 7.5-325 mg q12 PRN for pain.   - oxycodone 7.5 mg PO q6h PRN for severe pain   - start standing tylenol.    # Hypertension  On home metoprolol succinate 50mg qd + chlorthalidone 25mg PO qd   - c/w toprol   - hold home chlorthalidone.    # Hyperlipidemia  ·  Plan: on home crestor 40mg PO qHS + zetia 10mg PO qd,  - c/w therapeutic interchange      Patient was discharged to: home  New medications: metronidazole 250mg q6h, tetracycline 500mg q6h, bismuth subsalicylate 300mg q6h, proton pump inhibitor 20mg q12h  Changes to old medications: none  Medications that were stopped: Colchicine, statin, chlorthalidone    Items to follow up as outpatient: cardiology for pericarditis, GI for epigastric pain/h pylori infection, PCP for transminitis    Physical exam at the time of discharge:   General: NAD, appears uncomfortable lying in bed  Head: NC/AT; MMM; PERRL; EOMI;  Neck: Supple; no JVD  Respiratory: CTAB; no wheezes/rales/rhonchi  Cardiovascular: Regular rhythm/rate; S1/S2+, systolic murmur c/w aortic stenosis  Gastrointestinal: Soft; diffusely tender to palpation  Extremities: WWP; no edema/cyanosis  Neurological: A&Ox3, conversing and answering questions appropriately      LABS & STUDIES:  SARS-CoV-2: NotDetec (14 Oct 2023 12:03)  SARS-CoV-2: NotDetec (23 Sep 2023 12:35)   #Discharge: do not delete    63F former smoker, with FHx MI, PMHx of asthma/COPD, sciatica/herniated disc, HTN, HLD, DM2, obesity, anxiety, AS s/p bioAVR/ascending aorta replacement and CABG SVG-PDA 05/2017 @ [facility] w subsequent PCIs (most recent 10/16/23 DESx2 pLAD) recent admission to [facility] Dec 28-29, 2023 for chest pain w/ neg trop x 2, neg CCTA for acute findings, dx'd w/ pericarditis discharged on Colchicine 0.6 mg BID x 3 months, recent dx of H Pylori on triple therapy p/w epigastric pain. Colchicine  and statin was dc'd due to concern for worsening abdominal pain and pt was switched to quadruple therapy.    # Epigastric abdominal pain  atient with characteristic epigastric abdominal pain radiating to back however CTAP without evidence of pancreatitis and lipase 20 (does not meet criteria for pancreatitis). Possible PUD 2/2 to H Pylori? CTA C/A/P r/o dissection.  - discontinued statin and colchicine which may have contributed to muscle pain and abdominal pain  - Switch patient to quadruple therapy due to existing patient allergy to clarithromycin and possible side effect of clarithromycin being abdominal pain.  - Continue H.Pylori quadruple therapy until 3/19  - f/u repeat breath test 2-4 weeks after completing therapy to confirm eradication upon     # Transaminitis  AST/ALT elevations, uptrending since discharge in 12/2023. Polo's sign negative on admission. CTAP without evidence of galbladder obstruction however layering sludge without wall thickening or pericholecystic fluid present on exam.  - hepatitis panel with signs of past HepB infection (surface and core antibody positive)  - outpatient follow up    # Hyponatremia  On 3/8, patient found to have hyponatremic and improving with hypertonics from Urine studies c/w SIADH.  - resolved  - discontinue chlorthalidone    #CAD (coronary artery disease).P/w sharp SSCP associated w anxiety/SOB resolved on own. Currently c/o very mild CP upon a deep breath but otherwise CP free. HD stable. Compliant with DAPT. hx of bioAVR/ascending aorta replacement, CABG SVG-PDA, and recent DESx2 pLAD 10/2023. Trop WNL on admission. EKG Q waves V2 - V6. Cardiac cath 10/16/23: DRAKE x2 pLAD, OM stent patient, patent SVG-RPDA graft. TTE 10/17/23: EF 63%, normal LV/RV. Bioprosthetic AV, no other valve disease, no pericardial effusion.  - Continue w/ ASA, plavix and BB    #H/O pericarditis  Discharged 12/29 on Colchicine 0.6 mg BID x 3 months.  - d/c colchicine 0.6mg PO BID per cardiology due to risk of colchicine contributing to abdominal discomfort.    #History of COPD  ·  Plan: SpO2 100% on RA, no new or change in cough or sputum production, no CARRILLO, no wheeze. Not on home O2.   - can continue with inhalers when confirmed.    # Anxiety   - Continue home diazepam qHS.    # Diabetes mellitus  on home metformin 1000mg PO qAM & 500mg PO qHS  - c/w home meds    #History of chronic back pain.   On home vicodin 7.5-325 mg q12 PRN for pain.   - oxycodone 7.5 mg PO q6h PRN for severe pain   - start standing tylenol.    # Hypertension  On home metoprolol succinate 50mg qd + chlorthalidone 25mg PO qd   - c/w toprol   - hold home chlorthalidone.    # Hyperlipidemia  ·  Plan: on home crestor 40mg PO qHS + zetia 10mg PO qd,  - c/w therapeutic interchange      Patient was discharged to: home  New medications: metronidazole 250mg q6h, tetracycline 500mg q6h, bismuth subsalicylate 300mg q6h, proton pump inhibitor 20mg q12h  Changes to old medications: none  Medications that were stopped: Colchicine, crestor, chlorthalidone    Items to follow up as outpatient: cardiology for pericarditis, GI for epigastric pain/h pylori infection, PCP for transminitis    Physical exam at the time of discharge:   General: NAD, appears uncomfortable lying in bed  Head: NC/AT; MMM; PERRL; EOMI;  Neck: Supple; no JVD  Respiratory: CTAB; no wheezes/rales/rhonchi  Cardiovascular: Regular rhythm/rate; S1/S2+, systolic murmur c/w aortic stenosis  Gastrointestinal: Soft; diffusely tender to palpation  Extremities: WWP; no edema/cyanosis  Neurological: A&Ox3, conversing and answering questions appropriately      LABS & STUDIES:  SARS-CoV-2: NotDetec (14 Oct 2023 12:03)  SARS-CoV-2: NotDetec (23 Sep 2023 12:35)

## 2024-03-07 NOTE — DISCHARGE NOTE PROVIDER - CARE PROVIDERS DIRECT ADDRESSES
[email or web address]

## 2024-03-07 NOTE — PROGRESS NOTE ADULT - ASSESSMENT
63F former smoker, with FHx MI, PMHx of asthma/COPD, sciatica/herniated disc, HTN, HLD, DM2, obesity, anxiety, AS s/p bioAVR/ascending aorta replacement and CABG SVG-PDA 05/2017 @ [facility] w subsequent PCIs (most recent 10/16/23 DESx2 pLAD) recent admission to [facility] Dec 28-29, 2023 for chest pain w/ neg trop x 2, neg CCTA for acute findings, dx'd w/ pericarditis discharged on Colchicine 0.6 mg BID x 3 months (still taking), recent dx of H Pylori, now on PPI + Amoxicillin 1g BID x 14 days, Clarithromycin 500 mg BID x 14 days, a/f epigastric abdominal pain.

## 2024-03-07 NOTE — CONSULT NOTE ADULT - ASSESSMENT
ASSESSMENT:  64 YO Female, former smoker (1.5 ppd, quit 8/2023) w/ PMHx of asthma/COPD, recently diagnosed H. Pylori (on triple tx), sciatica/herniated disc, HTN, HLD, DMII, anxiety, AS s/p bioAVR/ascending aorta replacement and CABG x1 (SVG-PDA) on 05/2017 ([facility], Dr. Hanson) w/ subsequent PCIs (most recent 10/16/23 DESx2 pLAD), recently admitted to [facility] 12/28-29 for c/o chest pain, found to have pericarditis and discharged on Colchicine. He also recently presented to [facility] ED 2/29 c/o epigastric pain, and also at [facility] ED on 3/3, where patient had CTAP w/ IV contrast that showed fatty liver and was discharged home. Patient then presented to [facility] 3/5 c/o diffuse back pain/tightness that worsens with deep breathing, associated with sweated and general weakness. She was admitted to medical service and GI and cardiology teams consulted. Patient had TTE significant for peak transvalvular velocity is 3.53 m/s, the mean transvalvular gradient is 28.00 mmHg, and the LVOT/AV velocity ratio is 0.28. CTSx surgery team consulted for possible bioprosthetic stenosis, recommended structural heart consult for TAVR Cheng.     PLAN:  Problem 1: Bioprosthetic AS  -S/p bioAVR/ascending aorta replacement and CABG x1 (SVG-PDA) on 5/2017   -TTE 3/6: peak transvalvular velocity is 3.53 m/s, the mean transvalvular gradient is 28.00 mmHg, and the LVOT/AV velocity ratio is 0.28  -Will discuss further with Dr. Kwok  -Structural heart team will continue to follow.    Problem 2: Myopathy   -Possibly 2/2 colchicine use  -CPK elevated and transaminitis present; Cont to trend  -Cardiology team following, recs appreciated  -Colchicine and statin held  -Care per primary team    Problem 3: Epigastric pain  -GI team consulted, recs appreciated  -Continue H.Pylori quadruple therapy  -Cont bowel regimen  -Care per primary team    Problem 4: Hx of HTN  -Cont BB  -Monitor BP/HR  -Care per primary team    I have reviewed clinical labs tests and reports, radiology tests and reports, as well as old patient medical records, and discussed with the refering physician.  ASSESSMENT:  64 YO Female, former smoker (1.5 ppd, quit 8/2023) w/ PMHx of asthma/COPD, recently diagnosed H. Pylori (on triple tx), sciatica/herniated disc, HTN, HLD, DMII, anxiety, AS s/p bioAVR/ascending aorta replacement and CABG x1 (SVG-PDA) on 05/2017 ([facility], Dr. Hanson) w/ subsequent PCIs (most recent 10/16/23 DESx2 pLAD), recently admitted to [facility] 12/28-29 for c/o chest pain, found to have pericarditis and discharged on Colchicine. He also recently presented to [facility] ED 2/29 c/o epigastric pain, and also at [facility] ED on 3/3, where patient had CTAP w/ IV contrast that showed fatty liver and was discharged home. Patient then presented to [facility] 3/5 c/o diffuse back pain/tightness that worsens with deep breathing, associated with sweated and general weakness. She was admitted to medical service and GI and cardiology teams consulted. Patient had TTE significant for peak transvalvular velocity is 3.53 m/s, the mean transvalvular gradient is 28.00 mmHg, and the LVOT/AV velocity ratio is 0.28. CTSx surgery team consulted for possible bioprosthetic stenosis, recommended structural heart consult for TAVR Cheng.     PLAN:  Problem 1: Bioprosthetic AS  -S/p bioAVR/ascending aorta replacement and CABG x1 (SVG-PDA) on 5/2017   -TTE 3/6: peak transvalvular velocity is 3.53 m/s, the mean transvalvular gradient is 28.00 mmHg, and the LVOT/AV velocity ratio is 0.28  -Please obtain TAVR CT Scans (CT heart congenital w/ IVC, CT A/P w/ IVC, CT head noncon)  -Cont w/u for abdominal pain  -Structural heart team will continue to follow.    Problem 2: Myopathy   -Possibly 2/2 colchicine use  -CPK elevated and transaminitis present; Cont to trend  -Cardiology team following, recs appreciated  -Colchicine and statin held  -Care per primary team    Problem 3: Epigastric pain  -GI team consulted, recs appreciated  -Continue H.Pylori quadruple therapy  -Cont bowel regimen  -Care per primary team    Problem 4: Hx of HTN  -Cont BB  -Monitor BP/HR  -Care per primary team    I have reviewed clinical labs tests and reports, radiology tests and reports, as well as old patient medical records, and discussed with the refering physician.

## 2024-03-07 NOTE — CONSULT NOTE ADULT - NS ATTEND AMEND GEN_ALL_CORE FT
62 yo F with multiple medical problems:  Former smoker (1.5 ppd, quit 8/2023) w/ PMHx of asthma/COPD, recently diagnosed H. Pylori (on triple tx), sciatica/herniated disc, HTN, HLD, DMII, anxiety.  Recent dx of pericarditis on col  2017: AVR 23, [id number] ([employer]ase), Root enlargement, ascending replacement, cabg x 1 (SVG to PDA)  Cath: 10/16/2023: PCI LAD, patent SVG to PDA  Pt seen at bedside, pt appears to be very comfortable, constantly rubbing epigastric area.  Pt reports this pain has been persistent with no alleviating or aggravating factors.  Discussed brieftly with patient regarding the aortic valve degeneration and would like to pursue testing for possible intervention. Pt agree with testing, but was insisting dx for pain.  Would defer to primary team for work up.   CTA TAVR protocol  If deemed medically stable for discharge, can be worked up and seen as outpatient.    Will follow while in house

## 2024-03-07 NOTE — DISCHARGE NOTE PROVIDER - NSDCFUSCHEDAPPT_GEN_ALL_CORE_FT
Kena Hopper  [facility] Physician On license of [facility]  PULMMED [address]  Scheduled Appointment: 03/27/2024    [facility]  HEARTVASC [address]  Scheduled Appointment: 04/10/2024    Momo Gamez  [facility]  HEARTVAS [address]  Scheduled Appointment: 04/10/2024

## 2024-03-07 NOTE — CONSULT NOTE ADULT - ASSESSMENT
Assessment:  64 YO Female, former smoker (1.5 ppd, quit 8/2023) w/ PMHx of asthma/COPD, recently diagnosed H. Pylori (on triple tx), sciatica/herniated disc, HTN, HLD, DMII, anxiety, AS s/p bioAVR/ascending aorta replacement and CABG x1 (SVG-PDA) on 05/2017 ([facility], Dr. Hanson) w/ subsequent PCIs (most recent 10/16/23 DESx2 pLAD), recently admitted to [facility] 12/28-29 for c/o chest pain, found to have pericarditis and discharged on Colchicine. He also recently presented to [facility] ED 2/29 c/o epigastric pain, and also at [facility] ED on 3/3, where patient had CTAP w/ IV contrast that showed fatty liver and was discharged home. Patient then presented to [facility] 3/5 c/o diffuse back pain/tightness that worsens with deep breathing, associated with sweated and general weakness. She was admitted to medical service and GI and cardiology teams consulted. Patient had TTE significant for peak transvalvular velocity is 3.53 m/s, the mean transvalvular gradient is 28.00 mmHg, and the LVOT/AV velocity ratio is 0.28. CTSx surgery team consulted for possible bioprosthetic stenosis    Plan:  Problem 1: Bioprosthetic AS  -S/p bioAVR/ascending aorta replacement and CABG x1 (SVG-PDA) on 5/2017   -TTE 3/6: peak transvalvular velocity is 3.53 m/s, the mean transvalvular gradient is 28.00 mmHg, and the LVOT/AV velocity ratio is 0.28  -Will discuss further with Dr. Hanson to evaluate for re-op AVR vs TAVR & structural heart consult  -CTSx team will continue to follow    Problem 2: Myopathy   -Possibly 2/2 colchicine use  -CPK elevated and transaminitis present; Cont to trend  -Cardiology team following, recs appreciated  -Colchicine and statin held  -Care per primary team    Problem 3: Epigastric pain  -GI team consulted, recs appreciated  -Continue H.Pylori quadruple therapy  -Cont bowel regimen  -Care per primary team    Problem 4: Hx of HTN  -Cont BB  -Monitor BP/HR  -Care per primary team    I have reviewed clinical labs tests and reports, radiology tests and reports, as well as old patient medical records, and discussed with the refering physician.     Assessment:  62 YO Female, former smoker (1.5 ppd, quit 8/2023) w/ PMHx of asthma/COPD, recently diagnosed H. Pylori (on triple tx), sciatica/herniated disc, HTN, HLD, DMII, anxiety, AS s/p bioAVR/ascending aorta replacement and CABG x1 (SVG-PDA) on 05/2017 ([facility], Dr. Hanson) w/ subsequent PCIs (most recent 10/16/23 DESx2 pLAD), recently admitted to [facility] 12/28-29 for c/o chest pain, found to have pericarditis and discharged on Colchicine. He also recently presented to [facility] ED 2/29 c/o epigastric pain, and also at [facility] ED on 3/3, where patient had CTAP w/ IV contrast that showed fatty liver and was discharged home. Patient then presented to [facility] 3/5 c/o diffuse back pain/tightness that worsens with deep breathing, associated with sweated and general weakness. She was admitted to medical service and GI and cardiology teams consulted. Patient had TTE significant for peak transvalvular velocity is 3.53 m/s, the mean transvalvular gradient is 28.00 mmHg, and the LVOT/AV velocity ratio is 0.28. CTSx surgery team consulted for possible bioprosthetic stenosis    Plan:  Problem 1: Bioprosthetic AS  -S/p bioAVR/ascending aorta replacement and CABG x1 (SVG-PDA) on 5/2017   -TTE 3/6: peak transvalvular velocity is 3.53 m/s, the mean transvalvular gradient is 28.00 mmHg, and the LVOT/AV velocity ratio is 0.28  -Discussed with Dr. Hanson. Recommend structural consult for transcatheter aortic valve in valve.   -No indication for cardiac surgical intervention at this time.     Problem 2: Myopathy   -Possibly 2/2 colchicine use  -CPK elevated and transaminitis present; Cont to trend  -Cardiology team following, recs appreciated  -Colchicine and statin held  -Care per primary team    Problem 3: Epigastric pain  -GI team consulted, recs appreciated  -Continue H.Pylori quadruple therapy  -Cont bowel regimen  -Care per primary team    Problem 4: Hx of HTN  -Cont BB  -Monitor BP/HR  -Care per primary team    I have reviewed clinical labs tests and reports, radiology tests and reports, as well as old patient medical records, and discussed with the refering physician.

## 2024-03-07 NOTE — PROGRESS NOTE ADULT - SUBJECTIVE AND OBJECTIVE BOX
Cardiology Consult Service Progress Note     Mckay Villalpando MD [facility]  Cardiology Fellow   Pager [phone]    Patient is a 63y old  Female who presents with a chief complaint of abdominal pain (07 Mar 2024 07:34)      SUBJECTIVE/OVERNIGHT EVENTS   No acute events overnight.      OBJECTIVE  Vital Signs Last 24 Hrs  T(C): 36.8 (07 Mar 2024 06:16), Max: 36.8 (07 Mar 2024 06:16)  T(F): 98.2 (07 Mar 2024 06:16), Max: 98.2 (07 Mar 2024 06:16)  HR: 88 (07 Mar 2024 06:16) (80 - 90)  BP: 134/90 (07 Mar 2024 06:16) (134/90 - 153/95)  BP(mean): --  RR: 18 (07 Mar 2024 06:16) (17 - 18)  SpO2: 96% (07 Mar 2024 06:16) (96% - 99%)    Parameters below as of 07 Mar 2024 06:16  Patient On (Oxygen Delivery Method): room air        I&O's Summary      PHYSICAL EXAM:  GENERAL: NAD, well-developed  HEAD:  Atraumatic, Normocephalic  EYES: EOMI, conjunctiva and sclera clear  NECK: Supple, No JVD  CHEST/LUNG: Clear to auscultation bilaterally; No wheeze  HEART: Regular rate and rhythm; No murmurs, rubs, or gallops  ABDOMEN: Soft, Nontender, Nondistended; Bowel sounds present  EXTREMITIES:  2+ Peripheral Pulses, No clubbing, cyanosis, or edema  PSYCH: AAOx3  NEUROLOGY: non-focal  SKIN: No rashes or lesions    LABS                        13.6   5.43  )-----------( 203      ( 07 Mar 2024 05:30 )             42.1                         13.6   5.97  )-----------( 191      ( 06 Mar 2024 05:30 )             41.1     03-07    132<L>  |  87<L>  |  17  ----------------------------<  131<H>  3.3<L>   |  27  |  0.73  03-06    132<L>  |  90<L>  |  19  ----------------------------<  134<H>  3.2<L>   |  26  |  0.85    Ca    9.7      07 Mar 2024 05:30  Ca    9.5      06 Mar 2024 05:30  Phos  4.3     03-07  Mg     1.8     03-07    TPro  7.4  /  Alb  4.2  /  TBili  0.4  /  DBili  x   /  AST  86<H>  /  ALT  91<H>  /  AlkPhos  57  03-07  TPro  7.3  /  Alb  4.2  /  TBili  0.4  /  DBili  x   /  AST  106<H>  /  ALT  100<H>  /  AlkPhos  59  03-06    CAPILLARY BLOOD GLUCOSE      POCT Blood Glucose.: 146 mg/dL (07 Mar 2024 08:55)  POCT Blood Glucose.: 140 mg/dL (06 Mar 2024 22:12)  POCT Blood Glucose.: 142 mg/dL (06 Mar 2024 18:02)  POCT Blood Glucose.: 145 mg/dL (06 Mar 2024 13:32)       06 Mar 2024 05:30 Troponin x     /  U/L / CKMB x     / CKMB Units x                  Urinalysis Basic - ( 07 Mar 2024 05:30 )    Color: x / Appearance: x / SG: x / pH: x  Gluc: 131 mg/dL / Ketone: x  / Bili: x / Urobili: x   Blood: x / Protein: x / Nitrite: x   Leuk Esterase: x / RBC: x / WBC x   Sq Epi: x / Non Sq Epi: x / Bacteria: x        RADIOLOGY & ADDITIONAL TESTS:    MEDICATIONS  (STANDING):  aspirin enteric coated 81 milliGRAM(s) Oral daily  bismuth subsalicylate Liquid 300 milliGRAM(s) Oral every 6 hours  clopidogrel Tablet 75 milliGRAM(s) Oral daily  dextrose 5%. 1000 milliLiter(s) (50 mL/Hr) IV Continuous <Continuous>  dextrose 5%. 1000 milliLiter(s) (100 mL/Hr) IV Continuous <Continuous>  dextrose 50% Injectable 25 Gram(s) IV Push once  dextrose 50% Injectable 12.5 Gram(s) IV Push once  dextrose 50% Injectable 25 Gram(s) IV Push once  diazepam    Tablet 2 milliGRAM(s) Oral at bedtime  glucagon  Injectable 1 milliGRAM(s) IntraMuscular once  insulin lispro (ADMELOG) corrective regimen sliding scale   SubCutaneous Before meals and at bedtime  metoprolol succinate ER 50 milliGRAM(s) Oral daily  metroNIDAZOLE    Tablet 250 milliGRAM(s) Oral every 6 hours  pantoprazole    Tablet 40 milliGRAM(s) Oral every 12 hours  polyethylene glycol 3350 17 Gram(s) Oral two times a day  potassium chloride    Tablet ER 40 milliEquivalent(s) Oral once  senna 2 Tablet(s) Oral at bedtime  sodium chloride 0.9%. 1000 milliLiter(s) (100 mL/Hr) IV Continuous <Continuous>  tetracycline 500 milliGRAM(s) Oral four times a day    MEDICATIONS  (PRN):  acetaminophen     Tablet .. 975 milliGRAM(s) Oral every 6 hours PRN Mild Pain (1 - 3)  acetaminophen 300 mG/butalbital 50 mG/ caffeine 40 mG 1 Capsule(s) Oral every 6 hours PRN headache  aluminum hydroxide/magnesium hydroxide/simethicone Suspension 30 milliLiter(s) Oral every 4 hours PRN Dyspepsia  aluminum hydroxide/magnesium hydroxide/simethicone Suspension 30 milliLiter(s) Oral every 4 hours PRN Dyspepsia  dextrose Oral Gel 15 Gram(s) Oral once PRN Blood Glucose LESS THAN 70 milliGRAM(s)/deciliter  melatonin 3 milliGRAM(s) Oral at bedtime PRN Insomnia  oxyCODONE    IR 5 milliGRAM(s) Oral every 12 hours PRN Severe Pain (7 - 10)     Cardiology Consult Service Progress Note     Mckay Villalpando MD [facility]  Cardiology Fellow   Pager [phone]    Patient is a 63y old  Female who presents with a chief complaint of abdominal pain (07 Mar 2024 07:34)    SUBJECTIVE/OVERNIGHT EVENTS   No acute events overnight.    Reports that her chest discomfort and muscle aches have improved slightly     OBJECTIVE  Vital Signs Last 24 Hrs  T(C): 36.8 (07 Mar 2024 06:16), Max: 36.8 (07 Mar 2024 06:16)  T(F): 98.2 (07 Mar 2024 06:16), Max: 98.2 (07 Mar 2024 06:16)  HR: 88 (07 Mar 2024 06:16) (80 - 90)  BP: 134/90 (07 Mar 2024 06:16) (134/90 - 153/95)  RR: 18 (07 Mar 2024 06:16) (17 - 18)  SpO2: 96% (07 Mar 2024 06:16) (96% - 99%)    Parameters below as of 07 Mar 2024 06:16  Patient On (Oxygen Delivery Method): room air    PHYSICAL EXAM:  GENERAL: NAD, well-developed  HEAD:  Atraumatic, Normocephalic  EYES: EOMI, conjunctiva and sclera clear  NECK: Supple, No JVD  CHEST/LUNG: Clear to auscultation bilaterally; No wheeze  HEART: Regular rate and rhythm; +systolic murmur   ABDOMEN: Soft, Nontender, Nondistended; Bowel sounds present  EXTREMITIES:  2+ Peripheral Pulses, No clubbing, cyanosis, or edema  PSYCH: AAOx3  NEUROLOGY: non-focal  SKIN: No rashes or lesions    LABS                        13.6   5.43  )-----------( 203      ( 07 Mar 2024 05:30 )             42.1                         13.6   5.97  )-----------( 191      ( 06 Mar 2024 05:30 )             41.1     03-07    132<L>  |  87<L>  |  17  ----------------------------<  131<H>  3.3<L>   |  27  |  0.73  03-06    132<L>  |  90<L>  |  19  ----------------------------<  134<H>  3.2<L>   |  26  |  0.85    Ca    9.7      07 Mar 2024 05:30  Ca    9.5      06 Mar 2024 05:30  Phos  4.3     03-07  Mg     1.8     03-07    TPro  7.4  /  Alb  4.2  /  TBili  0.4  /  DBili  x   /  AST  86<H>  /  ALT  91<H>  /  AlkPhos  57  03-07  TPro  7.3  /  Alb  4.2  /  TBili  0.4  /  DBili  x   /  AST  106<H>  /  ALT  100<H>  /  AlkPhos  59  03-06    CAPILLARY BLOOD GLUCOSE  POCT Blood Glucose.: 146 mg/dL (07 Mar 2024 08:55)  POCT Blood Glucose.: 140 mg/dL (06 Mar 2024 22:12)  POCT Blood Glucose.: 142 mg/dL (06 Mar 2024 18:02)  POCT Blood Glucose.: 145 mg/dL (06 Mar 2024 13:32)     06 Mar 2024 05:30 Troponin x     /  U/L / CKMB x     / CKMB Units x        MEDICATIONS  (STANDING):  aspirin enteric coated 81 milliGRAM(s) Oral daily  bismuth subsalicylate Liquid 300 milliGRAM(s) Oral every 6 hours  clopidogrel Tablet 75 milliGRAM(s) Oral daily  dextrose 5%. 1000 milliLiter(s) (50 mL/Hr) IV Continuous <Continuous>  dextrose 5%. 1000 milliLiter(s) (100 mL/Hr) IV Continuous <Continuous>  dextrose 50% Injectable 25 Gram(s) IV Push once  dextrose 50% Injectable 12.5 Gram(s) IV Push once  dextrose 50% Injectable 25 Gram(s) IV Push once  diazepam    Tablet 2 milliGRAM(s) Oral at bedtime  glucagon  Injectable 1 milliGRAM(s) IntraMuscular once  insulin lispro (ADMELOG) corrective regimen sliding scale   SubCutaneous Before meals and at bedtime  metoprolol succinate ER 50 milliGRAM(s) Oral daily  metroNIDAZOLE    Tablet 250 milliGRAM(s) Oral every 6 hours  pantoprazole    Tablet 40 milliGRAM(s) Oral every 12 hours  polyethylene glycol 3350 17 Gram(s) Oral two times a day  potassium chloride    Tablet ER 40 milliEquivalent(s) Oral once  senna 2 Tablet(s) Oral at bedtime  sodium chloride 0.9%. 1000 milliLiter(s) (100 mL/Hr) IV Continuous <Continuous>  tetracycline 500 milliGRAM(s) Oral four times a day    MEDICATIONS  (PRN):  acetaminophen     Tablet .. 975 milliGRAM(s) Oral every 6 hours PRN Mild Pain (1 - 3)  acetaminophen 300 mG/butalbital 50 mG/ caffeine 40 mG 1 Capsule(s) Oral every 6 hours PRN headache  aluminum hydroxide/magnesium hydroxide/simethicone Suspension 30 milliLiter(s) Oral every 4 hours PRN Dyspepsia  aluminum hydroxide/magnesium hydroxide/simethicone Suspension 30 milliLiter(s) Oral every 4 hours PRN Dyspepsia  dextrose Oral Gel 15 Gram(s) Oral once PRN Blood Glucose LESS THAN 70 milliGRAM(s)/deciliter  melatonin 3 milliGRAM(s) Oral at bedtime PRN Insomnia  oxyCODONE    IR 5 milliGRAM(s) Oral every 12 hours PRN Severe Pain (7 - 10)

## 2024-03-07 NOTE — PROGRESS NOTE ADULT - ASSESSMENT
{\rtf1\hupipl28722\ansi\sbqtkow1872\ftnbj\uc1\deff0  {\fonttbl{\f0 \fnil Segoe UI;}{\f1 \fnil \fcharset0 Segoe UI;}{\f2 \fnil Times New Maulik;}}  {\colortbl ;\dqs933\pzdct434\efdf527 ;\red0\green0\blue0 ;\red0\green0\flib641 ;\red0\green0\blue0 ;}  {\stylesheet{\f0\fs20 Normal;}{\cs1 Default Paragraph Font;}{\cs2\f0\fs16 Line Number;}{\cs3\f2\fs24\ul\cf3 Hyperlink;}}  {\*\revtbl{Unknown;}}  \anvagr66886\lnltoz49005\nzavw9184\hnkfm4116\uiede3066\rhhff2964\ifmlktq381\qendmxf381\nogrowautofit\jecomd643\formshade\nofeaturethrottle1\dntblnsbdb\fet4\aendnotes\aftnnrlc\pgbrdrhead\pgbrdrfoot  \sectd\dcapmg78910\zustvy64912\guttersxn0\byagrohp7015\siaixypa0031\ewnwkzon2785\fxxmpsfy1513\dlaexdl021\anqelxn540\sbkpage\pgncont\pgndec  \plain\plain\f0\fs24\ql\plain\f0\fs24\plain\f0\fs20\wysf1228\hich\f0\dbch\f0\loch\f0\fs20\par  I M\par  \par  63 y o F former smoker, with FHx MI, PMHx of asthma/COPD, sciatica/herniated disc, HTN, HLD, DM2, obesity, anxiety, AS s/p bioAVR/ascending aorta replacement and CABG SVG-PDA 05/2017 @ [facility] w subsequent PCIs (most recent 10/16/23 DESx2 pLAD) recent admission   to [facility] Dec 28-29, 2023 for chest pain w/ neg trop x 2, neg CCTA for acute findings, dx'd w/ pericarditis discharged on Colchicine 0.6 mg BID x 3 months (still taking), recent dx of H Pylori, now on PPI + Amoxicillin 1g BID x 14 days, Clarithromycin 500   mg BID x 14 days, a/f epigastric abdominal pain. \par  \par  \plain\f1\fs20\ucvo3556\hich\f1\dbch\f1\loch\f1\cf2\fs20\ul{\field{\*\fldinst HYPERLINK [id number] }{\fldrslt Problem/Plan - 1:}}\plain\f0\fs20\nzrk8289\hich\f0\dbch\f0\loch\f0\fs20\ql\par  \'b7  {\*\bkmkstart dr08119542674}{\*\bkmkend gw91786479281}Problem: {\*\bkmkstart js25299911058}{\*\bkmkend qq71441895563}Epigastric abdominal pain. \par  \'b7  {\*\bkmkstart zk13516714868}{\*\bkmkend rz28446445506}Plan: {\*\bkmkstart tv78142545093}{\*\bkmkend xr76332561277}patient with characteristic epigastric abdominal pain radiating to back however CTAP without evidence of pancreatitis and lipase 20   on admission therefore junior criteria 1/3 not consistent with acute pancreatitis. Likely that pain is acute i/s/o constipation and chronic back pain requiring daily opioid administration v H. pylori infection. \par  - c/w pain regimen below\par  - c/t monitor pain\par  - Colchicine (for pericarditis) d/jorge due to concern it is contributing to stomach pain\par  \par  #H. pylori inf: on PPI + Amoxicillin 1g BID x 14 days, Clarithromycin 500 mg BID x 14 days. \par  - confirm ABx start date and reinstate per clinical necessity\par  - Switch patient to quadruple therapy due to existing patient allergy to clarithromycin and possible side effect of clarithromycin being abdominal pain\par  \par  #anion gap: AG 20 with bicarb 26. patient reports poor PO intake i/s/o pain. Ketosis likely driving AG i/s/o poor PO intake  \par  - f/u UA\par  - BS q6h until urinarating (precuationary i/s/o constipation).\plain\f1\fs20\mpji9589\hich\f1\dbch\f1\loch\f1\cf2\fs20\strike\plain\f0\fs20\lrge5834\hich\f0\dbch\f0\loch\f0\fs20\par  \par  \plain\f1\fs20\ydno8150\hich\f1\dbch\f1\loch\f1\cf2\fs20\ul{\field{\*\fldinst HYPERLINK [id number] }{\fldrslt Problem/Plan - 2:}}\plain\f0\fs20\oxjh0335\hich\f0\dbch\f0\loch\f0\fs20\ql\par  \'b7  {\*\bkmkstart qg06944357373}{\*\bkmkend zk02701711606}Problem: {\*\bkmkstart cm02061377809}{\*\bkmkend jh34878827780}Transaminitis. \par  \'b7  {\*\bkmkstart gk68410460269}{\*\bkmkend wb33958794173}Plan: {\*\bkmkstart hp76140327176}{\*\bkmkend uq04899662283}AST/ALT elevations, uptrending since discharge in 12/2023. Polo's sign negative on admission. CTAP without evidence of galbladder   obstruction however layering sludge without wall thickening or pericholecystic fluid present on exam.\par  - hepatitis panel with signs of past HepB infection (surface and core antibody positive).\plain\f1\fs20\xvlk8048\hich\f1\dbch\f1\loch\f1\cf2\fs20\strike\plain\f0\fs20\rjcx8832\hich\f0\dbch\f0\loch\f0\fs20\par  \par  \plain\f1\fs20\aqhc2199\hich\f1\dbch\f1\loch\f1\cf2\fs20\ul{\field{\*\fldinst HYPERLINK [id number] }{\fldrslt Problem/Plan - 3:}}\plain\f0\fs20\ifef4406\hich\f0\dbch\f0\loch\f0\fs20\ql\par  \'b7  {\*\bkmkstart af32679562121}{\*\bkmkend ix43461094772}Problem: {\*\bkmkstart ox22140580644}{\*\bkmkend ft31287369734}CAD (coronary artery disease). \par  \'b7  {\*\bkmkstart fi25401222103}{\*\bkmkend rm46054764185}Plan: {\*\bkmkstart tt42112342041}{\*\bkmkend jv32790214268}#CAD (coronary artery disease): P/w sharp SSCP associated w anxiety/SOB resolved on own. Currently c/o very mild CP upon a deep breath   but otherwise CP free. HD stable. Compliant with DAPT. hx of bioAVR/ascending aorta replacement, CABG SVG-PDA, and recent DESx2 pLAD 10/2023. Trop WNL on admission. EKG Q waves V2 - V6. Cardiac cath 10/16/23: DRAKE x2 pLAD, OM stent patient, patent SVG-RPDA   graft. TTE 10/17/23: EF 63%, normal LV/RV. Bioprosthetic AV, no other valve disease, no pericardial effusion.\par  - Continue ASA/Plavix/Statin/BB\par  - Monitor for CP overnight.\par  \par  \plain\f1\fs20\fuhz3710\hich\f1\dbch\f1\loch\f1\cf2\fs20\ul{\field{\*\fldinst HYPERLINK [id number] }{\fldrslt Problem/Plan - 4:}}\plain\f0\fs20\txrk4072\hich\f0\dbch\f0\loch\f0\fs20\ql\par  \'b7  {\*\bkmkstart wz04109420272}{\*\bkmkend bf58200188435}Problem: {\*\bkmkstart xa19843275915}{\*\bkmkend jp18115361870}H/O pericarditis. \par  \'b7  {\*\bkmkstart hk55818149033}{\*\bkmkend zi35939588258}Plan: {\*\bkmkstart gh69860183723}{\*\bkmkend vu46706062721}discharged 12/29 on Colchicine 0.6 mg BID x 3 months\par  - d/c colchicine 0.6mg PO BID per cardiology due to risk of colchicine contributing to abdominal discomfort.\plain\f1\fs20\gcus3615\hich\f1\dbch\f1\loch\f1\cf2\fs20\strike\plain\f0\fs20\yidr6693\hich\f0\dbch\f0\loch\f0\fs20\par  \par  \plain\f1\fs20\sdpm2556\hich\f1\dbch\f1\loch\f1\cf2\fs20\ul{\field{\*\fldinst HYPERLINK [id number] }{\fldrslt Problem/Plan - 5:}}\plain\f0\fs20\ybab1691\hich\f0\dbch\f0\loch\f0\fs20\ql\par  \'b7  {\*\bkmkstart nb55840653819}{\*\bkmkend yr80295916356}Problem: {\*\bkmkstart sp79214173674}{\*\bkmkend ch18972792688}History of COPD. \par  \'b7  {\*\bkmkstart rf28347038439}{\*\bkmkend yx07686778974}Plan: {\*\bkmkstart lo49594289002}{\*\bkmkend qn22443549506}SpO2 100% on RA, no new or change in cough or sputum production, no CARRILLO, no wheeze. Not on home O2. \par  - confirm home inhalers in AM.\par  \par  \plain\f1\fs20\zkoq9743\hich\f1\dbch\f1\loch\f1\cf2\fs20\ul{\field{\*\fldinst HYPERLINK [id number] }{\fldrslt Problem/Plan - 6:}}\plain\f0\fs20\bjgp2591\hich\f0\dbch\f0\loch\f0\fs20\ql\par  \'b7  {\*\bkmkstart jk16448350719}{\*\bkmkend gj26203997670}Problem: {\*\bkmkstart pi96728605210}{\*\bkmkend zq31150782254}Constipation. \par  \'b7  {\*\bkmkstart hv68882908210}{\*\bkmkend lr84650897589}Plan: {\*\bkmkstart rp99877245241}{\*\bkmkend qb78751788010}last BM 3 days prior to admission \par  - start miralax + senna.\par  \par  \plain\f1\fs20\elou7709\hich\f1\dbch\f1\loch\f1\cf2\fs20\ul{\field{\*\fldinst HYPERLINK [id number] }{\fldrslt Problem/Plan - 7:}}\plain\f0\fs20\wcnf7446\hich\f0\dbch\f0\loch\f0\fs20\ql\par  \'b7  {\*\bkmkstart ru41560656024}{\*\bkmkend uu30660086097}Problem: {\*\bkmkstart rr82882136005}{\*\bkmkend zh04091230089}Anxiety. \par  \'b7  {\*\bkmkstart em09381372067}{\*\bkmkend st65272164532}Plan: {\*\bkmkstart up65977894599}{\*\bkmkend yg63463358940}- Continue home diazepam qHS.\par  \par  \plain\f1\fs20\eylx3585\hich\f1\dbch\f1\loch\f1\cf2\fs20\ul{\field{\*\fldinst HYPERLINK [id number] }{\fldrslt Problem/Plan - 8:}}\plain\f0\fs20\lsot8836\hich\f0\dbch\f0\loch\f0\fs20\ql\par  \'b7  {\*\bkmkstart vm05062845305}{\*\bkmkend cm68249223757}Problem: {\*\bkmkstart kt87931478491}{\*\bkmkend hx69524034230}Diabetes mellitus. \par  \'b7  {\*\bkmkstart bo01728906442}{\*\bkmkend xv51082697729}Plan: {\*\bkmkstart bd72999135134}{\*\bkmkend qn28510065393}on home metformin 1000mg PO qAM & 500mg PO qHS\par  - c/w ISS, FS.\par  \par  \plain\f1\fs20\ysmj3412\hich\f1\dbch\f1\loch\f1\cf2\fs20\ul{\field{\*\fldinst HYPERLINK [id number] }{\fldrslt Problem/Plan - 9:}}\plain\f0\fs20\fknw4685\hich\f0\dbch\f0\loch\f0\fs20\ql\par  \'b7  {\*\bkmkstart oh77122480226}{\*\bkmkend ud69216044106}Problem: {\*\bkmkstart yy26870136862}{\*\bkmkend qq18932873750}History of chronic back pain. \par  \'b7  {\*\bkmkstart ri51003607514}{\*\bkmkend zz35805312316}Plan: {\*\bkmkstart rs69727339573}{\*\bkmkend qm00543840552}On home vicodin 7.5-325 mg q12 PRN for pain. \par  - c/w oxycodone 5mg PO q12h PRN for severe pain \par  - c/w tylenol 625mg PO q6h for pain/fever.\par  \par  \plain\f1\fs20\ksgs7118\hich\f1\dbch\f1\loch\f1\cf2\fs20\ul{\field{\*\fldinst HYPERLINK [id number] }{\fldrslt Problem/Plan - 10:}}\plain\f0\fs20\ubve8542\hich\f0\dbch\f0\loch\f0\fs20\ql\par  \'b7  {\*\bkmkstart sd01752086742}{\*\bkmkend tv99350161562}Problem: {\*\bkmkstart if43554748273}{\*\bkmkend bz41327441274}Hypertension. \par  \'b7  {\*\bkmkstart tw90170732182}{\*\bkmkend wy08970414804}Plan; {\*\bkmkstart po87073597870}{\*\bkmkend rz03229524474}on home metoprolol succinate 50mg qd + chlorthalidone 25mg PO qd \par  - c/w toprol \par  - confirm chlorthalidone home med.\par  \par  \plain\f1\fs20\jvzp2620\hich\f1\dbch\f1\loch\f1\cf2\fs20\ul{\field{\*\fldinst HYPERLINK [id number] }{\fldrslt Problem/Plan - 11:}}\plain\f0\fs20\hfrm7389\hich\f0\dbch\f0\loch\f0\fs20\ql\par  \'b7  {\*\bkmkstart pb070740095851}{\*\bkmkend mo112409004367}Problem: {\*\bkmkstart mx229233083916}{\*\bkmkend po069898773015}Hyperlipidemia. \par  \'b7  {\*\bkmkstart qr433141362204}{\*\bkmkend ey343771662218}Plan: {\*\bkmkstart zr205513797407}{\*\bkmkend pp330373748550}on home crestor 40mg PO qHS + zetia 10mg PO qd\par  - c/w therapeutic interchange.\par  \par  \plain\f1\fs20\mzzt7788\hich\f1\dbch\f1\loch\f1\cf2\fs20\ul{\field{\*\fldinst HYPERLINK [id number] }{\fldrslt Problem/Plan - 12:}}\plain\f0\fs20\toxj3327\hich\f0\dbch\f0\loch\f0\fs20\ql\par  \'b7  {\*\bkmkstart nd877148197843}{\*\bkmkend yc176382821979}Problem: {\*\bkmkstart ym221364151212}{\*\bkmkend jq396268532204}Need for prophylactic measure. \par  \'b7  {\*\bkmkstart eh719426406740}{\*\bkmkend ja013257901397}Plan: {\*\bkmkstart fw386926417552}{\*\bkmkend es545574637593}F: s/p 500cc NS bolus \par  E: Replete if K<4 or Mag<2\par  N: DASH Diet\par  GI ppx: Pantoprazole\par  VTE ppx: Ambulatory, SCDs while in bed\par  Dispo: RMF.\par  \par  \par  }

## 2024-03-07 NOTE — DISCHARGE NOTE PROVIDER - CARE PROVIDER_API CALL
Momo Gamez  Cardiology  [address] [address]  Phone: [phone]  Fax: [phone]  Established Patient  Scheduled Appointment: 04/10/2024 09:00 AM    Kena Hopper  Pulmonary Disease  [address] [place] [address]  Phone: [phone]  Fax: [phone]  Scheduled Appointment: 03/27/2024 11:00 AM

## 2024-03-07 NOTE — PROGRESS NOTE ADULT - ASSESSMENT
INCOMPLETE    62 yo F PMHx of asthma/COPD, HTN, HLD, DM2, AS s/p bioAVR/ascending aorta replacement and CABG SVG-PDA 05/2017 @ [facility] w subsequent PCIs (most recent 10/16/23 DESx2 pLAD), recent diagnosis of pericarditis, and GERD 2/2 H pylori presenting with epigastric pain and pain in multiple muscle groups. Cardiology consulted for tx of pericarditis     Review of studies:  ECG 3/5/24: NSR, inferior infarct pattern, Left axis deviation, left atrial enlargement, poor R wave progression   TTE 10/17/23: Normal BiV function, bio AV with normal function (peak rafa 2.49 m/s, mean grad 15), no pericardial effusion   [facility] 10/16/23: LM normal, 60-70% stenosis s/p DRAKE x2, LCx: OM1 patent stent, RCA patent SVG-RPDA    Home meds: ASA 81, Plavix 75, Toprol 50mg po qd, chlorthalidone 25mg po qd, Crestor 40, Zetia 10     #Myopathy   Diagnosed with pericarditis in 12/2023 and treated with colchicine with plan for 3 months of therapy.   Suspicion for possible colchicine myopathy given pain in multiple muscle groups, elevated CPK and transaminitis. Current pain is not consistent with pericardial inflammation, more over ESR is mildly elevated and CRP is negative pointing away from pericardial inflammation.   - hold colchicine  - hold atorvastatin  - trend CPK    - obtain TTE, r/o new pericardial effusion     #Pericarditis  Description of pain not consistent with pericarditis   - d/c Colchicine as above, no indication to start high dose NSAID at this time     #CAD s/p SVG to RCA and PCI   c/w ASA 81, Plavix 75   c/w Toprol XL 50     #HTN  - Chlorthalidone held on admission, resume when able     will continue to follow, upon d/c follow up Dr. Gamez    64 yo F PMHx of asthma/COPD, HTN, HLD, DM2, AS s/p bioAVR/ascending aorta replacement and CABG SVG-PDA 05/2017 @ [facility] w subsequent PCIs (most recent 10/16/23 DESx2 pLAD), recent diagnosis of pericarditis, and GERD 2/2 H pylori presenting with epigastric pain and pain in multiple muscle groups. Cardiology consulted for tx of pericarditis     Review of studies:  TTE 3/6/24: Normal Bi-v function, normal atria, bioprosthetic valve in aortic position (peak rafa 3.53 m/s, MG 28, DI 0.28), no pericardial effusion   ECG 3/5/24: NSR, inferior infarct pattern, Left axis deviation, left atrial enlargement, poor R wave progression   TTE 10/17/23: Normal BiV function, bio AV with normal function (peak rafa 2.49 m/s, mean grad 15), no pericardial effusion   [facility] 10/16/23: LM normal, 60-70% stenosis s/p DRAKE x2, LCx: OM1 patent stent, RCA patent SVG-RPDA    Home meds: ASA 81, Plavix 75, Toprol 50mg po qd, chlorthalidone 25mg po qd, Crestor 40, Zetia 10     #Myopathy   Diagnosed with pericarditis in 12/2023 and treated with colchicine with plan for 3 months of therapy.   Suspicion for possible colchicine skeletal myopathy given pain in multiple muscle groups, elevated CPK and transaminitis. Current pain is not consistent with pericardial inflammation, more over ESR is mildly elevated and CRP is negative pointing away from pericardial inflammation, more over there is no pericardial effusion on TTE. ECG without changes that would reflect pericarditis   - hold colchicine  - hold atorvastatin  - trend CPK      #Bioprosthetic AVR, peak rafa 3.53 m/s, MG 28, DI 0.28  TTE with findings suggestive of moderate to severe stenosis of the bioprosthetic AVR  - Discussed with Dr. Hanson's team, will have CT surgery and Structural heart team review the case     #Pericarditis  Description of pain not consistent with pericarditis   - d/c Colchicine as above, no indication to start high dose NSAID at this time     #CAD s/p SVG to RCA and PCI   c/w ASA 81, Plavix 75   c/w Toprol XL 50     #HTN  - Chlorthalidone held on admission, resume when able     will continue to follow, upon d/c follow up Dr. Gamez

## 2024-03-07 NOTE — DISCHARGE NOTE PROVIDER - PROVIDER TOKENS
PROVIDER:[TOKEN:[[id number]],SCHEDULEDAPPT:[04/10/2024],SCHEDULEDAPPTTIME:[09:00 AM],ESTABLISHEDPATIENT:[T]],PROVIDER:[TOKEN:[[id number]],SCHEDULEDAPPT:[03/27/2024],SCHEDULEDAPPTTIME:[11:00 AM]]

## 2024-03-07 NOTE — PROGRESS NOTE ADULT - SUBJECTIVE AND OBJECTIVE BOX
**INCOMPLETE NOTE    OVERNIGHT EVENTS: None    SUBJECTIVE:  Patient seen and examined at bedside, comfortable, NAD. Denied fever, chest pain, dyspnea, abdominal pain.     Vital Signs Last 12 Hrs  T(F): 98.2 (03-07-24 @ 06:16), Max: 98.2 (03-07-24 @ 06:16)  HR: 88 (03-07-24 @ 06:16) (86 - 88)  BP: 134/90 (03-07-24 @ 06:16) (134/90 - 144/91)  BP(mean): --  RR: 18 (03-07-24 @ 06:16) (17 - 18)  SpO2: 96% (03-07-24 @ 06:16) (96% - 96%)  I&O's Summary      PHYSICAL EXAM:  Constitutional: NAD, comfortable in bed.  HEENT: NC/AT, PERRLA, EOMI, no conjunctival pallor or scleral icterus, MMM  Neck: Supple, no JVD  Respiratory: CTA B/L. No w/r/r.   Cardiovascular: RRR, normal S1 and S2, no m/r/g.   Gastrointestinal: +BS, soft NTND, no guarding or rebound tenderness, no palpable masses   Extremities: wwp; no cyanosis, clubbing or edema.   Vascular: Pulses equal and strong throughout.   Neurological: AAOx3, no CN deficits, strength and sensation intact throughout.   Skin: No gross skin abnormalities or rashes        LABS:                        13.6   5.43  )-----------( 203      ( 07 Mar 2024 05:30 )             42.1     03-06    132<L>  |  90<L>  |  19  ----------------------------<  134<H>  3.2<L>   |  26  |  0.85    Ca    9.5      06 Mar 2024 05:30  Phos  5.3     03-06  Mg     1.7     03-06    TPro  7.3  /  Alb  4.2  /  TBili  0.4  /  DBili  x   /  AST  106<H>  /  ALT  100<H>  /  AlkPhos  59  03-06      Urinalysis Basic - ( 06 Mar 2024 05:30 )    Color: x / Appearance: x / SG: x / pH: x  Gluc: 134 mg/dL / Ketone: x  / Bili: x / Urobili: x   Blood: x / Protein: x / Nitrite: x   Leuk Esterase: x / RBC: x / WBC x   Sq Epi: x / Non Sq Epi: x / Bacteria: x          RADIOLOGY & ADDITIONAL TESTS:    MEDICATIONS  (STANDING):  aspirin enteric coated 81 milliGRAM(s) Oral daily  bismuth subsalicylate Liquid 300 milliGRAM(s) Oral every 6 hours  clopidogrel Tablet 75 milliGRAM(s) Oral daily  dextrose 5%. 1000 milliLiter(s) (50 mL/Hr) IV Continuous <Continuous>  dextrose 5%. 1000 milliLiter(s) (100 mL/Hr) IV Continuous <Continuous>  dextrose 50% Injectable 25 Gram(s) IV Push once  dextrose 50% Injectable 12.5 Gram(s) IV Push once  dextrose 50% Injectable 25 Gram(s) IV Push once  diazepam    Tablet 2 milliGRAM(s) Oral at bedtime  glucagon  Injectable 1 milliGRAM(s) IntraMuscular once  insulin lispro (ADMELOG) corrective regimen sliding scale   SubCutaneous Before meals and at bedtime  metoprolol succinate ER 50 milliGRAM(s) Oral daily  metroNIDAZOLE    Tablet 250 milliGRAM(s) Oral every 6 hours  pantoprazole    Tablet 40 milliGRAM(s) Oral every 12 hours  polyethylene glycol 3350 17 Gram(s) Oral two times a day  senna 2 Tablet(s) Oral at bedtime  tetracycline 500 milliGRAM(s) Oral four times a day    MEDICATIONS  (PRN):  acetaminophen     Tablet .. 975 milliGRAM(s) Oral every 6 hours PRN Mild Pain (1 - 3)  acetaminophen 300 mG/butalbital 50 mG/ caffeine 40 mG 1 Capsule(s) Oral every 6 hours PRN headache  aluminum hydroxide/magnesium hydroxide/simethicone Suspension 30 milliLiter(s) Oral every 4 hours PRN Dyspepsia  aluminum hydroxide/magnesium hydroxide/simethicone Suspension 30 milliLiter(s) Oral every 4 hours PRN Dyspepsia  dextrose Oral Gel 15 Gram(s) Oral once PRN Blood Glucose LESS THAN 70 milliGRAM(s)/deciliter  melatonin 3 milliGRAM(s) Oral at bedtime PRN Insomnia  oxyCODONE    IR 5 milliGRAM(s) Oral every 12 hours PRN Severe Pain (7 - 10)   **INCOMPLETE NOTE    OVERNIGHT EVENTS: None    SUBJECTIVE:  Patient seen and examined at bedside, comfortable, NAD. Denied fever, chest pain, dyspnea, abdominal pain. Patient states she feels a little better than yesterday; legs feel less weak, and she is able to move them. She still has a headache that required tylenol overnight; same headache as yesterday. She denies any chest pain overnight. Abdominal pain is the same as yesterday, though patient notes she passed a small amount of stool that she described as "not watery or firm."    Vital Signs Last 12 Hrs  T(F): 98.2 (03-07-24 @ 06:16), Max: 98.2 (03-07-24 @ 06:16)  HR: 88 (03-07-24 @ 06:16) (86 - 88)  BP: 134/90 (03-07-24 @ 06:16) (134/90 - 144/91)  BP(mean): --  RR: 18 (03-07-24 @ 06:16) (17 - 18)  SpO2: 96% (03-07-24 @ 06:16) (96% - 96%)  I&O's Summary      PHYSICAL EXAM:  Constitutional: NAD, comfortable in bed.  HEENT: NC/AT, EOMI, no conjunctival pallor or scleral icterus, MMM  Neck: Supple, no JVD  Respiratory: CTA B/L. No w/r/r.   Cardiovascular: RRR, normal S1 and S2, loud crescendo-decrescendo systolic murmur  Gastrointestinal: +BS, tender to palpation diffusely with pain felt in RUQ, soft nondistended, no guarding or rebound tenderness, no palpable masses   Extremities: wwp; no cyanosis, clubbing or edema.   Vascular: Pulses equal and strong throughout.   Neurological: AAOx3, no CN deficits, strength and sensation intact throughout.   Skin: No gross skin abnormalities or rashes        LABS:                        13.6   5.43  )-----------( 203      ( 07 Mar 2024 05:30 )             42.1     03-06    132<L>  |  90<L>  |  19  ----------------------------<  134<H>  3.2<L>   |  26  |  0.85    Ca    9.5      06 Mar 2024 05:30  Phos  5.3     03-06  Mg     1.7     03-06    TPro  7.3  /  Alb  4.2  /  TBili  0.4  /  DBili  x   /  AST  106<H>  /  ALT  100<H>  /  AlkPhos  59  03-06      Urinalysis Basic - ( 06 Mar 2024 05:30 )    Color: x / Appearance: x / SG: x / pH: x  Gluc: 134 mg/dL / Ketone: x  / Bili: x / Urobili: x   Blood: x / Protein: x / Nitrite: x   Leuk Esterase: x / RBC: x / WBC x   Sq Epi: x / Non Sq Epi: x / Bacteria: x          RADIOLOGY & ADDITIONAL TESTS:    MEDICATIONS  (STANDING):  aspirin enteric coated 81 milliGRAM(s) Oral daily  bismuth subsalicylate Liquid 300 milliGRAM(s) Oral every 6 hours  clopidogrel Tablet 75 milliGRAM(s) Oral daily  dextrose 5%. 1000 milliLiter(s) (50 mL/Hr) IV Continuous <Continuous>  dextrose 5%. 1000 milliLiter(s) (100 mL/Hr) IV Continuous <Continuous>  dextrose 50% Injectable 25 Gram(s) IV Push once  dextrose 50% Injectable 12.5 Gram(s) IV Push once  dextrose 50% Injectable 25 Gram(s) IV Push once  diazepam    Tablet 2 milliGRAM(s) Oral at bedtime  glucagon  Injectable 1 milliGRAM(s) IntraMuscular once  insulin lispro (ADMELOG) corrective regimen sliding scale   SubCutaneous Before meals and at bedtime  metoprolol succinate ER 50 milliGRAM(s) Oral daily  metroNIDAZOLE    Tablet 250 milliGRAM(s) Oral every 6 hours  pantoprazole    Tablet 40 milliGRAM(s) Oral every 12 hours  polyethylene glycol 3350 17 Gram(s) Oral two times a day  senna 2 Tablet(s) Oral at bedtime  tetracycline 500 milliGRAM(s) Oral four times a day    MEDICATIONS  (PRN):  acetaminophen     Tablet .. 975 milliGRAM(s) Oral every 6 hours PRN Mild Pain (1 - 3)  acetaminophen 300 mG/butalbital 50 mG/ caffeine 40 mG 1 Capsule(s) Oral every 6 hours PRN headache  aluminum hydroxide/magnesium hydroxide/simethicone Suspension 30 milliLiter(s) Oral every 4 hours PRN Dyspepsia  aluminum hydroxide/magnesium hydroxide/simethicone Suspension 30 milliLiter(s) Oral every 4 hours PRN Dyspepsia  dextrose Oral Gel 15 Gram(s) Oral once PRN Blood Glucose LESS THAN 70 milliGRAM(s)/deciliter  melatonin 3 milliGRAM(s) Oral at bedtime PRN Insomnia  oxyCODONE    IR 5 milliGRAM(s) Oral every 12 hours PRN Severe Pain (7 - 10)

## 2024-03-07 NOTE — CONSULT NOTE ADULT - SUBJECTIVE AND OBJECTIVE BOX
INCOMPLETE NOTE    Surgeon: Dr. Hanson    Requesting Physician: Dr. Juarez    HISTORY OF PRESENT ILLNESS:  64 YO Female, former smoker (1.5 ppd, quit 8/2023) w/ PMHx of asthma/COPD, recently diagnosed H. Pylori (on triple tx), sciatica/herniated disc, HTN, HLD, DMII, anxiety, AS s/p bioAVR/ascending aorta replacement and CABG x1 (SVG-PDA) on 05/2017 ([facility], Dr. Hanson) w/ subsequent PCIs (most recent 10/16/23 DESx2 pLAD), recently admitted to [facility] 12/28-29 for c/o chest pain, found to have pericarditis and discharged on Colchicine. He also recently presented to [facility] ED 2/29 c/o epigastric pain, and also at [facility] ED on 3/3, where patient had CTAP w/ IV contrast that showed fatty liver and was discharged home. Patient then presented to [facility] 3/5 c/o diffuse back pain/tightness that worsens with deep breathing, associated with sweated and general weakness. She was admitted to medical service and GI and cardiology teams consulted. Patient had TTE significant for peak transvalvular velocity is 3.53 m/s, the mean transvalvular gradient is 28.00 mmHg, and the LVOT/AV velocity ratio is 0.28. CTSx surgery team consulted for possible bioprosthetic stenosis    PAST MEDICAL & SURGICAL HISTORY:  Hypertension    Hyperlipidemia    Diabetes mellitus    Asthma    GERD (gastroesophageal reflux disease)    Kidney stone    Back injury  lumbar, work related    CAD (coronary artery disease)    Carpal tunnel syndrome    Status post small bowel resection    Uterine fibroid  removal    Status post laser lithotripsy of ureteral calculus    H/O aortic valve replacement    MEDICATIONS  (STANDING):  aspirin enteric coated 81 milliGRAM(s) Oral daily  bismuth subsalicylate Liquid 300 milliGRAM(s) Oral every 6 hours  clopidogrel Tablet 75 milliGRAM(s) Oral daily  dextrose 5%. 1000 milliLiter(s) (50 mL/Hr) IV Continuous <Continuous>  dextrose 5%. 1000 milliLiter(s) (100 mL/Hr) IV Continuous <Continuous>  dextrose 50% Injectable 25 Gram(s) IV Push once  dextrose 50% Injectable 12.5 Gram(s) IV Push once  dextrose 50% Injectable 25 Gram(s) IV Push once  diazepam    Tablet 2 milliGRAM(s) Oral at bedtime  glucagon  Injectable 1 milliGRAM(s) IntraMuscular once  insulin lispro (ADMELOG) corrective regimen sliding scale   SubCutaneous Before meals and at bedtime  metoprolol succinate ER 50 milliGRAM(s) Oral daily  metroNIDAZOLE    Tablet 250 milliGRAM(s) Oral every 6 hours  pantoprazole    Tablet 40 milliGRAM(s) Oral every 12 hours  polyethylene glycol 3350 17 Gram(s) Oral two times a day  senna 2 Tablet(s) Oral at bedtime  sodium chloride 0.9%. 1000 milliLiter(s) (100 mL/Hr) IV Continuous <Continuous>  tetracycline 500 milliGRAM(s) Oral four times a day    MEDICATIONS  (PRN):  acetaminophen     Tablet .. 975 milliGRAM(s) Oral every 6 hours PRN Mild Pain (1 - 3)  acetaminophen 300 mG/butalbital 50 mG/ caffeine 40 mG 1 Capsule(s) Oral every 6 hours PRN headache  aluminum hydroxide/magnesium hydroxide/simethicone Suspension 30 milliLiter(s) Oral every 4 hours PRN Dyspepsia  aluminum hydroxide/magnesium hydroxide/simethicone Suspension 30 milliLiter(s) Oral every 4 hours PRN Dyspepsia  dextrose Oral Gel 15 Gram(s) Oral once PRN Blood Glucose LESS THAN 70 milliGRAM(s)/deciliter  melatonin 3 milliGRAM(s) Oral at bedtime PRN Insomnia  oxyCODONE    IR 5 milliGRAM(s) Oral every 12 hours PRN Severe Pain (7 - 10)    Allergies    Biaxin (Hives)    Intolerances    SOCIAL HISTORY:  Smoker:  YES / NO        PACK YEARS:                         WHEN QUIT?  ETOH use:  YES / NO               FREQUENCY / QUANTITY:  Ilicit Drug use:  YES / NO  Occupation:  Assisted device use (Cane / Walker):  Live with:    FAMILY HISTORY:  Family history of atrial fibrillation (Mother)    Family history of cardiac pacemaker (Mother)    Family history of asthma (Mother)    Family history of MI (myocardial infarction) (Mother)    Family history of pulmonary embolism (Mother)    Review of Systems:  CONSTITUTIONAL: Denies fevers / chills, sweats, fatigue, weight loss, weight gain                                       NEURO:  Denies parathesias, seizures, syncope, confusion                                                                                  EYES:  Denies blurry vision, discharge, pain, loss of vision                                                                                    ENMT:  Denies difficulty hearing, vertigo, dysphagia, epistaxis, recent dental work                                       CV:  Denies chest pain, palpitations, CARRILLO, orthopnea                                                                                           RESPIRATORY:  Denies wWheezing, SOB, cough / sputum, hemoptysis                                                               GI:  Denies nausea, vomiting, diarrhea, constipation, melena                                                                          : Denies hematuria, dysuria, urgency, incontinence                                                                                          MUSKULOSKELETAL:  Denies arthritis, joint swelling, muscle weakness                                                             SKIN/BREAST:  Denies rash, itching, hair loss, masses                                                                                              PSYCH:  Denies depression, anxiety, suicidal ideation                                                                                                HEME/LYMPH:  Denies bruises easily, enlarged lymph nodes, tender lymph nodes                                          ENDOCRINE:  Denies cold intolerance, heat intolerance, polydipsia                                                                      Vital Signs Last 24 Hrs  T(C): 36.8 (07 Mar 2024 06:16), Max: 36.8 (07 Mar 2024 06:16)  T(F): 98.2 (07 Mar 2024 06:16), Max: 98.2 (07 Mar 2024 06:16)  HR: 88 (07 Mar 2024 06:16) (86 - 88)  BP: 134/90 (07 Mar 2024 06:16) (134/90 - 146/82)  BP(mean): --  RR: 18 (07 Mar 2024 06:16) (17 - 18)  SpO2: 96% (07 Mar 2024 06:16) (96% - 99%)    Parameters below as of 07 Mar 2024 06:16  Patient On (Oxygen Delivery Method): room air    PHYSICAL EXAM:  GENERAL: NAD, lying in bed comfortably  HEAD:  Atraumatic, Normocephalic  EYES: EOMI, PERRLA, conjunctiva and sclera clear  ENT: Moist mucous membranes  NECK: Supple, No JVD  CHEST/LUNG: Clear to auscultation bilaterally; No rales, rhonchi, wheezing, or rubs. Unlabored respirations  HEART: Regular rate and rhythm; No murmurs, rubs, or gallops  ABDOMEN: Bowel sounds present; Soft, Nontender, Nondistended. No hepatomegally  EXTREMITIES:  2+ Peripheral Pulses, brisk capillary refill. No clubbing, cyanosis, or edema  NERVOUS SYSTEM:  Alert & Oriented X3, speech clear. No deficits   MSK: FROM all 4 extremities, full and equal strength  SKIN: No rashes or lesions                                                          LABS:                        13.6   5.43  )-----------( 203      ( 07 Mar 2024 05:30 )             42.1     03-07    132<L>  |  87<L>  |  17  ----------------------------<  131<H>  3.3<L>   |  27  |  0.73    Ca    9.7      07 Mar 2024 05:30  Phos  4.3     03-07  Mg     1.8     03-07    TPro  7.4  /  Alb  4.2  /  TBili  0.4  /  DBili  x   /  AST  86<H>  /  ALT  91<H>  /  AlkPhos  57  03-07    Urinalysis Basic - ( 07 Mar 2024 05:30 )    Color: x / Appearance: x / SG: x / pH: x  Gluc: 131 mg/dL / Ketone: x  / Bili: x / Urobili: x   Blood: x / Protein: x / Nitrite: x   Leuk Esterase: x / RBC: x / WBC x   Sq Epi: x / Non Sq Epi: x / Bacteria: x    CARDIAC MARKERS ( 06 Mar 2024 05:30 )  x     / x     / 770 U/L / x     / x        RADIOLOGY & ADDITIONAL STUDIES:  CT Scan:   < from: CT Angio Chest Aorta w/wo IV Cont (03.05.24 @ 14:44) >  FINDINGS:  CHEST:  LUNGS AND LARGE AIRWAYS: Patent central airways. No consolidation or   suspicious nodule. Mild mosaic attenuation in the lung bases, may be due   to air trapping.  PLEURA: No pleural effusion.  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   No thoracic aortic aortic dissection or aneurysm.  HEART: Heart size is normal. Aortic valve replacement. No pericardial   effusion.  MEDIASTINUM AND BHARAT: No lymphadenopathy.  CHEST WALL AND LOWER NECK: Median sternotomy.    ABDOMEN AND PELVIS:  LIVER: Within normal limits.  BILE DUCTS: Normal caliber.  GALLBLADDER: Layering sludge. No wall thickening or pericholecystic fluid.  SPLEEN: Within normal limits.  PANCREAS: Within normal limits.  ADRENALS: Within normal limits.  KIDNEYS/URETERS: Within normal limits.    BLADDER: Within normal limits.  REPRODUCTIVE ORGANS: Uterus and adnexa within normal limits.    BOWEL: No bowel obstruction. Appendix is normal.  PERITONEUM: No ascites.  VESSELS: Atherosclerotic changes. No abdominal aortic dissection or   aneurysm.  RETROPERITONEUM/LYMPH NODES: No lymphadenopathy.  ABDOMINAL WALL: Within normal limits.  BONES: Degenerative changes.    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 No   aortic dissection or aneurysm.    TTE:  < from: TTE Echo Complete w/ Contrast w/ Doppler (03.06.24 @ 15:34) >  CONCLUSIONS:     1. Normal left ventricular size and systolic function.   2. Normal right ventricular size and systolic function.   3. Normal atria.   4. Bioprosthetic valve is seen in the aortic position. The peak   transvalvular velocity is 3.53 m/s, the mean transvalvular gradient is   28.00 mmHg, and the LVOT/AV velocity ratio is 0.28. Aortic valve velocity   is elevated and may be suggestive of bioprosthetic stenosis. Clinical   correlation is advised.   5. No pericardial effusion.   6. Compared to the previous TTE performed on 10/17/2023, velocity and   gradients across aortic bioprosthesis are higher.     INCOMPLETE NOTE    Surgeon: Dr. Hanson    Requesting Physician: Dr. Juarez    HISTORY OF PRESENT ILLNESS:  62 YO Female, former smoker (1.5 ppd, quit 8/2023) w/ PMHx of asthma/COPD, recently diagnosed H. Pylori (on triple tx), sciatica/herniated disc, HTN, HLD, DMII, anxiety, AS s/p bioAVR/ascending aorta replacement and CABG x1 (SVG-PDA) on 05/2017 ([facility], Dr. Hanson) w/ subsequent PCIs (most recent 10/16/23 DESx2 pLAD), recently admitted to [facility] 12/28-29 for c/o chest pain, found to have pericarditis and discharged on Colchicine. He also recently presented to [facility] ED 2/29 c/o epigastric pain, and also at [facility] ED on 3/3, where patient had CTAP w/ IV contrast that showed fatty liver and was discharged home. Patient then presented to [facility] 3/5 c/o diffuse back pain/tightness that worsens with deep breathing, associated with sweated and general weakness. She was admitted to medical service and GI and cardiology teams consulted. Patient had TTE significant for peak transvalvular velocity is 3.53 m/s, the mean transvalvular gradient is 28.00 mmHg, and the LVOT/AV velocity ratio is 0.28. CTSx surgery team consulted for possible bioprosthetic stenosis.   Patient seen and examined at bedside, at present admits to persistent abdominal pain and some chest pain as well, associated with SOB at rest. Denies fever, chills, palpitations, N/V, lightheadedness, dizziness.     PAST MEDICAL & SURGICAL HISTORY:  Hypertension    Hyperlipidemia    Diabetes mellitus    Asthma    GERD (gastroesophageal reflux disease)    Kidney stone    Back injury  lumbar, work related    CAD (coronary artery disease)    Carpal tunnel syndrome    Status post small bowel resection    Uterine fibroid  removal    Status post laser lithotripsy of ureteral calculus    H/O aortic valve replacement    MEDICATIONS  (STANDING):  aspirin enteric coated 81 milliGRAM(s) Oral daily  bismuth subsalicylate Liquid 300 milliGRAM(s) Oral every 6 hours  clopidogrel Tablet 75 milliGRAM(s) Oral daily  dextrose 5%. 1000 milliLiter(s) (50 mL/Hr) IV Continuous <Continuous>  dextrose 5%. 1000 milliLiter(s) (100 mL/Hr) IV Continuous <Continuous>  dextrose 50% Injectable 25 Gram(s) IV Push once  dextrose 50% Injectable 12.5 Gram(s) IV Push once  dextrose 50% Injectable 25 Gram(s) IV Push once  diazepam    Tablet 2 milliGRAM(s) Oral at bedtime  glucagon  Injectable 1 milliGRAM(s) IntraMuscular once  insulin lispro (ADMELOG) corrective regimen sliding scale   SubCutaneous Before meals and at bedtime  metoprolol succinate ER 50 milliGRAM(s) Oral daily  metroNIDAZOLE    Tablet 250 milliGRAM(s) Oral every 6 hours  pantoprazole    Tablet 40 milliGRAM(s) Oral every 12 hours  polyethylene glycol 3350 17 Gram(s) Oral two times a day  senna 2 Tablet(s) Oral at bedtime  sodium chloride 0.9%. 1000 milliLiter(s) (100 mL/Hr) IV Continuous <Continuous>  tetracycline 500 milliGRAM(s) Oral four times a day    MEDICATIONS  (PRN):  acetaminophen     Tablet .. 975 milliGRAM(s) Oral every 6 hours PRN Mild Pain (1 - 3)  acetaminophen 300 mG/butalbital 50 mG/ caffeine 40 mG 1 Capsule(s) Oral every 6 hours PRN headache  aluminum hydroxide/magnesium hydroxide/simethicone Suspension 30 milliLiter(s) Oral every 4 hours PRN Dyspepsia  aluminum hydroxide/magnesium hydroxide/simethicone Suspension 30 milliLiter(s) Oral every 4 hours PRN Dyspepsia  dextrose Oral Gel 15 Gram(s) Oral once PRN Blood Glucose LESS THAN 70 milliGRAM(s)/deciliter  melatonin 3 milliGRAM(s) Oral at bedtime PRN Insomnia  oxyCODONE    IR 5 milliGRAM(s) Oral every 12 hours PRN Severe Pain (7 - 10)    Allergies    Biaxin (Hives)    Intolerances    SOCIAL HISTORY:  Former smoker, quit 8/2023    FAMILY HISTORY:  Family history of atrial fibrillation (Mother)    Family history of cardiac pacemaker (Mother)    Family history of asthma (Mother)    Family history of MI (myocardial infarction) (Mother)    Family history of pulmonary embolism (Mother)    Review of Systems:  CONSTITUTIONAL: Denies fevers / chills, sweats, fatigue, weight loss, weight gain                                       NEURO:  Denies parathesias, seizures, syncope, confusion                                                                                  EYES:  Denies blurry vision, discharge, pain, loss of vision                                                                                    ENMT:  Denies difficulty hearing, vertigo, dysphagia, epistaxis, recent dental work                                       CV:  Denies chest pain, palpitations, CARRILLO, orthopnea                                                                                           RESPIRATORY:  Denies wWheezing, SOB, cough / sputum, hemoptysis                                                               GI:  Denies nausea, vomiting, diarrhea, constipation, melena                                                                          : Denies hematuria, dysuria, urgency, incontinence                                                                                          MUSKULOSKELETAL:  Denies arthritis, joint swelling, muscle weakness                                                             SKIN/BREAST:  Denies rash, itching, hair loss, masses                                                                                              PSYCH:  Denies depression, anxiety, suicidal ideation                                                                                                HEME/LYMPH:  Denies bruises easily, enlarged lymph nodes, tender lymph nodes                                          ENDOCRINE:  Denies cold intolerance, heat intolerance, polydipsia                                                                      Vital Signs Last 24 Hrs  T(C): 36.8 (07 Mar 2024 06:16), Max: 36.8 (07 Mar 2024 06:16)  T(F): 98.2 (07 Mar 2024 06:16), Max: 98.2 (07 Mar 2024 06:16)  HR: 88 (07 Mar 2024 06:16) (86 - 88)  BP: 134/90 (07 Mar 2024 06:16) (134/90 - 146/82)  BP(mean): --  RR: 18 (07 Mar 2024 06:16) (17 - 18)  SpO2: 96% (07 Mar 2024 06:16) (96% - 99%)    Parameters below as of 07 Mar 2024 06:16  Patient On (Oxygen Delivery Method): room air    PHYSICAL EXAM:  GENERAL: NAD, lying in bed   HEAD:  Atraumatic, Normocephalic  EYES: EOMI, PERRLA, conjunctiva and sclera clear  ENT: Moist mucous membranes  NECK: Supple, No JVD  CHEST/LUNG: CTAB; Previous MSI scar noted   HEART: +Murmur; RRR  ABDOMEN: Soft, Nondistended +ttp  EXTREMITIES:  2+ Peripheral Pulses, brisk capillary refill. No clubbing, cyanosis, or edema  NERVOUS SYSTEM:  Alert & Oriented X3, speech clear. No deficits                                                           LABS:                        13.6   5.43  )-----------( 203      ( 07 Mar 2024 05:30 )             42.1     03-07    132<L>  |  87<L>  |  17  ----------------------------<  131<H>  3.3<L>   |  27  |  0.73    Ca    9.7      07 Mar 2024 05:30  Phos  4.3     03-07  Mg     1.8     03-07    TPro  7.4  /  Alb  4.2  /  TBili  0.4  /  DBili  x   /  AST  86<H>  /  ALT  91<H>  /  AlkPhos  57  03-07    Urinalysis Basic - ( 07 Mar 2024 05:30 )    Color: x / Appearance: x / SG: x / pH: x  Gluc: 131 mg/dL / Ketone: x  / Bili: x / Urobili: x   Blood: x / Protein: x / Nitrite: x   Leuk Esterase: x / RBC: x / WBC x   Sq Epi: x / Non Sq Epi: x / Bacteria: x    CARDIAC MARKERS ( 06 Mar 2024 05:30 )  x     / x     / 770 U/L / x     / x        RADIOLOGY & ADDITIONAL STUDIES:  CT Scan:   < from: CT Angio Chest Aorta w/wo IV Cont (03.05.24 @ 14:44) >  FINDINGS:  CHEST:  LUNGS AND LARGE AIRWAYS: Patent central airways. No consolidation or   suspicious nodule. Mild mosaic attenuation in the lung bases, may be due   to air trapping.  PLEURA: No pleural effusion.  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   No thoracic aortic aortic dissection or aneurysm.  HEART: Heart size is normal. Aortic valve replacement. No pericardial   effusion.  MEDIASTINUM AND BHARAT: No lymphadenopathy.  CHEST WALL AND LOWER NECK: Median sternotomy.    ABDOMEN AND PELVIS:  LIVER: Within normal limits.  BILE DUCTS: Normal caliber.  GALLBLADDER: Layering sludge. No wall thickening or pericholecystic fluid.  SPLEEN: Within normal limits.  PANCREAS: Within normal limits.  ADRENALS: Within normal limits.  KIDNEYS/URETERS: Within normal limits.    BLADDER: Within normal limits.  REPRODUCTIVE ORGANS: Uterus and adnexa within normal limits.    BOWEL: No bowel obstruction. Appendix is normal.  PERITONEUM: No ascites.  VESSELS: Atherosclerotic changes. No abdominal aortic dissection or   aneurysm.  RETROPERITONEUM/LYMPH NODES: No lymphadenopathy.  ABDOMINAL WALL: Within normal limits.  BONES: Degenerative changes.    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 No   aortic dissection or aneurysm.    TTE:  < from: TTE Echo Complete w/ Contrast w/ Doppler (03.06.24 @ 15:34) >  CONCLUSIONS:     1. Normal left ventricular size and systolic function.   2. Normal right ventricular size and systolic function.   3. Normal atria.   4. Bioprosthetic valve is seen in the aortic position. The peak   transvalvular velocity is 3.53 m/s, the mean transvalvular gradient is   28.00 mmHg, and the LVOT/AV velocity ratio is 0.28. Aortic valve velocity   is elevated and may be suggestive of bioprosthetic stenosis. Clinical   correlation is advised.   5. No pericardial effusion.   6. Compared to the previous TTE performed on 10/17/2023, velocity and   gradients across aortic bioprosthesis are higher.     Surgeon: Dr. Hanson    Requesting Physician: Dr. Juarez    HISTORY OF PRESENT ILLNESS:  62 YO Female, former smoker (1.5 ppd, quit 8/2023) w/ PMHx of asthma/COPD, recently diagnosed H. Pylori (on triple tx), sciatica/herniated disc, HTN, HLD, DMII, anxiety, AS s/p bioAVR/ascending aorta replacement and CABG x1 (SVG-PDA) on 05/2017 ([facility], Dr. Hanson) w/ subsequent PCIs (most recent 10/16/23 DESx2 pLAD), recently admitted to [facility] 12/28-29 for c/o chest pain, found to have pericarditis and discharged on Colchicine. He also recently presented to [facility] ED 2/29 c/o epigastric pain, and also at [facility] ED on 3/3, where patient had CTAP w/ IV contrast that showed fatty liver and was discharged home. Patient then presented to [facility] 3/5 c/o diffuse back pain/tightness that worsens with deep breathing, associated with sweated and general weakness. She was admitted to medical service and GI and cardiology teams consulted. Patient had TTE significant for peak transvalvular velocity is 3.53 m/s, the mean transvalvular gradient is 28.00 mmHg, and the LVOT/AV velocity ratio is 0.28. CTSx surgery team consulted for possible bioprosthetic stenosis.   Patient seen and examined at bedside, at present admits to persistent abdominal pain and some chest pain as well, associated with SOB at rest. Denies fever, chills, palpitations, N/V, lightheadedness, dizziness.     PAST MEDICAL & SURGICAL HISTORY:  Hypertension    Hyperlipidemia    Diabetes mellitus    Asthma    GERD (gastroesophageal reflux disease)    Kidney stone    Back injury  lumbar, work related    CAD (coronary artery disease)    Carpal tunnel syndrome    Status post small bowel resection    Uterine fibroid  removal    Status post laser lithotripsy of ureteral calculus    H/O aortic valve replacement    MEDICATIONS  (STANDING):  aspirin enteric coated 81 milliGRAM(s) Oral daily  bismuth subsalicylate Liquid 300 milliGRAM(s) Oral every 6 hours  clopidogrel Tablet 75 milliGRAM(s) Oral daily  dextrose 5%. 1000 milliLiter(s) (50 mL/Hr) IV Continuous <Continuous>  dextrose 5%. 1000 milliLiter(s) (100 mL/Hr) IV Continuous <Continuous>  dextrose 50% Injectable 25 Gram(s) IV Push once  dextrose 50% Injectable 12.5 Gram(s) IV Push once  dextrose 50% Injectable 25 Gram(s) IV Push once  diazepam    Tablet 2 milliGRAM(s) Oral at bedtime  glucagon  Injectable 1 milliGRAM(s) IntraMuscular once  insulin lispro (ADMELOG) corrective regimen sliding scale   SubCutaneous Before meals and at bedtime  metoprolol succinate ER 50 milliGRAM(s) Oral daily  metroNIDAZOLE    Tablet 250 milliGRAM(s) Oral every 6 hours  pantoprazole    Tablet 40 milliGRAM(s) Oral every 12 hours  polyethylene glycol 3350 17 Gram(s) Oral two times a day  senna 2 Tablet(s) Oral at bedtime  sodium chloride 0.9%. 1000 milliLiter(s) (100 mL/Hr) IV Continuous <Continuous>  tetracycline 500 milliGRAM(s) Oral four times a day    MEDICATIONS  (PRN):  acetaminophen     Tablet .. 975 milliGRAM(s) Oral every 6 hours PRN Mild Pain (1 - 3)  acetaminophen 300 mG/butalbital 50 mG/ caffeine 40 mG 1 Capsule(s) Oral every 6 hours PRN headache  aluminum hydroxide/magnesium hydroxide/simethicone Suspension 30 milliLiter(s) Oral every 4 hours PRN Dyspepsia  aluminum hydroxide/magnesium hydroxide/simethicone Suspension 30 milliLiter(s) Oral every 4 hours PRN Dyspepsia  dextrose Oral Gel 15 Gram(s) Oral once PRN Blood Glucose LESS THAN 70 milliGRAM(s)/deciliter  melatonin 3 milliGRAM(s) Oral at bedtime PRN Insomnia  oxyCODONE    IR 5 milliGRAM(s) Oral every 12 hours PRN Severe Pain (7 - 10)    Allergies    Biaxin (Hives)    Intolerances    SOCIAL HISTORY:  Former smoker, quit 8/2023    FAMILY HISTORY:  Family history of atrial fibrillation (Mother)    Family history of cardiac pacemaker (Mother)    Family history of asthma (Mother)    Family history of MI (myocardial infarction) (Mother)    Family history of pulmonary embolism (Mother)    Review of Systems:  CONSTITUTIONAL: Denies fevers / chills, sweats, fatigue, weight loss, weight gain                                       NEURO:  Denies parathesias, seizures, syncope, confusion                                                                                  EYES:  Denies blurry vision, discharge, pain, loss of vision                                                                                    ENMT:  Denies difficulty hearing, vertigo, dysphagia, epistaxis, recent dental work                                       CV:  Denies chest pain, palpitations, CARRILLO, orthopnea                                                                                           RESPIRATORY:  Denies wWheezing, SOB, cough / sputum, hemoptysis                                                               GI:  Denies nausea, vomiting, diarrhea, constipation, melena                                                                          : Denies hematuria, dysuria, urgency, incontinence                                                                                          MUSKULOSKELETAL:  Denies arthritis, joint swelling, muscle weakness                                                             SKIN/BREAST:  Denies rash, itching, hair loss, masses                                                                                              PSYCH:  Denies depression, anxiety, suicidal ideation                                                                                                HEME/LYMPH:  Denies bruises easily, enlarged lymph nodes, tender lymph nodes                                          ENDOCRINE:  Denies cold intolerance, heat intolerance, polydipsia                                                                      Vital Signs Last 24 Hrs  T(C): 36.8 (07 Mar 2024 06:16), Max: 36.8 (07 Mar 2024 06:16)  T(F): 98.2 (07 Mar 2024 06:16), Max: 98.2 (07 Mar 2024 06:16)  HR: 88 (07 Mar 2024 06:16) (86 - 88)  BP: 134/90 (07 Mar 2024 06:16) (134/90 - 146/82)  BP(mean): --  RR: 18 (07 Mar 2024 06:16) (17 - 18)  SpO2: 96% (07 Mar 2024 06:16) (96% - 99%)    Parameters below as of 07 Mar 2024 06:16  Patient On (Oxygen Delivery Method): room air    PHYSICAL EXAM:  GENERAL: NAD, lying in bed   HEAD:  Atraumatic, Normocephalic  EYES: EOMI, PERRLA, conjunctiva and sclera clear  ENT: Moist mucous membranes  NECK: Supple, No JVD  CHEST/LUNG: CTAB; Previous MSI scar noted   HEART: +Murmur; RRR  ABDOMEN: Soft, Nondistended +ttp  EXTREMITIES:  2+ Peripheral Pulses, brisk capillary refill. No clubbing, cyanosis, or edema  NERVOUS SYSTEM:  Alert & Oriented X3, speech clear. No deficits                                                           LABS:                        13.6   5.43  )-----------( 203      ( 07 Mar 2024 05:30 )             42.1     03-07    132<L>  |  87<L>  |  17  ----------------------------<  131<H>  3.3<L>   |  27  |  0.73    Ca    9.7      07 Mar 2024 05:30  Phos  4.3     03-07  Mg     1.8     03-07    TPro  7.4  /  Alb  4.2  /  TBili  0.4  /  DBili  x   /  AST  86<H>  /  ALT  91<H>  /  AlkPhos  57  03-07    Urinalysis Basic - ( 07 Mar 2024 05:30 )    Color: x / Appearance: x / SG: x / pH: x  Gluc: 131 mg/dL / Ketone: x  / Bili: x / Urobili: x   Blood: x / Protein: x / Nitrite: x   Leuk Esterase: x / RBC: x / WBC x   Sq Epi: x / Non Sq Epi: x / Bacteria: x    CARDIAC MARKERS ( 06 Mar 2024 05:30 )  x     / x     / 770 U/L / x     / x        RADIOLOGY & ADDITIONAL STUDIES:  CT Scan:   < from: CT Angio Chest Aorta w/wo IV Cont (03.05.24 @ 14:44) >  FINDINGS:  CHEST:  LUNGS AND LARGE AIRWAYS: Patent central airways. No consolidation or   suspicious nodule. Mild mosaic attenuation in the lung bases, may be due   to air trapping.  PLEURA: No pleural effusion.  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   No thoracic aortic aortic dissection or aneurysm.  HEART: Heart size is normal. Aortic valve replacement. No pericardial   effusion.  MEDIASTINUM AND BHARAT: No lymphadenopathy.  CHEST WALL AND LOWER NECK: Median sternotomy.    ABDOMEN AND PELVIS:  LIVER: Within normal limits.  BILE DUCTS: Normal caliber.  GALLBLADDER: Layering sludge. No wall thickening or pericholecystic fluid.  SPLEEN: Within normal limits.  PANCREAS: Within normal limits.  ADRENALS: Within normal limits.  KIDNEYS/URETERS: Within normal limits.    BLADDER: Within normal limits.  REPRODUCTIVE ORGANS: Uterus and adnexa within normal limits.    BOWEL: No bowel obstruction. Appendix is normal.  PERITONEUM: No ascites.  VESSELS: Atherosclerotic changes. No abdominal aortic dissection or   aneurysm.  RETROPERITONEUM/LYMPH NODES: No lymphadenopathy.  ABDOMINAL WALL: Within normal limits.  BONES: Degenerative changes.    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 No   aortic dissection or aneurysm.    TTE:  < from: TTE Echo Complete w/ Contrast w/ Doppler (03.06.24 @ 15:34) >  CONCLUSIONS:     1. Normal left ventricular size and systolic function.   2. Normal right ventricular size and systolic function.   3. Normal atria.   4. Bioprosthetic valve is seen in the aortic position. The peak   transvalvular velocity is 3.53 m/s, the mean transvalvular gradient is   28.00 mmHg, and the LVOT/AV velocity ratio is 0.28. Aortic valve velocity   is elevated and may be suggestive of bioprosthetic stenosis. Clinical   correlation is advised.   5. No pericardial effusion.   6. Compared to the previous TTE performed on 10/17/2023, velocity and   gradients across aortic bioprosthesis are higher.

## 2024-03-07 NOTE — PROGRESS NOTE ADULT - HEIGHT IN FEET
Interval History: Pt was seen and examined this am. K is elevated. Pt has no palpitation, no new c/o's, no SOB, no discomfort. Discussed the medical issues and lack of renal recovery at present with pt. She was given kayexalate last pm with improvement in s K to 5. But this am K is 6. Risk and benefits of HD explained.    Review of patient's allergies indicates:   Allergen Reactions    Tramadol Hives     Current Facility-Administered Medications   Medication Frequency    0.9%  NaCl infusion Continuous    acetaminophen tablet 650 mg Q6H PRN    albuterol-ipratropium 2.5 mg-0.5 mg/3 mL nebulizer solution 3 mL Q4H PRN    fentaNYL 25 mcg/hr 1 patch Q72H    folic acid-vit B6-vit B12 2.5-25-2 mg tablet 1 tablet Daily    gabapentin capsule 300 mg QHS    hydrOXYzine HCL tablet 25 mg TID PRN    metroNIDAZOLE tablet 2,000 mg Once    multivitamin tablet Daily    ondansetron injection 4 mg Q8H PRN    oxyCODONE immediate release tablet 10 mg Q4H PRN    polyethylene glycol packet 17 g Daily    promethazine tablet 25 mg Q6H PRN    [START ON 7/14/2020] senna tablet 17.2 mg QHS    thiamine tablet 100 mg Daily       Objective:     Vital Signs (Most Recent):  Temp: 97.6 °F (36.4 °C) (07/13/20 1604)  Pulse: 86 (07/13/20 1604)  Resp: 18 (07/13/20 1715)  BP: (!) 155/87 (07/13/20 1604)  SpO2: 95 % (07/13/20 1604)  O2 Device (Oxygen Therapy): nasal cannula (07/13/20 1450) Vital Signs (24h Range):  Temp:  [97.6 °F (36.4 °C)-98.8 °F (37.1 °C)] 97.6 °F (36.4 °C)  Pulse:  [81-89] 86  Resp:  [16-18] 18  SpO2:  [95 %-99 %] 95 %  BP: (129-155)/(69-87) 155/87     Weight: 124.3 kg (274 lb) (07/06/20 1700)  Body mass index is 38.22 kg/m².  Body surface area is 2.5 meters squared.    I/O last 3 completed shifts:  In: 1760 [P.O.:1760]  Out: 5000 [Urine:5000]    Physical Exam  Vitals signs and nursing note reviewed.   Constitutional:       Appearance: Normal appearance.   HENT:      Head: Normocephalic and atraumatic.   Cardiovascular:       Rate and Rhythm: Regular rhythm.      Pulses: Normal pulses.      Heart sounds: Normal heart sounds.   Pulmonary:      Effort: Pulmonary effort is normal.      Breath sounds: Normal breath sounds. No rales.   Abdominal:      Palpations: Abdomen is soft.   Musculoskeletal:      Right lower leg: No edema.      Left lower leg: No edema.   Skin:     General: Skin is warm and dry.   Neurological:      Mental Status: She is alert.   Psychiatric:         Mood and Affect: Mood normal.         Behavior: Behavior normal.         Significant Labs: reviewed, K this am was 6.0  BMP  Lab Results   Component Value Date     07/13/2020    K 4.6 07/13/2020     07/13/2020    CO2 16 (L) 07/13/2020    BUN 54 (H) 07/13/2020    CREATININE 9.5 (H) 07/13/2020    CALCIUM 8.2 (L) 07/13/2020    ANIONGAP 11 07/13/2020    ESTGFRAFRICA 5 (A) 07/13/2020    EGFRNONAA 5 (A) 07/13/2020       Lab Results   Component Value Date    WBC 3.08 (L) 07/13/2020    HGB 9.4 (L) 07/13/2020    HCT 30.3 (L) 07/13/2020    MCV 96 07/13/2020     07/13/2020       Significant Imaging: reviewed CXR's   5

## 2024-03-07 NOTE — DISCHARGE NOTE PROVIDER - NSDCCPCAREPLAN_GEN_ALL_CORE_FT
PRINCIPAL DISCHARGE DIAGNOSIS  Diagnosis: Epigastric pain  Assessment and Plan of Treatment: Helicobacter pylori, also known as H. pylori, is a bacterium that is commonly found in the stomach. It is present in approximately one-half of the world's population. You were experiencing abdominal pain likely from this infection. Although you were started on three medications, we discontinued it and re-started you on quadriple therapy which includes a proton pump inhibitor, Bismuth, and two antibiotics. Please take all four medications until 03/19/2023 and follow up with your PCP and gastroeneterology appointment to make sure that the infection was eradicated. Please follow up with your PCP specifically within 3-4 weeks from now.        SECONDARY DISCHARGE DIAGNOSES  Diagnosis: Syndrome of inappropriate ADH (SIADH) secretion  Assessment and Plan of Treatment: SIADH happens when your body makes excess amounts of antidiuretic hormone (ADH). SIADH causes your body to retain too much water and commonly leads to hyponatremia, which is low levels of sodium in your blood. This can happen when you are experiencing pain. You were treated with fluids that helped increase your sodium levels. Please also ensure that you do not take your blood pressure medication, hydrochlorathiazide which can cause you to have low sodium. Please follow up with your primary care physician to discuss restarting and please also follow up on your sodium levels.       PRINCIPAL DISCHARGE DIAGNOSIS  Diagnosis: Epigastric pain  Assessment and Plan of Treatment: Helicobacter pylori, also known as H. pylori, is a bacterium that is commonly found in the stomach. It is present in approximately one-half of the world's population. You were experiencing abdominal pain likely from this infection. Although you were started on three medications, we discontinued it and re-started you on quadriple therapy which includes a proton pump inhibitor, Bismuth, and two antibiotics. Please take all four medications until 03/19/2023 and follow up with your PCP and gastroeneterology appointment to make sure that the infection was eradicated. Please follow up with your PCP specifically within 3-4 weeks from now. In regards to your pain, you can take the following over the counter medications: up to 2g of aetaminophen per day (tylenol 975mg x2 per day) or 3 tablets of Ibuprofen 300mg per day.        SECONDARY DISCHARGE DIAGNOSES  Diagnosis: Syndrome of inappropriate ADH (SIADH) secretion  Assessment and Plan of Treatment: SIADH happens when your body makes excess amounts of antidiuretic hormone (ADH). SIADH causes your body to retain too much water and commonly leads to hyponatremia, which is low levels of sodium in your blood. This can happen when you are experiencing pain. You were treated with fluids that helped increase your sodium levels. Please also ensure that you do not take your blood pressure medication, hydrochlorathiazide which can cause you to have low sodium. Please follow up with your primary care physician to discuss restarting and please also follow up on your sodium levels.       PRINCIPAL DISCHARGE DIAGNOSIS  Diagnosis: Epigastric pain  Assessment and Plan of Treatment: Helicobacter pylori, also known as H. pylori, is a bacterium that is commonly found in the stomach. It is present in approximately one-half of the world's population. You were experiencing abdominal pain likely from this infection. Although you were started on three medications, we discontinued it and re-started you on quadriple therapy which includes a proton pump inhibitor, Bismuth, and two antibiotics. Please take all four medications until 03/19/2023 and follow up with your PCP and gastroeneterology appointment to make sure that the infection was eradicated. Please follow up with your PCP specifically within 3-4 weeks from now. In regards to your pain, you can take the following over the counter medications: up to 2g of aetaminophen per day (tylenol 975mg x2 per day) or 3 tablets of Ibuprofen 300mg per day. Alternatively, you can continue to take your hydrocodone-acteminophen 7.5-325mg that was previously prescribed by your PCP.        SECONDARY DISCHARGE DIAGNOSES  Diagnosis: Syndrome of inappropriate ADH (SIADH) secretion  Assessment and Plan of Treatment: SIADH happens when your body makes excess amounts of antidiuretic hormone (ADH). SIADH causes your body to retain too much water and commonly leads to hyponatremia, which is low levels of sodium in your blood. This can happen when you are experiencing pain. You were treated with fluids that helped increase your sodium levels. Please also ensure that you do not take your blood pressure medication, hydrochlorathiazide which can cause you to have low sodium. Please follow up with your primary care physician to discuss restarting and please also follow up on your sodium levels.

## 2024-03-07 NOTE — DISCHARGE NOTE PROVIDER - ATTENDING DISCHARGE PHYSICAL EXAMINATION:
PE  Gen: pleasant female nAD  HEENT: EOMI, NCAT  Neck: no jvd  no bruits  Lungs: CTA b/l nonlabored  CV: RRR S1S2  GI: +BS nontender, no guarding  LE; no edema  Psych: calm cooperative.

## 2024-03-07 NOTE — DISCHARGE NOTE PROVIDER - NSDCMRMEDTOKEN_GEN_ALL_CORE_FT
aspirin 81 mg oral delayed release tablet: 1 tab(s) orally once a day  cefpodoxime 100 mg oral tablet: 1 tab(s) orally 2 times a day  chlorthalidone 25 mg oral tablet: 1 tab(s) orally once a day  clopidogrel 75 mg oral tablet: 1 tab(s) orally once a day  colchicine 0.6 mg oral tablet: 1 tab(s) orally 2 times a day  Crestor 40 mg oral tablet: 1 tab(s) orally once a day  diazePAM 5 mg oral tablet: 0.5 tab(s) orally once a day  hydrocodone-acetaminophen 7.5 mg-325 mg oral tablet: 1 tab(s) orally once a day  MetFORMIN (Eqv-Fortamet) 500 mg oral tablet, extended release: 2 tab(s) orally once a day in morning   metFORMIN 500 mg oral tablet: 1 tab(s) orally once a day (at bedtime)  metoprolol succinate 50 mg oral tablet, extended release: 1 orally once a day  omeprazole 40 mg oral delayed release capsule: 1 cap(s) orally once a day  Zetia 10 mg oral tablet: 1 tab(s) orally once a day   aspirin 81 mg oral delayed release tablet: 1 tab(s) orally once a day  bismuth subsalicylate 262 mg/15 mL oral suspension: 17.18 milliliter(s) orally every 6 hours  clopidogrel 75 mg oral tablet: 1 tab(s) orally once a day  Crestor 40 mg oral tablet: 1 tab(s) orally once a day  diazePAM 5 mg oral tablet: 0.5 tab(s) orally once a day  MetFORMIN (Eqv-Fortamet) 500 mg oral tablet, extended release: 2 tab(s) orally once a day in morning   metoprolol succinate 50 mg oral tablet, extended release: 1 orally once a day  metroNIDAZOLE 250 mg oral tablet: 1 tab(s) orally every 6 hours  pantoprazole 40 mg oral delayed release tablet: 1 tab(s) orally every 12 hours  tetracycline 250 mg oral tablet: 2 tab(s) orally every 6 hours  Zetia 10 mg oral tablet: 1 tab(s) orally once a day   aspirin 81 mg oral delayed release tablet: 1 tab(s) orally once a day  bismuth subsalicylate 262 mg/15 mL oral suspension: 17.18 milliliter(s) orally every 6 hours  clopidogrel 75 mg oral tablet: 1 tab(s) orally once a day  diazePAM 5 mg oral tablet: 0.5 tab(s) orally once a day  MetFORMIN (Eqv-Fortamet) 500 mg oral tablet, extended release: 2 tab(s) orally once a day in morning   metoprolol succinate 50 mg oral tablet, extended release: 1 orally once a day  metroNIDAZOLE 250 mg oral tablet: 1 tab(s) orally every 6 hours  pantoprazole 40 mg oral delayed release tablet: 1 tab(s) orally every 12 hours  tetracycline 250 mg oral tablet: 2 tab(s) orally every 6 hours  Zetia 10 mg oral tablet: 1 tab(s) orally once a day

## 2024-03-07 NOTE — DISCHARGE NOTE PROVIDER - NSDCFUADDAPPT_GEN_ALL_CORE_FT
Please attend your scheduled appointment with Dr. Hopper, the lung doctor, on Wednesday 3/27 at 11am.  Please attend your scheduled appointment with Dr. Gamez, your cardiologist, on Wednesday 4/10 at 9am. Please attend your scheduled appointment with Dr. Hopper, the lung doctor, on Wednesday 3/27 at 11am.  Please attend your scheduled appointment with Dr. Gamez, your cardiologist, on Wednesday 4/10 at 9am.  Please attend your scheduled appointment with Dr. Santana, [facility], on 06/11 at 1:30pm.

## 2024-03-07 NOTE — PROGRESS NOTE ADULT - PROBLEM SELECTOR PLAN 2
AST/ALT elevations, uptrending since discharge in 12/2023. Polo's sign negative on admission. CTAP without evidence of galbladder obstruction however layering sludge without wall thickening or pericholecystic fluid present on exam.  - f/u hepatitis studies AST/ALT elevations, uptrending since discharge in 12/2023. Polo's sign negative on admission. CTAP without evidence of galbladder obstruction however layering sludge without wall thickening or pericholecystic fluid present on exam.  - hepatitis panel with signs of past HepB infection (surface and core antibody positive)

## 2024-03-07 NOTE — PROGRESS NOTE ADULT - PROBLEM SELECTOR PLAN 1
patient with characteristic epigastric abdominal pain radiating to back however CTAP without evidence of pancreatitis and lipase 20 on admission therefore junior xriteria 1/3 not consistent with acute pancriatitis. Likely that pain is acute i/s/o constipation and chronic back pain requiring daily opioid administration v H. pylori infection.   - c/w pain regimen below  - c/t monitor pain    #H. pylori inf: on PPI + Amoxicillin 1g BID x 14 days, Clarithromycin 500 mg BID x 14 days.   - confirm ABx start date and reinstate per clinical necessity  - Switch patient to quadruple therapy due to existing patient allergy to clarithromycin and possible side effect of clarithromycin being abdominal pain    #anion gap: AG 20 with bicarb 26. patient reports poor PO intake i/s/o pain. Ketosis likely driving AG i/s/o poor PO intake    - f/u UA  - BS q6h until urinarating (precuationary i/s/o constipation) patient with characteristic epigastric abdominal pain radiating to back however CTAP without evidence of pancreatitis and lipase 20 on admission therefore junior criteria 1/3 not consistent with acute pancreatitis. Likely that pain is acute i/s/o constipation and chronic back pain requiring daily opioid administration v H. pylori infection.   - c/w pain regimen below  - c/t monitor pain  - Colchicine (for pericarditis) d/jorge due to concern it is contributing to stomach pain    #H. pylori inf: on PPI + Amoxicillin 1g BID x 14 days, Clarithromycin 500 mg BID x 14 days.   - confirm ABx start date and reinstate per clinical necessity  - Switch patient to quadruple therapy due to existing patient allergy to clarithromycin and possible side effect of clarithromycin being abdominal pain    #anion gap: AG 20 with bicarb 26. patient reports poor PO intake i/s/o pain. Ketosis likely driving AG i/s/o poor PO intake    - f/u UA  - BS q6h until urinarating (precuationary i/s/o constipation)

## 2024-03-07 NOTE — PROGRESS NOTE ADULT - SUBJECTIVE AND OBJECTIVE BOX
Physical Medicine and Rehabilitation Progress Note :       Patient is a 63y old  Female who presents with a chief complaint of abdominal pain (07 Mar 2024 09:48)      HPI:  63F former smoker, with FHx MI, PMHx of asthma/COPD, sciatica/herniated disc, HTN, HLD, DM2, obesity, anxiety, AS s/p bioAVR/ascending aorta replacement and CABG SVG-PDA 05/2017 @ [facility] w subsequent PCIs (most recent 10/16/23 DESx2 pLAD) recent admission to [facility] Dec 28-29, 2023 for chest pain w/ neg trop x 2, neg CCTA for acute findings, dx'd w/ pericarditis discharged on Colchicine 0.6 mg BID x 3 months (still taking), recent dx of H Pylori, now on PPI + Amoxicillin 1g BID x 14 days, Clarithromycin 500 mg BID x 14 days, recently seen in the ED on 2/29 for epigastric pain, T&R'd, states to have been seen at [facility] on 3/3, where patient had CTAP w/ IV contrast that showed fatty liver (c/w prior ED w/u-had US at that time), and was discharged home. Patient presents today w/ diffuse back pain, described as tightness, worse w/ deep breathing, sweating, not feeling well, gen weakness. No f/c. CP 5/10, back pain 7/10. Patient reports to have stopped smoking in 08/2023, previous smoked 1.5 packs daily, tpy roughly 60. Patient reports that last BM was 3 days prior to admission and is not currently on bowel regimen while on opioid pain meds.     ED COURSE:   Vitals: T 98 to 99.6, HR 80 to 111, /83 to 146/86, RR 18, SpO2 100% on room air    Significant Labs: WBC 4.9, Hgb 14.2, Plt 181, Cl 91, AG 20 with bicarb 26, Calcium 10.8, ,     EKG: Q waves V2 - V6    CTA chest and A/P: Stable appearance post graft replacement of the ascending aorta. No aortic dissection or aneurysm.    Inverventions: ASA 162mg, Morphine 4mg IV, zofran 4mg IV   (05 Mar 2024 20:57)                            13.6   5.43  )-----------( 203      ( 07 Mar 2024 05:30 )             42.1       03-07    132<L>  |  87<L>  |  17  ----------------------------<  131<H>  3.3<L>   |  27  |  0.73    Ca    9.7      07 Mar 2024 05:30  Phos  4.3     03-07  Mg     1.8     03-07    TPro  7.4  /  Alb  4.2  /  TBili  0.4  /  DBili  x   /  AST  86<H>  /  ALT  91<H>  /  AlkPhos  57  03-07    Vital Signs Last 24 Hrs  T(C): 36.8 (07 Mar 2024 06:16), Max: 36.8 (07 Mar 2024 06:16)  T(F): 98.2 (07 Mar 2024 06:16), Max: 98.2 (07 Mar 2024 06:16)  HR: 88 (07 Mar 2024 06:16) (86 - 88)  BP: 134/90 (07 Mar 2024 06:16) (134/90 - 146/82)  BP(mean): --  RR: 18 (07 Mar 2024 06:16) (17 - 18)  SpO2: 96% (07 Mar 2024 06:16) (96% - 99%)    Parameters below as of 07 Mar 2024 06:16  Patient On (Oxygen Delivery Method): room air        MEDICATIONS  (STANDING):  aspirin enteric coated 81 milliGRAM(s) Oral daily  bismuth subsalicylate Liquid 300 milliGRAM(s) Oral every 6 hours  clopidogrel Tablet 75 milliGRAM(s) Oral daily  dextrose 5%. 1000 milliLiter(s) (50 mL/Hr) IV Continuous <Continuous>  dextrose 5%. 1000 milliLiter(s) (100 mL/Hr) IV Continuous <Continuous>  dextrose 50% Injectable 25 Gram(s) IV Push once  dextrose 50% Injectable 12.5 Gram(s) IV Push once  dextrose 50% Injectable 25 Gram(s) IV Push once  diazepam    Tablet 2 milliGRAM(s) Oral at bedtime  glucagon  Injectable 1 milliGRAM(s) IntraMuscular once  insulin lispro (ADMELOG) corrective regimen sliding scale   SubCutaneous Before meals and at bedtime  metoprolol succinate ER 50 milliGRAM(s) Oral daily  metroNIDAZOLE    Tablet 250 milliGRAM(s) Oral every 6 hours  pantoprazole    Tablet 40 milliGRAM(s) Oral every 12 hours  polyethylene glycol 3350 17 Gram(s) Oral two times a day  senna 2 Tablet(s) Oral at bedtime  sodium chloride 0.9%. 1000 milliLiter(s) (100 mL/Hr) IV Continuous <Continuous>  tetracycline 500 milliGRAM(s) Oral four times a day    MEDICATIONS  (PRN):  acetaminophen     Tablet .. 975 milliGRAM(s) Oral every 6 hours PRN Mild Pain (1 - 3)  acetaminophen 300 mG/butalbital 50 mG/ caffeine 40 mG 1 Capsule(s) Oral every 6 hours PRN headache  aluminum hydroxide/magnesium hydroxide/simethicone Suspension 30 milliLiter(s) Oral every 4 hours PRN Dyspepsia  aluminum hydroxide/magnesium hydroxide/simethicone Suspension 30 milliLiter(s) Oral every 4 hours PRN Dyspepsia  dextrose Oral Gel 15 Gram(s) Oral once PRN Blood Glucose LESS THAN 70 milliGRAM(s)/deciliter  melatonin 3 milliGRAM(s) Oral at bedtime PRN Insomnia  oxyCODONE    IR 5 milliGRAM(s) Oral every 12 hours PRN Severe Pain (7 - 10)          Initial Functional Status Assessment :         Previous Level of Function:     · Ambulation Skills	independent  · Transfer Skills	independent  · ADL Skills	independent  · Work/Leisure Activity	independent  · Additional Comments	Pt lives alone in an elevator access apartment building with 0 GRAY. She reports being independent at baseline for all ADLs and functional mobility. Pt states that she sometimes ambulates with a SC due to mild balance deficit. Pt has a tub shower with shower chair, no other DME in the home.    Cognitive Status Examination:   · Orientation	oriented to person, place, time and situation  · Level of Consciousness	alert  · Follows Commands and Answers Questions	100% of the time  · Personal Safety and Judgment	intact    Range of Motion Exam:   · Range of Motion Examination	bilateral upper extremity ROM was WFL (within functional limits); bilateral lower extremity ROM was WFL (within functional limits)    Manual Muscle Testing:   · Manual Muscle Testing Results	B UE/LE strength grossly 3/5 throughout assessed through functional mobility    Bed Mobility: Rolling/Turning:     · Level of [place]	supervision  · Physical Assist/Nonphysical Assist	supervision    Bed Mobility: Sit to Supine:     · Level of [place]	supervision  · Physical Assist/Nonphysical Assist	supervision    Bed Mobility: Supine to Sit:     · Level of [place]	supervision  · Physical Assist/Nonphysical Assist	supervision    Bed Mobility Analysis:     · Impairments Contributing to Impaired Bed Mobility	decreased strength; pain; dec aerobic capacity/endurance    Transfer: Sit to Stand:     · Level of [place]	contact guard; HHA  · Physical Assist/Nonphysical Assist	1 person assist  · Assistive Device	no AD    Transfer: Stand to Sit:     · Level of [place]	contact guard; HHA  · Physical Assist/Nonphysical Assist	1 person assist  · Weight-Bearing Restrictions	weight-bearing as tolerated  · Assistive Device	no AD    Sit/Stand Transfer Safety Analysis:     · Impairments Contributing to Impaired Transfers	impaired balance; pain; decreased strength; dec aerobic capacity/endurance    Gait Skills:     · Level of [place]	Deferred today due to Pt report of whole body pain and severe headache    Stair Negotiation:     · Level of [place]	Deferred today due to Pt report of whole body pain and severe headache    Balance Skills Assessment:     · Sitting Balance: Static	normal balance  · Sitting Balance: Dynamic	good balance  · Sit-to-Stand Balance	fair minus  · Standing Balance: Static	fair minus  · Systems Impairment Contributing to Balance Disturbance	musculoskeletal  · Identified Impairments Contributing to Balance Disturbance	pain; decreased strength; dec aerobic capacity/endurance    Sensory Examination:   Sensory Examination:    Grossly Intact:   · Gross Sensory Examination	Grossly Intact      Treatment Location:   · Comments	Pt reports severe headache throughout treatment session and states that her legs feel like lead. No neuro deficits observed, Pt AOx 4, clear speech, visual acuity and tracking intact, symmetric facial movements, symmetric UE/LE strength, no sensory deficits. CAM Diane and MD Juarez made aware of Pt headache.    Clinical Impressions:   · Criteria for Skilled Therapeutic Interventions	impairments found; functional limitations in following categories; therapy frequency; anticipated discharge recommendation  · Impairments Found (describe specific impairments)	aerobic capacity/endurance; gait, locomotion, and balance; muscle strength; posture  · Functional Limitations in Following Categories (describe specific limitations)	self-care; home management; community/leisure  · Rehab Potential	good, to achieve stated therapy goals        PM&R Impression : as above    Current disposition plan recommendation :    pending progress

## 2024-03-07 NOTE — CONSULT NOTE ADULT - SUBJECTIVE AND OBJECTIVE BOX
Surgeon: Dr. Kwok    Requesting Physician: Dr. Hanson    HISTORY OF PRESENT ILLNESS:  62 YO Female, former smoker (1.5 ppd, quit 8/2023) w/ PMHx of asthma/COPD, recently diagnosed H. Pylori (on triple tx), sciatica/herniated disc, HTN, HLD, DMII, anxiety, AS s/p bioAVR/ascending aorta replacement and CABG x1 (SVG-PDA) on 05/2017 ([facility], Dr. Hanson) w/ subsequent PCIs (most recent 10/16/23 DESx2 pLAD), recently admitted to [facility] 12/28-29 for c/o chest pain, found to have pericarditis and discharged on Colchicine. He also recently presented to [facility] ED 2/29 c/o epigastric pain, and also at [facility] ED on 3/3, where patient had CTAP w/ IV contrast that showed fatty liver and was discharged home. Patient then presented to [facility] 3/5 c/o diffuse back pain/tightness that worsens with deep breathing, associated with sweated and general weakness. She was admitted to medical service and GI and cardiology teams consulted. Patient had TTE significant for peak transvalvular velocity is 3.53 m/s, the mean transvalvular gradient is 28.00 mmHg, and the LVOT/AV velocity ratio is 0.28. CTSx surgery team consulted for possible bioprosthetic stenosis, recommended structural heart consult for TAVR Cheng.   Patient seen and examined at bedside, at present admits to persistent abdominal pain and some chest pain as well, associated with SOB at rest. Denies fever, chills, palpitations, N/V, lightheadedness, dizziness.     PAST MEDICAL & SURGICAL HISTORY:  Hypertension    Hyperlipidemia    Diabetes mellitus    Asthma    GERD (gastroesophageal reflux disease)    Kidney stone    Back injury  lumbar, work related    CAD (coronary artery disease)    Carpal tunnel syndrome    Status post small bowel resection    Uterine fibroid  removal    Status post laser lithotripsy of ureteral calculus    H/O aortic valve replacement    MEDICATIONS  (STANDING):  aspirin enteric coated 81 milliGRAM(s) Oral daily  bismuth subsalicylate Liquid 300 milliGRAM(s) Oral every 6 hours  clopidogrel Tablet 75 milliGRAM(s) Oral daily  dextrose 5%. 1000 milliLiter(s) (50 mL/Hr) IV Continuous <Continuous>  dextrose 5%. 1000 milliLiter(s) (100 mL/Hr) IV Continuous <Continuous>  dextrose 50% Injectable 25 Gram(s) IV Push once  dextrose 50% Injectable 12.5 Gram(s) IV Push once  dextrose 50% Injectable 25 Gram(s) IV Push once  diazepam    Tablet 2 milliGRAM(s) Oral at bedtime  glucagon  Injectable 1 milliGRAM(s) IntraMuscular once  insulin lispro (ADMELOG) corrective regimen sliding scale   SubCutaneous Before meals and at bedtime  metoprolol succinate ER 50 milliGRAM(s) Oral daily  metroNIDAZOLE    Tablet 250 milliGRAM(s) Oral every 6 hours  pantoprazole    Tablet 40 milliGRAM(s) Oral every 12 hours  polyethylene glycol 3350 17 Gram(s) Oral two times a day  senna 2 Tablet(s) Oral at bedtime  sodium chloride 0.9%. 1000 milliLiter(s) (100 mL/Hr) IV Continuous <Continuous>  tetracycline 500 milliGRAM(s) Oral four times a day    MEDICATIONS  (PRN):  acetaminophen     Tablet .. 975 milliGRAM(s) Oral every 6 hours PRN Mild Pain (1 - 3)  acetaminophen 300 mG/butalbital 50 mG/ caffeine 40 mG 1 Capsule(s) Oral every 6 hours PRN headache  aluminum hydroxide/magnesium hydroxide/simethicone Suspension 30 milliLiter(s) Oral every 4 hours PRN Dyspepsia  aluminum hydroxide/magnesium hydroxide/simethicone Suspension 30 milliLiter(s) Oral every 4 hours PRN Dyspepsia  dextrose Oral Gel 15 Gram(s) Oral once PRN Blood Glucose LESS THAN 70 milliGRAM(s)/deciliter  melatonin 3 milliGRAM(s) Oral at bedtime PRN Insomnia  oxyCODONE    IR 5 milliGRAM(s) Oral every 12 hours PRN Severe Pain (7 - 10)    Allergies    Biaxin (Hives)    Intolerances    SOCIAL HISTORY:  Former smoker, quit 8/2023    FAMILY HISTORY:  Family history of atrial fibrillation (Mother)    Family history of cardiac pacemaker (Mother)    Family history of asthma (Mother)    Family history of MI (myocardial infarction) (Mother)    Family history of pulmonary embolism (Mother)    Review of Systems:  CONSTITUTIONAL: Denies fevers / chills, sweats, fatigue, weight loss, weight gain                                       NEURO:  Denies parathesias, seizures, syncope, confusion                                                                                  EYES:  Denies blurry vision, discharge, pain, loss of vision                                                                                    ENMT:  Denies difficulty hearing, vertigo, dysphagia, epistaxis, recent dental work                                       CV:  Denies chest pain, palpitations, CARRILLO, orthopnea                                                                                           RESPIRATORY:  Denies wWheezing, SOB, cough / sputum, hemoptysis                                                               GI:  Denies nausea, vomiting, diarrhea, constipation, melena                                                                          : Denies hematuria, dysuria, urgency, incontinence                                                                                          MUSKULOSKELETAL:  Denies arthritis, joint swelling, muscle weakness                                                             SKIN/BREAST:  Denies rash, itching, hair loss, masses                                                                                              PSYCH:  Denies depression, anxiety, suicidal ideation                                                                                                HEME/LYMPH:  Denies bruises easily, enlarged lymph nodes, tender lymph nodes                                          ENDOCRINE:  Denies cold intolerance, heat intolerance, polydipsia                                                                      Vital Signs Last 24 Hrs  T(C): 36.7 (07 Mar 2024 09:48), Max: 36.8 (07 Mar 2024 06:16)  T(F): 98 (07 Mar 2024 09:48), Max: 98.2 (07 Mar 2024 06:16)  HR: 76 (07 Mar 2024 09:48) (76 - 88)  BP: 152/92 (07 Mar 2024 09:48) (134/90 - 152/92)  BP(mean): --  RR: 19 (07 Mar 2024 09:48) (17 - 19)  SpO2: 100% (07 Mar 2024 09:48) (96% - 100%)    Parameters below as of 07 Mar 2024 09:48  Patient On (Oxygen Delivery Method): room air    PHYSICAL EXAM:  GENERAL: NAD, lying in bed   HEAD:  Atraumatic, Normocephalic  EYES: EOMI, PERRLA, conjunctiva and sclera clear  ENT: Moist mucous membranes  NECK: Supple, No JVD  CHEST/LUNG: CTAB; Previous MSI scar noted   HEART: +Murmur; RRR  ABDOMEN: Soft, Nondistended +ttp  EXTREMITIES:  2+ Peripheral Pulses, brisk capillary refill. No clubbing, cyanosis, or edema  NERVOUS SYSTEM:  Alert & Oriented X3, speech clear. No deficits                                                           LABS:                        13.6   5.43  )-----------( 203      ( 07 Mar 2024 05:30 )             42.1     03-07    132<L>  |  87<L>  |  17  ----------------------------<  131<H>  3.3<L>   |  27  |  0.73    Ca    9.7      07 Mar 2024 05:30  Phos  4.3     03-07  Mg     1.8     03-07    TPro  7.4  /  Alb  4.2  /  TBili  0.4  /  DBili  x   /  AST  86<H>  /  ALT  91<H>  /  AlkPhos  57  03-07    Urinalysis Basic - ( 07 Mar 2024 05:30 )    Color: x / Appearance: x / SG: x / pH: x  Gluc: 131 mg/dL / Ketone: x  / Bili: x / Urobili: x   Blood: x / Protein: x / Nitrite: x   Leuk Esterase: x / RBC: x / WBC x   Sq Epi: x / Non Sq Epi: x / Bacteria: x    CARDIAC MARKERS ( 06 Mar 2024 05:30 )  x     / x     / 770 U/L / x     / x        RADIOLOGY & ADDITIONAL STUDIES:  CT Scan:   < from: CT Angio Chest Aorta w/wo IV Cont (03.05.24 @ 14:44) >  FINDINGS:  CHEST:  LUNGS AND LARGE AIRWAYS: Patent central airways. No consolidation or   suspicious nodule. Mild mosaic attenuation in the lung bases, may be due   to air trapping.  PLEURA: No pleural effusion.  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   No thoracic aortic aortic dissection or aneurysm.  HEART: Heart size is normal. Aortic valve replacement. No pericardial   effusion.  MEDIASTINUM AND BHARAT: No lymphadenopathy.  CHEST WALL AND LOWER NECK: Median sternotomy.    ABDOMEN AND PELVIS:  LIVER: Within normal limits.  BILE DUCTS: Normal caliber.  GALLBLADDER: Layering sludge. No wall thickening or pericholecystic fluid.  SPLEEN: Within normal limits.  PANCREAS: Within normal limits.  ADRENALS: Within normal limits.  KIDNEYS/URETERS: Within normal limits.    BLADDER: Within normal limits.  REPRODUCTIVE ORGANS: Uterus and adnexa within normal limits.    BOWEL: No bowel obstruction. Appendix is normal.  PERITONEUM: No ascites.  VESSELS: Atherosclerotic changes. No abdominal aortic dissection or   aneurysm.  RETROPERITONEUM/LYMPH NODES: No lymphadenopathy.  ABDOMINAL WALL: Within normal limits.  BONES: Degenerative changes.    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 No   aortic dissection or aneurysm.    TTE:  < from: TTE Echo Complete w/ Contrast w/ Doppler (03.06.24 @ 15:34) >  CONCLUSIONS:     1. Normal left ventricular size and systolic function.   2. Normal right ventricular size and systolic function.   3. Normal atria.   4. Bioprosthetic valve is seen in the aortic position. The peak   transvalvular velocity is 3.53 m/s, the mean transvalvular gradient is   28.00 mmHg, and the LVOT/AV velocity ratio is 0.28. Aortic valve velocity   is elevated and may be suggestive of bioprosthetic stenosis. Clinical   correlation is advised.   5. No pericardial effusion.   6. Compared to the previous TTE performed on 10/17/2023, velocity and   gradients across aortic bioprosthesis are higher.

## 2024-03-08 ENCOUNTER — TRANSCRIPTION ENCOUNTER (OUTPATIENT)
Age: 64
End: 2024-03-08

## 2024-03-08 DIAGNOSIS — E87.1 HYPO-OSMOLALITY AND HYPONATREMIA: ICD-10-CM

## 2024-03-08 LAB
ADD ON TEST-SPECIMEN IN LAB: SIGNIFICANT CHANGE UP
ALBUMIN SERPL ELPH-MCNC: 4.1 G/DL — SIGNIFICANT CHANGE UP (ref 3.3–5)
ALBUMIN SERPL ELPH-MCNC: 4.2 G/DL — SIGNIFICANT CHANGE UP (ref 3.3–5)
ALDOLASE SERPL-CCNC: 9.5 U/L — SIGNIFICANT CHANGE UP (ref 3.3–10.3)
ALP SERPL-CCNC: 51 U/L — SIGNIFICANT CHANGE UP (ref 40–120)
ALP SERPL-CCNC: 52 U/L — SIGNIFICANT CHANGE UP (ref 40–120)
ALT FLD-CCNC: 67 U/L — HIGH (ref 10–45)
ALT FLD-CCNC: 67 U/L — HIGH (ref 10–45)
ANA TITR SER: NEGATIVE — SIGNIFICANT CHANGE UP
ANION GAP SERPL CALC-SCNC: 12 MMOL/L — SIGNIFICANT CHANGE UP (ref 5–17)
ANION GAP SERPL CALC-SCNC: 13 MMOL/L — SIGNIFICANT CHANGE UP (ref 5–17)
ANION GAP SERPL CALC-SCNC: 13 MMOL/L — SIGNIFICANT CHANGE UP (ref 5–17)
ANION GAP SERPL CALC-SCNC: 14 MMOL/L — SIGNIFICANT CHANGE UP (ref 5–17)
APPEARANCE UR: CLEAR — SIGNIFICANT CHANGE UP
AST SERPL-CCNC: 89 U/L — HIGH (ref 10–40)
AST SERPL-CCNC: 89 U/L — HIGH (ref 10–40)
BACTERIA # UR AUTO: NEGATIVE /HPF — SIGNIFICANT CHANGE UP
BASOPHILS # BLD AUTO: 0.03 K/UL — SIGNIFICANT CHANGE UP (ref 0–0.2)
BASOPHILS NFR BLD AUTO: 0.6 % — SIGNIFICANT CHANGE UP (ref 0–2)
BILIRUB DIRECT SERPL-MCNC: <0.2 MG/DL — SIGNIFICANT CHANGE UP (ref 0–0.3)
BILIRUB INDIRECT FLD-MCNC: SIGNIFICANT CHANGE UP (ref 0.2–1)
BILIRUB SERPL-MCNC: 0.4 MG/DL — SIGNIFICANT CHANGE UP (ref 0.2–1.2)
BILIRUB SERPL-MCNC: 0.4 MG/DL — SIGNIFICANT CHANGE UP (ref 0.2–1.2)
BILIRUB UR-MCNC: NEGATIVE — SIGNIFICANT CHANGE UP
BUN SERPL-MCNC: 14 MG/DL — SIGNIFICANT CHANGE UP (ref 7–23)
BUN SERPL-MCNC: 15 MG/DL — SIGNIFICANT CHANGE UP (ref 7–23)
BUN SERPL-MCNC: 16 MG/DL — SIGNIFICANT CHANGE UP (ref 7–23)
BUN SERPL-MCNC: 16 MG/DL — SIGNIFICANT CHANGE UP (ref 7–23)
CALCIUM SERPL-MCNC: 9 MG/DL — SIGNIFICANT CHANGE UP (ref 8.4–10.5)
CALCIUM SERPL-MCNC: 9 MG/DL — SIGNIFICANT CHANGE UP (ref 8.4–10.5)
CALCIUM SERPL-MCNC: 9.6 MG/DL — SIGNIFICANT CHANGE UP (ref 8.4–10.5)
CALCIUM SERPL-MCNC: 9.6 MG/DL — SIGNIFICANT CHANGE UP (ref 8.4–10.5)
CAST: 2 /LPF — SIGNIFICANT CHANGE UP (ref 0–4)
CHLORIDE SERPL-SCNC: 84 MMOL/L — LOW (ref 96–108)
CHLORIDE SERPL-SCNC: 85 MMOL/L — LOW (ref 96–108)
CHLORIDE SERPL-SCNC: 85 MMOL/L — LOW (ref 96–108)
CHLORIDE SERPL-SCNC: 87 MMOL/L — LOW (ref 96–108)
CO2 SERPL-SCNC: 23 MMOL/L — SIGNIFICANT CHANGE UP (ref 22–31)
CO2 SERPL-SCNC: 24 MMOL/L — SIGNIFICANT CHANGE UP (ref 22–31)
CO2 SERPL-SCNC: 25 MMOL/L — SIGNIFICANT CHANGE UP (ref 22–31)
CO2 SERPL-SCNC: 25 MMOL/L — SIGNIFICANT CHANGE UP (ref 22–31)
COLOR SPEC: SIGNIFICANT CHANGE UP
CREAT ?TM UR-MCNC: 75 MG/DL — SIGNIFICANT CHANGE UP
CREAT SERPL-MCNC: 0.54 MG/DL — SIGNIFICANT CHANGE UP (ref 0.5–1.3)
CREAT SERPL-MCNC: 0.6 MG/DL — SIGNIFICANT CHANGE UP (ref 0.5–1.3)
CREAT SERPL-MCNC: 0.61 MG/DL — SIGNIFICANT CHANGE UP (ref 0.5–1.3)
CREAT SERPL-MCNC: 0.62 MG/DL — SIGNIFICANT CHANGE UP (ref 0.5–1.3)
DIFF PNL FLD: ABNORMAL
EGFR: 100 ML/MIN/1.73M2 — SIGNIFICANT CHANGE UP
EGFR: 102 ML/MIN/1.73M2 — SIGNIFICANT CHANGE UP
EGFR: 99 ML/MIN/1.73M2 — SIGNIFICANT CHANGE UP
EGFR: 99 ML/MIN/1.73M2 — SIGNIFICANT CHANGE UP
EOSINOPHIL # BLD AUTO: 0.07 K/UL — SIGNIFICANT CHANGE UP (ref 0–0.5)
EOSINOPHIL NFR BLD AUTO: 1.3 % — SIGNIFICANT CHANGE UP (ref 0–6)
GLUCOSE BLDC GLUCOMTR-MCNC: 135 MG/DL — HIGH (ref 70–99)
GLUCOSE BLDC GLUCOMTR-MCNC: 136 MG/DL — HIGH (ref 70–99)
GLUCOSE BLDC GLUCOMTR-MCNC: 154 MG/DL — HIGH (ref 70–99)
GLUCOSE SERPL-MCNC: 135 MG/DL — HIGH (ref 70–99)
GLUCOSE SERPL-MCNC: 144 MG/DL — HIGH (ref 70–99)
GLUCOSE SERPL-MCNC: 149 MG/DL — HIGH (ref 70–99)
GLUCOSE SERPL-MCNC: 156 MG/DL — HIGH (ref 70–99)
GLUCOSE UR QL: NEGATIVE MG/DL — SIGNIFICANT CHANGE UP
HCT VFR BLD CALC: 39.9 % — SIGNIFICANT CHANGE UP (ref 34.5–45)
HGB BLD-MCNC: 13.3 G/DL — SIGNIFICANT CHANGE UP (ref 11.5–15.5)
IMM GRANULOCYTES NFR BLD AUTO: 1.1 % — HIGH (ref 0–0.9)
KETONES UR-MCNC: ABNORMAL MG/DL
LEUKOCYTE ESTERASE UR-ACNC: NEGATIVE — SIGNIFICANT CHANGE UP
LYMPHOCYTES # BLD AUTO: 1.44 K/UL — SIGNIFICANT CHANGE UP (ref 1–3.3)
LYMPHOCYTES # BLD AUTO: 27.5 % — SIGNIFICANT CHANGE UP (ref 13–44)
MAGNESIUM SERPL-MCNC: 1.7 MG/DL — SIGNIFICANT CHANGE UP (ref 1.6–2.6)
MCHC RBC-ENTMCNC: 25.5 PG — LOW (ref 27–34)
MCHC RBC-ENTMCNC: 33.3 GM/DL — SIGNIFICANT CHANGE UP (ref 32–36)
MCV RBC AUTO: 76.6 FL — LOW (ref 80–100)
MONOCYTES # BLD AUTO: 0.9 K/UL — SIGNIFICANT CHANGE UP (ref 0–0.9)
MONOCYTES NFR BLD AUTO: 17.2 % — HIGH (ref 2–14)
NEUTROPHILS # BLD AUTO: 2.74 K/UL — SIGNIFICANT CHANGE UP (ref 1.8–7.4)
NEUTROPHILS NFR BLD AUTO: 52.3 % — SIGNIFICANT CHANGE UP (ref 43–77)
NITRITE UR-MCNC: NEGATIVE — SIGNIFICANT CHANGE UP
NRBC # BLD: 0 /100 WBCS — SIGNIFICANT CHANGE UP (ref 0–0)
OSMOLALITY UR: 671 MOSM/KG — SIGNIFICANT CHANGE UP (ref 300–900)
PH UR: 6.5 — SIGNIFICANT CHANGE UP (ref 5–8)
PHOSPHATE SERPL-MCNC: 3.5 MG/DL — SIGNIFICANT CHANGE UP (ref 2.5–4.5)
PLATELET # BLD AUTO: 210 K/UL — SIGNIFICANT CHANGE UP (ref 150–400)
POTASSIUM SERPL-MCNC: 2.9 MMOL/L — CRITICAL LOW (ref 3.5–5.3)
POTASSIUM SERPL-MCNC: 3.2 MMOL/L — LOW (ref 3.5–5.3)
POTASSIUM SERPL-MCNC: 3.4 MMOL/L — LOW (ref 3.5–5.3)
POTASSIUM SERPL-MCNC: 3.6 MMOL/L — SIGNIFICANT CHANGE UP (ref 3.5–5.3)
POTASSIUM SERPL-SCNC: 2.9 MMOL/L — CRITICAL LOW (ref 3.5–5.3)
POTASSIUM SERPL-SCNC: 3.2 MMOL/L — LOW (ref 3.5–5.3)
POTASSIUM SERPL-SCNC: 3.4 MMOL/L — LOW (ref 3.5–5.3)
POTASSIUM SERPL-SCNC: 3.6 MMOL/L — SIGNIFICANT CHANGE UP (ref 3.5–5.3)
POTASSIUM UR-SCNC: 73 MMOL/L — SIGNIFICANT CHANGE UP
PROT ?TM UR-MCNC: 301 MG/DL — HIGH (ref 0–12)
PROT SERPL-MCNC: 7.1 G/DL — SIGNIFICANT CHANGE UP (ref 6–8.3)
PROT SERPL-MCNC: 7.2 G/DL — SIGNIFICANT CHANGE UP (ref 6–8.3)
PROT UR-MCNC: 300 MG/DL
PROT/CREAT UR-RTO: 4 RATIO — HIGH (ref 0–0.2)
RBC # BLD: 5.21 M/UL — HIGH (ref 3.8–5.2)
RBC # FLD: 13.9 % — SIGNIFICANT CHANGE UP (ref 10.3–14.5)
RBC CASTS # UR COMP ASSIST: 6 /HPF — HIGH (ref 0–4)
SODIUM SERPL-SCNC: 122 MMOL/L — LOW (ref 135–145)
SODIUM SERPL-SCNC: 122 MMOL/L — LOW (ref 135–145)
SODIUM SERPL-SCNC: 123 MMOL/L — LOW (ref 135–145)
SODIUM SERPL-SCNC: 123 MMOL/L — LOW (ref 135–145)
SODIUM UR-SCNC: 102 MMOL/L — SIGNIFICANT CHANGE UP
SP GR SPEC: 1.03 — SIGNIFICANT CHANGE UP (ref 1–1.03)
SQUAMOUS # UR AUTO: 0 /HPF — SIGNIFICANT CHANGE UP (ref 0–5)
UROBILINOGEN FLD QL: 1 MG/DL — SIGNIFICANT CHANGE UP (ref 0.2–1)
UUN UR-MCNC: 720 MG/DL — SIGNIFICANT CHANGE UP
WBC # BLD: 5.24 K/UL — SIGNIFICANT CHANGE UP (ref 3.8–10.5)
WBC # FLD AUTO: 5.24 K/UL — SIGNIFICANT CHANGE UP (ref 3.8–10.5)
WBC UR QL: 0 /HPF — SIGNIFICANT CHANGE UP (ref 0–5)

## 2024-03-08 PROCEDURE — 75573 CT HRT C+ STRUX CGEN HRT DS: CPT | Mod: 26

## 2024-03-08 PROCEDURE — 74174 CTA ABD&PLVS W/CONTRAST: CPT | Mod: 26

## 2024-03-08 PROCEDURE — 99232 SBSQ HOSP IP/OBS MODERATE 35: CPT

## 2024-03-08 PROCEDURE — 70450 CT HEAD/BRAIN W/O DYE: CPT | Mod: 26

## 2024-03-08 PROCEDURE — 99233 SBSQ HOSP IP/OBS HIGH 50: CPT | Mod: GC

## 2024-03-08 RX ORDER — POTASSIUM CHLORIDE 20 MEQ
10 PACKET (EA) ORAL
Refills: 0 | Status: DISCONTINUED | OUTPATIENT
Start: 2024-03-08 | End: 2024-03-08

## 2024-03-08 RX ORDER — POTASSIUM CHLORIDE 20 MEQ
40 PACKET (EA) ORAL EVERY 4 HOURS
Refills: 0 | Status: COMPLETED | OUTPATIENT
Start: 2024-03-08 | End: 2024-03-08

## 2024-03-08 RX ORDER — SODIUM CHLORIDE 5 G/100ML
100 INJECTION, SOLUTION INTRAVENOUS ONCE
Refills: 0 | Status: COMPLETED | OUTPATIENT
Start: 2024-03-08 | End: 2024-03-09

## 2024-03-08 RX ORDER — SODIUM CHLORIDE 5 G/100ML
50 INJECTION, SOLUTION INTRAVENOUS ONCE
Refills: 0 | Status: COMPLETED | OUTPATIENT
Start: 2024-03-08 | End: 2024-03-08

## 2024-03-08 RX ORDER — OXYCODONE HYDROCHLORIDE 5 MG/1
2.5 TABLET ORAL EVERY 6 HOURS
Refills: 0 | Status: DISCONTINUED | OUTPATIENT
Start: 2024-03-08 | End: 2024-03-09

## 2024-03-08 RX ORDER — OXYCODONE HYDROCHLORIDE 5 MG/1
5 TABLET ORAL EVERY 6 HOURS
Refills: 0 | Status: DISCONTINUED | OUTPATIENT
Start: 2024-03-08 | End: 2024-03-09

## 2024-03-08 RX ORDER — SODIUM CHLORIDE 5 G/100ML
500 INJECTION, SOLUTION INTRAVENOUS ONCE
Refills: 0 | Status: DISCONTINUED | OUTPATIENT
Start: 2024-03-08 | End: 2024-03-08

## 2024-03-08 RX ORDER — POTASSIUM CHLORIDE 20 MEQ
40 PACKET (EA) ORAL ONCE
Refills: 0 | Status: COMPLETED | OUTPATIENT
Start: 2024-03-08 | End: 2024-03-08

## 2024-03-08 RX ORDER — SODIUM CHLORIDE 5 G/100ML
100 INJECTION, SOLUTION INTRAVENOUS ONCE
Refills: 0 | Status: DISCONTINUED | OUTPATIENT
Start: 2024-03-08 | End: 2024-03-08

## 2024-03-08 RX ORDER — MAGNESIUM SULFATE 500 MG/ML
1 VIAL (ML) INJECTION ONCE
Refills: 0 | Status: COMPLETED | OUTPATIENT
Start: 2024-03-08 | End: 2024-03-08

## 2024-03-08 RX ADMIN — Medication 81 MILLIGRAM(S): at 11:05

## 2024-03-08 RX ADMIN — POLYETHYLENE GLYCOL 3350 17 GRAM(S): 17 POWDER, FOR SOLUTION ORAL at 17:36

## 2024-03-08 RX ADMIN — Medication 500 MILLIGRAM(S): at 11:05

## 2024-03-08 RX ADMIN — Medication 250 MILLIGRAM(S): at 05:58

## 2024-03-08 RX ADMIN — OXYCODONE HYDROCHLORIDE 5 MILLIGRAM(S): 5 TABLET ORAL at 00:50

## 2024-03-08 RX ADMIN — Medication 300 MILLIGRAM(S): at 11:06

## 2024-03-08 RX ADMIN — Medication 30 MILLILITER(S): at 09:39

## 2024-03-08 RX ADMIN — Medication 1 CAPSULE(S): at 07:24

## 2024-03-08 RX ADMIN — OXYCODONE HYDROCHLORIDE 5 MILLIGRAM(S): 5 TABLET ORAL at 15:47

## 2024-03-08 RX ADMIN — OXYCODONE HYDROCHLORIDE 5 MILLIGRAM(S): 5 TABLET ORAL at 23:39

## 2024-03-08 RX ADMIN — Medication 500 MILLIGRAM(S): at 17:37

## 2024-03-08 RX ADMIN — Medication 50 MILLIGRAM(S): at 05:58

## 2024-03-08 RX ADMIN — Medication 100 GRAM(S): at 09:33

## 2024-03-08 RX ADMIN — OXYCODONE HYDROCHLORIDE 5 MILLIGRAM(S): 5 TABLET ORAL at 22:39

## 2024-03-08 RX ADMIN — Medication 975 MILLIGRAM(S): at 16:47

## 2024-03-08 RX ADMIN — SENNA PLUS 2 TABLET(S): 8.6 TABLET ORAL at 22:38

## 2024-03-08 RX ADMIN — POLYETHYLENE GLYCOL 3350 17 GRAM(S): 17 POWDER, FOR SOLUTION ORAL at 05:58

## 2024-03-08 RX ADMIN — Medication 5 MILLIGRAM(S): at 17:37

## 2024-03-08 RX ADMIN — Medication 300 MILLIGRAM(S): at 06:15

## 2024-03-08 RX ADMIN — Medication 975 MILLIGRAM(S): at 15:47

## 2024-03-08 RX ADMIN — Medication 40 MILLIEQUIVALENT(S): at 14:13

## 2024-03-08 RX ADMIN — Medication 250 MILLIGRAM(S): at 11:05

## 2024-03-08 RX ADMIN — PANTOPRAZOLE SODIUM 40 MILLIGRAM(S): 20 TABLET, DELAYED RELEASE ORAL at 17:37

## 2024-03-08 RX ADMIN — Medication 1 CAPSULE(S): at 06:25

## 2024-03-08 RX ADMIN — Medication 250 MILLIGRAM(S): at 17:36

## 2024-03-08 RX ADMIN — Medication 975 MILLIGRAM(S): at 06:24

## 2024-03-08 RX ADMIN — Medication 40 MILLIEQUIVALENT(S): at 11:15

## 2024-03-08 RX ADMIN — PANTOPRAZOLE SODIUM 40 MILLIGRAM(S): 20 TABLET, DELAYED RELEASE ORAL at 06:00

## 2024-03-08 RX ADMIN — Medication 975 MILLIGRAM(S): at 07:24

## 2024-03-08 RX ADMIN — Medication 2 MILLIGRAM(S): at 23:51

## 2024-03-08 RX ADMIN — Medication 300 MILLIGRAM(S): at 17:37

## 2024-03-08 RX ADMIN — Medication 500 MILLIGRAM(S): at 05:59

## 2024-03-08 RX ADMIN — Medication 2: at 22:53

## 2024-03-08 RX ADMIN — SODIUM CHLORIDE 300 MILLILITER(S): 5 INJECTION, SOLUTION INTRAVENOUS at 11:05

## 2024-03-08 RX ADMIN — CLOPIDOGREL BISULFATE 75 MILLIGRAM(S): 75 TABLET, FILM COATED ORAL at 11:06

## 2024-03-08 RX ADMIN — OXYCODONE HYDROCHLORIDE 5 MILLIGRAM(S): 5 TABLET ORAL at 16:47

## 2024-03-08 NOTE — DIETITIAN INITIAL EVALUATION ADULT - PERTINENT MEDS FT
MEDICATIONS  (STANDING):  aspirin enteric coated 81 milliGRAM(s) Oral daily  bisacodyl 5 milliGRAM(s) Oral every 12 hours  bismuth subsalicylate Liquid 300 milliGRAM(s) Oral every 6 hours  clopidogrel Tablet 75 milliGRAM(s) Oral daily  dextrose 5%. 1000 milliLiter(s) (50 mL/Hr) IV Continuous <Continuous>  dextrose 5%. 1000 milliLiter(s) (100 mL/Hr) IV Continuous <Continuous>  dextrose 50% Injectable 25 Gram(s) IV Push once  dextrose 50% Injectable 12.5 Gram(s) IV Push once  dextrose 50% Injectable 25 Gram(s) IV Push once  diazepam    Tablet 2 milliGRAM(s) Oral at bedtime  glucagon  Injectable 1 milliGRAM(s) IntraMuscular once  insulin lispro (ADMELOG) corrective regimen sliding scale   SubCutaneous Before meals and at bedtime  metoprolol succinate ER 50 milliGRAM(s) Oral daily  metroNIDAZOLE    Tablet 250 milliGRAM(s) Oral every 6 hours  pantoprazole    Tablet 40 milliGRAM(s) Oral every 12 hours  polyethylene glycol 3350 17 Gram(s) Oral two times a day  potassium chloride   Powder 40 milliEquivalent(s) Oral every 4 hours  senna 2 Tablet(s) Oral at bedtime  tetracycline 500 milliGRAM(s) Oral four times a day    MEDICATIONS  (PRN):  acetaminophen     Tablet .. 975 milliGRAM(s) Oral every 6 hours PRN Mild Pain (1 - 3)  acetaminophen 300 mG/butalbital 50 mG/ caffeine 40 mG 1 Capsule(s) Oral every 6 hours PRN headache  aluminum hydroxide/magnesium hydroxide/simethicone Suspension 30 milliLiter(s) Oral every 4 hours PRN Dyspepsia  aluminum hydroxide/magnesium hydroxide/simethicone Suspension 30 milliLiter(s) Oral every 4 hours PRN Dyspepsia  dextrose Oral Gel 15 Gram(s) Oral once PRN Blood Glucose LESS THAN 70 milliGRAM(s)/deciliter  melatonin 3 milliGRAM(s) Oral at bedtime PRN Insomnia  oxyCODONE    IR 5 milliGRAM(s) Oral every 12 hours PRN Severe Pain (7 - 10)

## 2024-03-08 NOTE — DIETITIAN INITIAL EVALUATION ADULT - OTHER CALCULATIONS
Based on Standards of Care pt above % IBW (157%) thus ideal body weight used for all calculations (115#). Needs adjusted for advanced age. Fluid recs per team in view of hyponatremia.

## 2024-03-08 NOTE — DIETITIAN INITIAL EVALUATION ADULT - ADD RECOMMEND
1. Recommend to liberalize diet to Regular in attempts to promote PO intake (liberalizing diet will allow for more menu options)  2. Encourage and monitor PO intake, honor preferences as able    3. Monitor labs, wt trends, GI function, and skin integrity   4. Pain and bowel regimen per team   5. RD to remain available for additional nutrition interventions and diet edu as needed

## 2024-03-08 NOTE — PROGRESS NOTE ADULT - SUBJECTIVE AND OBJECTIVE BOX
**INCOMPLETE NOTE    OVERNIGHT EVENTS:    SUBJECTIVE:  Patient seen and examined at bedside.    Vital Signs Last 12 Hrs  T(F): 98.1 (03-08-24 @ 05:20), Max: 98.3 (03-07-24 @ 21:12)  HR: 81 (03-08-24 @ 05:20) (81 - 88)  BP: 129/80 (03-08-24 @ 05:20) (129/80 - 133/54)  BP(mean): --  RR: 18 (03-08-24 @ 05:20) (18 - 18)  SpO2: 98% (03-08-24 @ 05:20) (98% - 98%)  I&O's Summary      PHYSICAL EXAM:  Constitutional: NAD, comfortable in bed.  HEENT: NC/AT, PERRLA, EOMI, no conjunctival pallor or scleral icterus, MMM  Neck: Supple, no JVD  Respiratory: CTA B/L. No w/r/r.   Cardiovascular: RRR, normal S1 and S2, no m/r/g.   Gastrointestinal: +BS, soft NTND, no guarding or rebound tenderness, no palpable masses   Extremities: wwp; no cyanosis, clubbing or edema.   Vascular: Pulses equal and strong throughout.   Neurological: AAOx3, no CN deficits, strength and sensation intact throughout.   Skin: No gross skin abnormalities or rashes        LABS:                        13.6   5.43  )-----------( 203      ( 07 Mar 2024 05:30 )             42.1     03-07    132<L>  |  87<L>  |  17  ----------------------------<  131<H>  3.3<L>   |  27  |  0.73    Ca    9.7      07 Mar 2024 05:30  Phos  4.3     03-07  Mg     1.8     03-07    TPro  7.4  /  Alb  4.2  /  TBili  0.4  /  DBili  x   /  AST  86<H>  /  ALT  91<H>  /  AlkPhos  57  03-07      Urinalysis Basic - ( 07 Mar 2024 05:30 )    Color: x / Appearance: x / SG: x / pH: x  Gluc: 131 mg/dL / Ketone: x  / Bili: x / Urobili: x   Blood: x / Protein: x / Nitrite: x   Leuk Esterase: x / RBC: x / WBC x   Sq Epi: x / Non Sq Epi: x / Bacteria: x          RADIOLOGY & ADDITIONAL TESTS:    MEDICATIONS  (STANDING):  aspirin enteric coated 81 milliGRAM(s) Oral daily  bismuth subsalicylate Liquid 300 milliGRAM(s) Oral every 6 hours  clopidogrel Tablet 75 milliGRAM(s) Oral daily  dextrose 5%. 1000 milliLiter(s) (50 mL/Hr) IV Continuous <Continuous>  dextrose 5%. 1000 milliLiter(s) (100 mL/Hr) IV Continuous <Continuous>  dextrose 50% Injectable 25 Gram(s) IV Push once  dextrose 50% Injectable 12.5 Gram(s) IV Push once  dextrose 50% Injectable 25 Gram(s) IV Push once  diazepam    Tablet 2 milliGRAM(s) Oral at bedtime  glucagon  Injectable 1 milliGRAM(s) IntraMuscular once  insulin lispro (ADMELOG) corrective regimen sliding scale   SubCutaneous Before meals and at bedtime  metoprolol succinate ER 50 milliGRAM(s) Oral daily  metroNIDAZOLE    Tablet 250 milliGRAM(s) Oral every 6 hours  pantoprazole    Tablet 40 milliGRAM(s) Oral every 12 hours  polyethylene glycol 3350 17 Gram(s) Oral two times a day  senna 2 Tablet(s) Oral at bedtime  tetracycline 500 milliGRAM(s) Oral four times a day    MEDICATIONS  (PRN):  acetaminophen     Tablet .. 975 milliGRAM(s) Oral every 6 hours PRN Mild Pain (1 - 3)  acetaminophen 300 mG/butalbital 50 mG/ caffeine 40 mG 1 Capsule(s) Oral every 6 hours PRN headache  aluminum hydroxide/magnesium hydroxide/simethicone Suspension 30 milliLiter(s) Oral every 4 hours PRN Dyspepsia  aluminum hydroxide/magnesium hydroxide/simethicone Suspension 30 milliLiter(s) Oral every 4 hours PRN Dyspepsia  dextrose Oral Gel 15 Gram(s) Oral once PRN Blood Glucose LESS THAN 70 milliGRAM(s)/deciliter  melatonin 3 milliGRAM(s) Oral at bedtime PRN Insomnia  oxyCODONE    IR 5 milliGRAM(s) Oral every 12 hours PRN Severe Pain (7 - 10)   **INCOMPLETE NOTE    OVERNIGHT EVENTS:    SUBJECTIVE:  Patient seen and examined at bedside. No acute events overnight. Patient states her headache and muscle pains/weakness have improved, but abdominal pain persists. She describes it as diffuse and tender, and thinks the discomfort is due to all of the beverages she has been drinking "mixing." When she has strong abdominal pain, she will feel like she can't take a breath due to chest tightness, but she denies any localized chest pain.    Vital Signs Last 12 Hrs  T(F): 98.1 (03-08-24 @ 05:20), Max: 98.3 (03-07-24 @ 21:12)  HR: 81 (03-08-24 @ 05:20) (81 - 88)  BP: 129/80 (03-08-24 @ 05:20) (129/80 - 133/54)  BP(mean): --  RR: 18 (03-08-24 @ 05:20) (18 - 18)  SpO2: 98% (03-08-24 @ 05:20) (98% - 98%)  I&O's Summary      PHYSICAL EXAM:  Constitutional: NAD, comfortable in bed.  HEENT: NC/AT, EOMI, no conjunctival pallor or scleral icterus, MMM  Neck: Supple, no JVD  Respiratory: CTA B/L. No w/r/r.   Cardiovascular: RRR, loud crescendo-decrescendo murmur  Gastrointestinal: +BS, soft ND, diffusely tender to palpation with most severe pain felt in RUQ no guarding or rebound tenderness, no palpable masses   Extremities: wwp; no cyanosis, clubbing or edema.   Vascular: Pulses equal and strong throughout.   Neurological: AAOx3, no CN deficits, strength and sensation intact throughout.   Skin: No gross skin abnormalities or rashes        LABS:                        13.6   5.43  )-----------( 203      ( 07 Mar 2024 05:30 )             42.1     03-07    132<L>  |  87<L>  |  17  ----------------------------<  131<H>  3.3<L>   |  27  |  0.73    Ca    9.7      07 Mar 2024 05:30  Phos  4.3     03-07  Mg     1.8     03-07    TPro  7.4  /  Alb  4.2  /  TBili  0.4  /  DBili  x   /  AST  86<H>  /  ALT  91<H>  /  AlkPhos  57  03-07      Urinalysis Basic - ( 07 Mar 2024 05:30 )    Color: x / Appearance: x / SG: x / pH: x  Gluc: 131 mg/dL / Ketone: x  / Bili: x / Urobili: x   Blood: x / Protein: x / Nitrite: x   Leuk Esterase: x / RBC: x / WBC x   Sq Epi: x / Non Sq Epi: x / Bacteria: x          RADIOLOGY & ADDITIONAL TESTS:    MEDICATIONS  (STANDING):  aspirin enteric coated 81 milliGRAM(s) Oral daily  bismuth subsalicylate Liquid 300 milliGRAM(s) Oral every 6 hours  clopidogrel Tablet 75 milliGRAM(s) Oral daily  dextrose 5%. 1000 milliLiter(s) (50 mL/Hr) IV Continuous <Continuous>  dextrose 5%. 1000 milliLiter(s) (100 mL/Hr) IV Continuous <Continuous>  dextrose 50% Injectable 25 Gram(s) IV Push once  dextrose 50% Injectable 12.5 Gram(s) IV Push once  dextrose 50% Injectable 25 Gram(s) IV Push once  diazepam    Tablet 2 milliGRAM(s) Oral at bedtime  glucagon  Injectable 1 milliGRAM(s) IntraMuscular once  insulin lispro (ADMELOG) corrective regimen sliding scale   SubCutaneous Before meals and at bedtime  metoprolol succinate ER 50 milliGRAM(s) Oral daily  metroNIDAZOLE    Tablet 250 milliGRAM(s) Oral every 6 hours  pantoprazole    Tablet 40 milliGRAM(s) Oral every 12 hours  polyethylene glycol 3350 17 Gram(s) Oral two times a day  senna 2 Tablet(s) Oral at bedtime  tetracycline 500 milliGRAM(s) Oral four times a day    MEDICATIONS  (PRN):  acetaminophen     Tablet .. 975 milliGRAM(s) Oral every 6 hours PRN Mild Pain (1 - 3)  acetaminophen 300 mG/butalbital 50 mG/ caffeine 40 mG 1 Capsule(s) Oral every 6 hours PRN headache  aluminum hydroxide/magnesium hydroxide/simethicone Suspension 30 milliLiter(s) Oral every 4 hours PRN Dyspepsia  aluminum hydroxide/magnesium hydroxide/simethicone Suspension 30 milliLiter(s) Oral every 4 hours PRN Dyspepsia  dextrose Oral Gel 15 Gram(s) Oral once PRN Blood Glucose LESS THAN 70 milliGRAM(s)/deciliter  melatonin 3 milliGRAM(s) Oral at bedtime PRN Insomnia  oxyCODONE    IR 5 milliGRAM(s) Oral every 12 hours PRN Severe Pain (7 - 10)   OVERNIGHT EVENTS: NAEO     SUBJECTIVE:  Patient seen and examined at bedside. No acute events overnight. Patient states her headache and muscle pains/weakness have improved, but abdominal pain persists. She describes it as diffuse and tender, and thinks the discomfort is due to all of the beverages she has been drinking "mixing." When she has strong abdominal pain, she will feel like she can't take a breath due to chest tightness, but she denies any localized chest pain.    Vital Signs Last 12 Hrs  T(F): 98.1 (03-08-24 @ 05:20), Max: 98.3 (03-07-24 @ 21:12)  HR: 81 (03-08-24 @ 05:20) (81 - 88)  BP: 129/80 (03-08-24 @ 05:20) (129/80 - 133/54)  BP(mean): --  RR: 18 (03-08-24 @ 05:20) (18 - 18)  SpO2: 98% (03-08-24 @ 05:20) (98% - 98%)  I&O's Summary      PHYSICAL EXAM:  Constitutional: NAD, comfortable in bed.  HEENT: NC/AT, EOMI, no conjunctival pallor or scleral icterus, MMM  Neck: Supple, no JVD  Respiratory: CTA B/L. No w/r/r.   Cardiovascular: RRR, loud crescendo-decrescendo murmur  Gastrointestinal: +BS, soft ND, diffusely tender to palpation with most severe pain felt in RUQ no guarding or rebound tenderness, no palpable masses   Extremities: wwp; no cyanosis, clubbing or edema.   Vascular: Pulses equal and strong throughout.   Neurological: AAOx3, no CN deficits, strength and sensation intact throughout.   Skin: No gross skin abnormalities or rashes        LABS:                        13.6   5.43  )-----------( 203      ( 07 Mar 2024 05:30 )             42.1     03-07    132<L>  |  87<L>  |  17  ----------------------------<  131<H>  3.3<L>   |  27  |  0.73    Ca    9.7      07 Mar 2024 05:30  Phos  4.3     03-07  Mg     1.8     03-07    TPro  7.4  /  Alb  4.2  /  TBili  0.4  /  DBili  x   /  AST  86<H>  /  ALT  91<H>  /  AlkPhos  57  03-07      Urinalysis Basic - ( 07 Mar 2024 05:30 )    Color: x / Appearance: x / SG: x / pH: x  Gluc: 131 mg/dL / Ketone: x  / Bili: x / Urobili: x   Blood: x / Protein: x / Nitrite: x   Leuk Esterase: x / RBC: x / WBC x   Sq Epi: x / Non Sq Epi: x / Bacteria: x          RADIOLOGY & ADDITIONAL TESTS:    MEDICATIONS  (STANDING):  aspirin enteric coated 81 milliGRAM(s) Oral daily  bismuth subsalicylate Liquid 300 milliGRAM(s) Oral every 6 hours  clopidogrel Tablet 75 milliGRAM(s) Oral daily  dextrose 5%. 1000 milliLiter(s) (50 mL/Hr) IV Continuous <Continuous>  dextrose 5%. 1000 milliLiter(s) (100 mL/Hr) IV Continuous <Continuous>  dextrose 50% Injectable 25 Gram(s) IV Push once  dextrose 50% Injectable 12.5 Gram(s) IV Push once  dextrose 50% Injectable 25 Gram(s) IV Push once  diazepam    Tablet 2 milliGRAM(s) Oral at bedtime  glucagon  Injectable 1 milliGRAM(s) IntraMuscular once  insulin lispro (ADMELOG) corrective regimen sliding scale   SubCutaneous Before meals and at bedtime  metoprolol succinate ER 50 milliGRAM(s) Oral daily  metroNIDAZOLE    Tablet 250 milliGRAM(s) Oral every 6 hours  pantoprazole    Tablet 40 milliGRAM(s) Oral every 12 hours  polyethylene glycol 3350 17 Gram(s) Oral two times a day  senna 2 Tablet(s) Oral at bedtime  tetracycline 500 milliGRAM(s) Oral four times a day    MEDICATIONS  (PRN):  acetaminophen     Tablet .. 975 milliGRAM(s) Oral every 6 hours PRN Mild Pain (1 - 3)  acetaminophen 300 mG/butalbital 50 mG/ caffeine 40 mG 1 Capsule(s) Oral every 6 hours PRN headache  aluminum hydroxide/magnesium hydroxide/simethicone Suspension 30 milliLiter(s) Oral every 4 hours PRN Dyspepsia  aluminum hydroxide/magnesium hydroxide/simethicone Suspension 30 milliLiter(s) Oral every 4 hours PRN Dyspepsia  dextrose Oral Gel 15 Gram(s) Oral once PRN Blood Glucose LESS THAN 70 milliGRAM(s)/deciliter  melatonin 3 milliGRAM(s) Oral at bedtime PRN Insomnia  oxyCODONE    IR 5 milliGRAM(s) Oral every 12 hours PRN Severe Pain (7 - 10)

## 2024-03-08 NOTE — PROGRESS NOTE ADULT - ASSESSMENT
ASSESSMENT:  62 YO Female, former smoker (1.5 ppd, quit 8/2023) w/ PMHx of asthma/COPD, recently diagnosed H. Pylori (on triple tx), sciatica/herniated disc, HTN, HLD, DMII, anxiety, AS s/p bioAVR/ascending aorta replacement and CABG x1 (SVG-PDA) on 05/2017 ([facility], Dr. Hanson) w/ subsequent PCIs (most recent 10/16/23 DESx2 pLAD), recently admitted to [facility] 12/28-29 for c/o chest pain, found to have pericarditis and discharged on Colchicine. He also recently presented to [facility] ED 2/29 c/o epigastric pain, and also at [facility] ED on 3/3, where patient had CTAP w/ IV contrast that showed fatty liver and was discharged home. Patient then presented to [facility] 3/5 c/o diffuse back pain/tightness that worsens with deep breathing, associated with sweated and general weakness. She was admitted to medical service and GI and cardiology teams consulted. Patient had TTE significant for peak transvalvular velocity is 3.53 m/s, the mean transvalvular gradient is 28.00 mmHg, and the LVOT/AV velocity ratio is 0.28. CTSx surgery team consulted for possible bioprosthetic stenosis, recommended structural heart consult for TAVR Cheng.     PLAN:  Problem 1: Bioprosthetic AS  -S/p bioAVR/ascending aorta replacement and CABG x1 (SVG-PDA) on 5/2017   -TTE 3/6: peak transvalvular velocity is 3.53 m/s, the mean transvalvular gradient is 28.00 mmHg, and the LVOT/AV velocity ratio is 0.28  -TAVR scans done, will be reviewed by attending  -Cont w/u for abdominal pain.   -Can follow-up outpatient for further TAVR work-up if medically stable for discharge  -Structural heart team will continue to follow.    Problem 2: Myopathy   -Possibly 2/2 colchicine use  -CPK elevated and transaminitis present; Cont to trend  -Cardiology team following, recs appreciated  -Colchicine and statin held  -Care per primary team    Problem 3: Epigastric pain  -GI team consulted, recs appreciated  -Continue H.Pylori quadruple therapy  -Cont bowel regimen  -Care per primary team    Problem 4: Hx of HTN  -Cont BB  -Monitor BP/HR  -Care per primary team    I have reviewed clinical labs tests and reports, radiology tests and reports, as well as old patient medical records, and discussed with the refering physician.      ASSESSMENT:  62 YO Female, former smoker (1.5 ppd, quit 8/2023) w/ PMHx of asthma/COPD, recently diagnosed H. Pylori (on triple tx), sciatica/herniated disc, HTN, HLD, DMII, anxiety, AS s/p bioAVR/ascending aorta replacement and CABG x1 (SVG-PDA) on 05/2017 ([facility], Dr. Hanson) w/ subsequent PCIs (most recent 10/16/23 DESx2 pLAD), recently admitted to [facility] 12/28-29 for c/o chest pain, found to have pericarditis and discharged on Colchicine. He also recently presented to [facility] ED 2/29 c/o epigastric pain, and also at [facility] ED on 3/3, where patient had CTAP w/ IV contrast that showed fatty liver and was discharged home. Patient then presented to [facility] 3/5 c/o diffuse back pain/tightness that worsens with deep breathing, associated with sweated and general weakness. She was admitted to medical service and GI and cardiology teams consulted. Patient had TTE significant for peak transvalvular velocity is 3.53 m/s, the mean transvalvular gradient is 28.00 mmHg, and the LVOT/AV velocity ratio is 0.28. CTSx surgery team consulted for possible bioprosthetic stenosis, recommended structural heart consult for TAVR Cheng.     PLAN:  Problem 1: Bioprosthetic AS  -Discussed with Dr. Kwok  -S/p bioAVR/ascending aorta replacement and CABG x1 (SVG-PDA) on 5/2017   -TTE 3/6: peak transvalvular velocity is 3.53 m/s, the mean transvalvular gradient is 28.00 mmHg, and the LVOT/AV velocity ratio is 0.28  -TAVR scans done, reviewed by attending  -No TAVR this admission. Needs further work-up for abdominal pain/H.pyori and can follow-up outpatient with Dr. Kwok once discharged.  -Structural heart team will continue to follow.    Problem 2: Myopathy   -Possibly 2/2 colchicine use  -CPK elevated and transaminitis present; Cont to trend  -Cardiology team following, recs appreciated  -Colchicine and statin held  -Care per primary team    Problem 3: Epigastric pain  -GI team consulted, recs appreciated  -Continue H.Pylori quadruple therapy  -Cont bowel regimen  -Care per primary team    Problem 4: Hx of HTN  -Cont BB  -Monitor BP/HR  -Care per primary team    I have reviewed clinical labs tests and reports, radiology tests and reports, as well as old patient medical records, and discussed with the refering physician.

## 2024-03-08 NOTE — DISCHARGE NOTE NURSING/CASE MANAGEMENT/SOCIAL WORK - [id number]_GEN_ALL_CORE
For information on Fall & Injury Prevention, visit: [email or web address] OR  [email or web address] OR  https://www.cdc.gov/steadi/patient.html

## 2024-03-08 NOTE — PROGRESS NOTE ADULT - PROBLEM SELECTOR PLAN 3
On 3/8, patient found to have hyponatremia to 122. Repeat lab work showed Na 123.   - Managed with sodium chloride 3% bolus 50 mL once over 10 minutes

## 2024-03-08 NOTE — DIETITIAN INITIAL EVALUATION ADULT - PERTINENT LABORATORY DATA
03-08    123<L>  |  84<L>  |  16  ----------------------------<  156<H>  3.4<L>   |  25  |  0.62    Ca    9.6      08 Mar 2024 08:36  Phos  3.5     03-08  Mg     1.7     03-08    TPro  7.2  /  Alb  4.1  /  TBili  0.4  /  DBili  x   /  AST  89<H>  /  ALT  67<H>  /  AlkPhos  52  03-08  POCT Blood Glucose.: 135 mg/dL (03-08-24 @ 13:14)  A1C with Estimated Average Glucose Result: 6.7 % (03-06-24 @ 05:30)  A1C with Estimated Average Glucose Result: 6.5 % (12-29-23 @ 07:15)  A1C with Estimated Average Glucose Result: 6.8 % (10-15-23 @ 05:30)

## 2024-03-08 NOTE — PROGRESS NOTE ADULT - ASSESSMENT
63F former smoker, with FHx MI, PMHx of asthma/COPD, sciatica/herniated disc, HTN, HLD, DM2, obesity, anxiety, AS s/p bioAVR/ascending aorta replacement and CABG SVG-PDA 05/2017 @ [facility] w subsequent PCIs (most recent 10/16/23 DESx2 pLAD) recent admission to [facility] Dec 28-29, 2023 for chest pain w/ neg trop x 2, neg CCTA for acute findings, dx'd w/ pericarditis discharged on Colchicine 0.6 mg BID x 3 months (still taking), recent dx of H Pylori, now on PPI + Amoxicillin 1g BID x 14 days, Clarithromycin 500 mg BID x 14 days, a/f epigastric abdominal pain.

## 2024-03-08 NOTE — PROGRESS NOTE ADULT - SUBJECTIVE AND OBJECTIVE BOX
Patient discussed on morning rounds with Dr. Kwok    SUBJECTIVE ASSESSMENT: Patient seen and examined at bedside. Patient states that her abdominal pain is still present, has not improved. Denies chest pain, SOB, palpitations, N/V/D.    Vital Signs Last 24 Hrs  T(C): 36.4 (08 Mar 2024 12:33), Max: 36.8 (07 Mar 2024 21:12)  T(F): 97.6 (08 Mar 2024 12:33), Max: 98.3 (07 Mar 2024 21:12)  HR: 80 (08 Mar 2024 12:33) (80 - 88)  BP: 155/90 (08 Mar 2024 12:33) (129/80 - 155/90)  BP(mean): --  RR: 17 (08 Mar 2024 12:33) (17 - 18)  SpO2: 96% (08 Mar 2024 12:33) (96% - 98%)    Parameters below as of 08 Mar 2024 12:33  Patient On (Oxygen Delivery Method): room air    I&O's Detail    08 Mar 2024 07:01  -  08 Mar 2024 15:58  --------------------------------------------------------  IN:  Total IN: 0 mL    OUT:    Voided (mL): 200 mL  Total OUT: 200 mL    Total NET: -200 mL    CHEST TUBE: None   ROXANE DRAIN:  None   EPICARDIAL WIRES: None   TIE DOWNS: None   HACKETT: None     PHYSICAL EXAM:  GENERAL: NAD, lying in bed   HEAD:  Atraumatic, Normocephalic  EYES: EOMI, PERRLA, conjunctiva and sclera clear  ENT: Moist mucous membranes  NECK: Supple, No JVD  CHEST/LUNG: CTAB; Previous MSI scar noted   HEART: +Murmur; RRR  ABDOMEN: Soft, Nondistended +ttp  EXTREMITIES:  2+ Peripheral Pulses, brisk capillary refill. No clubbing, cyanosis, or edema  NERVOUS SYSTEM:  Alert & Oriented X3, speech clear. No deficits                                                           LABS:                        13.3   5.24  )-----------( 210      ( 08 Mar 2024 05:30 )             39.9     03-08    122<L>  |  85<L>  |  14  ----------------------------<  135<H>  3.2<L>   |  25  |  0.54    Ca    9.0      08 Mar 2024 15:01  Phos  3.5     03-08  Mg     1.7     03-08    TPro  7.2  /  Alb  4.1  /  TBili  0.4  /  DBili  x   /  AST  89<H>  /  ALT  67<H>  /  AlkPhos  52  03-08    Urinalysis Basic - ( 08 Mar 2024 15:01 )    Color: x / Appearance: x / SG: x / pH: x  Gluc: 135 mg/dL / Ketone: x  / Bili: x / Urobili: x   Blood: x / Protein: x / Nitrite: x   Leuk Esterase: x / RBC: x / WBC x   Sq Epi: x / Non Sq Epi: x / Bacteria: x    MEDICATIONS  (STANDING):  aspirin enteric coated 81 milliGRAM(s) Oral daily  bisacodyl 5 milliGRAM(s) Oral every 12 hours  bismuth subsalicylate Liquid 300 milliGRAM(s) Oral every 6 hours  clopidogrel Tablet 75 milliGRAM(s) Oral daily  dextrose 5%. 1000 milliLiter(s) (100 mL/Hr) IV Continuous <Continuous>  dextrose 5%. 1000 milliLiter(s) (50 mL/Hr) IV Continuous <Continuous>  dextrose 50% Injectable 25 Gram(s) IV Push once  dextrose 50% Injectable 12.5 Gram(s) IV Push once  dextrose 50% Injectable 25 Gram(s) IV Push once  diazepam    Tablet 2 milliGRAM(s) Oral at bedtime  glucagon  Injectable 1 milliGRAM(s) IntraMuscular once  insulin lispro (ADMELOG) corrective regimen sliding scale   SubCutaneous Before meals and at bedtime  metoprolol succinate ER 50 milliGRAM(s) Oral daily  metroNIDAZOLE    Tablet 250 milliGRAM(s) Oral every 6 hours  pantoprazole    Tablet 40 milliGRAM(s) Oral every 12 hours  polyethylene glycol 3350 17 Gram(s) Oral two times a day  senna 2 Tablet(s) Oral at bedtime  tetracycline 500 milliGRAM(s) Oral four times a day    MEDICATIONS  (PRN):  acetaminophen     Tablet .. 975 milliGRAM(s) Oral every 6 hours PRN Mild Pain (1 - 3)  acetaminophen 300 mG/butalbital 50 mG/ caffeine 40 mG 1 Capsule(s) Oral every 6 hours PRN headache  aluminum hydroxide/magnesium hydroxide/simethicone Suspension 30 milliLiter(s) Oral every 4 hours PRN Dyspepsia  aluminum hydroxide/magnesium hydroxide/simethicone Suspension 30 milliLiter(s) Oral every 4 hours PRN Dyspepsia  dextrose Oral Gel 15 Gram(s) Oral once PRN Blood Glucose LESS THAN 70 milliGRAM(s)/deciliter  melatonin 3 milliGRAM(s) Oral at bedtime PRN Insomnia  oxyCODONE    IR 5 milliGRAM(s) Oral every 12 hours PRN Severe Pain (7 - 10)    RADIOLOGY & ADDITIONAL TESTS:  < from: CT Heart Congenital w/ IV Cont (03.08.24 @ 12:39) >  VII. Results    A: Coronary Artery Calcium Score: Not performed    B: Coronary Artery CT Angiogram    Coronary Dominance: RightYes  Anomalous coronary artery:  No  Coronary fistula: No    Coronary arteries (segment number):    LM (5): <50% stenosis due to predominantly calcific plaque    LAD:  Prox (6): Stent present, probably patent  Mid (7): Stent present, probably patent  Distal (8): Not well visualized  D1 (9): Normal  D2 (10): Normal    LCX:  Prox (11): Stent present, probably patent  OM1(12): Stent present, severity is indeterminate  Mid (13): Not well visualized  RCA:  Prox (1): Non-obstructive  Mid (2): Probable severe stenosis  Distal (3): Not well visualized  RPDA (4): Not well visualized    % area stenosis:   Normal = 0%;  Minimal = 1 to 29%;  Mild = 30 to 49%;    Moderate= 50 to 74%;  Severe= 75 to 90%; Subtotal > or = 91%  Lesion type:  Calcified;  Noncalcified;  Predominantly Calcified;    Predominantly Noncalcified;  With Remodeling    C: Myocardial structural abnormality:  LV:    N  LA:    N  RV:   N  RA:   N  Atrial septum:  N  Ventricular septum:  N  Pericardium:  N  Pericardial effusion: N    Non Cardiac Finding: .    IMPRESSION:    Cardiac:  1.  Probable severe mid RCA stenosis, The distal RCA and RPDA are not   well visualized  2.  Stent present in the OM2, patency is indeterminate.  The mid LCx is   not well visualized  3.  The distal LAD is not well visualized  4.  BioAVR with restricted motion of the noncoronary cusp    Cardiac findings independently dictated by the Cardiology Attending.  Non-cardiac findings independently dictated by the Radiology Attending.    Non-cardiac:    < from: CT Head No Cont (03.08.24 @ 12:32) >  Findings:    The ventricles and sulci are within normal limits for patient's stated   age.    No acute intracranial hemorrhage, extra-axial fluid collection, mass   effect or midline shift..    Gray-white matter differentiation is preserved. There are patchy foci of   periventricular and subcortical hypodensities consistent with moderate   chronic microvascular ischemic disease. .    The bones of the calvarium are intact.    The paranasal sinuses and bilateral mastoid complexes are well aerated.    Impression:    No acute intracranial hemorrhage, mass effect or demarcated territorial   infarction. Stable exam since 7/21/2022.

## 2024-03-08 NOTE — DIETITIAN INITIAL EVALUATION ADULT - OTHER INFO
63y F former smoker, PMH asthma/COPD, sciatica/herniated disc, HTN, HLD, T2DM, obesity, anxiety, AS s/p bioAVR/ascending aorta replacement and CABG SVG-PDA (5/2017) w/ subsequent PCIs (most recent 10/16/23 DESx2 pLAD), admission 12/28-12/29/2023 for CP dx'd w/ pericarditis (dc'd on Colchicine x3m), recently dx'd H. pylori (on triple therapy), presented for abdominal pain, found to have elevated LFTs and admitted for further work up and management of abdominal pain. Course c/b bioprosthetic AS.    Patient seen at bedside for nutrition assessment. Current diet order: DASH/TLC, Consistent Carbohydrate. Confirms NKFA. No difficulty chewing/swallowing reported. Pt states she feels hungry, however has been unable to tolerate PO related to abdominal pain. Pt states she "picked" at breakfast this AM. PTA, patient endorses ongoing abdominal pain and decreased PO intake for several weeks. Dosing weight: 180#, BMI 31.9, reports UBW of 180# and unsure if she has had weight loss over the last few weeks. Recommend nursing to obtain new dosing weight. No pressure injuries or edema documented. Denies N/V/D/C, last BM 3/7 per EMR. Labs: Na 123, K 3.4, POCT 117-162 x 24 hours, HgbA1c 6.7%. Meds: abx, bowel regimen. Observed with no overt signs and symptoms of muscle or fat wasting. Based on ASPEN guidelines, pt does not meet criteria for malnutrition at this time. Encouraged adequate PO intake as tolerated, pt expressed understanding. See nutrition recommendations below.

## 2024-03-08 NOTE — PROGRESS NOTE ADULT - PROBLEM SELECTOR PLAN 2
AST/ALT elevations, uptrending since discharge in 12/2023. Polo's sign negative on admission. CTAP without evidence of galbladder obstruction however layering sludge without wall thickening or pericholecystic fluid present on exam.  - hepatitis panel with signs of past HepB infection (surface and core antibody positive)

## 2024-03-08 NOTE — DISCHARGE NOTE NURSING/CASE MANAGEMENT/SOCIAL WORK - NSDCFUADDAPPT_GEN_ALL_CORE_FT
Please attend your scheduled appointment with Dr. Hopper, the lung doctor, on Wednesday 3/27 at 11am.  Please attend your scheduled appointment with Dr. Gamez, your cardiologist, on Wednesday 4/10 at 9am.  Please attend your scheduled appointment with Dr. Santana, [facility], on 06/11 at 1:30pm.

## 2024-03-08 NOTE — DISCHARGE NOTE NURSING/CASE MANAGEMENT/SOCIAL WORK - PATIENT PORTAL LINK FT
You can access the FollowMyHealth Patient Portal offered by [facility] by registering at the following website: [email or web address]. By joining [facility]’s FollowMyHealth portal, you will also be able to view your health information using other applications (apps) compatible with our system.

## 2024-03-08 NOTE — PROGRESS NOTE ADULT - PROBLEM SELECTOR PLAN 1
patient with characteristic epigastric abdominal pain radiating to back however CTAP without evidence of pancreatitis and lipase 20 on admission therefore junior criteria 1/3 not consistent with acute pancreatitis. Likely that pain is acute i/s/o constipation and chronic back pain requiring daily opioid administration v H. pylori infection.   - c/w pain regimen below  - c/t monitor pain  - Colchicine (for pericarditis) d/jorge due to concern it is contributing to stomach pain    #H. pylori inf: on PPI + Amoxicillin 1g BID x 14 days, Clarithromycin 500 mg BID x 14 days.   - confirm ABx start date and reinstate per clinical necessity  - Switch patient to quadruple therapy due to existing patient allergy to clarithromycin and possible side effect of clarithromycin being abdominal pain    #anion gap: AG 20 with bicarb 26. patient reports poor PO intake i/s/o pain. Ketosis likely driving AG i/s/o poor PO intake    - f/u UA  - BS q6h until urinarating (precuationary i/s/o constipation)

## 2024-03-09 LAB
ALBUMIN SERPL ELPH-MCNC: 4.1 G/DL — SIGNIFICANT CHANGE UP (ref 3.3–5)
ALP SERPL-CCNC: 51 U/L — SIGNIFICANT CHANGE UP (ref 40–120)
ALT FLD-CCNC: 49 U/L — HIGH (ref 10–45)
ANION GAP SERPL CALC-SCNC: 11 MMOL/L — SIGNIFICANT CHANGE UP (ref 5–17)
ANION GAP SERPL CALC-SCNC: 11 MMOL/L — SIGNIFICANT CHANGE UP (ref 5–17)
ANION GAP SERPL CALC-SCNC: 12 MMOL/L — SIGNIFICANT CHANGE UP (ref 5–17)
ANION GAP SERPL CALC-SCNC: 13 MMOL/L — SIGNIFICANT CHANGE UP (ref 5–17)
APPEARANCE UR: CLEAR — SIGNIFICANT CHANGE UP
AST SERPL-CCNC: 70 U/L — HIGH (ref 10–40)
BACTERIA # UR AUTO: NEGATIVE /HPF — SIGNIFICANT CHANGE UP
BASOPHILS # BLD AUTO: 0.03 K/UL — SIGNIFICANT CHANGE UP (ref 0–0.2)
BASOPHILS NFR BLD AUTO: 0.5 % — SIGNIFICANT CHANGE UP (ref 0–2)
BILIRUB SERPL-MCNC: 0.4 MG/DL — SIGNIFICANT CHANGE UP (ref 0.2–1.2)
BILIRUB UR-MCNC: NEGATIVE — SIGNIFICANT CHANGE UP
BUN SERPL-MCNC: 14 MG/DL — SIGNIFICANT CHANGE UP (ref 7–23)
BUN SERPL-MCNC: 15 MG/DL — SIGNIFICANT CHANGE UP (ref 7–23)
BUN SERPL-MCNC: 16 MG/DL — SIGNIFICANT CHANGE UP (ref 7–23)
BUN SERPL-MCNC: 18 MG/DL — SIGNIFICANT CHANGE UP (ref 7–23)
CALCIUM SERPL-MCNC: 9 MG/DL — SIGNIFICANT CHANGE UP (ref 8.4–10.5)
CALCIUM SERPL-MCNC: 9 MG/DL — SIGNIFICANT CHANGE UP (ref 8.4–10.5)
CALCIUM SERPL-MCNC: 9.1 MG/DL — SIGNIFICANT CHANGE UP (ref 8.4–10.5)
CALCIUM SERPL-MCNC: 9.2 MG/DL — SIGNIFICANT CHANGE UP (ref 8.4–10.5)
CAST: 2 /LPF — SIGNIFICANT CHANGE UP (ref 0–4)
CHLORIDE SERPL-SCNC: 87 MMOL/L — LOW (ref 96–108)
CHLORIDE SERPL-SCNC: 88 MMOL/L — LOW (ref 96–108)
CHLORIDE SERPL-SCNC: 88 MMOL/L — LOW (ref 96–108)
CHLORIDE SERPL-SCNC: 89 MMOL/L — LOW (ref 96–108)
CO2 SERPL-SCNC: 22 MMOL/L — SIGNIFICANT CHANGE UP (ref 22–31)
CO2 SERPL-SCNC: 23 MMOL/L — SIGNIFICANT CHANGE UP (ref 22–31)
COLOR SPEC: SIGNIFICANT CHANGE UP
CORTIS AM PEAK SERPL-MCNC: 26.05 UG/DL — HIGH (ref 6.02–18.4)
CREAT ?TM UR-MCNC: 78 MG/DL — SIGNIFICANT CHANGE UP
CREAT SERPL-MCNC: 0.5 MG/DL — SIGNIFICANT CHANGE UP (ref 0.5–1.3)
CREAT SERPL-MCNC: 0.52 MG/DL — SIGNIFICANT CHANGE UP (ref 0.5–1.3)
CREAT SERPL-MCNC: 0.53 MG/DL — SIGNIFICANT CHANGE UP (ref 0.5–1.3)
CREAT SERPL-MCNC: 0.57 MG/DL — SIGNIFICANT CHANGE UP (ref 0.5–1.3)
DIFF PNL FLD: ABNORMAL
EGFR: 101 ML/MIN/1.73M2 — SIGNIFICANT CHANGE UP
EGFR: 103 ML/MIN/1.73M2 — SIGNIFICANT CHANGE UP
EGFR: 103 ML/MIN/1.73M2 — SIGNIFICANT CHANGE UP
EGFR: 104 ML/MIN/1.73M2 — SIGNIFICANT CHANGE UP
EOSINOPHIL # BLD AUTO: 0.09 K/UL — SIGNIFICANT CHANGE UP (ref 0–0.5)
EOSINOPHIL NFR BLD AUTO: 1.5 % — SIGNIFICANT CHANGE UP (ref 0–6)
GLUCOSE BLDC GLUCOMTR-MCNC: 135 MG/DL — HIGH (ref 70–99)
GLUCOSE BLDC GLUCOMTR-MCNC: 142 MG/DL — HIGH (ref 70–99)
GLUCOSE BLDC GLUCOMTR-MCNC: 151 MG/DL — HIGH (ref 70–99)
GLUCOSE BLDC GLUCOMTR-MCNC: 162 MG/DL — HIGH (ref 70–99)
GLUCOSE SERPL-MCNC: 131 MG/DL — HIGH (ref 70–99)
GLUCOSE SERPL-MCNC: 133 MG/DL — HIGH (ref 70–99)
GLUCOSE SERPL-MCNC: 137 MG/DL — HIGH (ref 70–99)
GLUCOSE SERPL-MCNC: 151 MG/DL — HIGH (ref 70–99)
GLUCOSE UR QL: NEGATIVE MG/DL — SIGNIFICANT CHANGE UP
HCT VFR BLD CALC: 39.4 % — SIGNIFICANT CHANGE UP (ref 34.5–45)
HGB BLD-MCNC: 13.2 G/DL — SIGNIFICANT CHANGE UP (ref 11.5–15.5)
IMM GRANULOCYTES NFR BLD AUTO: 1.5 % — HIGH (ref 0–0.9)
KETONES UR-MCNC: NEGATIVE MG/DL — SIGNIFICANT CHANGE UP
LEUKOCYTE ESTERASE UR-ACNC: ABNORMAL
LYMPHOCYTES # BLD AUTO: 1.72 K/UL — SIGNIFICANT CHANGE UP (ref 1–3.3)
LYMPHOCYTES # BLD AUTO: 28.4 % — SIGNIFICANT CHANGE UP (ref 13–44)
MAGNESIUM SERPL-MCNC: 1.7 MG/DL — SIGNIFICANT CHANGE UP (ref 1.6–2.6)
MCHC RBC-ENTMCNC: 25.8 PG — LOW (ref 27–34)
MCHC RBC-ENTMCNC: 33.5 GM/DL — SIGNIFICANT CHANGE UP (ref 32–36)
MCV RBC AUTO: 77.1 FL — LOW (ref 80–100)
MONOCYTES # BLD AUTO: 0.88 K/UL — SIGNIFICANT CHANGE UP (ref 0–0.9)
MONOCYTES NFR BLD AUTO: 14.5 % — HIGH (ref 2–14)
NEUTROPHILS # BLD AUTO: 3.24 K/UL — SIGNIFICANT CHANGE UP (ref 1.8–7.4)
NEUTROPHILS NFR BLD AUTO: 53.6 % — SIGNIFICANT CHANGE UP (ref 43–77)
NITRITE UR-MCNC: NEGATIVE — SIGNIFICANT CHANGE UP
NRBC # BLD: 0 /100 WBCS — SIGNIFICANT CHANGE UP (ref 0–0)
OSMOLALITY SERPL: 260 MOSM/KG — LOW (ref 280–301)
OSMOLALITY UR: 553 MOSM/KG — SIGNIFICANT CHANGE UP (ref 300–900)
OSMOLALITY UR: 630 MOSM/KG — SIGNIFICANT CHANGE UP (ref 300–900)
OSMOLALITY UR: 722 MOSM/KG — SIGNIFICANT CHANGE UP (ref 300–900)
PH UR: 6.5 — SIGNIFICANT CHANGE UP (ref 5–8)
PHOSPHATE SERPL-MCNC: 2.8 MG/DL — SIGNIFICANT CHANGE UP (ref 2.5–4.5)
PLATELET # BLD AUTO: 214 K/UL — SIGNIFICANT CHANGE UP (ref 150–400)
POTASSIUM SERPL-MCNC: 3.1 MMOL/L — LOW (ref 3.5–5.3)
POTASSIUM SERPL-MCNC: 3.5 MMOL/L — SIGNIFICANT CHANGE UP (ref 3.5–5.3)
POTASSIUM SERPL-MCNC: 3.5 MMOL/L — SIGNIFICANT CHANGE UP (ref 3.5–5.3)
POTASSIUM SERPL-MCNC: 3.7 MMOL/L — SIGNIFICANT CHANGE UP (ref 3.5–5.3)
POTASSIUM SERPL-SCNC: 3.1 MMOL/L — LOW (ref 3.5–5.3)
POTASSIUM SERPL-SCNC: 3.5 MMOL/L — SIGNIFICANT CHANGE UP (ref 3.5–5.3)
POTASSIUM SERPL-SCNC: 3.5 MMOL/L — SIGNIFICANT CHANGE UP (ref 3.5–5.3)
POTASSIUM SERPL-SCNC: 3.7 MMOL/L — SIGNIFICANT CHANGE UP (ref 3.5–5.3)
POTASSIUM UR-SCNC: 73 MMOL/L — SIGNIFICANT CHANGE UP
PROT ?TM UR-MCNC: 242 MG/DL — SIGNIFICANT CHANGE UP (ref 0–12)
PROT SERPL-MCNC: 6.8 G/DL — SIGNIFICANT CHANGE UP (ref 6–8.3)
PROT UR-MCNC: 300 MG/DL
PROT/CREAT UR-RTO: 3.1 RATIO — SIGNIFICANT CHANGE UP (ref 0–0.2)
RBC # BLD: 5.11 M/UL — SIGNIFICANT CHANGE UP (ref 3.8–5.2)
RBC # FLD: 14 % — SIGNIFICANT CHANGE UP (ref 10.3–14.5)
RBC CASTS # UR COMP ASSIST: 11 /HPF — HIGH (ref 0–4)
SODIUM SERPL-SCNC: 120 MMOL/L — CRITICAL LOW (ref 135–145)
SODIUM SERPL-SCNC: 122 MMOL/L — LOW (ref 135–145)
SODIUM SERPL-SCNC: 122 MMOL/L — LOW (ref 135–145)
SODIUM SERPL-SCNC: 124 MMOL/L — LOW (ref 135–145)
SODIUM UR-SCNC: 51 MMOL/L — SIGNIFICANT CHANGE UP
SODIUM UR-SCNC: 94 MMOL/L — SIGNIFICANT CHANGE UP
SP GR SPEC: 1.02 — SIGNIFICANT CHANGE UP (ref 1–1.03)
SQUAMOUS # UR AUTO: 1 /HPF — SIGNIFICANT CHANGE UP (ref 0–5)
TSH SERPL-MCNC: 6.39 UIU/ML — HIGH (ref 0.27–4.2)
URATE SERPL-MCNC: 2.2 MG/DL — LOW (ref 2.5–7)
UROBILINOGEN FLD QL: 0.2 MG/DL — SIGNIFICANT CHANGE UP (ref 0.2–1)
UUN UR-MCNC: 843 MG/DL — SIGNIFICANT CHANGE UP
WBC # BLD: 6.05 K/UL — SIGNIFICANT CHANGE UP (ref 3.8–10.5)
WBC # FLD AUTO: 6.05 K/UL — SIGNIFICANT CHANGE UP (ref 3.8–10.5)
WBC UR QL: 4 /HPF — SIGNIFICANT CHANGE UP (ref 0–5)

## 2024-03-09 PROCEDURE — 99233 SBSQ HOSP IP/OBS HIGH 50: CPT

## 2024-03-09 PROCEDURE — 99223 1ST HOSP IP/OBS HIGH 75: CPT

## 2024-03-09 PROCEDURE — 93010 ELECTROCARDIOGRAM REPORT: CPT

## 2024-03-09 RX ORDER — SODIUM CHLORIDE 5 G/100ML
1000 INJECTION, SOLUTION INTRAVENOUS
Refills: 0 | Status: DISCONTINUED | OUTPATIENT
Start: 2024-03-09 | End: 2024-03-10

## 2024-03-09 RX ORDER — SODIUM ZIRCONIUM CYCLOSILICATE 10 G/10G
10 POWDER, FOR SUSPENSION ORAL ONCE
Refills: 0 | Status: DISCONTINUED | OUTPATIENT
Start: 2024-03-09 | End: 2024-03-09

## 2024-03-09 RX ORDER — SODIUM CHLORIDE 5 G/100ML
1000 INJECTION, SOLUTION INTRAVENOUS
Refills: 0 | Status: DISCONTINUED | OUTPATIENT
Start: 2024-03-09 | End: 2024-03-09

## 2024-03-09 RX ORDER — SUCRALFATE 1 G
1 TABLET ORAL EVERY 6 HOURS
Refills: 0 | Status: DISCONTINUED | OUTPATIENT
Start: 2024-03-09 | End: 2024-03-12

## 2024-03-09 RX ORDER — POTASSIUM CHLORIDE 20 MEQ
40 PACKET (EA) ORAL ONCE
Refills: 0 | Status: COMPLETED | OUTPATIENT
Start: 2024-03-09 | End: 2024-03-09

## 2024-03-09 RX ORDER — OXYCODONE HYDROCHLORIDE 5 MG/1
5 TABLET ORAL EVERY 6 HOURS
Refills: 0 | Status: DISCONTINUED | OUTPATIENT
Start: 2024-03-09 | End: 2024-03-11

## 2024-03-09 RX ORDER — SODIUM CHLORIDE 5 G/100ML
100 INJECTION, SOLUTION INTRAVENOUS ONCE
Refills: 0 | Status: COMPLETED | OUTPATIENT
Start: 2024-03-09 | End: 2024-03-09

## 2024-03-09 RX ORDER — MAGNESIUM SULFATE 500 MG/ML
2 VIAL (ML) INJECTION ONCE
Refills: 0 | Status: DISCONTINUED | OUTPATIENT
Start: 2024-03-09 | End: 2024-03-12

## 2024-03-09 RX ORDER — ACETAMINOPHEN 500 MG
975 TABLET ORAL EVERY 8 HOURS
Refills: 0 | Status: DISCONTINUED | OUTPATIENT
Start: 2024-03-09 | End: 2024-03-11

## 2024-03-09 RX ORDER — POTASSIUM CHLORIDE 20 MEQ
40 PACKET (EA) ORAL ONCE
Refills: 0 | Status: DISCONTINUED | OUTPATIENT
Start: 2024-03-09 | End: 2024-03-09

## 2024-03-09 RX ORDER — OXYCODONE HYDROCHLORIDE 5 MG/1
7.5 TABLET ORAL EVERY 6 HOURS
Refills: 0 | Status: DISCONTINUED | OUTPATIENT
Start: 2024-03-09 | End: 2024-03-11

## 2024-03-09 RX ADMIN — SODIUM CHLORIDE 50 MILLILITER(S): 5 INJECTION, SOLUTION INTRAVENOUS at 23:23

## 2024-03-09 RX ADMIN — Medication 2: at 13:33

## 2024-03-09 RX ADMIN — OXYCODONE HYDROCHLORIDE 7.5 MILLIGRAM(S): 5 TABLET ORAL at 19:27

## 2024-03-09 RX ADMIN — OXYCODONE HYDROCHLORIDE 2.5 MILLIGRAM(S): 5 TABLET ORAL at 12:42

## 2024-03-09 RX ADMIN — CLOPIDOGREL BISULFATE 75 MILLIGRAM(S): 75 TABLET, FILM COATED ORAL at 11:46

## 2024-03-09 RX ADMIN — Medication 500 MILLIGRAM(S): at 17:58

## 2024-03-09 RX ADMIN — Medication 300 MILLIGRAM(S): at 11:47

## 2024-03-09 RX ADMIN — Medication 975 MILLIGRAM(S): at 22:12

## 2024-03-09 RX ADMIN — OXYCODONE HYDROCHLORIDE 5 MILLIGRAM(S): 5 TABLET ORAL at 07:50

## 2024-03-09 RX ADMIN — Medication 50 MILLIGRAM(S): at 06:48

## 2024-03-09 RX ADMIN — Medication 250 MILLIGRAM(S): at 18:00

## 2024-03-09 RX ADMIN — Medication 81 MILLIGRAM(S): at 11:46

## 2024-03-09 RX ADMIN — Medication 250 MILLIGRAM(S): at 00:16

## 2024-03-09 RX ADMIN — Medication 500 MILLIGRAM(S): at 23:11

## 2024-03-09 RX ADMIN — Medication 250 MILLIGRAM(S): at 11:46

## 2024-03-09 RX ADMIN — PANTOPRAZOLE SODIUM 40 MILLIGRAM(S): 20 TABLET, DELAYED RELEASE ORAL at 18:00

## 2024-03-09 RX ADMIN — Medication 5 MILLIGRAM(S): at 18:00

## 2024-03-09 RX ADMIN — Medication 300 MILLIGRAM(S): at 07:38

## 2024-03-09 RX ADMIN — Medication 975 MILLIGRAM(S): at 14:48

## 2024-03-09 RX ADMIN — Medication 500 MILLIGRAM(S): at 11:46

## 2024-03-09 RX ADMIN — PANTOPRAZOLE SODIUM 40 MILLIGRAM(S): 20 TABLET, DELAYED RELEASE ORAL at 06:48

## 2024-03-09 RX ADMIN — Medication 300 MILLIGRAM(S): at 18:01

## 2024-03-09 RX ADMIN — Medication 1 GRAM(S): at 18:02

## 2024-03-09 RX ADMIN — Medication 500 MILLIGRAM(S): at 06:49

## 2024-03-09 RX ADMIN — Medication 10 MILLIGRAM(S): at 13:49

## 2024-03-09 RX ADMIN — OXYCODONE HYDROCHLORIDE 5 MILLIGRAM(S): 5 TABLET ORAL at 16:15

## 2024-03-09 RX ADMIN — Medication 3 MILLIGRAM(S): at 22:13

## 2024-03-09 RX ADMIN — POLYETHYLENE GLYCOL 3350 17 GRAM(S): 17 POWDER, FOR SOLUTION ORAL at 06:49

## 2024-03-09 RX ADMIN — POLYETHYLENE GLYCOL 3350 17 GRAM(S): 17 POWDER, FOR SOLUTION ORAL at 17:57

## 2024-03-09 RX ADMIN — Medication 1 GRAM(S): at 12:45

## 2024-03-09 RX ADMIN — SODIUM CHLORIDE 600 MILLILITER(S): 5 INJECTION, SOLUTION INTRAVENOUS at 03:01

## 2024-03-09 RX ADMIN — SODIUM CHLORIDE 40 MILLILITER(S): 5 INJECTION, SOLUTION INTRAVENOUS at 16:09

## 2024-03-09 RX ADMIN — Medication 1 GRAM(S): at 23:11

## 2024-03-09 RX ADMIN — Medication 40 MILLIEQUIVALENT(S): at 03:57

## 2024-03-09 RX ADMIN — OXYCODONE HYDROCHLORIDE 2.5 MILLIGRAM(S): 5 TABLET ORAL at 11:42

## 2024-03-09 RX ADMIN — OXYCODONE HYDROCHLORIDE 5 MILLIGRAM(S): 5 TABLET ORAL at 06:50

## 2024-03-09 RX ADMIN — Medication 250 MILLIGRAM(S): at 23:11

## 2024-03-09 RX ADMIN — Medication 250 MILLIGRAM(S): at 06:49

## 2024-03-09 RX ADMIN — OXYCODONE HYDROCHLORIDE 7.5 MILLIGRAM(S): 5 TABLET ORAL at 20:27

## 2024-03-09 RX ADMIN — SODIUM CHLORIDE 200 MILLILITER(S): 5 INJECTION, SOLUTION INTRAVENOUS at 00:40

## 2024-03-09 RX ADMIN — Medication 3 MILLIGRAM(S): at 00:36

## 2024-03-09 RX ADMIN — Medication 975 MILLIGRAM(S): at 13:48

## 2024-03-09 RX ADMIN — OXYCODONE HYDROCHLORIDE 5 MILLIGRAM(S): 5 TABLET ORAL at 17:15

## 2024-03-09 RX ADMIN — Medication 5 MILLIGRAM(S): at 06:49

## 2024-03-09 RX ADMIN — Medication 2: at 17:50

## 2024-03-09 RX ADMIN — Medication 2 MILLIGRAM(S): at 22:12

## 2024-03-09 RX ADMIN — Medication 500 MILLIGRAM(S): at 00:16

## 2024-03-09 NOTE — PROGRESS NOTE ADULT - SUBJECTIVE AND OBJECTIVE BOX
Physical Medicine and Rehabilitation Progress Note :       Patient is a 63y old  Female who presents with a chief complaint of abdominal pain (08 Mar 2024 15:57)      HPI:  63F former smoker, with FHx MI, PMHx of asthma/COPD, sciatica/herniated disc, HTN, HLD, DM2, obesity, anxiety, AS s/p bioAVR/ascending aorta replacement and CABG SVG-PDA 05/2017 @ [facility] w subsequent PCIs (most recent 10/16/23 DESx2 pLAD) recent admission to [facility] Dec 28-29, 2023 for chest pain w/ neg trop x 2, neg CCTA for acute findings, dx'd w/ pericarditis discharged on Colchicine 0.6 mg BID x 3 months (still taking), recent dx of H Pylori, now on PPI + Amoxicillin 1g BID x 14 days, Clarithromycin 500 mg BID x 14 days, recently seen in the ED on 2/29 for epigastric pain, T&R'd, states to have been seen at [facility] on 3/3, where patient had CTAP w/ IV contrast that showed fatty liver (c/w prior ED w/u-had US at that time), and was discharged home. Patient presents today w/ diffuse back pain, described as tightness, worse w/ deep breathing, sweating, not feeling well, gen weakness. No f/c. CP 5/10, back pain 7/10. Patient reports to have stopped smoking in 08/2023, previous smoked 1.5 packs daily, tpy roughly 60. Patient reports that last BM was 3 days prior to admission and is not currently on bowel regimen while on opioid pain meds.     ED COURSE:   Vitals: T 98 to 99.6, HR 80 to 111, /83 to 146/86, RR 18, SpO2 100% on room air    Significant Labs: WBC 4.9, Hgb 14.2, Plt 181, Cl 91, AG 20 with bicarb 26, Calcium 10.8, ,     EKG: Q waves V2 - V6    CTA chest and A/P: Stable appearance post graft replacement of the ascending aorta. No aortic dissection or aneurysm.    Inverventions: ASA 162mg, Morphine 4mg IV, zofran 4mg IV   (05 Mar 2024 20:57)                            13.2   6.05  )-----------( 214      ( 09 Mar 2024 05:30 )             39.4       03-09    120<LL>  |  87<L>  |  15  ----------------------------<  137<H>  3.7   |  22  |  0.52    Ca    9.1      09 Mar 2024 05:30  Phos  2.8     03-09  Mg     1.7     03-09    TPro  6.8  /  Alb  4.1  /  TBili  0.4  /  DBili  x   /  AST  70<H>  /  ALT  49<H>  /  AlkPhos  51  03-09    Vital Signs Last 24 Hrs  T(C): 36.7 (09 Mar 2024 05:50), Max: 36.8 (08 Mar 2024 20:58)  T(F): 98.1 (09 Mar 2024 05:50), Max: 98.3 (08 Mar 2024 20:58)  HR: 88 (09 Mar 2024 05:50) (80 - 88)  BP: 128/75 (09 Mar 2024 05:50) (128/75 - 155/90)  BP(mean): --  RR: 18 (09 Mar 2024 05:50) (17 - 18)  SpO2: 100% (09 Mar 2024 05:50) (96% - 100%)    Parameters below as of 09 Mar 2024 05:50  Patient On (Oxygen Delivery Method): room air        MEDICATIONS  (STANDING):  aspirin enteric coated 81 milliGRAM(s) Oral daily  bisacodyl 5 milliGRAM(s) Oral every 12 hours  bismuth subsalicylate Liquid 300 milliGRAM(s) Oral every 6 hours  clopidogrel Tablet 75 milliGRAM(s) Oral daily  dextrose 5%. 1000 milliLiter(s) (50 mL/Hr) IV Continuous <Continuous>  dextrose 5%. 1000 milliLiter(s) (100 mL/Hr) IV Continuous <Continuous>  dextrose 50% Injectable 25 Gram(s) IV Push once  dextrose 50% Injectable 12.5 Gram(s) IV Push once  dextrose 50% Injectable 25 Gram(s) IV Push once  diazepam    Tablet 2 milliGRAM(s) Oral at bedtime  glucagon  Injectable 1 milliGRAM(s) IntraMuscular once  insulin lispro (ADMELOG) corrective regimen sliding scale   SubCutaneous Before meals and at bedtime  magnesium sulfate  IVPB 2 Gram(s) IV Intermittent once  metoprolol succinate ER 50 milliGRAM(s) Oral daily  metroNIDAZOLE    Tablet 250 milliGRAM(s) Oral every 6 hours  pantoprazole    Tablet 40 milliGRAM(s) Oral every 12 hours  polyethylene glycol 3350 17 Gram(s) Oral two times a day  senna 2 Tablet(s) Oral at bedtime  sucralfate suspension 1 Gram(s) Oral every 6 hours  tetracycline 500 milliGRAM(s) Oral four times a day    MEDICATIONS  (PRN):  acetaminophen     Tablet .. 975 milliGRAM(s) Oral every 6 hours PRN Mild Pain (1 - 3)  acetaminophen 300 mG/butalbital 50 mG/ caffeine 40 mG 1 Capsule(s) Oral every 6 hours PRN headache  aluminum hydroxide/magnesium hydroxide/simethicone Suspension 30 milliLiter(s) Oral every 4 hours PRN Dyspepsia  aluminum hydroxide/magnesium hydroxide/simethicone Suspension 30 milliLiter(s) Oral every 4 hours PRN Dyspepsia  dextrose Oral Gel 15 Gram(s) Oral once PRN Blood Glucose LESS THAN 70 milliGRAM(s)/deciliter  melatonin 3 milliGRAM(s) Oral at bedtime PRN Insomnia  oxyCODONE    IR 5 milliGRAM(s) Oral every 6 hours PRN Severe Pain (7 - 10)  oxyCODONE    IR 2.5 milliGRAM(s) Oral every 6 hours PRN Moderate Pain (4 - 6)      3/8/2024  Functional Status Assessment :         Therapeutic Interventions    Sit-Stand Transfer Training  Transfer Training Sit-to-Stand Transfer: supervsion;  verbal cues;  weight-bearing as tolerated  Transfer Training Stand-to-Sit Transfer: supervsion;  verbal cues;  weight-bearing as tolerated  Sit-to-Stand Transfer Training Transfer Safety Analysis: decreased balance;  decreased weight-shifting ability;  decreased strength;  impaired balance;  impaired postural control    Gait Training  Gait Training: supervsion;  stand-by assist;  verbal cues;  nonverbal cues (demo/gestures);  1 person assist;  weight-bearing as tolerated   IV pole ;  60ft x2  Gait Analysis: 2-point gait   decreased david;  crouch;  decreased hip/knee flexion;  decreased step length;  decreased stride length;  decreased toe clearance;  decreased strength;  impaired balance;  impaired postural control;  60ft x2;  IV Pole    Therapeutic Exercise  Therapeutic Exercise Detail: Seated Therex: LAQ,Ankle pumps, Hip flexion, x10           PM&R Impression : as above    Current disposition plan recommendation :     d/c home with home physical therapy

## 2024-03-09 NOTE — PROGRESS NOTE ADULT - ASSESSMENT
63F former smoker, with FHx MI, PMHx of asthma/COPD, sciatica/herniated disc, HTN, HLD, DM2, obesity, anxiety, AS s/p bioAVR/ascending aorta replacement and CABG SVG-PDA 05/2017 @ [facility] w subsequent PCIs (most recent 10/16/23 DESx2 pLAD) recent admission to [facility] Dec 28-29, 2023 for chest pain w/ neg trop x 2, neg CCTA for acute findings, dx'd w/ pericarditis discharged on Colchicine 0.6 mg BID x 3 months (still taking), recent dx of H Pylori, now on PPI + Amoxicillin 1g BID x 14 days, Clarithromycin 500 mg BID x 14 days, a/f epigastric abdominal pain (though somewhat generalized).

## 2024-03-09 NOTE — PROGRESS NOTE ADULT - PROBLEM SELECTOR PLAN 2
AST/ALT elevations, uptrending since discharge in 12/2023. Polo's sign negative on admission. CTAP without evidence of galbladder obstruction however layering sludge without wall thickening or pericholecystic fluid present on exam.  - hepatitis panel with signs of past HepB infection (surface and core antibody positive)  - CTM

## 2024-03-09 NOTE — PROGRESS NOTE ADULT - SUBJECTIVE AND OBJECTIVE BOX
Patient is a 63y old  Female who presents with a chief complaint of abdominal pain (09 Mar 2024 12:17)      SUBJECTIVE / OVERNIGHT EVENTS: Patient seen and examined at bedside. No acute events overnight. Still wit diffuse abdominal pain and poor PO intake. SOB 2/2 to splinting. Denies fever. Still constipated    MEDICATIONS  (STANDING):  acetaminophen     Tablet .. 975 milliGRAM(s) Oral every 8 hours  aspirin enteric coated 81 milliGRAM(s) Oral daily  bisacodyl 5 milliGRAM(s) Oral every 12 hours  bismuth subsalicylate Liquid 300 milliGRAM(s) Oral every 6 hours  clopidogrel Tablet 75 milliGRAM(s) Oral daily  dextrose 5%. 1000 milliLiter(s) (50 mL/Hr) IV Continuous <Continuous>  dextrose 5%. 1000 milliLiter(s) (100 mL/Hr) IV Continuous <Continuous>  dextrose 50% Injectable 25 Gram(s) IV Push once  dextrose 50% Injectable 12.5 Gram(s) IV Push once  dextrose 50% Injectable 25 Gram(s) IV Push once  diazepam    Tablet 2 milliGRAM(s) Oral at bedtime  glucagon  Injectable 1 milliGRAM(s) IntraMuscular once  insulin lispro (ADMELOG) corrective regimen sliding scale   SubCutaneous Before meals and at bedtime  magnesium sulfate  IVPB 2 Gram(s) IV Intermittent once  metoprolol succinate ER 50 milliGRAM(s) Oral daily  metroNIDAZOLE    Tablet 250 milliGRAM(s) Oral every 6 hours  pantoprazole    Tablet 40 milliGRAM(s) Oral every 12 hours  polyethylene glycol 3350 17 Gram(s) Oral two times a day  senna 2 Tablet(s) Oral at bedtime  sodium chloride 2% . 1000 milliLiter(s) (40 mL/Hr) IV Continuous <Continuous>  sucralfate suspension 1 Gram(s) Oral every 6 hours  tetracycline 500 milliGRAM(s) Oral four times a day    MEDICATIONS  (PRN):  acetaminophen 300 mG/butalbital 50 mG/ caffeine 40 mG 1 Capsule(s) Oral every 6 hours PRN headache  aluminum hydroxide/magnesium hydroxide/simethicone Suspension 30 milliLiter(s) Oral every 4 hours PRN Dyspepsia  aluminum hydroxide/magnesium hydroxide/simethicone Suspension 30 milliLiter(s) Oral every 4 hours PRN Dyspepsia  dextrose Oral Gel 15 Gram(s) Oral once PRN Blood Glucose LESS THAN 70 milliGRAM(s)/deciliter  melatonin 3 milliGRAM(s) Oral at bedtime PRN Insomnia  oxyCODONE    IR 5 milliGRAM(s) Oral every 6 hours PRN Severe Pain (7 - 10)  oxyCODONE    IR 2.5 milliGRAM(s) Oral every 6 hours PRN Moderate Pain (4 - 6)      CAPILLARY BLOOD GLUCOSE      POCT Blood Glucose.: 162 mg/dL (09 Mar 2024 13:09)  POCT Blood Glucose.: 142 mg/dL (09 Mar 2024 09:13)  POCT Blood Glucose.: 154 mg/dL (08 Mar 2024 22:26)    I&O's Summary    08 Mar 2024 07:01  -  09 Mar 2024 07:00  --------------------------------------------------------  IN: 0 mL / OUT: 200 mL / NET: -200 mL        PHYSICAL EXAM:  Vital Signs Last 24 Hrs  T(C): 36.7 (09 Mar 2024 05:50), Max: 36.8 (08 Mar 2024 20:58)  T(F): 98.1 (09 Mar 2024 05:50), Max: 98.3 (08 Mar 2024 20:58)  HR: 88 (09 Mar 2024 05:50) (88 - 88)  BP: 128/75 (09 Mar 2024 05:50) (128/75 - 133/81)  BP(mean): --  RR: 18 (09 Mar 2024 05:50) (18 - 18)  SpO2: 100% (09 Mar 2024 05:50) (98% - 100%)    Parameters below as of 09 Mar 2024 05:50  Patient On (Oxygen Delivery Method): room air        GEN: female in NAD, appears comfortable, no diaphoresis  EYES: No scleral injection, EOMI  ENTM: neck supple & symmetric without tracheal deviation, moist membranes, no gross hearing impairment, thyroid gland not enlarged  CV: +S1/S2, no m/r/g, no abdominal bruit, no LE edema  RESP: breathing comfortably, no respiratory accessory muscle use, CTAB, no w/r/r  GI: normoactive BS, soft, NTND, no rebounding/guarding, no palpable masses    LABS:                        13.2   6.05  )-----------( 214      ( 09 Mar 2024 05:30 )             39.4     03-09    122<L>  |  88<L>  |  16  ----------------------------<  133<H>  3.5   |  22  |  0.50    Ca    9.0      09 Mar 2024 15:22  Phos  2.8     03-09  Mg     1.7     03-09    TPro  6.8  /  Alb  4.1  /  TBili  0.4  /  DBili  x   /  AST  70<H>  /  ALT  49<H>  /  AlkPhos  51  03-09          Urinalysis Basic - ( 09 Mar 2024 15:22 )    Color: x / Appearance: x / SG: x / pH: x  Gluc: 133 mg/dL / Ketone: x  / Bili: x / Urobili: x   Blood: x / Protein: x / Nitrite: x   Leuk Esterase: x / RBC: x / WBC x   Sq Epi: x / Non Sq Epi: x / Bacteria: x          RADIOLOGY & ADDITIONAL TESTS:  Results Reviewed:   Imaging Personally Reviewed:  Electrocardiogram Personally Reviewed:    COORDINATION OF CARE:  Care Discussed with Consultants/Other Providers [Y/N]:  Prior or Outpatient Records Reviewed [Y/N]:

## 2024-03-09 NOTE — PROGRESS NOTE ADULT - PROBLEM SELECTOR PLAN 1
patient with characteristic epigastric abdominal pain radiating to back however CTAP without evidence of pancreatitis and lipase 20 (does not meet criteria for pancreatitis). Possible PUD 2/2 to H Pylori? CTA C/A/P r/o dissection. Per cardiology low concern primary cardiac  - c/w pain regimen below  - Colchicine (for pericarditis) d/jorge due to concern it is contributing to stomach pain    #H. pylori inf: on PPI + Amoxicillin 1g BID x 14 days, Clarithromycin 500 mg BID x 14 days.   - Switch patient to quadruple therapy due to existing patient allergy to clarithromycin and possible side effect of clarithromycin being abdominal pain

## 2024-03-09 NOTE — CONSULT NOTE ADULT - CONSULT REASON
Atrial fibrillation
Bioprosthetic AS
Hyponatremia
PM&R evaluation
TAVR Valve in valve
Abdominal pain

## 2024-03-09 NOTE — PATIENT PROFILE ADULT - NSPROPTRIGHTREPNAME_GEN_A__NUR
Ears: no ear pain and no hearing problems. Nose: no nasal congestion and no nasal drainage. Mouth/Throat: no dysphagia, no hoarseness and no throat pain. Neck: no lumps, no pain, no stiffness and no swollen glands.
Brenda Coffman

## 2024-03-09 NOTE — CONSULT NOTE ADULT - ASSESSMENT
62 yo F w/ PMH of HTN, HLD, DM2, CAD s/p CABG and PCIs, AS s/p AVR, asthma/COPD, recent diagnosis of pericarditis started on colchicine for three months and h. pylori on quadruple therapy now presenting with upper abdominal pain, chest tightness, and back pain. Underwent CT imaging without evidence of dissection or other obvious acute pathology. Echo without effusion, noted increased velocities across bioprosthetic AV. Hospital course complicated by hyponatremia with sodium 122 which developed 3/8 AM. Given 250cc hypertonic by medicine team without improvement, sodium remains 122 most recently. Chris and Uosm elevated. Hypokalemia also noted. TSH noted 6.4, AM cortisol 26. BP not significantly elevated at present off of antihypertensives. Nephrology consulted for management of hyponatremia.    Acute hyponatremia - no clear associated symptoms, suspect SIADH secondary to pain  - Discussed with medicine team, start 2% saline 40cc/hr for 24 hours  - Check BMP at 2-4PM and 6-8PM.  - Goal sodium 126 tomorrow AM  - Place de anda, monitor UOP  - Check serum osm and uric acid  - Trend urine osm and urine sodium  - Nephrotic range proteinuria noted, would repeat urine prot/creat and alb/creat ratio tomorrow  - Fluid restriction <1L/day  - Avoid NSAIDs, thiazides, SSRIs 62 yo F w/ PMH of HTN, HLD, DM2, CAD s/p CABG and PCIs, AS s/p AVR, asthma/COPD, recent diagnosis of pericarditis started on colchicine for three months and h. pylori on quadruple therapy now presenting with upper abdominal pain, chest tightness, and back pain. Underwent CT imaging without evidence of dissection or other obvious acute pathology. Echo without effusion, noted increased velocities across bioprosthetic AV. Hospital course complicated by hyponatremia with sodium 122 which developed 3/8 AM. Given 250cc hypertonic by medicine team without improvement, sodium remains 122 most recently. Chris and Uosm elevated. Hypokalemia also noted. TSH noted 6.4, AM cortisol 26. BP not significantly elevated at present off of antihypertensives. Nephrology consulted for management of hyponatremia.    Acute hyponatremia - no clear associated symptoms, suspect SIADH secondary to pain vs limited solute intake due to inability to tolerate PO   - Discussed with medicine team, start 2% saline 40cc/hr for 24 hours  - Check BMP at 2-4PM and 6-8PM.  - Goal sodium 126 tomorrow AM  - Place de anda, monitor UOP  - Check serum osm and uric acid  - Trend urine osm and urine sodium  - Nephrotic range proteinuria noted, would repeat urine prot/creat and alb/creat ratio tomorrow  - Fluid restriction <1L/day  - Avoid NSAIDs, thiazides, SSRIs

## 2024-03-09 NOTE — PROGRESS NOTE ADULT - ASSESSMENT
I M    63 y o F former smoker, with FHx MI, PMHx of asthma/COPD, sciatica/herniated disc, HTN, HLD, DM2, obesity, anxiety, AS s/p bioAVR/ascending aorta replacement and CABG SVG-PDA 05/2017 @ [facility] w subsequent PCIs (most recent 10/16/23 DESx2 pLAD) recent admission to [facility] Dec 28-29, 2023 for chest pain w/ neg trop x 2, neg CCTA for acute findings, dx'd w/ pericarditis discharged on Colchicine 0.6 mg BID x 3 months (still taking), recent dx of H Pylori, now on PPI + Amoxicillin 1g BID x 14 days, Clarithromycin 500 mg BID x 14 days, a/f epigastric abdominal pain.     Problem/Plan - 1:  ·  Problem: Epigastric abdominal pain.   ·  Plan: patient with characteristic epigastric abdominal pain radiating to back however CTAP without evidence of pancreatitis and lipase 20 on admission therefore junior criteria 1/3 not consistent with acute pancreatitis. Likely that pain is acute i/s/o constipation and chronic back pain requiring daily opioid administration v H. pylori infection.   - c/w pain regimen below  - c/t monitor pain  - Colchicine (for pericarditis) d/jorge due to concern it is contributing to stomach pain    #H. pylori inf: on PPI + Amoxicillin 1g BID x 14 days, Clarithromycin 500 mg BID x 14 days.   - confirm ABx start date and reinstate per clinical necessity  - Switch patient to quadruple therapy due to existing patient allergy to clarithromycin and possible side effect of clarithromycin being abdominal pain    #anion gap: AG 20 with bicarb 26. patient reports poor PO intake i/s/o pain. Ketosis likely driving AG i/s/o poor PO intake    - f/u UA  - BS q6h until urinarating (precuationary i/s/o constipation).    Problem/Plan - 2:  ·  Problem: Transaminitis.   ·  Plan: AST/ALT elevations, uptrending since discharge in 12/2023. Polo's sign negative on admission. CTAP without evidence of galbladder obstruction however layering sludge without wall thickening or pericholecystic fluid present on exam.  - hepatitis panel with signs of past HepB infection (surface and core antibody positive).    Problem/Plan - 3:  ·  Problem: Hyponatremia.   ·  Plan: On 3/8, patient found to have hyponatremia to 122. Repeat lab work showed Na 123.   - Managed with sodium chloride 3% bolus 50 mL once over 10 minutes.    Problem/Plan - 4:  ·  Problem: CAD (coronary artery disease).   ·  Plan: #CAD (coronary artery disease): P/w sharp SSCP associated w anxiety/SOB resolved on own. Currently c/o very mild CP upon a deep breath but otherwise CP free. HD stable. Compliant with DAPT. hx of bioAVR/ascending aorta replacement, CABG SVG-PDA, and recent DESx2 pLAD 10/2023. Trop WNL on admission. EKG Q waves V2 - V6. Cardiac cath 10/16/23: DRAKE x2 pLAD, OM stent patient, patent SVG-RPDA graft. TTE 10/17/23: EF 63%, normal LV/RV. Bioprosthetic AV, no other valve disease, no pericardial effusion.  - Continue ASA/Plavix/Statin/BB  - Monitor for CP overnight.    Problem/Plan - 5:  ·  Problem: H/O pericarditis.   ·  Plan: discharged 12/29 on Colchicine 0.6 mg BID x 3 months  - d/c colchicine 0.6mg PO BID per cardiology due to risk of colchicine contributing to abdominal discomfort.    Problem/Plan - 6:  ·  Problem: History of COPD.   ·  Plan: SpO2 100% on RA, no new or change in cough or sputum production, no CARRILLO, no wheeze. Not on home O2.   - confirm home inhalers in AM.    Problem/Plan - 7:  ·  Problem: Constipation.   ·  Plan: last BM 3 days prior to admission   - start miralax + senna.    Problem/Plan - 8:  ·  Problem: Anxiety.   ·  Plan: - Continue home diazepam qHS.    Problem/Plan - 9:  ·  Problem: Diabetes mellitus.   ·  Plan: on home metformin 1000mg PO qAM & 500mg PO qHS  - c/w ISS, FS.    Problem/Plan - 10:  ·  Problem: History of chronic back pain.   ·  Plan; On home vicodin 7.5-325 mg q12 PRN for pain.   - c/w oxycodone 5mg PO q12h PRN for severe pain   - c/w tylenol 625mg PO q6h for pain/fever.    Problem/Plan - 11:  ·  Problem: Hypertension.   ·  Plan: on home metoprolol succinate 50mg qd + chlorthalidone 25mg PO qd   - c/w toprol   - confirm chlorthalidone home med.    Problem/Plan - 12:  ·  Problem: Hyperlipidemia.   ·  Plan: on home crestor 40mg PO qHS + zetia 10mg PO qd  - c/w therapeutic interchange.

## 2024-03-09 NOTE — CONSULT NOTE ADULT - SUBJECTIVE AND OBJECTIVE BOX
NEPHROLOGY SERVICE INITIAL CONSULT NOTE    Karen Raman is a 62 yo F w/ PMH of HTN, HLD, DM2, CAD s/p CABG and PCIs, AS s/p AVR, asthma/COPD, recent diagnosis of pericarditis started on colchicine for three months and h. pylori on quadruple therapy now presenting with upper abdominal pain, chest tightness, and back pain. Underwent CT imaging without evidence of dissection or other obvious acute pathology. Echo without effusion, noted increased velocities across bioprosthetic AV. Hospital course complicated by hyponatremia with sodium 122 which developed 3/8 AM. Given 250cc hypertonic by medicine team without improvement, sodium remains 122 most recently. Chris and Uosm elevated. Hypokalemia also noted. TSH noted 6.4, AM cortisol 26. BP not significantly elevated at present off of antihypertensives. Nephrology consulted for management of hyponatremia. Patient seen, reports significant abdominal discomfort and chest tightness. Poor appetite.    REVIEW OF SYSTEMS:   Otherwise negative except as specified in HPI    PAST MEDICAL AND SURGICAL HISTORY:   PAST MEDICAL & SURGICAL HISTORY:  Hypertension      Hyperlipidemia      Diabetes mellitus      Asthma      GERD (gastroesophageal reflux disease)      Kidney stone      Back injury  lumbar, work related      CAD (coronary artery disease)      Carpal tunnel syndrome      Status post small bowel resection      Uterine fibroid  removal      Status post laser lithotripsy of ureteral calculus      H/O aortic valve replacement          FAMILY HISTORY:  FAMILY HISTORY:  Family history of atrial fibrillation (Mother)    Family history of cardiac pacemaker (Mother)    Family history of asthma (Mother)    Family history of MI (myocardial infarction) (Mother)    Family history of pulmonary embolism (Mother)        Otherwise Noncontributory to current admission    SOCIAL HISTORY:  Tobacco use: Denies  EtOH use: Denies  Illicit drug use: Denies    HOME MEDICATIONS:      ACTIVE MEDICATIONS:  MEDICATIONS  (STANDING):  acetaminophen     Tablet .. 975 milliGRAM(s) Oral every 8 hours  aspirin enteric coated 81 milliGRAM(s) Oral daily  bisacodyl 5 milliGRAM(s) Oral every 12 hours  bismuth subsalicylate Liquid 300 milliGRAM(s) Oral every 6 hours  clopidogrel Tablet 75 milliGRAM(s) Oral daily  dextrose 5%. 1000 milliLiter(s) (50 mL/Hr) IV Continuous <Continuous>  dextrose 5%. 1000 milliLiter(s) (100 mL/Hr) IV Continuous <Continuous>  dextrose 50% Injectable 25 Gram(s) IV Push once  dextrose 50% Injectable 12.5 Gram(s) IV Push once  dextrose 50% Injectable 25 Gram(s) IV Push once  diazepam    Tablet 2 milliGRAM(s) Oral at bedtime  glucagon  Injectable 1 milliGRAM(s) IntraMuscular once  insulin lispro (ADMELOG) corrective regimen sliding scale   SubCutaneous Before meals and at bedtime  magnesium sulfate  IVPB 2 Gram(s) IV Intermittent once  metoprolol succinate ER 50 milliGRAM(s) Oral daily  metroNIDAZOLE    Tablet 250 milliGRAM(s) Oral every 6 hours  pantoprazole    Tablet 40 milliGRAM(s) Oral every 12 hours  polyethylene glycol 3350 17 Gram(s) Oral two times a day  senna 2 Tablet(s) Oral at bedtime  sodium chloride 2% . 1000 milliLiter(s) (40 mL/Hr) IV Continuous <Continuous>  sucralfate suspension 1 Gram(s) Oral every 6 hours  tetracycline 500 milliGRAM(s) Oral four times a day    MEDICATIONS  (PRN):  acetaminophen 300 mG/butalbital 50 mG/ caffeine 40 mG 1 Capsule(s) Oral every 6 hours PRN headache  aluminum hydroxide/magnesium hydroxide/simethicone Suspension 30 milliLiter(s) Oral every 4 hours PRN Dyspepsia  aluminum hydroxide/magnesium hydroxide/simethicone Suspension 30 milliLiter(s) Oral every 4 hours PRN Dyspepsia  dextrose Oral Gel 15 Gram(s) Oral once PRN Blood Glucose LESS THAN 70 milliGRAM(s)/deciliter  melatonin 3 milliGRAM(s) Oral at bedtime PRN Insomnia  oxyCODONE    IR 5 milliGRAM(s) Oral every 6 hours PRN Severe Pain (7 - 10)  oxyCODONE    IR 2.5 milliGRAM(s) Oral every 6 hours PRN Moderate Pain (4 - 6)      ALLERGIES:  Allergies    Biaxin (Hives)    Intolerances        VITAL SIGNS:  Vital Signs Last 24 Hrs  T(C): 36.7 (09 Mar 2024 05:50), Max: 36.8 (08 Mar 2024 20:58)  T(F): 98.1 (09 Mar 2024 05:50), Max: 98.3 (08 Mar 2024 20:58)  HR: 88 (09 Mar 2024 05:50) (88 - 88)  BP: 128/75 (09 Mar 2024 05:50) (128/75 - 133/81)  BP(mean): --  RR: 18 (09 Mar 2024 05:50) (18 - 18)  SpO2: 100% (09 Mar 2024 05:50) (98% - 100%)    Parameters below as of 09 Mar 2024 05:50  Patient On (Oxygen Delivery Method): room air        03-08-24 @ 07:01  -  03-09-24 @ 07:00  --------------------------------------------------------  IN:  Total IN: 0 mL    OUT:    Voided (mL): 200 mL  Total OUT: 200 mL    Total NET: -200 mL          PHYSICAL EXAM:  Constitutional: Appears in acute discomfort  Head: NCAT, EOMI  Respiratory: CTAB, no crackles noted  Cardiac: Regular rate  Gastrointestinal: abdomen soft, mildly distended, TTP in RUQ  Extremities: no peripheral edema  Dermatologic: no rashes on exposed skin  Neurologic: Awake, alert, interactive    LABS:                        13.2   6.05  )-----------( 214      ( 09 Mar 2024 05:30 )             39.4     03-09    122<L>  |  88<L>  |  16  ----------------------------<  133<H>  3.5   |  22  |  0.50    Ca    9.0      09 Mar 2024 15:22  Phos  2.8     03-09  Mg     1.7     03-09    TPro  6.8  /  Alb  4.1  /  TBili  0.4  /  DBili  x   /  AST  70<H>  /  ALT  49<H>  /  AlkPhos  51  03-09      Urinalysis Basic - ( 09 Mar 2024 15:22 )    Color: x / Appearance: x / SG: x / pH: x  Gluc: 133 mg/dL / Ketone: x  / Bili: x / Urobili: x   Blood: x / Protein: x / Nitrite: x   Leuk Esterase: x / RBC: x / WBC x   Sq Epi: x / Non Sq Epi: x / Bacteria: x          CAPILLARY BLOOD GLUCOSE      POCT Blood Glucose.: 162 mg/dL (09 Mar 2024 13:09)  POCT Blood Glucose.: 142 mg/dL (09 Mar 2024 09:13)  POCT Blood Glucose.: 154 mg/dL (08 Mar 2024 22:26)          RADIOLOGY & ADDITIONAL TESTS: Reviewed.

## 2024-03-09 NOTE — PROGRESS NOTE ADULT - PROBLEM SELECTOR PLAN 3
On 3/8, patient found to have hyponatremia to 122. Not improving with hypertonic saline boluses  - urine studies c/w SIADH  - Renal consult

## 2024-03-09 NOTE — PROGRESS NOTE ADULT - PROBLEM SELECTOR PLAN 4
#CAD (coronary artery disease): P/w sharp SSCP associated w anxiety/SOB resolved on own. Currently c/o very mild CP upon a deep breath but otherwise CP free. HD stable. Compliant with DAPT. hx of bioAVR/ascending aorta replacement, CABG SVG-PDA, and recent DESx2 pLAD 10/2023. Trop WNL on admission. EKG Q waves V2 - V6. Cardiac cath 10/16/23: DRAKE x2 pLAD, OM stent patient, patent SVG-RPDA graft. TTE 10/17/23: EF 63%, normal LV/RV. Bioprosthetic AV, no other valve disease, no pericardial effusion.  - Continue ASA/Plavix/Statin/BB  - Check EKG for changes   - Cardiology following

## 2024-03-09 NOTE — CONSULT NOTE ADULT - ATTENDING COMMENTS
63y HTN, DM, CAD s/p CABG admitted for abdominal pain and nauseas. Self reports limited PO intake due to chronic nauseas due to pain  Found to have hyponatremia of 122.   Suspect hyponatremia multifactorial (reduced solute intake vs SIADH)  Agree w/ trial of NaCl 2% due to inability to tolerate PO   Recommend management of on going GI symptoms  c/w free water restriction <1L/d  Goal of correction 6meq in 24hrs  Avoid hypotonic solutions  Further recs as above    Discussed with primary team
Patient is a 64 yo F PMHx of CAD s/p PCI (10/2023), AS s/p bioAVR/ascending aorta replacement and CABG SVG-PDA (2017), recent diagnosis of pericarditis (12/2023) for which she was started on Colchicine, asthma/COPD, HTN, HLD, DM2, recent outpatient H pylori diagnosis and treatement presenting with epigastric pain and weakness. Cardiology consulted for Management of Pericarditis    Review of studies:  - ECG 3/5/24: NSR, inferior infarct pattern, Left axis deviation, left atrial enlargement, poor R wave progression   - TTE 10/17/23: Normal BiV function, bio AV with normal function (peak rafa 2.49 m/s, mean grad 15), no pericardial effusion   - LHC 10/16/23: LM normal, 60-70% stenosis s/p DRAKE x2, LCx: OM1 patent stent, RCA patent SVG-RPDA      Home meds: ASA 81, Plavix 75, Toprol 50mg po qd, chlorthalidone 25mg po qd, Crestor 40, Zetia 10     # Pericarditis  - Patient has known CAD with PCI of the OM1 in 08/2023 in setting of UA, and subsequent PCI of LAD in 10/2023. SVG to the RCA was patent t that time  - She returned to the Hospital in December with pleuritic chest discomfort, and was sent home on Colchicine 0.6 mg PO BID for a three months therapy  - Patient reports that a month into her therapy, she started to feel worse with abdominal discomfort, burning chest discomfort, dyspepsia, and worsening diarrhea. Her PO intake has gotten progressively worse and in the last few weeks she has been remarkably weeks  - Patient has been seen in the ER three times in the last 2 months for Abdominal discomfort. CT abdomen pelvis revealed Enteritis and repeat CT this hospitalization ruled out dissection  - ECG this hospitalization reviewed showing NSR with poor R wave progressive without ischemic changes  - CT chest reviewed without pericardial effusion.  - At this time do not believe patient's presentation to be from her pericarditis. Suspect Hpylori in patient on DAPT and concurrent Colchicine and ABx therapy exacerbated her possible GERD. Am concerned about PUD   - Recommend EGD for assessment. No CV contraindication to proceeding with EGD. Management of Hpylori by Medicine/GI  - Would recommend Stopping her colchicine indefinitely; Would defer NSAID use as well and cont patient on DAPT for CAD as Bellow  - Can obtain Limited Echo for structural assessment    #Myopathy   - Diagnosed with pericarditis in 12/2023 and treated with colchicine with plan for 3 months of therapy.   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   - Medication list reviewed, no medication to suggest Drug Induced Lupus.  - Would cont to hold off resuming Statin until CK normalizes  - Will consider Repatha as an outpatient if Myalgia/weakness persist    # CAD  - s/p SVG to RCA and PCI of OM and Prox LAD  - c/w ASA 81, Plavix 75   - c/w Toprol XL 50     Cardiology will cont to follow with you. Please call with any questions   Dr. Gamez, patient's outpatient Cardiologist made aware of admission and clinical course
Patient seen, examined, and discussed with Dr. Kuhn. Agree with above. 63F former smoker (Quit 8/2023), CAD s/p CABG (2017) and AS s/p bioAVR/ascending aorta replacement and PCI (most recent 0/16/23 DESx2 pLAD on plavix/ aspirin, asthma/COPD, HTN, HLD, DM2, pericarditis 12/2023 (on Colchicine 0.6 mg BID x 3 months (still taking), recent dx of H Pylori (unable to tolerate triple therapy), presenting for generalized pain and fatigue. Very generalized abd pain that she states started with initiation of colchicine. Please treat constipation, discontinue colchicine, continue quad therapy.     Jeannie Vega MD  Gastroenterology

## 2024-03-10 LAB
ALBUMIN SERPL ELPH-MCNC: 3.9 G/DL — SIGNIFICANT CHANGE UP (ref 3.3–5)
ALP SERPL-CCNC: 46 U/L — SIGNIFICANT CHANGE UP (ref 40–120)
ALT FLD-CCNC: 44 U/L — SIGNIFICANT CHANGE UP (ref 10–45)
ANION GAP SERPL CALC-SCNC: 12 MMOL/L — SIGNIFICANT CHANGE UP (ref 5–17)
ANION GAP SERPL CALC-SCNC: 8 MMOL/L — SIGNIFICANT CHANGE UP (ref 5–17)
ANION GAP SERPL CALC-SCNC: 9 MMOL/L — SIGNIFICANT CHANGE UP (ref 5–17)
AST SERPL-CCNC: 86 U/L — HIGH (ref 10–40)
BASOPHILS # BLD AUTO: 0.03 K/UL — SIGNIFICANT CHANGE UP (ref 0–0.2)
BASOPHILS NFR BLD AUTO: 0.6 % — SIGNIFICANT CHANGE UP (ref 0–2)
BILIRUB SERPL-MCNC: 0.3 MG/DL — SIGNIFICANT CHANGE UP (ref 0.2–1.2)
BUN SERPL-MCNC: 15 MG/DL — SIGNIFICANT CHANGE UP (ref 7–23)
BUN SERPL-MCNC: 15 MG/DL — SIGNIFICANT CHANGE UP (ref 7–23)
BUN SERPL-MCNC: 16 MG/DL — SIGNIFICANT CHANGE UP (ref 7–23)
CALCIUM SERPL-MCNC: 8.6 MG/DL — SIGNIFICANT CHANGE UP (ref 8.4–10.5)
CALCIUM SERPL-MCNC: 8.8 MG/DL — SIGNIFICANT CHANGE UP (ref 8.4–10.5)
CALCIUM SERPL-MCNC: 9 MG/DL — SIGNIFICANT CHANGE UP (ref 8.4–10.5)
CHLORIDE SERPL-SCNC: 91 MMOL/L — LOW (ref 96–108)
CHLORIDE SERPL-SCNC: 92 MMOL/L — LOW (ref 96–108)
CHLORIDE SERPL-SCNC: 95 MMOL/L — LOW (ref 96–108)
CO2 SERPL-SCNC: 22 MMOL/L — SIGNIFICANT CHANGE UP (ref 22–31)
CREAT ?TM UR-MCNC: 61 MG/DL — SIGNIFICANT CHANGE UP
CREAT SERPL-MCNC: 0.49 MG/DL — LOW (ref 0.5–1.3)
CREAT SERPL-MCNC: 0.52 MG/DL — SIGNIFICANT CHANGE UP (ref 0.5–1.3)
CREAT SERPL-MCNC: 0.58 MG/DL — SIGNIFICANT CHANGE UP (ref 0.5–1.3)
EGFR: 100 ML/MIN/1.73M2 — SIGNIFICANT CHANGE UP
EGFR: 103 ML/MIN/1.73M2 — SIGNIFICANT CHANGE UP
EGFR: 105 ML/MIN/1.73M2 — SIGNIFICANT CHANGE UP
EOSINOPHIL # BLD AUTO: 0.07 K/UL — SIGNIFICANT CHANGE UP (ref 0–0.5)
EOSINOPHIL NFR BLD AUTO: 1.3 % — SIGNIFICANT CHANGE UP (ref 0–6)
GLUCOSE BLDC GLUCOMTR-MCNC: 116 MG/DL — HIGH (ref 70–99)
GLUCOSE BLDC GLUCOMTR-MCNC: 132 MG/DL — HIGH (ref 70–99)
GLUCOSE BLDC GLUCOMTR-MCNC: 140 MG/DL — HIGH (ref 70–99)
GLUCOSE BLDC GLUCOMTR-MCNC: 185 MG/DL — HIGH (ref 70–99)
GLUCOSE SERPL-MCNC: 118 MG/DL — HIGH (ref 70–99)
GLUCOSE SERPL-MCNC: 124 MG/DL — HIGH (ref 70–99)
GLUCOSE SERPL-MCNC: 133 MG/DL — HIGH (ref 70–99)
HCT VFR BLD CALC: 36.6 % — SIGNIFICANT CHANGE UP (ref 34.5–45)
HGB BLD-MCNC: 12.1 G/DL — SIGNIFICANT CHANGE UP (ref 11.5–15.5)
IMM GRANULOCYTES NFR BLD AUTO: 1.9 % — HIGH (ref 0–0.9)
LYMPHOCYTES # BLD AUTO: 1.46 K/UL — SIGNIFICANT CHANGE UP (ref 1–3.3)
LYMPHOCYTES # BLD AUTO: 27.2 % — SIGNIFICANT CHANGE UP (ref 13–44)
MAGNESIUM SERPL-MCNC: 1.6 MG/DL — SIGNIFICANT CHANGE UP (ref 1.6–2.6)
MCHC RBC-ENTMCNC: 25.5 PG — LOW (ref 27–34)
MCHC RBC-ENTMCNC: 33.1 GM/DL — SIGNIFICANT CHANGE UP (ref 32–36)
MCV RBC AUTO: 77.2 FL — LOW (ref 80–100)
MONOCYTES # BLD AUTO: 0.75 K/UL — SIGNIFICANT CHANGE UP (ref 0–0.9)
MONOCYTES NFR BLD AUTO: 14 % — SIGNIFICANT CHANGE UP (ref 2–14)
NEUTROPHILS # BLD AUTO: 2.96 K/UL — SIGNIFICANT CHANGE UP (ref 1.8–7.4)
NEUTROPHILS NFR BLD AUTO: 55 % — SIGNIFICANT CHANGE UP (ref 43–77)
NRBC # BLD: 0 /100 WBCS — SIGNIFICANT CHANGE UP (ref 0–0)
OSMOLALITY SERPL: 264 MOSM/KG — LOW (ref 280–301)
OSMOLALITY UR: 217 MOSM/KG — LOW (ref 300–900)
OSMOLALITY UR: 491 MOSM/KG — SIGNIFICANT CHANGE UP (ref 300–900)
PLATELET # BLD AUTO: 216 K/UL — SIGNIFICANT CHANGE UP (ref 150–400)
POTASSIUM SERPL-MCNC: 2.9 MMOL/L — CRITICAL LOW (ref 3.5–5.3)
POTASSIUM SERPL-MCNC: 3.8 MMOL/L — SIGNIFICANT CHANGE UP (ref 3.5–5.3)
POTASSIUM SERPL-MCNC: 3.8 MMOL/L — SIGNIFICANT CHANGE UP (ref 3.5–5.3)
POTASSIUM SERPL-SCNC: 2.9 MMOL/L — CRITICAL LOW (ref 3.5–5.3)
POTASSIUM SERPL-SCNC: 3.8 MMOL/L — SIGNIFICANT CHANGE UP (ref 3.5–5.3)
POTASSIUM SERPL-SCNC: 3.8 MMOL/L — SIGNIFICANT CHANGE UP (ref 3.5–5.3)
PROT ?TM UR-MCNC: 92 MG/DL — HIGH (ref 0–12)
PROT SERPL-MCNC: 6.4 G/DL — SIGNIFICANT CHANGE UP (ref 6–8.3)
PROT/CREAT UR-RTO: 1.5 RATIO — HIGH (ref 0–0.2)
RBC # BLD: 4.74 M/UL — SIGNIFICANT CHANGE UP (ref 3.8–5.2)
RBC # FLD: 14.4 % — SIGNIFICANT CHANGE UP (ref 10.3–14.5)
SODIUM SERPL-SCNC: 123 MMOL/L — LOW (ref 135–145)
SODIUM SERPL-SCNC: 125 MMOL/L — LOW (ref 135–145)
SODIUM SERPL-SCNC: 125 MMOL/L — LOW (ref 135–145)
SODIUM UR-SCNC: 31 MMOL/L — SIGNIFICANT CHANGE UP
SODIUM UR-SCNC: 41 MMOL/L — SIGNIFICANT CHANGE UP
SODIUM UR-SCNC: 50 MMOL/L — SIGNIFICANT CHANGE UP
WBC # BLD: 5.37 K/UL — SIGNIFICANT CHANGE UP (ref 3.8–10.5)
WBC # FLD AUTO: 5.37 K/UL — SIGNIFICANT CHANGE UP (ref 3.8–10.5)

## 2024-03-10 PROCEDURE — 99231 SBSQ HOSP IP/OBS SF/LOW 25: CPT

## 2024-03-10 PROCEDURE — 99233 SBSQ HOSP IP/OBS HIGH 50: CPT | Mod: GC

## 2024-03-10 RX ORDER — POTASSIUM CHLORIDE 20 MEQ
40 PACKET (EA) ORAL
Refills: 0 | Status: COMPLETED | OUTPATIENT
Start: 2024-03-10 | End: 2024-03-10

## 2024-03-10 RX ORDER — SODIUM CHLORIDE 5 G/100ML
1000 INJECTION, SOLUTION INTRAVENOUS
Refills: 0 | Status: DISCONTINUED | OUTPATIENT
Start: 2024-03-10 | End: 2024-03-10

## 2024-03-10 RX ORDER — MAGNESIUM SULFATE 500 MG/ML
4 VIAL (ML) INJECTION ONCE
Refills: 0 | Status: COMPLETED | OUTPATIENT
Start: 2024-03-10 | End: 2024-03-10

## 2024-03-10 RX ORDER — SODIUM CHLORIDE 5 G/100ML
1000 INJECTION, SOLUTION INTRAVENOUS
Refills: 0 | Status: DISCONTINUED | OUTPATIENT
Start: 2024-03-10 | End: 2024-03-11

## 2024-03-10 RX ADMIN — Medication 1 GRAM(S): at 06:09

## 2024-03-10 RX ADMIN — Medication 250 MILLIGRAM(S): at 12:04

## 2024-03-10 RX ADMIN — POLYETHYLENE GLYCOL 3350 17 GRAM(S): 17 POWDER, FOR SOLUTION ORAL at 18:31

## 2024-03-10 RX ADMIN — Medication 5 MILLIGRAM(S): at 18:31

## 2024-03-10 RX ADMIN — Medication 300 MILLIGRAM(S): at 12:04

## 2024-03-10 RX ADMIN — CLOPIDOGREL BISULFATE 75 MILLIGRAM(S): 75 TABLET, FILM COATED ORAL at 12:05

## 2024-03-10 RX ADMIN — OXYCODONE HYDROCHLORIDE 7.5 MILLIGRAM(S): 5 TABLET ORAL at 04:55

## 2024-03-10 RX ADMIN — Medication 500 MILLIGRAM(S): at 06:08

## 2024-03-10 RX ADMIN — Medication 500 MILLIGRAM(S): at 12:04

## 2024-03-10 RX ADMIN — Medication 300 MILLIGRAM(S): at 06:10

## 2024-03-10 RX ADMIN — SODIUM CHLORIDE 50 MILLILITER(S): 5 INJECTION, SOLUTION INTRAVENOUS at 23:37

## 2024-03-10 RX ADMIN — PANTOPRAZOLE SODIUM 40 MILLIGRAM(S): 20 TABLET, DELAYED RELEASE ORAL at 18:31

## 2024-03-10 RX ADMIN — Medication 50 MILLIGRAM(S): at 06:08

## 2024-03-10 RX ADMIN — OXYCODONE HYDROCHLORIDE 7.5 MILLIGRAM(S): 5 TABLET ORAL at 12:00

## 2024-03-10 RX ADMIN — OXYCODONE HYDROCHLORIDE 5 MILLIGRAM(S): 5 TABLET ORAL at 23:22

## 2024-03-10 RX ADMIN — Medication 5 MILLIGRAM(S): at 06:08

## 2024-03-10 RX ADMIN — Medication 3 MILLIGRAM(S): at 22:19

## 2024-03-10 RX ADMIN — Medication 500 MILLIGRAM(S): at 18:42

## 2024-03-10 RX ADMIN — Medication 81 MILLIGRAM(S): at 12:05

## 2024-03-10 RX ADMIN — OXYCODONE HYDROCHLORIDE 7.5 MILLIGRAM(S): 5 TABLET ORAL at 05:55

## 2024-03-10 RX ADMIN — OXYCODONE HYDROCHLORIDE 7.5 MILLIGRAM(S): 5 TABLET ORAL at 18:25

## 2024-03-10 RX ADMIN — Medication 1 GRAM(S): at 12:04

## 2024-03-10 RX ADMIN — OXYCODONE HYDROCHLORIDE 5 MILLIGRAM(S): 5 TABLET ORAL at 22:22

## 2024-03-10 RX ADMIN — Medication 25 GRAM(S): at 10:15

## 2024-03-10 RX ADMIN — Medication 975 MILLIGRAM(S): at 18:23

## 2024-03-10 RX ADMIN — Medication 40 MILLIEQUIVALENT(S): at 08:58

## 2024-03-10 RX ADMIN — Medication 1 GRAM(S): at 18:31

## 2024-03-10 RX ADMIN — PANTOPRAZOLE SODIUM 40 MILLIGRAM(S): 20 TABLET, DELAYED RELEASE ORAL at 06:09

## 2024-03-10 RX ADMIN — Medication 250 MILLIGRAM(S): at 18:42

## 2024-03-10 RX ADMIN — Medication 2 MILLIGRAM(S): at 22:19

## 2024-03-10 RX ADMIN — OXYCODONE HYDROCHLORIDE 7.5 MILLIGRAM(S): 5 TABLET ORAL at 19:25

## 2024-03-10 RX ADMIN — OXYCODONE HYDROCHLORIDE 7.5 MILLIGRAM(S): 5 TABLET ORAL at 11:00

## 2024-03-10 RX ADMIN — Medication 975 MILLIGRAM(S): at 17:23

## 2024-03-10 RX ADMIN — Medication 300 MILLIGRAM(S): at 18:33

## 2024-03-10 RX ADMIN — Medication 2: at 18:30

## 2024-03-10 RX ADMIN — Medication 975 MILLIGRAM(S): at 06:08

## 2024-03-10 RX ADMIN — SENNA PLUS 2 TABLET(S): 8.6 TABLET ORAL at 22:19

## 2024-03-10 RX ADMIN — POLYETHYLENE GLYCOL 3350 17 GRAM(S): 17 POWDER, FOR SOLUTION ORAL at 06:09

## 2024-03-10 RX ADMIN — Medication 250 MILLIGRAM(S): at 06:08

## 2024-03-10 RX ADMIN — Medication 40 MILLIEQUIVALENT(S): at 10:49

## 2024-03-10 NOTE — PROGRESS NOTE ADULT - PROBLEM SELECTOR PLAN 3
On 3/8, patient found to have hyponatremia to 122. Not improving with hypertonic saline boluses  - urine studies c/w SIADH  - Renal consult On 3/8, patient found to have hyponatremia to 122. Not improving with hypertonic saline boluses  - urine studies c/w SIADH  - c/w 2% hypertonic saline  - Renal consult

## 2024-03-10 NOTE — PROGRESS NOTE ADULT - ASSESSMENT
63y HTN, DM, CAD s/p CABG and PCIs, AS s/p AVR, asthma/COPD with hospital course c/b hyponatremia.   Hypo osmolar hyponatremia likely multifactorial (suspect there was a component of limited solute intake due to inability to tolerate PO vs SIADH from chronic pain)    1. Hypo osmolar hyponatremia multifactorial (suspect there was a component of limited solute intake due to inability to tolerate PO vs SIADH from chronic pain) - improving  Lowest Na+ documented at 120   Started on NaCl 2% at 40cc/hr with gradual correction ~ now at 125  Since PO is very limited ~ will c/w NaCl 2% for another 24hrs  Recommend management of on going GI symptoms including pain control   c/w free water restriction <1L/d  Avoid hypotonic solutions  Will maintain goal of correction ~ 6meq in 24hrs  Ideally new Na+ would be between 131 ranges by 3/11  Trend BMP for additional monitoring   Trend Uosm and urine lytes periodically    Discussed with primary team

## 2024-03-10 NOTE — PROGRESS NOTE ADULT - SUBJECTIVE AND OBJECTIVE BOX
Seen and evaluated at bedside, no overnight events reported by primary team. Continues to reported abdominal pain.     UOP: 400ml    ROS:  --------------------------------------------------------------------------------  Gen: No fevers/chills, weakness  Skin: No rashes  Head/Eyes/Ears/Mouth: No headache; No hearing changes, mucous discharge, vision changes  Respiratory: No dyspnea, cough, wheezing  CV: No chest pain, palpitations  GI: ++ abdominal pain, + nausea  : No increased frequency, dysuria, hematuria  MSK: No joint pain/swelling; no back pain; no edema  Neuro: No dizziness/lightheadedness, weakness, seizures, numbness  Heme: No easy bruising or bleeding    All other systems were reviewed and are negative, except as noted.    PAST HISTORY  --------------------------------------------------------------------------------  No significant changes to PMH, PSH, FHx, SHx, unless otherwise noted    ALLERGIES & MEDICATIONS  --------------------------------------------------------------------------------  Allergies    Biaxin (Hives)    Intolerances      Standing Inpatient Medications  acetaminophen     Tablet .. 975 milliGRAM(s) Oral every 8 hours  aspirin enteric coated 81 milliGRAM(s) Oral daily  bisacodyl 5 milliGRAM(s) Oral every 12 hours  bismuth subsalicylate Liquid 300 milliGRAM(s) Oral every 6 hours  clopidogrel Tablet 75 milliGRAM(s) Oral daily  dextrose 5%. 1000 milliLiter(s) IV Continuous <Continuous>  dextrose 5%. 1000 milliLiter(s) IV Continuous <Continuous>  dextrose 50% Injectable 25 Gram(s) IV Push once  dextrose 50% Injectable 12.5 Gram(s) IV Push once  dextrose 50% Injectable 25 Gram(s) IV Push once  diazepam    Tablet 2 milliGRAM(s) Oral at bedtime  glucagon  Injectable 1 milliGRAM(s) IntraMuscular once  insulin lispro (ADMELOG) corrective regimen sliding scale   SubCutaneous Before meals and at bedtime  magnesium sulfate  IVPB 2 Gram(s) IV Intermittent once  metoprolol succinate ER 50 milliGRAM(s) Oral daily  metroNIDAZOLE    Tablet 250 milliGRAM(s) Oral every 6 hours  pantoprazole    Tablet 40 milliGRAM(s) Oral every 12 hours  polyethylene glycol 3350 17 Gram(s) Oral two times a day  senna 2 Tablet(s) Oral at bedtime  sodium chloride 2% . 1000 milliLiter(s) IV Continuous <Continuous>  sucralfate suspension 1 Gram(s) Oral every 6 hours  tetracycline 500 milliGRAM(s) Oral four times a day    PRN Inpatient Medications  acetaminophen 300 mG/butalbital 50 mG/ caffeine 40 mG 1 Capsule(s) Oral every 6 hours PRN  aluminum hydroxide/magnesium hydroxide/simethicone Suspension 30 milliLiter(s) Oral every 4 hours PRN  dextrose Oral Gel 15 Gram(s) Oral once PRN  melatonin 3 milliGRAM(s) Oral at bedtime PRN  oxyCODONE    IR 7.5 milliGRAM(s) Oral every 6 hours PRN  oxyCODONE    IR 5 milliGRAM(s) Oral every 6 hours PRN        VITALS/PHYSICAL EXAM  --------------------------------------------------------------------------------  T(C): 36.8 (03-10-24 @ 13:44), Max: 36.8 (03-10-24 @ 13:44)  HR: 87 (03-10-24 @ 13:44) (84 - 87)  BP: 132/82 (03-10-24 @ 13:44) (124/80 - 139/88)  RR: 18 (03-10-24 @ 13:44) (18 - 19)  SpO2: 97% (03-10-24 @ 13:44) (96% - 98%)  Wt(kg): --        03-09-24 @ 06:01  -  03-10-24 @ 07:00  --------------------------------------------------------  IN: 400 mL / OUT: 400 mL / NET: 0 mL      Physical Exam:  	  Constitutional: Appears in acute discomfort  Head: NCAT, EOMI  Respiratory: CTAB, no crackles noted  Cardiac: Regular rate  Gastrointestinal: abdomen soft, mildly distended, TTP in RUQ  Extremities: no peripheral edema  Dermatologic: no rashes on exposed skin  Neurologic: Awake, alert, interactive      LABS/STUDIES  --------------------------------------------------------------------------------              12.1   5.37  >-----------<  216      [03-10-24 @ 05:30]              36.6     125  |  91  |  16  ----------------------------<  133      [03-10-24 @ 05:30]  2.9   |  22  |  0.49        Ca     8.6     [03-10-24 @ 05:30]      Mg     1.6     [03-10-24 @ 05:30]      Phos  2.8     [03-09-24 @ 05:30]    TPro  6.4  /  Alb  3.9  /  TBili  0.3  /  DBili  x   /  AST  86  /  ALT  44  /  AlkPhos  46  [03-10-24 @ 05:30]        Uric acid 2.2      [03-09-24 @ 15:22]  Serum Osmolality 264      [03-10-24 @ 05:30]    Creatinine Trend:  SCr 0.49 [03-10 @ 05:30]  SCr 0.57 [03-09 @ 20:00]  SCr 0.50 [03-09 @ 15:22]  SCr 0.52 [03-09 @ 05:30]  SCr 0.53 [03-09 @ 01:52]    Urinalysis - [03-10-24 @ 05:30]      Color  / Appearance  / SG  / pH       Gluc 133 / Ketone   / Bili  / Urobili        Blood  / Protein  / Leuk Est  / Nitrite       RBC  / WBC  / Hyaline  / Gran  / Sq Epi  / Non Sq Epi  / Bacteria     Urine Creatinine 61      [03-10-24 @ 06:42]  Urine Protein 92      [03-10-24 @ 06:42]  Urine Sodium 31      [03-09-24 @ 23:40]  Urine Urea Nitrogen 843      [03-09-24 @ 12:26]  Urine Potassium 73      [03-09-24 @ 12:26]  Urine Osmolality 722      [03-09-24 @ 23:40]    PTH -- (Ca 10.4)      [03-05-24 @ 14:14]   32  HbA1c 6.5      [05-20-19 @ 06:40]  TSH 6.390      [03-09-24 @ 05:30]  Lipid: chol 67, , HDL 24, LDL --      [03-06-24 @ 05:30]    HBsAb Reactive      [03-06-24 @ 05:30]  HBsAg Nonreact      [03-06-24 @ 05:30]  HBcAb Reactive      [03-06-24 @ 05:30]  HCV 0.03, Nonreact      [03-06-24 @ 05:30]    GEE: titer Negative, pattern --      [03-06-24 @ 15:07]

## 2024-03-10 NOTE — PROGRESS NOTE ADULT - SUBJECTIVE AND OBJECTIVE BOX
O/N Events:    Subjective/ROS: Patient seen and examined at bedside.     Denies Fever/Chills, HA, CP, SOB, n/v, changes in bowel/urinary habits.  12pt ROS otherwise negative.    VITALS  Vital Signs Last 24 Hrs  T(C): 36.7 (10 Mar 2024 05:38), Max: 36.7 (09 Mar 2024 20:43)  T(F): 98 (10 Mar 2024 05:38), Max: 98.1 (09 Mar 2024 20:43)  HR: 85 (10 Mar 2024 05:38) (84 - 85)  BP: 139/88 (10 Mar 2024 05:38) (124/80 - 139/88)  BP(mean): --  RR: 19 (10 Mar 2024 05:38) (18 - 19)  SpO2: 96% (10 Mar 2024 05:38) (96% - 98%)    Parameters below as of 10 Mar 2024 05:38  Patient On (Oxygen Delivery Method): room air        CAPILLARY BLOOD GLUCOSE      POCT Blood Glucose.: 135 mg/dL (09 Mar 2024 21:43)  POCT Blood Glucose.: 151 mg/dL (09 Mar 2024 17:41)  POCT Blood Glucose.: 162 mg/dL (09 Mar 2024 13:09)  POCT Blood Glucose.: 142 mg/dL (09 Mar 2024 09:13)      PHYSICAL EXAM  General: NAD  Head: NC/AT; MMM; PERRL; EOMI;  Neck: Supple; no JVD  Respiratory: CTAB; no wheezes/rales/rhonchi  Cardiovascular: Regular rhythm/rate; S1/S2+, no murmurs, rubs gallops   Gastrointestinal: Soft; NTND; bowel sounds normal and present  Extremities: WWP; no edema/cyanosis  Neurological: A&Ox3, CNII-XII grossly intact; no obvious focal deficits    MEDICATIONS  (STANDING):  acetaminophen     Tablet .. 975 milliGRAM(s) Oral every 8 hours  aspirin enteric coated 81 milliGRAM(s) Oral daily  bisacodyl 5 milliGRAM(s) Oral every 12 hours  bismuth subsalicylate Liquid 300 milliGRAM(s) Oral every 6 hours  clopidogrel Tablet 75 milliGRAM(s) Oral daily  dextrose 5%. 1000 milliLiter(s) (50 mL/Hr) IV Continuous <Continuous>  dextrose 5%. 1000 milliLiter(s) (100 mL/Hr) IV Continuous <Continuous>  dextrose 50% Injectable 25 Gram(s) IV Push once  dextrose 50% Injectable 12.5 Gram(s) IV Push once  dextrose 50% Injectable 25 Gram(s) IV Push once  diazepam    Tablet 2 milliGRAM(s) Oral at bedtime  glucagon  Injectable 1 milliGRAM(s) IntraMuscular once  insulin lispro (ADMELOG) corrective regimen sliding scale   SubCutaneous Before meals and at bedtime  magnesium sulfate  IVPB 2 Gram(s) IV Intermittent once  metoprolol succinate ER 50 milliGRAM(s) Oral daily  metroNIDAZOLE    Tablet 250 milliGRAM(s) Oral every 6 hours  pantoprazole    Tablet 40 milliGRAM(s) Oral every 12 hours  polyethylene glycol 3350 17 Gram(s) Oral two times a day  senna 2 Tablet(s) Oral at bedtime  sodium chloride 2% . 1000 milliLiter(s) (50 mL/Hr) IV Continuous <Continuous>  sucralfate suspension 1 Gram(s) Oral every 6 hours  tetracycline 500 milliGRAM(s) Oral four times a day    MEDICATIONS  (PRN):  acetaminophen 300 mG/butalbital 50 mG/ caffeine 40 mG 1 Capsule(s) Oral every 6 hours PRN headache  aluminum hydroxide/magnesium hydroxide/simethicone Suspension 30 milliLiter(s) Oral every 4 hours PRN Dyspepsia  dextrose Oral Gel 15 Gram(s) Oral once PRN Blood Glucose LESS THAN 70 milliGRAM(s)/deciliter  melatonin 3 milliGRAM(s) Oral at bedtime PRN Insomnia  oxyCODONE    IR 7.5 milliGRAM(s) Oral every 6 hours PRN Severe Pain (7 - 10)  oxyCODONE    IR 5 milliGRAM(s) Oral every 6 hours PRN Moderate Pain (4 - 6)      Biaxin (Hives)      LABS                        13.2   6.05  )-----------( 214      ( 09 Mar 2024 05:30 )             39.4     03-09    122<L>  |  89<L>  |  18  ----------------------------<  151<H>  3.5   |  22  |  0.57    Ca    9.2      09 Mar 2024 20:00  Phos  2.8     03-09  Mg     1.7     03-09    TPro  6.8  /  Alb  4.1  /  TBili  0.4  /  DBili  x   /  AST  70<H>  /  ALT  49<H>  /  AlkPhos  51  03-09      Urinalysis Basic - ( 09 Mar 2024 20:00 )    Color: x / Appearance: x / SG: x / pH: x  Gluc: 151 mg/dL / Ketone: x  / Bili: x / Urobili: x   Blood: x / Protein: x / Nitrite: x   Leuk Esterase: x / RBC: x / WBC x   Sq Epi: x / Non Sq Epi: x / Bacteria: x              IMAGING/EKG/ETC   o/n: Na 122, Chris 31, UOsm 722, increased rate to 50cc/hr and will keep it overnight. Will re-check in AM     Subjective/ROS: Patient seen and examined at bedside.     Denies Fever/Chills, HA, CP, SOB, n/v, changes in bowel/urinary habits.  12pt ROS otherwise negative.    VITALS  Vital Signs Last 24 Hrs  T(C): 36.7 (10 Mar 2024 05:38), Max: 36.7 (09 Mar 2024 20:43)  T(F): 98 (10 Mar 2024 05:38), Max: 98.1 (09 Mar 2024 20:43)  HR: 85 (10 Mar 2024 05:38) (84 - 85)  BP: 139/88 (10 Mar 2024 05:38) (124/80 - 139/88)  BP(mean): --  RR: 19 (10 Mar 2024 05:38) (18 - 19)  SpO2: 96% (10 Mar 2024 05:38) (96% - 98%)    Parameters below as of 10 Mar 2024 05:38  Patient On (Oxygen Delivery Method): room air        CAPILLARY BLOOD GLUCOSE      POCT Blood Glucose.: 135 mg/dL (09 Mar 2024 21:43)  POCT Blood Glucose.: 151 mg/dL (09 Mar 2024 17:41)  POCT Blood Glucose.: 162 mg/dL (09 Mar 2024 13:09)  POCT Blood Glucose.: 142 mg/dL (09 Mar 2024 09:13)      PHYSICAL EXAM  General: NAD  Head: NC/AT; MMM; PERRL; EOMI;  Neck: Supple; no JVD  Respiratory: CTAB; no wheezes/rales/rhonchi  Cardiovascular: Regular rhythm/rate; S1/S2+, no murmurs, rubs gallops   Gastrointestinal: Soft; NTND; bowel sounds normal and present  Extremities: WWP; no edema/cyanosis  Neurological: A&Ox3, CNII-XII grossly intact; no obvious focal deficits    MEDICATIONS  (STANDING):  acetaminophen     Tablet .. 975 milliGRAM(s) Oral every 8 hours  aspirin enteric coated 81 milliGRAM(s) Oral daily  bisacodyl 5 milliGRAM(s) Oral every 12 hours  bismuth subsalicylate Liquid 300 milliGRAM(s) Oral every 6 hours  clopidogrel Tablet 75 milliGRAM(s) Oral daily  dextrose 5%. 1000 milliLiter(s) (50 mL/Hr) IV Continuous <Continuous>  dextrose 5%. 1000 milliLiter(s) (100 mL/Hr) IV Continuous <Continuous>  dextrose 50% Injectable 25 Gram(s) IV Push once  dextrose 50% Injectable 12.5 Gram(s) IV Push once  dextrose 50% Injectable 25 Gram(s) IV Push once  diazepam    Tablet 2 milliGRAM(s) Oral at bedtime  glucagon  Injectable 1 milliGRAM(s) IntraMuscular once  insulin lispro (ADMELOG) corrective regimen sliding scale   SubCutaneous Before meals and at bedtime  magnesium sulfate  IVPB 2 Gram(s) IV Intermittent once  metoprolol succinate ER 50 milliGRAM(s) Oral daily  metroNIDAZOLE    Tablet 250 milliGRAM(s) Oral every 6 hours  pantoprazole    Tablet 40 milliGRAM(s) Oral every 12 hours  polyethylene glycol 3350 17 Gram(s) Oral two times a day  senna 2 Tablet(s) Oral at bedtime  sodium chloride 2% . 1000 milliLiter(s) (50 mL/Hr) IV Continuous <Continuous>  sucralfate suspension 1 Gram(s) Oral every 6 hours  tetracycline 500 milliGRAM(s) Oral four times a day    MEDICATIONS  (PRN):  acetaminophen 300 mG/butalbital 50 mG/ caffeine 40 mG 1 Capsule(s) Oral every 6 hours PRN headache  aluminum hydroxide/magnesium hydroxide/simethicone Suspension 30 milliLiter(s) Oral every 4 hours PRN Dyspepsia  dextrose Oral Gel 15 Gram(s) Oral once PRN Blood Glucose LESS THAN 70 milliGRAM(s)/deciliter  melatonin 3 milliGRAM(s) Oral at bedtime PRN Insomnia  oxyCODONE    IR 7.5 milliGRAM(s) Oral every 6 hours PRN Severe Pain (7 - 10)  oxyCODONE    IR 5 milliGRAM(s) Oral every 6 hours PRN Moderate Pain (4 - 6)      Biaxin (Hives)      LABS                        13.2   6.05  )-----------( 214      ( 09 Mar 2024 05:30 )             39.4     03-09    122<L>  |  89<L>  |  18  ----------------------------<  151<H>  3.5   |  22  |  0.57    Ca    9.2      09 Mar 2024 20:00  Phos  2.8     03-09  Mg     1.7     03-09    TPro  6.8  /  Alb  4.1  /  TBili  0.4  /  DBili  x   /  AST  70<H>  /  ALT  49<H>  /  AlkPhos  51  03-09      Urinalysis Basic - ( 09 Mar 2024 20:00 )    Color: x / Appearance: x / SG: x / pH: x  Gluc: 151 mg/dL / Ketone: x  / Bili: x / Urobili: x   Blood: x / Protein: x / Nitrite: x   Leuk Esterase: x / RBC: x / WBC x   Sq Epi: x / Non Sq Epi: x / Bacteria: x              IMAGING/EKG/ETC   o/n: Na 122, Chris 31, UOsm 722, increased rate to 50cc/hr and will keep it overnight. Will re-check in AM     Subjective/ROS: Patient seen and examined at bedside. still c/o epigastric pain but improved from previous day.  Denies Fever/Chills, HA, CP, SOB, n/v, changes in bowel/urinary habits.  12pt ROS otherwise negative.    VITALS  Vital Signs Last 24 Hrs  T(C): 36.7 (10 Mar 2024 05:38), Max: 36.7 (09 Mar 2024 20:43)  T(F): 98 (10 Mar 2024 05:38), Max: 98.1 (09 Mar 2024 20:43)  HR: 85 (10 Mar 2024 05:38) (84 - 85)  BP: 139/88 (10 Mar 2024 05:38) (124/80 - 139/88)  BP(mean): --  RR: 19 (10 Mar 2024 05:38) (18 - 19)  SpO2: 96% (10 Mar 2024 05:38) (96% - 98%)    Parameters below as of 10 Mar 2024 05:38  Patient On (Oxygen Delivery Method): room air        CAPILLARY BLOOD GLUCOSE      POCT Blood Glucose.: 135 mg/dL (09 Mar 2024 21:43)  POCT Blood Glucose.: 151 mg/dL (09 Mar 2024 17:41)  POCT Blood Glucose.: 162 mg/dL (09 Mar 2024 13:09)  POCT Blood Glucose.: 142 mg/dL (09 Mar 2024 09:13)      PHYSICAL EXAM  General: NAD, appears somewhat uncomfortable  Head: NC/AT; MMM; PERRL; EOMI;  Neck: Supple; no JVD  Respiratory: CTAB; no wheezes/rales/rhonchi  Cardiovascular: Regular rhythm/rate; S1/S2+, no murmurs, rubs gallops   Gastrointestinal: Soft; NTND; bowel sounds normal and present  Extremities: WWP; no edema/cyanosis  Neurological: A&Ox3, CNII-XII grossly intact; no obvious focal deficits    MEDICATIONS  (STANDING):  acetaminophen     Tablet .. 975 milliGRAM(s) Oral every 8 hours  aspirin enteric coated 81 milliGRAM(s) Oral daily  bisacodyl 5 milliGRAM(s) Oral every 12 hours  bismuth subsalicylate Liquid 300 milliGRAM(s) Oral every 6 hours  clopidogrel Tablet 75 milliGRAM(s) Oral daily  dextrose 5%. 1000 milliLiter(s) (50 mL/Hr) IV Continuous <Continuous>  dextrose 5%. 1000 milliLiter(s) (100 mL/Hr) IV Continuous <Continuous>  dextrose 50% Injectable 25 Gram(s) IV Push once  dextrose 50% Injectable 12.5 Gram(s) IV Push once  dextrose 50% Injectable 25 Gram(s) IV Push once  diazepam    Tablet 2 milliGRAM(s) Oral at bedtime  glucagon  Injectable 1 milliGRAM(s) IntraMuscular once  insulin lispro (ADMELOG) corrective regimen sliding scale   SubCutaneous Before meals and at bedtime  magnesium sulfate  IVPB 2 Gram(s) IV Intermittent once  metoprolol succinate ER 50 milliGRAM(s) Oral daily  metroNIDAZOLE    Tablet 250 milliGRAM(s) Oral every 6 hours  pantoprazole    Tablet 40 milliGRAM(s) Oral every 12 hours  polyethylene glycol 3350 17 Gram(s) Oral two times a day  senna 2 Tablet(s) Oral at bedtime  sodium chloride 2% . 1000 milliLiter(s) (50 mL/Hr) IV Continuous <Continuous>  sucralfate suspension 1 Gram(s) Oral every 6 hours  tetracycline 500 milliGRAM(s) Oral four times a day    MEDICATIONS  (PRN):  acetaminophen 300 mG/butalbital 50 mG/ caffeine 40 mG 1 Capsule(s) Oral every 6 hours PRN headache  aluminum hydroxide/magnesium hydroxide/simethicone Suspension 30 milliLiter(s) Oral every 4 hours PRN Dyspepsia  dextrose Oral Gel 15 Gram(s) Oral once PRN Blood Glucose LESS THAN 70 milliGRAM(s)/deciliter  melatonin 3 milliGRAM(s) Oral at bedtime PRN Insomnia  oxyCODONE    IR 7.5 milliGRAM(s) Oral every 6 hours PRN Severe Pain (7 - 10)  oxyCODONE    IR 5 milliGRAM(s) Oral every 6 hours PRN Moderate Pain (4 - 6)      Biaxin (Hives)      LABS                        13.2   6.05  )-----------( 214      ( 09 Mar 2024 05:30 )             39.4     03-09    122<L>  |  89<L>  |  18  ----------------------------<  151<H>  3.5   |  22  |  0.57    Ca    9.2      09 Mar 2024 20:00  Phos  2.8     03-09  Mg     1.7     03-09    TPro  6.8  /  Alb  4.1  /  TBili  0.4  /  DBili  x   /  AST  70<H>  /  ALT  49<H>  /  AlkPhos  51  03-09      Urinalysis Basic - ( 09 Mar 2024 20:00 )    Color: x / Appearance: x / SG: x / pH: x  Gluc: 151 mg/dL / Ketone: x  / Bili: x / Urobili: x   Blood: x / Protein: x / Nitrite: x   Leuk Esterase: x / RBC: x / WBC x   Sq Epi: x / Non Sq Epi: x / Bacteria: x              IMAGING/EKG/ETC

## 2024-03-11 LAB
ADD ON TEST-SPECIMEN IN LAB: SIGNIFICANT CHANGE UP
ADD ON TEST-SPECIMEN IN LAB: SIGNIFICANT CHANGE UP
ALBUMIN SERPL ELPH-MCNC: 3.7 G/DL — SIGNIFICANT CHANGE UP (ref 3.3–5)
ALP SERPL-CCNC: 50 U/L — SIGNIFICANT CHANGE UP (ref 40–120)
ALT FLD-CCNC: 60 U/L — HIGH (ref 10–45)
ANION GAP SERPL CALC-SCNC: 10 MMOL/L — SIGNIFICANT CHANGE UP (ref 5–17)
ANION GAP SERPL CALC-SCNC: 8 MMOL/L — SIGNIFICANT CHANGE UP (ref 5–17)
ANISOCYTOSIS BLD QL: SLIGHT — SIGNIFICANT CHANGE UP
APPEARANCE UR: ABNORMAL
AST SERPL-CCNC: 137 U/L — HIGH (ref 10–40)
BACTERIA # UR AUTO: ABNORMAL /HPF
BASOPHILS # BLD AUTO: 0.06 K/UL — SIGNIFICANT CHANGE UP (ref 0–0.2)
BASOPHILS NFR BLD AUTO: 1 % — SIGNIFICANT CHANGE UP (ref 0–2)
BILIRUB DIRECT SERPL-MCNC: < 0.2 MG/DL — SIGNIFICANT CHANGE UP (ref 0–0.3)
BILIRUB INDIRECT FLD-MCNC: SIGNIFICANT CHANGE UP (ref 0.2–1)
BILIRUB SERPL-MCNC: 0.2 MG/DL — SIGNIFICANT CHANGE UP (ref 0.2–1.2)
BILIRUB UR-MCNC: ABNORMAL
BUN SERPL-MCNC: 13 MG/DL — SIGNIFICANT CHANGE UP (ref 7–23)
BUN SERPL-MCNC: 18 MG/DL — SIGNIFICANT CHANGE UP (ref 7–23)
BURR CELLS BLD QL SMEAR: PRESENT — SIGNIFICANT CHANGE UP
CALCIUM SERPL-MCNC: 8.6 MG/DL — SIGNIFICANT CHANGE UP (ref 8.4–10.5)
CALCIUM SERPL-MCNC: 9.2 MG/DL — SIGNIFICANT CHANGE UP (ref 8.4–10.5)
CAST: 1 /LPF — SIGNIFICANT CHANGE UP (ref 0–4)
CHLORIDE SERPL-SCNC: 97 MMOL/L — SIGNIFICANT CHANGE UP (ref 96–108)
CHLORIDE SERPL-SCNC: 99 MMOL/L — SIGNIFICANT CHANGE UP (ref 96–108)
CK SERPL-CCNC: 384 U/L — HIGH (ref 25–170)
CO2 SERPL-SCNC: 22 MMOL/L — SIGNIFICANT CHANGE UP (ref 22–31)
CO2 SERPL-SCNC: 23 MMOL/L — SIGNIFICANT CHANGE UP (ref 22–31)
COLOR SPEC: ABNORMAL
CREAT ?TM UR-MCNC: 73 MG/DL — SIGNIFICANT CHANGE UP
CREAT SERPL-MCNC: 0.57 MG/DL — SIGNIFICANT CHANGE UP (ref 0.5–1.3)
CREAT SERPL-MCNC: 0.69 MG/DL — SIGNIFICANT CHANGE UP (ref 0.5–1.3)
DIFF PNL FLD: ABNORMAL
EGFR: 101 ML/MIN/1.73M2 — SIGNIFICANT CHANGE UP
EGFR: 96 ML/MIN/1.73M2 — SIGNIFICANT CHANGE UP
EOSINOPHIL # BLD AUTO: 0.06 K/UL — SIGNIFICANT CHANGE UP (ref 0–0.5)
EOSINOPHIL NFR BLD AUTO: 1 % — SIGNIFICANT CHANGE UP (ref 0–6)
GLUCOSE BLDC GLUCOMTR-MCNC: 114 MG/DL — HIGH (ref 70–99)
GLUCOSE BLDC GLUCOMTR-MCNC: 116 MG/DL — HIGH (ref 70–99)
GLUCOSE BLDC GLUCOMTR-MCNC: 188 MG/DL — HIGH (ref 70–99)
GLUCOSE BLDC GLUCOMTR-MCNC: 96 MG/DL — SIGNIFICANT CHANGE UP (ref 70–99)
GLUCOSE SERPL-MCNC: 117 MG/DL — HIGH (ref 70–99)
GLUCOSE SERPL-MCNC: 136 MG/DL — HIGH (ref 70–99)
GLUCOSE UR QL: NEGATIVE MG/DL — SIGNIFICANT CHANGE UP
HCT VFR BLD CALC: 38.8 % — SIGNIFICANT CHANGE UP (ref 34.5–45)
HGB BLD-MCNC: 12.5 G/DL — SIGNIFICANT CHANGE UP (ref 11.5–15.5)
KETONES UR-MCNC: NEGATIVE MG/DL — SIGNIFICANT CHANGE UP
LEUKOCYTE ESTERASE UR-ACNC: ABNORMAL
LG PLATELETS BLD QL AUTO: SLIGHT — SIGNIFICANT CHANGE UP
LYMPHOCYTES # BLD AUTO: 2.32 K/UL — SIGNIFICANT CHANGE UP (ref 1–3.3)
LYMPHOCYTES # BLD AUTO: 38 % — SIGNIFICANT CHANGE UP (ref 13–44)
MAGNESIUM SERPL-MCNC: 1.8 MG/DL — SIGNIFICANT CHANGE UP (ref 1.6–2.6)
MANUAL SMEAR VERIFICATION: SIGNIFICANT CHANGE UP
MCHC RBC-ENTMCNC: 25.7 PG — LOW (ref 27–34)
MCHC RBC-ENTMCNC: 32.2 GM/DL — SIGNIFICANT CHANGE UP (ref 32–36)
MCV RBC AUTO: 79.7 FL — LOW (ref 80–100)
METAMYELOCYTES # FLD: 2 % — HIGH (ref 0–0)
MICROCYTES BLD QL: SLIGHT — SIGNIFICANT CHANGE UP
MONOCYTES # BLD AUTO: 0.37 K/UL — SIGNIFICANT CHANGE UP (ref 0–0.9)
MONOCYTES NFR BLD AUTO: 6 % — SIGNIFICANT CHANGE UP (ref 2–14)
MYELOCYTES NFR BLD: 1 % — HIGH (ref 0–0)
NEUTROPHILS # BLD AUTO: 3.11 K/UL — SIGNIFICANT CHANGE UP (ref 1.8–7.4)
NEUTROPHILS NFR BLD AUTO: 51 % — SIGNIFICANT CHANGE UP (ref 43–77)
NITRITE UR-MCNC: NEGATIVE — SIGNIFICANT CHANGE UP
NRBC # BLD: 0 /100 WBCS — SIGNIFICANT CHANGE UP (ref 0–0)
NRBC # BLD: SIGNIFICANT CHANGE UP /100 WBCS (ref 0–0)
OSMOLALITY SERPL: 281 MOSM/KG — SIGNIFICANT CHANGE UP (ref 280–301)
OSMOLALITY UR: 529 MOSM/KG — SIGNIFICANT CHANGE UP (ref 300–900)
OSMOLALITY UR: 709 MOSM/KG — SIGNIFICANT CHANGE UP (ref 300–900)
OVALOCYTES BLD QL SMEAR: SLIGHT — SIGNIFICANT CHANGE UP
PH UR: 6 — SIGNIFICANT CHANGE UP (ref 5–8)
PHOSPHATE SERPL-MCNC: 2.9 MG/DL — SIGNIFICANT CHANGE UP (ref 2.5–4.5)
PLAT MORPH BLD: ABNORMAL
PLATELET # BLD AUTO: 260 K/UL — SIGNIFICANT CHANGE UP (ref 150–400)
POIKILOCYTOSIS BLD QL AUTO: SLIGHT — SIGNIFICANT CHANGE UP
POLYCHROMASIA BLD QL SMEAR: SLIGHT — SIGNIFICANT CHANGE UP
POTASSIUM SERPL-MCNC: 3.8 MMOL/L — SIGNIFICANT CHANGE UP (ref 3.5–5.3)
POTASSIUM SERPL-MCNC: 3.9 MMOL/L — SIGNIFICANT CHANGE UP (ref 3.5–5.3)
POTASSIUM SERPL-SCNC: 3.8 MMOL/L — SIGNIFICANT CHANGE UP (ref 3.5–5.3)
POTASSIUM SERPL-SCNC: 3.9 MMOL/L — SIGNIFICANT CHANGE UP (ref 3.5–5.3)
PROT ?TM UR-MCNC: 67 MG/DL — HIGH (ref 0–12)
PROT SERPL-MCNC: 6.3 G/DL — SIGNIFICANT CHANGE UP (ref 6–8.3)
PROT UR-MCNC: 100 MG/DL
PROT/CREAT UR-RTO: 0.9 RATIO — HIGH (ref 0–0.2)
RBC # BLD: 4.87 M/UL — SIGNIFICANT CHANGE UP (ref 3.8–5.2)
RBC # FLD: 15.1 % — HIGH (ref 10.3–14.5)
RBC BLD AUTO: ABNORMAL
RBC CASTS # UR COMP ASSIST: >1900 /HPF — HIGH (ref 0–4)
SODIUM SERPL-SCNC: 129 MMOL/L — LOW (ref 135–145)
SODIUM SERPL-SCNC: 130 MMOL/L — LOW (ref 135–145)
SODIUM UR-SCNC: 63 MMOL/L — SIGNIFICANT CHANGE UP
SODIUM UR-SCNC: 82 MMOL/L — SIGNIFICANT CHANGE UP
SP GR SPEC: 1.02 — SIGNIFICANT CHANGE UP (ref 1–1.03)
SQUAMOUS # UR AUTO: 1 /HPF — SIGNIFICANT CHANGE UP (ref 0–5)
UROBILINOGEN FLD QL: 0.2 MG/DL — SIGNIFICANT CHANGE UP (ref 0.2–1)
WBC # BLD: 6.1 K/UL — SIGNIFICANT CHANGE UP (ref 3.8–10.5)
WBC # FLD AUTO: 6.1 K/UL — SIGNIFICANT CHANGE UP (ref 3.8–10.5)
WBC UR QL: 6 /HPF — HIGH (ref 0–5)

## 2024-03-11 PROCEDURE — 76770 US EXAM ABDO BACK WALL COMP: CPT | Mod: 26

## 2024-03-11 PROCEDURE — 99232 SBSQ HOSP IP/OBS MODERATE 35: CPT

## 2024-03-11 PROCEDURE — 99233 SBSQ HOSP IP/OBS HIGH 50: CPT | Mod: GC

## 2024-03-11 RX ORDER — DIAZEPAM 5 MG
2 TABLET ORAL AT BEDTIME
Refills: 0 | Status: DISCONTINUED | OUTPATIENT
Start: 2024-03-11 | End: 2024-03-12

## 2024-03-11 RX ORDER — MORPHINE SULFATE 50 MG/1
2 CAPSULE, EXTENDED RELEASE ORAL EVERY 6 HOURS
Refills: 0 | Status: DISCONTINUED | OUTPATIENT
Start: 2024-03-11 | End: 2024-03-12

## 2024-03-11 RX ORDER — MAGNESIUM SULFATE 500 MG/ML
1 VIAL (ML) INJECTION ONCE
Refills: 0 | Status: COMPLETED | OUTPATIENT
Start: 2024-03-11 | End: 2024-03-11

## 2024-03-11 RX ORDER — POTASSIUM CHLORIDE 20 MEQ
20 PACKET (EA) ORAL ONCE
Refills: 0 | Status: COMPLETED | OUTPATIENT
Start: 2024-03-11 | End: 2024-03-11

## 2024-03-11 RX ORDER — MORPHINE SULFATE 50 MG/1
4 CAPSULE, EXTENDED RELEASE ORAL EVERY 6 HOURS
Refills: 0 | Status: DISCONTINUED | OUTPATIENT
Start: 2024-03-11 | End: 2024-03-12

## 2024-03-11 RX ORDER — ACETAMINOPHEN 500 MG
650 TABLET ORAL EVERY 6 HOURS
Refills: 0 | Status: DISCONTINUED | OUTPATIENT
Start: 2024-03-11 | End: 2024-03-12

## 2024-03-11 RX ORDER — MAGNESIUM SULFATE 500 MG/ML
2 VIAL (ML) INJECTION ONCE
Refills: 0 | Status: COMPLETED | OUTPATIENT
Start: 2024-03-11 | End: 2024-03-11

## 2024-03-11 RX ORDER — UREA 15 G
15 POWDER IN PACKET (EA) ORAL DAILY
Refills: 0 | Status: DISCONTINUED | OUTPATIENT
Start: 2024-03-11 | End: 2024-03-12

## 2024-03-11 RX ADMIN — SENNA PLUS 2 TABLET(S): 8.6 TABLET ORAL at 21:34

## 2024-03-11 RX ADMIN — Medication 100 GRAM(S): at 18:23

## 2024-03-11 RX ADMIN — Medication 50 MILLIGRAM(S): at 06:34

## 2024-03-11 RX ADMIN — Medication 500 MILLIGRAM(S): at 23:44

## 2024-03-11 RX ADMIN — Medication 975 MILLIGRAM(S): at 00:36

## 2024-03-11 RX ADMIN — Medication 2 MILLIGRAM(S): at 21:34

## 2024-03-11 RX ADMIN — POLYETHYLENE GLYCOL 3350 17 GRAM(S): 17 POWDER, FOR SOLUTION ORAL at 18:22

## 2024-03-11 RX ADMIN — Medication 500 MILLIGRAM(S): at 18:22

## 2024-03-11 RX ADMIN — OXYCODONE HYDROCHLORIDE 5 MILLIGRAM(S): 5 TABLET ORAL at 11:39

## 2024-03-11 RX ADMIN — Medication 25 GRAM(S): at 11:38

## 2024-03-11 RX ADMIN — Medication 1 GRAM(S): at 23:44

## 2024-03-11 RX ADMIN — Medication 250 MILLIGRAM(S): at 18:22

## 2024-03-11 RX ADMIN — OXYCODONE HYDROCHLORIDE 5 MILLIGRAM(S): 5 TABLET ORAL at 12:11

## 2024-03-11 RX ADMIN — Medication 250 MILLIGRAM(S): at 00:35

## 2024-03-11 RX ADMIN — Medication 300 MILLIGRAM(S): at 00:36

## 2024-03-11 RX ADMIN — Medication 500 MILLIGRAM(S): at 00:35

## 2024-03-11 RX ADMIN — Medication 250 MILLIGRAM(S): at 11:38

## 2024-03-11 RX ADMIN — Medication 20 MILLIEQUIVALENT(S): at 11:39

## 2024-03-11 RX ADMIN — CLOPIDOGREL BISULFATE 75 MILLIGRAM(S): 75 TABLET, FILM COATED ORAL at 11:39

## 2024-03-11 RX ADMIN — Medication 15 GRAM(S): at 21:33

## 2024-03-11 RX ADMIN — PANTOPRAZOLE SODIUM 40 MILLIGRAM(S): 20 TABLET, DELAYED RELEASE ORAL at 06:34

## 2024-03-11 RX ADMIN — Medication 300 MILLIGRAM(S): at 11:38

## 2024-03-11 RX ADMIN — Medication 5 MILLIGRAM(S): at 06:35

## 2024-03-11 RX ADMIN — Medication 81 MILLIGRAM(S): at 11:55

## 2024-03-11 RX ADMIN — MORPHINE SULFATE 4 MILLIGRAM(S): 50 CAPSULE, EXTENDED RELEASE ORAL at 22:36

## 2024-03-11 RX ADMIN — Medication 250 MILLIGRAM(S): at 23:44

## 2024-03-11 RX ADMIN — Medication 975 MILLIGRAM(S): at 09:35

## 2024-03-11 RX ADMIN — MORPHINE SULFATE 4 MILLIGRAM(S): 50 CAPSULE, EXTENDED RELEASE ORAL at 21:36

## 2024-03-11 RX ADMIN — Medication 500 MILLIGRAM(S): at 11:38

## 2024-03-11 RX ADMIN — Medication 5 MILLIGRAM(S): at 18:23

## 2024-03-11 RX ADMIN — Medication 500 MILLIGRAM(S): at 06:34

## 2024-03-11 RX ADMIN — Medication 300 MILLIGRAM(S): at 06:35

## 2024-03-11 RX ADMIN — Medication 300 MILLIGRAM(S): at 23:43

## 2024-03-11 RX ADMIN — Medication 300 MILLIGRAM(S): at 18:37

## 2024-03-11 RX ADMIN — Medication 2: at 22:20

## 2024-03-11 RX ADMIN — Medication 975 MILLIGRAM(S): at 10:30

## 2024-03-11 RX ADMIN — Medication 1 GRAM(S): at 06:35

## 2024-03-11 RX ADMIN — Medication 250 MILLIGRAM(S): at 06:34

## 2024-03-11 RX ADMIN — Medication 1 GRAM(S): at 00:35

## 2024-03-11 RX ADMIN — Medication 1 GRAM(S): at 18:22

## 2024-03-11 RX ADMIN — PANTOPRAZOLE SODIUM 40 MILLIGRAM(S): 20 TABLET, DELAYED RELEASE ORAL at 18:23

## 2024-03-11 RX ADMIN — POLYETHYLENE GLYCOL 3350 17 GRAM(S): 17 POWDER, FOR SOLUTION ORAL at 06:35

## 2024-03-11 RX ADMIN — Medication 1 GRAM(S): at 11:39

## 2024-03-11 NOTE — PROGRESS NOTE ADULT - PROBLEM SELECTOR PLAN 2
AST/ALT elevations, uptrending since discharge in 12/2023. Polo's sign negative on admission. CTAP without evidence of galbladder obstruction however layering sludge without wall thickening or pericholecystic fluid present on exam.  - hepatitis panel with signs of past HepB infection (surface and core antibody positive)  - CTM AST/ALT elevations, uptrending since discharge in 12/2023. Polo's sign negative on admission. CTAP without evidence of galbladder obstruction however layering sludge without wall thickening or pericholecystic fluid present on exam.  - hepatitis panel with signs of past HepB infection (surface and core antibody positive)  - CTM  - f/u GEE, ASMA, AMA, IgG

## 2024-03-11 NOTE — PROGRESS NOTE ADULT - SUBJECTIVE AND OBJECTIVE BOX
Cardiology Consult    O/N:  Interval History:  Telemetry:    OBJECTIVE  Vitals:  T(C): 36.4 (24 @ 06:16), Max: 36.8 (03-10-24 @ 13:44)  HR: 77 (24 @ 06:16) (77 - 87)  BP: 102/68 (24 @ 10:15) (89/60 - 136/88)  RR: 19 (24 @ 06:16) (18 - 19)  SpO2: 97% (24 @ 06:16) (97% - 98%)  Wt(kg): --    I/O:  I&O's Summary    10 Mar 2024 07:01  -  11 Mar 2024 07:00  --------------------------------------------------------  IN: 0 mL / OUT: 1400 mL / NET: -1400 mL    11 Mar 2024 07:01  -  11 Mar 2024 13:14  --------------------------------------------------------  IN: 100 mL / OUT: 250 mL / NET: -150 mL        PHYSICAL EXAM:  Appearance: NAD. Speaking in full sentences.   HEENT: No pallor noted.  Conjunctiva clear b/l. Moist oral mucosa.  Cardiovascular: RRR with no murmurs.  Respiratory: Lungs CTAB.   Gastrointestinal:  Soft, nontender. Non-distended. Non-rigid.	  Extremities: No edema b/l. No erythema b/l. LE WWP b/l.  Vascular: DP intact  Neurologic:  Alert and awake. Moving all extremities. Following commands.   	  LABS:                        12.5   6.10  )-----------( 260      ( 11 Mar 2024 05:30 )             38.8         129<L>  |  97  |  13  ----------------------------<  136<H>  3.8   |  22  |  0.57    Ca    9.2      11 Mar 2024 05:30  Phos  2.9     03-11  Mg     1.8     -11    TPro  6.3  /  Alb  3.7  /  TBili  0.2  /  DBili  < 0.2  /  AST  137<H>  /  ALT  60<H>  /  AlkPhos  50  -11      Urinalysis Basic - ( 11 Mar 2024 09:32 )    Color: Orange / Appearance: Cloudy / S.019 / pH: x  Gluc: x / Ketone: Negative mg/dL  / Bili: Small / Urobili: 0.2 mg/dL   Blood: x / Protein: 100 mg/dL / Nitrite: Negative   Leuk Esterase: Small / RBC: >1900 /HPF / WBC 6 /HPF   Sq Epi: x / Non Sq Epi: 1 /HPF / Bacteria: Few /HPF        RADIOLOGY & ADDITIONAL TESTS:  Reviewed .    MEDICATIONS  (STANDING):  acetaminophen     Tablet .. 975 milliGRAM(s) Oral every 8 hours  aspirin enteric coated 81 milliGRAM(s) Oral daily  bisacodyl 5 milliGRAM(s) Oral every 12 hours  bismuth subsalicylate Liquid 300 milliGRAM(s) Oral every 6 hours  clopidogrel Tablet 75 milliGRAM(s) Oral daily  dextrose 5%. 1000 milliLiter(s) (100 mL/Hr) IV Continuous <Continuous>  dextrose 5%. 1000 milliLiter(s) (50 mL/Hr) IV Continuous <Continuous>  dextrose 50% Injectable 25 Gram(s) IV Push once  dextrose 50% Injectable 25 Gram(s) IV Push once  dextrose 50% Injectable 12.5 Gram(s) IV Push once  diazepam    Tablet 2 milliGRAM(s) Oral at bedtime  glucagon  Injectable 1 milliGRAM(s) IntraMuscular once  insulin lispro (ADMELOG) corrective regimen sliding scale   SubCutaneous Before meals and at bedtime  magnesium sulfate  IVPB 2 Gram(s) IV Intermittent once  magnesium sulfate  IVPB 1 Gram(s) IV Intermittent once  metoprolol succinate ER 50 milliGRAM(s) Oral daily  metroNIDAZOLE    Tablet 250 milliGRAM(s) Oral every 6 hours  pantoprazole    Tablet 40 milliGRAM(s) Oral every 12 hours  polyethylene glycol 3350 17 Gram(s) Oral two times a day  senna 2 Tablet(s) Oral at bedtime  sodium chloride 2% . 1000 milliLiter(s) (50 mL/Hr) IV Continuous <Continuous>  sucralfate suspension 1 Gram(s) Oral every 6 hours  tetracycline 500 milliGRAM(s) Oral four times a day    MEDICATIONS  (PRN):  acetaminophen 300 mG/butalbital 50 mG/ caffeine 40 mG 1 Capsule(s) Oral every 6 hours PRN headache  aluminum hydroxide/magnesium hydroxide/simethicone Suspension 30 milliLiter(s) Oral every 4 hours PRN Dyspepsia  dextrose Oral Gel 15 Gram(s) Oral once PRN Blood Glucose LESS THAN 70 milliGRAM(s)/deciliter  melatonin 3 milliGRAM(s) Oral at bedtime PRN Insomnia  oxyCODONE    IR 5 milliGRAM(s) Oral every 6 hours PRN Moderate Pain (4 - 6)  oxyCODONE    IR 7.5 milliGRAM(s) Oral every 6 hours PRN Severe Pain (7 - 10)

## 2024-03-11 NOTE — PROGRESS NOTE ADULT - SUBJECTIVE AND OBJECTIVE BOX
Seen and evaluated at bedside, no overnight events reported by primary team. Continues to reported intermittent abdominal pain.     Physical exam:   Respiratory: CTABL.   CV: No chest pain, palpitation.   GI: Complains of epigastric pain, sitting in bed getting ready to go to bathroom, no abd exam during this encounter.   : + Vill[place] with dark urine.   Neuro: No motor deficits, A&Ox3.       Allergies:  Biaxin (Hives)    Hospital Medications:   MEDICATIONS  (STANDING):  acetaminophen     Tablet .. 975 milliGRAM(s) Oral every 8 hours  aspirin enteric coated 81 milliGRAM(s) Oral daily  bisacodyl 5 milliGRAM(s) Oral every 12 hours  bismuth subsalicylate Liquid 300 milliGRAM(s) Oral every 6 hours  clopidogrel Tablet 75 milliGRAM(s) Oral daily  dextrose 5%. 1000 milliLiter(s) (100 mL/Hr) IV Continuous <Continuous>  dextrose 5%. 1000 milliLiter(s) (50 mL/Hr) IV Continuous <Continuous>  dextrose 50% Injectable 25 Gram(s) IV Push once  dextrose 50% Injectable 25 Gram(s) IV Push once  dextrose 50% Injectable 12.5 Gram(s) IV Push once  diazepam    Tablet 2 milliGRAM(s) Oral at bedtime  glucagon  Injectable 1 milliGRAM(s) IntraMuscular once  insulin lispro (ADMELOG) corrective regimen sliding scale   SubCutaneous Before meals and at bedtime  magnesium sulfate  IVPB 2 Gram(s) IV Intermittent once  magnesium sulfate  IVPB 1 Gram(s) IV Intermittent once  metoprolol succinate ER 50 milliGRAM(s) Oral daily  metroNIDAZOLE    Tablet 250 milliGRAM(s) Oral every 6 hours  pantoprazole    Tablet 40 milliGRAM(s) Oral every 12 hours  polyethylene glycol 3350 17 Gram(s) Oral two times a day  senna 2 Tablet(s) Oral at bedtime  sodium chloride 2% . 1000 milliLiter(s) (50 mL/Hr) IV Continuous <Continuous>  sucralfate suspension 1 Gram(s) Oral every 6 hours  tetracycline 500 milliGRAM(s) Oral four times a day      REVIEW OF SYSTEMS:  All other review of systems is negative unless indicated above.    VITALS:  T(F): 98.5 (03-11-24 @ 13:38), Max: 98.5 (03-11-24 @ 13:38)  HR: 81 (03-11-24 @ 13:38)  BP: 129/77 (03-11-24 @ 13:38)  RR: 18 (03-11-24 @ 13:38)  SpO2: 99% (03-11-24 @ 13:38)  Wt(kg): --    03-09 @ 06:01  -  03-10 @ 07:00  --------------------------------------------------------  IN: 400 mL / OUT: 400 mL / NET: 0 mL    03-10 @ 07:01  -  03-11 @ 07:00  --------------------------------------------------------  IN: 0 mL / OUT: 1400 mL / NET: -1400 mL    03-11 @ 07:01  -  03-11 @ 14:42  --------------------------------------------------------  IN: 400 mL / OUT: 350 mL / NET: 50 mL        LABS:  03-11    129<L>  |  97  |  13  ----------------------------<  136<H>  3.8   |  22  |  0.57    Ca    9.2      11 Mar 2024 05:30  Phos  2.9     03-11  Mg     1.8     03-11    TPro  6.3  /  Alb  3.7  /  TBili  0.2  /  DBili  < 0.2  /  AST  137<H>  /  ALT  60<H>  /  AlkPhos  50  03-11                          12.5   6.10  )-----------( 260      ( 11 Mar 2024 05:30 )             38.8         RADIOLOGY & ADDITIONAL STUDIES:  Reviewed.

## 2024-03-11 NOTE — PROGRESS NOTE ADULT - ASSESSMENT
63F former smoker, with FHx MI, PMHx of asthma/COPD, sciatica/herniated disc, HTN, HLD, DM2, obesity, anxiety, AS s/p bioAVR/ascending aorta replacement and CABG SVG-PDA 05/2017 @ [facility] w subsequent PCIs (most recent 10/16/23 DESx2 pLAD) recent admission to [facility] Dec 28-29, 2023 for chest pain w/ neg trop x 2, neg CCTA for acute findings, dx'd w/ pericarditis discharged on Colchicine 0.6 mg BID x 3 months (still taking), recent dx of H Pylori, now on PPI + Amoxicillin 1g BID x 14 days, Clarithromycin 500 mg BID x 14 days, a/f epigastric abdominal pain (though somewhat generalized).

## 2024-03-11 NOTE — PROGRESS NOTE ADULT - ASSESSMENT
63y HTN, DM, CAD s/p CABG and PCIs, AS s/p AVR, asthma/COPD with hospital course c/b hyponatremia.   On Diazepam at home, suffers from Anxiety as per pte.     Imp: Hypotonic hyponatremia(chronic), possible SIADH.   Na 129 today.   Lowest Na documented at 120, U. Na elevated, U. osmo elevated.   sCr wnl  BP ~120/70    Plan:  Na goal is Na wnl on 3/12 am labs.   Stop 2% hypertonic.   Obtain Stat BMP.   Fluid restriction<1L in 24h, for now.   Pain control as per primary team.   Will consider Blake if f/u Na not improving.   Encourage po solute intake.

## 2024-03-11 NOTE — PROGRESS NOTE ADULT - SUBJECTIVE AND OBJECTIVE BOX
O/N Events:    Subjective/ROS: Patient seen and examined at bedside.     Denies Fever/Chills, HA, CP, SOB, n/v, changes in bowel/urinary habits.  12pt ROS otherwise negative.    VITALS  Vital Signs Last 24 Hrs  T(C): 36.4 (11 Mar 2024 06:16), Max: 36.8 (10 Mar 2024 13:44)  T(F): 97.6 (11 Mar 2024 06:16), Max: 98.2 (10 Mar 2024 13:44)  HR: 77 (11 Mar 2024 06:16) (77 - 87)  BP: 136/88 (11 Mar 2024 06:16) (132/82 - 136/88)  BP(mean): --  RR: 19 (11 Mar 2024 06:16) (18 - 19)  SpO2: 97% (11 Mar 2024 06:16) (97% - 98%)    Parameters below as of 11 Mar 2024 06:16  Patient On (Oxygen Delivery Method): room air        CAPILLARY BLOOD GLUCOSE      POCT Blood Glucose.: 116 mg/dL (10 Mar 2024 21:49)  POCT Blood Glucose.: 185 mg/dL (10 Mar 2024 18:17)  POCT Blood Glucose.: 132 mg/dL (10 Mar 2024 13:04)  POCT Blood Glucose.: 140 mg/dL (10 Mar 2024 09:15)      PHYSICAL EXAM  General: NAD  Head: NC/AT; MMM; PERRL; EOMI;  Neck: Supple; no JVD  Respiratory: CTAB; no wheezes/rales/rhonchi  Cardiovascular: Regular rhythm/rate; S1/S2+, no murmurs, rubs gallops   Gastrointestinal: Soft; NTND; bowel sounds normal and present  Extremities: WWP; no edema/cyanosis  Neurological: A&Ox3, CNII-XII grossly intact; no obvious focal deficits    MEDICATIONS  (STANDING):  acetaminophen     Tablet .. 975 milliGRAM(s) Oral every 8 hours  aspirin enteric coated 81 milliGRAM(s) Oral daily  bisacodyl 5 milliGRAM(s) Oral every 12 hours  bismuth subsalicylate Liquid 300 milliGRAM(s) Oral every 6 hours  clopidogrel Tablet 75 milliGRAM(s) Oral daily  dextrose 5%. 1000 milliLiter(s) (100 mL/Hr) IV Continuous <Continuous>  dextrose 5%. 1000 milliLiter(s) (50 mL/Hr) IV Continuous <Continuous>  dextrose 50% Injectable 25 Gram(s) IV Push once  dextrose 50% Injectable 25 Gram(s) IV Push once  dextrose 50% Injectable 12.5 Gram(s) IV Push once  diazepam    Tablet 2 milliGRAM(s) Oral at bedtime  glucagon  Injectable 1 milliGRAM(s) IntraMuscular once  insulin lispro (ADMELOG) corrective regimen sliding scale   SubCutaneous Before meals and at bedtime  magnesium sulfate  IVPB 2 Gram(s) IV Intermittent once  metoprolol succinate ER 50 milliGRAM(s) Oral daily  metroNIDAZOLE    Tablet 250 milliGRAM(s) Oral every 6 hours  pantoprazole    Tablet 40 milliGRAM(s) Oral every 12 hours  polyethylene glycol 3350 17 Gram(s) Oral two times a day  senna 2 Tablet(s) Oral at bedtime  sodium chloride 2% . 1000 milliLiter(s) (50 mL/Hr) IV Continuous <Continuous>  sucralfate suspension 1 Gram(s) Oral every 6 hours  tetracycline 500 milliGRAM(s) Oral four times a day    MEDICATIONS  (PRN):  acetaminophen 300 mG/butalbital 50 mG/ caffeine 40 mG 1 Capsule(s) Oral every 6 hours PRN headache  aluminum hydroxide/magnesium hydroxide/simethicone Suspension 30 milliLiter(s) Oral every 4 hours PRN Dyspepsia  dextrose Oral Gel 15 Gram(s) Oral once PRN Blood Glucose LESS THAN 70 milliGRAM(s)/deciliter  melatonin 3 milliGRAM(s) Oral at bedtime PRN Insomnia  oxyCODONE    IR 7.5 milliGRAM(s) Oral every 6 hours PRN Severe Pain (7 - 10)  oxyCODONE    IR 5 milliGRAM(s) Oral every 6 hours PRN Moderate Pain (4 - 6)      Biaxin (Hives)      LABS                        12.1   5.37  )-----------( 216      ( 10 Mar 2024 05:30 )             36.6     03-10    125<L>  |  95<L>  |  15  ----------------------------<  124<H>  3.8   |  22  |  0.58    Ca    9.0      10 Mar 2024 22:50  Mg     1.6     03-10    TPro  6.4  /  Alb  3.9  /  TBili  0.3  /  DBili  x   /  AST  86<H>  /  ALT  44  /  AlkPhos  46  03-10      Urinalysis Basic - ( 10 Mar 2024 22:50 )    Color: x / Appearance: x / SG: x / pH: x  Gluc: 124 mg/dL / Ketone: x  / Bili: x / Urobili: x   Blood: x / Protein: x / Nitrite: x   Leuk Esterase: x / RBC: x / WBC x   Sq Epi: x / Non Sq Epi: x / Bacteria: x              IMAGING/EKG/ETC   O/N Events: no events    Subjective/ROS: Patient seen and examined at bedside. Pt c/o urine looking somewhat fruit punch colored but denies dysuria or increase in frequency or associated flank pain.    Denies Fever/Chills, HA, CP, SOB, n/v 12pt ROS otherwise negative.    VITALS  Vital Signs Last 24 Hrs  T(C): 36.4 (11 Mar 2024 06:16), Max: 36.8 (10 Mar 2024 13:44)  T(F): 97.6 (11 Mar 2024 06:16), Max: 98.2 (10 Mar 2024 13:44)  HR: 77 (11 Mar 2024 06:16) (77 - 87)  BP: 136/88 (11 Mar 2024 06:16) (132/82 - 136/88)  BP(mean): --  RR: 19 (11 Mar 2024 06:16) (18 - 19)  SpO2: 97% (11 Mar 2024 06:16) (97% - 98%)    Parameters below as of 11 Mar 2024 06:16  Patient On (Oxygen Delivery Method): room air        CAPILLARY BLOOD GLUCOSE      POCT Blood Glucose.: 116 mg/dL (10 Mar 2024 21:49)  POCT Blood Glucose.: 185 mg/dL (10 Mar 2024 18:17)  POCT Blood Glucose.: 132 mg/dL (10 Mar 2024 13:04)  POCT Blood Glucose.: 140 mg/dL (10 Mar 2024 09:15)      PHYSICAL EXAM  General: NAD, appears uncomfortable lying in bed  Head: NC/AT; MMM; PERRL; EOMI;  Neck: Supple; no JVD  Respiratory: CTAB; no wheezes/rales/rhonchi  Cardiovascular: Regular rhythm/rate; S1/S2+, systolic murmur c/w aortic stenosis  Gastrointestinal: Soft; diffusely tender to palpation  Extremities: WWP; no edema/cyanosis  Neurological: A&Ox3, conversing and answering questions appropriately    MEDICATIONS  (STANDING):  acetaminophen     Tablet .. 975 milliGRAM(s) Oral every 8 hours  aspirin enteric coated 81 milliGRAM(s) Oral daily  bisacodyl 5 milliGRAM(s) Oral every 12 hours  bismuth subsalicylate Liquid 300 milliGRAM(s) Oral every 6 hours  clopidogrel Tablet 75 milliGRAM(s) Oral daily  dextrose 5%. 1000 milliLiter(s) (100 mL/Hr) IV Continuous <Continuous>  dextrose 5%. 1000 milliLiter(s) (50 mL/Hr) IV Continuous <Continuous>  dextrose 50% Injectable 25 Gram(s) IV Push once  dextrose 50% Injectable 25 Gram(s) IV Push once  dextrose 50% Injectable 12.5 Gram(s) IV Push once  diazepam    Tablet 2 milliGRAM(s) Oral at bedtime  glucagon  Injectable 1 milliGRAM(s) IntraMuscular once  insulin lispro (ADMELOG) corrective regimen sliding scale   SubCutaneous Before meals and at bedtime  magnesium sulfate  IVPB 2 Gram(s) IV Intermittent once  metoprolol succinate ER 50 milliGRAM(s) Oral daily  metroNIDAZOLE    Tablet 250 milliGRAM(s) Oral every 6 hours  pantoprazole    Tablet 40 milliGRAM(s) Oral every 12 hours  polyethylene glycol 3350 17 Gram(s) Oral two times a day  senna 2 Tablet(s) Oral at bedtime  sodium chloride 2% . 1000 milliLiter(s) (50 mL/Hr) IV Continuous <Continuous>  sucralfate suspension 1 Gram(s) Oral every 6 hours  tetracycline 500 milliGRAM(s) Oral four times a day    MEDICATIONS  (PRN):  acetaminophen 300 mG/butalbital 50 mG/ caffeine 40 mG 1 Capsule(s) Oral every 6 hours PRN headache  aluminum hydroxide/magnesium hydroxide/simethicone Suspension 30 milliLiter(s) Oral every 4 hours PRN Dyspepsia  dextrose Oral Gel 15 Gram(s) Oral once PRN Blood Glucose LESS THAN 70 milliGRAM(s)/deciliter  melatonin 3 milliGRAM(s) Oral at bedtime PRN Insomnia  oxyCODONE    IR 7.5 milliGRAM(s) Oral every 6 hours PRN Severe Pain (7 - 10)  oxyCODONE    IR 5 milliGRAM(s) Oral every 6 hours PRN Moderate Pain (4 - 6)      Biaxin (Hives)      LABS                        12.1   5.37  )-----------( 216      ( 10 Mar 2024 05:30 )             36.6     03-10    125<L>  |  95<L>  |  15  ----------------------------<  124<H>  3.8   |  22  |  0.58    Ca    9.0      10 Mar 2024 22:50  Mg     1.6     03-10    TPro  6.4  /  Alb  3.9  /  TBili  0.3  /  DBili  x   /  AST  86<H>  /  ALT  44  /  AlkPhos  46  03-10      Urinalysis Basic - ( 10 Mar 2024 22:50 )    Color: x / Appearance: x / SG: x / pH: x  Gluc: 124 mg/dL / Ketone: x  / Bili: x / Urobili: x   Blood: x / Protein: x / Nitrite: x   Leuk Esterase: x / RBC: x / WBC x   Sq Epi: x / Non Sq Epi: x / Bacteria: x              IMAGING/EKG/ETC

## 2024-03-11 NOTE — PROGRESS NOTE ADULT - SUBJECTIVE AND OBJECTIVE BOX
GASTROENTEROLOGY PROGRESS NOTE  Patient seen and examined at bedside.  LFTs downtrending  Cont to have abdominal pain  On HP therapy    PERTINENT REVIEW OF SYSTEMS:  CONSTITUTIONAL: No weakness, fevers or chills  HEENT: No visual changes; No vertigo or throat pain   GASTROINTESTINAL: As above.  NEUROLOGICAL: No numbness or weakness  SKIN: No itching, burning, rashes, or lesions     Allergies    Biaxin (Hives)    Intolerances      MEDICATIONS:  MEDICATIONS  (STANDING):  acetaminophen     Tablet .. 975 milliGRAM(s) Oral every 8 hours  aspirin enteric coated 81 milliGRAM(s) Oral daily  bisacodyl 5 milliGRAM(s) Oral every 12 hours  bismuth subsalicylate Liquid 300 milliGRAM(s) Oral every 6 hours  clopidogrel Tablet 75 milliGRAM(s) Oral daily  dextrose 5%. 1000 milliLiter(s) (50 mL/Hr) IV Continuous <Continuous>  dextrose 5%. 1000 milliLiter(s) (100 mL/Hr) IV Continuous <Continuous>  dextrose 50% Injectable 12.5 Gram(s) IV Push once  dextrose 50% Injectable 25 Gram(s) IV Push once  dextrose 50% Injectable 25 Gram(s) IV Push once  diazepam    Tablet 2 milliGRAM(s) Oral at bedtime  glucagon  Injectable 1 milliGRAM(s) IntraMuscular once  insulin lispro (ADMELOG) corrective regimen sliding scale   SubCutaneous Before meals and at bedtime  magnesium sulfate  IVPB 1 Gram(s) IV Intermittent once  magnesium sulfate  IVPB 2 Gram(s) IV Intermittent once  metoprolol succinate ER 50 milliGRAM(s) Oral daily  metroNIDAZOLE    Tablet 250 milliGRAM(s) Oral every 6 hours  pantoprazole    Tablet 40 milliGRAM(s) Oral every 12 hours  polyethylene glycol 3350 17 Gram(s) Oral two times a day  senna 2 Tablet(s) Oral at bedtime  sodium chloride 2% . 1000 milliLiter(s) (50 mL/Hr) IV Continuous <Continuous>  sucralfate suspension 1 Gram(s) Oral every 6 hours  tetracycline 500 milliGRAM(s) Oral four times a day    MEDICATIONS  (PRN):  acetaminophen 300 mG/butalbital 50 mG/ caffeine 40 mG 1 Capsule(s) Oral every 6 hours PRN headache  aluminum hydroxide/magnesium hydroxide/simethicone Suspension 30 milliLiter(s) Oral every 4 hours PRN Dyspepsia  dextrose Oral Gel 15 Gram(s) Oral once PRN Blood Glucose LESS THAN 70 milliGRAM(s)/deciliter  melatonin 3 milliGRAM(s) Oral at bedtime PRN Insomnia  oxyCODONE    IR 5 milliGRAM(s) Oral every 6 hours PRN Moderate Pain (4 - 6)  oxyCODONE    IR 7.5 milliGRAM(s) Oral every 6 hours PRN Severe Pain (7 - 10)    Vital Signs Last 24 Hrs  T(C): 36.4 (11 Mar 2024 06:16), Max: 36.8 (10 Mar 2024 13:44)  T(F): 97.6 (11 Mar 2024 06:16), Max: 98.2 (10 Mar 2024 13:44)  HR: 77 (11 Mar 2024 06:16) (77 - 87)  BP: 102/68 (11 Mar 2024 10:15) (89/60 - 136/88)  BP(mean): --  RR: 19 (11 Mar 2024 06:16) (18 - 19)  SpO2: 97% (11 Mar 2024 06:16) (97% - 98%)    Parameters below as of 11 Mar 2024 06:16  Patient On (Oxygen Delivery Method): room air        03-10 @ 07:01  -  03-11 @ 07:00  --------------------------------------------------------  IN: 0 mL / OUT: 1400 mL / NET: -1400 mL      PHYSICAL EXAM:    General: in no acute distress  HEENT: MMM, conjunctiva and sclera clear  Gastrointestinal: Soft non-tender non-distended; No rebound or guarding  Skin: Warm and dry. No obvious rash    LABS:                        12.5   6.10  )-----------( 260      ( 11 Mar 2024 05:30 )             38.8     03-11    129<L>  |  97  |  13  ----------------------------<  136<H>  3.8   |  22  |  0.57    Ca    9.2      11 Mar 2024 05:30  Phos  2.9     03-11  Mg     1.8     03-11    TPro  6.4  /  Alb  3.9  /  TBili  0.3  /  DBili  x   /  AST  86<H>  /  ALT  44  /  AlkPhos  46  03-10          Urinalysis Basic - ( 11 Mar 2024 05:30 )    Color: x / Appearance: x / SG: x / pH: x  Gluc: 136 mg/dL / Ketone: x  / Bili: x / Urobili: x   Blood: x / Protein: x / Nitrite: x   Leuk Esterase: x / RBC: x / WBC x   Sq Epi: x / Non Sq Epi: x / Bacteria: x                RADIOLOGY & ADDITIONAL STUDIES:  Reviewed

## 2024-03-11 NOTE — PROGRESS NOTE ADULT - PROBLEM SELECTOR PLAN 1
patient with characteristic epigastric abdominal pain radiating to back however CTAP without evidence of pancreatitis and lipase 20 (does not meet criteria for pancreatitis). Possible PUD 2/2 to H Pylori? CTA C/A/P r/o dissection. Per cardiology low concern primary cardiac  - c/w pain regimen below  - Colchicine (for pericarditis) d/jorge due to concern it is contributing to stomach pain    #H. pylori inf: on PPI + Amoxicillin 1g BID x 14 days, Clarithromycin 500 mg BID x 14 days.   - Switch patient to quadruple therapy due to existing patient allergy to clarithromycin and possible side effect of clarithromycin being abdominal pain patient with characteristic epigastric abdominal pain radiating to back however CTAP without evidence of pancreatitis and lipase 20 (does not meet criteria for pancreatitis). Possible PUD 2/2 to H Pylori? CTA C/A/P r/o dissection. Per cardiology low concern primary cardiac  - c/w pain regimen below    #H. pylori inf: on PPI + Amoxicillin 1g BID x 14 days, Clarithromycin 500 mg BID x 14 days.   - Switch patient to quadruple therapy due to existing patient allergy to clarithromycin and possible side effect of clarithromycin being abdominal pain

## 2024-03-11 NOTE — PROGRESS NOTE ADULT - ASSESSMENT
{\rtf1\wcamot51169\ansi\tbllnzu1583\ftnbj\uc1\deff0  {\fonttbl{\f0 \fnil Segoe UI;}{\f1 \fnil \fcharset0 Segoe UI;}{\f2 \fnil Times New Maulik;}}  {\colortbl ;\qvw268\dzcot020\tyzo918 ;\red0\green0\blue0 ;\red0\green0\rlsu926 ;\red0\green0\blue0 ;}  {\stylesheet{\f0\fs20 Normal;}{\cs1 Default Paragraph Font;}{\cs2\f0\fs16 Line Number;}{\cs3\f2\fs24\ul\cf3 Hyperlink;}}  {\*\revtbl{Unknown;}}  \mbvtqp43692\vclegr44482\jfjtn2661\hygtz3778\cqmcu0023\isiqi5183\osptllu642\rvcciho285\nogrowautofit\kufbtc370\formshade\nofeaturethrottle1\dntblnsbdb\fet4\aendnotes\aftnnrlc\pgbrdrhead\pgbrdrfoot  \sectd\aztoxm09553\eapzya24836\guttersxn0\xfklryxy2860\ehattreo6535\jkgqkrft6925\ctgzdokl3921\gzmxipl163\gzhxonm152\sbkpage\pgncont\pgndec  \plain\plain\f0\fs24\ql\plain\f0\fs24\plain\f0\fs20\hove0692\hich\f0\dbch\f0\loch\f0\fs20\par  63 y o F former smoker, with FHx MI, PMHx of asthma/COPD, sciatica/herniated disc, HTN, HLD, DM2, obesity, anxiety, AS s/p bioAVR/ascending aorta replacement and CABG SVG-PDA 05/2017 @ [facility] w subsequent PCIs (most recent 10/16/23 DESx2 pLAD) recent admission   to [facility] Dec 28-29, 2023 for chest pain w/ neg trop x 2, neg CCTA for acute findings, dx'd w/ pericarditis discharged on Colchicine 0.6 mg BID x 3 months (still taking), recent dx of H Pylori, now on PPI + Amoxicillin 1g BID x 14 days, Clarithromycin 500   mg BID x 14 days, a/f epigastric abdominal pain (though somewhat generalized).\par  \par  \plain\f1\fs20\rgwz9400\hich\f1\dbch\f1\loch\f1\cf2\fs20\ul{\field{\*\fldinst HYPERLINK [id number] }{\fldrslt Problem/Plan - 1:}}\plain\f0\fs20\ikks6779\hich\f0\dbch\f0\loch\f0\fs20\ql\par  \'b7  {\*\bkmkstart [id number]}{\*\bkmkend [id number]}Problem: {\*\bkmkstart sl98107991836}{\*\bkmkend on73180793798}Epigastric abdominal pain. \par  \'b7  {\*\bkmkstart [id number]}{\*\bkmkend sp84009151076}Plan: {\*\bkmkstart eh29556710026}{\*\bkmkend gw62200157725}patient with characteristic epigastric abdominal pain radiating to back however CTAP without evidence of pancreatitis and lipase 20   (does not meet criteria for pancreatitis). Possible PUD 2/2 to H Pylori? CTA C/A/P r/o dissection. Per cardiology low concern primary cardiac\par  - c/w pain regimen below\par  \par  #H. pylori inf: on PPI + Amoxicillin 1g BID x 14 days, Clarithromycin 500 mg BID x 14 days. \par  - Switch patient to quadruple therapy due to existing patient allergy to clarithromycin and possible side effect of clarithromycin being abdominal pain.\plain\f1\fs20\ymgq5193\hich\f1\dbch\f1\loch\f1\cf2\fs20\strike\plain\f0\fs20\vkkc3792\hich\f0\dbch\f0\loch\f0\fs20\par  \par  \plain\f1\fs20\hvau0950\hich\f1\dbch\f1\loch\f1\cf2\fs20\ul{\field{\*\fldinst HYPERLINK [id number] }{\fldrslt Problem/Plan - 2:}}\plain\f0\fs20\fnif5083\hich\f0\dbch\f0\loch\f0\fs20\ql\par  \'b7  {\*\bkmkstart gf30941490274}{\*\bkmkend so65429642353}Problem: {\*\bkmkstart oo99613453109}{\*\bkmkend ud40692787833}Transaminitis. \par  \'b7  {\*\bkmkstart tr88851815913}{\*\bkmkend ck53411622702}Plan: {\*\bkmkstart zz70935054476}{\*\bkmkend az67087919009}AST/ALT elevations, uptrending since discharge in 12/2023. Polo's sign negative on admission. CTAP without evidence of galbladder   obstruction however layering sludge without wall thickening or pericholecystic fluid present on exam.\par  - hepatitis panel with signs of past HepB infection (surface and core antibody positive)\par  - CTM\par  - f/u GEE, ASMA, AMA, IgG.\plain\f1\fs20\tzcj0855\hich\f1\dbch\f1\loch\f1\cf2\fs20\strike\plain\f0\fs20\fgzn9687\hich\f0\dbch\f0\loch\f0\fs20\par  \par  \plain\f1\fs20\erbf1544\hich\f1\dbch\f1\loch\f1\cf2\fs20\ul{\field{\*\fldinst HYPERLINK [id number] }{\fldrslt Problem/Plan - 3:}}\plain\f0\fs20\esbv1173\hich\f0\dbch\f0\loch\f0\fs20\ql\par  \'b7  {\*\bkmkstart li41934345172}{\*\bkmkend vm25477525565}Problem: {\*\bkmkstart vi31771128963}{\*\bkmkend gc22379944278}Hyponatremia. \par  \'b7  {\*\bkmkstart vn89562658586}{\*\bkmkend fi95356599705}Plan: {\*\bkmkstart ob58310983072}{\*\bkmkend bu09649763882}On 3/8, patient found to have hyponatremic and improving with hypertonics from 125-->129 this AM\par  - urine studies c/w SIADH\par  - c/w 2% hypertonic saline\par  - Renal recs appreciated.\plain\f1\fs20\fsls9489\hich\f1\dbch\f1\loch\f1\cf2\fs20\strike\plain\f0\fs20\trac9005\hich\f0\dbch\f0\loch\f0\fs20\par  \par  \plain\f1\fs20\ecao5559\hich\f1\dbch\f1\loch\f1\cf2\fs20\ul{\field{\*\fldinst HYPERLINK [id number] }{\fldrslt Problem/Plan - 4:}}\plain\f0\fs20\viit9079\hich\f0\dbch\f0\loch\f0\fs20\ql\par  \'b7  {\*\bkmkstart np95057580171}{\*\bkmkend lw73162414965}Problem: {\*\bkmkstart fb52777943598}{\*\bkmkend wa15618828338}CAD (coronary artery disease). \par  \'b7  {\*\bkmkstart tr84910673021}{\*\bkmkend jh00073537187}Plan: {\*\bkmkstart dm30795472192}{\*\bkmkend yi25811564032}#CAD (coronary artery disease): P/w sharp SSCP associated w anxiety/SOB resolved on own. Currently c/o very mild CP upon a deep breath   but otherwise CP free. HD stable. Compliant with DAPT. hx of bioAVR/ascending aorta replacement, CABG SVG-PDA, and recent DESx2 pLAD 10/2023. Trop WNL on admission. EKG Q waves V2 - V6. Cardiac cath 10/16/23: DRAKE x2 pLAD, OM stent patient, patent SVG-RPDA   graft. TTE 10/17/23: EF 63%, normal LV/RV. Bioprosthetic AV, no other valve disease, no pericardial effusion.\par  - Continue ASA/Plavix/Statin/BB\par  - Check EKG for changes \par  - Cardiology following.\par  \par  \plain\f1\fs20\qwre3720\hich\f1\dbch\f1\loch\f1\cf2\fs20\ul{\field{\*\fldinst HYPERLINK [id number] }{\fldrslt Problem/Plan - 5:}}\plain\f0\fs20\tgwi5094\hich\f0\dbch\f0\loch\f0\fs20\ql\par  \'b7  {\*\bkmkstart tt95412223362}{\*\bkmkend bs08536622735}Problem: {\*\bkmkstart fm97048676216}{\*\bkmkend xs75024647677}H/O pericarditis. \par  \'b7  {\*\bkmkstart ib57414606202}{\*\bkmkend rw39779584531}Plan: {\*\bkmkstart ku16072577972}{\*\bkmkend sj46945070017}discharged 12/29 on Colchicine 0.6 mg BID x 3 months\par  - d/c colchicine 0.6mg PO BID per cardiology due to risk of colchicine contributing to abdominal discomfort.\par  \par  \plain\f1\fs20\tbet3127\hich\f1\dbch\f1\loch\f1\cf2\fs20\ul{\field{\*\fldinst HYPERLINK [id number] }{\fldrslt Problem/Plan - 6:}}\plain\f0\fs20\ahlw0560\hich\f0\dbch\f0\loch\f0\fs20\ql\par  \'b7  {\*\bkmkstart gd97837523419}{\*\bkmkend jl11654506724}Problem: {\*\bkmkstart pc77168692851}{\*\bkmkend ur33242188394}History of COPD. \par  \'b7  {\*\bkmkstart ca86346331376}{\*\bkmkend iy39063704276}Plan: {\*\bkmkstart qp61637242356}{\*\bkmkend ns17772828065}SpO2 100% on RA, no new or change in cough or sputum production, no CARRILLO, no wheeze. Not on home O2. \par  - can continue with inhalers when confirmed.\par  \par  \plain\f1\fs20\sbbw3851\hich\f1\dbch\f1\loch\f1\cf2\fs20\ul{\field{\*\fldinst HYPERLINK [id number] }{\fldrslt Problem/Plan - 7:}}\plain\f0\fs20\actv6397\hich\f0\dbch\f0\loch\f0\fs20\ql\par  \'b7  {\*\bkmkstart ko68939538602}{\*\bkmkend zy72967859528}Problem: {\*\bkmkstart ry72380553510}{\*\bkmkend um48146172455}Constipation. \par  \'b7  {\*\bkmkstart pj36904355747}{\*\bkmkend eu16775396316}Plan: {\*\bkmkstart bl37151488631}{\*\bkmkend al52989938284}last BM 3 days prior to admission \par  - start miralax + senna + dulcolax\par  - Consider other agents such as lactulose, but would worsen her bloating.\par  \par  \plain\f1\fs20\xafm4022\hich\f1\dbch\f1\loch\f1\cf2\fs20\ul{\field{\*\fldinst HYPERLINK [id number] }{\fldrslt Problem/Plan - 8:}}\plain\f0\fs20\cyib0837\hich\f0\dbch\f0\loch\f0\fs20\ql\par  \'b7  {\*\bkmkstart bf12141213456}{\*\bkmkend ai77997823841}Problem: {\*\bkmkstart xm55295276669}{\*\bkmkend ul49769890863}Anxiety. \par  \'b7  {\*\bkmkstart nm07659430527}{\*\bkmkend fp84959886007}Plan: {\*\bkmkstart rh07973816245}{\*\bkmkend st17881107470}- Continue home diazepam qHS.\par  \par  \plain\f1\fs20\naqo1557\hich\f1\dbch\f1\loch\f1\cf2\fs20\ul{\field{\*\fldinst HYPERLINK [id number] }{\fldrslt Problem/Plan - 9:}}\plain\f0\fs20\jhae0337\hich\f0\dbch\f0\loch\f0\fs20\ql\par  \'b7  {\*\bkmkstart lj67619623998}{\*\bkmkend gq56149483824}Problem: {\*\bkmkstart qr96445796321}{\*\bkmkend gy33849575774}Diabetes mellitus. \par  \'b7  {\*\bkmkstart [id number]}{\*\bkmkend [id number]}Plan: {\*\bkmkstart [id number]}{\*\bkmkend if15704157016}on home metformin 1000mg PO qAM & 500mg PO qHS\par  - c/w ISS, FS.\par  \par  \plain\f1\fs20\xkcr3966\hich\f1\dbch\f1\loch\f1\cf2\fs20\ul{\field{\*\fldinst HYPERLINK [id number] }{\fldrslt Problem/Plan - 10:}}\plain\f0\fs20\pott1298\hich\f0\dbch\f0\loch\f0\fs20\ql\par  \'b7  {\*\bkmkstart [id number]}{\*\bkmkend [id number]}Problem: {\*\bkmkstart rn91057016236}{\*\bkmkend iq03899213636}History of chronic back pain. \par  \'b7  {\*\bkmkstart iy08652100919}{\*\bkmkend ag78855571327}Plan; {\*\bkmkstart mb10545091739}{\*\bkmkend ms54499892098}On home vicodin 7.5-325 mg q12 PRN for pain. \par  - oxycodone 7.5 mg PO q6h PRN for severe pain \par  - start standing tylenol.\par  \par  \plain\f1\fs20\vgwc4929\hich\f1\dbch\f1\loch\f1\cf2\fs20\ul{\field{\*\fldinst HYPERLINK [id number] }{\fldrslt Problem/Plan - 11:}}\plain\f0\fs20\gjup8643\hich\f0\dbch\f0\loch\f0\fs20\ql\par  \'b7  {\*\bkmkstart [id number]}{\*\bkmkend mm544876948567}Problem: {\*\bkmkstart iu987323756628}{\*\bkmkend zk755665115089}Hypertension. \par  \'b7  {\*\bkmkstart ck113655392377}{\*\bkmkend wb949712337349}Plan: {\*\bkmkstart [id number]}{\*\bkmkend lr986522598756}on home metoprolol succinate 50mg qd + chlorthalidone 25mg PO qd \par  - c/w toprol \par  - hold home chlorthalidone.\par  \par  \plain\f1\fs20\cwex3270\hich\f1\dbch\f1\loch\f1\cf2\fs20\ul{\field{\*\fldinst HYPERLINK [id number] }{\fldrslt Problem/Plan - 12:}}\plain\f0\fs20\cwva2511\hich\f0\dbch\f0\loch\f0\fs20\ql\par  \'b7  {\*\bkmkstart [id number]}{\*\bkmkend vc533911398009}Problem: {\*\bkmkstart [id number]}{\*\bkmkend sd860266786870}Hyperlipidemia. \par  \'b7  {\*\bkmkstart [id number]}{\*\bkmkend [id number]}Plan: {\*\bkmkstart [id number]}{\*\bkmkend fv131788917979}on home crestor 40mg PO qHS + zetia 10mg PO qd\par  - c/w therapeutic interchange.\par  \par  }

## 2024-03-11 NOTE — PROGRESS NOTE ADULT - PROBLEM SELECTOR PLAN 3
On 3/8, patient found to have hyponatremia to 122. Not improving with hypertonic saline boluses  - urine studies c/w SIADH  - c/w 2% hypertonic saline  - Renal consult On 3/8, patient found to have hyponatremic and improving with hypertonics from 125-->129 this AM  - urine studies c/w SIADH  - c/w 2% hypertonic saline  - Renal recs appreciated

## 2024-03-11 NOTE — PROGRESS NOTE ADULT - SUBJECTIVE AND OBJECTIVE BOX
Physical Medicine and Rehabilitation Progress Note :       Patient is a 63y old  Female who presents with a chief complaint of abdominal pain (11 Mar 2024 10:39)      HPI:  63F former smoker, with FHx MI, PMHx of asthma/COPD, sciatica/herniated disc, HTN, HLD, DM2, obesity, anxiety, AS s/p bioAVR/ascending aorta replacement and CABG SVG-PDA 05/2017 @ [facility] w subsequent PCIs (most recent 10/16/23 DESx2 pLAD) recent admission to [facility] Dec 28-29, 2023 for chest pain w/ neg trop x 2, neg CCTA for acute findings, dx'd w/ pericarditis discharged on Colchicine 0.6 mg BID x 3 months (still taking), recent dx of H Pylori, now on PPI + Amoxicillin 1g BID x 14 days, Clarithromycin 500 mg BID x 14 days, recently seen in the ED on 2/29 for epigastric pain, T&R'd, states to have been seen at [facility], operated by [facility] on 3/3, where patient had CTAP w/ IV contrast that showed fatty liver (c/w prior ED w/u-had US at that time), and was discharged home. Patient presents today w/ diffuse back pain, described as tightness, worse w/ deep breathing, sweating, not feeling well, gen weakness. No f/c. CP 5/10, back pain 7/10. Patient reports to have stopped smoking in 08/2023, previous smoked 1.5 packs daily, tpy roughly 60. Patient reports that last BM was 3 days prior to admission and is not currently on bowel regimen while on opioid pain meds.     ED COURSE:   Vitals: T 98 to 99.6, HR 80 to 111, /83 to 146/86, RR 18, SpO2 100% on room air    Significant Labs: WBC 4.9, Hgb 14.2, Plt 181, Cl 91, AG 20 with bicarb 26, Calcium 10.8, ,     EKG: Q waves V2 - V6    CTA chest and A/P: Stable appearance post graft replacement of the ascending aorta. No aortic dissection or aneurysm.    Inverventions: ASA 162mg, Morphine 4mg IV, zofran 4mg IV   (05 Mar 2024 20:57)                            12.5   6.10  )-----------( 260      ( 11 Mar 2024 05:30 )             38.8       03-11    129<L>  |  97  |  13  ----------------------------<  136<H>  3.8   |  22  |  0.57    Ca    9.2      11 Mar 2024 05:30  Phos  2.9     03-11  Mg     1.8     03-11    TPro  6.4  /  Alb  3.9  /  TBili  0.3  /  DBili  x   /  AST  86<H>  /  ALT  44  /  AlkPhos  46  03-10    Vital Signs Last 24 Hrs  T(C): 36.4 (11 Mar 2024 06:16), Max: 36.8 (10 Mar 2024 13:44)  T(F): 97.6 (11 Mar 2024 06:16), Max: 98.2 (10 Mar 2024 13:44)  HR: 77 (11 Mar 2024 06:16) (77 - 87)  BP: 102/68 (11 Mar 2024 10:15) (89/60 - 136/88)  BP(mean): --  RR: 19 (11 Mar 2024 06:16) (18 - 19)  SpO2: 97% (11 Mar 2024 06:16) (97% - 98%)    Parameters below as of 11 Mar 2024 06:16  Patient On (Oxygen Delivery Method): room air        MEDICATIONS  (STANDING):  acetaminophen     Tablet .. 975 milliGRAM(s) Oral every 8 hours  aspirin enteric coated 81 milliGRAM(s) Oral daily  bisacodyl 5 milliGRAM(s) Oral every 12 hours  bismuth subsalicylate Liquid 300 milliGRAM(s) Oral every 6 hours  clopidogrel Tablet 75 milliGRAM(s) Oral daily  dextrose 5%. 1000 milliLiter(s) (100 mL/Hr) IV Continuous <Continuous>  dextrose 5%. 1000 milliLiter(s) (50 mL/Hr) IV Continuous <Continuous>  dextrose 50% Injectable 25 Gram(s) IV Push once  dextrose 50% Injectable 25 Gram(s) IV Push once  dextrose 50% Injectable 12.5 Gram(s) IV Push once  diazepam    Tablet 2 milliGRAM(s) Oral at bedtime  glucagon  Injectable 1 milliGRAM(s) IntraMuscular once  insulin lispro (ADMELOG) corrective regimen sliding scale   SubCutaneous Before meals and at bedtime  magnesium sulfate  IVPB 2 Gram(s) IV Intermittent once  magnesium sulfate  IVPB 1 Gram(s) IV Intermittent once  metoprolol succinate ER 50 milliGRAM(s) Oral daily  metroNIDAZOLE    Tablet 250 milliGRAM(s) Oral every 6 hours  pantoprazole    Tablet 40 milliGRAM(s) Oral every 12 hours  polyethylene glycol 3350 17 Gram(s) Oral two times a day  senna 2 Tablet(s) Oral at bedtime  sodium chloride 2% . 1000 milliLiter(s) (50 mL/Hr) IV Continuous <Continuous>  sucralfate suspension 1 Gram(s) Oral every 6 hours  tetracycline 500 milliGRAM(s) Oral four times a day    MEDICATIONS  (PRN):  acetaminophen 300 mG/butalbital 50 mG/ caffeine 40 mG 1 Capsule(s) Oral every 6 hours PRN headache  aluminum hydroxide/magnesium hydroxide/simethicone Suspension 30 milliLiter(s) Oral every 4 hours PRN Dyspepsia  dextrose Oral Gel 15 Gram(s) Oral once PRN Blood Glucose LESS THAN 70 milliGRAM(s)/deciliter  melatonin 3 milliGRAM(s) Oral at bedtime PRN Insomnia  oxyCODONE    IR 7.5 milliGRAM(s) Oral every 6 hours PRN Severe Pain (7 - 10)  oxyCODONE    IR 5 milliGRAM(s) Oral every 6 hours PRN Moderate Pain (4 - 6)        PHYSICAL EXAM  General: NAD, appears uncomfortable lying in bed  Head: NC/AT; MMM; PERRL; EOMI;  Neck: Supple; no JVD  Respiratory: CTAB; no wheezes/rales/rhonchi  Cardiovascular: Regular rhythm/rate; S1/S2+, systolic murmur c/w aortic stenosis  Gastrointestinal: Soft; diffusely tender to palpation  Extremities: WWP; no edema/cyanosis  Neurological: A&Ox3, conversing and answering questions appropriately            Functional Status Assessment :         Pain Assessment/Number Scale (0-10) Adult  Presence of Pain: denies pain/discomfort (Rating = 0)  Pain Rating (0-10): Rest: 0 (no pain/absence of nonverbal indicators of pain)  Pain Rating (0-10): Activity: 0 (no pain/absence of nonverbal indicators of pain)    Safety      AM-PAC Functional Assessment: Basic Mobility  Type of Assessment: Daily assessment  Turning from your back to your side while in a flat bed without using bedrails?: 4 = No assist / stand by assistance  Moving from lying on your back to sitting on the flat side of a flat bed without using bedrails?: 4 = No assist / stand by assistance  Moving to and from a bed to a chair (including a wheelchair)?: 4 = No assist / stand by assistance  Standing up from a chair using your arms (e.g. wheelchair or bedside chair)?: 4 = No assist / stand by assistance  Walking in hospital room?: 3 = A little assistance  Climbing 3-5 steps with a railing?: 3 = A little assistance  Score: 22   Row Comment: Ask the patient "How much help from another person do you currently need? (If the patient hasn't done an activity recently, how much help from another person do you think he/she needs if he/she tried?)    Cognitive/Neuro      Cognitive/Neuro/Behavioral  Cognitive/Neuro/Behavioral [WDL Definition: Alert; opens eyes spontaneously; arouses to voice or touch; oriented x 4; follows commands; speech spontaneous, logical; purposeful motor response; behavior appropriate to situation]: WDL    Language Assistance  Preferred Language to Address Healthcare Preferred Language to Address Healthcare: English    Therapeutic Interventions      Bed Mobility  Bed Mobility Training Rolling/Turning: independent  Bed Mobility Training Sit-to-Supine: independent    Sit-Stand Transfer Training  Transfer Training Sit-to-Stand Transfer: independent;  rolling walker  Transfer Training Stand-to-Sit Transfer: independent;  rolling walker    Gait Training  Gait Training: contact guard;  1 person assist;  nonverbal cues (demo/gestures);  verbal cues;  rolling walker;  75 feet;  x2  Gait Analysis: 3-point gait   decreased david;  increased time in double stance;  decreased hip/knee flexion;  decreased step length;  decreased trunk rotation;  decreased weight-shifting ability;  decreased strength;  impaired balance;  impaired postural control;  75 feet;  x2;  rolling walker          PM&R Impression : as above    Current disposition plan recommendation :     d/c home with home physical therapy

## 2024-03-11 NOTE — PROGRESS NOTE ADULT - ASSESSMENT
63F former smoker (Quit 8/2023), CAD s/p CABG (2017) and AS s/p bioAVR/ascending aorta replacement and PCI (most recent 0/16/23 DESx2 pLAD on plavix/ aspirin, asthma/COPD, HTN, HLD, DM2, pericarditis 12/2023 (on Colchicine 0.6 mg BID x 3 months (still taking), recent dx of H Pylori (unable to tolerate triple therapy), presenting for generalized pain and fatigue.     #Abdominal pain  #H. Pylori positive  #Elevated LFTs  Patient with generalized pain of unclear etiology and w/u for pancreaticobiliary pathology negative. DDx includes H.pylori infection, gastritis, ulcers, medications (h.pylori treatment, colchicine constipation. She has a known HP infection and was recently treated with triple therapy, but stopped due to intolerability of side effects and presently has been restarted on quadruple therapy. While ulcer or gastritis/ esophagitis are on the ddx, she is currently on PPI BID and being treated for HP, has no evidence of bleeding, and CT without alarming features. Would continue medical management for now and defer EGD especially in setting of recent stents.    Of notes, she also have elevated lfts 2-3x ULN x 3 months with steatosis on imaging. Likely medication induced colchicine v alvarenga v cardiac. Hep C neg. Labs suggestive of prior Hep B infection (no prior vaccination per patient). No supplements/ herbals or alcohol use. Complete work up with testing for autoimmune.    Recommendations:  - Supportive care per primary team  - Agree with plan to repeat TTE  - Continue H.Pylori quadruple therapy - will need repeat breath test to confirm eradication upon   - Treat constipation with Miralax BID + PRN senna  - Avoid hepatoxic medication  - Consider stopping Colchicine  - Please obtain GEE, ASMA, AMA, IgG    Mckenzie Kuhn MD  Gastroenterology Fellow, PGY -6  Weekday Pager [phone] 63F former smoker (Quit 8/2023), CAD s/p CABG (2017) and AS s/p bioAVR/ascending aorta replacement and PCI (most recent 0/16/23 DESx2 pLAD on plavix/ aspirin, asthma/COPD, HTN, HLD, DM2, pericarditis 12/2023 (on Colchicine 0.6 mg BID x 3 months (still taking), recent dx of H Pylori (unable to tolerate triple therapy), presenting for generalized pain and fatigue.     #Abdominal pain  #Enteritis  #H. Pylori positive  #Elevated LFTs  Patient with generalized pain of unclear etiology and w/u for pancreaticobiliary pathology negative. DDx includes enteritis seen on CT (? medication induced from colchicine, ischemic), known H.pylori infection/ HP medications, constipation. While ulcer or gastritis/ esophagitis are on the ddx, she is currently on PPI BID and being treated for HP, has no evidence of bleeding. Would continue medical management for now and defer EGD especially in setting of recent stents.    Of notes, she also have elevated lfts 2-3x ULN x 3 months with steatosis on imaging. Most likely DILI from colchicine as it elevated after starting colchicine and now improving having stopped. However, may also be FORRESTER. Hep C neg. Labs suggestive of prior Hep B infection (no prior vaccination per patient). No supplements/ herbals or alcohol use. Autoimmune work up negative.    Recommendations:  - Supportive care per primary team  - Treat constipation with Miralax BID + senna  - Continue H.Pylori quadruple therapy - will need repeat breath test 2-4 weeks after completing therapy to confirm eradication upon   - Avoid hepatoxic medication  - Continue to monitor daily LFTs until resolution    SILVIA Kuhn MD  Gastroenterology Fellow, PGY -6  Weekday Pager [phone] 63F former smoker (Quit 8/2023), CAD s/p CABG (2017) and AS s/p bioAVR/ascending aorta replacement and PCI (most recent 0/16/23 DESx2 pLAD on plavix/ aspirin, asthma/COPD, HTN, HLD, DM2, pericarditis 12/2023 (on Colchicine 0.6 mg BID x 3 months (still taking), recent dx of H Pylori (unable to tolerate triple therapy), presenting for generalized pain and fatigue.     #Abdominal pain  #Enteritis  #H. Pylori positive  #Elevated LFTs  Patient with generalized pain of unclear etiology and w/u for pancreaticobiliary pathology negative. DDx includes enteritis seen on CT (? medication induced from colchicine, ischemic), known H.pylori infection/ HP medications, constipation. While ulcer or gastritis/ esophagitis are on the ddx, she is currently on PPI BID and being treated for HP, has no evidence of bleeding. Would continue medical management for now and defer EGD especially in setting of recent stents.    Of notes, she also have elevated lfts 2-3x ULN x 3 months with steatosis on imaging. Most likely DILI from colchicine as it elevated after starting colchicine and now improving having stopped. However, may also be FORRESTER. Hep C neg. Labs suggestive of prior Hep B infection (no prior vaccination per patient). No supplements/ herbals or alcohol use. Autoimmune work up negative.    Recommendations:  - Supportive care per primary team  - Treat constipation with Miralax BID + senna  - Continue H.Pylori quadruple therapy - will need repeat breath test 2-4 weeks after completing therapy to confirm eradication upon   - Avoid hepatoxic medication  - Continue to monitor daily LFTs until resolution    Mckenzie Kuhn MD  Gastroenterology Fellow, PGY -6  Weekday Pager [phone]

## 2024-03-12 VITALS
SYSTOLIC BLOOD PRESSURE: 107 MMHG | RESPIRATION RATE: 18 BRPM | TEMPERATURE: 99 F | HEART RATE: 93 BPM | OXYGEN SATURATION: 98 % | DIASTOLIC BLOOD PRESSURE: 71 MMHG

## 2024-03-12 LAB
ALBUMIN SERPL ELPH-MCNC: 3.7 G/DL — SIGNIFICANT CHANGE UP (ref 3.3–5)
ALP SERPL-CCNC: 48 U/L — SIGNIFICANT CHANGE UP (ref 40–120)
ALT FLD-CCNC: 58 U/L — HIGH (ref 10–45)
ANION GAP SERPL CALC-SCNC: 10 MMOL/L — SIGNIFICANT CHANGE UP (ref 5–17)
AST SERPL-CCNC: 107 U/L — HIGH (ref 10–40)
BASOPHILS # BLD AUTO: 0.06 K/UL — SIGNIFICANT CHANGE UP (ref 0–0.2)
BASOPHILS NFR BLD AUTO: 0.9 % — SIGNIFICANT CHANGE UP (ref 0–2)
BILIRUB SERPL-MCNC: 0.2 MG/DL — SIGNIFICANT CHANGE UP (ref 0.2–1.2)
BUN SERPL-MCNC: 25 MG/DL — HIGH (ref 7–23)
CALCIUM SERPL-MCNC: 8.4 MG/DL — SIGNIFICANT CHANGE UP (ref 8.4–10.5)
CHLORIDE SERPL-SCNC: 100 MMOL/L — SIGNIFICANT CHANGE UP (ref 96–108)
CO2 SERPL-SCNC: 22 MMOL/L — SIGNIFICANT CHANGE UP (ref 22–31)
CREAT SERPL-MCNC: 0.66 MG/DL — SIGNIFICANT CHANGE UP (ref 0.5–1.3)
EGFR: 97 ML/MIN/1.73M2 — SIGNIFICANT CHANGE UP
EOSINOPHIL # BLD AUTO: 0.16 K/UL — SIGNIFICANT CHANGE UP (ref 0–0.5)
EOSINOPHIL NFR BLD AUTO: 2.4 % — SIGNIFICANT CHANGE UP (ref 0–6)
GLUCOSE BLDC GLUCOMTR-MCNC: 121 MG/DL — HIGH (ref 70–99)
GLUCOSE BLDC GLUCOMTR-MCNC: 215 MG/DL — HIGH (ref 70–99)
GLUCOSE SERPL-MCNC: 112 MG/DL — HIGH (ref 70–99)
HCT VFR BLD CALC: 36.7 % — SIGNIFICANT CHANGE UP (ref 34.5–45)
HGB BLD-MCNC: 11.8 G/DL — SIGNIFICANT CHANGE UP (ref 11.5–15.5)
IGG FLD-MCNC: 761 MG/DL — SIGNIFICANT CHANGE UP (ref 610–1660)
IGG1 SER-MCNC: 442 MG/DL — SIGNIFICANT CHANGE UP (ref 240–1118)
IGG2 SER-MCNC: 289 MG/DL — SIGNIFICANT CHANGE UP (ref 124–549)
IGG3 SER-MCNC: 29.7 MG/DL — SIGNIFICANT CHANGE UP (ref 15–102)
IGG4 SER-MCNC: 15.2 MG/DL — SIGNIFICANT CHANGE UP (ref 1–123)
IMM GRANULOCYTES NFR BLD AUTO: 3.4 % — HIGH (ref 0–0.9)
LYMPHOCYTES # BLD AUTO: 2.2 K/UL — SIGNIFICANT CHANGE UP (ref 1–3.3)
LYMPHOCYTES # BLD AUTO: 32.5 % — SIGNIFICANT CHANGE UP (ref 13–44)
MAGNESIUM SERPL-MCNC: 2 MG/DL — SIGNIFICANT CHANGE UP (ref 1.6–2.6)
MCHC RBC-ENTMCNC: 26 PG — LOW (ref 27–34)
MCHC RBC-ENTMCNC: 32.2 GM/DL — SIGNIFICANT CHANGE UP (ref 32–36)
MCV RBC AUTO: 81 FL — SIGNIFICANT CHANGE UP (ref 80–100)
MONOCYTES # BLD AUTO: 0.78 K/UL — SIGNIFICANT CHANGE UP (ref 0–0.9)
MONOCYTES NFR BLD AUTO: 11.5 % — SIGNIFICANT CHANGE UP (ref 2–14)
NEUTROPHILS # BLD AUTO: 3.33 K/UL — SIGNIFICANT CHANGE UP (ref 1.8–7.4)
NEUTROPHILS NFR BLD AUTO: 49.3 % — SIGNIFICANT CHANGE UP (ref 43–77)
NRBC # BLD: 0 /100 WBCS — SIGNIFICANT CHANGE UP (ref 0–0)
PHOSPHATE SERPL-MCNC: 3.1 MG/DL — SIGNIFICANT CHANGE UP (ref 2.5–4.5)
PLATELET # BLD AUTO: 258 K/UL — SIGNIFICANT CHANGE UP (ref 150–400)
POTASSIUM SERPL-MCNC: 3.3 MMOL/L — LOW (ref 3.5–5.3)
POTASSIUM SERPL-SCNC: 3.3 MMOL/L — LOW (ref 3.5–5.3)
PROT SERPL-MCNC: 6 G/DL — SIGNIFICANT CHANGE UP (ref 6–8.3)
RBC # BLD: 4.53 M/UL — SIGNIFICANT CHANGE UP (ref 3.8–5.2)
RBC # FLD: 16 % — HIGH (ref 10.3–14.5)
SODIUM SERPL-SCNC: 132 MMOL/L — LOW (ref 135–145)
WBC # BLD: 6.76 K/UL — SIGNIFICANT CHANGE UP (ref 3.8–10.5)
WBC # FLD AUTO: 6.76 K/UL — SIGNIFICANT CHANGE UP (ref 3.8–10.5)

## 2024-03-12 PROCEDURE — 99232 SBSQ HOSP IP/OBS MODERATE 35: CPT

## 2024-03-12 PROCEDURE — 82784 ASSAY IGA/IGD/IGG/IGM EACH: CPT

## 2024-03-12 PROCEDURE — 36415 COLL VENOUS BLD VENIPUNCTURE: CPT

## 2024-03-12 PROCEDURE — 86255 FLUORESCENT ANTIBODY SCREEN: CPT

## 2024-03-12 PROCEDURE — 87637 SARSCOV2&INF A&B&RSV AMP PRB: CPT

## 2024-03-12 PROCEDURE — 87340 HEPATITIS B SURFACE AG IA: CPT

## 2024-03-12 PROCEDURE — 85025 COMPLETE CBC W/AUTO DIFF WBC: CPT

## 2024-03-12 PROCEDURE — 84300 ASSAY OF URINE SODIUM: CPT

## 2024-03-12 PROCEDURE — 86709 HEPATITIS A IGM ANTIBODY: CPT

## 2024-03-12 PROCEDURE — 83036 HEMOGLOBIN GLYCOSYLATED A1C: CPT

## 2024-03-12 PROCEDURE — 80061 LIPID PANEL: CPT

## 2024-03-12 PROCEDURE — 81001 URINALYSIS AUTO W/SCOPE: CPT

## 2024-03-12 PROCEDURE — 99239 HOSP IP/OBS DSCHRG MGMT >30: CPT | Mod: GC

## 2024-03-12 PROCEDURE — 99232 SBSQ HOSP IP/OBS MODERATE 35: CPT | Mod: GC

## 2024-03-12 PROCEDURE — 86235 NUCLEAR ANTIGEN ANTIBODY: CPT

## 2024-03-12 PROCEDURE — 84550 ASSAY OF BLOOD/URIC ACID: CPT

## 2024-03-12 PROCEDURE — 74174 CTA ABD&PLVS W/CONTRAST: CPT | Mod: MC

## 2024-03-12 PROCEDURE — 83935 ASSAY OF URINE OSMOLALITY: CPT

## 2024-03-12 PROCEDURE — 82310 ASSAY OF CALCIUM: CPT

## 2024-03-12 PROCEDURE — 75573 CT HRT C+ STRUX CGEN HRT DS: CPT | Mod: MC

## 2024-03-12 PROCEDURE — 83930 ASSAY OF BLOOD OSMOLALITY: CPT

## 2024-03-12 PROCEDURE — 96375 TX/PRO/DX INJ NEW DRUG ADDON: CPT

## 2024-03-12 PROCEDURE — 82533 TOTAL CORTISOL: CPT

## 2024-03-12 PROCEDURE — 86803 HEPATITIS C AB TEST: CPT

## 2024-03-12 PROCEDURE — 97530 THERAPEUTIC ACTIVITIES: CPT

## 2024-03-12 PROCEDURE — 86038 ANTINUCLEAR ANTIBODIES: CPT

## 2024-03-12 PROCEDURE — 83721 ASSAY OF BLOOD LIPOPROTEIN: CPT

## 2024-03-12 PROCEDURE — 99285 EMERGENCY DEPT VISIT HI MDM: CPT | Mod: 25

## 2024-03-12 PROCEDURE — 84100 ASSAY OF PHOSPHORUS: CPT

## 2024-03-12 PROCEDURE — 71275 CT ANGIOGRAPHY CHEST: CPT | Mod: MC

## 2024-03-12 PROCEDURE — 85652 RBC SED RATE AUTOMATED: CPT

## 2024-03-12 PROCEDURE — 82787 IGG 1 2 3 OR 4 EACH: CPT

## 2024-03-12 PROCEDURE — 70450 CT HEAD/BRAIN W/O DYE: CPT | Mod: MC

## 2024-03-12 PROCEDURE — 86140 C-REACTIVE PROTEIN: CPT

## 2024-03-12 PROCEDURE — 86704 HEP B CORE ANTIBODY TOTAL: CPT

## 2024-03-12 PROCEDURE — 93005 ELECTROCARDIOGRAM TRACING: CPT

## 2024-03-12 PROCEDURE — 84133 ASSAY OF URINE POTASSIUM: CPT

## 2024-03-12 PROCEDURE — 86381 MITOCHONDRIAL ANTIBODY EACH: CPT

## 2024-03-12 PROCEDURE — 80076 HEPATIC FUNCTION PANEL: CPT

## 2024-03-12 PROCEDURE — 82962 GLUCOSE BLOOD TEST: CPT

## 2024-03-12 PROCEDURE — 76770 US EXAM ABDO BACK WALL COMP: CPT

## 2024-03-12 PROCEDURE — 84540 ASSAY OF URINE/UREA-N: CPT

## 2024-03-12 PROCEDURE — 84484 ASSAY OF TROPONIN QUANT: CPT

## 2024-03-12 PROCEDURE — 83880 ASSAY OF NATRIURETIC PEPTIDE: CPT

## 2024-03-12 PROCEDURE — 80048 BASIC METABOLIC PNL TOTAL CA: CPT

## 2024-03-12 PROCEDURE — 83970 ASSAY OF PARATHORMONE: CPT

## 2024-03-12 PROCEDURE — 86705 HEP B CORE ANTIBODY IGM: CPT

## 2024-03-12 PROCEDURE — 80053 COMPREHEN METABOLIC PANEL: CPT

## 2024-03-12 PROCEDURE — 96374 THER/PROPH/DIAG INJ IV PUSH: CPT

## 2024-03-12 PROCEDURE — 83690 ASSAY OF LIPASE: CPT

## 2024-03-12 PROCEDURE — 84443 ASSAY THYROID STIM HORMONE: CPT

## 2024-03-12 PROCEDURE — 97116 GAIT TRAINING THERAPY: CPT

## 2024-03-12 PROCEDURE — C8929: CPT

## 2024-03-12 PROCEDURE — 82550 ASSAY OF CK (CPK): CPT

## 2024-03-12 PROCEDURE — 82570 ASSAY OF URINE CREATININE: CPT

## 2024-03-12 PROCEDURE — 86706 HEP B SURFACE ANTIBODY: CPT

## 2024-03-12 PROCEDURE — 83735 ASSAY OF MAGNESIUM: CPT

## 2024-03-12 PROCEDURE — 82085 ASSAY OF ALDOLASE: CPT

## 2024-03-12 PROCEDURE — 84156 ASSAY OF PROTEIN URINE: CPT

## 2024-03-12 PROCEDURE — 97161 PT EVAL LOW COMPLEX 20 MIN: CPT

## 2024-03-12 RX ORDER — OMEPRAZOLE 10 MG/1
1 CAPSULE, DELAYED RELEASE ORAL
Qty: 0 | Refills: 0 | DISCHARGE

## 2024-03-12 RX ORDER — HYDROCODONE BITARTRATE AND ACETAMINOPHEN 7.5; 325 MG/15ML; MG/15ML
1 SOLUTION ORAL
Refills: 0 | DISCHARGE

## 2024-03-12 RX ORDER — ROSUVASTATIN CALCIUM 5 MG/1
1 TABLET ORAL
Qty: 0 | Refills: 0 | DISCHARGE

## 2024-03-12 RX ORDER — METRONIDAZOLE 500 MG
1 TABLET ORAL
Qty: 32 | Refills: 0
Start: 2024-03-12 | End: 2024-03-19

## 2024-03-12 RX ORDER — SUCRALFATE 1 G
10 TABLET ORAL
Qty: 0 | Refills: 0 | DISCHARGE
Start: 2024-03-12

## 2024-03-12 RX ORDER — METFORMIN HYDROCHLORIDE 850 MG/1
1 TABLET ORAL
Qty: 0 | Refills: 0 | DISCHARGE

## 2024-03-12 RX ORDER — POTASSIUM CHLORIDE 20 MEQ
40 PACKET (EA) ORAL EVERY 4 HOURS
Refills: 0 | Status: DISCONTINUED | OUTPATIENT
Start: 2024-03-12 | End: 2024-03-12

## 2024-03-12 RX ORDER — PANTOPRAZOLE SODIUM 20 MG/1
1 TABLET, DELAYED RELEASE ORAL
Qty: 16 | Refills: 0
Start: 2024-03-12 | End: 2024-03-19

## 2024-03-12 RX ORDER — ACETAMINOPHEN 500 MG
2 TABLET ORAL
Qty: 0 | Refills: 0 | DISCHARGE
Start: 2024-03-12

## 2024-03-12 RX ORDER — POTASSIUM CHLORIDE 20 MEQ
40 PACKET (EA) ORAL EVERY 4 HOURS
Refills: 0 | Status: COMPLETED | OUTPATIENT
Start: 2024-03-12 | End: 2024-03-12

## 2024-03-12 RX ORDER — CHLORTHALIDONE 50 MG
1 TABLET ORAL
Qty: 0 | Refills: 0 | DISCHARGE

## 2024-03-12 RX ADMIN — MORPHINE SULFATE 4 MILLIGRAM(S): 50 CAPSULE, EXTENDED RELEASE ORAL at 14:51

## 2024-03-12 RX ADMIN — Medication 40 MILLIEQUIVALENT(S): at 11:07

## 2024-03-12 RX ADMIN — MORPHINE SULFATE 2 MILLIGRAM(S): 50 CAPSULE, EXTENDED RELEASE ORAL at 10:39

## 2024-03-12 RX ADMIN — Medication 81 MILLIGRAM(S): at 11:07

## 2024-03-12 RX ADMIN — Medication 5 MILLIGRAM(S): at 06:27

## 2024-03-12 RX ADMIN — Medication 40 MILLIEQUIVALENT(S): at 07:35

## 2024-03-12 RX ADMIN — CLOPIDOGREL BISULFATE 75 MILLIGRAM(S): 75 TABLET, FILM COATED ORAL at 11:07

## 2024-03-12 RX ADMIN — Medication 250 MILLIGRAM(S): at 11:07

## 2024-03-12 RX ADMIN — Medication 300 MILLIGRAM(S): at 11:08

## 2024-03-12 RX ADMIN — Medication 250 MILLIGRAM(S): at 06:27

## 2024-03-12 RX ADMIN — Medication 1 GRAM(S): at 11:07

## 2024-03-12 RX ADMIN — MORPHINE SULFATE 4 MILLIGRAM(S): 50 CAPSULE, EXTENDED RELEASE ORAL at 15:10

## 2024-03-12 RX ADMIN — Medication 500 MILLIGRAM(S): at 06:27

## 2024-03-12 RX ADMIN — Medication 300 MILLIGRAM(S): at 06:26

## 2024-03-12 RX ADMIN — Medication 1 GRAM(S): at 06:27

## 2024-03-12 RX ADMIN — Medication 4: at 13:06

## 2024-03-12 RX ADMIN — Medication 15 GRAM(S): at 11:08

## 2024-03-12 RX ADMIN — Medication 500 MILLIGRAM(S): at 11:07

## 2024-03-12 RX ADMIN — MORPHINE SULFATE 2 MILLIGRAM(S): 50 CAPSULE, EXTENDED RELEASE ORAL at 09:34

## 2024-03-12 RX ADMIN — POLYETHYLENE GLYCOL 3350 17 GRAM(S): 17 POWDER, FOR SOLUTION ORAL at 06:27

## 2024-03-12 RX ADMIN — PANTOPRAZOLE SODIUM 40 MILLIGRAM(S): 20 TABLET, DELAYED RELEASE ORAL at 06:27

## 2024-03-12 RX ADMIN — Medication 50 MILLIGRAM(S): at 06:27

## 2024-03-12 NOTE — PROGRESS NOTE ADULT - PROBLEM SELECTOR PLAN 2
AST/ALT elevations, uptrending since discharge in 12/2023. Polo's sign negative on admission. CTAP without evidence of galbladder obstruction however layering sludge without wall thickening or pericholecystic fluid present on exam.  - hepatitis panel with signs of past HepB infection (surface and core antibody positive)  - CTM  - f/u GEE, ASMA, AMA, IgG

## 2024-03-12 NOTE — PROGRESS NOTE ADULT - PROBLEM SELECTOR PLAN 10
on home metoprolol succinate 50mg qd + chlorthalidone 25mg PO qd   - c/w toprol   - confirm chlorthalidone home med
On home vicodin 7.5-325 mg q12 PRN for pain.   - oxycodone 7.5 mg PO q6h PRN for severe pain   - start standing tylenol
on home metoprolol succinate 50mg qd + chlorthalidone 25mg PO qd   - c/w toprol   - confirm chlorthalidone home med
On home vicodin 7.5-325 mg q12 PRN for pain.   - c/w oxycodone 5mg PO q12h PRN for severe pain   - c/w tylenol 625mg PO q6h for pain/fever
On home vicodin 7.5-325 mg q12 PRN for pain.   - oxycodone 7.5 mg PO q6h PRN for severe pain   - start standing tylenol

## 2024-03-12 NOTE — PROGRESS NOTE ADULT - PROBLEM SELECTOR PROBLEM 1
Epigastric abdominal pain

## 2024-03-12 NOTE — PROGRESS NOTE ADULT - SUBJECTIVE AND OBJECTIVE BOX
GASTROENTEROLOGY PROGRESS NOTE  Patient seen and examined at bedside.  Continues to c/o diffuse abdominal pain  Weakness in extremities and general fatigue appear to be improving  LFTs remain elevated; na improving    PERTINENT REVIEW OF SYSTEMS:  CONSTITUTIONAL: No weakness, fevers or chills  HEENT: No visual changes; No vertigo or throat pain   GASTROINTESTINAL: As above.  NEUROLOGICAL: No numbness or weakness  SKIN: No itching, burning, rashes, or lesions     Allergies    Biaxin (Hives)    Intolerances      MEDICATIONS:  MEDICATIONS  (STANDING):  aspirin enteric coated 81 milliGRAM(s) Oral daily  bisacodyl 5 milliGRAM(s) Oral every 12 hours  bismuth subsalicylate Liquid 300 milliGRAM(s) Oral every 6 hours  clopidogrel Tablet 75 milliGRAM(s) Oral daily  dextrose 5%. 1000 milliLiter(s) (100 mL/Hr) IV Continuous <Continuous>  dextrose 5%. 1000 milliLiter(s) (50 mL/Hr) IV Continuous <Continuous>  dextrose 50% Injectable 25 Gram(s) IV Push once  dextrose 50% Injectable 25 Gram(s) IV Push once  dextrose 50% Injectable 12.5 Gram(s) IV Push once  diazepam    Tablet 2 milliGRAM(s) Oral at bedtime  glucagon  Injectable 1 milliGRAM(s) IntraMuscular once  insulin lispro (ADMELOG) corrective regimen sliding scale   SubCutaneous Before meals and at bedtime  magnesium sulfate  IVPB 2 Gram(s) IV Intermittent once  metoprolol succinate ER 50 milliGRAM(s) Oral daily  metroNIDAZOLE    Tablet 250 milliGRAM(s) Oral every 6 hours  pantoprazole    Tablet 40 milliGRAM(s) Oral every 12 hours  polyethylene glycol 3350 17 Gram(s) Oral two times a day  senna 2 Tablet(s) Oral at bedtime  sucralfate suspension 1 Gram(s) Oral every 6 hours  tetracycline 500 milliGRAM(s) Oral four times a day  urea Oral Powder 15 Gram(s) Oral daily    MEDICATIONS  (PRN):  acetaminophen     Tablet .. 650 milliGRAM(s) Oral every 6 hours PRN Mild Pain (1 - 3)  acetaminophen 300 mG/butalbital 50 mG/ caffeine 40 mG 1 Capsule(s) Oral every 6 hours PRN headache  aluminum hydroxide/magnesium hydroxide/simethicone Suspension 30 milliLiter(s) Oral every 4 hours PRN Dyspepsia  dextrose Oral Gel 15 Gram(s) Oral once PRN Blood Glucose LESS THAN 70 milliGRAM(s)/deciliter  melatonin 3 milliGRAM(s) Oral at bedtime PRN Insomnia  morphine  - Injectable 2 milliGRAM(s) IV Push every 6 hours PRN Moderate Pain (4 - 6)  morphine  - Injectable 4 milliGRAM(s) IV Push every 6 hours PRN Severe Pain (7 - 10)    Vital Signs Last 24 Hrs  T(C): 37.1 (12 Mar 2024 12:30), Max: 37.1 (12 Mar 2024 12:30)  T(F): 98.7 (12 Mar 2024 12:30), Max: 98.7 (12 Mar 2024 12:30)  HR: 93 (12 Mar 2024 12:30) (81 - 93)  BP: 107/71 (12 Mar 2024 12:30) (107/71 - 132/83)  BP(mean): --  RR: 18 (12 Mar 2024 12:30) (18 - 18)  SpO2: 98% (12 Mar 2024 12:30) (97% - 98%)    Parameters below as of 12 Mar 2024 12:30  Patient On (Oxygen Delivery Method): room air        03-11 @ 07:01  -  03-12 @ 07:00  --------------------------------------------------------  IN: 650 mL / OUT: 1000 mL / NET: -350 mL      PHYSICAL EXAM:    General: in no acute distress  HEENT: MMM, conjunctiva and sclera clear  Gastrointestinal: Soft tender non-distended; No rebound or guarding  Skin: Warm and dry. No obvious rash    LABS:                        11.8   6.76  )-----------( 258      ( 12 Mar 2024 05:30 )             36.7     03-12    132<L>  |  100  |  25<H>  ----------------------------<  112<H>  3.3<L>   |  22  |  0.66    Ca    8.4      12 Mar 2024 05:30  Phos  3.1     03-12  Mg     2.0     03-12    TPro  6.0  /  Alb  3.7  /  TBili  0.2  /  DBili  x   /  AST  107<H>  /  ALT  58<H>  /  AlkPhos  48  03-12          Urinalysis Basic - ( 12 Mar 2024 05:30 )    Color: x / Appearance: x / SG: x / pH: x  Gluc: 112 mg/dL / Ketone: x  / Bili: x / Urobili: x   Blood: x / Protein: x / Nitrite: x   Leuk Esterase: x / RBC: x / WBC x   Sq Epi: x / Non Sq Epi: x / Bacteria: x                RADIOLOGY & ADDITIONAL STUDIES:  Reviewed

## 2024-03-12 NOTE — PROGRESS NOTE ADULT - PROBLEM SELECTOR PLAN 1
patient with characteristic epigastric abdominal pain radiating to back however CTAP without evidence of pancreatitis and lipase 20 (does not meet criteria for pancreatitis). Possible PUD 2/2 to H Pylori? CTA C/A/P r/o dissection. Per cardiology low concern primary cardiac  - c/w pain regimen below    #H. pylori inf: on PPI + Amoxicillin 1g BID x 14 days, Clarithromycin 500 mg BID x 14 days.   - Switch patient to quadruple therapy due to existing patient allergy to clarithromycin and possible side effect of clarithromycin being abdominal pain

## 2024-03-12 NOTE — PROGRESS NOTE ADULT - PROBLEM SELECTOR PROBLEM 9
Diabetes mellitus
History of chronic back pain
History of chronic back pain
Diabetes mellitus

## 2024-03-12 NOTE — PROGRESS NOTE ADULT - TIME BILLING
As above
As above
Reviewed hospital course, blood work, vitals, course to date  Thorough H/P  Serial exams today  Reviewed cardiac imaging  Case d/w SW, CT surgery, structural cardiology  Documentation.
Reviewed ER course, blood work, vitals, course to date  Thorough H/P  Returned later for repeat exam as patient having CP.   Case d/w house staff, PT, cardiology  Independently reviewed CT aorta  Considered ordering tests including further blood work for possible myositis, PMR picture.   Documentation.
Reviewed ER course, blood work, vitals, course to date  Thorough H/P  Returned later for repeat exam as patient having CP.   Case d/w house staff, cardiology, GI. Discussed holding medications. Discussed adding on medications for h. pylori and holding colchicine/statin in setting of myopathy  Considered ordering tests including further blood work for possible myositis  Documentation.
Reviewed weekend course, blood work, vitals, course to date  Thorough H/P  Close monitoring sodium  Reviewed cardiac imaging  Case d/w SW, CT surgery, structural cardiology, nephrology, urology  Considered ordering multiple tests  Documentation.

## 2024-03-12 NOTE — PROGRESS NOTE ADULT - PROBLEM SELECTOR PROBLEM 5
H/O pericarditis
History of COPD
H/O pericarditis
H/O pericarditis
History of COPD
H/O pericarditis
H/O pericarditis

## 2024-03-12 NOTE — PROGRESS NOTE ADULT - PROBLEM SELECTOR PLAN 12
on home crestor 40mg PO qHS + zetia 10mg PO qd  - c/w therapeutic interchange
F: s/p 500cc NS bolus   E: Replete if K<4 or Mag<2  N: DASH Diet  GI ppx: Pantoprazole  VTE ppx: Ambulatory, SCDs while in bed  Dispo: RMF
F: s/p 500cc NS bolus   E: Replete if K<4 or Mag<2  N: DASH Diet  GI ppx: Pantoprazole  VTE ppx: Ambulatory, SCDs while in bed  Dispo: RMF
on home crestor 40mg PO qHS + zetia 10mg PO qd  - c/w therapeutic interchange
on home crestor 40mg PO qHS + zetia 10mg PO qd  - c/w therapeutic interchange

## 2024-03-12 NOTE — PROGRESS NOTE ADULT - ATTENDING COMMENTS
Patient is a 64 yo F PMHx of CAD s/p PCI (10/2023), AS s/p bioAVR/ascending aorta replacement and CABG SVG-PDA (2017), recent diagnosis of pericarditis (12/2023) for which she was started on Colchicine, asthma/COPD, HTN, HLD, DM2, recent outpatient H pylori diagnosis and treatement presenting with epigastric pain and weakness. Cardiology consulted for Management of Pericarditis    Review of studies:  - ECG 3/5/24: NSR, inferior infarct pattern, Left axis deviation, left atrial enlargement, poor R wave progression   - TTE 03/06/2024:  Normal left ventricular size and systolic function. Normal right ventricular size and systolic function. Normal atria. Bioprosthetic valve is seen in the aortic position. The peak transvalvular velocity is 3.53 m/s, the mean transvalvular gradient is 28.00 mmHg, and the LVOT/AV velocity ratio is 0.28. Aortic valve velocity is elevated and may be suggestive of bioprosthetic stenosis. Clinical correlation is advised. Compared to the previous TTE performed on 10/17/2023, velocity and gradients across aortic bioprosthesis are higher.  - TTE 10/17/23: Normal BiV function, bio AV with normal function (peak rafa 2.49 m/s, mean grad 15), no pericardial effusion   - LHC 10/16/23: LM normal, 60-70% stenosis s/p DRAKE x2, LCx: OM1 patent stent, RCA patent SVG-RPDA      Home meds: ASA 81, Plavix 75, Toprol 50mg po qd, chlorthalidone 25mg po qd, Crestor 40, Zetia 10     # AS s/p Bioprosthetic AVR  - Patient noted to have increased gradients trough Bio AVR. The peak transvalvular velocity is 3.53 m/s, with a mean transvalvular gradient of 28.00 mmHg, with a  LVOT/AV velocity ratio of 0.28.   - CTS team made aware of progression of Bioprosthetic AV stenosis and Structural heart team consulted   - Patient underwent CT TAVR protocol; She will follow up has an outpatient with Dr Kwok  for continued evaluation of Valve in Valve TAVR intervention.  - CELESTE deferred this hospitalization due to concern for active PUD/Gi discomfort in patient who has not undergone EGD evaluation  - Cont Toprol 50 mg po daily     # Pericarditis  - Patient has known CAD with PCI of the OM1 in 08/2023 in setting of UA, and subsequent PCI of LAD in 10/2023. SVG to the RCA was patent t that time  - She returned to the Hospital in December with pleuritic chest discomfort, and was sent home on Colchicine 0.6 mg PO BID for 3 months   - Patient reports that a month into her therapy, she started to feel worse with abdominal discomfort, burning chest discomfort, dyspepsia, and worsening diarrhea. Her PO intake has gotten progressively worse and in the last few weeks she has been remarkably weeks  - Patient has been seen in the ER three times in the last 2 months for Abdominal discomfort. CT abdomen pelvis revealed Enteritis and repeat CT this hospitalization ruled out dissection  - ECG this hospitalization reviewed showing NSR with poor R wave progressive without ischemic changes  - CT chest reviewed without pericardial effusion.  - At this time do not believe patient's presentation to be from her pericarditis. Suspect Hpylori in patient on DAPT and concurrent Colchicine and ABx therapy exacerbated her possible GERD.  - Recommend EGD for assessment, however GI deferred this hospitalization as patient has not completed quadruple therapy    #Myopathy/Transaminitis  - Diagnosed with pericarditis in 12/2023 and treated with colchicine with plan for 3 months of therapy.   - Patient reported escalating weakness and myalgias  - CK on admission elevated to 700 with normal CRP and ESR normal for patient's age; Patient also with Mild transaminitis  - At this time given normal Inflammatory markers and otherwise low level of CK do no suspect patient's Myopathy to be from PMR, Or Vasculitis  - Suspect degree of colchicine Myopathy in patient already using a statin.   -  Wll cont to defer resuming statin for now until CK normalizes, currently at 384. If CK remains elevated or borderline, or patient with repeat myalgia with statin re-challenge Will consider Repatha as an outpatient     # CAD  - s/p SVG to RCA and PCI of OM and Prox LAD  - c/w ASA 81, Plavix 75   - c/w Toprol XL 50     Please ensure patient has outpatient follow up with Dr Gamez within 2 weeks of DC
clinically appears to have SIADH-- perhaps due to abd pain? --undergoing w/u for pain cause  NA improved with 2% NS--holding and follow Na  may consider Blake
Pt seen and d/w fellow.  Will plan on outpt f/u at [address]  Consider abdominal CTA.  Continue HP therapy.
Patient is a 62 yo F PMHx of CAD s/p PCI (10/2023), AS s/p bioAVR/ascending aorta replacement and CABG SVG-PDA (2017), recent diagnosis of pericarditis (12/2023) for which she was started on Colchicine, asthma/COPD, HTN, HLD, DM2, recent outpatient H pylori diagnosis and treatement presenting with epigastric pain and weakness. Cardiology consulted for Management of Pericarditis    Review of studies:  - ECG 3/5/24: NSR, inferior infarct pattern, Left axis deviation, left atrial enlargement, poor R wave progression   - TTE 03/06/2024:  Normal left ventricular size and systolic function. Normal right ventricular size and systolic function. Normal atria. Bioprosthetic valve is seen in the aortic position. The peak transvalvular velocity is 3.53 m/s, the mean transvalvular gradient is 28.00 mmHg, and the LVOT/AV velocity ratio is 0.28. Aortic valve velocity is elevated and may be suggestive of bioprosthetic stenosis. Clinical correlation is advised. Compared to the previous TTE performed on 10/17/2023, velocity and gradients across aortic bioprosthesis are higher.  - TTE 10/17/23: Normal BiV function, bio AV with normal function (peak rafa 2.49 m/s, mean grad 15), no pericardial effusion   - C 10/16/23: LM normal, 60-70% stenosis s/p DRAKE x2, LCx: OM1 patent stent, RCA patent SVG-RPDA      Home meds: ASA 81, Plavix 75, Toprol 50mg po qd, chlorthalidone 25mg po qd, Crestor 40, Zetia 10     # AS s/p Bioprosthetic AVR  - Patient noted to have increased gradients trough Bio AVR. The peak transvalvular velocity is 3.53 m/s, with a mean transvalvular gradient of 28.00 mmHg, with a  LVOT/AV velocity ratio of 0.28.   - CTS team made aware of progression of Bioprosthetic AV stenosis and Structural heart team consulted   - Patient will undergo CT TAVR protocol  - However would defer CELESTE at this time given ongoing GI issues, concern for PUD in patient who has not undergone EGD evaluation  - Cont Toprol 50 mg po daily     # Pericarditis  - Patient has known CAD with PCI of the OM1 in 08/2023 in setting of UA, and subsequent PCI of LAD in 10/2023. SVG to the RCA was patent t that time  - She returned to the Hospital in December with pleuritic chest discomfort, and was sent home on Colchicine 0.6 mg PO BID for 3 months   - Patient reports that a month into her therapy, she started to feel worse with abdominal discomfort, burning chest discomfort, dyspepsia, and worsening diarrhea. Her PO intake has gotten progressively worse and in the last few weeks she has been remarkably weeks  - Patient has been seen in the ER three times in the last 2 months for Abdominal discomfort. CT abdomen pelvis revealed Enteritis and repeat CT this hospitalization ruled out dissection  - ECG this hospitalization reviewed showing NSR with poor R wave progressive without ischemic changes  - CT chest reviewed without pericardial effusion.  - At this time do not believe patient's presentation to be from her pericarditis. Suspect Hpylori in patient on DAPT and concurrent Colchicine and ABx therapy exacerbated her possible GERD. Am concerned about PUD   - Recommend EGD for assessment. No CV contraindication to proceeding with EGD. Management of Hpylori by Medicine/GI  - Would recommend Stopping her colchicine indefinitely; Would defer NSAID use as well and cont patient on DAPT for CAD as Below    #Myopathy   - Diagnosed with pericarditis in 12/2023 and treated with colchicine with plan for 3 months of therapy.   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   - Medication list reviewed, no medication to suggest Drug Induced Lupus.  - Would cont to hold off resuming Statin until CK normalizes  - Will consider Repatha as an outpatient if Myalgia/weakness persist  - Please send repeat CK with am labs on 3/8 am    # CAD  - s/p SVG to RCA and PCI of OM and Prox LAD  - c/w ASA 81, Plavix 75   - c/w Toprol XL 50     Cardiology will cont to follow with you. Please call with any questions   Dr. Gamez, patient's outpatient Cardiologist made aware of admission and clinical course .
#Chest pain, significant history of CAD and recent pericarditis  #Hyponatremia  #Statin induced myopathy  #Weakness  #Elevated LFTs  #h/o H. pylori  #Anxiety      -Chest pain, does not appear to be ACS but is at high risk of. EKG non ischemic. Recent pericarditis, ?is this related to colchicine causing some of her abdominal pain; cardio/GI agree. Holding colchicine  -Hyponatremia likely SIADH but hypovolemia could be contributing with poor PO intake. Given acuity of sodium, will replete with hypertonic. Close monitoring. Will avoid overcorrection.   -Elevated CK w/ normal inflammatory markers, likely statin myopathy which can be exacerbated w/ colchicine use. Holding statin. PT consult. Will likelky need repatha, to be evaluated outpatient. D/w cardio who agrees. CT surg/structural cards requesting imaging which is being done.   -Could be statin related as well as fatty liver. Hep panel w/ past HBV infxn.   -Did not complete therapy and clarithromycin can cause some abd complaints. Will switch to quad therapy for a complete course. Pain may be related to undertreated H. pylori.  -Patient has significant anxiety about her medical complaints, which are legitimate. However her anxiety is likely worsening her symptoms. Continuing to .
#Chest pain, significant history of CAD and recent pericarditis  #Weakness  #Elevated LFTs  #h/o H. pylori  #Anxiety      -Chest pain, does not appear to be ACS but is at high risk of. EKG non ischemic. Recent pericarditis, ?is this related to colchicine causing some of her abdominal pain?   -Could be many things. ESR/CRP for possible PMR. CK elevated, possible statin induced myopathy? Significant weakness in proximal LE and UE. Could be AI myopathy as well. Depending on results will consider rheum consult.   -Could be statin related, fatty liver. Hep panel pending.   -Did not complete therapy and clarithromycin can cause some abd complaints. Will switch to quad therapy for a complete course. Pain may be related to undertreated H. pylori.  -Patient has significant anxiety about her medical complaints, which are legitimate. However her anxiety is likely worsening her symptoms. Continuing to .
#Chest pain, significant history of CAD and recent pericarditis  #Hyponatremia  #hematuria  #Statin induced myopathy  #Weakness  #Elevated LFTs  #h/o H. pylori  #Anxiety      -Chest pain, does not appear to be ACS but is at high risk of. EKG non ischemic   -Hyponatremia likely SIADH but hypovolemia could be contributing with poor PO intake. Nephrology following.   -New onset. SIgnificant blood. On duap for recent stent. Painless.   -Elevated CK w/ normal inflammatory markers, likely statin myopathy which can be exacerbated w/ colchicine use. Holding statin. PT consult. Will likelky need repatha, to be evaluated outpatient. D/w cardio who agrees. CT surg/structural cards imaging complete.   -Could be statin related as well as fatty liver. Hep panel w/ past HBV infxn.   -Did not complete therapy and clarithromycin can cause some abd complaints. Switched to quad therapy for a complete course. Pain may be related to undertreated H. pylori.  -Patient has significant anxiety about her medical complaints, which are legitimate. However her anxiety is likely worsening her symptoms. Continuing to .
Patient seen and examined at bedside. No acute events overnight. Patient still with epigastric pain, but slight improvement. Had 3-4 BM yesterday.  to palpation in that region. Unclear if etiology is due to colchicine. Unlikely cardiac, serial EKGs unchanged. Patient requesting CLD. Would ask GI if they would consider EGD (per patient she has never had one). Nephrology following for hyponatremia and was on 2% NS.
#Chest pain, significant history of CAD and recent pericarditis  #Statin induced myopathy  #Weakness  #Elevated LFTs  #h/o H. pylori  #Anxiety      -Chest pain, does not appear to be ACS but is at high risk of. EKG non ischemic. Recent pericarditis, ?is this related to colchicine causing some of her abdominal pain; cardio/GI agree. Holding colchicine  -Elevated CK w/ normal inflammatory markers, likely statin myopathy which can be exacerbated w/ colchicine use. Holding statin. PT consult. Will likelky need repatha, to be evaluated outpatient. D/w cardio who agrees.   -Could be statin related as well as fatty liver. Hep panel w/ past HBV infxn.   -Did not complete therapy and clarithromycin can cause some abd complaints. Will switch to quad therapy for a complete course. Pain may be related to undertreated H. pylori.  -Patient has significant anxiety about her medical complaints, which are legitimate. However her anxiety is likely worsening her symptoms. Continuing to .

## 2024-03-12 NOTE — PROGRESS NOTE ADULT - ASSESSMENT
63F former smoker, with FHx MI, PMHx of asthma/COPD, sciatica/herniated disc, HTN, HLD, DM2, obesity, anxiety, AS s/p bioAVR/ascending aorta replacement and CABG SVG-PDA 05/2017 @ [facility] w subsequent PCIs (most recent 10/16/23 DESx2 pLAD) recent admission to [facility] Dec 28-29, 2023 for chest pain w/ neg trop x 2, neg CCTA for acute findings, dx'd w/ pericarditis discharged on Colchicine 0.6 mg BID x 3 months (still taking), recent dx of H Pylori, now on PPI + Amoxicillin 1g BID x 14 days, Clarithromycin 500 mg BID x 14 days, a/f epigastric abdominal pain (though somewhat generalized).

## 2024-03-12 NOTE — PROGRESS NOTE ADULT - PROBLEM SELECTOR PLAN 3
On 3/8, patient found to have hyponatremic and improving with hypertonics from 125-->129 this AM  - urine studies c/w SIADH  - c/w 2% hypertonic saline  - Renal recs appreciated

## 2024-03-12 NOTE — PROGRESS NOTE ADULT - SUBJECTIVE AND OBJECTIVE BOX
O/N Events:    Subjective/ROS: Patient seen and examined at bedside.     Denies Fever/Chills, HA, CP, SOB, n/v, changes in bowel/urinary habits.  12pt ROS otherwise negative.    VITALS  Vital Signs Last 24 Hrs  T(C): 36.7 (12 Mar 2024 05:42), Max: 37 (11 Mar 2024 20:33)  T(F): 98 (12 Mar 2024 05:42), Max: 98.6 (11 Mar 2024 20:33)  HR: 88 (12 Mar 2024 05:42) (81 - 88)  BP: 132/83 (12 Mar 2024 05:42) (89/60 - 132/83)  BP(mean): --  RR: 18 (12 Mar 2024 05:42) (18 - 18)  SpO2: 98% (12 Mar 2024 05:42) (97% - 99%)    Parameters below as of 12 Mar 2024 05:42  Patient On (Oxygen Delivery Method): room air        CAPILLARY BLOOD GLUCOSE      POCT Blood Glucose.: 188 mg/dL (11 Mar 2024 22:09)  POCT Blood Glucose.: 114 mg/dL (11 Mar 2024 17:49)  POCT Blood Glucose.: 96 mg/dL (11 Mar 2024 13:39)  POCT Blood Glucose.: 116 mg/dL (11 Mar 2024 09:18)      PHYSICAL EXAM  General: NAD  Head: NC/AT; MMM; PERRL; EOMI;  Neck: Supple; no JVD  Respiratory: CTAB; no wheezes/rales/rhonchi  Cardiovascular: Regular rhythm/rate; S1/S2+, no murmurs, rubs gallops   Gastrointestinal: Soft; NTND; bowel sounds normal and present  Extremities: WWP; no edema/cyanosis  Neurological: A&Ox3, CNII-XII grossly intact; no obvious focal deficits    MEDICATIONS  (STANDING):  aspirin enteric coated 81 milliGRAM(s) Oral daily  bisacodyl 5 milliGRAM(s) Oral every 12 hours  bismuth subsalicylate Liquid 300 milliGRAM(s) Oral every 6 hours  clopidogrel Tablet 75 milliGRAM(s) Oral daily  dextrose 5%. 1000 milliLiter(s) (50 mL/Hr) IV Continuous <Continuous>  dextrose 5%. 1000 milliLiter(s) (100 mL/Hr) IV Continuous <Continuous>  dextrose 50% Injectable 12.5 Gram(s) IV Push once  dextrose 50% Injectable 25 Gram(s) IV Push once  dextrose 50% Injectable 25 Gram(s) IV Push once  diazepam    Tablet 2 milliGRAM(s) Oral at bedtime  glucagon  Injectable 1 milliGRAM(s) IntraMuscular once  insulin lispro (ADMELOG) corrective regimen sliding scale   SubCutaneous Before meals and at bedtime  magnesium sulfate  IVPB 2 Gram(s) IV Intermittent once  metoprolol succinate ER 50 milliGRAM(s) Oral daily  metroNIDAZOLE    Tablet 250 milliGRAM(s) Oral every 6 hours  pantoprazole    Tablet 40 milliGRAM(s) Oral every 12 hours  polyethylene glycol 3350 17 Gram(s) Oral two times a day  senna 2 Tablet(s) Oral at bedtime  sucralfate suspension 1 Gram(s) Oral every 6 hours  tetracycline 500 milliGRAM(s) Oral four times a day  urea Oral Powder 15 Gram(s) Oral daily    MEDICATIONS  (PRN):  acetaminophen     Tablet .. 650 milliGRAM(s) Oral every 6 hours PRN Mild Pain (1 - 3)  acetaminophen 300 mG/butalbital 50 mG/ caffeine 40 mG 1 Capsule(s) Oral every 6 hours PRN headache  aluminum hydroxide/magnesium hydroxide/simethicone Suspension 30 milliLiter(s) Oral every 4 hours PRN Dyspepsia  dextrose Oral Gel 15 Gram(s) Oral once PRN Blood Glucose LESS THAN 70 milliGRAM(s)/deciliter  melatonin 3 milliGRAM(s) Oral at bedtime PRN Insomnia  morphine  - Injectable 2 milliGRAM(s) IV Push every 6 hours PRN Moderate Pain (4 - 6)  morphine  - Injectable 4 milliGRAM(s) IV Push every 6 hours PRN Severe Pain (7 - 10)      Biaxin (Hives)      LABS                        11.8   6.76  )-----------( 258      ( 12 Mar 2024 05:30 )             36.7     03-11    130<L>  |  99  |  18  ----------------------------<  117<H>  3.9   |  23  |  0.69    Ca    8.6      11 Mar 2024 19:12  Phos  2.9     03-11  Mg     1.8     03-11    TPro  6.3  /  Alb  3.7  /  TBili  0.2  /  DBili  < 0.2  /  AST  137<H>  /  ALT  60<H>  /  AlkPhos  50  03-11      Urinalysis Basic - ( 11 Mar 2024 19:12 )    Color: x / Appearance: x / SG: x / pH: x  Gluc: 117 mg/dL / Ketone: x  / Bili: x / Urobili: x   Blood: x / Protein: x / Nitrite: x   Leuk Esterase: x / RBC: x / WBC x   Sq Epi: x / Non Sq Epi: x / Bacteria: x      CARDIAC MARKERS ( 11 Mar 2024 05:30 )  x     / x     / 384 U/L / x     / x              IMAGING/EKG/ETC

## 2024-03-12 NOTE — PROGRESS NOTE ADULT - PROBLEM SELECTOR PLAN 6
SpO2 100% on RA, no new or change in cough or sputum production, no CARRILLO, no wheeze. Not on home O2.   - can continue with inhalers when confirmed
SpO2 100% on RA, no new or change in cough or sputum production, no CARRILLO, no wheeze. Not on home O2.   - can continue with inhalers when confirmed
last BM 3 days prior to admission   - start miralax + senna
SpO2 100% on RA, no new or change in cough or sputum production, no CARRILLO, no wheeze. Not on home O2.   - can continue with inhalers when confirmed
SpO2 100% on RA, no new or change in cough or sputum production, no CARRILLO, no wheeze. Not on home O2.   - can continue with inhalers when confirmed
SpO2 100% on RA, no new or change in cough or sputum production, no CARRILLO, no wheeze. Not on home O2.   - confirm home inhalers in AM
last BM 3 days prior to admission   - start miralax + senna

## 2024-03-12 NOTE — PROGRESS NOTE ADULT - PROBLEM SELECTOR PLAN 11
on home metoprolol succinate 50mg qd + chlorthalidone 25mg PO qd   - c/w toprol   - hold home chlorthalidone
on home metoprolol succinate 50mg qd + chlorthalidone 25mg PO qd   - c/w toprol   - hold home chlorthalidone
on home metoprolol succinate 50mg qd + chlorthalidone 25mg PO qd   - c/w toprol   - confirm chlorthalidone home med
on home crestor 40mg PO qHS + zetia 10mg PO qd  - c/w therapeutic interchange
on home crestor 40mg PO qHS + zetia 10mg PO qd  - c/w therapeutic interchange
on home metoprolol succinate 50mg qd + chlorthalidone 25mg PO qd   - c/w toprol   - hold home chlorthalidone
on home metoprolol succinate 50mg qd + chlorthalidone 25mg PO qd   - c/w toprol   - hold home chlorthalidone

## 2024-03-12 NOTE — PROGRESS NOTE ADULT - ASSESSMENT
62 yo F PMHx of asthma/COPD, HTN, HLD, DM2, AS s/p bioAVR/ascending aorta replacement and CABG SVG-PDA 05/2017 @ [facility] w subsequent PCIs (most recent 10/16/23 DESx2 pLAD), recent diagnosis of pericarditis, and GERD 2/2 H pylori presenting with epigastric pain and pain in multiple muscle groups. Cardiology consulted for tx of pericarditis     Review of studies:  ECG 3/5/24: NSR, inferior infarct pattern, Left axis deviation, left atrial enlargement, poor R wave progression   TTE 10/17/23: Normal BiV function, bio AV with normal function (peak rafa 2.49 m/s, mean grad 15), no pericardial effusion   [facility] 10/16/23: LM normal, 60-70% stenosis s/p DRAKE x2, LCx: OM1 patent stent, RCA patent SVG-RPDA    Home meds: ASA 81, Plavix 75, Toprol 50mg po qd, chlorthalidone 25mg po qd, Crestor 40, Zetia 10     #Myopathy   Diagnosed with pericarditis in 12/2023 and treated with colchicine with plan for 3 months of therapy.   Suspicion for possible colchicine myopathy given pain in multiple muscle groups, elevated CPK and transaminitis. Current pain is not consistent with pericardial inflammation, more over ESR is mildly elevated and CRP is negative pointing away from pericardial inflammation.   - hold colchicine  - hold atorvastatin  - trend CPK    Patient will follow up with Dr. Gamez for potential challenge with statin in the future as an outpatient    #Pericarditis  Description of pain NOT consistent with pericarditis   - d/c Colchicine as above, no indication to start high dose NSAID at this time     #CAD s/p SVG to RCA and PCI   c/w ASA 81, Plavix 75   c/w Toprol XL 50     #HTN  - Chlorthalidone held on admission, resume when able     will continue to follow, upon d/c follow up Dr. Gamez

## 2024-03-12 NOTE — PROGRESS NOTE ADULT - PROBLEM SELECTOR PROBLEM 6
History of COPD
Constipation
History of COPD
Constipation

## 2024-03-12 NOTE — PROGRESS NOTE ADULT - ASSESSMENT
63F former smoker (Quit 8/2023), CAD s/p CABG (2017) and AS s/p bioAVR/ascending aorta replacement and PCI (most recent 0/16/23 DESx2 pLAD on plavix/ aspirin, asthma/COPD, HTN, HLD, DM2, pericarditis 12/2023 (on Colchicine 0.6 mg BID x 3 months (still taking), recent dx of H Pylori (unable to tolerate triple therapy), presenting for generalized pain and fatigue.     #Abdominal pain  #Enteritis  #H. Pylori positive  #Elevated LFTs  Patient with generalized pain of unclear etiology and w/u for pancreaticobiliary pathology negative. DDx includes enteritis seen on CT (? medication induced from colchicine, ischemic), known H.pylori infection/ HP medications, constipation. While ulcer or gastritis/ esophagitis are on the ddx, she is currently on PPI BID and being treated for HP, has no evidence of bleeding. Would continue medical management for now and defer EGD especially in setting of recent stents.    Of notes, she also have elevated lfts 2-3x ULN x 3 months with steatosis on imaging. Most likely DILI from colchicine as it elevated after starting colchicine and now improving having stopped. However, may also be FORRESTER. Hep C neg. Labs suggestive of prior Hep B infection (no prior vaccination per patient). No supplements/ herbals or alcohol use. Autoimmune work up negative.     Recommendations:  - Obtain Abdominal CTA to evaluate vasculature to r/o perfusion etiology of enteritis  - Treat constipation with Miralax BID + senna  - Continue H.Pylori quadruple therapy - will need repeat breath test 2-4 weeks after completing therapy to confirm eradication upon   - Avoid hepatoxic medication  - Continue to monitor daily LFTs until resolution    Patient can follow up at the hepatology clinic, located at [address] [facility] by calling [phone]  Please schedule patient for GI follow-up with myself at the clinic located on the fourth floor of [address] by calling the office at [phone]     Mckenzie Kuhn MD  Gastroenterology Fellow, PGY -6  Weekday Pager [phone] 63F former smoker (Quit 8/2023), CAD s/p CABG (2017) and AS s/p bioAVR/ascending aorta replacement and PCI (most recent 0/16/23 DESx2 pLAD on plavix/ aspirin, asthma/COPD, HTN, HLD, DM2, pericarditis 12/2023 (on Colchicine 0.6 mg BID x 3 months (still taking), recent dx of H Pylori (unable to tolerate triple therapy), presenting for generalized pain and fatigue.     #Abdominal pain  #Enteritis  #H. Pylori positive  #Elevated LFTs  Patient with generalized pain of unclear etiology and w/u for pancreaticobiliary pathology negative. DDx includes enteritis seen on CT (? medication induced from colchicine, ischemic), known H.pylori infection/ HP medications, constipation. While ulcer or gastritis/ esophagitis are on the ddx, she is currently on PPI BID and being treated for HP, has no evidence of bleeding. Would continue medical management for now and defer EGD especially in setting of recent stents.    Of notes, she also have elevated lfts 2-3x ULN x 3 months with steatosis on imaging. Most likely FORRESTER vs less likely DILI from colchicine (started after colchicine). Hep C neg. Labs suggestive of prior Hep B infection (no prior vaccination per patient). No supplements/ herbals or alcohol use. Autoimmune work up negative.     Recommendations:  - Obtain Abdominal CTA to evaluate vasculature to r/o perfusion etiology of enteritis  - Treat constipation with Miralax BID + senna  - Continue H.Pylori quadruple therapy - will need repeat breath test 2-4 weeks after completing therapy to confirm eradication upon   - Avoid hepatoxic medication  - Continue to monitor daily LFTs until resolution  - Recommend outpatient fibroscan    Patient can follow up at the hepatology clinic, located at [address] [facility] by calling [phone]  Please schedule patient for GI follow-up with myself at the clinic located on the fourth floor of [address] by calling the office at [phone]     Mckenzie Kuhn MD  Gastroenterology Fellow, PGY -6  Weekday Pager [phone]

## 2024-03-12 NOTE — PROGRESS NOTE ADULT - PROBLEM SELECTOR PLAN 8
on home metformin 1000mg PO qAM & 500mg PO qHS  - c/w ISS, FS.
on home metformin 1000mg PO qAM & 500mg PO qHS  - c/w ISS, FS.
- Continue home diazepam qHS

## 2024-03-12 NOTE — PROGRESS NOTE ADULT - PROVIDER SPECIALTY LIST ADULT
Cardiology
Internal Medicine
Cardiology
Internal Medicine
Internal Medicine
Rehab Medicine
Structural Heart
Gastroenterology
Gastroenterology
Nephrology
Rehab Medicine
Rehab Medicine
Cardiology
Nephrology
Internal Medicine

## 2024-03-12 NOTE — PROGRESS NOTE ADULT - PROBLEM SELECTOR PROBLEM 3
Hyponatremia
CAD (coronary artery disease)
Hyponatremia
CAD (coronary artery disease)

## 2024-03-12 NOTE — PROGRESS NOTE ADULT - SUBJECTIVE AND OBJECTIVE BOX
Cardiology Consult    O/N:  Interval History: patient was seen and examined in am. reported feeling better.      OBJECTIVE  Vitals:  T(C): 37.1 (03-12-24 @ 12:30), Max: 37.1 (03-12-24 @ 12:30)  HR: 93 (03-12-24 @ 12:30) (81 - 93)  BP: 107/71 (03-12-24 @ 12:30) (107/71 - 132/83)  RR: 18 (03-12-24 @ 12:30) (18 - 18)  SpO2: 98% (03-12-24 @ 12:30) (97% - 98%)  Wt(kg): --    I/O:  I&O's Summary    11 Mar 2024 07:01  -  12 Mar 2024 07:00  --------------------------------------------------------  IN: 650 mL / OUT: 1000 mL / NET: -350 mL        PHYSICAL EXAM:  Appearance: NAD. Speaking in full sentences.   HEENT: No pallor noted.    Cardiovascular: RRR with no murmurs.  Respiratory: Lungs CTAB.   Gastrointestinal:  Soft, nontender. 	  Extremities: No edema b/l.   Neurologic:  Alert and awake.  	  LABS:                        11.8   6.76  )-----------( 258      ( 12 Mar 2024 05:30 )             36.7     03-12    132<L>  |  100  |  25<H>  ----------------------------<  112<H>  3.3<L>   |  22  |  0.66    Ca    8.4      12 Mar 2024 05:30  Phos  3.1     03-12  Mg     2.0     03-12    TPro  6.0  /  Alb  3.7  /  TBili  0.2  /  DBili  x   /  AST  107<H>  /  ALT  58<H>  /  AlkPhos  48  03-12      Urinalysis Basic - ( 12 Mar 2024 05:30 )    Color: x / Appearance: x / SG: x / pH: x  Gluc: 112 mg/dL / Ketone: x  / Bili: x / Urobili: x   Blood: x / Protein: x / Nitrite: x   Leuk Esterase: x / RBC: x / WBC x   Sq Epi: x / Non Sq Epi: x / Bacteria: x        RADIOLOGY & ADDITIONAL TESTS:  Reviewed .    MEDICATIONS  (STANDING):  aspirin enteric coated 81 milliGRAM(s) Oral daily  bisacodyl 5 milliGRAM(s) Oral every 12 hours  bismuth subsalicylate Liquid 300 milliGRAM(s) Oral every 6 hours  clopidogrel Tablet 75 milliGRAM(s) Oral daily  dextrose 5%. 1000 milliLiter(s) (50 mL/Hr) IV Continuous <Continuous>  dextrose 5%. 1000 milliLiter(s) (100 mL/Hr) IV Continuous <Continuous>  dextrose 50% Injectable 12.5 Gram(s) IV Push once  dextrose 50% Injectable 25 Gram(s) IV Push once  dextrose 50% Injectable 25 Gram(s) IV Push once  diazepam    Tablet 2 milliGRAM(s) Oral at bedtime  glucagon  Injectable 1 milliGRAM(s) IntraMuscular once  insulin lispro (ADMELOG) corrective regimen sliding scale   SubCutaneous Before meals and at bedtime  magnesium sulfate  IVPB 2 Gram(s) IV Intermittent once  metoprolol succinate ER 50 milliGRAM(s) Oral daily  metroNIDAZOLE    Tablet 250 milliGRAM(s) Oral every 6 hours  pantoprazole    Tablet 40 milliGRAM(s) Oral every 12 hours  polyethylene glycol 3350 17 Gram(s) Oral two times a day  senna 2 Tablet(s) Oral at bedtime  sucralfate suspension 1 Gram(s) Oral every 6 hours  tetracycline 500 milliGRAM(s) Oral four times a day  urea Oral Powder 15 Gram(s) Oral daily    MEDICATIONS  (PRN):  acetaminophen     Tablet .. 650 milliGRAM(s) Oral every 6 hours PRN Mild Pain (1 - 3)  acetaminophen 300 mG/butalbital 50 mG/ caffeine 40 mG 1 Capsule(s) Oral every 6 hours PRN headache  aluminum hydroxide/magnesium hydroxide/simethicone Suspension 30 milliLiter(s) Oral every 4 hours PRN Dyspepsia  dextrose Oral Gel 15 Gram(s) Oral once PRN Blood Glucose LESS THAN 70 milliGRAM(s)/deciliter  melatonin 3 milliGRAM(s) Oral at bedtime PRN Insomnia  morphine  - Injectable 2 milliGRAM(s) IV Push every 6 hours PRN Moderate Pain (4 - 6)  morphine  - Injectable 4 milliGRAM(s) IV Push every 6 hours PRN Severe Pain (7 - 10)

## 2024-03-12 NOTE — PROGRESS NOTE ADULT - PROBLEM SELECTOR PROBLEM 12
Hyperlipidemia
Need for prophylactic measure
Need for prophylactic measure

## 2024-03-12 NOTE — PROGRESS NOTE ADULT - PROBLEM SELECTOR PROBLEM 4
H/O pericarditis
H/O pericarditis
CAD (coronary artery disease)

## 2024-03-12 NOTE — PROGRESS NOTE ADULT - PROBLEM SELECTOR PLAN 7
last BM 3 days prior to admission   - start miralax + senna + dulcolax  - Consider other agents such as lactulose, but would worsen her bloating
last BM 3 days prior to admission   - start miralax + senna
last BM 3 days prior to admission   - start miralax + senna + dulcolax  - Consider other agents such as lactulose, but would worsen her bloating
- Continue home diazepam qHS
- Continue home diazepam qHS

## 2024-03-12 NOTE — PROGRESS NOTE ADULT - REASON FOR ADMISSION
Pt reports left lower quad abd pain that began @ 1000 this morning with blood in urine. Pt describes the pain as constant and stabbing. Pt also reports nausea and vomiting with symptoms but denies diarrhea. .  Pt appendix are on left side.
abdominal pain
None needed
abdominal pain

## 2024-03-12 NOTE — PROGRESS NOTE ADULT - PROBLEM SELECTOR PLAN 9
on home metformin 1000mg PO qAM & 500mg PO qHS  - c/w ISS, FS.
On home vicodin 7.5-325 mg q12 PRN for pain.   - c/w oxycodone 5mg PO q12h PRN for severe pain   - c/w tylenol 625mg PO q6h for pain/fever
On home vicodin 7.5-325 mg q12 PRN for pain.   - c/w oxycodone 5mg PO q12h PRN for severe pain   - c/w tylenol 625mg PO q6h for pain/fever
on home metformin 1000mg PO qAM & 500mg PO qHS  - c/w ISS, FS.

## 2024-03-12 NOTE — PROGRESS NOTE ADULT - PROBLEM SELECTOR PROBLEM 10
History of chronic back pain
History of chronic back pain
Hypertension
History of chronic back pain
History of chronic back pain
Hypertension
History of chronic back pain

## 2024-03-13 ENCOUNTER — NON-APPOINTMENT (OUTPATIENT)
Age: 64
End: 2024-03-13

## 2024-03-13 LAB
MITOCHONDRIA AB SER-ACNC: SIGNIFICANT CHANGE UP
SMOOTH MUSCLE AB SER-ACNC: SIGNIFICANT CHANGE UP

## 2024-03-15 ENCOUNTER — EMERGENCY (EMERGENCY)
Facility: HOSPITAL | Age: 64
LOS: 1 days | Discharge: HOME | End: 2024-03-15
Attending: STUDENT IN AN ORGANIZED HEALTH CARE EDUCATION/TRAINING PROGRAM | Admitting: STUDENT IN AN ORGANIZED HEALTH CARE EDUCATION/TRAINING PROGRAM
Payer: MEDICARE

## 2024-03-15 VITALS
OXYGEN SATURATION: 99 % | RESPIRATION RATE: 18 BRPM | HEART RATE: 104 BPM | SYSTOLIC BLOOD PRESSURE: 106 MMHG | DIASTOLIC BLOOD PRESSURE: 62 MMHG | WEIGHT: 160.06 LBS | HEIGHT: 63 IN | TEMPERATURE: 97 F

## 2024-03-15 VITALS
HEART RATE: 91 BPM | SYSTOLIC BLOOD PRESSURE: 121 MMHG | RESPIRATION RATE: 20 BRPM | OXYGEN SATURATION: 100 % | DIASTOLIC BLOOD PRESSURE: 71 MMHG

## 2024-03-15 DIAGNOSIS — R53.1 WEAKNESS: ICD-10-CM

## 2024-03-15 DIAGNOSIS — G56.00 CARPAL TUNNEL SYNDROME, UNSPECIFIED UPPER LIMB: Chronic | ICD-10-CM

## 2024-03-15 DIAGNOSIS — R07.89 OTHER CHEST PAIN: ICD-10-CM

## 2024-03-15 DIAGNOSIS — Z88.1 ALLERGY STATUS TO OTHER ANTIBIOTIC AGENTS STATUS: ICD-10-CM

## 2024-03-15 DIAGNOSIS — Z98.890 OTHER SPECIFIED POSTPROCEDURAL STATES: Chronic | ICD-10-CM

## 2024-03-15 DIAGNOSIS — I25.10 ATHEROSCLEROTIC HEART DISEASE OF NATIVE CORONARY ARTERY WITHOUT ANGINA PECTORIS: ICD-10-CM

## 2024-03-15 DIAGNOSIS — Z95.5 PRESENCE OF CORONARY ANGIOPLASTY IMPLANT AND GRAFT: ICD-10-CM

## 2024-03-15 DIAGNOSIS — D25.9 LEIOMYOMA OF UTERUS, UNSPECIFIED: Chronic | ICD-10-CM

## 2024-03-15 DIAGNOSIS — I10 ESSENTIAL (PRIMARY) HYPERTENSION: ICD-10-CM

## 2024-03-15 DIAGNOSIS — E11.9 TYPE 2 DIABETES MELLITUS WITHOUT COMPLICATIONS: ICD-10-CM

## 2024-03-15 DIAGNOSIS — Z95.1 PRESENCE OF AORTOCORONARY BYPASS GRAFT: ICD-10-CM

## 2024-03-15 DIAGNOSIS — Z95.2 PRESENCE OF PROSTHETIC HEART VALVE: Chronic | ICD-10-CM

## 2024-03-15 DIAGNOSIS — Z90.49 ACQUIRED ABSENCE OF OTHER SPECIFIED PARTS OF DIGESTIVE TRACT: Chronic | ICD-10-CM

## 2024-03-15 DIAGNOSIS — R19.7 DIARRHEA, UNSPECIFIED: ICD-10-CM

## 2024-03-15 LAB
ANION GAP SERPL CALC-SCNC: 15 MMOL/L — SIGNIFICANT CHANGE UP (ref 5–17)
APPEARANCE UR: ABNORMAL
BACTERIA # UR AUTO: NEGATIVE /HPF — SIGNIFICANT CHANGE UP
BASOPHILS # BLD AUTO: 0.04 K/UL — SIGNIFICANT CHANGE UP (ref 0–0.2)
BASOPHILS NFR BLD AUTO: 0.5 % — SIGNIFICANT CHANGE UP (ref 0–2)
BILIRUB UR-MCNC: NEGATIVE — SIGNIFICANT CHANGE UP
BUN SERPL-MCNC: 19 MG/DL — SIGNIFICANT CHANGE UP (ref 7–23)
CALCIUM SERPL-MCNC: 9.8 MG/DL — SIGNIFICANT CHANGE UP (ref 8.4–10.5)
CAST: 8 /LPF — HIGH (ref 0–4)
CHLORIDE SERPL-SCNC: 102 MMOL/L — SIGNIFICANT CHANGE UP (ref 96–108)
CO2 SERPL-SCNC: 20 MMOL/L — LOW (ref 22–31)
COLOR SPEC: SIGNIFICANT CHANGE UP
CREAT SERPL-MCNC: 0.71 MG/DL — SIGNIFICANT CHANGE UP (ref 0.5–1.3)
DIFF PNL FLD: NEGATIVE — SIGNIFICANT CHANGE UP
EGFR: 94 ML/MIN/1.73M2 — SIGNIFICANT CHANGE UP
EOSINOPHIL # BLD AUTO: 0.14 K/UL — SIGNIFICANT CHANGE UP (ref 0–0.5)
EOSINOPHIL NFR BLD AUTO: 1.8 % — SIGNIFICANT CHANGE UP (ref 0–6)
GLUCOSE SERPL-MCNC: 91 MG/DL — SIGNIFICANT CHANGE UP (ref 70–99)
GLUCOSE UR QL: NEGATIVE MG/DL — SIGNIFICANT CHANGE UP
HCT VFR BLD CALC: 39.6 % — SIGNIFICANT CHANGE UP (ref 34.5–45)
HGB BLD-MCNC: 12.4 G/DL — SIGNIFICANT CHANGE UP (ref 11.5–15.5)
IMM GRANULOCYTES NFR BLD AUTO: 1.7 % — HIGH (ref 0–0.9)
KETONES UR-MCNC: ABNORMAL MG/DL
LEUKOCYTE ESTERASE UR-ACNC: ABNORMAL
LYMPHOCYTES # BLD AUTO: 1.91 K/UL — SIGNIFICANT CHANGE UP (ref 1–3.3)
LYMPHOCYTES # BLD AUTO: 25.2 % — SIGNIFICANT CHANGE UP (ref 13–44)
MCHC RBC-ENTMCNC: 26.3 PG — LOW (ref 27–34)
MCHC RBC-ENTMCNC: 31.3 GM/DL — LOW (ref 32–36)
MCV RBC AUTO: 84.1 FL — SIGNIFICANT CHANGE UP (ref 80–100)
MONOCYTES # BLD AUTO: 0.86 K/UL — SIGNIFICANT CHANGE UP (ref 0–0.9)
MONOCYTES NFR BLD AUTO: 11.4 % — SIGNIFICANT CHANGE UP (ref 2–14)
NEUTROPHILS # BLD AUTO: 4.49 K/UL — SIGNIFICANT CHANGE UP (ref 1.8–7.4)
NEUTROPHILS NFR BLD AUTO: 59.4 % — SIGNIFICANT CHANGE UP (ref 43–77)
NITRITE UR-MCNC: NEGATIVE — SIGNIFICANT CHANGE UP
NRBC # BLD: 0 /100 WBCS — SIGNIFICANT CHANGE UP (ref 0–0)
PH UR: 6 — SIGNIFICANT CHANGE UP (ref 5–8)
PLATELET # BLD AUTO: 287 K/UL — SIGNIFICANT CHANGE UP (ref 150–400)
POTASSIUM SERPL-MCNC: 4.2 MMOL/L — SIGNIFICANT CHANGE UP (ref 3.5–5.3)
POTASSIUM SERPL-SCNC: 4.2 MMOL/L — SIGNIFICANT CHANGE UP (ref 3.5–5.3)
PROT UR-MCNC: 30 MG/DL
RBC # BLD: 4.71 M/UL — SIGNIFICANT CHANGE UP (ref 3.8–5.2)
RBC # FLD: 17.3 % — HIGH (ref 10.3–14.5)
RBC CASTS # UR COMP ASSIST: 47 /HPF — HIGH (ref 0–4)
SODIUM SERPL-SCNC: 137 MMOL/L — SIGNIFICANT CHANGE UP (ref 135–145)
SP GR SPEC: 1.03 — SIGNIFICANT CHANGE UP (ref 1–1.03)
SQUAMOUS # UR AUTO: 8 /HPF — HIGH (ref 0–5)
TROPONIN T, HIGH SENSITIVITY RESULT: 24 NG/L — SIGNIFICANT CHANGE UP (ref 0–51)
TROPONIN T, HIGH SENSITIVITY RESULT: 28 NG/L — SIGNIFICANT CHANGE UP (ref 0–51)
UROBILINOGEN FLD QL: 1 MG/DL — SIGNIFICANT CHANGE UP (ref 0.2–1)
WBC # BLD: 7.57 K/UL — SIGNIFICANT CHANGE UP (ref 3.8–10.5)
WBC # FLD AUTO: 7.57 K/UL — SIGNIFICANT CHANGE UP (ref 3.8–10.5)
WBC UR QL: 1 /HPF — SIGNIFICANT CHANGE UP (ref 0–5)

## 2024-03-15 PROCEDURE — 96374 THER/PROPH/DIAG INJ IV PUSH: CPT

## 2024-03-15 PROCEDURE — 81001 URINALYSIS AUTO W/SCOPE: CPT

## 2024-03-15 PROCEDURE — 84484 ASSAY OF TROPONIN QUANT: CPT

## 2024-03-15 PROCEDURE — 71045 X-RAY EXAM CHEST 1 VIEW: CPT

## 2024-03-15 PROCEDURE — 36415 COLL VENOUS BLD VENIPUNCTURE: CPT

## 2024-03-15 PROCEDURE — 71045 X-RAY EXAM CHEST 1 VIEW: CPT | Mod: 26

## 2024-03-15 PROCEDURE — 85025 COMPLETE CBC W/AUTO DIFF WBC: CPT

## 2024-03-15 PROCEDURE — 80048 BASIC METABOLIC PNL TOTAL CA: CPT

## 2024-03-15 PROCEDURE — 99285 EMERGENCY DEPT VISIT HI MDM: CPT

## 2024-03-15 PROCEDURE — 99284 EMERGENCY DEPT VISIT MOD MDM: CPT | Mod: 25

## 2024-03-15 RX ORDER — SODIUM CHLORIDE 9 MG/ML
1000 INJECTION INTRAMUSCULAR; INTRAVENOUS; SUBCUTANEOUS ONCE
Refills: 0 | Status: DISCONTINUED | OUTPATIENT
Start: 2024-03-15 | End: 2024-03-15

## 2024-03-15 RX ORDER — SODIUM CHLORIDE 9 MG/ML
1000 INJECTION INTRAMUSCULAR; INTRAVENOUS; SUBCUTANEOUS ONCE
Refills: 0 | Status: COMPLETED | OUTPATIENT
Start: 2024-03-15 | End: 2024-03-15

## 2024-03-15 RX ORDER — MORPHINE SULFATE 50 MG/1
4 CAPSULE, EXTENDED RELEASE ORAL ONCE
Refills: 0 | Status: DISCONTINUED | OUTPATIENT
Start: 2024-03-15 | End: 2024-03-15

## 2024-03-15 RX ADMIN — SODIUM CHLORIDE 1000 MILLILITER(S): 9 INJECTION INTRAMUSCULAR; INTRAVENOUS; SUBCUTANEOUS at 13:58

## 2024-03-15 RX ADMIN — MORPHINE SULFATE 4 MILLIGRAM(S): 50 CAPSULE, EXTENDED RELEASE ORAL at 14:18

## 2024-03-15 NOTE — ED PROVIDER NOTE - OBJECTIVE STATEMENT
The patient is a 63y Female with pmhx of cardiac stents, htn, DM, chronic back pain c/o b/l extremities weakness. Pt says her arms and legs feel numb, tingling, and weak. Says her  has weakened. C/o of chest pressure/tightness in the middle of her chest which only occurs when doing an activity. Says it is intermittent, nonradiating, rates the discomfort 7/10. Pt took aspirin today. She also c/o of persistent diarrhea 2x today, no melena, no blood. Denies n/v/c The patient is a 63y Female with pmhx of CAD s/p CABG 2017, cardiac stents, htn, DM, chronic back pain c/o intermittent chest pressure over the past 2 days, also with loose stools over the past 2 days and generalized weakness.  Pt was admitted recently for generalized abd pain, generalized weakness similar to her complaint today, treated for H. pylori and is still quadruple therapy.  Pt reports 2 loose, watery stools today which were normal in color, not black or dark appearing.  Pt denies fever, chills, dysuria, SOB, vomiting, all other ROS negative.

## 2024-03-15 NOTE — ED PROVIDER NOTE - PATIENT PORTAL LINK FT
You can access the FollowMyHealth Patient Portal offered by [facility] by registering at the following website: [email or web address]. By joining [facility]’s FollowMyHealth portal, you will also be able to view your health information using other applications (apps) compatible with our system.

## 2024-03-15 NOTE — ED ADULT NURSE NOTE - COVID-19 ORDERING FACILITY
Med:  exelon patch   -  Last filled on 4/20/20 for 30 with 10 refills    LOV:  10/19/20    FUV:  2/10/21    Refill authorized per protocol.      
[facility]

## 2024-03-15 NOTE — ED ADULT NURSE NOTE - OBJECTIVE STATEMENT
63F hx HTN, CAD s/p CABG 2017, DM, back pain presents with intermittent chest pressure for the last 2 days as well as general malaise. Endorsing couple days of loose stills and decreased PO intake. Reports recent admission and treatment for H. pylori. Denies abd pain, nausea, vomiting, fever or chills

## 2024-03-15 NOTE — ED PROVIDER NOTE - NEUROLOGICAL, MLM
Alert and oriented, no focal deficits, no motor or sensory deficits. 5/5 strength on all extremities, 2+ pulses

## 2024-03-15 NOTE — ED ADULT NURSE NOTE - NSFALLUNIVINTERV_ED_ALL_ED
Bed/Stretcher in lowest position, wheels locked, appropriate side rails in place/Call bell, personal items and telephone in reach/Instruct patient to call for assistance before getting out of bed/chair/stretcher/Non-slip footwear applied when patient is off stretcher/[place] to call system/Physically safe environment - no spills, clutter or unnecessary equipment/Purposeful proactive rounding/Room/bathroom lighting operational, light cord in reach

## 2024-03-15 NOTE — ED ADULT NURSE NOTE - CCCP TRG CHIEF CMPLNT
RISK                                                          Points  [] Previous VTE                                           3  [] Thrombophilia                                        2  [] Lower limb paralysis                              2   [] Current Cancer                                       2   [x] Immobilization > 24 hrs                        1  [] ICU/CCU stay > 24 hours                       1  [] Age > 60                                                   1    Score: 1, initiate 40mg Lovenox SubQ multiple medical complaints

## 2024-03-15 NOTE — CHART NOTE - NSCHARTNOTEFT_GEN_A_CORE
Chart reviewed with previous admission PCM notes read, Recent admit for epigastric pain presumably for H Pylori. Pt was discharged home on quadruple therapy for H. Pylori stopping statins and colchicine which may have contributed to epigastric and muscle pain. [facility] services was also set up.    Presents to ED today c/o chest pain, weakness and diarrhea. Met with pt at bedside, a&ox3 introduced self and role as CM c/o chest pain, weakness, numbness, diarrhea reports compliant with meds. Reports RN was supposed to visit her today @ 3pm and was advise to call ambulance if continues to not feel well. Communications with ED provider with plans for labs, pain meds, IVFs, troponin level.   Update: Labs wnl, will repeat troponin and plans for DC if normal. Will alert [facility] to reschedule home visit this weekend.

## 2024-03-15 NOTE — ED PROVIDER NOTE - CLINICAL SUMMARY MEDICAL DECISION MAKING FREE TEXT BOX
The patient is a 63y Female with pmhx of CAD s/p CABG 2017, cardiac stents, htn, DM, chronic back presents with multiple complaints.  Pt with intermittent chest pressure x 2 days, has EKG with no ischemic changes in ED, trop 28, recent cardiac CTA showing patent stents and no new areas of stenosis, will repeat trop.  Pt with generalized weakness, diarrhea x 2 days.  Possibly related to GI sx she has had over the past few weeks, decreased oral intake as a result, loose stools over the past 2 days.  Pt with recent abd imaging showing possible enteritis, reports not having worsening pain, having persistent chronic sx.  Labs with no acute abnormalities, no indication for repeat CT a/p.  Will repeat 3hr trop and reassess, likely plan for d/c if delta <3.

## 2024-03-15 NOTE — ED PROVIDER NOTE - ATTENDING APP SHARED VISIT CONTRIBUTION OF CARE
I have reviewed and agree with all pertinent clinical information, including the history, physical exam, and medical decision making as documented by the PA/NP.    Patient signed out to me by Dr. Hilton--pending adolfo sutherland dc if stable. Adoflo sutherland reassuring. DC by AYDEE haynes.

## 2024-03-15 NOTE — ED ADULT TRIAGE NOTE - CHIEF COMPLAINT QUOTE
Pt presents to ED C/O diarrhea x days with chest pressure starting today. C/O black/ bloody stools x 3 days ago. Hx cardiac stents, AFIB. Recently seen in ED as per pt. EKG in progress.

## 2024-03-20 ENCOUNTER — APPOINTMENT (OUTPATIENT)
Dept: HEART AND VASCULAR | Facility: CLINIC | Age: 64
End: 2024-03-20
Payer: MEDICARE

## 2024-03-20 ENCOUNTER — NON-APPOINTMENT (OUTPATIENT)
Age: 64
End: 2024-03-20

## 2024-03-20 VITALS
DIASTOLIC BLOOD PRESSURE: 63 MMHG | HEIGHT: 63 IN | SYSTOLIC BLOOD PRESSURE: 100 MMHG | WEIGHT: 149 LBS | HEART RATE: 98 BPM | TEMPERATURE: 97.9 F | BODY MASS INDEX: 26.4 KG/M2 | OXYGEN SATURATION: 98 %

## 2024-03-20 DIAGNOSIS — F41.9 ANXIETY DISORDER, UNSPECIFIED: ICD-10-CM

## 2024-03-20 DIAGNOSIS — J44.9 CHRONIC OBSTRUCTIVE PULMONARY DISEASE, UNSPECIFIED: ICD-10-CM

## 2024-03-20 DIAGNOSIS — Z87.891 PERSONAL HISTORY OF NICOTINE DEPENDENCE: ICD-10-CM

## 2024-03-20 DIAGNOSIS — R79.89 OTHER SPECIFIED ABNORMAL FINDINGS OF BLOOD CHEMISTRY: ICD-10-CM

## 2024-03-20 DIAGNOSIS — Z79.84 LONG TERM (CURRENT) USE OF ORAL HYPOGLYCEMIC DRUGS: ICD-10-CM

## 2024-03-20 DIAGNOSIS — Z79.82 LONG TERM (CURRENT) USE OF ASPIRIN: ICD-10-CM

## 2024-03-20 DIAGNOSIS — R31.9 HEMATURIA, UNSPECIFIED: ICD-10-CM

## 2024-03-20 DIAGNOSIS — I10 ESSENTIAL (PRIMARY) HYPERTENSION: ICD-10-CM

## 2024-03-20 DIAGNOSIS — G72.0 DRUG-INDUCED MYOPATHY: ICD-10-CM

## 2024-03-20 DIAGNOSIS — K59.00 CONSTIPATION, UNSPECIFIED: ICD-10-CM

## 2024-03-20 DIAGNOSIS — I48.91 UNSPECIFIED ATRIAL FIBRILLATION: ICD-10-CM

## 2024-03-20 DIAGNOSIS — E66.9 OBESITY, UNSPECIFIED: ICD-10-CM

## 2024-03-20 DIAGNOSIS — R10.13 EPIGASTRIC PAIN: ICD-10-CM

## 2024-03-20 DIAGNOSIS — Z87.442 PERSONAL HISTORY OF URINARY CALCULI: ICD-10-CM

## 2024-03-20 DIAGNOSIS — K27.9 PEPTIC ULCER, SITE UNSPECIFIED, UNSPECIFIED AS ACUTE OR CHRONIC, WITHOUT HEMORRHAGE OR PERFORATION: ICD-10-CM

## 2024-03-20 DIAGNOSIS — E11.9 TYPE 2 DIABETES MELLITUS WITHOUT COMPLICATIONS: ICD-10-CM

## 2024-03-20 DIAGNOSIS — K21.9 GASTRO-ESOPHAGEAL REFLUX DISEASE WITHOUT ESOPHAGITIS: ICD-10-CM

## 2024-03-20 DIAGNOSIS — T50.4X5A ADVERSE EFFECT OF DRUGS AFFECTING URIC ACID METABOLISM, INITIAL ENCOUNTER: ICD-10-CM

## 2024-03-20 DIAGNOSIS — Z88.1 ALLERGY STATUS TO OTHER ANTIBIOTIC AGENTS STATUS: ICD-10-CM

## 2024-03-20 DIAGNOSIS — R74.01 ELEVATION OF LEVELS OF LIVER TRANSAMINASE LEVELS: ICD-10-CM

## 2024-03-20 DIAGNOSIS — I25.10 ATHEROSCLEROTIC HEART DISEASE OF NATIVE CORONARY ARTERY WITHOUT ANGINA PECTORIS: ICD-10-CM

## 2024-03-20 DIAGNOSIS — E78.5 HYPERLIPIDEMIA, UNSPECIFIED: ICD-10-CM

## 2024-03-20 DIAGNOSIS — Z95.2 PRESENCE OF PROSTHETIC HEART VALVE: ICD-10-CM

## 2024-03-20 DIAGNOSIS — Z83.2 FAMILY HISTORY OF DISEASES OF THE BLOOD AND BLOOD-FORMING ORGANS AND CERTAIN DISORDERS INVOLVING THE IMMUNE MECHANISM: ICD-10-CM

## 2024-03-20 DIAGNOSIS — G89.29 OTHER CHRONIC PAIN: ICD-10-CM

## 2024-03-20 DIAGNOSIS — M54.9 DORSALGIA, UNSPECIFIED: ICD-10-CM

## 2024-03-20 DIAGNOSIS — Z95.1 PRESENCE OF AORTOCORONARY BYPASS GRAFT: ICD-10-CM

## 2024-03-20 DIAGNOSIS — A04.8 OTHER SPECIFIED BACTERIAL INTESTINAL INFECTIONS: ICD-10-CM

## 2024-03-20 DIAGNOSIS — E22.2 SYNDROME OF INAPPROPRIATE SECRETION OF ANTIDIURETIC HORMONE: ICD-10-CM

## 2024-03-20 PROCEDURE — 99214 OFFICE O/P EST MOD 30 MIN: CPT

## 2024-03-20 PROCEDURE — 93000 ELECTROCARDIOGRAM COMPLETE: CPT

## 2024-03-20 PROCEDURE — G2211 COMPLEX E/M VISIT ADD ON: CPT

## 2024-03-21 LAB
ALBUMIN SERPL ELPH-MCNC: 4 G/DL
ALP BLD-CCNC: 52 U/L
ALT SERPL-CCNC: 44 U/L
ANION GAP SERPL CALC-SCNC: 14 MMOL/L
AST SERPL-CCNC: 47 U/L
BILIRUB SERPL-MCNC: 0.2 MG/DL
BUN SERPL-MCNC: 13 MG/DL
CALCIUM SERPL-MCNC: 9.7 MG/DL
CHLORIDE SERPL-SCNC: 101 MMOL/L
CK SERPL-CCNC: 59 U/L
CO2 SERPL-SCNC: 23 MMOL/L
CREAT SERPL-MCNC: 0.62 MG/DL
EGFR: 99 ML/MIN/1.73M2
GLUCOSE SERPL-MCNC: 87 MG/DL
POTASSIUM SERPL-SCNC: 4.7 MMOL/L
PROT SERPL-MCNC: 6.6 G/DL
SODIUM SERPL-SCNC: 137 MMOL/L

## 2024-03-21 NOTE — ASSESSMENT
[FreeTextEntry1] : EKG NSR low voltage PRWP (no change from 1/24)  echo 3/24 CONCLUSIONS:   1. Normal left ventricular size and systolic function.  2. Normal right ventricular size and systolic function.  3. Normal atria.  4. Bioprosthetic valve is seen in the aortic position. The peak transvalvular velocity is 3.53 m/s, the mean transvalvular gradient is 28.00 mmHg, and the LVOT/AV velocity ratio is 0.28. Aortic valve velocity is elevated and may be suggestive of bioprosthetic stenosis. Clinical correlation is advised.  5. No pericardial effusion.  6. Compared to the previous TTE performed on 10/17/2023, velocity and gradients across aortic bioprosthesis are higher.  A/P 63 yr old obese F with DM, HTN, HLD, asthma, ex-smoker ( 1/2 ppd, quit 9/2023), mod- severe AS s/p post AVR/ascending aortic replacement, CABG x1 ( SVG-PDA in 2017 with recent recurrent hospitalization for unstable angina s/p OM1 stent and with patent graft, now LAD PCI  Symptoms previously improved w COPD treatments cardiac status appears stable As main symptoms appear COPD related, refer Pulm  In terms of existing cardiac conditions  #CAD, native vessel, native heart, no angina # s/p DRAKE circumflex, DRAKE LAD - remains angina free continue risk modification c/w ASA, Plavix and Toprol 50mg qd   #Hyperlipidemia Target LDL < 70 - LDL 24 as above. c/w Crestor 40mg qd and Zetia 10mg qd - states she has completed smoking cessation - recheck lipids in furture (statsin on hold as bleow)   # rhabdo Last   in hosp  CPK normal today  stains remain on hold - for another month until clinical symptoms resolve   # HTN BP at goal today - c/w chlorthalidone 25mg qd  # DM A1c 6.5% - c/w metformin  # abnormal EKG LVEF wnl as noted PRPW likely related to COPD No changes from baseline Follow for now  #AS # s/p AVR Bioprosthetic AS -Discussed with Dr. Kwok in hospital  -S/p bioAVR/ascending aorta replacement and CABG x1 (SVG-PDA) on 5/2017  -TTE 3/6: peak transvalvular velocity is 3.53 m/s, the mean transvalvular gradient is 28.00 mmHg, and the LVOT/AV velocity ratio is 0.28 -TAVR scans done, reviewed by attending -No TAVR this admission. Needs further work-up for abdominal pain/H.pyori and can follow-up outpatient with Dr. Kwok once discharged. -Structural heart team will continue to follow.   ---------------------------------------------------------------------------------------------- Today TCM visit linked to post discharge RN phone call as below Discussion/Summary     Call back completed.   Pt states she is tired but doing well at home. Denies chest pain/SOB. Appt confirmed with scheduling and pt provided office phone number for questions/concerns. 48 Hour Callback:   Patient Education: Instructions were given for patient to seek medical advice for any change in condition.      Electronically signed by : TINA SOTELO R.N.; Mar 13 2024  9:43AM Eastern Standard Time (Author)

## 2024-03-21 NOTE — HISTORY OF PRESENT ILLNESS
[FreeTextEntry1] : 63 yr old obese F with DM, HTN, HLD, asthma,ex-smoker ( 1/2 ppd, quit 9/2023), mod- severe AS s/p post AVR/ascending aortic replacement, CABG x1 ( SVG-PDA) on 5/22/17 seen today for follow up post hospital visit for unstable angina. She had sudden onset chest pain and underwent cardiac cath. Cath 8/30/23 w/ Dr Barrios: s/p PTCA/DRAKE x 1 OM1 70% stenosis (FFR positive 0.76), pRCA 70% stenosis, mRCA 99% stenosis, RPDA-filled by patent SVG-RPDA graft, pLAD 40% (FFR negative 0.92) She is now s/p cath OM1 stent with patent graft. Overall, she has been doing well. She has been taking her DAPT without any issues. Denies any bleeding/ bruising. She quit smoking while in the hospital and has not relapsed. She is very motivated to never smoke again. Overall, her functional/exercise capacity is limited given chronic back pain due to sciatica. She uses a cane to ambulate. Seen 9/23 feeling well. Since her discharge, she has been active at home. She cooks and cleans She denied any chest pain/ SOB or CARRILLO.  Readmitted H reporting progressive dyspnea with minimal exertion when walking half a block and performing ADLs. Pt reports she has not felt improved symptoms since stent placement in August. In addition, she admits to exertional fatigue and chest pain intermittently with activity. Pt is compliant with DAPT therapy. Patient now s/p cardiac catheterization (10/16/23) w/ Dr. Phillips w/ DRAKE x2 pLAD, OM stent patient. TTE (10/17/23): EF 63%, normal LV/RF SF, bioprosthetic valve, no valvular disease, no pericardial effusion. Since d/c has denied typical angina. Walking, cooking laundry without symptoms. Still typical COPD symptoms, which respond easily to inhalants  Since last visit, continues to improve, mostly in pulm areas - less reliance on inhalants etc Most symptoms relate to pulm, cough, worse w changes in weather etc, improve w inhalants (she tries to avoid due tachyacrdia) Still frequent muskuloskel CP (ie broght on during upper extremity positioning for CT scan)  Recent admit 3/24 p/w epigastric pain. 64 YO Female, former smoker (1.5 ppd, quit 8/2023) w/ PMHx of asthma/COPD, recently diagnosed H. Pylori (on triple tx), sciatica/herniated disc, HTN, HLD, DMII, anxiety, AS s/p bioAVR/ascending aorta replacement and CABG x1 (SVG-PDA) on 05/2017 ([facility], Dr. Hanson) w/ subsequent PCIs (most recent 10/16/23 DESx2 pLAD), recently admitted to [facility] 12/28-29 for c/o chest pain, found to have pericarditis and discharged on Colchicine. He also recently presented to [facility] ED 2/29 c/o epigastric pain, and also at [facility] ED on 3/3, where patient had CTAP w/ IV contrast that showed fatty liver and was discharged home. Patient then presented to [facility] 3/5 c/o diffuse back pain/tightness that worsens with deep breathing, associated with sweated and general weakness. She was admitted to medical service and GI and cardiology teams consulted. Patient had TTE significant for peak transvalvular velocity is 3.53 m/s, the mean transvalvular gradient is 28.00 mmHg, and the LVOT/AV velocity ratio is 0.28. CTSx surgery team consulted for possible bioprosthetic stenosis, recommended structural heart consult for TAVR Cheng.  Colchicine  and statin was dc'd due to concern for worsening abdominal pain and  pt was switched to quadruple therapy.    # Epigastric abdominal pain  atient with characteristic epigastric abdominal pain radiating to back however  CTAP without evidence of pancreatitis and lipase 20 (does not meet criteria for  pancreatitis). Possible PUD 2/2 to H Pylori? CTA C/A/P r/o dissection.  - discontinued statin and colchicine which may have contributed to muscle pain  and abdominal pain  - Switch patient to quadruple therapy due to existing patient allergy to  clarithromycin and possible side effect of clarithromycin being abdominal pain.  - Continue H.Pylori quadruple therapy until 3/19  - f/u repeat breath test 2-4 weeks after completing therapy to confirm  eradication upon    # Transaminitis  AST/ALT elevations, uptrending since discharge in 12/2023. Polo's sign  negative on admission. CTAP without evidence of galbladder obstruction however  layering sludge without wall thickening or pericholecystic fluid present on  exam.  - hepatitis panel with signs of past HepB infection (surface and core antibody  positive)  - outpatient follow up    # Hyponatremia  On 3/8, patient found to have hyponatremic and improving with hypertonics from  Urine studies c/w SIADH.  - resolved  - discontinue chlorthalidone    #CAD (coronary artery disease).P/w sharp SSCP associated w anxiety/SOB resolved  on own. Currently c/o very mild CP upon a deep breath but otherwise CP free. HD  stable. Compliant with DAPT. hx of bioAVR/ascending aorta replacement, CABG  SVG-PDA, and recent DESx2 pLAD 10/2023. Trop WNL on admission. EKG Q waves V2 -  V6. Cardiac cath 10/16/23: DRAKE x2 pLAD, OM stent patient, patent SVG-RPDA  graft. TTE 10/17/23: EF 63%, normal LV/RV. Bioprosthetic AV, no other valve  disease, no pericardial effusion.  - Continue w/ ASA, plavix and BB    #H/O pericarditis  Discharged 12/29 on Colchicine 0.6 mg BID x 3 months.  - d/c colchicine 0.6mg PO BID per cardiology due to risk of colchicine  contributing to abdominal discomfort.    #History of COPD  -  Plan: SpO2 100% on RA, no new or change in cough or sputum production, no  CARRILLO, no wheeze. Not on home O2.  - can continue with inhalers when confirmed.    # Anxiety   - Continue home diazepam qHS.    # Diabetes mellitus  on home metformin 1000mg PO qAM & 500mg PO qHS  - c/w home meds    #History of chronic back pain.  On home vicodin 7.5-325 mg q12 PRN for pain.  - oxycodone 7.5 mg PO q6h PRN for severe pain  - start standing tylenol.    # Hypertension  On home metoprolol succinate 50mg qd + chlorthalidone 25mg PO qd  - c/w toprol  - hold home chlorthalidone.    # Hyperlipidemia  -  Plan: on home crestor 40mg PO qHS + zetia 10mg PO qd,  - c/w therapeutic interchange  Sicne d/c abd pain improved. No CP. Msucle weakness persists, but much improved

## 2024-03-21 NOTE — PHYSICAL EXAM
[Well Developed] : well developed [Well Nourished] : well nourished [No Acute Distress] : no acute distress [Normal Conjunctiva] : normal conjunctiva [Normal Venous Pressure] : normal venous pressure [No Carotid Bruit] : no carotid bruit [Normal S1, S2] : normal S1, S2 [No Rub] : no rub [No Gallop] : no gallop [Clear Lung Fields] : clear lung fields [Good Air Entry] : good air entry [No Respiratory Distress] : no respiratory distress  [Soft] : abdomen soft [Non Tender] : non-tender [No Masses/organomegaly] : no masses/organomegaly [Normal Bowel Sounds] : normal bowel sounds [Normal Gait] : normal gait [No Edema] : no edema [No Cyanosis] : no cyanosis [No Clubbing] : no clubbing [No Varicosities] : no varicosities [No Rash] : no rash [No Skin Lesions] : no skin lesions [Moves all extremities] : moves all extremities [No Focal Deficits] : no focal deficits [Normal Speech] : normal speech [Alert and Oriented] : alert and oriented [Normal memory] : normal memory [de-identified] : A2 preserved, 2/6 MONIQUE RUSB

## 2024-03-22 LAB — ALDOLASE SERPL-CCNC: 6 U/L

## 2024-03-27 ENCOUNTER — APPOINTMENT (OUTPATIENT)
Dept: PULMONOLOGY | Facility: CLINIC | Age: 64
End: 2024-03-27
Payer: MEDICARE

## 2024-03-27 VITALS
TEMPERATURE: 97.2 F | OXYGEN SATURATION: 98 % | BODY MASS INDEX: 30.12 KG/M2 | DIASTOLIC BLOOD PRESSURE: 66 MMHG | HEART RATE: 85 BPM | HEIGHT: 63 IN | WEIGHT: 170 LBS | SYSTOLIC BLOOD PRESSURE: 96 MMHG

## 2024-03-27 PROCEDURE — 99204 OFFICE O/P NEW MOD 45 MIN: CPT

## 2024-03-27 RX ORDER — FLUTICASONE PROPIONATE 44 UG/1
44 AEROSOL, METERED RESPIRATORY (INHALATION)
Refills: 0 | Status: COMPLETED | COMMUNITY
End: 2024-03-27

## 2024-03-27 RX ORDER — ALBUTEROL SULFATE 90 UG/1
108 (90 BASE) AEROSOL, METERED RESPIRATORY (INHALATION)
Qty: 1 | Refills: 3 | Status: COMPLETED | COMMUNITY
End: 2024-03-27

## 2024-03-28 NOTE — HISTORY OF PRESENT ILLNESS
[Former] : former [TextBox_4] : 63F with pmhx of  DM, HTN, HLD, COPD, recently quit smoker (quit 9/2023), mod- severe AS s/p post AVR/ascending aortic replacement and CABG 5/22/17 with recent PCI 8/23 for unstable angina now on DAPT and persistent dyspnea on exertion recathed showing patent graft but work up has revealed bioprothetic valve stenosis and pending TAVR evaluation. Referred by Dr. Rahman for further evaluation of dyspnea on exertion.  Able to only walk half a block using her cane, unable to climb steps. Intermittent wheeze and cough, uses inhalers for COPD. Recent GI issues. Recently quit former heavy smoker up to 2ppd quit 9/2023; smoked since age 14. Using Symbicort BID and prn albuterol once daily.  [ESS] : 3

## 2024-03-28 NOTE — CONSULT LETTER
[Dear  ___] : Dear  [unfilled], [Consult Letter:] : I had the pleasure of evaluating your patient, [unfilled]. [Please see my note below.] : Please see my note below. [Consult Closing:] : Thank you very much for allowing me to participate in the care of this patient.  If you have any questions, please do not hesitate to contact me. [Sincerely,] : Sincerely, [FreeTextEntry3] : Kena Hopper MD  [employer] [facility] Pulmonary, Critical Care, and Sleep Medicine [DrMilton  ___] : Dr. LEON

## 2024-03-28 NOTE — PHYSICAL EXAM
[No Acute Distress] : no acute distress [Normal Rate/Rhythm] : normal rate/rhythm [Murmur ___ / 6] : murmur [unfilled] / 6 [Normal S1, S2] : normal s1, s2 [Clear to Auscultation Bilaterally] : clear to auscultation bilaterally [No Resp Distress] : no resp distress [Oriented x3] : oriented x3 [Normal Affect] : normal affect [Normal Appearance] : normal appearance [Normal Gait] : normal gait

## 2024-03-28 NOTE — ASSESSMENT
[FreeTextEntry1] : 63F with pmhx of  DM, HTN, HLD, COPD, recently quit smoker (quit 9/2023), mod- severe AS s/p post AVR/ascending aortic replacement and CABG 5/22/17 with recent PCI 8/23 for unstable angina now on DAPT and persistent dyspnea on exertion recathed showing patent graft but work up has revealed bioprosthetic valve stenosis and pending TAVR evaluation. Referred by Dr. Gamez for further evaluation of dyspnea on exertion.  CT Chest 3/24: mosaic attenuation, no nodules   CARRILLO likely multifactorial, including COPD in heavy prior smoker, obesity and valve stenosis. Can optimize inhaler regimen by beginning LAMA/LABA (Stiolto) and d/c ICS/LABA (Symbicort). Can continue prn albuterol. Prior spirometry was periop and showed moderate restriction. Will repeat PFTs/6MWT. She qualifies for lung cancer screening >20py 2ppd former smoker quit 9/.2023. Had recent CT chest and reviewed with filemon attenuation, no suspicious nodules. Can enroll in lung cancer screening next due 3/3025.   -6MWT/PFTs  -begin LAMA/LABA Stioloto  -d/c symbicort -continue prn albuterol -lung cancer screening 3/2025 -follow up in 6 to 8 weeks

## 2024-03-28 NOTE — REVIEW OF SYSTEMS
[Cough] : cough [Dyspnea] : dyspnea [Wheezing] : wheezing [SOB on Exertion] : sob on exertion [Fever] : no fever [Chills] : no chills [Sputum] : no sputum [Pleuritic Pain] : no pleuritic pain [Edema] : no edema [Headache] : no headache [Dizziness] : no dizziness [Paralysis] : no paralysis

## 2024-04-10 ENCOUNTER — NON-APPOINTMENT (OUTPATIENT)
Age: 64
End: 2024-04-10

## 2024-04-10 ENCOUNTER — APPOINTMENT (OUTPATIENT)
Dept: HEART AND VASCULAR | Facility: CLINIC | Age: 64
End: 2024-04-10
Payer: MEDICARE

## 2024-04-10 VITALS
BODY MASS INDEX: 30.48 KG/M2 | OXYGEN SATURATION: 97 % | HEART RATE: 90 BPM | TEMPERATURE: 97.2 F | WEIGHT: 172 LBS | SYSTOLIC BLOOD PRESSURE: 116 MMHG | HEIGHT: 63 IN | DIASTOLIC BLOOD PRESSURE: 70 MMHG

## 2024-04-10 DIAGNOSIS — E78.5 HYPERLIPIDEMIA, UNSPECIFIED: ICD-10-CM

## 2024-04-10 DIAGNOSIS — I35.0 NONRHEUMATIC AORTIC (VALVE) STENOSIS: ICD-10-CM

## 2024-04-10 DIAGNOSIS — Z95.5 PRESENCE OF CORONARY ANGIOPLASTY IMPLANT AND GRAFT: ICD-10-CM

## 2024-04-10 DIAGNOSIS — Z95.2 PRESENCE OF PROSTHETIC HEART VALVE: ICD-10-CM

## 2024-04-10 DIAGNOSIS — Z98.890 OTHER SPECIFIED POSTPROCEDURAL STATES: ICD-10-CM

## 2024-04-10 DIAGNOSIS — Z95.1 PRESENCE OF AORTOCORONARY BYPASS GRAFT: ICD-10-CM

## 2024-04-10 DIAGNOSIS — M60.9 MYOSITIS, UNSPECIFIED: ICD-10-CM

## 2024-04-10 DIAGNOSIS — Z86.79 OTHER SPECIFIED POSTPROCEDURAL STATES: ICD-10-CM

## 2024-04-10 PROCEDURE — G2211 COMPLEX E/M VISIT ADD ON: CPT

## 2024-04-10 PROCEDURE — 99214 OFFICE O/P EST MOD 30 MIN: CPT

## 2024-04-10 PROCEDURE — 93000 ELECTROCARDIOGRAM COMPLETE: CPT

## 2024-04-10 NOTE — ASSESSMENT
[FreeTextEntry1] : EKG NSR low voltage PRWP (no change from 1/24)  63 yr old obese F with DM, HTN, HLD, asthma, ex-smoker ( 1/2 ppd, quit 9/2023), mod- severe AS s/p post AVR/ascending aortic replacement, CABG x1 ( SVG-PDA in 2017 with recent recurrent hospitalization for unstable angina s/p OM1 stent and with patent graft, now LAD PCI  Symptoms previously improved w COPD treatments cardiac status appears stable As main symptoms appear COPD related, saw Pulm -6MWT/PFTs -begin LAMA/LABA Stioloto -d/c symbicort -continue prn albuterol  In terms of existing cardiac conditions  #CAD, native vessel, native heart, no angina # s/p DRAKE circumflex, DARKE LAD - remains angina free continue risk modification c/w ASA, Plavix and Toprol 50mg qd   #Hyperlipidemia Target LDL < 70 - LDL 24 as above. c/w Crestor 40mg qd and Zetia 10mg qd - states she has completed smoking cessation - recheck lipids in furture (statin on hold as below)  # rhabdo Last  in hosp CPK normal today  stains remain on hold - for another month until clinical symptoms resolve will start PCSK9  # HTN BP at goal today - c/w chlorthalidone 25mg qd  # DM A1c 6.5% - c/w metformin  # abnormal EKG LVEF wnl as noted PRPW likely related to COPD No changes from baseline Follow for now  #AS # s/p AVR Bioprosthetic AS -Discussed with Dr. Kwok in hospital -S/p bioAVR/ascending aorta replacement and CABG x1 (SVG-PDA) on 5/2017 -TTE 3/6: peak transvalvular velocity is 3.53 m/s, the mean transvalvular gradient is 28.00 mmHg, and the LVOT/AV velocity ratio is 0.28 -TAVR scans done, reviewed by attending -No TAVR this admission. Needs further work-up for abdominal pain/H.pyori and can follow-up outpatient with Dr. Kwok once discharged. -Structural heart team will continue to follow.

## 2024-04-10 NOTE — PHYSICAL EXAM
[Well Developed] : well developed [Well Nourished] : well nourished [No Acute Distress] : no acute distress [Normal Conjunctiva] : normal conjunctiva [Normal Venous Pressure] : normal venous pressure [No Carotid Bruit] : no carotid bruit [Normal S1, S2] : normal S1, S2 [No Rub] : no rub [No Gallop] : no gallop [Clear Lung Fields] : clear lung fields [Good Air Entry] : good air entry [No Respiratory Distress] : no respiratory distress  [Soft] : abdomen soft [Non Tender] : non-tender [No Masses/organomegaly] : no masses/organomegaly [Normal Bowel Sounds] : normal bowel sounds [Normal Gait] : normal gait [No Edema] : no edema [No Cyanosis] : no cyanosis [No Clubbing] : no clubbing [No Varicosities] : no varicosities [No Rash] : no rash [No Skin Lesions] : no skin lesions [Moves all extremities] : moves all extremities [No Focal Deficits] : no focal deficits [Normal Speech] : normal speech [Alert and Oriented] : alert and oriented [Normal memory] : normal memory [de-identified] : A2 preserved, 2/6 MONIQUE RUSB

## 2024-04-10 NOTE — HISTORY OF PRESENT ILLNESS
[FreeTextEntry1] : 63 yr old obese F with DM, HTN, HLD, asthma,ex-smoker ( 1/2 ppd, quit 9/2023), mod- severe AS s/p post AVR/ascending aortic replacement, CABG x1 ( SVG-PDA) on 5/22/17 seen today for follow up post hospital visit for unstable angina. She had sudden onset chest pain and underwent cardiac cath. Cath 8/30/23 w/ Dr Barrios: s/p PTCA/DRAKE x 1 OM1 70% stenosis (FFR positive 0.76), pRCA 70% stenosis, mRCA 99% stenosis, RPDA-filled by patent SVG-RPDA graft, pLAD 40% (FFR negative 0.92) She is now s/p cath OM1 stent with patent graft. Overall, she has been doing well. She has been taking her DAPT without any issues. Denies any bleeding/ bruising. She quit smoking while in the hospital and has not relapsed. She is very motivated to never smoke again. Overall, her functional/exercise capacity is limited given chronic back pain due to sciatica. She uses a cane to ambulate. Seen 9/23 feeling well. Since her discharge, she has been active at home. She cooks and cleans She denied any chest pain/ SOB or CARRILLO.  Readmitted H reporting progressive dyspnea with minimal exertion when walking half a block and performing ADLs. Pt reports she has not felt improved symptoms since stent placement in August. In addition, she admits to exertional fatigue and chest pain intermittently with activity. Pt is compliant with DAPT therapy. Patient now s/p cardiac catheterization (10/16/23) w/ Dr. Phillips w/ DRAKE x2 pLAD, OM stent patient. TTE (10/17/23): EF 63%, normal LV/RF SF, bioprosthetic valve, no valvular disease, no pericardial effusion. Since d/c has denied typical angina. Walking, cooking laundry without symptoms. Still typical COPD symptoms, which respond easily to inhalants  Since last visit, continues to improve, mostly in pulm areas - less reliance on inhalants etc Most symptoms relate to pulm, cough, worse w changes in weather etc, improve w inhalants (she tries to avoid due tachyacrdia) Still frequent muskuloskel CP (ie broght on during upper extremity positioning for CT scan)  Recent admit 3/24 p/w epigastric pain. 62 YO Female, former smoker (1.5 ppd, quit 8/2023) w/ PMHx of asthma/COPD, recently diagnosed H. Pylori (on triple tx), sciatica/herniated disc, HTN, HLD, DMII, anxiety, AS s/p bioAVR/ascending aorta replacement and CABG x1 (SVG-PDA) on 05/2017 ([facility], Dr. Hanson) w/ subsequent PCIs (most recent 10/16/23 DESx2 pLAD), recently admitted to [facility] 12/28-29 for c/o chest pain, found to have pericarditis and discharged on Colchicine. He also recently presented to [facility] ED 2/29 c/o epigastric pain, and also at [facility] ED on 3/3, where patient had CTAP w/ IV contrast that showed fatty liver and was discharged home. Patient then presented to [facility] 3/5 c/o diffuse back pain/tightness that worsens with deep breathing, associated with sweated and general weakness. She was admitted to medical service and GI and cardiology teams consulted. Patient had TTE significant for peak transvalvular velocity is 3.53 m/s, the mean transvalvular gradient is 28.00 mmHg, and the LVOT/AV velocity ratio is 0.28. CTSx surgery team consulted for possible bioprosthetic stenosis, recommended structural heart consult for TAVR Cheng.  Colchicine and statin was dc'd due to concern for worsening abdominal pain and  pt was switched to quadruple therapy.    # Epigastric abdominal pain  atient with characteristic epigastric abdominal pain radiating to back however  CTAP without evidence of pancreatitis and lipase 20 (does not meet criteria for  pancreatitis). Possible PUD 2/2 to H Pylori? CTA C/A/P r/o dissection.  - discontinued statin and colchicine which may have contributed to muscle pain  and abdominal pain  - Switch patient to quadruple therapy due to existing patient allergy to  clarithromycin and possible side effect of clarithromycin being abdominal pain.  - Continue H.Pylori quadruple therapy until 3/19  - f/u repeat breath test 2-4 weeks after completing therapy to confirm  eradication upon    # Transaminitis  AST/ALT elevations, uptrending since discharge in 12/2023. Polo's sign  negative on admission. CTAP without evidence of galbladder obstruction however  layering sludge without wall thickening or pericholecystic fluid present on  exam.  - hepatitis panel with signs of past HepB infection (surface and core antibody  positive)  - outpatient follow up    # Hyponatremia  On 3/8, patient found to have hyponatremic and improving with hypertonics from  Urine studies c/w SIADH.  - resolved  - discontinue chlorthalidone    #CAD (coronary artery disease).P/w sharp SSCP associated w anxiety/SOB resolved  on own. Currently c/o very mild CP upon a deep breath but otherwise CP free. HD  stable. Compliant with DAPT. hx of bioAVR/ascending aorta replacement, CABG  SVG-PDA, and recent DESx2 pLAD 10/2023. Trop WNL on admission. EKG Q waves V2 -  V6. Cardiac cath 10/16/23: DRAKE x2 pLAD, OM stent patient, patent SVG-RPDA  graft. TTE 10/17/23: EF 63%, normal LV/RV. Bioprosthetic AV, no other valve  disease, no pericardial effusion.  - Continue w/ ASA, plavix and BB    #H/O pericarditis  Discharged 12/29 on Colchicine 0.6 mg BID x 3 months.  - d/c colchicine 0.6mg PO BID per cardiology due to risk of colchicine  contributing to abdominal discomfort.    #History of COPD  - Plan: SpO2 100% on RA, no new or change in cough or sputum production, no  CARRILLO, no wheeze. Not on home O2.  - can continue with inhalers when confirmed.    # Anxiety  - Continue home diazepam qHS.    # Diabetes mellitus  on home metformin 1000mg PO qAM & 500mg PO qHS  - c/w home meds    #History of chronic back pain.  On home vicodin 7.5-325 mg q12 PRN for pain.  - oxycodone 7.5 mg PO q6h PRN for severe pain  - start standing tylenol.    # Hypertension  On home metoprolol succinate 50mg qd + chlorthalidone 25mg PO qd  - c/w toprol  - hold home chlorthalidone.    # Hyperlipidemia  - Plan: on home crestor 40mg PO qHS + zetia 10mg PO qd,  - c/w therapeutic interchange  Sicne d/c abd pain improved. No CP. Msucle weakness persists, but much improved. Walking continues to improve, now >>  block, able to eprform housework

## 2024-04-12 RX ORDER — EVOLOCUMAB 420 MG/3.5
420 KIT SUBCUTANEOUS
Qty: 1 | Refills: 5 | Status: DISCONTINUED | COMMUNITY
Start: 2024-04-10 | End: 2024-04-12

## 2024-04-15 RX ORDER — EVOLOCUMAB 140 MG/ML
140 INJECTION, SOLUTION SUBCUTANEOUS
Qty: 3 | Refills: 3 | Status: ACTIVE | COMMUNITY
Start: 2024-04-12 | End: 1900-01-01

## 2024-04-15 NOTE — ED ADULT TRIAGE NOTE - NSWEIGHTCALCTOOLDRUG_GEN_A_CORE
AUDIOMETRIC EVALUATION      Name:  Julee Todd  :  1980  Age:  44 y.o.  Date of Evaluation:  04/15/2024       History:  Ms. Todd is seen today for a repeat vestibular evaluation due to persistent benign paroxysmal positional vertigo at the request of YISEL Cruz.      Audiologic Information:  Concerns for Hearing: No  PETs: No  Other otologic surgical history: No  Aural Pressure/Fullness: Bilateral  Otalgia: Bilateral  Otorrhea: No  Tinnitus: Intermittent bilateral ringing  Dizziness: lots of spinning/lightheaded- especially in a car  Noise Exposure: No  Family history of hearing loss: No  Head trauma requiring hospital stay: No  Chemotherapy: No  Other significant history: None     Vestibular Information:  Duration: Minutes (episodes last for a couple of days after being on a boat)  Triggers: in a car or a boat, has recently been working with a  and that has triggered spinning  Heart Problems: No  Back/Neck Problems: previous neck injury  Vision Problems: None  Nausea: yes  Weakness/Numbness: numbness when laying down  Motion Sickness: Yes- regularly  Migraine/Headache: No  Falls: No    **Case history obtained in office and through EMR system      EVALUATION:        RESULTS:    Otoscopic Evaluation:  Right: minimal cerumen, tympanic membrane visualized  Left: minimal cerumen, tympanic membrane visualized  **NOTE: Testing completed after ears were examined by ENT provider    Tympanometry (226 Hz):  Right: Type As  Left: Type A      Positional Screening:    Spontaneous Nystagmus Normal - No nystagmus observed or recorded   [place]-Hallpike Positive    Left Horizontal non-fading nystagmus recorded   Right No nystagmus observed or recorded   Positional Normal   Supine No nystagmus observed or recorded   Head Right No nystagmus observed or recorded   Head Left No nystagmus observed or recorded       IMPRESSIONS:  Tympanometry showed normal middle ear pressure and static compliance, consistent  with normal middle ear function for the left ear. Tympanometry showed normal middle ear pressure with reduced static compliance, consistent with hypomobile tympanic membrane, for the right ear. Patient was counseled with regard to the findings.  Lempert maneuver was performed for left horizontal canal benign paroxysmal positional vertigo without incidence.      Diagnosis:  1. BPPV (benign paroxysmal positional vertigo), left         RECOMMENDATIONS/PLAN:  Follow-up recommendations per YISEL Cruz.  Follow-up if vestibular symptoms persist  Discussed results and recommendations with patient. Questions were addressed and the patient was encouraged to contact our department should concerns arise.        Padmini Wesley, CCC-A, F-AAA  Doctor of Audiology    used

## 2024-04-16 ENCOUNTER — APPOINTMENT (OUTPATIENT)
Age: 64
End: 2024-04-16
Payer: MEDICARE

## 2024-04-16 VITALS
DIASTOLIC BLOOD PRESSURE: 70 MMHG | SYSTOLIC BLOOD PRESSURE: 110 MMHG | HEIGHT: 63 IN | OXYGEN SATURATION: 96 % | WEIGHT: 173 LBS | HEART RATE: 81 BPM | TEMPERATURE: 95.3 F | RESPIRATION RATE: 14 BRPM | BODY MASS INDEX: 30.65 KG/M2

## 2024-04-16 DIAGNOSIS — A04.8 OTHER SPECIFIED BACTERIAL INTESTINAL INFECTIONS: ICD-10-CM

## 2024-04-16 DIAGNOSIS — K76.0 FATTY (CHANGE OF) LIVER, NOT ELSEWHERE CLASSIFIED: ICD-10-CM

## 2024-04-16 PROCEDURE — 99204 OFFICE O/P NEW MOD 45 MIN: CPT

## 2024-04-16 RX ORDER — CLOPIDOGREL 75 MG/1
75 TABLET, FILM COATED ORAL DAILY
Refills: 0 | Status: ACTIVE | COMMUNITY

## 2024-04-16 RX ORDER — DIAZEPAM 10 MG/1
10 TABLET ORAL
Refills: 0 | Status: ACTIVE | COMMUNITY

## 2024-04-16 RX ORDER — METFORMIN HYDROCHLORIDE 500 MG/1
500 TABLET, COATED ORAL
Refills: 0 | Status: ACTIVE | COMMUNITY

## 2024-04-16 RX ORDER — EZETIMIBE 10 MG/1
10 TABLET ORAL
Refills: 0 | Status: ACTIVE | COMMUNITY

## 2024-04-18 RX ORDER — TIOTROPIUM BROMIDE AND OLODATEROL 3.124; 2.736 UG/1; UG/1
2.5-2.5 SPRAY, METERED RESPIRATORY (INHALATION) DAILY
Qty: 1 | Refills: 5 | Status: DISCONTINUED | COMMUNITY
Start: 2024-03-27 | End: 2024-04-18

## 2024-04-18 NOTE — HISTORY OF PRESENT ILLNESS
[FreeTextEntry1] : 63F former smoker (Quit 8/2023), CAD s/p CABG (2017) and AS s/p bioAVR/ascending aorta replacement and PCI (most recent 0/16/23 DESx2 pLAD on plavix/ aspirin, asthma/COPD, HTN, HLD, DM2, pericarditis 12/2023 (on Colchicine 0.6 mg BID x 3 months (still taking), recent dx of H Pylori (unable to tolerate triple therapy), diverticulitis 2007 here for follow up after hospital visit.   4/16/24 - hospitalized 1.5 months ago for 2 weeks with abdominal pain and loose stools  - abdominal pain has improved and stool no longer loose  - BMs are weird now- no longer diarrhea. a lot of stool coming out at once, does sometimes have BM every other day instead of daily  - eating well  - completed quadruple therapy for h. pylori few weeks ago, continuing to take PPI  - pt reports her last colonoscopy was 5 years ago and she will be due August 2024   Hospital course  presenting for generalized pain and fatigue.   #Abdominal pain #Enteritis #H. Pylori positive #Elevated LFTs Patient with generalized pain of unclear etiology and w/u for pancreaticobiliary pathology negative. DDx includes enteritis seen on CT (? medication induced from colchicine, ischemic), known H.pylori infection/ HP medications, constipation. While ulcer or gastritis/ esophagitis are on the ddx, she is currently on PPI BID and being treated for HP, has no evidence of bleeding. Would continue medical management for now and defer EGD especially in setting of recent stents.   Of notes, she also have elevated lfts 2-3x ULN x 3 months with steatosis on imaging. Most likely FORRESTER vs less likely DILI from colchicine (started after colchicine). Hep C neg. Labs suggestive of prior Hep B infection (no prior vaccination per patient). No supplements/ herbals or alcohol use. Autoimmune work up negative.

## 2024-04-18 NOTE — PHYSICAL EXAM
[Bowel Sounds] : normal bowel sounds [Abdomen Tenderness] : non-tender [No Masses] : no abdominal mass palpated [Abdomen Soft] : soft [Abnormal Walk] : normal gait [Normal Color / Pigmentation] : normal skin color and pigmentation [] : no rash [Normal Turgor] : normal skin turgor [No Focal Deficits] : no focal deficits [Normal] : oriented to person, place, and time [Oriented To Time, Place, And Person] : oriented to person, place, and time [Normal Affect] : the affect was normal [Normal Mood] : the mood was normal [de-identified] : abdominal adiposity

## 2024-04-18 NOTE — ASSESSMENT
[FreeTextEntry1] : 63F former smoker (Quit 8/2023), CAD s/p CABG (2017) and AS s/p bioAVR/ascending aorta replacement and PCI (most recent 0/16/23 DESx2 pLAD on plavix/ aspirin, asthma/COPD, HTN, HLD, DM2, pericarditis 12/2023 (on Colchicine 0.6 mg BID x 3 months (still taking), recent dx of H Pylori (unable to tolerate triple therapy), diverticulitis 2007 here for follow up after hospital visit.   Abdominal pain, loose stools - have resolved, unclear etiology   H. pylori infection - completed quadruple therapy - since pt on PPI still, advised to stop for 2 weeks then return for eradication breath test   Hepatic steatosis  - recommend fibroscan - discussed importance of weight loss and consider nutrition referral   CRC screening - obtain colonoscopy report from [facility] to review and determine if patient due at this time   f/u after above

## 2024-05-01 ENCOUNTER — NON-APPOINTMENT (OUTPATIENT)
Age: 64
End: 2024-05-01

## 2024-05-01 LAB — UREA BREATH TEST QL: NEGATIVE

## 2024-05-09 ENCOUNTER — EMERGENCY (EMERGENCY)
Facility: HOSPITAL | Age: 64
LOS: 1 days | Discharge: HOME | End: 2024-05-09
Admitting: EMERGENCY MEDICINE
Payer: MEDICARE

## 2024-05-09 VITALS
WEIGHT: 175.05 LBS | HEIGHT: 63 IN | TEMPERATURE: 98 F | DIASTOLIC BLOOD PRESSURE: 70 MMHG | SYSTOLIC BLOOD PRESSURE: 146 MMHG | RESPIRATION RATE: 16 BRPM | OXYGEN SATURATION: 99 % | HEART RATE: 84 BPM

## 2024-05-09 DIAGNOSIS — S70.02XA CONTUSION OF LEFT HIP, INITIAL ENCOUNTER: ICD-10-CM

## 2024-05-09 DIAGNOSIS — I25.10 ATHEROSCLEROTIC HEART DISEASE OF NATIVE CORONARY ARTERY WITHOUT ANGINA PECTORIS: ICD-10-CM

## 2024-05-09 DIAGNOSIS — T40.2X6A UNDERDOSING OF OTHER OPIOIDS, INITIAL ENCOUNTER: ICD-10-CM

## 2024-05-09 DIAGNOSIS — M25.512 PAIN IN LEFT SHOULDER: ICD-10-CM

## 2024-05-09 DIAGNOSIS — D25.9 LEIOMYOMA OF UTERUS, UNSPECIFIED: Chronic | ICD-10-CM

## 2024-05-09 DIAGNOSIS — G89.11 ACUTE PAIN DUE TO TRAUMA: ICD-10-CM

## 2024-05-09 DIAGNOSIS — I10 ESSENTIAL (PRIMARY) HYPERTENSION: ICD-10-CM

## 2024-05-09 DIAGNOSIS — Z90.49 ACQUIRED ABSENCE OF OTHER SPECIFIED PARTS OF DIGESTIVE TRACT: Chronic | ICD-10-CM

## 2024-05-09 DIAGNOSIS — G89.29 OTHER CHRONIC PAIN: ICD-10-CM
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
## 2024-05-09 DIAGNOSIS — Z79.01 LONG TERM (CURRENT) USE OF ANTICOAGULANTS: ICD-10-CM
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
## 2024-05-09 DIAGNOSIS — E11.9 TYPE 2 DIABETES MELLITUS WITHOUT COMPLICATIONS: ICD-10-CM

## 2024-05-09 DIAGNOSIS — G56.00 CARPAL TUNNEL SYNDROME, UNSPECIFIED UPPER LIMB: Chronic | ICD-10-CM

## 2024-05-09 DIAGNOSIS — Z95.1 PRESENCE OF AORTOCORONARY BYPASS GRAFT: ICD-10-CM

## 2024-05-09 DIAGNOSIS — Z98.890 OTHER SPECIFIED POSTPROCEDURAL STATES: Chronic | ICD-10-CM

## 2024-05-09 DIAGNOSIS — Z88.1 ALLERGY STATUS TO OTHER ANTIBIOTIC AGENTS STATUS: ICD-10-CM

## 2024-05-09 DIAGNOSIS — Z95.2 PRESENCE OF PROSTHETIC HEART VALVE: Chronic | ICD-10-CM

## 2024-05-09 DIAGNOSIS — Z91.148 PATIENT'S OTHER NONCOMPLIANCE WITH MEDICATION REGIMEN FOR OTHER REASON: ICD-10-CM

## 2024-05-09 PROCEDURE — 73030 X-RAY EXAM OF SHOULDER: CPT

## 2024-05-09 PROCEDURE — 73502 X-RAY EXAM HIP UNI 2-3 VIEWS: CPT | Mod: 26,LT

## 2024-05-09 PROCEDURE — 99284 EMERGENCY DEPT VISIT MOD MDM: CPT | Mod: 25

## 2024-05-09 PROCEDURE — 73080 X-RAY EXAM OF ELBOW: CPT

## 2024-05-09 PROCEDURE — 73080 X-RAY EXAM OF ELBOW: CPT | Mod: 26,LT

## 2024-05-09 PROCEDURE — 73502 X-RAY EXAM HIP UNI 2-3 VIEWS: CPT

## 2024-05-09 PROCEDURE — 73030 X-RAY EXAM OF SHOULDER: CPT | Mod: 26

## 2024-05-09 PROCEDURE — 99284 EMERGENCY DEPT VISIT MOD MDM: CPT

## 2024-05-09 RX ORDER — OXYCODONE HYDROCHLORIDE 5 MG/1
5 TABLET ORAL ONCE
Refills: 0 | Status: DISCONTINUED | OUTPATIENT
Start: 2024-05-09 | End: 2024-05-09

## 2024-05-09 RX ADMIN — OXYCODONE HYDROCHLORIDE 5 MILLIGRAM(S): 5 TABLET ORAL at 12:01

## 2024-05-09 NOTE — ED ADULT TRIAGE NOTE - CHIEF COMPLAINT QUOTE
Patient PMH CAD and DM to the ED c/o mechanical trip and fall while taking out trash last night, landing on left side. Denies head strike or neck pain, -LOC, +Plavix. C/O left arm and left hip pain, ambulatory with cane. AAOX4, NAD.

## 2024-05-09 NOTE — ED PROVIDER NOTE - PHYSICAL EXAMINATION
Vitals reviewed  Gen: comfortable appearing at rest, in nad, speaking in full sentences  Skin: wwp, no rash/lesions  HEENT: ncat, eomi, mmm  Neck/Back: no midline ttp/step off, no paraspinal ttp  Chest: no chest wall ttp   CV: rrr, no audible m/r/g  Resp: symmetrical expansion, ctab, no w/r/r  Ext: + hematoma w/ ttp over L lateral hip/femur, no deformity, pain w/ ROM L shoulder and mild ttp over AC joint, FROM throughout, no peripheral edema, 5/5 strength all ext, SILT equal throughout, distal pulses 2+  Neuro: alert/oriented x3, no focal deficits, steady gait with cane

## 2024-05-09 NOTE — ED PROVIDER NOTE - INTERNATIONAL TRAVEL
Discussed results with Dr. Mary Zhong- recommend patient see neurosurgery for eval ASAP given the current and progressive neurological symptoms in the left leg. Reviewed results and recommended next steps with patient via phone. He verbalizes understanding. Awaiting return call from [facility] to schedule patient for surgical consult. Will call patient to advise of appointment as soon as completed. No

## 2024-05-09 NOTE — ED PROVIDER NOTE - CLINICAL SUMMARY MEDICAL DECISION MAKING FREE TEXT BOX
64 F pmh CAD s/p CABG on AC, htn, dm p/w L shoulder and L hip/femur pain s/p mechanical fall last night.  no head trauma or loc 64 F pmh CAD s/p CABG on AC, htn, dm p/w L shoulder and L hip/femur pain s/p mechanical fall last night.  no head trauma or loc.  on exam pt comfortable appearing, ambulating with cane, no midline spinal ttp, + hematoma w/ ttp over L lateral hip/femur, no deformity, pain w/ ROM L shoulder and mild ttp over AC joint, FROM throughout, no peripheral edema, 5/5 strength all ext, SILT equal throughout, distal pulses 2+.  suspect contusions; no c/f expanding hematoma. will obtain xrays L shoulder/elbow and hip to r/o fx. will give home dose oxycodone for pain     imaging reviewed; no acute fx or dislocation.  educated on RICE and can continue her home pain meds as prescribed.  discussed strict return parameters

## 2024-05-09 NOTE — ED PROVIDER NOTE - OBJECTIVE STATEMENT
64 F pmh CAD s/p CABG on AC, htn, dm p/w L shoulder and L hip/femur pain s/p mechanical fall last night.  pt reports she was taking out trash and tripped falling to L hitting shoulder on wall then falling to ground- no loc or head strike.  pt was able to get up on her own, noted pain in L shoulder and L hip- pt iced  both areas overnight but this morning noted area of swelling to L hip and worsening pain prompting visit.  pt takes percocet for chronic pain but did not take this morning.  denies f/c, HA, neck/back pain, chest pain, sob, palpitations, abd pain, nvd, numbness/weakness, paresthesias, bowel or bladder dysfunction, other injuries

## 2024-05-09 NOTE — ED ADULT NURSE NOTE - CAS DISCH TRANSFER METHOD
tegretol     Last Written Prescription Date: 03/02/17  Last Fill Quantity: 90, # refills: 0  [place] [facility] [place] or [facility] prescribing provider: 02/29/16       Lab Results   Component Value Date    ALT 46 11/05/2013     Lab Results   Component Value Date    WBC 5.0 02/29/2016     Lab Results   Component Value Date    RBC 4.80 02/29/2016     Lab Results   Component Value Date    HGB 14.3 02/29/2016     Lab Results   Component Value Date    HCT 43.4 02/29/2016     No components found for: MCT  Lab Results   Component Value Date    MCV 90 02/29/2016     Lab Results   Component Value Date    MCH 29.8 02/29/2016     Lab Results   Component Value Date    MCHC 32.9 02/29/2016     Lab Results   Component Value Date    RDW 12.5 02/29/2016     Lab Results   Component Value Date     02/29/2016     TSH   Date Value Ref Range Status   11/05/2013 1.11 0.4 - 5.0 mU/L Final     Creatinine   Date Value Ref Range Status   11/05/2013 0.93 0.66 - 1.25 mg/dL Final       Drug Levels  Depakote: No results found for this or any previous visit.  Dilantin: No results found for this or any previous visit.  Gabitril: No results found for this or any previous visit.  Tegretol: No results found for this or any previous visit.  Zonegran: No results found for this or any previous visit.     Walking

## 2024-05-09 NOTE — ED PROVIDER NOTE - NSFOLLOWUPINSTRUCTIONS_ED_ALL_ED_FT
Continue home pain medications as prescribed  REST- Rest your hurting/injured joint or extremity to decrease pain and swelling for 24-48 hours    ICE- Apply ice to area of pain to decreased inflammation and pain, put towel/barrier between ice and skin. You can keep ice on for 20 minutes at a time 4-8 times daily   COMPRESSION- Wear ace wrap or brace for support to reduce swelling.  Make sure not to wrap too tight, loosen if skin feeling numb/tingling or skin turns blue   ELEVATION- Elevate hurting/injured area 6 or more inches about level of heart to decrease swelling/inflammation.  Use pillow under joint to elevate area    Hematoma  A hematoma is a collection of blood under the skin, in an organ, in a body space, in a joint space, or in other tissue. The blood can thicken (clot) to form a lump that you can see and feel. The lump is often firm and may become sore and tender. Most hematomas get better in a few days to weeks. However, some hematomas may be serious and require medical care. Hematomas can range from very small to very large.    What are the causes?  This condition is caused by:  A blunt or penetrating injury.  Leakage from a blood vessel under the skin.  Some medical procedures, including surgeries, such as oral surgery, face lifts, and surgeries on the joints.  Some medical conditions that cause bleeding or bruising. There may be multiple hematomas that appear in different areas of the body.  What increases the risk?  You are more likely to develop this condition if:  You are an older adult.  You use blood thinners.  You regularly use NSAIDs, such as ibuprofen, for pain.  You play contact sports.  What are the signs or symptoms?  Comparison of a normal ankle and an ankle that is swollen and bruised.  Symptoms of this condition depend on where the hematoma is located.    Common symptoms of a hematoma that is under the skin include:  A firm lump on the body.  Pain and tenderness in the area.  Bruising. Blue, dark blue, purple-red, or yellowish skin (discoloration) may appear at the site of the hematoma if the hematoma is close to the surface of the skin.  Common symptoms of a hematoma that is deep in the tissues or body spaces may be less obvious. They include:  A collection of blood in the stomach (intra-abdominal hematoma). This may cause pain in the abdomen, weakness, fainting, and shortness of breath.  A collection of blood in the head (intracranial hematoma). This may cause a headache or symptoms such as weakness, trouble speaking or understanding, or a change in consciousness.  How is this diagnosed?  This condition is diagnosed based on:  Your medical history.  A physical exam.  Imaging tests, such as an ultrasound or CT scan. These may be needed if your health care provider suspects a hematoma in deeper tissues or body spaces.  Blood tests. These may be needed if your health care provider believes that the hematoma is caused by a medical condition.  How is this treated?  Treatment for this condition depends on the cause, size, and location of the hematoma. Treatment may include:  Doing nothing. The majority of hematomas do not need treatment as many of them go away on their own.  Surgery or close monitoring. This may be needed for large hematomas or hematomas that affect vital organs.  Medicines. Medicines may be given if there is an underlying medical cause for the hematoma.  Follow these instructions at home:  Managing pain, stiffness, and swelling    Bag of ice on a towel on the skin.  If directed, put ice on the injured area. To do this:  Put ice in a plastic bag.  Place a towel between your skin and the bag.  Leave the ice on for 20 minutes, 2–3 times a day for the first couple of days.  If your skin turns bright red, remove the ice right away to prevent skin damage. The risk of skin damage is higher if you cannot feel pain, heat, or cold.  If directed, apply heat to the affected area as often as told by your health care provider. Use the heat source that your health care provider recommends, such as a moist heat pack or a heating pad.  Place a towel between your skin and the heat source.  Leave the heat on for 20–30 minutes.  If your skin turns bright red, remove the heat right away to prevent burns. The risk of burns is higher if you cannot feel pain, heat, or cold.  Raise (elevate) the injured area above the level of your heart while you are sitting or lying down.  If directed, wrap the affected area with an elastic bandage. The bandage applies pressure (compression) to the area, which may help to reduce swelling and promote healing. Do not wrap the bandage too tightly around the affected area.  If your hematoma is on a leg or foot (lower extremity) and is painful, your health care provider may recommend crutches. Use them as told by your health care provider.  General instructions    Take over-the-counter and prescription medicines only as told by your health care provider.  Rest the injured area as directed by your health care provider.  Keep all follow-up visits. Your health care provider may want to see how your hematoma is progressing with treatment.  Contact a health care provider if:  You have a fever.  The swelling or discoloration gets worse.  You develop more hematomas.  Your pain is worse or your pain is not controlled with medicine.  Your skin over the hematoma breaks or starts bleeding.  Get help right away if:  Your hematoma is in your chest or abdomen and you have weakness, shortness of breath, or a change in consciousness.  You have a hematoma on your scalp that is caused by a fall or injury, and you also have:  A headache that gets worse.  Trouble speaking or understanding speech.  Weakness.  A change in alertness or consciousness.  These symptoms may be an emergency. Get help right away. Call 911.  Do not wait to see if the symptoms will go away.  Do not drive yourself to the hospital.  This information is not intended to replace advice given to you by your health care provider. Make sure you discuss any questions you have with your health care provider.

## 2024-05-09 NOTE — ED PROVIDER NOTE - PATIENT PORTAL LINK FT
You can access the FollowMyHealth Patient Portal offered by [facility] by registering at the following website: [email or web address]. By joining [employer]s FollowMyHealth portal, you will also be able to view your health information using other applications (apps) compatible with our system.

## 2024-06-25 NOTE — PATIENT PROFILE ADULT - DO YOU NEED ADDITIONAL SERVICES TO MANAGE ANY OF THESE MEDICAL CONDITIONS AT HOME?
Sabrina Johnson is calling to request a refill on the following medication(s):    Medication Request:  Requested Prescriptions     Pending Prescriptions Disp Refills    LANTUS SOLOSTAR 100 UNIT/ML injection pen [Pharmacy Med Name: LANTUS SOLOSTAR 100 UNIT/ML] 15 mL      Sig: INJECT 20 UNITS UNDER THE SKIN TWO TIMES A DAY    oxyCODONE-acetaminophen (PERCOCET) 5-325 MG per tablet 120 tablet 0     Sig: Take 1 tablet by mouth every 6 hours as needed for Pain for up to 30 days. Take lowest dose possible to manage pain       Last Visit Date (If Applicable):  4/5/2024    Next Visit Date:    7/12/2024                 no

## 2024-06-26 ENCOUNTER — APPOINTMENT (OUTPATIENT)
Dept: PULMONOLOGY | Facility: CLINIC | Age: 64
End: 2024-06-26
Payer: MEDICARE

## 2024-06-26 VITALS
SYSTOLIC BLOOD PRESSURE: 131 MMHG | OXYGEN SATURATION: 99 % | TEMPERATURE: 98.5 F | HEIGHT: 63 IN | HEART RATE: 105 BPM | BODY MASS INDEX: 30.65 KG/M2 | DIASTOLIC BLOOD PRESSURE: 83 MMHG | WEIGHT: 173 LBS

## 2024-06-26 DIAGNOSIS — R06.02 SHORTNESS OF BREATH: ICD-10-CM

## 2024-06-26 DIAGNOSIS — J44.9 CHRONIC OBSTRUCTIVE PULMONARY DISEASE, UNSPECIFIED: ICD-10-CM

## 2024-06-26 DIAGNOSIS — Z87.891 PERSONAL HISTORY OF NICOTINE DEPENDENCE: ICD-10-CM

## 2024-06-26 PROCEDURE — 94726 PLETHYSMOGRAPHY LUNG VOLUMES: CPT

## 2024-06-26 PROCEDURE — 94618 PULMONARY STRESS TESTING: CPT

## 2024-06-26 PROCEDURE — 94729 DIFFUSING CAPACITY: CPT

## 2024-06-26 PROCEDURE — ZZZZZ: CPT

## 2024-06-26 PROCEDURE — 99214 OFFICE O/P EST MOD 30 MIN: CPT | Mod: 25

## 2024-06-26 PROCEDURE — 94060 EVALUATION OF WHEEZING: CPT

## 2024-06-26 PROCEDURE — G2211 COMPLEX E/M VISIT ADD ON: CPT

## 2024-06-26 RX ORDER — BUDESONIDE AND FORMOTEROL FUMARATE DIHYDRATE 160; 4.5 UG/1; UG/1
160-4.5 AEROSOL RESPIRATORY (INHALATION)
Qty: 10.2 | Refills: 3 | Status: ACTIVE | COMMUNITY
Start: 2024-06-26 | End: 1900-01-01

## 2024-06-26 RX ORDER — UMECLIDINIUM BROMIDE AND VILANTEROL TRIFENATATE 62.5; 25 UG/1; UG/1
62.5-25 POWDER RESPIRATORY (INHALATION)
Qty: 6 | Refills: 1 | Status: DISCONTINUED | COMMUNITY
Start: 2024-04-18 | End: 2024-06-26

## 2024-06-28 PROBLEM — Z87.891 FORMER SMOKER: Status: ACTIVE | Noted: 2024-03-27

## 2024-06-28 PROBLEM — R06.02 SHORTNESS OF BREATH: Status: ACTIVE | Noted: 2024-06-28

## 2024-06-28 PROBLEM — J44.9 CHRONIC OBSTRUCTIVE PULMONARY DISEASE, UNSPECIFIED COPD TYPE: Status: ACTIVE | Noted: 2024-03-27

## 2024-07-10 NOTE — DIETITIAN INITIAL EVALUATION ADULT - PROBLEM SELECTOR PLAN 4
F/U Lipid panel and LFT's   -on home Crestor 40mg daily and Zetia 10mg daily  continue Atorvastatin 80mg daily normal balance

## 2024-07-17 ENCOUNTER — APPOINTMENT (OUTPATIENT)
Dept: HEART AND VASCULAR | Facility: CLINIC | Age: 64
End: 2024-07-17
Payer: MEDICARE

## 2024-07-17 ENCOUNTER — NON-APPOINTMENT (OUTPATIENT)
Age: 64
End: 2024-07-17

## 2024-07-17 VITALS
HEART RATE: 86 BPM | OXYGEN SATURATION: 97 % | WEIGHT: 169 LBS | DIASTOLIC BLOOD PRESSURE: 70 MMHG | TEMPERATURE: 97.7 F | SYSTOLIC BLOOD PRESSURE: 103 MMHG | HEIGHT: 63 IN | BODY MASS INDEX: 29.95 KG/M2

## 2024-07-17 DIAGNOSIS — Z98.890 OTHER SPECIFIED POSTPROCEDURAL STATES: ICD-10-CM

## 2024-07-17 DIAGNOSIS — Z95.1 PRESENCE OF AORTOCORONARY BYPASS GRAFT: ICD-10-CM

## 2024-07-17 DIAGNOSIS — Z95.2 PRESENCE OF PROSTHETIC HEART VALVE: ICD-10-CM

## 2024-07-17 DIAGNOSIS — Z86.79 OTHER SPECIFIED POSTPROCEDURAL STATES: ICD-10-CM

## 2024-07-17 DIAGNOSIS — I25.10 ATHEROSCLEROTIC HEART DISEASE OF NATIVE CORONARY ARTERY W/OUT ANGINA PECTORIS: ICD-10-CM

## 2024-07-17 DIAGNOSIS — E78.5 HYPERLIPIDEMIA, UNSPECIFIED: ICD-10-CM

## 2024-07-17 DIAGNOSIS — R06.02 SHORTNESS OF BREATH: ICD-10-CM

## 2024-07-17 LAB
ALBUMIN SERPL ELPH-MCNC: 4.6 G/DL
ALP BLD-CCNC: 53 U/L
ALT SERPL-CCNC: 12 U/L
ANION GAP SERPL CALC-SCNC: 13 MMOL/L
AST SERPL-CCNC: 17 U/L
BILIRUB SERPL-MCNC: 0.2 MG/DL
BUN SERPL-MCNC: 11 MG/DL
CALCIUM SERPL-MCNC: 10 MG/DL
CHLORIDE SERPL-SCNC: 102 MMOL/L
CHOLEST SERPL-MCNC: 130 MG/DL
CK SERPL-CCNC: 45 U/L
CO2 SERPL-SCNC: 26 MMOL/L
CREAT SERPL-MCNC: 0.66 MG/DL
EGFR: 97 ML/MIN/1.73M2
GLUCOSE SERPL-MCNC: 96 MG/DL
HDLC SERPL-MCNC: 54 MG/DL
LDLC SERPL CALC-MCNC: 35 MG/DL
NONHDLC SERPL-MCNC: 77 MG/DL
POTASSIUM SERPL-SCNC: 4.3 MMOL/L
PROT SERPL-MCNC: 7.4 G/DL
SODIUM SERPL-SCNC: 140 MMOL/L
TRIGL SERPL-MCNC: 275 MG/DL

## 2024-07-17 PROCEDURE — 93000 ELECTROCARDIOGRAM COMPLETE: CPT

## 2024-07-17 PROCEDURE — 99214 OFFICE O/P EST MOD 30 MIN: CPT

## 2024-07-17 PROCEDURE — G2211 COMPLEX E/M VISIT ADD ON: CPT

## 2024-07-17 RX ORDER — SEMAGLUTIDE 0.68 MG/ML
2 INJECTION, SOLUTION SUBCUTANEOUS
Refills: 0 | Status: ACTIVE | COMMUNITY

## 2024-07-19 PROBLEM — Z00.00 ENCOUNTER FOR PREVENTIVE HEALTH EXAMINATION: Status: ACTIVE | Noted: 2024-07-19

## 2024-07-19 NOTE — HISTORY OF PRESENT ILLNESS
[FreeTextEntry1] : Referred by Dr. Gamez for bioprosthetic AS evaluation.  Pulm: Dr. Hopper   64F, former smoker, with PMH of asthma/COPD, anxiety, H. pylori, HTN, HLD, T2DM, severe AS and CAD s/p SAVR (23mm 3300TFX), ascending aortic replacement (28mm graft) to enlarge aortic root, and 1V CABG (SVG-RPDA) on 5/22/17 followed by subsequently PCIs: DRAKE to prox OM1 (8/2023) and DRAKE to prox LAD (10/2023), pericarditis s/p colchicine (2023),   Seen by Dr. Kwok during her March 2024 admission for back pain with associated weakness due to the abnormal findings on her TTE. Per Dr. Kwok's note:  Discussed briefly with patient regarding the aortic valve degeneration and would like to pursue testing for possible intervention. Pt agree with testing but was insisting dx for pain. Would defer to primary team for work up. CTA TAVR protocol If deemed medically stable for discharge, can be worked up and seen as outpatient."   NCHCT 3/8/24 Impression: No acute intracranial hemorrhage, mass effect or demarcated territorial infarction. Stable exam since 7/21/2022.  Cardiac CTA 3/8/24 Cardiac: 1.  Probable severe mid RCA stenosis, The distal RCA and RPDA are not well visualized 2.  Stent present in the OM2, patency is indeterminate.  The mid LCx is not well visualized 3.  The distal LAD is not well visualized 4.  BioAVR with restricted motion of the noncoronary cusp  CTA Chest 3/8/24 IMPRESSION: Stable appearance post graft replacement of the ascending aorta. No aortic dissection or aneurysm.  CTA Abd Pelvis 3/08/24 IMPRESSION: 1. Moderate calcified plaque in the aortoiliac vessels without significant stenosis.  TTE 3/6/24 CONCLUSIONS:  1. Normal left ventricular size and systolic function.  2. Normal right ventricular size and systolic function.  3. Normal atria.  4. Bioprosthetic valve is seen in the aortic position. The peak transvalvular velocity is 3.53 m/s, the mean transvalvular gradient is 28.00 mmHg, and the LVOT/AV velocity ratio is 0.28. Aortic valve velocity is elevated and may be suggestive of bioprosthetic stenosis. Clinical correlation is advised.  5. No pericardial effusion.  6. Compared to the previous TTE performed on 10/17/2023, velocity and gradients across aortic bioprosthesis are higher.  EKG 3/9/24: SR rate 75, MT 180ms, QRS 88ms, QTc 486ma, left axis.   7/17/24 labs: BUN/Cr 11/0.66  Today, patient reports ...   CELESTE today ...

## 2024-07-22 ENCOUNTER — APPOINTMENT (OUTPATIENT)
Dept: CARDIOTHORACIC SURGERY | Facility: CLINIC | Age: 64
End: 2024-07-22
Payer: MEDICARE

## 2024-07-22 ENCOUNTER — NON-APPOINTMENT (OUTPATIENT)
Age: 64
End: 2024-07-22

## 2024-07-22 ENCOUNTER — APPOINTMENT (OUTPATIENT)
Dept: CARDIOTHORACIC SURGERY | Facility: CLINIC | Age: 64
End: 2024-07-22

## 2024-07-22 VITALS
OXYGEN SATURATION: 97 % | HEART RATE: 83 BPM | HEIGHT: 63 IN | RESPIRATION RATE: 16 BRPM | WEIGHT: 171 LBS | SYSTOLIC BLOOD PRESSURE: 136 MMHG | DIASTOLIC BLOOD PRESSURE: 65 MMHG | BODY MASS INDEX: 30.3 KG/M2

## 2024-07-22 DIAGNOSIS — Z63.5 DISRUPTION OF FAMILY BY SEPARATION AND DIVORCE: ICD-10-CM

## 2024-07-22 DIAGNOSIS — I35.0 NONRHEUMATIC AORTIC (VALVE) STENOSIS: ICD-10-CM

## 2024-07-22 PROCEDURE — 99214 OFFICE O/P EST MOD 30 MIN: CPT

## 2024-07-22 PROCEDURE — 99204 OFFICE O/P NEW MOD 45 MIN: CPT

## 2024-07-22 SDOH — SOCIAL STABILITY - SOCIAL INSECURITY: DISRUPTION OF FAMILY BY SEPARATION AND DIVORCE: Z63.5

## 2024-07-26 ENCOUNTER — APPOINTMENT (OUTPATIENT)
Dept: ULTRASOUND IMAGING | Facility: HOSPITAL | Age: 64
End: 2024-07-26

## 2024-07-26 ENCOUNTER — RESULT REVIEW (OUTPATIENT)
Age: 64
End: 2024-07-26

## 2024-07-29 ENCOUNTER — APPOINTMENT (OUTPATIENT)
Dept: CARDIOTHORACIC SURGERY | Facility: CLINIC | Age: 64
End: 2024-07-29
Payer: MEDICARE

## 2024-07-29 ENCOUNTER — OUTPATIENT (OUTPATIENT)
Dept: OUTPATIENT SERVICES | Facility: HOSPITAL | Age: 64
LOS: 1 days | End: 2024-07-29

## 2024-07-29 ENCOUNTER — APPOINTMENT (OUTPATIENT)
Dept: ULTRASOUND IMAGING | Facility: HOSPITAL | Age: 64
End: 2024-07-29

## 2024-07-29 VITALS
TEMPERATURE: 97 F | OXYGEN SATURATION: 98 % | HEIGHT: 63 IN | SYSTOLIC BLOOD PRESSURE: 134 MMHG | WEIGHT: 171 LBS | DIASTOLIC BLOOD PRESSURE: 79 MMHG | BODY MASS INDEX: 30.3 KG/M2 | HEART RATE: 80 BPM

## 2024-07-29 DIAGNOSIS — Z90.49 ACQUIRED ABSENCE OF OTHER SPECIFIED PARTS OF DIGESTIVE TRACT: Chronic | ICD-10-CM

## 2024-07-29 DIAGNOSIS — D25.9 LEIOMYOMA OF UTERUS, UNSPECIFIED: Chronic | ICD-10-CM

## 2024-07-29 DIAGNOSIS — Z95.2 PRESENCE OF PROSTHETIC HEART VALVE: Chronic | ICD-10-CM

## 2024-07-29 DIAGNOSIS — Z98.890 OTHER SPECIFIED POSTPROCEDURAL STATES: Chronic | ICD-10-CM

## 2024-07-29 DIAGNOSIS — G56.00 CARPAL TUNNEL SYNDROME, UNSPECIFIED UPPER LIMB: Chronic | ICD-10-CM

## 2024-07-29 PROBLEM — Z63.5 DIVORCE: Status: ACTIVE | Noted: 2024-07-29

## 2024-07-29 PROBLEM — I35.0 STENOSIS OF AORTIC VALVE: Status: ACTIVE | Noted: 2017-04-14

## 2024-07-29 PROCEDURE — 99214 OFFICE O/P EST MOD 30 MIN: CPT

## 2024-07-29 PROCEDURE — 93880 EXTRACRANIAL BILAT STUDY: CPT

## 2024-07-29 RX ORDER — FAMOTIDINE 40 MG/1
40 TABLET, FILM COATED ORAL DAILY
Refills: 0 | Status: ACTIVE | COMMUNITY

## 2024-07-30 NOTE — ASSESSMENT
[FreeTextEntry1] : 64F, former smoker, with PMH of asthma/COPD, anxiety, low pain threshold, OA, H. pylori, HTN, HLD, T2DM, severe AS and CAD s/p SAVR (23mm 3300TFX), ascending aortic replacement (28mm graft) to enlarge aortic root, and 1V CABG (SVG-RPDA) by Dr. Hanson on 5/22/17 followed by subsequently PCIs: DRAKE to prox OM1 (8/2023) and DRAKE to prox LAD (10/2023), pericarditis s/p colchicine (2023), HFpEF, and bioprosthetic aortic valve stenosis who presents for follow up. Dr. Kwok have independently seen and evaluated the patient in this face-to-face encounter. Patient is clinically stable, NYHA Class III. CELESTE on 7/22/24 showed severe bioprosthetic AS. TAVR CTs showed favorable anatomy for transfemoral Cheng TAVR. Dr. Kwok discussed result with patient. Given symptoms and severe bioprosthetic AS, Dr. Kwok recommends treating her severe bioprosthetic AS.   Treatment options including high-risk redo surgical AVR, TAVR and medical therapy were discussed. Plan reviewed with multidisciplinary heart valve team with the decision to proceed with LHC/TF Cheng TAVR with single basilica +/- snorkel stenting.  Single Basilica Cheng TAVR procedure including the risks (including but not limited to bleeding, infection, stroke, need for permanent pacemaker, heart attack, and death), and benefits were discussed at length with patient. Patient was very anxious, became teary during the visit, and decided to proceed with procedure.   Patient is tentatively booked for August 13th pending carotid US results. She was asked to hold her Ozempic starting August 6th (last dose August 5th) and continue all other medications. Patient was advised that a hybrid OR staff will call her the day before the procedure with detailed instructions about her upcoming procedure. Advised to reach out to [facility] should he develop any s/s of infection, which will require her procedure to be rescheduled or should she have any additional questions or concerns. All questions were answered.

## 2024-07-30 NOTE — PHYSICAL EXAM
[Well Developed] : well developed [Well Nourished] : well nourished [Normal Conjunctiva] : normal conjunctiva [Normal S1, S2] : normal S1, S2 [No Rub] : no rub [No Gallop] : no gallop [Murmur] : murmur [Clear Lung Fields] : clear lung fields [Good Air Entry] : good air entry [No Respiratory Distress] : no respiratory distress  [Soft] : abdomen soft [Non Tender] : non-tender [Normal Bowel Sounds] : normal bowel sounds [Abnormal Gait] : abnormal gait [No Edema] : no edema [No Cyanosis] : no cyanosis [No Clubbing] : no clubbing [No Rash] : no rash [No Skin Lesions] : no skin lesions [Moves all extremities] : moves all extremities [No Focal Deficits] : no focal deficits [Normal Speech] : normal speech [Alert and Oriented] : alert and oriented [Normal memory] : normal memory [de-identified] : supple  [de-identified] : III/VI SM  [de-identified] : uses a cane for balance

## 2024-07-30 NOTE — HISTORY OF PRESENT ILLNESS
[FreeTextEntry1] : Referred by Dr. Gamez for bioprosthetic AS evaluation.  Pulm: Dr. Hopper   64F, former smoker, with PMH of asthma/COPD, anxiety, low pain threshold, OA, H. pylori, HTN, HLD, T2DM, severe AS and CAD s/p SAVR (23mm 3300TFX), ascending aortic replacement (28mm graft) to enlarge aortic root, and 1V CABG (SVG-RPDA) by Dr. Hanson on 5/22/17 followed by subsequently PCIs: DRAKE to prox OM1 (8/2023) and DRAKE to prox LAD (10/2023), pericarditis s/p colchicine (2023), HFpEF, and bioprosthetic aortic valve stenosis, who presents for follow up.  Initially seen by Dr. Kwok during her March 2024 admission for back pain, abdominal pain, and weakness 2/2 diarrhea due to the abnormal findings on her TTE. Per Dr. Kwok's note:  Discussed briefly with patient regarding the aortic valve degeneration and would like to pursue testing for possible intervention. Pt agree with testing but was insisting dx for pain. Would defer to primary team for work up. CTA TAVR protocol If deemed medically stable for discharge, can be worked up and seen as outpatient."  Seen for follow up last week (7/22/24) at which time patient reported progressive fatigue and dyspnea on minimal exertion with an exercise tolerance of 1 block on flat limited by sciatica pain, 1 flight of stair or a small uphill limited by shortness of breath. She also noted some non-radiating, self-limiting, chest tightness occurring typically in the morning and evening with no specific provoking or alleviating factor, or associated symptoms; notes occasional LH/dizzy with minimal activity such as cooking; and occasional palpitations.  Denied any recent syncope, orthopnea, PND, LE edema, abdominal bloating, fever, chills, dysuria, or active dental issue, but has two loose teeth.  Patient is retired, lives alone in an elevator apartment. She uses a cane for balance. Remains independent with her ADLs and iADLs - not some slowing when performing her iADLs. Her social support/HCPs are Brenda Christie (Friend) tel: [phone], and Martha Alvarenga (goddaughter) tel: [phone].   Today, patient reports feeling the same as last, no new medication changes, tolerated her CELESTE with no issue.

## 2024-07-30 NOTE — PHYSICAL EXAM
[Well Developed] : well developed [Well Nourished] : well nourished [Normal Conjunctiva] : normal conjunctiva [Normal S1, S2] : normal S1, S2 [No Rub] : no rub [No Gallop] : no gallop [Murmur] : murmur [Clear Lung Fields] : clear lung fields [Good Air Entry] : good air entry [No Respiratory Distress] : no respiratory distress  [Soft] : abdomen soft [Non Tender] : non-tender [Normal Bowel Sounds] : normal bowel sounds [Abnormal Gait] : abnormal gait [No Edema] : no edema [No Cyanosis] : no cyanosis [No Clubbing] : no clubbing [No Rash] : no rash [No Skin Lesions] : no skin lesions [Moves all extremities] : moves all extremities [No Focal Deficits] : no focal deficits [Normal Speech] : normal speech [Alert and Oriented] : alert and oriented [Normal memory] : normal memory [de-identified] : supple  [de-identified] : III/VI SM  [de-identified] : uses a cane for balance

## 2024-07-30 NOTE — PLAN
[TextEntry] : - follow up with Dr. Gamez as scheduled for ongoing cardiology care.  - proceed with CELESTE and pre-procedure labs today.  - obtain carotid US  - return to [facility] after testing, or as needed.   I, Dr. Alia Kwok, personally performed the evaluation and management (E/M) services for this new patient. That E/M includes conducting the clinically appropriate initial history &/or exam, assessing all conditions, and establishing the plan of care. Today, my MANJEET, was here to observe my evaluation and management service for this patient & follow plan of care established by me going forward.

## 2024-07-30 NOTE — PLAN
[TextEntry] : - continue GDMT per referring cardiologist.  - carotid US today; if no significant carotid disease, proceed with LHC/TF Cheng TAVR with single basilica +/- snorkel stenting on August 13th. - return to [facility] post-T[facility]R, or as needed.   I, Dr. Alia Kwok , personally performed the evaluation and management (E/M) services for this established patient who presents today with (a) new problem(s)/exacerbation of (an) existing condition(s). That E/M includes conducting the clinically appropriate interval history &/or exam, assessing all new/exacerbated conditions, and establishing a new plan of care. Today, my MANJEET,  was here to observe my evaluation and management service for this new problem/exacerbated condition and follow the plan of care established by me going forward.

## 2024-07-30 NOTE — RESULTS/DATA
[TextEntry] : NCT 3/8/24 Impression: No acute intracranial hemorrhage, mass effect or demarcated territorial infarction. Stable exam since 7/21/2022.  Cardiac CTA 3/8/24 Cardiac: 1.  Probable severe mid RCA stenosis, The distal RCA and RPDA are not well visualized 2.  Stent present in the OM2, patency is indeterminate.  The mid LCx is not well visualized 3.  The distal LAD is not well visualized 4.  BioAVR with restricted motion of the noncoronary cusp  CTA Chest 3/8/24 IMPRESSION: Stable appearance post graft replacement of the ascending aorta. No aortic dissection or aneurysm.  CTA Abd Pelvis 3/08/24 IMPRESSION: 1. Moderate calcified plaque in the aortoiliac vessels without significant stenosis.  TTE 3/6/24 CONCLUSIONS:  1. Normal left ventricular size and systolic function.  2. Normal right ventricular size and systolic function.  3. Normal atria.  4. Bioprosthetic valve is seen in the aortic position. The peak transvalvular velocity is 3.53 m/s, the mean transvalvular gradient is 28.00 mmHg, and the LVOT/AV velocity ratio is 0.28. Aortic valve velocity is elevated and may be suggestive of bioprosthetic stenosis. Clinical correlation is advised.  5. No pericardial effusion.  6. Compared to the previous TTE performed on 10/17/2023, velocity and gradients across aortic bioprosthesis are higher.  EKG 3/9/24: SR rate 75, NC 180ms, QRS 88ms, QTc 486ma, left axis.   7/17/24 labs: BUN/Cr 11/0.66  5MWT 7/22/24: 8:46s, 8:40s, 8:50s KCCQ done on 7/29/24 7/22/24 labs reviewed: WBC 7.86 H/H 12.7/42.1 Plt 288 BUN/Cr 11/0.62 ProBNP 608  CELESTE 7/26/24 CONCLUSIONS:  1. Normal left ventricular systolic function.  2. Normal right ventricular size and systolic function.  3. Bioprosthetic aortic leaflets are mildly thickened and calcified. Non- and right prosthetic leaflets appear fused and restricted in motion.  4. Severe prosthetic aortic stenosis. Aortic valve area measured via 3D multiplanar reconstruction with planimetry is 0.40 cm.  5. No other significant valvular disease.  6. No LA/RA/NETTIE/RAA thrombus seen.  7. No evidence of an intracardiac shunt.  8. No pericardial effusion.

## 2024-07-30 NOTE — PHYSICAL EXAM
[Well Developed] : well developed [Well Nourished] : well nourished [Normal Conjunctiva] : normal conjunctiva [Normal S1, S2] : normal S1, S2 [No Rub] : no rub [No Gallop] : no gallop [Murmur] : murmur [Clear Lung Fields] : clear lung fields [Good Air Entry] : good air entry [No Respiratory Distress] : no respiratory distress  [Soft] : abdomen soft [Non Tender] : non-tender [Normal Bowel Sounds] : normal bowel sounds [Abnormal Gait] : abnormal gait [No Edema] : no edema [No Cyanosis] : no cyanosis [No Clubbing] : no clubbing [No Rash] : no rash [No Skin Lesions] : no skin lesions [Moves all extremities] : moves all extremities [No Focal Deficits] : no focal deficits [Normal Speech] : normal speech [Alert and Oriented] : alert and oriented [Normal memory] : normal memory [de-identified] : supple  [de-identified] : III/VI SM  [de-identified] : uses a cane for balance

## 2024-07-30 NOTE — PLAN
[TextEntry] : - continue GDMT per referring cardiologist.  - carotid US today; if no significant carotid disease, proceed with LHC/TF Cheng TAVR with single basilica +/- snorkel stenting on August 13th. - return to [facility] post-TAVR, or as needed.   I, Dr. Alia Kwok , personally performed the evaluation and management (E/M) services for this established patient who presents today with (a) new problem(s)/exacerbation of (an) existing condition(s). That E/M includes conducting the clinically appropriate interval history &/or exam, assessing all new/exacerbated conditions, and establishing a new plan of care. Today, my MANJEET,  was here to observe my evaluation and management service for this new problem/exacerbated condition and follow the plan of care established by me going forward.

## 2024-07-30 NOTE — PHYSICAL EXAM
[Well Developed] : well developed [Well Nourished] : well nourished [Normal Conjunctiva] : normal conjunctiva [Normal S1, S2] : normal S1, S2 [No Rub] : no rub [No Gallop] : no gallop [Murmur] : murmur [Clear Lung Fields] : clear lung fields [Good Air Entry] : good air entry [No Respiratory Distress] : no respiratory distress  [Soft] : abdomen soft [Non Tender] : non-tender [Normal Bowel Sounds] : normal bowel sounds [Abnormal Gait] : abnormal gait [No Edema] : no edema [No Cyanosis] : no cyanosis [No Clubbing] : no clubbing [No Rash] : no rash [No Skin Lesions] : no skin lesions [Moves all extremities] : moves all extremities [No Focal Deficits] : no focal deficits [Normal Speech] : normal speech [Alert and Oriented] : alert and oriented [Normal memory] : normal memory [de-identified] : supple  [de-identified] : III/VI SM  [de-identified] : uses a cane for balance

## 2024-07-30 NOTE — RESULTS/DATA
[TextEntry] : [facility] 3/8/24 Impression: No acute intracranial hemorrhage, mass effect or demarcated territorial infarction. Stable exam since 7/21/2022.  Cardiac CTA 3/8/24 Cardiac: 1.  Probable severe mid RCA stenosis, The distal RCA and RPDA are not well visualized 2.  Stent present in the OM2, patency is indeterminate.  The mid LCx is not well visualized 3.  The distal LAD is not well visualized 4.  BioAVR with restricted motion of the noncoronary cusp  CTA Chest 3/8/24 IMPRESSION: Stable appearance post graft replacement of the ascending aorta. No aortic dissection or aneurysm.  CTA Abd Pelvis 3/08/24 IMPRESSION: 1. Moderate calcified plaque in the aortoiliac vessels without significant stenosis.  TTE 3/6/24 CONCLUSIONS:  1. Normal left ventricular size and systolic function.  2. Normal right ventricular size and systolic function.  3. Normal atria.  4. Bioprosthetic valve is seen in the aortic position. The peak transvalvular velocity is 3.53 m/s, the mean transvalvular gradient is 28.00 mmHg, and the LVOT/AV velocity ratio is 0.28. Aortic valve velocity is elevated and may be suggestive of bioprosthetic stenosis. Clinical correlation is advised.  5. No pericardial effusion.  6. Compared to the previous TTE performed on 10/17/2023, velocity and gradients across aortic bioprosthesis are higher.  EKG 3/9/24: SR rate 75, MD 180ms, QRS 88ms, QTc 486ma, left axis.   7/17/24 labs: BUN/Cr 11/0.66  5MWT 7/22/24: 8:46s, 8:40s, 8:50s KCCQ done on 7/29/24 7/22/24 labs reviewed: WBC 7.86 H/H 12.7/42.1 Plt 288 BUN/Cr 11/0.62 ProBNP 608  CELESTE 7/26/24 CONCLUSIONS:  1. Normal left ventricular systolic function.  2. Normal right ventricular size and systolic function.  3. Bioprosthetic aortic leaflets are mildly thickened and calcified. Non- and right prosthetic leaflets appear fused and restricted in motion.  4. Severe prosthetic aortic stenosis. Aortic valve area measured via 3D multiplanar reconstruction with planimetry is 0.40 cm.  5. No other significant valvular disease.  6. No LA/RA/NETTIE/RAA thrombus seen.  7. No evidence of an intracardiac shunt.  8. No pericardial effusion.

## 2024-07-30 NOTE — RESULTS/DATA
[TextEntry] : [facility] 3/8/24 Impression: No acute intracranial hemorrhage, mass effect or demarcated territorial infarction. Stable exam since 7/21/2022.  Cardiac CTA 3/8/24 Cardiac: 1.  Probable severe mid RCA stenosis, The distal RCA and RPDA are not well visualized 2.  Stent present in the OM2, patency is indeterminate.  The mid LCx is not well visualized 3.  The distal LAD is not well visualized 4.  BioAVR with restricted motion of the noncoronary cusp  CTA Chest 3/8/24 IMPRESSION: Stable appearance post graft replacement of the ascending aorta. No aortic dissection or aneurysm.  CTA Abd Pelvis 3/08/24 IMPRESSION: 1. Moderate calcified plaque in the aortoiliac vessels without significant stenosis.  TTE 3/6/24 CONCLUSIONS:  1. Normal left ventricular size and systolic function.  2. Normal right ventricular size and systolic function.  3. Normal atria.  4. Bioprosthetic valve is seen in the aortic position. The peak transvalvular velocity is 3.53 m/s, the mean transvalvular gradient is 28.00 mmHg, and the LVOT/AV velocity ratio is 0.28. Aortic valve velocity is elevated and may be suggestive of bioprosthetic stenosis. Clinical correlation is advised.  5. No pericardial effusion.  6. Compared to the previous TTE performed on 10/17/2023, velocity and gradients across aortic bioprosthesis are higher.  EKG 3/9/24: SR rate 75, IL 180ms, QRS 88ms, QTc 486ma, left axis.   7/17/24 labs: BUN/Cr 11/0.66  5MWT 7/22/24: 8:46s, 8:40s, 8:50s

## 2024-07-30 NOTE — RESULTS/DATA
[TextEntry] : [facility] 3/8/24 Impression: No acute intracranial hemorrhage, mass effect or demarcated territorial infarction. Stable exam since 7/21/2022.  Cardiac CTA 3/8/24 Cardiac: 1.  Probable severe mid RCA stenosis, The distal RCA and RPDA are not well visualized 2.  Stent present in the OM2, patency is indeterminate.  The mid LCx is not well visualized 3.  The distal LAD is not well visualized 4.  BioAVR with restricted motion of the noncoronary cusp  CTA Chest 3/8/24 IMPRESSION: Stable appearance post graft replacement of the ascending aorta. No aortic dissection or aneurysm.  CTA Abd Pelvis 3/08/24 IMPRESSION: 1. Moderate calcified plaque in the aortoiliac vessels without significant stenosis.  TTE 3/6/24 CONCLUSIONS:  1. Normal left ventricular size and systolic function.  2. Normal right ventricular size and systolic function.  3. Normal atria.  4. Bioprosthetic valve is seen in the aortic position. The peak transvalvular velocity is 3.53 m/s, the mean transvalvular gradient is 28.00 mmHg, and the LVOT/AV velocity ratio is 0.28. Aortic valve velocity is elevated and may be suggestive of bioprosthetic stenosis. Clinical correlation is advised.  5. No pericardial effusion.  6. Compared to the previous TTE performed on 10/17/2023, velocity and gradients across aortic bioprosthesis are higher.  EKG 3/9/24: SR rate 75, PA 180ms, QRS 88ms, QTc 486ma, left axis.   7/17/24 labs: BUN/Cr 11/0.66  5MWT 7/22/24: 8:46s, 8:40s, 8:50s

## 2024-07-30 NOTE — HISTORY OF PRESENT ILLNESS
[FreeTextEntry1] : Referred by Dr. Gamez for bioprosthetic AS evaluation.  Pulm: Dr. Hopper   64F, former smoker, with PMH of asthma/COPD, anxiety, low pain threshold, OA, H. pylori, HTN, HLD, T2DM, severe AS and CAD s/p SAVR (23mm 3300TFX), ascending aortic replacement (28mm graft) to enlarge aortic root, and 1V CABG (SVG-RPDA) by Dr. Hanson on 5/22/17 followed by subsequently PCIs: DRAKE to prox OM1 (8/2023) and DRAKE to prox LAD (10/2023), pericarditis s/p colchicine (2023), HFpEF, and bioprosthetic aortic valve stenosis, who presents for follow up..   Initially seen by Dr. Kwok during her March 2024 admission for back pain, abdominal pain, and weakness 2/2 diarrhea due to the abnormal findings on her TTE. Per Dr. Kwok's note:  Discussed briefly with patient regarding the aortic valve degeneration and would like to pursue testing for possible intervention. Pt agree with testing but was insisting dx for pain. Would defer to primary team for work up. CTA TAVR protocol If deemed medically stable for discharge, can be worked up and seen as outpatient."  Today, patient reports she's had progressive fatigue and dyspnea on minimal exertion. She has an exercise tolerance of 1 block on flat limited by sciatica pain, 1 flight of stair or a small uphill limited by shortness of breath. She also notes some non-radiating, self-limiting, chest tightness occurring typically in the morning and evening with no specific provoking or alleviating factor, or associated symptoms; notes occasional LH/dizzy with minimal activity such as cooking; and occasional palpitations.  Denies any recent syncope, orthopnea, PND, LE edema, abdominal bloating, fever, chills, dysuria, or active dental issue, but has two loose teeth.  Patient is retired, lives alone in an elevator apartment. SHe uses a cane for balance. Remains independent with her ADLs and iADLs - not some slowing when performing her iADLs. Her social support/HCPs are Brenda Christie (Friend) and Martha Alvarenga (goddaughter).    [Cerebrovascular Disease] : no cerebrovascular disease [Prior CVA] : with no prior CVA [CVD TIA] : no CVD Tia

## 2024-07-30 NOTE — REVIEW OF SYSTEMS
[Feeling Fatigued] : feeling fatigued [SOB] : shortness of breath [Dyspnea on exertion] : dyspnea during exertion [Chest Discomfort] : chest discomfort [Palpitations] : palpitations [Joint Pain] : joint pain [Dizziness] : dizziness [Anxiety] : anxiety [Negative] : Integumentary [Fever] : no fever [Headache] : no headache [Chills] : no chills [Lower Ext Edema] : no extremity edema [Leg Claudication] : no intermittent leg claudication [Orthopnea] : no orthopnea [PND] : no PND [Syncope] : no syncope [Tremor] : no tremor was seen [Numbness (Hypoesthesia)] : no numbness [Convulsions] : no convulsions [Tingling (Paresthesia)] : no tingling [Weakness] : no weakness [Limb Weakness (Paresis)] : no limb weakness (Paresis) [Speech Disturbance] : no speech disturbance

## 2024-07-30 NOTE — HISTORY OF PRESENT ILLNESS
[FreeTextEntry1] : Referred by Dr. Gamez for bioprosthetic AS evaluation.  Pulm: Dr. Hopper   64F, former smoker, with PMH of asthma/COPD, anxiety, low pain threshold, OA, H. pylori, HTN, HLD, T2DM, severe AS and CAD s/p SAVR (23mm 3300TFX), ascending aortic replacement (28mm graft) to enlarge aortic root, and 1V CABG (SVG-RPDA) by Dr. Hanson on 5/22/17 followed by subsequently PCIs: DRAKE to prox OM1 (8/2023) and DRAKE to prox LAD (10/2023), pericarditis s/p colchicine (2023), HFpEF, and bioprosthetic aortic valve stenosis, who presents for follow up.  Initially seen by Dr. Kwok during her March 2024 admission for back pain, abdominal pain, and weakness 2/2 diarrhea due to the abnormal findings on her TTE. Per Dr. Kwok's note:  Discussed briefly with patient regarding the aortic valve degeneration and would like to pursue testing for possible intervention. Pt agree with testing but was insisting dx for pain. Would defer to primary team for work up. CTA TAVR protocol If deemed medically stable for discharge, can be worked up and seen as outpatient."  Seen for follow up last week (7/22/24) at which time patient reported progressive fatigue and dyspnea on minimal exertion with an exercise tolerance of 1 block on flat limited by sciatica pain, 1 flight of stair or a small uphill limited by shortness of breath. She also noted some non-radiating, self-limiting, chest tightness occurring typically in the morning and evening with no specific provoking or alleviating factor, or associated symptoms; notes occasional LH/dizzy with minimal activity such as cooking; and occasional palpitations.  Denied any recent syncope, orthopnea, PND, LE edema, abdominal bloating, fever, chills, dysuria, or active dental issue, but has two loose teeth.  Patient is retired, lives alone in an elevator apartment. She uses a cane for balance. Remains independent with her ADLs and iADLs - not some slowing when performing her iADLs. Her social support/HCPs are Brenda Christie (Friend) tel: [phone], and Martha Alvarenga (goddaughter) tel: [phone].   Today, patient reports feeling the same as last, no new medication changes, tolerated her CELESTE with no issue.

## 2024-07-30 NOTE — ASSESSMENT
[FreeTextEntry1] : 64F, former smoker, with PMH of asthma/COPD, anxiety, low pain threshold, OA, H. pylori, HTN, HLD, T2DM, severe AS and CAD s/p SAVR (23mm 3300TFX), ascending aortic replacement (28mm graft) to enlarge aortic root, and 1V CABG (SVG-RPDA) by Dr. Hanson on 5/22/17 followed by subsequently PCIs: DRAKE to prox OM1 (8/2023) and DRAKE to prox LAD (10/2023), pericarditis s/p colchicine (2023), HFpEF, and bioprosthetic aortic valve stenosis who presents for follow up. Dr. Kwok have independently seen and evaluated the patient in this face-to-face encounter. Patient is clinically stable, NYHA Class III. TTE on 3/6/24 showed bioprosthetic aortic stenosis. Dr. Kwok deemed patient high risk re-do surgical AVR. Given patient's symptoms and aortic valve degeneration, Dr. Kwok recommends proceeding with CELESTE today to better evaluation her bioprosthetic aortic valve. Additionally, the patient needs a carotid US and repeat blood work as part of evaluation. She will return to our [facility] next week to discuss next steps. All questions were addressed.

## 2024-08-02 NOTE — HISTORY OF PRESENT ILLNESS
[FreeTextEntry1] : Referred by Dr. Gamez for bioprosthetic AS evaluation.  Pulm: Dr. Hopper   64F, former smoker, with PMH of asthma/COPD, anxiety, low pain threshold, OA, H. pylori, HTN, HLD, T2DM, severe AS and CAD s/p SAVR (23mm 3300TFX), ascending aortic replacement (28mm graft) to enlarge aortic root, and 1V CABG (SVG-RPDA) by Dr. Hanson on 5/22/17 followed by subsequently PCIs: DRAKE to prox OM1 (8/2023) and DRAKE to prox LAD (10/2023), pericarditis s/p colchicine (2023), HFpEF, and bioprosthetic aortic valve stenosis now referred for consultation for treatment options of her heart valve disease.   Seen by Dr. Kwok for CTS consultation during her March 2024 admission for back pain, abdominal pain, and weakness 2/2 diarrhea due to the abnormal findings on her TTE. Per Dr. Kwok's note:  Discussed briefly with patient regarding the aortic valve degeneration and would like to pursue testing for possible intervention. Pt agree with testing but was insisting dx for pain. Would defer to primary team for work up. CTA TAVR protocol If deemed medically stable for discharge, can be worked up and seen as outpatient."  Today, patient reports she's had progressive fatigue and dyspnea on minimal exertion. She has an exercise tolerance of 1 block on flat limited by sciatica pain, 1 flight of stair or a small uphill limited by shortness of breath. She also notes some non-radiating, self-limiting, chest tightness occurring typically in the morning and evening with no specific provoking or alleviating factor, or associated symptoms; notes occasional LH/dizzy with minimal activity such as cooking; and occasional palpitations.  Denies any recent syncope, orthopnea, PND, LE edema, abdominal bloating, fever, chills, dysuria, or active dental issue, but has two loose teeth.  Patient is retired, lives alone in an elevator apartment. SHe uses a cane for balance. Remains independent with her ADLs and iADLs - not some slowing when performing her iADLs. Her social support/HCPs are Brenda Christie (Friend) and Martha Alvarenga (goddaughter).

## 2024-08-02 NOTE — PHYSICAL EXAM
[Well Developed] : well developed [Well Nourished] : well nourished [Normal Conjunctiva] : normal conjunctiva [Normal S1, S2] : normal S1, S2 [No Rub] : no rub [No Gallop] : no gallop [Murmur] : murmur [Clear Lung Fields] : clear lung fields [Good Air Entry] : good air entry [No Respiratory Distress] : no respiratory distress  [Soft] : abdomen soft [Non Tender] : non-tender [Normal Bowel Sounds] : normal bowel sounds [Abnormal Gait] : abnormal gait [No Edema] : no edema [No Cyanosis] : no cyanosis [No Clubbing] : no clubbing [No Rash] : no rash [No Skin Lesions] : no skin lesions [Moves all extremities] : moves all extremities [No Focal Deficits] : no focal deficits [Normal Speech] : normal speech [Alert and Oriented] : alert and oriented [Normal memory] : normal memory [de-identified] : supple  [de-identified] : III/VI SM  [de-identified] : uses a cane for balance

## 2024-08-02 NOTE — ASSESSMENT
[FreeTextEntry1] : 64F, former smoker, with PMH of asthma/COPD, anxiety, low pain threshold, OA, H. pylori, HTN, HLD, T2DM, severe AS and CAD s/p SAVR (23mm 3300TFX), ascending aortic replacement (28mm graft) to enlarge aortic root, and 1V CABG (SVG-RPDA) by Dr. Hanson on 5/22/17 followed by subsequently PCIs: DRAKE to prox OM1 (8/2023) and DRAKE to prox LAD (10/2023), pericarditis s/p colchicine (2023), HFpEF, and bioprosthetic aortic valve stenosis now referred for consultation for treatment options of her heart valve disease. Dr. Conrad have independently seen and evaluated the patient in this face-to-face encounter. Dr. Conrad have reviewed patient's history and pertinent clinical data. Patient is clinically stable, NYHA Class III. TTE on 3/6/24 showed bioprosthetic aortic stenosis.  Given patient's symptoms and aortic valve degeneration, Dr. Conrad recommends proceeding with CELESTE today to better evaluation her bioprosthetic aortic valve. If her bioprosthetic valve is severely stenosed, treatment options, including high-risk redo surgical AVR, TAVR, and medical therapy, were discussed. Valve in valve TAVR procedure including the risks (including but not limited to bleeding, infection, stroke, need for permanent pacemaker, heart attack, and death), and benefits were discussed at length with patient. All questions were answered.

## 2024-08-02 NOTE — PHYSICAL EXAM
[Well Developed] : well developed [Well Nourished] : well nourished [Normal Conjunctiva] : normal conjunctiva [Normal S1, S2] : normal S1, S2 [No Rub] : no rub [No Gallop] : no gallop [Murmur] : murmur [Clear Lung Fields] : clear lung fields [Good Air Entry] : good air entry [No Respiratory Distress] : no respiratory distress  [Soft] : abdomen soft [Non Tender] : non-tender [Normal Bowel Sounds] : normal bowel sounds [Abnormal Gait] : abnormal gait [No Edema] : no edema [No Cyanosis] : no cyanosis [No Clubbing] : no clubbing [No Rash] : no rash [No Skin Lesions] : no skin lesions [Moves all extremities] : moves all extremities [No Focal Deficits] : no focal deficits [Normal Speech] : normal speech [Alert and Oriented] : alert and oriented [Normal memory] : normal memory [de-identified] : supple  [de-identified] : III/VI SM  [de-identified] : uses a cane for balance

## 2024-08-02 NOTE — PHYSICAL EXAM
[Well Developed] : well developed [Well Nourished] : well nourished [Normal Conjunctiva] : normal conjunctiva [Normal S1, S2] : normal S1, S2 [No Rub] : no rub [No Gallop] : no gallop [Murmur] : murmur [Clear Lung Fields] : clear lung fields [Good Air Entry] : good air entry [No Respiratory Distress] : no respiratory distress  [Soft] : abdomen soft [Non Tender] : non-tender [Normal Bowel Sounds] : normal bowel sounds [Abnormal Gait] : abnormal gait [No Edema] : no edema [No Cyanosis] : no cyanosis [No Clubbing] : no clubbing [No Rash] : no rash [No Skin Lesions] : no skin lesions [Moves all extremities] : moves all extremities [No Focal Deficits] : no focal deficits [Normal Speech] : normal speech [Alert and Oriented] : alert and oriented [Normal memory] : normal memory [de-identified] : supple  [de-identified] : III/VI SM  [de-identified] : uses a cane for balance

## 2024-08-02 NOTE — RESULTS/DATA
[TextEntry] : [facility] 3/8/24 Impression: No acute intracranial hemorrhage, mass effect or demarcated territorial infarction. Stable exam since 7/21/2022.  Cardiac CTA 3/8/24 Cardiac: 1.  Probable severe mid RCA stenosis, The distal RCA and RPDA are not well visualized 2.  Stent present in the OM2, patency is indeterminate.  The mid LCx is not well visualized 3.  The distal LAD is not well visualized 4.  BioAVR with restricted motion of the noncoronary cusp  CTA Chest 3/8/24 IMPRESSION: Stable appearance post graft replacement of the ascending aorta. No aortic dissection or aneurysm.  CTA Abd Pelvis 3/08/24 IMPRESSION: 1. Moderate calcified plaque in the aortoiliac vessels without significant stenosis.  TTE 3/6/24 CONCLUSIONS:  1. Normal left ventricular size and systolic function.  2. Normal right ventricular size and systolic function.  3. Normal atria.  4. Bioprosthetic valve is seen in the aortic position. The peak transvalvular velocity is 3.53 m/s, the mean transvalvular gradient is 28.00 mmHg, and the LVOT/AV velocity ratio is 0.28. Aortic valve velocity is elevated and may be suggestive of bioprosthetic stenosis. Clinical correlation is advised.  5. No pericardial effusion.  6. Compared to the previous TTE performed on 10/17/2023, velocity and gradients across aortic bioprosthesis are higher.  EKG 3/9/24: SR rate 75, MS 180ms, QRS 88ms, QTc 486ma, left axis.   7/17/24 labs: BUN/Cr 11/0.66  5MWT 7/22/24: 8:46s, 8:40s, 8:50s

## 2024-08-02 NOTE — REVIEW OF SYSTEMS
[Feeling Fatigued] : feeling fatigued [SOB] : shortness of breath [Dyspnea on exertion] : dyspnea during exertion [Chest Discomfort] : chest discomfort [Palpitations] : palpitations [Joint Pain] : joint pain [Dizziness] : dizziness [Anxiety] : anxiety [Negative] : Integumentary [Fever] : no fever [Headache] : no headache [Chills] : no chills [Lower Ext Edema] : no extremity edema [Leg Claudication] : no intermittent leg claudication [Orthopnea] : no orthopnea [PND] : no PND [Tremor] : no tremor was seen [Syncope] : no syncope [Numbness (Hypoesthesia)] : no numbness [Convulsions] : no convulsions [Tingling (Paresthesia)] : no tingling [Weakness] : no weakness [Limb Weakness (Paresis)] : no limb weakness (Paresis) [Speech Disturbance] : no speech disturbance

## 2024-08-02 NOTE — RESULTS/DATA
[TextEntry] : [facility] 3/8/24 Impression: No acute intracranial hemorrhage, mass effect or demarcated territorial infarction. Stable exam since 7/21/2022.  Cardiac CTA 3/8/24 Cardiac: 1.  Probable severe mid RCA stenosis, The distal RCA and RPDA are not well visualized 2.  Stent present in the OM2, patency is indeterminate.  The mid LCx is not well visualized 3.  The distal LAD is not well visualized 4.  BioAVR with restricted motion of the noncoronary cusp  CTA Chest 3/8/24 IMPRESSION: Stable appearance post graft replacement of the ascending aorta. No aortic dissection or aneurysm.  CTA Abd Pelvis 3/08/24 IMPRESSION: 1. Moderate calcified plaque in the aortoiliac vessels without significant stenosis.  TTE 3/6/24 CONCLUSIONS:  1. Normal left ventricular size and systolic function.  2. Normal right ventricular size and systolic function.  3. Normal atria.  4. Bioprosthetic valve is seen in the aortic position. The peak transvalvular velocity is 3.53 m/s, the mean transvalvular gradient is 28.00 mmHg, and the LVOT/AV velocity ratio is 0.28. Aortic valve velocity is elevated and may be suggestive of bioprosthetic stenosis. Clinical correlation is advised.  5. No pericardial effusion.  6. Compared to the previous TTE performed on 10/17/2023, velocity and gradients across aortic bioprosthesis are higher.  EKG 3/9/24: SR rate 75, CT 180ms, QRS 88ms, QTc 486ma, left axis.   7/17/24 labs: BUN/Cr 11/0.66  5MWT 7/22/24: 8:46s, 8:40s, 8:50s

## 2024-08-02 NOTE — RESULTS/DATA
[TextEntry] : [facility] 3/8/24 Impression: No acute intracranial hemorrhage, mass effect or demarcated territorial infarction. Stable exam since 7/21/2022.  Cardiac CTA 3/8/24 Cardiac: 1.  Probable severe mid RCA stenosis, The distal RCA and RPDA are not well visualized 2.  Stent present in the OM2, patency is indeterminate.  The mid LCx is not well visualized 3.  The distal LAD is not well visualized 4.  BioAVR with restricted motion of the noncoronary cusp  CTA Chest 3/8/24 IMPRESSION: Stable appearance post graft replacement of the ascending aorta. No aortic dissection or aneurysm.  CTA Abd Pelvis 3/08/24 IMPRESSION: 1. Moderate calcified plaque in the aortoiliac vessels without significant stenosis.  TTE 3/6/24 CONCLUSIONS:  1. Normal left ventricular size and systolic function.  2. Normal right ventricular size and systolic function.  3. Normal atria.  4. Bioprosthetic valve is seen in the aortic position. The peak transvalvular velocity is 3.53 m/s, the mean transvalvular gradient is 28.00 mmHg, and the LVOT/AV velocity ratio is 0.28. Aortic valve velocity is elevated and may be suggestive of bioprosthetic stenosis. Clinical correlation is advised.  5. No pericardial effusion.  6. Compared to the previous TTE performed on 10/17/2023, velocity and gradients across aortic bioprosthesis are higher.  EKG 3/9/24: SR rate 75, WI 180ms, QRS 88ms, QTc 486ma, left axis.   7/17/24 labs: BUN/Cr 11/0.66  5MWT 7/22/24: 8:46s, 8:40s, 8:50s

## 2024-08-12 ENCOUNTER — NON-APPOINTMENT (OUTPATIENT)
Age: 64
End: 2024-08-12

## 2024-08-12 ENCOUNTER — TRANSCRIPTION ENCOUNTER (OUTPATIENT)
Age: 64
End: 2024-08-12

## 2024-08-12 VITALS
HEIGHT: 63 IN | SYSTOLIC BLOOD PRESSURE: 141 MMHG | TEMPERATURE: 97 F | DIASTOLIC BLOOD PRESSURE: 82 MMHG | WEIGHT: 169.98 LBS | OXYGEN SATURATION: 100 % | RESPIRATION RATE: 16 BRPM | HEART RATE: 75 BPM

## 2024-08-12 RX ORDER — SEMAGLUTIDE 1 MG/.5ML
0.5 INJECTION, SOLUTION SUBCUTANEOUS
Refills: 0 | DISCHARGE

## 2024-08-12 RX ORDER — EVOLOCUMAB 140 MG/ML
140 INJECTION, SOLUTION SUBCUTANEOUS
Refills: 0 | DISCHARGE

## 2024-08-12 RX ORDER — FAMOTIDINE 10 MG/ML
1 INJECTION INTRAVENOUS
Refills: 0 | DISCHARGE

## 2024-08-12 NOTE — PATIENT PROFILE ADULT - FALL HARM RISK - HARM RISK INTERVENTIONS

## 2024-08-13 ENCOUNTER — INPATIENT (INPATIENT)
Facility: HOSPITAL | Age: 64
LOS: 5 days | Discharge: HOME HEALTH | DRG: 267 | End: 2024-08-19
Attending: INTERNAL MEDICINE | Admitting: INTERNAL MEDICINE
Payer: MEDICARE

## 2024-08-13 ENCOUNTER — APPOINTMENT (OUTPATIENT)
Dept: CARDIOTHORACIC SURGERY | Facility: HOSPITAL | Age: 64
End: 2024-08-13

## 2024-08-13 DIAGNOSIS — G56.00 CARPAL TUNNEL SYNDROME, UNSPECIFIED UPPER LIMB: Chronic | ICD-10-CM

## 2024-08-13 DIAGNOSIS — Z95.2 PRESENCE OF PROSTHETIC HEART VALVE: Chronic | ICD-10-CM

## 2024-08-13 DIAGNOSIS — Z90.49 ACQUIRED ABSENCE OF OTHER SPECIFIED PARTS OF DIGESTIVE TRACT: Chronic | ICD-10-CM

## 2024-08-13 DIAGNOSIS — D25.9 LEIOMYOMA OF UTERUS, UNSPECIFIED: Chronic | ICD-10-CM

## 2024-08-13 DIAGNOSIS — Z98.890 OTHER SPECIFIED POSTPROCEDURAL STATES: Chronic | ICD-10-CM

## 2024-08-13 LAB
A1C WITH ESTIMATED AVERAGE GLUCOSE RESULT: 5.4 % — SIGNIFICANT CHANGE UP (ref 4–5.6)
ALBUMIN SERPL ELPH-MCNC: 3.4 G/DL — SIGNIFICANT CHANGE UP (ref 3.3–5)
ALBUMIN SERPL ELPH-MCNC: 4.3 G/DL — SIGNIFICANT CHANGE UP (ref 3.3–5)
ALP SERPL-CCNC: 41 U/L — SIGNIFICANT CHANGE UP (ref 40–120)
ALP SERPL-CCNC: 54 U/L — SIGNIFICANT CHANGE UP (ref 40–120)
ALT FLD-CCNC: 10 U/L — SIGNIFICANT CHANGE UP (ref 10–45)
ALT FLD-CCNC: 11 U/L — SIGNIFICANT CHANGE UP (ref 10–45)
ANION GAP SERPL CALC-SCNC: 13 MMOL/L — SIGNIFICANT CHANGE UP (ref 5–17)
ANION GAP SERPL CALC-SCNC: 8 MMOL/L — SIGNIFICANT CHANGE UP (ref 5–17)
APTT BLD: 170 SEC — CRITICAL HIGH (ref 24.5–35.6)
APTT BLD: 33.1 SEC — SIGNIFICANT CHANGE UP (ref 24.5–35.6)
APTT BLD: 39.8 SEC — HIGH (ref 24.5–35.6)
AST SERPL-CCNC: 15 U/L — SIGNIFICANT CHANGE UP (ref 10–40)
AST SERPL-CCNC: 38 U/L — SIGNIFICANT CHANGE UP (ref 10–40)
BASE EXCESS BLDA CALC-SCNC: -0.8 MMOL/L — SIGNIFICANT CHANGE UP (ref -2–3)
BASE EXCESS BLDA CALC-SCNC: -1.4 MMOL/L — SIGNIFICANT CHANGE UP (ref -2–3)
BASE EXCESS BLDA CALC-SCNC: -1.9 MMOL/L — SIGNIFICANT CHANGE UP (ref -2–3)
BASE EXCESS BLDA CALC-SCNC: 0 MMOL/L — SIGNIFICANT CHANGE UP (ref -2–3)
BASE EXCESS BLDA CALC-SCNC: 1.2 MMOL/L — SIGNIFICANT CHANGE UP (ref -2–3)
BASE EXCESS BLDA CALC-SCNC: 2.3 MMOL/L — SIGNIFICANT CHANGE UP (ref -2–3)
BILIRUB SERPL-MCNC: 0.2 MG/DL — SIGNIFICANT CHANGE UP (ref 0.2–1.2)
BILIRUB SERPL-MCNC: 0.2 MG/DL — SIGNIFICANT CHANGE UP (ref 0.2–1.2)
BLD GP AB SCN SERPL QL: NEGATIVE — SIGNIFICANT CHANGE UP
BUN SERPL-MCNC: 11 MG/DL — SIGNIFICANT CHANGE UP (ref 7–23)
BUN SERPL-MCNC: 11 MG/DL — SIGNIFICANT CHANGE UP (ref 7–23)
CA-I BLDA-SCNC: 1.17 MMOL/L — SIGNIFICANT CHANGE UP (ref 1.15–1.33)
CA-I BLDA-SCNC: 1.18 MMOL/L — SIGNIFICANT CHANGE UP (ref 1.15–1.33)
CA-I BLDA-SCNC: 1.18 MMOL/L — SIGNIFICANT CHANGE UP (ref 1.15–1.33)
CA-I BLDA-SCNC: 1.2 MMOL/L — SIGNIFICANT CHANGE UP (ref 1.15–1.33)
CA-I BLDA-SCNC: 1.2 MMOL/L — SIGNIFICANT CHANGE UP (ref 1.15–1.33)
CA-I BLDA-SCNC: 1.27 MMOL/L — SIGNIFICANT CHANGE UP (ref 1.15–1.33)
CALCIUM SERPL-MCNC: 8.7 MG/DL — SIGNIFICANT CHANGE UP (ref 8.4–10.5)
CALCIUM SERPL-MCNC: 9.7 MG/DL — SIGNIFICANT CHANGE UP (ref 8.4–10.5)
CHLORIDE SERPL-SCNC: 101 MMOL/L — SIGNIFICANT CHANGE UP (ref 96–108)
CHLORIDE SERPL-SCNC: 102 MMOL/L — SIGNIFICANT CHANGE UP (ref 96–108)
CO2 BLDA-SCNC: 23 MMOL/L — SIGNIFICANT CHANGE UP (ref 19–24)
CO2 BLDA-SCNC: 24 MMOL/L — SIGNIFICANT CHANGE UP (ref 19–24)
CO2 BLDA-SCNC: 26 MMOL/L — HIGH (ref 19–24)
CO2 BLDA-SCNC: 29 MMOL/L — HIGH (ref 19–24)
CO2 SERPL-SCNC: 20 MMOL/L — LOW (ref 22–31)
CO2 SERPL-SCNC: 28 MMOL/L — SIGNIFICANT CHANGE UP (ref 22–31)
COHGB MFR BLDA: 0.7 % — SIGNIFICANT CHANGE UP
COHGB MFR BLDA: 0.8 % — SIGNIFICANT CHANGE UP
COHGB MFR BLDA: 1 % — SIGNIFICANT CHANGE UP
COHGB MFR BLDA: 1.1 % — SIGNIFICANT CHANGE UP
CREAT SERPL-MCNC: 0.64 MG/DL — SIGNIFICANT CHANGE UP (ref 0.5–1.3)
CREAT SERPL-MCNC: 0.74 MG/DL — SIGNIFICANT CHANGE UP (ref 0.5–1.3)
EGFR: 89 ML/MIN/1.73M2 — SIGNIFICANT CHANGE UP
EGFR: 97 ML/MIN/1.73M2 — SIGNIFICANT CHANGE UP
ESTIMATED AVERAGE GLUCOSE: 108 MG/DL — SIGNIFICANT CHANGE UP (ref 68–114)
GAS PNL BLDA: SIGNIFICANT CHANGE UP
GLUCOSE BLDA-MCNC: 108 MG/DL — HIGH (ref 70–99)
GLUCOSE BLDA-MCNC: 139 MG/DL — HIGH (ref 70–99)
GLUCOSE BLDA-MCNC: 150 MG/DL — HIGH (ref 70–99)
GLUCOSE BLDA-MCNC: 162 MG/DL — HIGH (ref 70–99)
GLUCOSE BLDA-MCNC: 88 MG/DL — SIGNIFICANT CHANGE UP (ref 70–99)
GLUCOSE BLDA-MCNC: 94 MG/DL — SIGNIFICANT CHANGE UP (ref 70–99)
GLUCOSE BLDC GLUCOMTR-MCNC: 100 MG/DL — HIGH (ref 70–99)
GLUCOSE BLDC GLUCOMTR-MCNC: 152 MG/DL — HIGH (ref 70–99)
GLUCOSE BLDC GLUCOMTR-MCNC: 152 MG/DL — HIGH (ref 70–99)
GLUCOSE SERPL-MCNC: 106 MG/DL — HIGH (ref 70–99)
GLUCOSE SERPL-MCNC: 178 MG/DL — HIGH (ref 70–99)
HCO3 BLDA-SCNC: 22 MMOL/L — SIGNIFICANT CHANGE UP (ref 21–28)
HCO3 BLDA-SCNC: 23 MMOL/L — SIGNIFICANT CHANGE UP (ref 21–28)
HCO3 BLDA-SCNC: 23 MMOL/L — SIGNIFICANT CHANGE UP (ref 21–28)
HCO3 BLDA-SCNC: 24 MMOL/L — SIGNIFICANT CHANGE UP (ref 21–28)
HCO3 BLDA-SCNC: 25 MMOL/L — SIGNIFICANT CHANGE UP (ref 21–28)
HCO3 BLDA-SCNC: 27 MMOL/L — SIGNIFICANT CHANGE UP (ref 21–28)
HCT VFR BLD CALC: 31.4 % — LOW (ref 34.5–45)
HCT VFR BLD CALC: 40.7 % — SIGNIFICANT CHANGE UP (ref 34.5–45)
HGB BLD-MCNC: 12.4 G/DL — SIGNIFICANT CHANGE UP (ref 11.5–15.5)
HGB BLD-MCNC: 9.5 G/DL — LOW (ref 11.5–15.5)
HGB BLDA-MCNC: 10 G/DL — LOW (ref 11.7–16.1)
HGB BLDA-MCNC: 10.3 G/DL — LOW (ref 11.7–16.1)
HGB BLDA-MCNC: 11.3 G/DL — LOW (ref 11.7–16.1)
HGB BLDA-MCNC: 9.6 G/DL — LOW (ref 11.7–16.1)
HGB BLDA-MCNC: 9.7 G/DL — LOW (ref 11.7–16.1)
HGB BLDA-MCNC: 9.8 G/DL — LOW (ref 11.7–16.1)
INR BLD: 0.83 — LOW (ref 0.85–1.18)
INR BLD: 1.14 — SIGNIFICANT CHANGE UP (ref 0.85–1.18)
ISTAT ARTERIAL BE: -3 MMOL/L — LOW (ref -2–3)
ISTAT ARTERIAL GLUCOSE: 172 MG/DL — HIGH (ref 70–99)
ISTAT ARTERIAL HCO3: 22 MMOL/L — SIGNIFICANT CHANGE UP (ref 22–26)
ISTAT ARTERIAL HEMATOCRIT: 30 % — LOW (ref 34.5–45)
ISTAT ARTERIAL HEMOGLOBIN: 10.2 G/DL — LOW (ref 11.5–15.5)
ISTAT ARTERIAL IONIZED CALCIUM: 1.15 MMOL/L — SIGNIFICANT CHANGE UP (ref 1.12–1.3)
ISTAT ARTERIAL PCO2: 35 MMHG — SIGNIFICANT CHANGE UP (ref 35–45)
ISTAT ARTERIAL PH: 7.4 — SIGNIFICANT CHANGE UP (ref 7.35–7.45)
ISTAT ARTERIAL PO2: 91 MMHG — SIGNIFICANT CHANGE UP (ref 80–105)
ISTAT ARTERIAL POTASSIUM: 4.3 MMOL/L — SIGNIFICANT CHANGE UP (ref 3.5–5.3)
ISTAT ARTERIAL SO2: 97 % — SIGNIFICANT CHANGE UP (ref 95–98)
ISTAT ARTERIAL SODIUM: 136 MMOL/L — SIGNIFICANT CHANGE UP (ref 135–145)
ISTAT ARTERIAL TCO2: 23 MMOL/L — SIGNIFICANT CHANGE UP (ref 22–31)
LACTATE SERPL-SCNC: 2.2 MMOL/L — HIGH (ref 0.5–2)
LACTATE SERPL-SCNC: 2.5 MMOL/L — HIGH (ref 0.5–2)
MAGNESIUM SERPL-MCNC: 1.9 MG/DL — SIGNIFICANT CHANGE UP (ref 1.6–2.6)
MAGNESIUM SERPL-MCNC: 2 MG/DL — SIGNIFICANT CHANGE UP (ref 1.6–2.6)
MCHC RBC-ENTMCNC: 24.4 PG — LOW (ref 27–34)
MCHC RBC-ENTMCNC: 25.1 PG — LOW (ref 27–34)
MCHC RBC-ENTMCNC: 30.3 GM/DL — LOW (ref 32–36)
MCHC RBC-ENTMCNC: 30.5 GM/DL — LOW (ref 32–36)
MCV RBC AUTO: 80.5 FL — SIGNIFICANT CHANGE UP (ref 80–100)
MCV RBC AUTO: 82.2 FL — SIGNIFICANT CHANGE UP (ref 80–100)
METHGB MFR BLDA: 0.2 % — SIGNIFICANT CHANGE UP
METHGB MFR BLDA: 0.4 % — SIGNIFICANT CHANGE UP
METHGB MFR BLDA: 0.5 % — SIGNIFICANT CHANGE UP
METHGB MFR BLDA: 0.6 % — SIGNIFICANT CHANGE UP
METHGB MFR BLDA: 0.7 % — SIGNIFICANT CHANGE UP
METHGB MFR BLDA: 0.9 % — SIGNIFICANT CHANGE UP
NRBC # BLD: 0 /100 WBCS — SIGNIFICANT CHANGE UP (ref 0–0)
NRBC # BLD: 0 /100 WBCS — SIGNIFICANT CHANGE UP (ref 0–0)
NT-PROBNP SERPL-SCNC: 404 PG/ML — HIGH (ref 0–300)
OXYHGB MFR BLDA: 93.3 % — SIGNIFICANT CHANGE UP (ref 90–95)
OXYHGB MFR BLDA: 97.4 % — HIGH (ref 90–95)
OXYHGB MFR BLDA: 97.5 % — HIGH (ref 90–95)
OXYHGB MFR BLDA: 97.6 % — HIGH (ref 90–95)
OXYHGB MFR BLDA: 97.7 % — HIGH (ref 90–95)
OXYHGB MFR BLDA: 98 % — HIGH (ref 90–95)
PCO2 BLDA: 33 MMHG — SIGNIFICANT CHANGE UP (ref 32–45)
PCO2 BLDA: 33 MMHG — SIGNIFICANT CHANGE UP (ref 32–45)
PCO2 BLDA: 35 MMHG — SIGNIFICANT CHANGE UP (ref 32–45)
PCO2 BLDA: 35 MMHG — SIGNIFICANT CHANGE UP (ref 32–45)
PCO2 BLDA: 36 MMHG — SIGNIFICANT CHANGE UP (ref 32–45)
PCO2 BLDA: 43 MMHG — SIGNIFICANT CHANGE UP (ref 32–45)
PH BLDA: 7.41 — SIGNIFICANT CHANGE UP (ref 7.35–7.45)
PH BLDA: 7.42 — SIGNIFICANT CHANGE UP (ref 7.35–7.45)
PH BLDA: 7.42 — SIGNIFICANT CHANGE UP (ref 7.35–7.45)
PH BLDA: 7.43 — SIGNIFICANT CHANGE UP (ref 7.35–7.45)
PH BLDA: 7.46 — HIGH (ref 7.35–7.45)
PH BLDA: 7.46 — HIGH (ref 7.35–7.45)
PHOSPHATE SERPL-MCNC: 4.1 MG/DL — SIGNIFICANT CHANGE UP (ref 2.5–4.5)
PHOSPHATE SERPL-MCNC: 4.3 MG/DL — SIGNIFICANT CHANGE UP (ref 2.5–4.5)
PLATELET # BLD AUTO: 246 K/UL — SIGNIFICANT CHANGE UP (ref 150–400)
PLATELET # BLD AUTO: 248 K/UL — SIGNIFICANT CHANGE UP (ref 150–400)
PO2 BLDA: 318 MMHG — HIGH (ref 83–108)
PO2 BLDA: 342 MMHG — HIGH (ref 83–108)
PO2 BLDA: 345 MMHG — HIGH (ref 83–108)
PO2 BLDA: 358 MMHG — HIGH (ref 83–108)
PO2 BLDA: 389 MMHG — HIGH (ref 83–108)
PO2 BLDA: 69 MMHG — LOW (ref 83–108)
POTASSIUM BLDA-SCNC: 4.4 MMOL/L — SIGNIFICANT CHANGE UP (ref 3.5–5.1)
POTASSIUM BLDA-SCNC: 4.5 MMOL/L — SIGNIFICANT CHANGE UP (ref 3.5–5.1)
POTASSIUM BLDA-SCNC: 4.6 MMOL/L — SIGNIFICANT CHANGE UP (ref 3.5–5.1)
POTASSIUM BLDA-SCNC: 4.6 MMOL/L — SIGNIFICANT CHANGE UP (ref 3.5–5.1)
POTASSIUM BLDA-SCNC: 4.7 MMOL/L — SIGNIFICANT CHANGE UP (ref 3.5–5.1)
POTASSIUM BLDA-SCNC: 4.8 MMOL/L — SIGNIFICANT CHANGE UP (ref 3.5–5.1)
POTASSIUM SERPL-MCNC: 4.1 MMOL/L — SIGNIFICANT CHANGE UP (ref 3.5–5.3)
POTASSIUM SERPL-MCNC: 4.7 MMOL/L — SIGNIFICANT CHANGE UP (ref 3.5–5.3)
POTASSIUM SERPL-SCNC: 4.1 MMOL/L — SIGNIFICANT CHANGE UP (ref 3.5–5.3)
POTASSIUM SERPL-SCNC: 4.7 MMOL/L — SIGNIFICANT CHANGE UP (ref 3.5–5.3)
PROT SERPL-MCNC: 6.2 G/DL — SIGNIFICANT CHANGE UP (ref 6–8.3)
PROT SERPL-MCNC: 7.9 G/DL — SIGNIFICANT CHANGE UP (ref 6–8.3)
PROTHROM AB SERPL-ACNC: 12.9 SEC — SIGNIFICANT CHANGE UP (ref 9.5–13)
PROTHROM AB SERPL-ACNC: 9.5 SEC — SIGNIFICANT CHANGE UP (ref 9.5–13)
RBC # BLD: 3.9 M/UL — SIGNIFICANT CHANGE UP (ref 3.8–5.2)
RBC # BLD: 4.95 M/UL — SIGNIFICANT CHANGE UP (ref 3.8–5.2)
RBC # FLD: 15.1 % — HIGH (ref 10.3–14.5)
RBC # FLD: 15.2 % — HIGH (ref 10.3–14.5)
RH IG SCN BLD-IMP: POSITIVE — SIGNIFICANT CHANGE UP
SAO2 % BLDA: 95.3 % — SIGNIFICANT CHANGE UP (ref 94–98)
SAO2 % BLDA: 98.8 % — HIGH (ref 94–98)
SAO2 % BLDA: 98.9 % — HIGH (ref 94–98)
SAO2 % BLDA: 99.2 % — HIGH (ref 94–98)
SODIUM BLDA-SCNC: 132 MMOL/L — LOW (ref 136–145)
SODIUM BLDA-SCNC: 133 MMOL/L — LOW (ref 136–145)
SODIUM BLDA-SCNC: 134 MMOL/L — LOW (ref 136–145)
SODIUM BLDA-SCNC: 134 MMOL/L — LOW (ref 136–145)
SODIUM BLDA-SCNC: 135 MMOL/L — LOW (ref 136–145)
SODIUM BLDA-SCNC: 135 MMOL/L — LOW (ref 136–145)
SODIUM SERPL-SCNC: 135 MMOL/L — SIGNIFICANT CHANGE UP (ref 135–145)
SODIUM SERPL-SCNC: 137 MMOL/L — SIGNIFICANT CHANGE UP (ref 135–145)
WBC # BLD: 10.93 K/UL — HIGH (ref 3.8–10.5)
WBC # BLD: 6.87 K/UL — SIGNIFICANT CHANGE UP (ref 3.8–10.5)
WBC # FLD AUTO: 10.93 K/UL — HIGH (ref 3.8–10.5)
WBC # FLD AUTO: 6.87 K/UL — SIGNIFICANT CHANGE UP (ref 3.8–10.5)

## 2024-08-13 PROCEDURE — 93010 ELECTROCARDIOGRAM REPORT: CPT

## 2024-08-13 PROCEDURE — 93355 ECHO TRANSESOPHAGEAL (TEE): CPT

## 2024-08-13 PROCEDURE — 71045 X-RAY EXAM CHEST 1 VIEW: CPT | Mod: 26

## 2024-08-13 PROCEDURE — 92928 PRQ TCAT PLMT NTRAC ST 1 LES: CPT | Mod: RC

## 2024-08-13 PROCEDURE — 33361 REPLACE AORTIC VALVE PERQ: CPT | Mod: 62,Q0

## 2024-08-13 PROCEDURE — 33370 TCAT PLMT&RMVL CEPD PERQ: CPT

## 2024-08-13 PROCEDURE — 99232 SBSQ HOSP IP/OBS MODERATE 35: CPT

## 2024-08-13 PROCEDURE — 33370 TCAT PLMT&RMVL CEPD PERQ: CPT | Mod: 80

## 2024-08-13 DEVICE — MEDTRONIC AMPLATZ GOOSE NECK SNARE KIT 20MM X 120CM X 6FR: Type: IMPLANTABLE DEVICE | Status: FUNCTIONAL

## 2024-08-13 DEVICE — IMPLANTABLE DEVICE: Type: IMPLANTABLE DEVICE | Status: FUNCTIONAL

## 2024-08-13 DEVICE — GWIRE ASTATO XS 0.014X300CM: Type: IMPLANTABLE DEVICE | Status: FUNCTIONAL

## 2024-08-13 DEVICE — WIREGUIDE TOROFLEX .018X40CM: Type: IMPLANTABLE DEVICE | Status: FUNCTIONAL

## 2024-08-13 DEVICE — INTRODUCER SHEATH KIT GLIDESHEATH SLENDER FLEX STRAIGHT 21G 6F X 10CM: Type: IMPLANTABLE DEVICE | Status: FUNCTIONAL

## 2024-08-13 DEVICE — GWIRE SUPRACORE .035X370: Type: IMPLANTABLE DEVICE | Status: FUNCTIONAL

## 2024-08-13 DEVICE — EMERALD GUIDEWIRE 0.35: Type: IMPLANTABLE DEVICE | Status: FUNCTIONAL

## 2024-08-13 DEVICE — GWIRE ROSEN 0.035INX260CM: Type: IMPLANTABLE DEVICE | Status: FUNCTIONAL

## 2024-08-13 DEVICE — GUIDEWIRE V-18 CONTROLWIRE STRAIGHT .018" X 300CM TAPER 8CM: Type: IMPLANTABLE DEVICE | Status: FUNCTIONAL

## 2024-08-13 DEVICE — SUT PERCLOSE PROGLIDE 6FR: Type: IMPLANTABLE DEVICE | Status: FUNCTIONAL

## 2024-08-13 DEVICE — GUIDEWIRE STANDARD STRAIGHT .035" X 180CM: Type: IMPLANTABLE DEVICE | Status: FUNCTIONAL

## 2024-08-13 DEVICE — GUIDEWIRE LUNDERQUIST EXTRA-STIFF DOUBLE CURVED .035" X 260CM: Type: IMPLANTABLE DEVICE | Status: FUNCTIONAL

## 2024-08-13 DEVICE — CATH DX JL4 INFIN 5FRX100CM: Type: IMPLANTABLE DEVICE | Status: FUNCTIONAL

## 2024-08-13 DEVICE — GUIDE LAUNCHER 6F JR4: Type: IMPLANTABLE DEVICE | Status: FUNCTIONAL

## 2024-08-13 DEVICE — SHEATH INTRO DRYSEAL FLEX 14FR 33CM: Type: IMPLANTABLE DEVICE | Status: FUNCTIONAL

## 2024-08-13 DEVICE — KIT PACE ELCTR BIPOLAR 5FR: Type: IMPLANTABLE DEVICE | Status: FUNCTIONAL

## 2024-08-13 DEVICE — VLV AORTIC EVOLUT FX PLUS 26MM: Type: IMPLANTABLE DEVICE | Status: FUNCTIONAL

## 2024-08-13 DEVICE — CATH TL .038 JR4 125CM 5FR: Type: IMPLANTABLE DEVICE | Status: FUNCTIONAL

## 2024-08-13 DEVICE — CATH DX PIG 145 INFIN 5FRX110CM: Type: IMPLANTABLE DEVICE | Status: FUNCTIONAL

## 2024-08-13 DEVICE — GUIDEWIRE AMPLATZ EXTRA-STIFF CURVED .035" X 260CM: Type: IMPLANTABLE DEVICE | Status: FUNCTIONAL

## 2024-08-13 DEVICE — CATH DX AL1 INFIN 5FRX100CM: Type: IMPLANTABLE DEVICE | Status: FUNCTIONAL

## 2024-08-13 DEVICE — CATH DX JL3.5 INFIN 5FR X 100CM: Type: IMPLANTABLE DEVICE | Status: FUNCTIONAL

## 2024-08-13 DEVICE — WIRE GD BMW UNIV II 190CM: Type: IMPLANTABLE DEVICE | Status: FUNCTIONAL

## 2024-08-13 DEVICE — GWIRE GUID  0.035INX150CM: Type: IMPLANTABLE DEVICE | Status: FUNCTIONAL

## 2024-08-13 DEVICE — SYS CEREBRAL PROT NCT04149535 SENTINEL FOR STUDY ONLY: Type: IMPLANTABLE DEVICE | Status: FUNCTIONAL

## 2024-08-13 DEVICE — WIRE ASAHI GRAND SLAM 300CM: Type: IMPLANTABLE DEVICE | Status: FUNCTIONAL

## 2024-08-13 DEVICE — CATH TAVR EVOLUT FX 14FR 23-29MM: Type: IMPLANTABLE DEVICE | Status: FUNCTIONAL

## 2024-08-13 DEVICE — GUIDEWIRE RUNTHROUGH NS 180CM: Type: IMPLANTABLE DEVICE | Status: FUNCTIONAL

## 2024-08-13 DEVICE — INTRO MICROPUNC 4FRX10CM SS: Type: IMPLANTABLE DEVICE | Status: FUNCTIONAL

## 2024-08-13 DEVICE — MEDTRONIC AMPLATZ GOOSE NECK SNARE KIT 35MM X 120CM X 6FR: Type: IMPLANTABLE DEVICE | Status: FUNCTIONAL

## 2024-08-13 DEVICE — ANGIOSEAL VASC CLOS VIP 8FR: Type: IMPLANTABLE DEVICE | Status: FUNCTIONAL

## 2024-08-13 DEVICE — CATH DX JR4 INFIN 5FRX100CM: Type: IMPLANTABLE DEVICE | Status: FUNCTIONAL

## 2024-08-13 DEVICE — CATH ANGIO GLIDECATH ANGLE 4FR X 120CM: Type: IMPLANTABLE DEVICE | Status: FUNCTIONAL

## 2024-08-13 DEVICE — CATH DX MPA2 INFIN 5FRX125CM: Type: IMPLANTABLE DEVICE | Status: FUNCTIONAL

## 2024-08-13 DEVICE — SHEATH INTRODUCER TERUMO PINNACLE CORONARY 8FR X 10CM X 0.038" MINI WIRE: Type: IMPLANTABLE DEVICE | Status: FUNCTIONAL

## 2024-08-13 DEVICE — WIRE GD BMW UNIV II 300CM: Type: IMPLANTABLE DEVICE | Status: FUNCTIONAL

## 2024-08-13 DEVICE — CATH GD EXT GUIDEZILLA II 6F: Type: IMPLANTABLE DEVICE | Status: FUNCTIONAL

## 2024-08-13 DEVICE — SHEATH INTRODUCER TERUMO PINNACLE CORONARY 5FR X 10CM X 0.038" MINI WIRE: Type: IMPLANTABLE DEVICE | Status: FUNCTIONAL

## 2024-08-13 DEVICE — GUIDEWIRE TERUMO GLIDEWIRE STIFF STRAGHT .035" X 180CM: Type: IMPLANTABLE DEVICE | Status: FUNCTIONAL

## 2024-08-13 DEVICE — LOADING SYSTEM EVOLUT FX 23-29MM: Type: IMPLANTABLE DEVICE | Status: FUNCTIONAL

## 2024-08-13 DEVICE — CATH DX MPA2 INFIN 5FRX100CM: Type: IMPLANTABLE DEVICE | Status: FUNCTIONAL

## 2024-08-13 RX ORDER — DEXTROSE 15 G/33 G
25 GEL IN PACKET (GRAM) ORAL ONCE
Refills: 0 | Status: DISCONTINUED | OUTPATIENT
Start: 2024-08-13 | End: 2024-08-19

## 2024-08-13 RX ORDER — DEXTROSE 15 G/33 G
15 GEL IN PACKET (GRAM) ORAL ONCE
Refills: 0 | Status: DISCONTINUED | OUTPATIENT
Start: 2024-08-13 | End: 2024-08-19

## 2024-08-13 RX ORDER — ALBUMIN (HUMAN) 5 G/20ML
250 SOLUTION INTRAVENOUS ONCE
Refills: 0 | Status: COMPLETED | OUTPATIENT
Start: 2024-08-13 | End: 2024-08-13

## 2024-08-13 RX ORDER — CEFAZOLIN SODIUM 2 G/100ML
2000 INJECTION, SOLUTION INTRAVENOUS EVERY 8 HOURS
Refills: 0 | Status: COMPLETED | OUTPATIENT
Start: 2024-08-13 | End: 2024-08-14

## 2024-08-13 RX ORDER — OXYCODONE HYDROCHLORIDE 5 MG/1
10 TABLET ORAL EVERY 6 HOURS
Refills: 0 | Status: DISCONTINUED | OUTPATIENT
Start: 2024-08-13 | End: 2024-08-16

## 2024-08-13 RX ORDER — FAMOTIDINE 10 MG/ML
40 INJECTION INTRAVENOUS AT BEDTIME
Refills: 0 | Status: DISCONTINUED | OUTPATIENT
Start: 2024-08-13 | End: 2024-08-16

## 2024-08-13 RX ORDER — DEXTROSE 15 G/33 G
12.5 GEL IN PACKET (GRAM) ORAL ONCE
Refills: 0 | Status: DISCONTINUED | OUTPATIENT
Start: 2024-08-13 | End: 2024-08-19

## 2024-08-13 RX ORDER — CHLORHEXIDINE GLUCONATE 40 MG/ML
1 SOLUTION TOPICAL
Refills: 0 | Status: DISCONTINUED | OUTPATIENT
Start: 2024-08-13 | End: 2024-08-19

## 2024-08-13 RX ORDER — SENNA 187 MG
2 TABLET ORAL AT BEDTIME
Refills: 0 | Status: DISCONTINUED | OUTPATIENT
Start: 2024-08-13 | End: 2024-08-19

## 2024-08-13 RX ORDER — HEPARIN SODIUM,BOVINE 1000/ML
5000 VIAL (ML) INJECTION EVERY 8 HOURS
Refills: 0 | Status: DISCONTINUED | OUTPATIENT
Start: 2024-08-13 | End: 2024-08-19

## 2024-08-13 RX ORDER — OXYCODONE HYDROCHLORIDE 5 MG/1
1 TABLET ORAL
Refills: 0 | DISCHARGE

## 2024-08-13 RX ORDER — OXYCODONE HYDROCHLORIDE 5 MG/1
5 TABLET ORAL EVERY 6 HOURS
Refills: 0 | Status: DISCONTINUED | OUTPATIENT
Start: 2024-08-13 | End: 2024-08-19

## 2024-08-13 RX ORDER — ASPIRIN 81 MG
81 TABLET, DELAYED RELEASE (ENTERIC COATED) ORAL DAILY
Refills: 0 | Status: DISCONTINUED | OUTPATIENT
Start: 2024-08-14 | End: 2024-08-19

## 2024-08-13 RX ORDER — PANTOPRAZOLE SODIUM 40 MG
40 TABLET, DELAYED RELEASE (ENTERIC COATED) ORAL EVERY 12 HOURS
Refills: 0 | Status: DISCONTINUED | OUTPATIENT
Start: 2024-08-13 | End: 2024-08-19

## 2024-08-13 RX ORDER — FENTANYL CITRATE 50 UG/ML
12.5 INJECTION INTRAMUSCULAR; INTRAVENOUS ONCE
Refills: 0 | Status: DISCONTINUED | OUTPATIENT
Start: 2024-08-13 | End: 2024-08-13

## 2024-08-13 RX ORDER — GLUCAGON INJECTION, SOLUTION 1 MG/.2ML
1 INJECTION, SOLUTION SUBCUTANEOUS ONCE
Refills: 0 | Status: DISCONTINUED | OUTPATIENT
Start: 2024-08-13 | End: 2024-08-19

## 2024-08-13 RX ADMIN — ALBUMIN (HUMAN) 500 MILLILITER(S): 5 SOLUTION INTRAVENOUS at 19:02

## 2024-08-13 RX ADMIN — FENTANYL CITRATE 12.5 MICROGRAM(S): 50 INJECTION INTRAMUSCULAR; INTRAVENOUS at 17:44

## 2024-08-13 RX ADMIN — Medication 2: at 17:18

## 2024-08-13 RX ADMIN — OXYCODONE HYDROCHLORIDE 10 MILLIGRAM(S): 5 TABLET ORAL at 19:31

## 2024-08-13 RX ADMIN — Medication 2 MILLIGRAM(S): at 21:15

## 2024-08-13 RX ADMIN — Medication 40 MILLIGRAM(S): at 19:01

## 2024-08-13 RX ADMIN — CEFAZOLIN SODIUM 100 MILLIGRAM(S): 2 INJECTION, SOLUTION INTRAVENOUS at 21:09

## 2024-08-13 RX ADMIN — ACETAMINOPHEN 650 MILLIGRAM(S): 325 TABLET ORAL at 23:30

## 2024-08-13 RX ADMIN — Medication 2: at 21:10

## 2024-08-13 RX ADMIN — FAMOTIDINE 40 MILLIGRAM(S): 10 INJECTION INTRAVENOUS at 21:10

## 2024-08-13 RX ADMIN — Medication 5000 UNIT(S): at 21:10

## 2024-08-13 RX ADMIN — Medication 2 TABLET(S): at 21:10

## 2024-08-13 RX ADMIN — Medication 2 MILLIGRAM(S): at 21:33

## 2024-08-13 RX ADMIN — ACETAMINOPHEN 650 MILLIGRAM(S): 325 TABLET ORAL at 23:00

## 2024-08-13 RX ADMIN — FENTANYL CITRATE 12.5 MICROGRAM(S): 50 INJECTION INTRAMUSCULAR; INTRAVENOUS at 18:30

## 2024-08-13 RX ADMIN — OXYCODONE HYDROCHLORIDE 10 MILLIGRAM(S): 5 TABLET ORAL at 19:50

## 2024-08-13 NOTE — BRIEF OPERATIVE NOTE - NSICDXBRIEFPROCEDURE_GEN_ALL_CORE_FT
PROCEDURES:  Percutaneous transcatheter aortic valve replacement (TAVR) 13-Aug-2024 13:38:47 Cheng TAVR (Evolut 26mm), [place], ____, EF normal Jaci Beauchamp   PROCEDURES:  Percutaneous transcatheter aortic valve replacement (TAVR) 13-Aug-2024 13:38:47 Cheng TAVR (Evolut 26mm), [place],  EF normal Jaci Beauchamp   PROCEDURES:  Percutaneous transcatheter aortic valve replacement (TAVR) 13-Aug-2024 13:38:47 Cheng TAVR (Evolut 26mm), [place] to L biopros cusp, Snorkel stent to pRCA (for coronary protetion) EF normal Jaci Beauchamp

## 2024-08-13 NOTE — PRE-ANESTHESIA EVALUATION ADULT - BP NONINVASIVE SYSTOLIC (MM HG)
----- Message from Nataly Christianson MD sent at 2/7/2020  4:11 PM EST -----  Please let patient know that echo appeared good. Recommend stress testing if he still has difficulties of palpitations. 141

## 2024-08-13 NOTE — BRIEF OPERATIVE NOTE - OPERATION/FINDINGS
R CF:  L CF:  R Radial:  L Radial: R CF: 14 F (primary), perclosed x2, angioseal  L CF: 14F, perclosed x2   R Radial: TR band in place, 6F   L Radial: 6F (being used as arterial line currently)   R IJ: TVP in place

## 2024-08-13 NOTE — H&P ADULT - NSHPREVIEWOFSYSTEMS_GEN_ALL_CORE
Review of Systems  CONSTITUTIONAL:  Denies Fevers / chills, sweats, fatigue, weight loss, weight gain                                      NEURO:  Denies changes in sensation, seizures, syncope, confusion                                                                            EYES:  Denies Blurry vision, discharge, pain, loss of vision                                                                                    ENMT:  Denies Difficulty hearing, vertigo, dysphagia, epistaxis, recent dental work                                       CV:  Denies Chest pain, palpitations, CARRILLO, orthopnea                                                                                          RESPIRATORY:  Denies Wheezing, SOB, cough / sputum, hemoptysis                                                                GI:  Denies Nausea, vomiting, diarrhea, constipation, melena, difficulty swallowing                                               : Denies Hematuria, dysuria, urgency, incontinence                                                                                         MUSCULOSKELETAL:  Denies arthritis, joint swelling, muscle weakness                                                             SKIN/BREAST:  Denies rash, itching, hair loss, masses                                                                                            PSYCH:  Denies depression, anxiety, suicidal ideation                                                                                               HEME/LYMPH:  Denies bruises easily, enlarged lymph nodes, tender lymph nodes                                        ENDOCRINE:  Denies cold intolerance, heat intolerance, polydipsia

## 2024-08-13 NOTE — H&P ADULT - HISTORY OF PRESENT ILLNESS
64F with a PMHx of former smoker, asthma/COPD, anxiety, OA, H. pylori, HTN, HLD, T2DM, severe AS and CAD (s/p SAVR (23mm 3300TFX), ascending aortic replacement (28mm graft) to enlarge aortic root, and 1V CABG (SVG-RPDA) by Dr. Hanson on 5/22/17 followed by subsequently PCI ( DRAKE to prox OM1 [8/2023] and DRAKE to prox LAD [10/2023], pericarditis s/p colchicine (2023), HFpEF, and bioprosthetic aortic valve stenosis and referred to Dr. Kwok for evaluation and deemed a good candidate for Valve in valve TAVR with basilica +/- snorkel stenting. On arrival Denies any chest pain, palpitations, orthopnea, wheezing, abd pain, nausea, vomiting, constipation, lightheadedness, headaches, fevers, or chills.

## 2024-08-13 NOTE — H&P ADULT - NSHPPHYSICALEXAM_GEN_ALL_CORE
PHYSICAL EXAM:  General: well appearing siting in chair in NAD   Neurological: AOx3. Motor skills grossly intact  Cardiovascular: Normal S1/S2.   Respiratory:no accessory muscle useage   Gastrointestinal: Soft. Non-tender  Extremities:  No edema. No calf tenderness.  Vascular: DP 2+ b/l  Incision Sites: none

## 2024-08-13 NOTE — H&P ADULT - ASSESSMENT
64F with a PMHx of former smoker, asthma/COPD, anxiety, OA, H. pylori, HTN, HLD, T2DM, severe AS and CAD (s/p SAVR (23mm 3300TFX), ascending aortic replacement (28mm graft) to enlarge aortic root, and 1V CABG (SVG-RPDA) by Dr. Hanson on 5/22/17 followed by subsequently PCI ( DRAKE to prox OM1 [8/2023] and DRAKE to prox LAD [10/2023], pericarditis s/p colchicine (2023), HFpEF, and bioprosthetic aortic valve stenosis and referred to Dr. Kwok for evaluation and deemed a good candidate for Valve in valve TAVR with basilica +/- snorkel stenting. On arrival Denies any chest pain, palpitations, orthopnea, wheezing, abd pain, nausea, vomiting, constipation, lightheadedness, headaches, fevers, or chills.    Plan:  -admit to 9L ICU care postop   -medications to be discussed postop  -will need postop: echo, cxr, labs, ekg   -MCOT on discharge   -antiplatelet plan TBD per Dr. Kwok    Dispo: 9L ICU care postop

## 2024-08-14 ENCOUNTER — RESULT REVIEW (OUTPATIENT)
Age: 64
End: 2024-08-14

## 2024-08-14 LAB
ALBUMIN SERPL ELPH-MCNC: 3.1 G/DL — LOW (ref 3.3–5)
ALBUMIN SERPL ELPH-MCNC: 3.4 G/DL — SIGNIFICANT CHANGE UP (ref 3.3–5)
ALBUMIN SERPL ELPH-MCNC: 3.6 G/DL — SIGNIFICANT CHANGE UP (ref 3.3–5)
ALP SERPL-CCNC: 31 U/L — LOW (ref 40–120)
ALP SERPL-CCNC: 32 U/L — LOW (ref 40–120)
ALP SERPL-CCNC: 34 U/L — LOW (ref 40–120)
ALT FLD-CCNC: 8 U/L — LOW (ref 10–45)
ALT FLD-CCNC: 9 U/L — LOW (ref 10–45)
ALT FLD-CCNC: 9 U/L — LOW (ref 10–45)
ANION GAP SERPL CALC-SCNC: 10 MMOL/L — SIGNIFICANT CHANGE UP (ref 5–17)
ANISOCYTOSIS BLD QL: SLIGHT — SIGNIFICANT CHANGE UP
APTT BLD: 24.1 SEC — LOW (ref 24.5–35.6)
APTT BLD: 26.3 SEC — SIGNIFICANT CHANGE UP (ref 24.5–35.6)
APTT BLD: 26.8 SEC — SIGNIFICANT CHANGE UP (ref 24.5–35.6)
APTT BLD: 28.1 SEC — SIGNIFICANT CHANGE UP (ref 24.5–35.6)
AST SERPL-CCNC: 39 U/L — SIGNIFICANT CHANGE UP (ref 10–40)
AST SERPL-CCNC: 46 U/L — HIGH (ref 10–40)
AST SERPL-CCNC: 54 U/L — HIGH (ref 10–40)
BASOPHILS # BLD AUTO: 0 K/UL — SIGNIFICANT CHANGE UP (ref 0–0.2)
BASOPHILS # BLD AUTO: 0.01 K/UL — SIGNIFICANT CHANGE UP (ref 0–0.2)
BASOPHILS NFR BLD AUTO: 0 % — SIGNIFICANT CHANGE UP (ref 0–2)
BASOPHILS NFR BLD AUTO: 0.1 % — SIGNIFICANT CHANGE UP (ref 0–2)
BILIRUB SERPL-MCNC: 0.2 MG/DL — SIGNIFICANT CHANGE UP (ref 0.2–1.2)
BILIRUB SERPL-MCNC: 0.4 MG/DL — SIGNIFICANT CHANGE UP (ref 0.2–1.2)
BILIRUB SERPL-MCNC: 0.5 MG/DL — SIGNIFICANT CHANGE UP (ref 0.2–1.2)
BUN SERPL-MCNC: 10 MG/DL — SIGNIFICANT CHANGE UP (ref 7–23)
BUN SERPL-MCNC: 8 MG/DL — SIGNIFICANT CHANGE UP (ref 7–23)
BUN SERPL-MCNC: 9 MG/DL — SIGNIFICANT CHANGE UP (ref 7–23)
BURR CELLS BLD QL SMEAR: PRESENT — SIGNIFICANT CHANGE UP
CALCIUM SERPL-MCNC: 8.2 MG/DL — LOW (ref 8.4–10.5)
CALCIUM SERPL-MCNC: 8.4 MG/DL — SIGNIFICANT CHANGE UP (ref 8.4–10.5)
CALCIUM SERPL-MCNC: 8.6 MG/DL — SIGNIFICANT CHANGE UP (ref 8.4–10.5)
CHLORIDE SERPL-SCNC: 102 MMOL/L — SIGNIFICANT CHANGE UP (ref 96–108)
CHLORIDE SERPL-SCNC: 105 MMOL/L — SIGNIFICANT CHANGE UP (ref 96–108)
CHLORIDE SERPL-SCNC: 105 MMOL/L — SIGNIFICANT CHANGE UP (ref 96–108)
CO2 SERPL-SCNC: 21 MMOL/L — LOW (ref 22–31)
CO2 SERPL-SCNC: 23 MMOL/L — SIGNIFICANT CHANGE UP (ref 22–31)
CO2 SERPL-SCNC: 24 MMOL/L — SIGNIFICANT CHANGE UP (ref 22–31)
CREAT SERPL-MCNC: 0.62 MG/DL — SIGNIFICANT CHANGE UP (ref 0.5–1.3)
CREAT SERPL-MCNC: 0.66 MG/DL — SIGNIFICANT CHANGE UP (ref 0.5–1.3)
CREAT SERPL-MCNC: 0.71 MG/DL — SIGNIFICANT CHANGE UP (ref 0.5–1.3)
EGFR: 94 ML/MIN/1.73M2 — SIGNIFICANT CHANGE UP
EGFR: 97 ML/MIN/1.73M2 — SIGNIFICANT CHANGE UP
EGFR: 98 ML/MIN/1.73M2 — SIGNIFICANT CHANGE UP
EOSINOPHIL # BLD AUTO: 0.58 K/UL — HIGH (ref 0–0.5)
EOSINOPHIL # BLD AUTO: 0.8 K/UL — HIGH (ref 0–0.5)
EOSINOPHIL NFR BLD AUTO: 5.4 % — SIGNIFICANT CHANGE UP (ref 0–6)
EOSINOPHIL NFR BLD AUTO: 6.9 % — HIGH (ref 0–6)
GAS PNL BLDA: SIGNIFICANT CHANGE UP
GLUCOSE BLDC GLUCOMTR-MCNC: 103 MG/DL — HIGH (ref 70–99)
GLUCOSE BLDC GLUCOMTR-MCNC: 124 MG/DL — HIGH (ref 70–99)
GLUCOSE BLDC GLUCOMTR-MCNC: 124 MG/DL — HIGH (ref 70–99)
GLUCOSE BLDC GLUCOMTR-MCNC: 147 MG/DL — HIGH (ref 70–99)
GLUCOSE SERPL-MCNC: 113 MG/DL — HIGH (ref 70–99)
GLUCOSE SERPL-MCNC: 130 MG/DL — HIGH (ref 70–99)
GLUCOSE SERPL-MCNC: 145 MG/DL — HIGH (ref 70–99)
HCT VFR BLD CALC: 24.1 % — LOW (ref 34.5–45)
HCT VFR BLD CALC: 24.3 % — LOW (ref 34.5–45)
HCT VFR BLD CALC: 30 % — LOW (ref 34.5–45)
HCT VFR BLD CALC: 32.5 % — LOW (ref 34.5–45)
HGB BLD-MCNC: 10.2 G/DL — LOW (ref 11.5–15.5)
HGB BLD-MCNC: 7.3 G/DL — LOW (ref 11.5–15.5)
HGB BLD-MCNC: 7.5 G/DL — LOW (ref 11.5–15.5)
HGB BLD-MCNC: 9.7 G/DL — LOW (ref 11.5–15.5)
HYPOCHROMIA BLD QL: SLIGHT — SIGNIFICANT CHANGE UP
IMM GRANULOCYTES NFR BLD AUTO: 0.8 % — SIGNIFICANT CHANGE UP (ref 0–0.9)
INR BLD: 1.03 — SIGNIFICANT CHANGE UP (ref 0.85–1.18)
INR BLD: 1.04 — SIGNIFICANT CHANGE UP (ref 0.85–1.18)
INR BLD: 1.07 — SIGNIFICANT CHANGE UP (ref 0.85–1.18)
INR BLD: 1.1 — SIGNIFICANT CHANGE UP (ref 0.85–1.18)
ISTAT VENOUS BE: 0 MMOL/L — SIGNIFICANT CHANGE UP (ref -2–3)
ISTAT VENOUS GLUCOSE: 109 MG/DL — HIGH (ref 70–99)
ISTAT VENOUS HCO3: 26 MMOL/L — SIGNIFICANT CHANGE UP (ref 23–28)
ISTAT VENOUS HEMATOCRIT: 22 % — LOW (ref 34.5–45)
ISTAT VENOUS HEMOGLOBIN: 7.5 GM/DL — LOW (ref 11.5–15.5)
ISTAT VENOUS IONIZED CALCIUM: 1.21 MMOL/L — SIGNIFICANT CHANGE UP (ref 1.12–1.3)
ISTAT VENOUS PCO2: 46 MMHG — SIGNIFICANT CHANGE UP (ref 41–51)
ISTAT VENOUS PH: 7.35 — SIGNIFICANT CHANGE UP (ref 7.31–7.41)
ISTAT VENOUS PO2: <66 MMHG — SIGNIFICANT CHANGE UP (ref 35–40)
ISTAT VENOUS POTASSIUM: 4.1 MMOL/L — SIGNIFICANT CHANGE UP (ref 3.5–5.3)
ISTAT VENOUS SO2: 66 % — SIGNIFICANT CHANGE UP
ISTAT VENOUS SODIUM: 138 MMOL/L — SIGNIFICANT CHANGE UP (ref 135–145)
ISTAT VENOUS TCO2: 27 MMOL/L — SIGNIFICANT CHANGE UP (ref 22–31)
LYMPHOCYTES # BLD AUTO: 0.3 K/UL — LOW (ref 1–3.3)
LYMPHOCYTES # BLD AUTO: 0.4 K/UL — LOW (ref 1–3.3)
LYMPHOCYTES # BLD AUTO: 2.6 % — LOW (ref 13–44)
LYMPHOCYTES # BLD AUTO: 3.7 % — LOW (ref 13–44)
MACROCYTES BLD QL: SLIGHT — SIGNIFICANT CHANGE UP
MAGNESIUM SERPL-MCNC: 1.7 MG/DL — SIGNIFICANT CHANGE UP (ref 1.6–2.6)
MAGNESIUM SERPL-MCNC: 1.8 MG/DL — SIGNIFICANT CHANGE UP (ref 1.6–2.6)
MAGNESIUM SERPL-MCNC: 2.2 MG/DL — SIGNIFICANT CHANGE UP (ref 1.6–2.6)
MANUAL SMEAR VERIFICATION: SIGNIFICANT CHANGE UP
MCHC RBC-ENTMCNC: 24.4 PG — LOW (ref 27–34)
MCHC RBC-ENTMCNC: 25.2 PG — LOW (ref 27–34)
MCHC RBC-ENTMCNC: 26.6 PG — LOW (ref 27–34)
MCHC RBC-ENTMCNC: 26.6 PG — LOW (ref 27–34)
MCHC RBC-ENTMCNC: 30.3 GM/DL — LOW (ref 32–36)
MCHC RBC-ENTMCNC: 30.9 GM/DL — LOW (ref 32–36)
MCHC RBC-ENTMCNC: 31.4 GM/DL — LOW (ref 32–36)
MCHC RBC-ENTMCNC: 32.3 GM/DL — SIGNIFICANT CHANGE UP (ref 32–36)
MCV RBC AUTO: 80.6 FL — SIGNIFICANT CHANGE UP (ref 80–100)
MCV RBC AUTO: 81.5 FL — SIGNIFICANT CHANGE UP (ref 80–100)
MCV RBC AUTO: 82.2 FL — SIGNIFICANT CHANGE UP (ref 80–100)
MCV RBC AUTO: 84.6 FL — SIGNIFICANT CHANGE UP (ref 80–100)
MICROCYTES BLD QL: SLIGHT — SIGNIFICANT CHANGE UP
MONOCYTES # BLD AUTO: 0.39 K/UL — SIGNIFICANT CHANGE UP (ref 0–0.9)
MONOCYTES # BLD AUTO: 0.62 K/UL — SIGNIFICANT CHANGE UP (ref 0–0.9)
MONOCYTES NFR BLD AUTO: 3.4 % — SIGNIFICANT CHANGE UP (ref 2–14)
MONOCYTES NFR BLD AUTO: 5.8 % — SIGNIFICANT CHANGE UP (ref 2–14)
NEUTROPHILS # BLD AUTO: 10.07 K/UL — HIGH (ref 1.8–7.4)
NEUTROPHILS # BLD AUTO: 9.05 K/UL — HIGH (ref 1.8–7.4)
NEUTROPHILS NFR BLD AUTO: 84.2 % — HIGH (ref 43–77)
NEUTROPHILS NFR BLD AUTO: 87.1 % — HIGH (ref 43–77)
NRBC # BLD: 0 /100 WBCS — SIGNIFICANT CHANGE UP (ref 0–0)
OVALOCYTES BLD QL SMEAR: SLIGHT — SIGNIFICANT CHANGE UP
PHOSPHATE SERPL-MCNC: 3 MG/DL — SIGNIFICANT CHANGE UP (ref 2.5–4.5)
PHOSPHATE SERPL-MCNC: 3.5 MG/DL — SIGNIFICANT CHANGE UP (ref 2.5–4.5)
PHOSPHATE SERPL-MCNC: 5.2 MG/DL — HIGH (ref 2.5–4.5)
PLAT MORPH BLD: ABNORMAL
PLATELET # BLD AUTO: 153 K/UL — SIGNIFICANT CHANGE UP (ref 150–400)
PLATELET # BLD AUTO: 158 K/UL — SIGNIFICANT CHANGE UP (ref 150–400)
PLATELET # BLD AUTO: 178 K/UL — SIGNIFICANT CHANGE UP (ref 150–400)
PLATELET # BLD AUTO: 207 K/UL — SIGNIFICANT CHANGE UP (ref 150–400)
POIKILOCYTOSIS BLD QL AUTO: SLIGHT — SIGNIFICANT CHANGE UP
POLYCHROMASIA BLD QL SMEAR: SLIGHT — SIGNIFICANT CHANGE UP
POTASSIUM SERPL-MCNC: 3.9 MMOL/L — SIGNIFICANT CHANGE UP (ref 3.5–5.3)
POTASSIUM SERPL-MCNC: 4.3 MMOL/L — SIGNIFICANT CHANGE UP (ref 3.5–5.3)
POTASSIUM SERPL-MCNC: 4.8 MMOL/L — SIGNIFICANT CHANGE UP (ref 3.5–5.3)
POTASSIUM SERPL-SCNC: 3.9 MMOL/L — SIGNIFICANT CHANGE UP (ref 3.5–5.3)
POTASSIUM SERPL-SCNC: 4.3 MMOL/L — SIGNIFICANT CHANGE UP (ref 3.5–5.3)
POTASSIUM SERPL-SCNC: 4.8 MMOL/L — SIGNIFICANT CHANGE UP (ref 3.5–5.3)
PROT SERPL-MCNC: 5.5 G/DL — LOW (ref 6–8.3)
PROT SERPL-MCNC: 5.7 G/DL — LOW (ref 6–8.3)
PROT SERPL-MCNC: 6.3 G/DL — SIGNIFICANT CHANGE UP (ref 6–8.3)
PROTHROM AB SERPL-ACNC: 11.7 SEC — SIGNIFICANT CHANGE UP (ref 9.5–13)
PROTHROM AB SERPL-ACNC: 11.8 SEC — SIGNIFICANT CHANGE UP (ref 9.5–13)
PROTHROM AB SERPL-ACNC: 12.2 SEC — SIGNIFICANT CHANGE UP (ref 9.5–13)
PROTHROM AB SERPL-ACNC: 12.5 SEC — SIGNIFICANT CHANGE UP (ref 9.5–13)
RBC # BLD: 2.98 M/UL — LOW (ref 3.8–5.2)
RBC # BLD: 2.99 M/UL — LOW (ref 3.8–5.2)
RBC # BLD: 3.65 M/UL — LOW (ref 3.8–5.2)
RBC # BLD: 3.84 M/UL — SIGNIFICANT CHANGE UP (ref 3.8–5.2)
RBC # FLD: 15.3 % — HIGH (ref 10.3–14.5)
RBC # FLD: 15.4 % — HIGH (ref 10.3–14.5)
RBC # FLD: 15.7 % — HIGH (ref 10.3–14.5)
RBC # FLD: 15.9 % — HIGH (ref 10.3–14.5)
RBC BLD AUTO: ABNORMAL
SODIUM SERPL-SCNC: 135 MMOL/L — SIGNIFICANT CHANGE UP (ref 135–145)
SODIUM SERPL-SCNC: 136 MMOL/L — SIGNIFICANT CHANGE UP (ref 135–145)
SODIUM SERPL-SCNC: 139 MMOL/L — SIGNIFICANT CHANGE UP (ref 135–145)
WBC # BLD: 10.75 K/UL — HIGH (ref 3.8–10.5)
WBC # BLD: 11.56 K/UL — HIGH (ref 3.8–10.5)
WBC # BLD: 11.88 K/UL — HIGH (ref 3.8–10.5)
WBC # BLD: 14.33 K/UL — HIGH (ref 3.8–10.5)
WBC # FLD AUTO: 10.75 K/UL — HIGH (ref 3.8–10.5)
WBC # FLD AUTO: 11.56 K/UL — HIGH (ref 3.8–10.5)
WBC # FLD AUTO: 11.88 K/UL — HIGH (ref 3.8–10.5)
WBC # FLD AUTO: 14.33 K/UL — HIGH (ref 3.8–10.5)

## 2024-08-14 PROCEDURE — 99291 CRITICAL CARE FIRST HOUR: CPT

## 2024-08-14 PROCEDURE — 71045 X-RAY EXAM CHEST 1 VIEW: CPT | Mod: 26

## 2024-08-14 PROCEDURE — 93306 TTE W/DOPPLER COMPLETE: CPT | Mod: 26

## 2024-08-14 RX ORDER — PHENYLEPHRINE HYDROCHLORIDE 10 MG/ML
0.5 INJECTION INTRAVENOUS
Qty: 40 | Refills: 0 | Status: DISCONTINUED | OUTPATIENT
Start: 2024-08-14 | End: 2024-08-16

## 2024-08-14 RX ORDER — ALPRAZOLAM 0.25 MG
0.5 TABLET ORAL ONCE
Refills: 0 | Status: DISCONTINUED | OUTPATIENT
Start: 2024-08-14 | End: 2024-08-14

## 2024-08-14 RX ORDER — ACETAMINOPHEN 325 MG/1
650 TABLET ORAL EVERY 6 HOURS
Refills: 0 | Status: DISCONTINUED | OUTPATIENT
Start: 2024-08-14 | End: 2024-08-19

## 2024-08-14 RX ORDER — DIAZEPAM 10 MG
2 TABLET ORAL DAILY
Refills: 0 | Status: DISCONTINUED | OUTPATIENT
Start: 2024-08-14 | End: 2024-08-19

## 2024-08-14 RX ORDER — ACETAMINOPHEN 325 MG/1
1000 TABLET ORAL ONCE
Refills: 0 | Status: DISCONTINUED | OUTPATIENT
Start: 2024-08-14 | End: 2024-08-19

## 2024-08-14 RX ORDER — POTASSIUM CHLORIDE 10 MEQ
40 TABLET, EXT RELEASE, PARTICLES/CRYSTALS ORAL ONCE
Refills: 0 | Status: COMPLETED | OUTPATIENT
Start: 2024-08-14 | End: 2024-08-14

## 2024-08-14 RX ADMIN — ACETAMINOPHEN 650 MILLIGRAM(S): 325 TABLET ORAL at 22:24

## 2024-08-14 RX ADMIN — FAMOTIDINE 40 MILLIGRAM(S): 10 INJECTION INTRAVENOUS at 21:55

## 2024-08-14 RX ADMIN — Medication 2 MILLIGRAM(S): at 10:10

## 2024-08-14 RX ADMIN — Medication 2 MILLIGRAM(S): at 02:00

## 2024-08-14 RX ADMIN — ACETAMINOPHEN 650 MILLIGRAM(S): 325 TABLET ORAL at 06:46

## 2024-08-14 RX ADMIN — Medication 40 MILLIGRAM(S): at 05:10

## 2024-08-14 RX ADMIN — Medication 500 MILLILITER(S): at 01:00

## 2024-08-14 RX ADMIN — OXYCODONE HYDROCHLORIDE 10 MILLIGRAM(S): 5 TABLET ORAL at 18:09

## 2024-08-14 RX ADMIN — PHENYLEPHRINE HYDROCHLORIDE 14.5 MICROGRAM(S)/KG/MIN: 10 INJECTION INTRAVENOUS at 04:41

## 2024-08-14 RX ADMIN — OXYCODONE HYDROCHLORIDE 5 MILLIGRAM(S): 5 TABLET ORAL at 15:05

## 2024-08-14 RX ADMIN — Medication 40 MILLIGRAM(S): at 17:08

## 2024-08-14 RX ADMIN — Medication 2 MILLIGRAM(S): at 12:23

## 2024-08-14 RX ADMIN — Medication 81 MILLIGRAM(S): at 11:06

## 2024-08-14 RX ADMIN — CHLORHEXIDINE GLUCONATE 1 APPLICATION(S): 40 SOLUTION TOPICAL at 06:44

## 2024-08-14 RX ADMIN — ACETAMINOPHEN 650 MILLIGRAM(S): 325 TABLET ORAL at 07:16

## 2024-08-14 RX ADMIN — OXYCODONE HYDROCHLORIDE 10 MILLIGRAM(S): 5 TABLET ORAL at 11:43

## 2024-08-14 RX ADMIN — Medication 5000 UNIT(S): at 21:57

## 2024-08-14 RX ADMIN — OXYCODONE HYDROCHLORIDE 5 MILLIGRAM(S): 5 TABLET ORAL at 14:09

## 2024-08-14 RX ADMIN — Medication 0.5 MILLIGRAM(S): at 21:55

## 2024-08-14 RX ADMIN — CEFAZOLIN SODIUM 100 MILLIGRAM(S): 2 INJECTION, SOLUTION INTRAVENOUS at 05:10

## 2024-08-14 RX ADMIN — OXYCODONE HYDROCHLORIDE 10 MILLIGRAM(S): 5 TABLET ORAL at 10:47

## 2024-08-14 RX ADMIN — OXYCODONE HYDROCHLORIDE 10 MILLIGRAM(S): 5 TABLET ORAL at 17:09

## 2024-08-14 RX ADMIN — Medication 2 MILLIGRAM(S): at 09:47

## 2024-08-14 RX ADMIN — Medication 2 MILLIGRAM(S): at 01:49

## 2024-08-14 RX ADMIN — Medication 2 MILLIGRAM(S): at 09:03

## 2024-08-14 RX ADMIN — Medication 75 MILLIGRAM(S): at 11:06

## 2024-08-14 RX ADMIN — Medication 25 GRAM(S): at 11:06

## 2024-08-14 RX ADMIN — ACETAMINOPHEN 650 MILLIGRAM(S): 325 TABLET ORAL at 21:54

## 2024-08-14 RX ADMIN — Medication 40 MILLIEQUIVALENT(S): at 13:54

## 2024-08-14 RX ADMIN — Medication 2 MILLIGRAM(S): at 08:33

## 2024-08-14 NOTE — PROGRESS NOTE ADULT - SUBJECTIVE AND OBJECTIVE BOX
CTICU  CRITICAL  CARE  attending     Hand off received 					   Pertinent clinical, laboratory, radiographic, hemodynamic, echocardiographic, respiratory data, microbiologic data and chart were reviewed and analyzed frequently throughout the course of the day and night  Patient seen and examined with CTS/ SH attending at bedside    Pt is a 64y , Female, post op day # 1  s/p TF-Cheng-TAVR; Core valve  R IJ TVP in place    today:    R groin hematoma  drop in Hct  transient pressor requirement.  acute post hemorrhagic anemia  s/p 2 units of PRBC today          FAMILY HISTORY:  Family history of atrial fibrillation (Mother)    Family history of cardiac pacemaker (Mother)    Family history of asthma (Mother)    Family history of MI (myocardial infarction) (Mother)    Family history of pulmonary embolism (Mother)    PAST MEDICAL & SURGICAL HISTORY:  Hypertension      Hyperlipidemia      Diabetes mellitus      Asthma      GERD (gastroesophageal reflux disease)      Kidney stone      Back injury  lumbar, work related      CAD (coronary artery disease)      Carpal tunnel syndrome      Status post small bowel resection      Uterine fibroid  removal      Status post laser lithotripsy of ureteral calculus      H/O aortic valve replacement        Patient is a 64y old  Female who presents with a  planned Cheng TAVR (13 Aug 2024 21:47)      14 system review limited 2/2 post op morbidity    Vital signs, hemodynamic and respiratory parameters were reviewed from the bedside nursing flowsheet.  ICU Vital Signs Last 24 Hrs  T(C): 36.8 (14 Aug 2024 12:30), Max: 36.9 (14 Aug 2024 09:39)  T(F): 98.2 (14 Aug 2024 12:30), Max: 98.4 (14 Aug 2024 09:39)  HR: 118 (14 Aug 2024 16:00) (75 - 118)  BP: 105/51 (14 Aug 2024 16:00) (97/57 - 128/58)  BP(mean): 73 (14 Aug 2024 16:00) (71 - 87)  ABP: 105/63 (14 Aug 2024 16:00) (90/45 - 135/65)  ABP(mean): 77 (14 Aug 2024 16:00) (58 - 96)  RR: 16 (14 Aug 2024 16:00) (15 - 18)  SpO2: 96% (14 Aug 2024 16:00) (91% - 100%)    O2 Parameters below as of 14 Aug 2024 17:00  Patient On (Oxygen Delivery Method): room air          Adult Advanced Hemodynamics Last 24 Hrs  CVP(mm Hg): --  CVP(cm H2O): --  CO: --  CI: --  PA: --  PA(mean): --  PCWP: --  SVR: --  SVRI: --  PVR: --  PVRI: --, ABG - ( 14 Aug 2024 03:06 )  pH, Arterial: 7.38  pH, Blood: x     /  pCO2: 40    /  pO2: 110   / HCO3: 24    / Base Excess: -1.3  /  SaO2: 99.4                Intake and output was reviewed and the fluid balance was calculated  Daily     Daily   I&O's Summary    13 Aug 2024 07:01  -  14 Aug 2024 07:00  --------------------------------------------------------  IN: 1140.6 mL / OUT: 1700 mL / NET: -559.4 mL    14 Aug 2024 07:01  -  14 Aug 2024 17:03  --------------------------------------------------------  IN: 1181.6 mL / OUT: 1060 mL / NET: 121.6 mL        All lines and drain sites were assessed  Glycemic trend was reviewedCAPILLARY BLOOD GLUCOSE      POCT Blood Glucose.: 124 mg/dL (14 Aug 2024 16:28)    No acute change in mental status  Auscultation of the chest reveals equal bs  Abdomen is soft  Extremities are warm and well perfused  Wounds appear clean and unremarkable  Antibiotics are periop    labs  CBC Full  -  ( 14 Aug 2024 11:43 )  WBC Count : 11.56 K/uL  RBC Count : 3.65 M/uL  Hemoglobin : 9.7 g/dL  Hematocrit : 30.0 %  Platelet Count - Automated : 153 K/uL  Mean Cell Volume : 82.2 fl  Mean Cell Hemoglobin : 26.6 pg  Mean Cell Hemoglobin Concentration : 32.3 gm/dL  Auto Neutrophil # : 10.07 K/uL  Auto Lymphocyte # : 0.30 K/uL  Auto Monocyte # : 0.39 K/uL  Auto Eosinophil # : 0.80 K/uL  Auto Basophil # : 0.00 K/uL  Auto Neutrophil % : 87.1 %  Auto Lymphocyte % : 2.6 %  Auto Monocyte % : 3.4 %  Auto Eosinophil % : 6.9 %  Auto Basophil % : 0.0 %    08-14    139  |  105  |  9   ----------------------------<  145<H>  3.9   |  24  |  0.62    Ca    8.4      14 Aug 2024 11:43  Phos  3.5     08-14  Mg     1.7     08-14    TPro  5.7<L>  /  Alb  3.4  /  TBili  0.5  /  DBili  x   /  AST  46<H>  /  ALT  8<L>  /  AlkPhos  32<L>  08-14    PT/INR - ( 14 Aug 2024 11:43 )   PT: 12.5 sec;   INR: 1.10          PTT - ( 14 Aug 2024 11:43 )  PTT:26.8 sec  The current medications were reviewed   MEDICATIONS  (STANDING):  aspirin enteric coated 81 milliGRAM(s) Oral daily  chlorhexidine 2% Cloths 1 Application(s) Topical <User Schedule>  clopidogrel Tablet 75 milliGRAM(s) Oral daily  dextrose 5%. 1000 milliLiter(s) (50 mL/Hr) IV Continuous <Continuous>  dextrose 5%. 1000 milliLiter(s) (100 mL/Hr) IV Continuous <Continuous>  dextrose 50% Injectable 25 Gram(s) IV Push once  dextrose 50% Injectable 12.5 Gram(s) IV Push once  dextrose 50% Injectable 25 Gram(s) IV Push once  diazepam    Tablet 2 milliGRAM(s) Oral daily  famotidine    Tablet 40 milliGRAM(s) Oral at bedtime  glucagon  Injectable 1 milliGRAM(s) IntraMuscular once  heparin   Injectable 5000 Unit(s) SubCutaneous every 8 hours  insulin lispro (ADMELOG) corrective regimen sliding scale   SubCutaneous Before meals and at bedtime  pantoprazole    Tablet 40 milliGRAM(s) Oral every 12 hours  phenylephrine    Infusion 0.5 MICROgram(s)/kG/Min (14.5 mL/Hr) IV Continuous <Continuous>  senna 2 Tablet(s) Oral at bedtime    MEDICATIONS  (PRN):  acetaminophen     Tablet .. 650 milliGRAM(s) Oral every 6 hours PRN Mild Pain (1 - 3)  acetaminophen   IVPB .. 1000 milliGRAM(s) IV Intermittent once PRN Mild Pain (1 - 3)  dextrose Oral Gel 15 Gram(s) Oral once PRN Blood Glucose LESS THAN 70 milliGRAM(s)/deciliter  oxyCODONE    IR 10 milliGRAM(s) Oral every 6 hours PRN Severe Pain (7 - 10)  oxyCODONE    IR 5 milliGRAM(s) Oral every 6 hours PRN Moderate Pain (4 - 6)       PROBLEM LIST/ ASSESSMENT:  HEALTH ISSUES - PROBLEM Dx:      ,   Patient is a 64y old  Female who presents for elective TF Cheng TAVR (13 Aug 2024 21:47)     s/p the same      My plan includes :    Monitor counts  R groin follow up  Transfuse if needed    close hemodynamic, ventilatory and drain monitoring and management per post op routine    Monitor for arrhythmias and monitor parameters for organ perfusion  monitor neurologic status  Head of the bed should remain elevated to 45 deg .   chest PT and IS will be encouraged  monitor adequacy of oxygenation and ventilation and attempt to wean oxygen  Nutritional goals will be met using po eventually , ensure adequate caloric intake and montior the same  Stress ulcer and VTE prophylaxis will be achieved    Glycemic control is satisfactory  Electrolytes have been repleted as necessary and wound care has been carried out. Pain control has been achieved.   agressive physical therapy and early mobility and ambulation goals will be met   The family was updated about the course and plan  CRITICAL CARE TIME SPENT in evaluation and management, reassessments, review and interpretation of labs and x-rays, ventilator and hemodynamic management, formulating a plan and coordinating care: ___45___ MIN.  Time does not include procedural time.  CTICU ATTENDING     					    Arnoldo Espinal MD

## 2024-08-14 NOTE — PHYSICAL THERAPY INITIAL EVALUATION ADULT - GENERAL OBSERVATIONS, REHAB EVAL
Patient encountered out of bed sitting in bedside chair in no apparent distress, +central line +tele +pulseOx +heplock. Agreeable to PT eval.  Patient c/o b/l groin pain., L UE pain

## 2024-08-14 NOTE — PHYSICAL THERAPY INITIAL EVALUATION ADULT - PHYSICAL ASSIST/NONPHYSICAL ASSIST: GAIT, REHAB EVAL
+chair follow ; cues for improved posture, sequencing within RW./verbal cues/nonverbal cues (demo/gestures)/1 person assist

## 2024-08-14 NOTE — PHYSICAL THERAPY INITIAL EVALUATION ADULT - IMPAIRMENTS CONTRIBUTING TO GAIT DEVIATIONS, PT EVAL
slightly unsteady with no ep of LOB, decreased endurance/impaired balance/pain/impaired postural control/decreased strength

## 2024-08-14 NOTE — PHYSICAL THERAPY INITIAL EVALUATION ADULT - ADDITIONAL COMMENTS
Patient lives alone in apartment +elevator. PTA, pt was independent with all ADLs and functional mobility, with the use of No AD within the home and straight cane within the community. Patient notes 1 fall in the past 3 months.

## 2024-08-14 NOTE — PHYSICAL THERAPY INITIAL EVALUATION ADULT - IMPAIRED TRANSFERS: SIT/STAND, REHAB EVAL
b/l groin pain , slightly unsteady with no ep of LOB, decreased endurance/impaired balance/pain/impaired postural control/decreased strength

## 2024-08-14 NOTE — PHYSICAL THERAPY INITIAL EVALUATION ADULT - PERTINENT HX OF CURRENT PROBLEM, REHAB EVAL
64F with a PMHx of former smoker, asthma/COPD, anxiety, OA, H. pylori, HTN, HLD, T2DM, severe AS and CAD (s/p SAVR (23mm 3300TFX), ascending aortic replacement (28mm graft) to enlarge aortic root, and 1V CABG (SVG-RPDA) by Dr. Hanson on 5/22/17 followed by subsequently PCI ( DRAKE to prox OM1 [8/2023] and RDAKE to prox LAD [10/2023], pericarditis s/p colchicine (2023), HFpEF, and bioprosthetic aortic valve stenosis and referred to Dr. Kwok for evaluation and deemed a good candidate for Valve in valve TAVR with basilica +/- snorkel stenting. On arrival Denies any chest pain, palpitations, orthopnea, wheezing, abd pain, nausea, vomiting, constipation, lightheadedness, headaches, fevers, or chills.

## 2024-08-15 LAB
ALBUMIN SERPL ELPH-MCNC: 3.4 G/DL — SIGNIFICANT CHANGE UP (ref 3.3–5)
ALP SERPL-CCNC: 32 U/L — LOW (ref 40–120)
ALT FLD-CCNC: 8 U/L — LOW (ref 10–45)
ANION GAP SERPL CALC-SCNC: 6 MMOL/L — SIGNIFICANT CHANGE UP (ref 5–17)
APPEARANCE UR: ABNORMAL
APTT BLD: 25 SEC — SIGNIFICANT CHANGE UP (ref 24.5–35.6)
AST SERPL-CCNC: 30 U/L — SIGNIFICANT CHANGE UP (ref 10–40)
BACTERIA # UR AUTO: NEGATIVE /HPF — SIGNIFICANT CHANGE UP
BASOPHILS # BLD AUTO: 0 K/UL — SIGNIFICANT CHANGE UP (ref 0–0.2)
BASOPHILS NFR BLD AUTO: 0 % — SIGNIFICANT CHANGE UP (ref 0–2)
BILIRUB SERPL-MCNC: 0.4 MG/DL — SIGNIFICANT CHANGE UP (ref 0.2–1.2)
BILIRUB UR-MCNC: NEGATIVE — SIGNIFICANT CHANGE UP
BUN SERPL-MCNC: 7 MG/DL — SIGNIFICANT CHANGE UP (ref 7–23)
CALCIUM SERPL-MCNC: 8.5 MG/DL — SIGNIFICANT CHANGE UP (ref 8.4–10.5)
CAST: 0 /LPF — SIGNIFICANT CHANGE UP (ref 0–4)
CHLORIDE SERPL-SCNC: 108 MMOL/L — SIGNIFICANT CHANGE UP (ref 96–108)
CO2 SERPL-SCNC: 25 MMOL/L — SIGNIFICANT CHANGE UP (ref 22–31)
COLOR SPEC: YELLOW — SIGNIFICANT CHANGE UP
CREAT SERPL-MCNC: 0.65 MG/DL — SIGNIFICANT CHANGE UP (ref 0.5–1.3)
DIFF PNL FLD: ABNORMAL
EGFR: 97 ML/MIN/1.73M2 — SIGNIFICANT CHANGE UP
EOSINOPHIL # BLD AUTO: 0.61 K/UL — HIGH (ref 0–0.5)
EOSINOPHIL NFR BLD AUTO: 8.4 % — HIGH (ref 0–6)
GLUCOSE BLDC GLUCOMTR-MCNC: 112 MG/DL — HIGH (ref 70–99)
GLUCOSE BLDC GLUCOMTR-MCNC: 115 MG/DL — HIGH (ref 70–99)
GLUCOSE BLDC GLUCOMTR-MCNC: 130 MG/DL — HIGH (ref 70–99)
GLUCOSE BLDC GLUCOMTR-MCNC: 145 MG/DL — HIGH (ref 70–99)
GLUCOSE SERPL-MCNC: 135 MG/DL — HIGH (ref 70–99)
GLUCOSE UR QL: NEGATIVE MG/DL — SIGNIFICANT CHANGE UP
HCT VFR BLD CALC: 31.2 % — LOW (ref 34.5–45)
HGB BLD-MCNC: 9.7 G/DL — LOW (ref 11.5–15.5)
IMM GRANULOCYTES NFR BLD AUTO: 0.7 % — SIGNIFICANT CHANGE UP (ref 0–0.9)
INR BLD: 1.05 — SIGNIFICANT CHANGE UP (ref 0.85–1.18)
KETONES UR-MCNC: NEGATIVE MG/DL — SIGNIFICANT CHANGE UP
LEUKOCYTE ESTERASE UR-ACNC: ABNORMAL
LYMPHOCYTES # BLD AUTO: 0.47 K/UL — LOW (ref 1–3.3)
LYMPHOCYTES # BLD AUTO: 6.5 % — LOW (ref 13–44)
MAGNESIUM SERPL-MCNC: 1.9 MG/DL — SIGNIFICANT CHANGE UP (ref 1.6–2.6)
MCHC RBC-ENTMCNC: 26.1 PG — LOW (ref 27–34)
MCHC RBC-ENTMCNC: 31.1 GM/DL — LOW (ref 32–36)
MCV RBC AUTO: 84.1 FL — SIGNIFICANT CHANGE UP (ref 80–100)
MONOCYTES # BLD AUTO: 0.39 K/UL — SIGNIFICANT CHANGE UP (ref 0–0.9)
MONOCYTES NFR BLD AUTO: 5.4 % — SIGNIFICANT CHANGE UP (ref 2–14)
NEUTROPHILS # BLD AUTO: 5.75 K/UL — SIGNIFICANT CHANGE UP (ref 1.8–7.4)
NEUTROPHILS NFR BLD AUTO: 79 % — HIGH (ref 43–77)
NITRITE UR-MCNC: NEGATIVE — SIGNIFICANT CHANGE UP
NRBC # BLD: 0 /100 WBCS — SIGNIFICANT CHANGE UP (ref 0–0)
PH UR: 6 — SIGNIFICANT CHANGE UP (ref 5–8)
PHOSPHATE SERPL-MCNC: 3.1 MG/DL — SIGNIFICANT CHANGE UP (ref 2.5–4.5)
PLATELET # BLD AUTO: 150 K/UL — SIGNIFICANT CHANGE UP (ref 150–400)
POTASSIUM SERPL-MCNC: 4.3 MMOL/L — SIGNIFICANT CHANGE UP (ref 3.5–5.3)
POTASSIUM SERPL-SCNC: 4.3 MMOL/L — SIGNIFICANT CHANGE UP (ref 3.5–5.3)
PROCALCITONIN SERPL-MCNC: 0.36 NG/ML — HIGH (ref 0.02–0.1)
PROT SERPL-MCNC: 6.1 G/DL — SIGNIFICANT CHANGE UP (ref 6–8.3)
PROT UR-MCNC: SIGNIFICANT CHANGE UP MG/DL
PROTHROM AB SERPL-ACNC: 11.9 SEC — SIGNIFICANT CHANGE UP (ref 9.5–13)
RBC # BLD: 3.71 M/UL — LOW (ref 3.8–5.2)
RBC # FLD: 15.9 % — HIGH (ref 10.3–14.5)
RBC CASTS # UR COMP ASSIST: 37 /HPF — HIGH (ref 0–4)
SARS-COV-2 RNA SPEC QL NAA+PROBE: SIGNIFICANT CHANGE UP
SODIUM SERPL-SCNC: 139 MMOL/L — SIGNIFICANT CHANGE UP (ref 135–145)
SP GR SPEC: 1.02 — SIGNIFICANT CHANGE UP (ref 1–1.03)
SQUAMOUS # UR AUTO: 6 /HPF — HIGH (ref 0–5)
UROBILINOGEN FLD QL: 0.2 MG/DL — SIGNIFICANT CHANGE UP (ref 0.2–1)
WBC # BLD: 7.27 K/UL — SIGNIFICANT CHANGE UP (ref 3.8–10.5)
WBC # FLD AUTO: 7.27 K/UL — SIGNIFICANT CHANGE UP (ref 3.8–10.5)
WBC UR QL: 6 /HPF — HIGH (ref 0–5)

## 2024-08-15 PROCEDURE — 71045 X-RAY EXAM CHEST 1 VIEW: CPT | Mod: 26

## 2024-08-15 PROCEDURE — 93970 EXTREMITY STUDY: CPT | Mod: 26

## 2024-08-15 RX ORDER — ALPRAZOLAM 0.25 MG
0.5 TABLET ORAL AT BEDTIME
Refills: 0 | Status: DISCONTINUED | OUTPATIENT
Start: 2024-08-15 | End: 2024-08-16

## 2024-08-15 RX ORDER — MAGNESIUM OXIDE TAB 400 MG (240 MG ELEMENTAL MG) 400 (240 MG) MG
800 TAB ORAL ONCE
Refills: 0 | Status: COMPLETED | OUTPATIENT
Start: 2024-08-15 | End: 2024-08-15

## 2024-08-15 RX ORDER — LIDOCAINE/BENZALKONIUM/ALCOHOL
2 SOLUTION, NON-ORAL TOPICAL DAILY
Refills: 0 | Status: DISCONTINUED | OUTPATIENT
Start: 2024-08-15 | End: 2024-08-19

## 2024-08-15 RX ADMIN — Medication 2 PATCH: at 12:14

## 2024-08-15 RX ADMIN — Medication 5000 UNIT(S): at 13:30

## 2024-08-15 RX ADMIN — CHLORHEXIDINE GLUCONATE 1 APPLICATION(S): 40 SOLUTION TOPICAL at 05:49

## 2024-08-15 RX ADMIN — ACETAMINOPHEN 650 MILLIGRAM(S): 325 TABLET ORAL at 14:30

## 2024-08-15 RX ADMIN — OXYCODONE HYDROCHLORIDE 10 MILLIGRAM(S): 5 TABLET ORAL at 10:20

## 2024-08-15 RX ADMIN — OXYCODONE HYDROCHLORIDE 10 MILLIGRAM(S): 5 TABLET ORAL at 09:20

## 2024-08-15 RX ADMIN — OXYCODONE HYDROCHLORIDE 10 MILLIGRAM(S): 5 TABLET ORAL at 17:47

## 2024-08-15 RX ADMIN — OXYCODONE HYDROCHLORIDE 10 MILLIGRAM(S): 5 TABLET ORAL at 23:32

## 2024-08-15 RX ADMIN — Medication 5000 UNIT(S): at 05:49

## 2024-08-15 RX ADMIN — FAMOTIDINE 40 MILLIGRAM(S): 10 INJECTION INTRAVENOUS at 22:02

## 2024-08-15 RX ADMIN — OXYCODONE HYDROCHLORIDE 10 MILLIGRAM(S): 5 TABLET ORAL at 03:20

## 2024-08-15 RX ADMIN — Medication 5000 UNIT(S): at 22:03

## 2024-08-15 RX ADMIN — Medication 75 MILLIGRAM(S): at 11:10

## 2024-08-15 RX ADMIN — Medication 0.5 MILLIGRAM(S): at 22:02

## 2024-08-15 RX ADMIN — OXYCODONE HYDROCHLORIDE 10 MILLIGRAM(S): 5 TABLET ORAL at 03:50

## 2024-08-15 RX ADMIN — Medication 40 MILLIGRAM(S): at 17:10

## 2024-08-15 RX ADMIN — OXYCODONE HYDROCHLORIDE 10 MILLIGRAM(S): 5 TABLET ORAL at 18:45

## 2024-08-15 RX ADMIN — Medication 2 TABLET(S): at 22:02

## 2024-08-15 RX ADMIN — Medication 2 PATCH: at 16:11

## 2024-08-15 RX ADMIN — MAGNESIUM OXIDE TAB 400 MG (240 MG ELEMENTAL MG) 800 MILLIGRAM(S): 400 (240 MG) TAB at 05:49

## 2024-08-15 RX ADMIN — Medication 81 MILLIGRAM(S): at 11:10

## 2024-08-15 RX ADMIN — ACETAMINOPHEN 650 MILLIGRAM(S): 325 TABLET ORAL at 22:32

## 2024-08-15 RX ADMIN — Medication 40 MILLIGRAM(S): at 05:49

## 2024-08-15 RX ADMIN — ACETAMINOPHEN 650 MILLIGRAM(S): 325 TABLET ORAL at 13:30

## 2024-08-15 RX ADMIN — ACETAMINOPHEN 650 MILLIGRAM(S): 325 TABLET ORAL at 22:02

## 2024-08-15 RX ADMIN — Medication 2 MILLIGRAM(S): at 11:09

## 2024-08-15 NOTE — OCCUPATIONAL THERAPY INITIAL EVALUATION ADULT - ADDITIONAL COMMENTS
Pt states that she lives alone in apt w/ elevator access. Pt states that she was independent prior to admission. Pt uses a cane for ambulation.

## 2024-08-15 NOTE — OCCUPATIONAL THERAPY INITIAL EVALUATION ADULT - GENERAL OBSERVATIONS, REHAB EVAL
Pt's RN Ginette aware of intent to eval/tx; cleared Pt. Pt received in chair - +telemetry, GERARD pruitt foley. Pt agreeable to OT.

## 2024-08-15 NOTE — OCCUPATIONAL THERAPY INITIAL EVALUATION ADULT - PERTINENT HX OF CURRENT PROBLEM, REHAB EVAL
64F with a PMHx of former smoker, asthma/COPD, anxiety, OA, H. pylori, HTN, HLD, T2DM, severe AS and CAD (s/p SAVR (23mm 3300TFX), ascending aortic replacement (28mm graft) to enlarge aortic root, and 1V CABG (SVG-RPDA) by Dr. Hanson on 5/22/17 followed by subsequently PCI ( DRAKE to prox OM1 [8/2023] and DRAKE to prox LAD [10/2023], pericarditis s/p colchicine (2023), HFpEF, and bioprosthetic aortic valve stenosis and referred to Dr. Kwok for evaluation and deemed a good candidate for Valve in valve TAVR with basilica +/- snorkel stenting.

## 2024-08-16 LAB
ALBUMIN SERPL ELPH-MCNC: 3 G/DL — LOW (ref 3.3–5)
ALP SERPL-CCNC: 30 U/L — LOW (ref 40–120)
ALT FLD-CCNC: 7 U/L — LOW (ref 10–45)
ANION GAP SERPL CALC-SCNC: 8 MMOL/L — SIGNIFICANT CHANGE UP (ref 5–17)
APTT BLD: 29.9 SEC — SIGNIFICANT CHANGE UP (ref 24.5–35.6)
AST SERPL-CCNC: 14 U/L — SIGNIFICANT CHANGE UP (ref 10–40)
BASOPHILS # BLD AUTO: 0.01 K/UL — SIGNIFICANT CHANGE UP (ref 0–0.2)
BASOPHILS NFR BLD AUTO: 0.2 % — SIGNIFICANT CHANGE UP (ref 0–2)
BILIRUB SERPL-MCNC: 0.3 MG/DL — SIGNIFICANT CHANGE UP (ref 0.2–1.2)
BUN SERPL-MCNC: 7 MG/DL — SIGNIFICANT CHANGE UP (ref 7–23)
CALCIUM SERPL-MCNC: 8.5 MG/DL — SIGNIFICANT CHANGE UP (ref 8.4–10.5)
CHLORIDE SERPL-SCNC: 106 MMOL/L — SIGNIFICANT CHANGE UP (ref 96–108)
CO2 SERPL-SCNC: 25 MMOL/L — SIGNIFICANT CHANGE UP (ref 22–31)
CREAT SERPL-MCNC: 0.62 MG/DL — SIGNIFICANT CHANGE UP (ref 0.5–1.3)
CULTURE RESULTS: SIGNIFICANT CHANGE UP
EGFR: 98 ML/MIN/1.73M2 — SIGNIFICANT CHANGE UP
EOSINOPHIL # BLD AUTO: 0.61 K/UL — HIGH (ref 0–0.5)
EOSINOPHIL NFR BLD AUTO: 12.6 % — HIGH (ref 0–6)
GLUCOSE BLDC GLUCOMTR-MCNC: 111 MG/DL — HIGH (ref 70–99)
GLUCOSE BLDC GLUCOMTR-MCNC: 112 MG/DL — HIGH (ref 70–99)
GLUCOSE BLDC GLUCOMTR-MCNC: 114 MG/DL — HIGH (ref 70–99)
GLUCOSE BLDC GLUCOMTR-MCNC: 131 MG/DL — HIGH (ref 70–99)
GLUCOSE SERPL-MCNC: 107 MG/DL — HIGH (ref 70–99)
HCT VFR BLD CALC: 28.7 % — LOW (ref 34.5–45)
HCT VFR BLD CALC: 29.9 % — LOW (ref 34.5–45)
HGB BLD-MCNC: 8.9 G/DL — LOW (ref 11.5–15.5)
HGB BLD-MCNC: 9.2 G/DL — LOW (ref 11.5–15.5)
IMM GRANULOCYTES NFR BLD AUTO: 1.4 % — HIGH (ref 0–0.9)
INR BLD: 0.94 — SIGNIFICANT CHANGE UP (ref 0.85–1.18)
LYMPHOCYTES # BLD AUTO: 0.89 K/UL — LOW (ref 1–3.3)
LYMPHOCYTES # BLD AUTO: 18.3 % — SIGNIFICANT CHANGE UP (ref 13–44)
MAGNESIUM SERPL-MCNC: 1.8 MG/DL — SIGNIFICANT CHANGE UP (ref 1.6–2.6)
MCHC RBC-ENTMCNC: 25.9 PG — LOW (ref 27–34)
MCHC RBC-ENTMCNC: 26.3 PG — LOW (ref 27–34)
MCHC RBC-ENTMCNC: 30.8 GM/DL — LOW (ref 32–36)
MCHC RBC-ENTMCNC: 31 GM/DL — LOW (ref 32–36)
MCV RBC AUTO: 84.2 FL — SIGNIFICANT CHANGE UP (ref 80–100)
MCV RBC AUTO: 84.7 FL — SIGNIFICANT CHANGE UP (ref 80–100)
MONOCYTES # BLD AUTO: 0.42 K/UL — SIGNIFICANT CHANGE UP (ref 0–0.9)
MONOCYTES NFR BLD AUTO: 8.6 % — SIGNIFICANT CHANGE UP (ref 2–14)
NEUTROPHILS # BLD AUTO: 2.86 K/UL — SIGNIFICANT CHANGE UP (ref 1.8–7.4)
NEUTROPHILS NFR BLD AUTO: 58.9 % — SIGNIFICANT CHANGE UP (ref 43–77)
NRBC # BLD: 0 /100 WBCS — SIGNIFICANT CHANGE UP (ref 0–0)
NRBC # BLD: 0 /100 WBCS — SIGNIFICANT CHANGE UP (ref 0–0)
PHOSPHATE SERPL-MCNC: 3.3 MG/DL — SIGNIFICANT CHANGE UP (ref 2.5–4.5)
PLATELET # BLD AUTO: 122 K/UL — LOW (ref 150–400)
PLATELET # BLD AUTO: 136 K/UL — LOW (ref 150–400)
POTASSIUM SERPL-MCNC: 4.2 MMOL/L — SIGNIFICANT CHANGE UP (ref 3.5–5.3)
POTASSIUM SERPL-SCNC: 4.2 MMOL/L — SIGNIFICANT CHANGE UP (ref 3.5–5.3)
PROT SERPL-MCNC: 5.6 G/DL — LOW (ref 6–8.3)
PROTHROM AB SERPL-ACNC: 10.7 SEC — SIGNIFICANT CHANGE UP (ref 9.5–13)
RBC # BLD: 3.39 M/UL — LOW (ref 3.8–5.2)
RBC # BLD: 3.55 M/UL — LOW (ref 3.8–5.2)
RBC # FLD: 15.8 % — HIGH (ref 10.3–14.5)
RBC # FLD: 16 % — HIGH (ref 10.3–14.5)
SODIUM SERPL-SCNC: 139 MMOL/L — SIGNIFICANT CHANGE UP (ref 135–145)
SPECIMEN SOURCE: SIGNIFICANT CHANGE UP
WBC # BLD: 4.86 K/UL — SIGNIFICANT CHANGE UP (ref 3.8–10.5)
WBC # BLD: 6 K/UL — SIGNIFICANT CHANGE UP (ref 3.8–10.5)
WBC # FLD AUTO: 4.86 K/UL — SIGNIFICANT CHANGE UP (ref 3.8–10.5)
WBC # FLD AUTO: 6 K/UL — SIGNIFICANT CHANGE UP (ref 3.8–10.5)

## 2024-08-16 PROCEDURE — 99232 SBSQ HOSP IP/OBS MODERATE 35: CPT

## 2024-08-16 PROCEDURE — 71045 X-RAY EXAM CHEST 1 VIEW: CPT | Mod: 26

## 2024-08-16 RX ORDER — POLYETHYLENE GLYCOL 3350 17 G/17G
17 POWDER, FOR SOLUTION ORAL DAILY
Refills: 0 | Status: DISCONTINUED | OUTPATIENT
Start: 2024-08-16 | End: 2024-08-19

## 2024-08-16 RX ORDER — SODIUM CHLORIDE 9 MG/ML
3 INJECTION INTRAMUSCULAR; INTRAVENOUS; SUBCUTANEOUS EVERY 8 HOURS
Refills: 0 | Status: DISCONTINUED | OUTPATIENT
Start: 2024-08-16 | End: 2024-08-19

## 2024-08-16 RX ADMIN — Medication 75 MILLIGRAM(S): at 11:14

## 2024-08-16 RX ADMIN — Medication 40 MILLIGRAM(S): at 21:11

## 2024-08-16 RX ADMIN — OXYCODONE HYDROCHLORIDE 10 MILLIGRAM(S): 5 TABLET ORAL at 05:44

## 2024-08-16 RX ADMIN — Medication 81 MILLIGRAM(S): at 11:14

## 2024-08-16 RX ADMIN — OXYCODONE HYDROCHLORIDE 10 MILLIGRAM(S): 5 TABLET ORAL at 00:02

## 2024-08-16 RX ADMIN — Medication 5000 UNIT(S): at 13:32

## 2024-08-16 RX ADMIN — OXYCODONE HYDROCHLORIDE 5 MILLIGRAM(S): 5 TABLET ORAL at 14:30

## 2024-08-16 RX ADMIN — POLYETHYLENE GLYCOL 3350 17 GRAM(S): 17 POWDER, FOR SOLUTION ORAL at 11:14

## 2024-08-16 RX ADMIN — Medication 5000 UNIT(S): at 05:45

## 2024-08-16 RX ADMIN — Medication 2 PATCH: at 19:56

## 2024-08-16 RX ADMIN — Medication 2 PATCH: at 11:15

## 2024-08-16 RX ADMIN — ACETAMINOPHEN 650 MILLIGRAM(S): 325 TABLET ORAL at 12:30

## 2024-08-16 RX ADMIN — Medication 40 MILLIGRAM(S): at 17:12

## 2024-08-16 RX ADMIN — OXYCODONE HYDROCHLORIDE 5 MILLIGRAM(S): 5 TABLET ORAL at 20:42

## 2024-08-16 RX ADMIN — SODIUM CHLORIDE 3 MILLILITER(S): 9 INJECTION INTRAMUSCULAR; INTRAVENOUS; SUBCUTANEOUS at 13:23

## 2024-08-16 RX ADMIN — ACETAMINOPHEN 650 MILLIGRAM(S): 325 TABLET ORAL at 11:14

## 2024-08-16 RX ADMIN — OXYCODONE HYDROCHLORIDE 5 MILLIGRAM(S): 5 TABLET ORAL at 19:48

## 2024-08-16 RX ADMIN — OXYCODONE HYDROCHLORIDE 5 MILLIGRAM(S): 5 TABLET ORAL at 13:31

## 2024-08-16 RX ADMIN — Medication 2 MILLIGRAM(S): at 11:14

## 2024-08-16 RX ADMIN — CHLORHEXIDINE GLUCONATE 1 APPLICATION(S): 40 SOLUTION TOPICAL at 05:49

## 2024-08-16 RX ADMIN — Medication 5000 UNIT(S): at 21:11

## 2024-08-16 RX ADMIN — Medication 40 MILLIGRAM(S): at 05:45

## 2024-08-16 RX ADMIN — OXYCODONE HYDROCHLORIDE 10 MILLIGRAM(S): 5 TABLET ORAL at 06:14

## 2024-08-16 RX ADMIN — Medication 2 TABLET(S): at 21:11

## 2024-08-16 RX ADMIN — SODIUM CHLORIDE 3 MILLILITER(S): 9 INJECTION INTRAMUSCULAR; INTRAVENOUS; SUBCUTANEOUS at 21:18

## 2024-08-16 NOTE — DIETITIAN INITIAL EVALUATION ADULT - OTHER CALCULATIONS
pounds, %%  IBW for EER as %IBW>100% in ICU setting. Needs adjusted for age, post-op healing. Fluids per team in view of CHF.

## 2024-08-16 NOTE — DIETITIAN INITIAL EVALUATION ADULT - PERTINENT LABORATORY DATA
08-16    139  |  106  |  7   ----------------------------<  107<H>  4.2   |  25  |  0.62    Ca    8.5      16 Aug 2024 03:18  Phos  3.3     08-16  Mg     1.8     08-16    TPro  5.6<L>  /  Alb  3.0<L>  /  TBili  0.3  /  DBili  x   /  AST  14  /  ALT  7<L>  /  AlkPhos  30<L>  08-16  POCT Blood Glucose.: 114 mg/dL (08-16-24 @ 06:51)  A1C with Estimated Average Glucose Result: 5.4 % (08-13-24 @ 06:01)  A1C with Estimated Average Glucose Result: 6.7 % (03-06-24 @ 05:30)  A1C with Estimated Average Glucose Result: 6.5 % (12-29-23 @ 07:15)

## 2024-08-16 NOTE — DIETITIAN INITIAL EVALUATION ADULT - PERSON TAUGHT/METHOD
Encouraged adequate PO intake with emphasis on protein for post-op healing/verbal instruction/patient instructed

## 2024-08-16 NOTE — DIETITIAN INITIAL EVALUATION ADULT - OTHER INFO
64F with a PMHx of former smoker, asthma/COPD, anxiety, OA, H. pylori, HTN, HLD, T2DM, severe AS and CAD (s/p SAVR (23mm 3300TFX), ascending aortic replacement (28mm graft) to enlarge aortic root, and 1V CABG (SVG-RPDA) by Dr. Hanson on 5/22/17 followed by subsequently PCI ( DRAKE to prox OM1 [8/2023] and DRAKE to prox LAD [10/2023], pericarditis s/p colchicine (2023), HFpEF, and bioprosthetic aortic valve stenosis and referred to Dr. Kwok for evaluation and deemed a good candidate for Valve in valve TAVR with basilica +/- snorkel stenting. On arrival Denies any chest pain, palpitations, orthopnea, wheezing, abd pain, nausea, vomiting, constipation, lightheadedness, headaches, fevers, or chills. S/p Cheng TAVR 8/13    Patient seen on 9EA for nutrition assessment. Received upright in chair on room air, MAP 75, /54, no drips at present. Labs and medications reviewed. Electrolytes WDL, POC glucose 112-130 x 24 hours, HgbA1c 5.4%. Ordered for insulin sliding scale, miralax, senna, protonix. No pressure injuries or edema documented. No report of GI distress, last BM PTA?- on bowel regimen. Current diet order: DASH/TLC. Confirms NKFA. No difficulty chewing/swallowing reported. Reports good appetite, consumed 100% of breakfast which consisted of bagel with cream cheese, grapes, lemon ice. PTA, patient endorses good appetite at baseline. Dosing weight: 170 pounds, BMI 30.1, reports UBW of 180 pounds and endorses intentional weight loss after starting Ozempic x 3 months ago. -10 pounds/6% x 3 months, not clinically significant. Observed with no overt signs and symptoms of muscle or fat wasting. Based on ASPEN guidelines, pt does not meet criteria for malnutrition. Nutrition education provided. Encouraged adequate PO intake with emphasis on protein for post-op healing, reviewed heart diet recommendations. Pt verbalized appreciation and understanding. See nutrition recommendations below.

## 2024-08-16 NOTE — PROGRESS NOTE ADULT - SUBJECTIVE AND OBJECTIVE BOX
Patient came over from ICU     Operation / Date: Cheng TAVR (Evolut 26mm), Basilica to L biopros cusp, Snorkel stent to pRCA (for coronary protetion) EF normal    SUBJECTIVE ASSESSMENT:  64y Female seen and assessed at bedside after coming over from the ICU. Patient states that she is having pain in her body from her siatica but otherwise has no CP or SOB         Vital Signs Last 24 Hrs  T(C): 36.5 (16 Aug 2024 09:10), Max: 36.7 (16 Aug 2024 05:13)  T(F): 97.7 (16 Aug 2024 09:10), Max: 98.1 (16 Aug 2024 05:13)  HR: 93 (16 Aug 2024 10:05) (72 - 100)  BP: 109/54 (16 Aug 2024 10:05) (83/53 - 110/59)  BP(mean): 75 (16 Aug 2024 10:05) (63 - 78)  RR: 18 (16 Aug 2024 10:05) (15 - 20)  SpO2: 98% (16 Aug 2024 10:05) (92% - 98%)    Parameters below as of 16 Aug 2024 10:05  Patient On (Oxygen Delivery Method): room air      I&O's Detail    15 Aug 2024 07:01  -  16 Aug 2024 07:00  --------------------------------------------------------  IN:    Oral Fluid: 580 mL  Total IN: 580 mL    OUT:    Voided (mL): 1700 mL  Total OUT: 1700 mL    Total NET: -1120 mL      16 Aug 2024 07:01  -  16 Aug 2024 12:28  --------------------------------------------------------  IN:  Total IN: 0 mL    OUT:    Voided (mL): 200 mL  Total OUT: 200 mL    Total NET: -200 mL      PHYSICAL EXAM:  General: NAD  Neurological: AOx3. Motor skills grossly intact  Cardiovascular: Normal S1/S2. Regular rate/rhythm. No murmurs  Respiratory: Lungs CTA bilaterally. No wheezing or rales  Gastrointestinal: +BS in all 4 quadrants. Non-distended. Soft. Non-tender  Extremities: Strength 5/5 b/l upper/lower extremities. Sensation grossly intact upper/lower extremities. mild b/l LE edema. No calf tenderness.  Vascular: Radial 2+bilaterally, DP 2+ b/l  Incision Sites: groin sites are c/d/i, soft. R sided area of ecchymosis, marked with pen and does not appear to be enlarging         LABS:                        8.9    4.86  )-----------( 122      ( 16 Aug 2024 03:18 )             28.7     PT/INR - ( 16 Aug 2024 03:18 )   PT: 10.7 sec;   INR: 0.94          PTT - ( 16 Aug 2024 03:18 )  PTT:29.9 sec    08-16    139  |  106  |  7   ----------------------------<  107<H>  4.2   |  25  |  0.62    Ca    8.5      16 Aug 2024 03:18  Phos  3.3     08-16  Mg     1.8     08-16    TPro  5.6<L>  /  Alb  3.0<L>  /  TBili  0.3  /  DBili  x   /  AST  14  /  ALT  7<L>  /  AlkPhos  30<L>  08-16      Urinalysis Basic - ( 16 Aug 2024 03:18 )    Color: x / Appearance: x / SG: x / pH: x  Gluc: 107 mg/dL / Ketone: x  / Bili: x / Urobili: x   Blood: x / Protein: x / Nitrite: x   Leuk Esterase: x / RBC: x / WBC x   Sq Epi: x / Non Sq Epi: x / Bacteria: x        MEDICATIONS  (STANDING):  acetaminophen   IVPB .. 1000 milliGRAM(s) IV Intermittent once  aspirin enteric coated 81 milliGRAM(s) Oral daily  chlorhexidine 2% Cloths 1 Application(s) Topical <User Schedule>  clopidogrel Tablet 75 milliGRAM(s) Oral daily  dextrose 5%. 1000 milliLiter(s) (50 mL/Hr) IV Continuous <Continuous>  dextrose 5%. 1000 milliLiter(s) (100 mL/Hr) IV Continuous <Continuous>  dextrose 50% Injectable 25 Gram(s) IV Push once  dextrose 50% Injectable 12.5 Gram(s) IV Push once  dextrose 50% Injectable 25 Gram(s) IV Push once  diazepam    Tablet 2 milliGRAM(s) Oral daily  famotidine    Tablet 40 milliGRAM(s) Oral at bedtime  glucagon  Injectable 1 milliGRAM(s) IntraMuscular once  heparin   Injectable 5000 Unit(s) SubCutaneous every 8 hours  insulin lispro (ADMELOG) corrective regimen sliding scale   SubCutaneous Before meals and at bedtime  lidocaine   4% Patch 2 Patch Transdermal daily  pantoprazole    Tablet 40 milliGRAM(s) Oral every 12 hours  senna 2 Tablet(s) Oral at bedtime  sodium chloride 0.9% lock flush 3 milliLiter(s) IV Push every 8 hours    MEDICATIONS  (PRN):  acetaminophen     Tablet .. 650 milliGRAM(s) Oral every 6 hours PRN Mild Pain (1 - 3)  acetaminophen   IVPB .. 1000 milliGRAM(s) IV Intermittent once PRN Mild Pain (1 - 3)  dextrose Oral Gel 15 Gram(s) Oral once PRN Blood Glucose LESS THAN 70 milliGRAM(s)/deciliter  oxyCODONE    IR 5 milliGRAM(s) Oral every 6 hours PRN Moderate Pain (4 - 6)  polyethylene glycol 3350 17 Gram(s) Oral daily PRN Constipation        RADIOLOGY & ADDITIONAL TESTS:

## 2024-08-16 NOTE — DIETITIAN INITIAL EVALUATION ADULT - PERTINENT MEDS FT
MEDICATIONS  (STANDING):  acetaminophen   IVPB .. 1000 milliGRAM(s) IV Intermittent once  aspirin enteric coated 81 milliGRAM(s) Oral daily  chlorhexidine 2% Cloths 1 Application(s) Topical <User Schedule>  clopidogrel Tablet 75 milliGRAM(s) Oral daily  dextrose 5%. 1000 milliLiter(s) (50 mL/Hr) IV Continuous <Continuous>  dextrose 5%. 1000 milliLiter(s) (100 mL/Hr) IV Continuous <Continuous>  dextrose 50% Injectable 25 Gram(s) IV Push once  dextrose 50% Injectable 12.5 Gram(s) IV Push once  dextrose 50% Injectable 25 Gram(s) IV Push once  diazepam    Tablet 2 milliGRAM(s) Oral daily  famotidine    Tablet 40 milliGRAM(s) Oral at bedtime  glucagon  Injectable 1 milliGRAM(s) IntraMuscular once  heparin   Injectable 5000 Unit(s) SubCutaneous every 8 hours  insulin lispro (ADMELOG) corrective regimen sliding scale   SubCutaneous Before meals and at bedtime  lidocaine   4% Patch 2 Patch Transdermal daily  pantoprazole    Tablet 40 milliGRAM(s) Oral every 12 hours  senna 2 Tablet(s) Oral at bedtime  sodium chloride 0.9% lock flush 3 milliLiter(s) IV Push every 8 hours    MEDICATIONS  (PRN):  acetaminophen     Tablet .. 650 milliGRAM(s) Oral every 6 hours PRN Mild Pain (1 - 3)  acetaminophen   IVPB .. 1000 milliGRAM(s) IV Intermittent once PRN Mild Pain (1 - 3)  dextrose Oral Gel 15 Gram(s) Oral once PRN Blood Glucose LESS THAN 70 milliGRAM(s)/deciliter  oxyCODONE    IR 5 milliGRAM(s) Oral every 6 hours PRN Moderate Pain (4 - 6)  polyethylene glycol 3350 17 Gram(s) Oral daily PRN Constipation

## 2024-08-16 NOTE — DIETITIAN INITIAL EVALUATION ADULT - ADD RECOMMEND
1. Continue DASH/TLC diet   2. Encourage and monitor PO intake, honor preferences as able   3. Monitor labs, wt trends, GI function, and skin integrity   4. Pain and bowel regimen per team   5. RD to remain available for additional nutrition interventions and diet edu as needed

## 2024-08-17 LAB
ALBUMIN SERPL ELPH-MCNC: 3.2 G/DL — LOW (ref 3.3–5)
ALP SERPL-CCNC: 34 U/L — LOW (ref 40–120)
ALT FLD-CCNC: 9 U/L — LOW (ref 10–45)
ANION GAP SERPL CALC-SCNC: 8 MMOL/L — SIGNIFICANT CHANGE UP (ref 5–17)
APTT BLD: 28.4 SEC — SIGNIFICANT CHANGE UP (ref 24.5–35.6)
AST SERPL-CCNC: 15 U/L — SIGNIFICANT CHANGE UP (ref 10–40)
BASOPHILS # BLD AUTO: 0.02 K/UL — SIGNIFICANT CHANGE UP (ref 0–0.2)
BASOPHILS NFR BLD AUTO: 0.4 % — SIGNIFICANT CHANGE UP (ref 0–2)
BILIRUB SERPL-MCNC: 0.4 MG/DL — SIGNIFICANT CHANGE UP (ref 0.2–1.2)
BUN SERPL-MCNC: 10 MG/DL — SIGNIFICANT CHANGE UP (ref 7–23)
CALCIUM SERPL-MCNC: 8.9 MG/DL — SIGNIFICANT CHANGE UP (ref 8.4–10.5)
CHLORIDE SERPL-SCNC: 104 MMOL/L — SIGNIFICANT CHANGE UP (ref 96–108)
CO2 SERPL-SCNC: 26 MMOL/L — SIGNIFICANT CHANGE UP (ref 22–31)
CREAT SERPL-MCNC: 0.69 MG/DL — SIGNIFICANT CHANGE UP (ref 0.5–1.3)
EGFR: 96 ML/MIN/1.73M2 — SIGNIFICANT CHANGE UP
EOSINOPHIL # BLD AUTO: 0.64 K/UL — HIGH (ref 0–0.5)
EOSINOPHIL NFR BLD AUTO: 12.1 % — HIGH (ref 0–6)
GLUCOSE BLDC GLUCOMTR-MCNC: 109 MG/DL — HIGH (ref 70–99)
GLUCOSE BLDC GLUCOMTR-MCNC: 124 MG/DL — HIGH (ref 70–99)
GLUCOSE BLDC GLUCOMTR-MCNC: 140 MG/DL — HIGH (ref 70–99)
GLUCOSE BLDC GLUCOMTR-MCNC: 155 MG/DL — HIGH (ref 70–99)
GLUCOSE SERPL-MCNC: 102 MG/DL — HIGH (ref 70–99)
HCT VFR BLD CALC: 31.1 % — LOW (ref 34.5–45)
HGB BLD-MCNC: 9.2 G/DL — LOW (ref 11.5–15.5)
IMM GRANULOCYTES NFR BLD AUTO: 1.5 % — HIGH (ref 0–0.9)
INR BLD: 0.95 — SIGNIFICANT CHANGE UP (ref 0.85–1.18)
LYMPHOCYTES # BLD AUTO: 1.37 K/UL — SIGNIFICANT CHANGE UP (ref 1–3.3)
LYMPHOCYTES # BLD AUTO: 25.9 % — SIGNIFICANT CHANGE UP (ref 13–44)
MAGNESIUM SERPL-MCNC: 1.9 MG/DL — SIGNIFICANT CHANGE UP (ref 1.6–2.6)
MCHC RBC-ENTMCNC: 25.8 PG — LOW (ref 27–34)
MCHC RBC-ENTMCNC: 29.6 GM/DL — LOW (ref 32–36)
MCV RBC AUTO: 87.4 FL — SIGNIFICANT CHANGE UP (ref 80–100)
MONOCYTES # BLD AUTO: 0.54 K/UL — SIGNIFICANT CHANGE UP (ref 0–0.9)
MONOCYTES NFR BLD AUTO: 10.2 % — SIGNIFICANT CHANGE UP (ref 2–14)
NEUTROPHILS # BLD AUTO: 2.64 K/UL — SIGNIFICANT CHANGE UP (ref 1.8–7.4)
NEUTROPHILS NFR BLD AUTO: 49.9 % — SIGNIFICANT CHANGE UP (ref 43–77)
NRBC # BLD: 0 /100 WBCS — SIGNIFICANT CHANGE UP (ref 0–0)
PHOSPHATE SERPL-MCNC: 3.4 MG/DL — SIGNIFICANT CHANGE UP (ref 2.5–4.5)
PLATELET # BLD AUTO: 162 K/UL — SIGNIFICANT CHANGE UP (ref 150–400)
POTASSIUM SERPL-MCNC: 4.2 MMOL/L — SIGNIFICANT CHANGE UP (ref 3.5–5.3)
POTASSIUM SERPL-SCNC: 4.2 MMOL/L — SIGNIFICANT CHANGE UP (ref 3.5–5.3)
PROT SERPL-MCNC: 6.1 G/DL — SIGNIFICANT CHANGE UP (ref 6–8.3)
PROTHROM AB SERPL-ACNC: 10.9 SEC — SIGNIFICANT CHANGE UP (ref 9.5–13)
RBC # BLD: 3.56 M/UL — LOW (ref 3.8–5.2)
RBC # FLD: 16.1 % — HIGH (ref 10.3–14.5)
SODIUM SERPL-SCNC: 138 MMOL/L — SIGNIFICANT CHANGE UP (ref 135–145)
WBC # BLD: 5.29 K/UL — SIGNIFICANT CHANGE UP (ref 3.8–10.5)
WBC # FLD AUTO: 5.29 K/UL — SIGNIFICANT CHANGE UP (ref 3.8–10.5)

## 2024-08-17 PROCEDURE — 71045 X-RAY EXAM CHEST 1 VIEW: CPT | Mod: 26

## 2024-08-17 RX ORDER — IPRATROPIUM BROMIDE AND ALBUTEROL SULFATE .5; 3 MG/3ML; MG/3ML
3 SOLUTION RESPIRATORY (INHALATION) ONCE
Refills: 0 | Status: COMPLETED | OUTPATIENT
Start: 2024-08-17 | End: 2024-08-17

## 2024-08-17 RX ORDER — IPRATROPIUM BROMIDE AND ALBUTEROL SULFATE .5; 3 MG/3ML; MG/3ML
3 SOLUTION RESPIRATORY (INHALATION) EVERY 6 HOURS
Refills: 0 | Status: DISCONTINUED | OUTPATIENT
Start: 2024-08-17 | End: 2024-08-19

## 2024-08-17 RX ADMIN — SODIUM CHLORIDE 3 MILLILITER(S): 9 INJECTION INTRAMUSCULAR; INTRAVENOUS; SUBCUTANEOUS at 06:05

## 2024-08-17 RX ADMIN — Medication 2: at 16:55

## 2024-08-17 RX ADMIN — Medication 40 MILLIGRAM(S): at 06:13

## 2024-08-17 RX ADMIN — Medication 10 MILLIGRAM(S): at 12:17

## 2024-08-17 RX ADMIN — SODIUM CHLORIDE 3 MILLILITER(S): 9 INJECTION INTRAMUSCULAR; INTRAVENOUS; SUBCUTANEOUS at 21:36

## 2024-08-17 RX ADMIN — ACETAMINOPHEN 650 MILLIGRAM(S): 325 TABLET ORAL at 12:57

## 2024-08-17 RX ADMIN — Medication 40 MILLIGRAM(S): at 21:22

## 2024-08-17 RX ADMIN — OXYCODONE HYDROCHLORIDE 5 MILLIGRAM(S): 5 TABLET ORAL at 18:46

## 2024-08-17 RX ADMIN — Medication 75 MILLIGRAM(S): at 12:17

## 2024-08-17 RX ADMIN — Medication 5000 UNIT(S): at 06:13

## 2024-08-17 RX ADMIN — OXYCODONE HYDROCHLORIDE 5 MILLIGRAM(S): 5 TABLET ORAL at 09:30

## 2024-08-17 RX ADMIN — Medication 81 MILLIGRAM(S): at 12:17

## 2024-08-17 RX ADMIN — IPRATROPIUM BROMIDE AND ALBUTEROL SULFATE 3 MILLILITER(S): .5; 3 SOLUTION RESPIRATORY (INHALATION) at 16:25

## 2024-08-17 RX ADMIN — Medication 2 TABLET(S): at 21:22

## 2024-08-17 RX ADMIN — Medication 2 PATCH: at 19:47

## 2024-08-17 RX ADMIN — IPRATROPIUM BROMIDE AND ALBUTEROL SULFATE 3 MILLILITER(S): .5; 3 SOLUTION RESPIRATORY (INHALATION) at 07:17

## 2024-08-17 RX ADMIN — OXYCODONE HYDROCHLORIDE 5 MILLIGRAM(S): 5 TABLET ORAL at 08:36

## 2024-08-17 RX ADMIN — CHLORHEXIDINE GLUCONATE 1 APPLICATION(S): 40 SOLUTION TOPICAL at 06:22

## 2024-08-17 RX ADMIN — POLYETHYLENE GLYCOL 3350 17 GRAM(S): 17 POWDER, FOR SOLUTION ORAL at 12:15

## 2024-08-17 RX ADMIN — ACETAMINOPHEN 650 MILLIGRAM(S): 325 TABLET ORAL at 12:16

## 2024-08-17 RX ADMIN — Medication 40 MILLIGRAM(S): at 18:46

## 2024-08-17 RX ADMIN — Medication 2 MILLIGRAM(S): at 12:25

## 2024-08-17 RX ADMIN — Medication 2 PATCH: at 12:16

## 2024-08-17 RX ADMIN — Medication 5000 UNIT(S): at 21:22

## 2024-08-17 RX ADMIN — OXYCODONE HYDROCHLORIDE 5 MILLIGRAM(S): 5 TABLET ORAL at 19:30

## 2024-08-17 RX ADMIN — Medication 5000 UNIT(S): at 15:04

## 2024-08-17 RX ADMIN — Medication 2 PATCH: at 00:35

## 2024-08-17 NOTE — PROGRESS NOTE ADULT - SUBJECTIVE AND OBJECTIVE BOX
Patient discussed on morning rounds with Dr. Brito / Paco    Operation / Date: Cheng TAVR (Evolut 26mm), Basilica to L biopros cusp, Snorkel stent to pRCA (for coronary protetion) EF normal    SUBJECTIVE ASSESSMENT:  64y Female       Vital Signs Last 24 Hrs  T(C): 36 (17 Aug 2024 05:01), Max: 37.3 (16 Aug 2024 21:46)  T(F): 96.8 (17 Aug 2024 05:01), Max: 99.2 (16 Aug 2024 21:46)  HR: 72 (17 Aug 2024 05:00) (72 - 98)  BP: 97/57 (17 Aug 2024 05:00) (90/61 - 109/54)  BP(mean): 71 (17 Aug 2024 05:00) (64 - 77)  RR: 16 (17 Aug 2024 05:00) (15 - 18)  SpO2: 97% (17 Aug 2024 05:00) (97% - 99%)    Parameters below as of 17 Aug 2024 05:00  Patient On (Oxygen Delivery Method): room air      I&O's Detail    16 Aug 2024 07:01  -  17 Aug 2024 07:00  --------------------------------------------------------  IN:  Total IN: 0 mL    OUT:    Voided (mL): 200 mL  Total OUT: 200 mL    Total NET: -200 mL          CHEST TUBE:  Yes/No. AIR LEAKS: Yes/No. Suction / H2O SEAL.   ROXANE DRAIN:  Yes/No.  EPICARDIAL WIRES: Yes/No.  TIE DOWNS: Yes/No.  HACKETT: Yes/No.    PHYSICAL EXAM:    General:     Neurological:    Cardiovascular:    Respiratory:    Gastrointestinal:    Extremities:    Vascular:    Incision Sites:    LABS:                        9.2    5.29  )-----------( 162      ( 17 Aug 2024 05:30 )             31.1       COUMADIN:  Yes/No. REASON: .    PT/INR - ( 17 Aug 2024 05:30 )   PT: 10.9 sec;   INR: 0.95          PTT - ( 17 Aug 2024 05:30 )  PTT:28.4 sec    08-17    138  |  104  |  10  ----------------------------<  102<H>  4.2   |  26  |  0.69    Ca    8.9      17 Aug 2024 05:30  Phos  3.4     08-17  Mg     1.9     08-17    TPro  6.1  /  Alb  3.2<L>  /  TBili  0.4  /  DBili  x   /  AST  15  /  ALT  9<L>  /  AlkPhos  34<L>  08-17      Urinalysis Basic - ( 17 Aug 2024 05:30 )    Color: x / Appearance: x / SG: x / pH: x  Gluc: 102 mg/dL / Ketone: x  / Bili: x / Urobili: x   Blood: x / Protein: x / Nitrite: x   Leuk Esterase: x / RBC: x / WBC x   Sq Epi: x / Non Sq Epi: x / Bacteria: x      MEDICATIONS  (STANDING):  acetaminophen   IVPB .. 1000 milliGRAM(s) IV Intermittent once  aspirin enteric coated 81 milliGRAM(s) Oral daily  atorvastatin 40 milliGRAM(s) Oral at bedtime  chlorhexidine 2% Cloths 1 Application(s) Topical <User Schedule>  clopidogrel Tablet 75 milliGRAM(s) Oral daily  dextrose 5%. 1000 milliLiter(s) (50 mL/Hr) IV Continuous <Continuous>  dextrose 5%. 1000 milliLiter(s) (100 mL/Hr) IV Continuous <Continuous>  dextrose 50% Injectable 25 Gram(s) IV Push once  dextrose 50% Injectable 12.5 Gram(s) IV Push once  dextrose 50% Injectable 25 Gram(s) IV Push once  diazepam    Tablet 2 milliGRAM(s) Oral daily  glucagon  Injectable 1 milliGRAM(s) IntraMuscular once  heparin   Injectable 5000 Unit(s) SubCutaneous every 8 hours  insulin lispro (ADMELOG) corrective regimen sliding scale   SubCutaneous Before meals and at bedtime  lidocaine   4% Patch 2 Patch Transdermal daily  pantoprazole    Tablet 40 milliGRAM(s) Oral every 12 hours  senna 2 Tablet(s) Oral at bedtime  sodium chloride 0.9% lock flush 3 milliLiter(s) IV Push every 8 hours    MEDICATIONS  (PRN):  acetaminophen     Tablet .. 650 milliGRAM(s) Oral every 6 hours PRN Mild Pain (1 - 3)  acetaminophen   IVPB .. 1000 milliGRAM(s) IV Intermittent once PRN Mild Pain (1 - 3)  dextrose Oral Gel 15 Gram(s) Oral once PRN Blood Glucose LESS THAN 70 milliGRAM(s)/deciliter  oxyCODONE    IR 5 milliGRAM(s) Oral every 6 hours PRN Moderate Pain (4 - 6)  polyethylene glycol 3350 17 Gram(s) Oral daily PRN Constipation        RADIOLOGY & ADDITIONAL TESTS:     Patient discussed on morning rounds with Dr. Brito / Paco    Operation / Date: Cheng TAVR (Evolut 26mm), Basilica to L biopros cusp, Snorkel stent to pRCA (for coronary protection) EF normal    SUBJECTIVE ASSESSMENT:  Patient states her asthma is "acting up".  She was getting a nebulizer treatment earlier when I saw her.  Overall, denies CP/SOB/N/V/D/dizziness/cough/fever/chills.  Awaiting bed at rehab.        Vital Signs Last 24 Hrs  T(C): 36 (17 Aug 2024 05:01), Max: 37.3 (16 Aug 2024 21:46)  T(F): 96.8 (17 Aug 2024 05:01), Max: 99.2 (16 Aug 2024 21:46)  HR: 72 (17 Aug 2024 05:00) (72 - 98)  BP: 97/57 (17 Aug 2024 05:00) (90/61 - 109/54)  BP(mean): 71 (17 Aug 2024 05:00) (64 - 77)  RR: 16 (17 Aug 2024 05:00) (15 - 18)  SpO2: 97% (17 Aug 2024 05:00) (97% - 99%)    Parameters below as of 17 Aug 2024 05:00  Patient On (Oxygen Delivery Method): room air      I&O's Detail    16 Aug 2024 07:01  -  17 Aug 2024 07:00  --------------------------------------------------------  IN:  Total IN: 0 mL    OUT:    Voided (mL): 200 mL  Total OUT: 200 mL    Total NET: -200 mL        PHYSICAL EXAM:    GEN: NAD, looks comfortable  Psych: Mood appropriate  Neuro: A&Ox3.  No focal deficits.  Moving all extremities.   HEENT: No obvious abnormalities  CV: S1S2, regular, no murmurs appreciated.  No carotid bruits.  No JVD  Lungs: Clear B/L.  No wheezing, rales or rhonchi  ABD: Soft, non-tender, non-distended.  +Bowel sounds  EXT: Warm and well perfused.  No peripheral edema noted  Musculoskeletal: Moving all extremities with normal ROM, no joint swelling  PV: Pedal pulses palpable  Incision Sites: Groin sites stable, no hematoma, mild ecchymosis.      LABS:                        9.2    5.29  )-----------( 162      ( 17 Aug 2024 05:30 )             31.1       PT/INR - ( 17 Aug 2024 05:30 )   PT: 10.9 sec;   INR: 0.95          PTT - ( 17 Aug 2024 05:30 )  PTT:28.4 sec    08-17    138  |  104  |  10  ----------------------------<  102<H>  4.2   |  26  |  0.69    Ca    8.9      17 Aug 2024 05:30  Phos  3.4     08-17  Mg     1.9     08-17    TPro  6.1  /  Alb  3.2<L>  /  TBili  0.4  /  DBili  x   /  AST  15  /  ALT  9<L>  /  AlkPhos  34<L>  08-17      Urinalysis Basic - ( 17 Aug 2024 05:30 )    Color: x / Appearance: x / SG: x / pH: x  Gluc: 102 mg/dL / Ketone: x  / Bili: x / Urobili: x   Blood: x / Protein: x / Nitrite: x   Leuk Esterase: x / RBC: x / WBC x   Sq Epi: x / Non Sq Epi: x / Bacteria: x      MEDICATIONS  (STANDING):  acetaminophen   IVPB .. 1000 milliGRAM(s) IV Intermittent once  aspirin enteric coated 81 milliGRAM(s) Oral daily  atorvastatin 40 milliGRAM(s) Oral at bedtime  chlorhexidine 2% Cloths 1 Application(s) Topical <User Schedule>  clopidogrel Tablet 75 milliGRAM(s) Oral daily  dextrose 5%. 1000 milliLiter(s) (50 mL/Hr) IV Continuous <Continuous>  dextrose 5%. 1000 milliLiter(s) (100 mL/Hr) IV Continuous <Continuous>  dextrose 50% Injectable 25 Gram(s) IV Push once  dextrose 50% Injectable 12.5 Gram(s) IV Push once  dextrose 50% Injectable 25 Gram(s) IV Push once  diazepam    Tablet 2 milliGRAM(s) Oral daily  glucagon  Injectable 1 milliGRAM(s) IntraMuscular once  heparin   Injectable 5000 Unit(s) SubCutaneous every 8 hours  insulin lispro (ADMELOG) corrective regimen sliding scale   SubCutaneous Before meals and at bedtime  lidocaine   4% Patch 2 Patch Transdermal daily  pantoprazole    Tablet 40 milliGRAM(s) Oral every 12 hours  senna 2 Tablet(s) Oral at bedtime  sodium chloride 0.9% lock flush 3 milliLiter(s) IV Push every 8 hours    MEDICATIONS  (PRN):  acetaminophen     Tablet .. 650 milliGRAM(s) Oral every 6 hours PRN Mild Pain (1 - 3)  acetaminophen   IVPB .. 1000 milliGRAM(s) IV Intermittent once PRN Mild Pain (1 - 3)  dextrose Oral Gel 15 Gram(s) Oral once PRN Blood Glucose LESS THAN 70 milliGRAM(s)/deciliter  oxyCODONE    IR 5 milliGRAM(s) Oral every 6 hours PRN Moderate Pain (4 - 6)  polyethylene glycol 3350 17 Gram(s) Oral daily PRN Constipation        RADIOLOGY & ADDITIONAL TESTS:     Patient discussed on morning rounds with Dr. Brito / Paco    Operation / Date: Cheng TAVR (Evolut 26mm), Basilica to L biopros cusp, Snorkel stent to pRCA (for coronary protection) EF normal    SUBJECTIVE ASSESSMENT:  Patient states her asthma is "acting up".  She was getting a nebulizer treatment earlier when I saw her.  Overall, denies CP/SOB/N/V/D/dizziness/cough/fever/chills.  Awaiting bed at rehab.        Vital Signs Last 24 Hrs  T(C): 36 (17 Aug 2024 05:01), Max: 37.3 (16 Aug 2024 21:46)  T(F): 96.8 (17 Aug 2024 05:01), Max: 99.2 (16 Aug 2024 21:46)  HR: 72 (17 Aug 2024 05:00) (72 - 98)  BP: 97/57 (17 Aug 2024 05:00) (90/61 - 109/54)  BP(mean): 71 (17 Aug 2024 05:00) (64 - 77)  RR: 16 (17 Aug 2024 05:00) (15 - 18)  SpO2: 97% (17 Aug 2024 05:00) (97% - 99%)    Parameters below as of 17 Aug 2024 05:00  Patient On (Oxygen Delivery Method): room air      I&O's Detail    16 Aug 2024 07:01  -  17 Aug 2024 07:00  --------------------------------------------------------  IN:  Total IN: 0 mL    OUT:    Voided (mL): 200 mL  Total OUT: 200 mL    Total NET: -200 mL        PHYSICAL EXAM:    GEN: NAD, looks comfortable  Psych: Mood appropriate  Neuro: A&Ox3.  No focal deficits.  Moving all extremities.   HEENT: No obvious abnormalities  CV: S1S2, regular, no murmurs appreciated.  No carotid bruits.  No JVD  Lungs: Clear B/L.  No wheezing, rales or rhonchi  ABD: Soft, non-tender, non-distended.  +Bowel sounds  EXT: Warm and well perfused.  No peripheral edema noted  Musculoskeletal: Moving all extremities with normal ROM, no joint swelling  PV: Pedal pulses palpable  Incision Sites: Groin sites stable, no hematoma, mild ecchymosis in pubic area, soft.  Large area above right groin (Right lower abdomen) with purplish/reddish hematoma.  Area remains soft.      LABS:                        9.2    5.29  )-----------( 162      ( 17 Aug 2024 05:30 )             31.1       PT/INR - ( 17 Aug 2024 05:30 )   PT: 10.9 sec;   INR: 0.95          PTT - ( 17 Aug 2024 05:30 )  PTT:28.4 sec    08-17    138  |  104  |  10  ----------------------------<  102<H>  4.2   |  26  |  0.69    Ca    8.9      17 Aug 2024 05:30  Phos  3.4     08-17  Mg     1.9     08-17    TPro  6.1  /  Alb  3.2<L>  /  TBili  0.4  /  DBili  x   /  AST  15  /  ALT  9<L>  /  AlkPhos  34<L>  08-17      Urinalysis Basic - ( 17 Aug 2024 05:30 )    Color: x / Appearance: x / SG: x / pH: x  Gluc: 102 mg/dL / Ketone: x  / Bili: x / Urobili: x   Blood: x / Protein: x / Nitrite: x   Leuk Esterase: x / RBC: x / WBC x   Sq Epi: x / Non Sq Epi: x / Bacteria: x      MEDICATIONS  (STANDING):  acetaminophen   IVPB .. 1000 milliGRAM(s) IV Intermittent once  aspirin enteric coated 81 milliGRAM(s) Oral daily  atorvastatin 40 milliGRAM(s) Oral at bedtime  chlorhexidine 2% Cloths 1 Application(s) Topical <User Schedule>  clopidogrel Tablet 75 milliGRAM(s) Oral daily  dextrose 5%. 1000 milliLiter(s) (50 mL/Hr) IV Continuous <Continuous>  dextrose 5%. 1000 milliLiter(s) (100 mL/Hr) IV Continuous <Continuous>  dextrose 50% Injectable 25 Gram(s) IV Push once  dextrose 50% Injectable 12.5 Gram(s) IV Push once  dextrose 50% Injectable 25 Gram(s) IV Push once  diazepam    Tablet 2 milliGRAM(s) Oral daily  glucagon  Injectable 1 milliGRAM(s) IntraMuscular once  heparin   Injectable 5000 Unit(s) SubCutaneous every 8 hours  insulin lispro (ADMELOG) corrective regimen sliding scale   SubCutaneous Before meals and at bedtime  lidocaine   4% Patch 2 Patch Transdermal daily  pantoprazole    Tablet 40 milliGRAM(s) Oral every 12 hours  senna 2 Tablet(s) Oral at bedtime  sodium chloride 0.9% lock flush 3 milliLiter(s) IV Push every 8 hours    MEDICATIONS  (PRN):  acetaminophen     Tablet .. 650 milliGRAM(s) Oral every 6 hours PRN Mild Pain (1 - 3)  acetaminophen   IVPB .. 1000 milliGRAM(s) IV Intermittent once PRN Mild Pain (1 - 3)  dextrose Oral Gel 15 Gram(s) Oral once PRN Blood Glucose LESS THAN 70 milliGRAM(s)/deciliter  oxyCODONE    IR 5 milliGRAM(s) Oral every 6 hours PRN Moderate Pain (4 - 6)  polyethylene glycol 3350 17 Gram(s) Oral daily PRN Constipation        RADIOLOGY & ADDITIONAL TESTS:

## 2024-08-18 LAB
GLUCOSE BLDC GLUCOMTR-MCNC: 116 MG/DL — HIGH (ref 70–99)
GLUCOSE BLDC GLUCOMTR-MCNC: 117 MG/DL — HIGH (ref 70–99)
GLUCOSE BLDC GLUCOMTR-MCNC: 122 MG/DL — HIGH (ref 70–99)
GLUCOSE BLDC GLUCOMTR-MCNC: 138 MG/DL — HIGH (ref 70–99)

## 2024-08-18 PROCEDURE — 99232 SBSQ HOSP IP/OBS MODERATE 35: CPT

## 2024-08-18 RX ADMIN — Medication 2 PATCH: at 00:36

## 2024-08-18 RX ADMIN — IPRATROPIUM BROMIDE AND ALBUTEROL SULFATE 3 MILLILITER(S): .5; 3 SOLUTION RESPIRATORY (INHALATION) at 19:20

## 2024-08-18 RX ADMIN — Medication 5000 UNIT(S): at 15:03

## 2024-08-18 RX ADMIN — Medication 5000 UNIT(S): at 06:57

## 2024-08-18 RX ADMIN — Medication 75 MILLIGRAM(S): at 12:30

## 2024-08-18 RX ADMIN — OXYCODONE HYDROCHLORIDE 5 MILLIGRAM(S): 5 TABLET ORAL at 13:20

## 2024-08-18 RX ADMIN — SODIUM CHLORIDE 3 MILLILITER(S): 9 INJECTION INTRAMUSCULAR; INTRAVENOUS; SUBCUTANEOUS at 21:53

## 2024-08-18 RX ADMIN — SODIUM CHLORIDE 3 MILLILITER(S): 9 INJECTION INTRAMUSCULAR; INTRAVENOUS; SUBCUTANEOUS at 05:59

## 2024-08-18 RX ADMIN — Medication 40 MILLIGRAM(S): at 21:29

## 2024-08-18 RX ADMIN — SODIUM CHLORIDE 3 MILLILITER(S): 9 INJECTION INTRAMUSCULAR; INTRAVENOUS; SUBCUTANEOUS at 13:24

## 2024-08-18 RX ADMIN — ACETAMINOPHEN 650 MILLIGRAM(S): 325 TABLET ORAL at 00:07

## 2024-08-18 RX ADMIN — OXYCODONE HYDROCHLORIDE 5 MILLIGRAM(S): 5 TABLET ORAL at 00:07

## 2024-08-18 RX ADMIN — Medication 81 MILLIGRAM(S): at 12:29

## 2024-08-18 RX ADMIN — Medication 2 MILLIGRAM(S): at 12:30

## 2024-08-18 RX ADMIN — OXYCODONE HYDROCHLORIDE 5 MILLIGRAM(S): 5 TABLET ORAL at 21:30

## 2024-08-18 RX ADMIN — Medication 5000 UNIT(S): at 21:29

## 2024-08-18 RX ADMIN — ACETAMINOPHEN 650 MILLIGRAM(S): 325 TABLET ORAL at 22:30

## 2024-08-18 RX ADMIN — Medication 2 TABLET(S): at 21:30

## 2024-08-18 RX ADMIN — CHLORHEXIDINE GLUCONATE 1 APPLICATION(S): 40 SOLUTION TOPICAL at 06:57

## 2024-08-18 RX ADMIN — ACETAMINOPHEN 650 MILLIGRAM(S): 325 TABLET ORAL at 12:29

## 2024-08-18 RX ADMIN — ACETAMINOPHEN 650 MILLIGRAM(S): 325 TABLET ORAL at 13:20

## 2024-08-18 RX ADMIN — ACETAMINOPHEN 650 MILLIGRAM(S): 325 TABLET ORAL at 21:30

## 2024-08-18 RX ADMIN — OXYCODONE HYDROCHLORIDE 5 MILLIGRAM(S): 5 TABLET ORAL at 12:30

## 2024-08-18 RX ADMIN — OXYCODONE HYDROCHLORIDE 5 MILLIGRAM(S): 5 TABLET ORAL at 22:30

## 2024-08-18 RX ADMIN — OXYCODONE HYDROCHLORIDE 5 MILLIGRAM(S): 5 TABLET ORAL at 01:00

## 2024-08-18 RX ADMIN — Medication 40 MILLIGRAM(S): at 06:57

## 2024-08-18 RX ADMIN — ACETAMINOPHEN 650 MILLIGRAM(S): 325 TABLET ORAL at 01:00

## 2024-08-18 RX ADMIN — Medication 40 MILLIGRAM(S): at 17:16

## 2024-08-18 NOTE — PROGRESS NOTE ADULT - ASSESSMENT
This is a 65 y/o former smoker, asthma/COPD, anxiety, OA, H. pylori, HTN, HLD, T2DM, severe AS and CAD (s/p SAVR (23mm 3300TFX), ascending aortic replacement (28mm graft) to enlarge aortic root, and 1V CABG (SVG-RPDA) by Dr. Hanson on 5/22/17 followed by subsequently PCI ( DRAKE to prox OM1 [8/2023] and DRAKE to prox LAD [10/2023], pericarditis s/p colchicine (2023), HFpEF, and bioprosthetic aortic valve stenosis and referred to Dr. Kwok for evaluation and deemed a good candidate for Valve in valve TAVR with basilica +/- snorkel stenting. Patient underwent Cheng TAVR (Evolut 26mm), Basilica to L biopros cusp, Snorkel stent to pRCA (for coronary protetion on 8/13/24. Patient went to ICU post op with RIJ TVP in place. POD1: TVP was removed and TTE was done. Pt had groin hematoma immediately post op, received 2units of PRBC with appropriate response.  Groin stablized.  POD2: patient stable, fever x1. LE duplex done and negative. POD3: Patient transferred to the floor.  Today, POD 5 still awaiting auth and placement for rehab.       P:  Neurovascular: No delirium. Pain well controlled with current regimen.  -PRN's: Tylenol /oxy  -anxiety: diazepam     Cardiovascular: Hemodynamically stable. HR controlled.  -AS (s/p SAVR and ascending aortic replacement and CABG x1) now s/p Cheng TAVR (Evolut 26mm), Basilica to L biopros cusp, Snorkel stent to pRCA       -c/w ASA/Plavix  -CAD: PCI 2023      -c/w asa/plavix   -HLD: c/w statin   -HTN: metop on hold s/p TAVR     Respiratory:   -02 Sat stable on room air  -CXR: stable     GI: Stable.  -PPX: protonix   -PO Diet.  -bowel reg: senna/miralax     Renal / :  -Monitor renal function - creatinine stable.   -Monitor I/O's.    Endocrine:    -A1c: 5.4, hx of DM, on ISS  -TSH: pending     Hematologic:  -CBC: H/H stable      -s/p 2UPRBC on POD1      ID:  -afebrile  -WBC: WNL    Prophylaxis:  -DVT prophylaxis with 5000 SubQ Heparin q8h.  -SCD's    Disposition:  -PT and OT recommending AR   -Pending bed / placement    
A: 65 yo former smoker, asthma/COPD, anxiety, OA, H. pylori, HTN, HLD, T2DM, severe AS and CAD (s/p SAVR (23mm 3300TFX), ascending aortic replacement (28mm graft) to enlarge aortic root, and 1V CABG (SVG-RPDA) by Dr. Hanson on 5/22/17 followed by subsequently PCI ( DRAKE to prox OM1 [8/2023] and DRAKE to prox LAD [10/2023], pericarditis s/p colchicine (2023), HFpEF, and bioprosthetic aortic valve stenosis and referred to Dr. Kwok for evaluation and deemed a good candidate for Valve in valve TAVR with basilica +/- snorkel stenting. Patient underwent Cheng TAVR (Evolut 26mm), Basilica to L biopros cusp, Snorkel stent to pRCA (for coronary protetion on 8/13/24. Patient went to ICU post op with RIJ TVP in place. POD1: TVP was removed and tte was done. 2 UPRBC given with appropriate response. POD2: patient stable, fever x1. LE done and were negative. POD3: Patient transferred to the floor. Awaiting auth and placement for rehab       P:  Neurovascular: No delirium. Pain well controlled with current regimen.  -PRN's: Tylenol /oxy  -anxiety: diazepam     Cardiovascular: Hemodynamically stable. HR controlled.  -AS (s/p SAVR and ascending aortic replacement and CABG x1) now s/p Cheng TAVR (Evolut 26mm), Basilica to L biopros cusp, Snorkel stent to pRCA       -c/w ASA/Plavix  -CAD: PCI 2023      -c/w asa/plavix   -HLD: c/w statin   -HTN: metop on hold s/p TAVR     Respiratory: 02 Sat = 98% on RA.  -If on oxygen wean to RA from for O2 Sat > 93%.  -Encourage C+DB and Use of IS 10x / hr while awake.  -CXR: stable     GI: Stable.  -PPX: protonix   -PO Diet.  -bowel reg: senna/miralax     Renal / :  -Monitor renal function (7/.62)  -Monitor I/O's.    Endocrine:    -A1c: 5.4, hx of DM, on ISS  -TSH: pending     Hematologic:  -CBC: 8.9/28.7      -s/p 2UPRBC on POD1  -Coagulation Panel: PT/INR - ( 16 Aug 2024 03:18 )   PT: 10.7 sec;   INR: 0.94     PTT - ( 16 Aug 2024 03:18 )  PTT:29.9 sec    ID:  -afebrile  -WBC: 4.86    Prophylaxis:  -DVT prophylaxis with 5000 SubQ Heparin q8h.  -SCD's    Disposition:  -PT and OT recommending AR   
This is a 65 y/o former smoker, asthma/COPD, anxiety, OA, H. pylori, HTN, HLD, T2DM, severe AS and CAD (s/p SAVR (23mm 3300TFX), ascending aortic replacement (28mm graft) to enlarge aortic root, and 1V CABG (SVG-RPDA) by Dr. Hanson on 5/22/17 followed by subsequently PCI ( DRAKE to prox OM1 [8/2023] and DRAKE to prox LAD [10/2023], pericarditis s/p colchicine (2023), HFpEF, and bioprosthetic aortic valve stenosis and referred to Dr. Kwok for evaluation and deemed a good candidate for Valve in valve TAVR with basilica +/- snorkel stenting. Patient underwent Cheng TAVR (Evolut 26mm), Basilica to L biopros cusp, Snorkel stent to pRCA (for coronary protetion on 8/13/24. Patient went to ICU post op with RIJ TVP in place. POD1: TVP was removed and TTE was done. Pt had groin hematoma immediately post op, received 2units of PRBC with appropriate response.  Groin stablized.  POD2: patient stable, fever x1. LE duplex done and negative. POD3: Patient transferred to the floor.  Today, POD 4 still awaiting auth and placement for rehab.       P:  Neurovascular: No delirium. Pain well controlled with current regimen.  -PRN's: Tylenol /oxy  -anxiety: diazepam     Cardiovascular: Hemodynamically stable. HR controlled.  -AS (s/p SAVR and ascending aortic replacement and CABG x1) now s/p Cheng TAVR (Evolut 26mm), Basilica to L biopros cusp, Snorkel stent to pRCA       -c/w ASA/Plavix  -CAD: PCI 2023      -c/w asa/plavix   -HLD: c/w statin   -HTN: metop on hold s/p TAVR     Respiratory:   -02 Sat stable on room air  -CXR: stable     GI: Stable.  -PPX: protonix   -PO Diet.  -bowel reg: senna/miralax     Renal / :  -Monitor renal function - creatinine stable.   -Monitor I/O's.    Endocrine:    -A1c: 5.4, hx of DM, on ISS  -TSH: pending     Hematologic:  -CBC: H/H stable      -s/p 2UPRBC on POD1      ID:  -afebrile  -WBC: WNL    Prophylaxis:  -DVT prophylaxis with 5000 SubQ Heparin q8h.  -SCD's    Disposition:  -PT and OT recommending AR   -Pending bed / placement

## 2024-08-18 NOTE — PROGRESS NOTE ADULT - SUBJECTIVE AND OBJECTIVE BOX
Patient discussed on morning rounds with Dr. Brito    Operation / Date: Cheng TAVR (Evolut 26mm), Basilica to L biopros cusp, Snorkel stent to pRCA (for coronary protection) EF normal      SUBJECTIVE ASSESSMENT:  Pt feels well today, Denies CP/SOB/N/V/D/dizziness/cough/fever/chills.  Ambulates w/ assistance, eating well.  Denies CP/SOB/N/V/D/dizziness/cough/fever/chills.      Vital Signs Last 24 Hrs  T(C): 36.2 (18 Aug 2024 05:06), Max: 36.6 (17 Aug 2024 09:34)  T(F): 97.2 (18 Aug 2024 05:06), Max: 97.8 (17 Aug 2024 09:34)  HR: 72 (18 Aug 2024 05:00) (72 - 88)  BP: 111/56 (18 Aug 2024 05:00) (81/53 - 129/68)  BP(mean): 78 (18 Aug 2024 05:00) (63 - 93)  RR: 17 (18 Aug 2024 05:00) (17 - 20)  SpO2: 98% (18 Aug 2024 05:00) (93% - 100%)    Parameters below as of 18 Aug 2024 05:00  Patient On (Oxygen Delivery Method): room air      I&O's Detail    17 Aug 2024 07:01  -  18 Aug 2024 07:00  --------------------------------------------------------  IN:    Oral Fluid: 1000 mL  Total IN: 1000 mL    OUT:  Total OUT: 0 mL    Total NET: 1000 mL      PHYSICAL EXAM:      GEN: NAD, looks comfortable; on room air.   Psych: Mood appropriate  Neuro: A&Ox3.  No focal deficits.  Moving all extremities.   HEENT: No obvious abnormalities  CV: S1S2, regular, no murmurs appreciated.  No carotid bruits.  No JVD  Lungs: Clear B/L.  No wheezing, rales or rhonchi  ABD: Soft, non-tender, non-distended.  +Bowel sounds  EXT: Warm and well perfused.  No peripheral edema noted  Musculoskeletal: Moving all extremities with normal ROM, no joint swelling  PV: Pedal pulses palpable  Incision Sites: Groin sites stable, no hematoma, mild ecchymosis in pubic area, soft.  Large area above right groin (Right lower abdomen) with purplish/reddish hematoma.  Area remains soft.    LABS:                        9.2    5.29  )-----------( 162      ( 17 Aug 2024 05:30 )             31.1       COUMADIN:  Yes/No. REASON: .    PT/INR - ( 17 Aug 2024 05:30 )   PT: 10.9 sec;   INR: 0.95          PTT - ( 17 Aug 2024 05:30 )  PTT:28.4 sec    08-17    138  |  104  |  10  ----------------------------<  102<H>  4.2   |  26  |  0.69    Ca    8.9      17 Aug 2024 05:30  Phos  3.4     08-17  Mg     1.9     08-17    TPro  6.1  /  Alb  3.2<L>  /  TBili  0.4  /  DBili  x   /  AST  15  /  ALT  9<L>  /  AlkPhos  34<L>  08-17      Urinalysis Basic - ( 17 Aug 2024 05:30 )    Color: x / Appearance: x / SG: x / pH: x  Gluc: 102 mg/dL / Ketone: x  / Bili: x / Urobili: x   Blood: x / Protein: x / Nitrite: x   Leuk Esterase: x / RBC: x / WBC x   Sq Epi: x / Non Sq Epi: x / Bacteria: x        MEDICATIONS  (STANDING):  acetaminophen   IVPB .. 1000 milliGRAM(s) IV Intermittent once  aspirin enteric coated 81 milliGRAM(s) Oral daily  atorvastatin 40 milliGRAM(s) Oral at bedtime  chlorhexidine 2% Cloths 1 Application(s) Topical <User Schedule>  clopidogrel Tablet 75 milliGRAM(s) Oral daily  dextrose 5%. 1000 milliLiter(s) (50 mL/Hr) IV Continuous <Continuous>  dextrose 5%. 1000 milliLiter(s) (100 mL/Hr) IV Continuous <Continuous>  dextrose 50% Injectable 12.5 Gram(s) IV Push once  dextrose 50% Injectable 25 Gram(s) IV Push once  dextrose 50% Injectable 25 Gram(s) IV Push once  diazepam    Tablet 2 milliGRAM(s) Oral daily  glucagon  Injectable 1 milliGRAM(s) IntraMuscular once  heparin   Injectable 5000 Unit(s) SubCutaneous every 8 hours  insulin lispro (ADMELOG) corrective regimen sliding scale   SubCutaneous Before meals and at bedtime  lidocaine   4% Patch 2 Patch Transdermal daily  pantoprazole    Tablet 40 milliGRAM(s) Oral every 12 hours  senna 2 Tablet(s) Oral at bedtime  sodium chloride 0.9% lock flush 3 milliLiter(s) IV Push every 8 hours    MEDICATIONS  (PRN):  acetaminophen     Tablet .. 650 milliGRAM(s) Oral every 6 hours PRN Mild Pain (1 - 3)  acetaminophen   IVPB .. 1000 milliGRAM(s) IV Intermittent once PRN Mild Pain (1 - 3)  albuterol/ipratropium for Nebulization 3 milliLiter(s) Nebulizer every 6 hours PRN Shortness of Breath and/or Wheezing  dextrose Oral Gel 15 Gram(s) Oral once PRN Blood Glucose LESS THAN 70 milliGRAM(s)/deciliter  oxyCODONE    IR 5 milliGRAM(s) Oral every 6 hours PRN Moderate Pain (4 - 6)  polyethylene glycol 3350 17 Gram(s) Oral daily PRN Constipation        RADIOLOGY & ADDITIONAL TESTS:

## 2024-08-19 ENCOUNTER — NON-APPOINTMENT (OUTPATIENT)
Age: 64
End: 2024-08-19

## 2024-08-19 ENCOUNTER — TRANSCRIPTION ENCOUNTER (OUTPATIENT)
Age: 64
End: 2024-08-19

## 2024-08-19 VITALS — DIASTOLIC BLOOD PRESSURE: 72 MMHG | HEART RATE: 89 BPM | SYSTOLIC BLOOD PRESSURE: 159 MMHG

## 2024-08-19 LAB
ANION GAP SERPL CALC-SCNC: 11 MMOL/L — SIGNIFICANT CHANGE UP (ref 5–17)
BUN SERPL-MCNC: 13 MG/DL — SIGNIFICANT CHANGE UP (ref 7–23)
CALCIUM SERPL-MCNC: 9.2 MG/DL — SIGNIFICANT CHANGE UP (ref 8.4–10.5)
CHLORIDE SERPL-SCNC: 107 MMOL/L — SIGNIFICANT CHANGE UP (ref 96–108)
CO2 SERPL-SCNC: 24 MMOL/L — SIGNIFICANT CHANGE UP (ref 22–31)
CREAT SERPL-MCNC: 0.67 MG/DL — SIGNIFICANT CHANGE UP (ref 0.5–1.3)
EGFR: 96 ML/MIN/1.73M2 — SIGNIFICANT CHANGE UP
GLUCOSE BLDC GLUCOMTR-MCNC: 121 MG/DL — HIGH (ref 70–99)
GLUCOSE BLDC GLUCOMTR-MCNC: 99 MG/DL — SIGNIFICANT CHANGE UP (ref 70–99)
GLUCOSE SERPL-MCNC: 114 MG/DL — HIGH (ref 70–99)
HCT VFR BLD CALC: 32 % — LOW (ref 34.5–45)
HGB BLD-MCNC: 9.7 G/DL — LOW (ref 11.5–15.5)
MAGNESIUM SERPL-MCNC: 1.9 MG/DL — SIGNIFICANT CHANGE UP (ref 1.6–2.6)
MCHC RBC-ENTMCNC: 26 PG — LOW (ref 27–34)
MCHC RBC-ENTMCNC: 30.3 GM/DL — LOW (ref 32–36)
MCV RBC AUTO: 85.8 FL — SIGNIFICANT CHANGE UP (ref 80–100)
NRBC # BLD: 0 /100 WBCS — SIGNIFICANT CHANGE UP (ref 0–0)
PLATELET # BLD AUTO: 207 K/UL — SIGNIFICANT CHANGE UP (ref 150–400)
POTASSIUM SERPL-MCNC: 4.4 MMOL/L — SIGNIFICANT CHANGE UP (ref 3.5–5.3)
POTASSIUM SERPL-SCNC: 4.4 MMOL/L — SIGNIFICANT CHANGE UP (ref 3.5–5.3)
RBC # BLD: 3.73 M/UL — LOW (ref 3.8–5.2)
RBC # FLD: 16.4 % — HIGH (ref 10.3–14.5)
SODIUM SERPL-SCNC: 142 MMOL/L — SIGNIFICANT CHANGE UP (ref 135–145)
WBC # BLD: 7.05 K/UL — SIGNIFICANT CHANGE UP (ref 3.8–10.5)
WBC # FLD AUTO: 7.05 K/UL — SIGNIFICANT CHANGE UP (ref 3.8–10.5)

## 2024-08-19 PROCEDURE — 93005 ELECTROCARDIOGRAM TRACING: CPT

## 2024-08-19 PROCEDURE — 85027 COMPLETE CBC AUTOMATED: CPT

## 2024-08-19 PROCEDURE — 86850 RBC ANTIBODY SCREEN: CPT

## 2024-08-19 PROCEDURE — C1760: CPT

## 2024-08-19 PROCEDURE — 84132 ASSAY OF SERUM POTASSIUM: CPT

## 2024-08-19 PROCEDURE — C1884: CPT

## 2024-08-19 PROCEDURE — 83735 ASSAY OF MAGNESIUM: CPT

## 2024-08-19 PROCEDURE — 36415 COLL VENOUS BLD VENIPUNCTURE: CPT

## 2024-08-19 PROCEDURE — C1889: CPT

## 2024-08-19 PROCEDURE — 82962 GLUCOSE BLOOD TEST: CPT

## 2024-08-19 PROCEDURE — 80053 COMPREHEN METABOLIC PANEL: CPT

## 2024-08-19 PROCEDURE — 83036 HEMOGLOBIN GLYCOSYLATED A1C: CPT

## 2024-08-19 PROCEDURE — 97161 PT EVAL LOW COMPLEX 20 MIN: CPT

## 2024-08-19 PROCEDURE — 94640 AIRWAY INHALATION TREATMENT: CPT

## 2024-08-19 PROCEDURE — 87635 SARS-COV-2 COVID-19 AMP PRB: CPT

## 2024-08-19 PROCEDURE — 82803 BLOOD GASES ANY COMBINATION: CPT

## 2024-08-19 PROCEDURE — 81001 URINALYSIS AUTO W/SCOPE: CPT

## 2024-08-19 PROCEDURE — 82947 ASSAY GLUCOSE BLOOD QUANT: CPT

## 2024-08-19 PROCEDURE — 84145 PROCALCITONIN (PCT): CPT

## 2024-08-19 PROCEDURE — 87086 URINE CULTURE/COLONY COUNT: CPT

## 2024-08-19 PROCEDURE — 85730 THROMBOPLASTIN TIME PARTIAL: CPT

## 2024-08-19 PROCEDURE — 97535 SELF CARE MNGMENT TRAINING: CPT

## 2024-08-19 PROCEDURE — 85610 PROTHROMBIN TIME: CPT

## 2024-08-19 PROCEDURE — 85025 COMPLETE CBC W/AUTO DIFF WBC: CPT

## 2024-08-19 PROCEDURE — 80048 BASIC METABOLIC PNL TOTAL CA: CPT

## 2024-08-19 PROCEDURE — C9399: CPT

## 2024-08-19 PROCEDURE — 83880 ASSAY OF NATRIURETIC PEPTIDE: CPT

## 2024-08-19 PROCEDURE — 83605 ASSAY OF LACTIC ACID: CPT

## 2024-08-19 PROCEDURE — 84295 ASSAY OF SERUM SODIUM: CPT

## 2024-08-19 PROCEDURE — 87040 BLOOD CULTURE FOR BACTERIA: CPT

## 2024-08-19 PROCEDURE — C1725: CPT

## 2024-08-19 PROCEDURE — C1894: CPT

## 2024-08-19 PROCEDURE — C1769: CPT

## 2024-08-19 PROCEDURE — 82330 ASSAY OF CALCIUM: CPT

## 2024-08-19 PROCEDURE — 86923 COMPATIBILITY TEST ELECTRIC: CPT

## 2024-08-19 PROCEDURE — P9016: CPT

## 2024-08-19 PROCEDURE — C1773: CPT

## 2024-08-19 PROCEDURE — 36430 TRANSFUSION BLD/BLD COMPNT: CPT

## 2024-08-19 PROCEDURE — 93306 TTE W/DOPPLER COMPLETE: CPT

## 2024-08-19 PROCEDURE — 86901 BLOOD TYPING SEROLOGIC RH(D): CPT

## 2024-08-19 PROCEDURE — 97165 OT EVAL LOW COMPLEX 30 MIN: CPT

## 2024-08-19 PROCEDURE — 71045 X-RAY EXAM CHEST 1 VIEW: CPT

## 2024-08-19 PROCEDURE — 99238 HOSP IP/OBS DSCHRG MGMT 30/<: CPT

## 2024-08-19 PROCEDURE — C1874: CPT

## 2024-08-19 PROCEDURE — 85014 HEMATOCRIT: CPT

## 2024-08-19 PROCEDURE — P9045: CPT

## 2024-08-19 PROCEDURE — 97110 THERAPEUTIC EXERCISES: CPT

## 2024-08-19 PROCEDURE — 86900 BLOOD TYPING SEROLOGIC ABO: CPT

## 2024-08-19 PROCEDURE — C8929: CPT

## 2024-08-19 PROCEDURE — 93970 EXTREMITY STUDY: CPT

## 2024-08-19 PROCEDURE — 84100 ASSAY OF PHOSPHORUS: CPT

## 2024-08-19 PROCEDURE — 97116 GAIT TRAINING THERAPY: CPT

## 2024-08-19 PROCEDURE — C1887: CPT

## 2024-08-19 RX ORDER — MAGNESIUM OXIDE TAB 400 MG (240 MG ELEMENTAL MG) 400 (240 MG) MG
400 TAB ORAL ONCE
Refills: 0 | Status: COMPLETED | OUTPATIENT
Start: 2024-08-19 | End: 2024-08-19

## 2024-08-19 RX ORDER — POTASSIUM CHLORIDE 10 MEQ
1 TABLET, EXT RELEASE, PARTICLES/CRYSTALS ORAL
Refills: 0 | DISCHARGE

## 2024-08-19 RX ADMIN — OXYCODONE HYDROCHLORIDE 5 MILLIGRAM(S): 5 TABLET ORAL at 07:03

## 2024-08-19 RX ADMIN — SODIUM CHLORIDE 3 MILLILITER(S): 9 INJECTION INTRAMUSCULAR; INTRAVENOUS; SUBCUTANEOUS at 13:44

## 2024-08-19 RX ADMIN — Medication 2 MILLIGRAM(S): at 11:30

## 2024-08-19 RX ADMIN — Medication 75 MILLIGRAM(S): at 11:30

## 2024-08-19 RX ADMIN — CHLORHEXIDINE GLUCONATE 1 APPLICATION(S): 40 SOLUTION TOPICAL at 05:57

## 2024-08-19 RX ADMIN — OXYCODONE HYDROCHLORIDE 5 MILLIGRAM(S): 5 TABLET ORAL at 13:48

## 2024-08-19 RX ADMIN — MAGNESIUM OXIDE TAB 400 MG (240 MG ELEMENTAL MG) 400 MILLIGRAM(S): 400 (240 MG) TAB at 07:52

## 2024-08-19 RX ADMIN — Medication 5000 UNIT(S): at 05:57

## 2024-08-19 RX ADMIN — OXYCODONE HYDROCHLORIDE 5 MILLIGRAM(S): 5 TABLET ORAL at 06:03

## 2024-08-19 RX ADMIN — Medication 5000 UNIT(S): at 13:48

## 2024-08-19 RX ADMIN — ACETAMINOPHEN 650 MILLIGRAM(S): 325 TABLET ORAL at 06:03

## 2024-08-19 RX ADMIN — ACETAMINOPHEN 650 MILLIGRAM(S): 325 TABLET ORAL at 07:03

## 2024-08-19 RX ADMIN — Medication 81 MILLIGRAM(S): at 11:30

## 2024-08-19 RX ADMIN — Medication 40 MILLIGRAM(S): at 07:13

## 2024-08-19 RX ADMIN — SODIUM CHLORIDE 3 MILLILITER(S): 9 INJECTION INTRAMUSCULAR; INTRAVENOUS; SUBCUTANEOUS at 06:12

## 2024-08-19 NOTE — DISCHARGE NOTE PROVIDER - CARE PROVIDERS DIRECT ADDRESSES
[email or web address]

## 2024-08-19 NOTE — DISCHARGE NOTE PROVIDER - HOSPITAL COURSE
Patient discussed on morning rounds with Dr. Conrad    Operation Date: 8/13/24: Cheng TAVR (Evolut 26mm), Basilica to L biopros cusp, Snorkel stent to pRCA (for coronary protetion)     Primary Surgeon/Attending MD: Dr. Conrad     Referring Physician: Dr. Momo Gamez   _ _ _ _ _ _ _ _ _ _ _ _  HOSPITAL COURSE:  Patient is a 65 y/o former smoker, asthma/COPD, anxiety, OA, H. pylori, HTN, HLD, T2DM, severe AS and CAD (s/p SAVR (23mm 3300TFX), ascending aortic replacement (28mm graft) to enlarge aortic root, and 1V CABG (SVG-RPDA) by Dr. Hanson on 5/22/17 followed by subsequently PCI ( DRAKE to prox OM1 [8/2023] and DRAKE to prox LAD [10/2023], pericarditis s/p colchicine (2023), HFpEF, and bioprosthetic aortic valve stenosis and referred to Dr. Kwok for evaluation and deemed a good candidate for Valve in valve TAVR with basilica +/- snorkel stenting. Patient underwent Cheng TAVR (Evolut 26mm), Basilica to L biopros cusp, Snorkel stent to pRCA (for coronary protetion on 8/13/24. Patient went to ICU post op with RIJ TVP in place. POD1: TVP was removed and TTE was done. Pt had groin hematoma immediately post op, received 2units of PRBC with appropriate response.  Groin stabilized.  POD2: patient stable, fever x1. LE duplex done and negative. POD3: Patient transferred to the floor.  POD 4-5, pt remained stable, pending acceptance to a [facility]. On POD 6, patient was re-evaluated by pt and deemed safe for d/c home. She denies cp/sob/n/v/fever/chills. As per discussion with Dr. Conrad, pt is stable for d/c.      _ _ _ _ _ _ _ _ _ _ _ _  DISCHARGE PHYSICAL EXAM:  Appearance: No acute distress.  Neurologic: AAOx3, no AMS or focal deficits.  Responds appropriately to verbal and physical stimuli; exhibits purposeful movement in all extremities.  HEENT:   MMM, PERRLA, EOMI	b/l  Neck: Supple  Cardiovascular: RRR, S1 S2. No m/r/g.  Respiratory: No acute respiratory distress. CTA b/l, no w/r/r.   Gastrointestinal:  Soft, non-tender, non-distended, + BS.	  Skin: No rashes. No cyanosis.  Extremities: Exhibits normal range of motion, no clubbing, cyanosis or edema.  Vascular: Peripheral pulses palpable 2+ bilaterally.  Incisions: bilateral groin incisions with ecchymoses. no hematoma, bleeding or drainage.   _ _ _ _ _ _ _ _ _ _ _ _   MEDICATION DISCHARGE CHECKLIST      TAVR Valve        [ X] Aspirin, [  ] Contraindicated, Reason_______________________________        [ X] Plavix, [ ] Contraindicated, Reason _______________________________    _ _ _ _ _ _ _ _ _ _ _ _  RELEVANT LABS/IMAGING:  < from: Xray Chest 1 View- PORTABLE-Routine (Xray Chest 1 View- PORTABLE-Routine in AM.) (08.17.24 @ 06:48) >< from: TTE Echo Complete w/ Contrast w/ Doppler (08.14.24 @ 08:19) >  IMPRESSION: No acute infiltrates    CONCLUSIONS:     1. Normal left ventricular size and systolic function.   2. Grade II left ventricular diastolic dysfunction.   3. Normal right ventricular size and systolic function.   4. Normal atria.   5. A transcatheter aortic valve (TAVR) is noted within prior   bioprosthesis, (valve-in-valve) in the aortic position which appears to   be functioning normally. The peak transvalvular velocity is 2.42 m/s, the   mean transvalvular gradient is 13.00 mmHg, and the LVOT/AV velocity ratio  is 0.64. There is no evidence of aortic regurgitation.   6. Trivial pericardial effusion.   7. Compared to the previous TTE performed on 3/6/2024, patient is s/p   transcatheter aortic valve replacement and the bioprosthetic stenosis has   resolved.    < end of copied text >  _ _ _ _ _ _ _ _ _ _ _ _  CLINICAL FOLLOW UP NEEDS:     [ X] Home equipment: d/c with MCOT  _ _ _ _ _ _ _ _ _ _ _ _  Over 35 minutes was spent with the patient reviewing the discharge material including medications, follow up appointments, recovery, concerning symptoms, and how to contact their health care providers if they have questions

## 2024-08-19 NOTE — DISCHARGE NOTE PROVIDER - NSDCFUADDAPPT_GEN_ALL_CORE_FT
Plan to follow up with MATT Stearns telehealth appointment on 8/30/24 at 9am.    Please call Dr. Gamez's office for follow up appointment time.

## 2024-08-19 NOTE — DISCHARGE NOTE PROVIDER - NSDCCPTREATMENT_GEN_ALL_CORE_FT
PRINCIPAL PROCEDURE  Procedure: Percutaneous transcatheter aortic valve replacement (TAVR)  Findings and Treatment: Cheng TAVR (Evolut 26mm), Basilica to L biopros cusp, Snorkel stent to pRCA (for coronary protetion) EF normal

## 2024-08-19 NOTE — DISCHARGE NOTE PROVIDER - CARE PROVIDER_API CALL
Sid Conrad  Interventional Cardiology  [address]  [place] [address]  Phone: [phone]  Fax: [phone]  Established Patient  Scheduled Appointment: 08/30/2024 09:00 AM    Momo Gamez  Cardiology  [address] [address]  Phone: [phone]  Fax: [phone]  Established Patient  Scheduled Appointment: 10/16/2024

## 2024-08-19 NOTE — DISCHARGE NOTE PROVIDER - NSDCHHBASESERVICE_GEN_ALL_CORE
Nursing Ivermectin Pregnancy And Lactation Text: This medication is Pregnancy Category C and it isn't known if it is safe during pregnancy. It is also excreted in breast milk.

## 2024-08-19 NOTE — DISCHARGE NOTE NURSING/CASE MANAGEMENT/SOCIAL WORK - PATIENT PORTAL LINK FT
You can access the FollowMyHealth Patient Portal offered by [facility] by registering at the following website: [email or web address]. By joining [facility]’s FollowMyHealth portal, you will also be able to view your health information using other applications (apps) compatible with our system.

## 2024-08-19 NOTE — DISCHARGE NOTE PROVIDER - NSDCMRMEDTOKEN_GEN_ALL_CORE_FT
aspirin 81 mg oral delayed release tablet: 1 tab(s) orally once a day  clopidogrel 75 mg oral tablet: 1 tab(s) orally once a day  diazePAM 5 mg oral tablet: 0.5 tab(s) orally once a day  famotidine 40 mg oral tablet: 1 tab(s) orally once a day (at bedtime)  MetFORMIN (Eqv-Fortamet) 500 mg oral tablet, extended release: 2 tab(s) orally once a day in morning   oxyCODONE 10 mg oral tablet: 1 tab(s) orally 2 times a day  Ozempic 2 mg/1.5 mL (0.25 mg or 0.5 mg dose) subcutaneous solution: 0.5 milligram(s) subcutaneously once a day  pantoprazole 40 mg oral delayed release tablet: 1 tab(s) orally every 12 hours  Repatha 140 mg/mL subcutaneous solution: 140 milligram(s) subcutaneously every other week  Zetia 10 mg oral tablet: 1 tab(s) orally once a day

## 2024-08-19 NOTE — DISCHARGE NOTE PROVIDER - PROVIDER TOKENS
PROVIDER:[TOKEN:[[id number]],SCHEDULEDAPPT:[08/30/2024],SCHEDULEDAPPTTIME:[09:00 AM],ESTABLISHEDPATIENT:[T]],PROVIDER:[TOKEN:[[id number]],SCHEDULEDAPPT:[10/16/2024],ESTABLISHEDPATIENT:[T]]

## 2024-08-19 NOTE — DISCHARGE NOTE NURSING/CASE MANAGEMENT/SOCIAL WORK - NSDCPEFALRISK_GEN_ALL_CORE
For information on Fall & Injury Prevention, visit: [email or web address] OR  [email or web address] OR  https://www.cdc.gov/steadi/patient.html

## 2024-08-19 NOTE — DISCHARGE NOTE PROVIDER - NSDCFUSCHEDAPPT_GEN_ALL_CORE_FT
Anshu Stearns  [facility] Physician [facility]  CTSURG [address]  Scheduled Appointment: 08/30/2024    Momo Gamez  [facility] Physician [facility]  HEARTVASC [address]  Scheduled Appointment: 10/16/2024

## 2024-08-20 LAB
CULTURE RESULTS: SIGNIFICANT CHANGE UP
CULTURE RESULTS: SIGNIFICANT CHANGE UP
SPECIMEN SOURCE: SIGNIFICANT CHANGE UP
SPECIMEN SOURCE: SIGNIFICANT CHANGE UP

## 2024-08-20 RX ORDER — OXYCODONE 10 MG/1
10 TABLET ORAL TWICE DAILY
Refills: 0 | Status: ACTIVE | COMMUNITY
Start: 2024-08-20

## 2024-08-20 RX ORDER — PANTOPRAZOLE 40 MG/1
40 TABLET, DELAYED RELEASE ORAL
Qty: 30 | Refills: 0 | Status: ACTIVE | COMMUNITY
Start: 2024-08-20

## 2024-08-21 ENCOUNTER — APPOINTMENT (OUTPATIENT)
Dept: CARE COORDINATION | Facility: HOME HEALTH | Age: 64
End: 2024-08-21

## 2024-08-21 VITALS
DIASTOLIC BLOOD PRESSURE: 73 MMHG | SYSTOLIC BLOOD PRESSURE: 139 MMHG | WEIGHT: 176 LBS | OXYGEN SATURATION: 99 % | BODY MASS INDEX: 31.18 KG/M2 | HEART RATE: 79 BPM | RESPIRATION RATE: 16 BRPM

## 2024-08-21 DIAGNOSIS — Z95.2 PRESENCE OF PROSTHETIC HEART VALVE: ICD-10-CM

## 2024-08-21 DIAGNOSIS — I10 ESSENTIAL (PRIMARY) HYPERTENSION: ICD-10-CM

## 2024-08-21 DIAGNOSIS — Z09 ENCOUNTER FOR FOLLOW-UP EXAMINATION AFTER COMPLETED TREATMENT FOR CONDITIONS OTHER THAN MALIGNANT NEOPLASM: ICD-10-CM

## 2024-08-21 DIAGNOSIS — E11.9 TYPE 2 DIABETES MELLITUS W/OUT COMPLICATIONS: ICD-10-CM

## 2024-08-21 DIAGNOSIS — E78.5 HYPERLIPIDEMIA, UNSPECIFIED: ICD-10-CM

## 2024-08-21 NOTE — ASSESSMENT
[FreeTextEntry1] : S/p TAVR- continue Asa, Clopidogrel, Repatha, and Zetia. Stop Potassium.  Continue MCOT, reviewed directions for use.  DM- continue Metformin and Ozempic with consistent carb diet. Check blood glucose level every morning.

## 2024-08-21 NOTE — HISTORY OF PRESENT ILLNESS
[FreeTextEntry1] : Patient is a 63 y/o former smoker, asthma/COPD, anxiety, OA, H. pylori, HTN, HLD, T2DM, severe AS and CAD (s/p SAVR (23mm 3300TFX), ascending aortic replacement (28mm graft) to enlarge aortic root, and 1V CABG (SVG-RPDA) by Dr. Hanson on 5/22/17 followed by subsequently PCI ( DRAKE to prox OM1 [8/2023] and DRAKE to prox LAD [10/2023], pericarditis s/p colchicine (2023), HFpEF, and bioprosthetic aortic valve stenosis and referred to Dr. Kwok for evaluation and deemed a good candidate for Valve in valve TAVR with basilica +/- snorkel stenting. Patient underwent Cheng TAVR (Evolut 26mm), Basilica to L biopros cusp, Snorkel stent to pRCA (for coronary protetion on 8/13/24. Patient went to ICU post op with RIJ TVP in place. POD1: TVP was removed and TTE was done. Pt had groin hematoma immediately post op, received 2units of PRBC with appropriate response.  Groin stabilized.  POD2: patient stable, fever x1. LE duplex done and negative. POD3: Patient transferred to the floor.  POD 4-5, pt remained stable, pending acceptance to a [facility]. On POD 6, patient was re-evaluated by pt and deemed safe for d/c home. She denies cp/sob/n/v/fever/chills. As per discussion with Dr. Conrad, pt is stable for d/c. Ms. Raman is recovering at home. She is ambulating independently.  Pt denies dizziness, SOB, palpitations, abdominal pain, difficulty urinating, and LE swelling.  Blood glucose 141 this am.  [facility] initiated care yesterday.  [Diabetes Mellitus] : Diabetes Mellitus [Diet] : controlled with diet [Oral] : controlled with oral diabetes medication [Dyslipidemia] : Dyslipidemia [Hypertension] : Hypertension

## 2024-08-21 NOTE — PLAN
[TextEntry] : Post Operative Care:  Pt advised of importance of daily weights. Pt advised to call FY NP with weight gain of 3 lbs or more.  Pt instructed on how to use incentive spirometer every hour, demonstrated proper use.  Pt encouraged to ambulate as much as tolerated, avoiding extreme temperatures outdoors.  Also advised to cleanse incisions daily with mild soap and water and to avoid lotions, powders, ointments or creams near or on the incision.  Low salt, low fat diet encouraged and discussed.  Pt advised to avoid heavy lifting or straining.     Follow Your Heart team will continue to follow up with pt status.  NP/CCC roles explained with pt understanding, contact information provided. Pt agrees to call with any questions, issues or concerns.  Worsening symptoms reviewed with patient understanding.      FOLLOW UP APPOINTMENTS:  CTS: Anshu Coats appointment 8/30/24  CARDIOLOGIST: Dr. Bird 2 weeks  PCP: Pt encouraged to follow up within one month of discharge

## 2024-08-21 NOTE — PHYSICAL EXAM
[Sclera] : the sclera and conjunctiva were normal [PERRL With Normal Accommodation] : pupils were equal in size, round, and reactive to light [Neck Appearance] : the appearance of the neck was normal [Respiration, Rhythm And Depth] : normal respiratory rhythm and effort [Exaggerated Use Of Accessory Muscles For Inspiration] : no accessory muscle use [Chest Visual Inspection Thoracic Asymmetry] : no chest asymmetry [Examination Of The Chest] : the chest was normal in appearance [Diminished Respiratory Excursion] : normal chest expansion [Abnormal Walk] : normal gait [Nail Clubbing] : no clubbing  or cyanosis of the fingernails [Involuntary Movements] : no involuntary movements were seen [Skin Color & Pigmentation] : normal skin color and pigmentation [Skin Turgor] : normal skin turgor [] : no rash [FreeTextEntry1] : Trace B/L pedal edema.  [No Focal Deficits] : no focal deficits [Oriented To Time, Place, And Person] : oriented to person, place, and time [Impaired Insight] : insight and judgment were intact [Affect] : the affect was normal [Mood] : the mood was normal [Memory Recent] : recent memory was not impaired [Memory Remote] : remote memory was not impaired

## 2024-08-26 DIAGNOSIS — J44.9 CHRONIC OBSTRUCTIVE PULMONARY DISEASE, UNSPECIFIED: ICD-10-CM
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
## 2024-08-26 DIAGNOSIS — E78.5 HYPERLIPIDEMIA, UNSPECIFIED: ICD-10-CM

## 2024-08-26 DIAGNOSIS — I11.0 HYPERTENSIVE HEART DISEASE WITH HEART FAILURE: ICD-10-CM

## 2024-08-26 DIAGNOSIS — T82.857A STENOSIS OF OTHER CARDIAC PROSTHETIC DEVICES, IMPLANTS AND GRAFTS, INITIAL ENCOUNTER: ICD-10-CM

## 2024-08-26 DIAGNOSIS — K21.9 GASTRO-ESOPHAGEAL REFLUX DISEASE WITHOUT ESOPHAGITIS: ICD-10-CM
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
## 2024-08-26 DIAGNOSIS — I50.32 CHRONIC DIASTOLIC (CONGESTIVE) HEART FAILURE: ICD-10-CM

## 2024-08-26 DIAGNOSIS — Z82.49 FAMILY HISTORY OF ISCHEMIC HEART DISEASE AND OTHER DISEASES OF THE CIRCULATORY SYSTEM: ICD-10-CM
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
## 2024-08-26 DIAGNOSIS — D62 ACUTE POSTHEMORRHAGIC ANEMIA: ICD-10-CM

## 2024-08-26 DIAGNOSIS — F41.9 ANXIETY DISORDER, UNSPECIFIED: ICD-10-CM

## 2024-08-26 DIAGNOSIS — Z00.6 ENCOUNTER FOR EXAMINATION FOR NORMAL COMPARISON AND CONTROL IN CLINICAL RESEARCH PROGRAM: ICD-10-CM

## 2024-08-26 DIAGNOSIS — Z87.891 PERSONAL HISTORY OF NICOTINE DEPENDENCE: ICD-10-CM

## 2024-08-26 DIAGNOSIS — I25.10 ATHEROSCLEROTIC HEART DISEASE OF NATIVE CORONARY ARTERY WITHOUT ANGINA PECTORIS: ICD-10-CM

## 2024-08-26 DIAGNOSIS — L76.32 POSTPROCEDURAL HEMATOMA OF SKIN AND SUBCUTANEOUS TISSUE FOLLOWING OTHER PROCEDURE: ICD-10-CM

## 2024-08-26 DIAGNOSIS — Z95.1 PRESENCE OF AORTOCORONARY BYPASS GRAFT: ICD-10-CM

## 2024-08-26 DIAGNOSIS — Z95.5 PRESENCE OF CORONARY ANGIOPLASTY IMPLANT AND GRAFT: ICD-10-CM

## 2024-08-26 DIAGNOSIS — E11.9 TYPE 2 DIABETES MELLITUS WITHOUT COMPLICATIONS: ICD-10-CM

## 2024-08-28 ENCOUNTER — APPOINTMENT (OUTPATIENT)
Dept: CARE COORDINATION | Facility: HOME HEALTH | Age: 64
End: 2024-08-28

## 2024-08-28 VITALS
DIASTOLIC BLOOD PRESSURE: 76 MMHG | SYSTOLIC BLOOD PRESSURE: 146 MMHG | HEART RATE: 86 BPM | WEIGHT: 174 LBS | OXYGEN SATURATION: 98 % | BODY MASS INDEX: 30.82 KG/M2 | RESPIRATION RATE: 18 BRPM

## 2024-08-28 RX ORDER — FLUTICASONE PROPIONATE 50 UG/1
50 SPRAY, METERED NASAL DAILY
Qty: 1 | Refills: 3 | Status: ACTIVE | COMMUNITY
Start: 2024-08-28 | End: 1900-01-01

## 2024-08-28 NOTE — HISTORY OF PRESENT ILLNESS
[FreeTextEntry1] : [facility]: [facility] Post discharge follow-up  Nadia Raman is a 63 y/o former smoker, asthma/COPD, anxiety, OA, H. pylori, HTN,HLD, T2DM, severe AS and CAD (s/p SAVR (23mm 3300TFX), ascending aortic replacement (28mm graft) to enlarge aortic root, and 1V CABG (SVG-RPDA) by Dr. Hanson on 5/22/17 followed by subsequently PCI ( DRAKE to prox OM1 [8/2023] and DRAKE to prox LAD [10/2023], pericarditis s/p colchicine (2023), HFpEF, and bioprosthetic aortic valve stenosis and referred to Dr. Kwok for evaluation and deemed a good candidate for Valve in valve TAVR with basilica +/- snorkel stenting. Patient underwent Cheng TAVR (Evolut 26mm), Basilica to L biopros cusp, Snorkel stent to pRCA (for coronary protetion on 8/13/24. Patient went to ICU post op with RIJ TVP in place. POD1: TVP was removed and TTE was done. Pt had groin hematoma immediately post op, received 2units of PRBC with appropriate response. Groin stabilized. POD2: patient stable, fever x1. LE duplex done and negative. POD3: Patient transferred to the floor. POD 4-5, pt remained stable, pending acceptance to a [facility]. On POD 6, patient was re-evaluated by pt and deemed safe for d/c home. She denies cp/sob/n/v/fever/chills. As per discussion with Dr. Conrad, pt is stable for d/c. Ms. Raman is recovering at home. She is ambulating independently. Pt denies dizziness, SOB, palpitations, abdominal pain, difficulty urinating, and LE swelling. Blood glucose 106 this am; 3lbs weight loss since discharge.    Seen by [facility] for follow-up, reports intermittent "coughing fits" she appears euvolemic on exam, lungs CTA, HHA present in home.

## 2024-08-28 NOTE — PHYSICAL EXAM
[Sclera] : the sclera and conjunctiva were normal [Jugular Venous Distention Increased] : there was no jugular-venous distention [] : no respiratory distress [Auscultation Breath Sounds / Voice Sounds] : lungs were clear to auscultation bilaterally [FreeTextEntry1] : percutaneous groin access sites c/d/i,  [Abnormal Walk] : normal gait [Skin Color & Pigmentation] : normal skin color and pigmentation [Oriented To Time, Place, And Person] : oriented to person, place, and time [Impaired Insight] : insight and judgment were intact [Affect] : the affect was normal

## 2024-08-28 NOTE — ASSESSMENT
[FreeTextEntry1] : Post Operative Care:  Pt advised of importance of daily weights. Pt advised to call [facility] NP with weight gain of 3 lbs or more. Pt instructed on how to use incentive spirometer every hour, demonstrated proper use. Pt encouraged to ambulate as much as tolerated, avoiding extreme temperatures outdoors. Also advised to cleanse incisions daily with mild soap and water and to avoid lotions, powders, ointments or creams near or on the incision. Low salt, low fat diet encouraged and discussed. Pt advised to avoid heavy lifting or straining. START Fluticasone nasal spray 1-2 times daily.   Follow Your Heart team will continue to follow up with pt status.  NP/CCC roles explained with pt understanding, contact information provided. Pt agrees to call with any questions, issues or concerns. Worsening symptoms reviewed with patient understanding.    FOLLOW UP APPOINTMENTS:  CTS: Anshu Coats appointment 8/30/24  CARDIOLOGIST: Dr. Bird 2 weeks  PCP: Pt encouraged to follow up within one month of discharge

## 2024-08-30 ENCOUNTER — TRANSCRIPTION ENCOUNTER (OUTPATIENT)
Age: 64
End: 2024-08-30

## 2024-08-30 ENCOUNTER — APPOINTMENT (OUTPATIENT)
Dept: CARDIOTHORACIC SURGERY | Facility: CLINIC | Age: 64
End: 2024-08-30
Payer: MEDICARE

## 2024-08-30 ENCOUNTER — NON-APPOINTMENT (OUTPATIENT)
Age: 64
End: 2024-08-30

## 2024-08-30 PROCEDURE — 99213 OFFICE O/P EST LOW 20 MIN: CPT

## 2024-09-01 NOTE — RESULTS/DATA
[TextEntry] : NCT 3/8/24 Impression: No acute intracranial hemorrhage, mass effect or demarcated territorial infarction. Stable exam since 7/21/2022.  Cardiac CTA 3/8/24 Cardiac: 1.  Probable severe mid RCA stenosis, The distal RCA and RPDA are not well visualized 2.  Stent present in the OM2, patency is indeterminate.  The mid LCx is not well visualized 3.  The distal LAD is not well visualized 4.  BioAVR with restricted motion of the noncoronary cusp  CTA Chest 3/8/24 IMPRESSION: Stable appearance post graft replacement of the ascending aorta. No aortic dissection or aneurysm.  CTA Abd Pelvis 3/08/24 IMPRESSION: 1. Moderate calcified plaque in the aortoiliac vessels without significant stenosis.  TTE 3/6/24 CONCLUSIONS:  1. Normal left ventricular size and systolic function.  2. Normal right ventricular size and systolic function.  3. Normal atria.  4. Bioprosthetic valve is seen in the aortic position. The peak transvalvular velocity is 3.53 m/s, the mean transvalvular gradient is 28.00 mmHg, and the LVOT/AV velocity ratio is 0.28. Aortic valve velocity is elevated and may be suggestive of bioprosthetic stenosis. Clinical correlation is advised.  5. No pericardial effusion.  6. Compared to the previous TTE performed on 10/17/2023, velocity and gradients across aortic bioprosthesis are higher.  EKG 3/9/24: SR rate 75, AL 180ms, QRS 88ms, QTc 486ma, left axis.   7/17/24 labs: BUN/Cr 11/0.66  5MWT 7/22/24: 8:46s, 8:40s, 8:50s KCCQ done on 7/29/24 7/22/24 labs reviewed: WBC 7.86 H/H 12.7/42.1 Plt 288 BUN/Cr 11/0.62 ProBNP 608  CELESTE 7/26/24 CONCLUSIONS:  1. Normal left ventricular systolic function.  2. Normal right ventricular size and systolic function.  3. Bioprosthetic aortic leaflets are mildly thickened and calcified. Non- and right prosthetic leaflets appear fused and restricted in motion.  4. Severe prosthetic aortic stenosis. Aortic valve area measured via 3D multiplanar reconstruction with planimetry is 0.40 cm.  5. No other significant valvular disease.  6. No LA/RA/NETTIE/RAA thrombus seen.  7. No evidence of an intracardiac shunt.  8. No pericardial effusion.

## 2024-09-01 NOTE — HISTORY OF PRESENT ILLNESS
[FreeTextEntry1] :  This visit was provided via telehealth using real-time 2-way audio visual technology. The patient, RUSSEL DAVID, was located at home, [address] [place] [address] , at the time of the visit. The provider was located at [address] [facility] [address]. The patient, RUSSEL Gallegosnd DELANO GORDILLO all participated in the telehealth encounter. Verbal consent given on 08/30/2024 by RUSSEL DAVID.  64F, former smoker, with PMH of asthma/COPD, anxiety, low pain threshold, OA, H. pylori, HTN, HLD, T2DM, severe AS and CAD s/p SAVR (23mm 3300TFX), ascending aortic replacement (28mm graft) to enlarge aortic root, and 1V CABG (SVG-RPDA) by Dr. Hanson on 5/22/17 followed by subsequently PCIs: DRAKE to prox OM1 (8/2023) and DRAKE to prox LAD (10/2023), pericarditis s/p colchicine (2023), HFpEF, and bioprosthetic aortic valve stenosis s/p TF Valve in valve (Evolute 26 mm) with basicilica to L bioprosthetic cusp and snorkel stent to RCA on 08/13/2024 who presents for follow up after discharge.   The patient tolerated the procedure without complications. She did have a groin hematoma immediately post op requiring 2 units of pRBCs. Groin stabilized. LE Duplex negative. Patient was discharged home on POD # 6 with MCOT. Patient's BB was hold on discharge.   She denies any SOB at rest or with exertion, chest pain, palpitations, dizziness, syncope, or LE edema.  The patient admits to mild groin site tenderness (does not need any medications for pain), the same from the hospital. She denies any fever, chills.  Patient is compliant with MCOT. Patient takes her BP which ranges from 120-160s

## 2024-09-01 NOTE — ASSESSMENT
[FreeTextEntry1] : 64F, former smoker, with PMH of asthma/COPD, anxiety, low pain threshold, OA, H. pylori, HTN, HLD, T2DM, severe AS and CAD s/p SAVR (23mm 3300TFX), ascending aortic replacement (28mm graft) to enlarge aortic root, and 1V CABG (SVG-RPDA) by Dr. Hanson on 5/22/17 followed by subsequently PCIs: DRAKE to prox OM1 (8/2023) and DRAKE to prox LAD (10/2023), pericarditis s/p colchicine (2023), HFpEF, and bioprosthetic aortic valve stenosis s/p TF Valve in valve (Evolute 26 mm) with basicilica to L bioprosthetic cusp and snorkel stent to RCA on 08/13/2024 who presents for follow up after discharge.   The patient is clinically stable, NYHA Class I. Since her procedure, the patient is improving well and was encouraged to increase her activity as tolerated.  Right groin pain improving (recent visit on 08/28/2024; benign physical exam for groin site). Patient was encourage to continue BP log for one week as her clinical cardiology appt is next week, may re-introduce her BB if MCOT continues to show no events (MCOT to date reviewed and discussed with Dr. Conrad shows no acute events). Patient will return to [facility] in 3 weeks with ECHO, EKG, labs. All questions answered.

## 2024-09-01 NOTE — HISTORY OF PRESENT ILLNESS
[FreeTextEntry1] :  This visit was provided via telehealth using real-time 2-way audio visual technology. The patient, RUSSEL DAVID, was located at home, [address] [place] [address] , at the time of the visit. The provider was located at [address] [facility] [address]. The patient, RUSSEL Gallegosnd DELANO GORDILLO all participated in the telehealth encounter. Verbal consent given on 08/30/2024 by RUSSEL DAVID.  64F, former smoker, with PMH of asthma/COPD, anxiety, low pain threshold, OA, H. pylori, HTN, HLD, T2DM, severe AS and CAD s/p SAVR (23mm 3300TFX), ascending aortic replacement (28mm graft) to enlarge aortic root, and 1V CABG (SVG-RPDA) by Dr. Hanson on 5/22/17 followed by subsequently PCIs: DRAKE to prox OM1 (8/2023) and DRAKE to prox LAD (10/2023), pericarditis s/p colchicine (2023), HFpEF, and bioprosthetic aortic valve stenosis s/p TF Valve in valve (Evolute 26 mm) with basicilica to L bioprosthetic cusp and snorkel stent to RCA on 08/13/2024 who presents for follow up after discharge.   The patient tolerated the procedure without complications. She did have a groin hematoma immediately post op requiring 2 units of pRBCs. Groin stabilized. LE Duplex negative. Patient was discharged home on POD # 6 with MCOT. Patient's BB was hold on discharge.   She denies any SOB at rest or with exertion, chest pain, palpitations, dizziness, syncope, or LE edema.  The patient admits to mild groin site tenderness (does not need any medications for pain), the same from the hospital. She denies any fever, chills.  Patient is compliant with MCOT. Patient takes her BP which ranges from 120-160s

## 2024-09-01 NOTE — RESULTS/DATA
[TextEntry] : NCT 3/8/24 Impression: No acute intracranial hemorrhage, mass effect or demarcated territorial infarction. Stable exam since 7/21/2022.  Cardiac CTA 3/8/24 Cardiac: 1.  Probable severe mid RCA stenosis, The distal RCA and RPDA are not well visualized 2.  Stent present in the OM2, patency is indeterminate.  The mid LCx is not well visualized 3.  The distal LAD is not well visualized 4.  BioAVR with restricted motion of the noncoronary cusp  CTA Chest 3/8/24 IMPRESSION: Stable appearance post graft replacement of the ascending aorta. No aortic dissection or aneurysm.  CTA Abd Pelvis 3/08/24 IMPRESSION: 1. Moderate calcified plaque in the aortoiliac vessels without significant stenosis.  TTE 3/6/24 CONCLUSIONS:  1. Normal left ventricular size and systolic function.  2. Normal right ventricular size and systolic function.  3. Normal atria.  4. Bioprosthetic valve is seen in the aortic position. The peak transvalvular velocity is 3.53 m/s, the mean transvalvular gradient is 28.00 mmHg, and the LVOT/AV velocity ratio is 0.28. Aortic valve velocity is elevated and may be suggestive of bioprosthetic stenosis. Clinical correlation is advised.  5. No pericardial effusion.  6. Compared to the previous TTE performed on 10/17/2023, velocity and gradients across aortic bioprosthesis are higher.  EKG 3/9/24: SR rate 75, SC 180ms, QRS 88ms, QTc 486ma, left axis.   7/17/24 labs: BUN/Cr 11/0.66  5MWT 7/22/24: 8:46s, 8:40s, 8:50s KCCQ done on 7/29/24 7/22/24 labs reviewed: WBC 7.86 H/H 12.7/42.1 Plt 288 BUN/Cr 11/0.62 ProBNP 608  CELESTE 7/26/24 CONCLUSIONS:  1. Normal left ventricular systolic function.  2. Normal right ventricular size and systolic function.  3. Bioprosthetic aortic leaflets are mildly thickened and calcified. Non- and right prosthetic leaflets appear fused and restricted in motion.  4. Severe prosthetic aortic stenosis. Aortic valve area measured via 3D multiplanar reconstruction with planimetry is 0.40 cm.  5. No other significant valvular disease.  6. No LA/RA/NETTIE/RAA thrombus seen.  7. No evidence of an intracardiac shunt.  8. No pericardial effusion.

## 2024-09-01 NOTE — PLAN
[TextEntry] : 1) Will return to [facility] in 3 weeks with ECHO, EKG, labs 2) Continue BP log 3) Continue current medication regimen and cardiology care with Dr. Vaughan

## 2024-09-01 NOTE — REVIEW OF SYSTEMS
[Joint Pain] : joint pain [Anxiety] : anxiety [Negative] : Integumentary [Fever] : no fever [Headache] : no headache [Chills] : no chills [SOB] : no shortness of breath [Feeling Fatigued] : not feeling fatigued [Dyspnea on exertion] : not dyspnea during exertion [Chest Discomfort] : no chest discomfort [Lower Ext Edema] : no extremity edema [Leg Claudication] : no intermittent leg claudication [Palpitations] : no palpitations [Orthopnea] : no orthopnea [PND] : no PND [Syncope] : no syncope [Dizziness] : no dizziness [Numbness (Hypoesthesia)] : no numbness [Tremor] : no tremor was seen [Convulsions] : no convulsions [Tingling (Paresthesia)] : no tingling [Weakness] : no weakness [Limb Weakness (Paresis)] : no limb weakness (Paresis) [Speech Disturbance] : no speech disturbance

## 2024-09-06 ENCOUNTER — NON-APPOINTMENT (OUTPATIENT)
Age: 64
End: 2024-09-06

## 2024-09-13 ENCOUNTER — APPOINTMENT (OUTPATIENT)
Dept: HEART AND VASCULAR | Facility: CLINIC | Age: 64
End: 2024-09-13
Payer: MEDICARE

## 2024-09-13 VITALS
BODY MASS INDEX: 30.48 KG/M2 | HEART RATE: 81 BPM | WEIGHT: 172 LBS | TEMPERATURE: 98.2 F | OXYGEN SATURATION: 98 % | DIASTOLIC BLOOD PRESSURE: 80 MMHG | SYSTOLIC BLOOD PRESSURE: 123 MMHG | HEIGHT: 63 IN

## 2024-09-13 DIAGNOSIS — Z98.890 OTHER SPECIFIED POSTPROCEDURAL STATES: ICD-10-CM

## 2024-09-13 DIAGNOSIS — Z95.5 PRESENCE OF CORONARY ANGIOPLASTY IMPLANT AND GRAFT: ICD-10-CM

## 2024-09-13 DIAGNOSIS — Z95.2 PRESENCE OF PROSTHETIC HEART VALVE: ICD-10-CM

## 2024-09-13 DIAGNOSIS — I25.10 ATHEROSCLEROTIC HEART DISEASE OF NATIVE CORONARY ARTERY W/OUT ANGINA PECTORIS: ICD-10-CM

## 2024-09-13 DIAGNOSIS — Z86.79 OTHER SPECIFIED POSTPROCEDURAL STATES: ICD-10-CM

## 2024-09-13 DIAGNOSIS — E11.9 TYPE 2 DIABETES MELLITUS W/OUT COMPLICATIONS: ICD-10-CM

## 2024-09-13 DIAGNOSIS — M60.9 MYOSITIS, UNSPECIFIED: ICD-10-CM

## 2024-09-13 DIAGNOSIS — E78.5 HYPERLIPIDEMIA, UNSPECIFIED: ICD-10-CM

## 2024-09-13 DIAGNOSIS — Z95.1 PRESENCE OF AORTOCORONARY BYPASS GRAFT: ICD-10-CM

## 2024-09-13 DIAGNOSIS — I10 ESSENTIAL (PRIMARY) HYPERTENSION: ICD-10-CM

## 2024-09-13 PROCEDURE — 93000 ELECTROCARDIOGRAM COMPLETE: CPT

## 2024-09-13 PROCEDURE — 99214 OFFICE O/P EST MOD 30 MIN: CPT

## 2024-09-13 PROCEDURE — G2211 COMPLEX E/M VISIT ADD ON: CPT

## 2024-09-13 NOTE — HISTORY OF PRESENT ILLNESS
[FreeTextEntry1] : 64F, former smoker, with PMH of asthma/COPD,, OA, H. pylori, HTN, HLD, T2DM, severe AS and CAD s/p SAVR (23mm 3300TFX), ascending aortic replacement (28mm graft) to enlarge aortic root, and 1V CABG (SVG-RPDA) by Dr. 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  The patient tolerated the procedure without complications. She did have a groin hematoma immediately post op requiring 2 units of pRBCs. Groin stabilized. LE Duplex negative. Patient was discharged home on POD # 6 with MCOT. Patient's BB was hold on discharge.  Post TAVR TTE 8/14/24 CONCLUSIONS: 1. Normal left ventricular size and systolic function. 2. Grade II left ventricular diastolic dysfunction. 3. Normal right ventricular size and systolic function. 4. Normal atria. 5. A transcatheter aortic valve (TAVR) is noted within prior bioprosthesis, (valve-in-valve) in the aortic position which appears to be functioning normally. The peak transvalvular velocity is 2.42 m/s, the mean transvalvular gradient is 13.00 mmHg, and the LVOT/AV velocity ratio is 0.64. There is no evidence of aortic regurgitation. 6. Trivial pericardial effusion. 7. Compared to the previous TTE performed on 3/6/2024, patient is s/p transcatheter aortic valve replacement and the bioprosthetic stenosis has resolved.  MCOT showed SR, no pauses or high grade AVB appreciated. Last strip transmission on 9/11/24.  Today, patient reports signif improvement in exertional capacity, much improved compared to pre TAVR

## 2024-09-13 NOTE — ASSESSMENT
[FreeTextEntry1] : EKG NSR low voltage PRWP (no change from 1/24) echo today - peak velocity  approx 2, no signif paravalvular leak   A/P 64 yr old obese F with DM, HTN, HLD, asthma, ex-smoker ( 1/2 ppd, quit 9/2023)s/p AVR/ascending aortic replacement, CABG x1 ( SVG-PDA in 2017 with recent recurrent hospitalization for unstable angina s/p OM1 stent and with patent graft, now LAD PCI, now TAVR 8/24   #AS # s/p TAVR symptoms improved echo as noted MCOT no AV block  f/u structural heart   #CAD, native vessel, native heart, no angina # s/p DRAKE circumflex, DRAKE LAD - remains angina free continue risk modification c/w ASA, Plavix and Toprol 50mg qd   #Hyperlipidemia Target LDL < 70 # statin intolerance hx rhabdo  stains remain on hold  on repatha and zetia   # HTN BP at goal today - c/w chlorthalidone 25mg qd  # DM A1c 6.5% - c/w metformin

## 2024-09-13 NOTE — PHYSICAL EXAM
[Well Developed] : well developed [Well Nourished] : well nourished [No Acute Distress] : no acute distress [Normal Conjunctiva] : normal conjunctiva [Normal Venous Pressure] : normal venous pressure [No Carotid Bruit] : no carotid bruit [Normal S1, S2] : normal S1, S2 [No Rub] : no rub [No Gallop] : no gallop [Clear Lung Fields] : clear lung fields [Good Air Entry] : good air entry [No Respiratory Distress] : no respiratory distress  [Soft] : abdomen soft [Non Tender] : non-tender [No Masses/organomegaly] : no masses/organomegaly [Normal Bowel Sounds] : normal bowel sounds [Normal Gait] : normal gait [No Edema] : no edema [No Cyanosis] : no cyanosis [No Clubbing] : no clubbing [No Varicosities] : no varicosities [No Rash] : no rash [No Skin Lesions] : no skin lesions [Moves all extremities] : moves all extremities [No Focal Deficits] : no focal deficits [Normal Speech] : normal speech [Alert and Oriented] : alert and oriented [Normal memory] : normal memory [de-identified] : A2 preserved, 2/6 MONIQUE RUSB

## 2024-09-16 ENCOUNTER — APPOINTMENT (OUTPATIENT)
Dept: CARDIOTHORACIC SURGERY | Facility: CLINIC | Age: 64
End: 2024-09-16
Payer: MEDICARE

## 2024-09-16 PROCEDURE — 99214 OFFICE O/P EST MOD 30 MIN: CPT

## 2024-09-18 ENCOUNTER — EMERGENCY (EMERGENCY)
Facility: HOSPITAL | Age: 64
LOS: 1 days | Discharge: HOME | End: 2024-09-18
Attending: STUDENT IN AN ORGANIZED HEALTH CARE EDUCATION/TRAINING PROGRAM | Admitting: STUDENT IN AN ORGANIZED HEALTH CARE EDUCATION/TRAINING PROGRAM
Payer: MEDICARE

## 2024-09-18 VITALS
HEIGHT: 63 IN | OXYGEN SATURATION: 95 % | DIASTOLIC BLOOD PRESSURE: 84 MMHG | RESPIRATION RATE: 16 BRPM | HEART RATE: 74 BPM | SYSTOLIC BLOOD PRESSURE: 152 MMHG | TEMPERATURE: 98 F

## 2024-09-18 VITALS
TEMPERATURE: 98 F | SYSTOLIC BLOOD PRESSURE: 160 MMHG | HEART RATE: 78 BPM | RESPIRATION RATE: 16 BRPM | DIASTOLIC BLOOD PRESSURE: 91 MMHG | OXYGEN SATURATION: 98 %

## 2024-09-18 DIAGNOSIS — Z98.890 OTHER SPECIFIED POSTPROCEDURAL STATES: Chronic | ICD-10-CM

## 2024-09-18 DIAGNOSIS — Z95.2 PRESENCE OF PROSTHETIC HEART VALVE: Chronic | ICD-10-CM

## 2024-09-18 DIAGNOSIS — Z90.49 ACQUIRED ABSENCE OF OTHER SPECIFIED PARTS OF DIGESTIVE TRACT: Chronic | ICD-10-CM

## 2024-09-18 DIAGNOSIS — D25.9 LEIOMYOMA OF UTERUS, UNSPECIFIED: Chronic | ICD-10-CM

## 2024-09-18 DIAGNOSIS — G56.00 CARPAL TUNNEL SYNDROME, UNSPECIFIED UPPER LIMB: Chronic | ICD-10-CM

## 2024-09-18 PROCEDURE — 99283 EMERGENCY DEPT VISIT LOW MDM: CPT | Mod: 25

## 2024-09-18 PROCEDURE — 96372 THER/PROPH/DIAG INJ SC/IM: CPT

## 2024-09-18 PROCEDURE — 73562 X-RAY EXAM OF KNEE 3: CPT | Mod: 26,LT

## 2024-09-18 PROCEDURE — 73562 X-RAY EXAM OF KNEE 3: CPT

## 2024-09-18 PROCEDURE — 99284 EMERGENCY DEPT VISIT MOD MDM: CPT

## 2024-09-18 RX ORDER — KETOROLAC TROMETHAMINE 30 MG/ML
30 INJECTION, SOLUTION INTRAMUSCULAR ONCE
Refills: 0 | Status: DISCONTINUED | OUTPATIENT
Start: 2024-09-18 | End: 2024-09-18

## 2024-09-18 RX ORDER — ACETAMINOPHEN 325 MG/1
975 TABLET ORAL ONCE
Refills: 0 | Status: COMPLETED | OUTPATIENT
Start: 2024-09-18 | End: 2024-09-18

## 2024-09-18 RX ORDER — PREDNISONE 10 MG
20 TABLET, DOSE PACK ORAL ONCE
Refills: 0 | Status: COMPLETED | OUTPATIENT
Start: 2024-09-18 | End: 2024-09-18

## 2024-09-18 RX ORDER — OXYCODONE HYDROCHLORIDE 5 MG/1
15 TABLET ORAL ONCE
Refills: 0 | Status: DISCONTINUED | OUTPATIENT
Start: 2024-09-18 | End: 2024-09-18

## 2024-09-18 RX ORDER — LIDOCAINE/BENZALKONIUM/ALCOHOL
1 SOLUTION, NON-ORAL TOPICAL ONCE
Refills: 0 | Status: COMPLETED | OUTPATIENT
Start: 2024-09-18 | End: 2024-09-18

## 2024-09-18 RX ADMIN — Medication 20 MILLIGRAM(S): at 07:49

## 2024-09-18 RX ADMIN — KETOROLAC TROMETHAMINE 30 MILLIGRAM(S): 30 INJECTION, SOLUTION INTRAMUSCULAR at 07:50

## 2024-09-18 RX ADMIN — ACETAMINOPHEN 975 MILLIGRAM(S): 325 TABLET ORAL at 07:49

## 2024-09-18 RX ADMIN — Medication 1 PATCH: at 07:50

## 2024-09-18 RX ADMIN — OXYCODONE HYDROCHLORIDE 15 MILLIGRAM(S): 5 TABLET ORAL at 09:07

## 2024-09-18 NOTE — ED PROVIDER NOTE - NSFOLLOWUPINSTRUCTIONS_ED_ALL_ED_FT
You were seen in the Emergency Department for: left knee pain    You were prescribed to the pharmacy:  Please follow the instructions on the container/label and ask your pharmacist for any questions/concerns.    For pain, you may take Tylenol (acetaminophen) 975 mg every 6 hours, AND/OR Advil (ibuprofen) 600 mg every 8 hours.    Please follow up with an orthopedic surgeon. If you do not have a primary physician or specialist of your needs, please call [phone] to find one convenient for you. At this number you will be able to locate a provider who accepts your insurance, as well as locate the right specialist for your needs.    You should return to the Emergency Department if you feel any new/worsening/persistent symptoms including but not limited to: fever, chills, vomiting, chest pain, difficulty breathing, loss of consciousness, bleeding, uncontrolled pain, numbness/weakness of a body part You were seen in the Emergency Department for: left knee pain    For pain, you may take Tylenol (acetaminophen) 975 mg every 6 hours, AND/OR Advil (ibuprofen) 600 mg every 8 hours.    Please follow up with an orthopedic surgeon (see info for Dr. Palafox). If you do not have a primary physician or specialist of your needs, please call [phone] to find one convenient for you. At this number you will be able to locate a provider who accepts your insurance, as well as locate the right specialist for your needs.    You should return to the Emergency Department if you feel any new/worsening/persistent symptoms including but not limited to: fever, chills, vomiting, chest pain, difficulty breathing, loss of consciousness, bleeding, uncontrolled pain, numbness/weakness of a body part

## 2024-09-18 NOTE — ED PROVIDER NOTE - CARE PROVIDER_API CALL
Jean-Pierre Palafox  Orthopaedic Surgery  [address]  [place] [address]  Phone: [phone]  Fax: [phone]  Follow Up Time:

## 2024-09-18 NOTE — ED ADULT NURSE NOTE - OBJECTIVE STATEMENT
63 y/o female presents to the ED c/o L knee pain & swelling. Denies fall/trauma. Denies CP, SOB, HA, F/C, N/V/D, weakness, dizziness, and any other complaints at this time. On exam A&Ox4, RA, ambulatory, NAD. Speaking in clear coherent sentences, respirations are spontaneous and unlabored.

## 2024-09-18 NOTE — ED PROVIDER NOTE - PROGRESS NOTE DETAILS
Uziel Dc MD: Patient reassessed, resting comfortably. Plan for DC with ortho f/u. Strict return precautions given for any onset of fever, redness/warmth over joint, inability to bend the knee/walk. Uziel Dc MD: Patient reassessed, resting comfortably. Plan for DC with ortho f/u. Strict return precautions given for any onset of fever, redness/warmth over joint, inability to bend the knee/walk. Ambulatory without issues at this time.

## 2024-09-18 NOTE — ED PROVIDER NOTE - OBJECTIVE STATEMENT
64 year old female with history of asthma/COPD, anxiety, OA, H. pylori, HTN, HLD, T2DM, severe AS s/p recent TAVR and CAD s/p CABG, presenting with L knee pain. States for the past few days has had atraumatic left knee pain causing difficulty in walking. Denies fever, chills, redness, or inciting trauma/fall. No known history of gout but has long standing history of arthritis in her shoulders and chronic low back pain/sciatica. No numbness, weakness, chest pain, SOB, wound, rash, warmth. 64 year old female with history of asthma/COPD, anxiety, OA, H. pylori, HTN, HLD, T2DM, severe AS s/p recent TAVR and CAD s/p CABG, chronic back pain on 10mg oxycodone BID, presenting with L knee pain. States for the past few days has had atraumatic left knee pain causing difficulty in walking. Denies fever, chills, redness, or inciting trauma/fall. No known history of gout but has long standing history of arthritis in her shoulders and chronic low back pain/sciatica. No numbness, weakness, chest pain, SOB, wound, rash, warmth.

## 2024-09-18 NOTE — ED CLERICAL - NS ED CLERK NOTE PRE-ARRIVAL INFORMATION; ADDITIONAL PRE-ARRIVAL INFORMATION
This patient is enrolled in the Follow Your Heart program and has undergone a cardiac surgery procedure and has active care navigation. This patient can be followed up by the care navigation team within 24 hours. To arrange close follow-up or to obtain additional clinical information about this patient, please call the contact number above.     Please call the cardiac surgery team once patient is registered at [phone] for consultation PRIOR to disposition decision.  The patient recently underwent a cardiac surgery procedure and the team can assist in acute medical management.

## 2024-09-18 NOTE — ED PROVIDER NOTE - CLINICAL SUMMARY MEDICAL DECISION MAKING FREE TEXT BOX
64 year old female with history of asthma/COPD, anxiety, OA, H. pylori, HTN, HLD, T2DM, severe AS s/p recent TAVR and CAD s/p CABG, presenting with L knee pain. 64 year old female with history of asthma/COPD, anxiety, OA, H. pylori, HTN, HLD, T2DM, severe AS s/p recent TAVR and CAD s/p CABG, presenting with L knee pain. Very well appearing, afebrile, on exam has mild joint effusion and pain with ROM but able to fully range and has no erythema/warmth to touch. Suspect arthritic symptoms vs patellofemoral syndrome. XR negative for acute fracture/dislocation. Low clinical suspicion for septic arthritis vs DVT. No concern for arterial ischemia vs compartment syndrome. Will treat symptomatically and DC with ortho referral. 64 year old female with history of asthma/COPD, anxiety, OA, H. pylori, HTN, HLD, T2DM, severe AS s/p recent TAVR and CAD s/p CABG, chronic back pain on 10mg oxycodone BID, presenting with L knee pain. Very well appearing, afebrile, on exam has mild joint effusion and pain with ROM but able to fully range and has no erythema/warmth to touch. Suspect arthritic symptoms vs patellofemoral syndrome. XR negative for acute fracture/dislocation. Low clinical suspicion for septic arthritis vs DVT. No concern for arterial ischemia vs compartment syndrome. Will treat symptomatically and DC with ortho referral.

## 2024-09-18 NOTE — ED PROVIDER NOTE - PATIENT PORTAL LINK FT
You can access the FollowMyHealth Patient Portal offered by [facility] by registering at the following website: [email or web address]. By joining [facility]’s FollowMyHealth portal, you will also be able to view your health information using other applications (apps) compatible with our system.

## 2024-09-18 NOTE — ED ADULT NURSE NOTE - NSFALLUNIVINTERV_ED_ALL_ED
Bed/Stretcher in lowest position, wheels locked, appropriate side rails in place/Call bell, personal items and telephone in reach/Instruct patient to call for assistance before getting out of bed/chair/stretcher/Non-slip footwear applied when patient is off stretcher/[place] to call system/Physically safe environment - no spills, clutter or unnecessary equipment/Purposeful proactive rounding/Room/bathroom lighting operational, light cord in reach

## 2024-09-18 NOTE — ED PROVIDER NOTE - PHYSICAL EXAMINATION
Gen - NAD; well-appearing; A+Ox3   HEENT - NCAT, EOMI, moist mucous membranes, clear oropharynx  Neck - supple  Resp - CTAB, no increased WOB  CV -  RRR, no m/r/g  Abd - soft, NT, ND; no guarding or rebound  Back - no CVA tenderness  MSK - FROM of b/l UE and LE with pain elicited with L knee PROM but able to range fully; mild joint effusion but no erythema or warmth to L knee, NVI distally, no foot drop, no rash/lesions  Extrem - no calf/thigh tenderness  Neuro - CN2-12 grossly intact, full motor strength and sensation to LT throughout  Skin - warm, well perfused

## 2024-09-21 DIAGNOSIS — M25.562 PAIN IN LEFT KNEE: ICD-10-CM

## 2024-09-21 DIAGNOSIS — M54.50 LOW BACK PAIN, UNSPECIFIED: ICD-10-CM

## 2024-09-21 DIAGNOSIS — E78.5 HYPERLIPIDEMIA, UNSPECIFIED: ICD-10-CM

## 2024-09-21 DIAGNOSIS — G89.29 OTHER CHRONIC PAIN: ICD-10-CM

## 2024-09-21 DIAGNOSIS — Z95.1 PRESENCE OF AORTOCORONARY BYPASS GRAFT: ICD-10-CM

## 2024-09-21 DIAGNOSIS — R26.2 DIFFICULTY IN WALKING, NOT ELSEWHERE CLASSIFIED: ICD-10-CM

## 2024-09-21 DIAGNOSIS — M19.012 PRIMARY OSTEOARTHRITIS, LEFT SHOULDER: ICD-10-CM

## 2024-09-21 DIAGNOSIS — J44.9 CHRONIC OBSTRUCTIVE PULMONARY DISEASE, UNSPECIFIED: ICD-10-CM

## 2024-09-21 DIAGNOSIS — I10 ESSENTIAL (PRIMARY) HYPERTENSION: ICD-10-CM

## 2024-09-21 DIAGNOSIS — J45.998 OTHER ASTHMA: ICD-10-CM

## 2024-09-21 DIAGNOSIS — Z88.1 ALLERGY STATUS TO OTHER ANTIBIOTIC AGENTS: ICD-10-CM

## 2024-09-21 DIAGNOSIS — M25.462 EFFUSION, LEFT KNEE: ICD-10-CM

## 2024-09-21 DIAGNOSIS — M19.011 PRIMARY OSTEOARTHRITIS, RIGHT SHOULDER: ICD-10-CM

## 2024-09-21 DIAGNOSIS — I25.10 ATHEROSCLEROTIC HEART DISEASE OF NATIVE CORONARY ARTERY WITHOUT ANGINA PECTORIS: ICD-10-CM

## 2024-09-21 DIAGNOSIS — Z91.040 LATEX ALLERGY STATUS: ICD-10-CM

## 2024-09-21 DIAGNOSIS — Z95.4 PRESENCE OF OTHER HEART-VALVE REPLACEMENT: ICD-10-CM

## 2024-09-21 DIAGNOSIS — E11.9 TYPE 2 DIABETES MELLITUS WITHOUT COMPLICATIONS: ICD-10-CM

## 2024-09-23 NOTE — HISTORY OF PRESENT ILLNESS
[FreeTextEntry1] : This visit was provided via telehealth using real-time 2-way audio visual technology. The patient, RUSSEL DAVID, was located at home, [address] [place] [address] , at the time of the visit. The provider was located at [address] [place] [address]. The patient, RUSSEL DAVID, Dr. Alia Kwok and DELANO GORDILLO all participated in the telehealth encounter. Verbal consent given on 09/16/2024 by RUSSEL DAVID.   Referred by Dr. Vorchheimer Pulm: Dr. Hopper  64F, former smoker, with PMH of asthma/COPD, anxiety, low pain threshold, OA, H. pylori, HTN, HLD, T2DM, severe AS and CAD s/p SAVR (23mm 3300TFX), ascending aortic replacement (28mm graft) to enlarge aortic root, and 1V CABG (SVG-RPDA) by Dr. 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   The patient tolerated the procedure without complications. She did have a groin hematoma immediately post op requiring 2 units of pRBCs. Groin stabilized. LE Duplex negative. Patient was discharged home on POD # 6 with MCOT. Patient's BB was hold on discharge.   ECHO on 09/13/2024- 1. Left ventricular cavity is normal in size. Left ventricular systolic function is normal with an ejection fraction of 60 % by Dukes's method of disks.2. There is mild (grade 1) left ventricular diastolic dysfunction. 3. Mild left ventricular hypertrophy.4. Normal right ventricular cavity size, with normal wall thickness, and probably normal right ventricular systolic function.5. A bioprosthetic vavle in the aortic position without regurgitation.6. The peak transaortic velocity is 2.30 m/s, peak transaortic gradient is 21.2 mmHg and mean transaortic gradient is 10.0 mmHg with an LVOT/aortic valve VTI ratio of 0.67. The effective orifice area is estimated at 1.89 cm by the continuity equation.7. Pulmonary artery systolic pressure could not be estimated.8. No pericardial effusion seen.  MCOT showed SR, no pauses or high grade AVB appreciated. Last strip transmission on 9/11/24.  Today, patient reports she is feeling well with no complaints. She denies any SOB at rest or with exertion, chest pain, palpitations, dizziness, or syncope.

## 2024-09-23 NOTE — REVIEW OF SYSTEMS
[Joint Pain] : joint pain [Anxiety] : anxiety [Negative] : Integumentary [Fever] : no fever [Headache] : no headache [Chills] : no chills [Feeling Fatigued] : not feeling fatigued [SOB] : no shortness of breath [Dyspnea on exertion] : not dyspnea during exertion [Chest Discomfort] : no chest discomfort [Lower Ext Edema] : no extremity edema [Leg Claudication] : no intermittent leg claudication [Palpitations] : no palpitations [Orthopnea] : no orthopnea [PND] : no PND [Syncope] : no syncope [Dizziness] : no dizziness [Tremor] : no tremor was seen [Numbness (Hypoesthesia)] : no numbness [Convulsions] : no convulsions [Tingling (Paresthesia)] : no tingling [Weakness] : no weakness [Limb Weakness (Paresis)] : no limb weakness (Paresis) [Speech Disturbance] : no speech disturbance

## 2024-09-23 NOTE — HISTORY OF PRESENT ILLNESS
[FreeTextEntry1] : This visit was provided via telehealth using real-time 2-way audio visual technology. The patient, RUSSEL DAVID, was located at home, [address] [place] [address] , at the time of the visit. The provider was located at [address] [place] [address]. The patient, RUSSEL DAVID, Dr. Alia Kwok and DELANO GORDILLO all participated in the telehealth encounter. Verbal consent given on 09/16/2024 by RUSSEL DAVID.   Referred by Dr. Vorchheimer Pulm: Dr. Hopper  64F, former smoker, with PMH of asthma/COPD, anxiety, low pain threshold, OA, H. pylori, HTN, HLD, T2DM, severe AS and CAD s/p SAVR (23mm 3300TFX), ascending aortic replacement (28mm graft) to enlarge aortic root, and 1V CABG (SVG-RPDA) by Dr. 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   The patient tolerated the procedure without complications. She did have a groin hematoma immediately post op requiring 2 units of pRBCs. Groin stabilized. LE Duplex negative. Patient was discharged home on POD # 6 with MCOT. Patient's BB was hold on discharge.   ECHO on 09/13/2024- 1. Left ventricular cavity is normal in size. Left ventricular systolic function is normal with an ejection fraction of 60 % by Dukes's method of disks.2. There is mild (grade 1) left ventricular diastolic dysfunction. 3. Mild left ventricular hypertrophy.4. Normal right ventricular cavity size, with normal wall thickness, and probably normal right ventricular systolic function.5. A bioprosthetic vavle in the aortic position without regurgitation.6. The peak transaortic velocity is 2.30 m/s, peak transaortic gradient is 21.2 mmHg and mean transaortic gradient is 10.0 mmHg with an LVOT/aortic valve VTI ratio of 0.67. The effective orifice area is estimated at 1.89 cm by the continuity equation.7. Pulmonary artery systolic pressure could not be estimated.8. No pericardial effusion seen.  MCOT showed SR, no pauses or high grade AVB appreciated. Last strip transmission on 9/11/24.  Today, patient reports she is feeling well with no complaints. She denies any SOB at rest or with exertion, chest pain, palpitations, dizziness, or syncope.

## 2024-09-23 NOTE — RESULTS/DATA
[TextEntry] : NCT 3/8/24 Impression: No acute intracranial hemorrhage, mass effect or demarcated territorial infarction. Stable exam since 7/21/2022.  Cardiac CTA 3/8/24 Cardiac: 1.  Probable severe mid RCA stenosis, The distal RCA and RPDA are not well visualized 2.  Stent present in the OM2, patency is indeterminate.  The mid LCx is not well visualized 3.  The distal LAD is not well visualized 4.  BioAVR with restricted motion of the noncoronary cusp  CTA Chest 3/8/24 IMPRESSION: Stable appearance post graft replacement of the ascending aorta. No aortic dissection or aneurysm.  CTA Abd Pelvis 3/08/24 IMPRESSION: 1. Moderate calcified plaque in the aortoiliac vessels without significant stenosis.  TTE 3/6/24 CONCLUSIONS:  1. Normal left ventricular size and systolic function.  2. Normal right ventricular size and systolic function.  3. Normal atria.  4. Bioprosthetic valve is seen in the aortic position. The peak transvalvular velocity is 3.53 m/s, the mean transvalvular gradient is 28.00 mmHg, and the LVOT/AV velocity ratio is 0.28. Aortic valve velocity is elevated and may be suggestive of bioprosthetic stenosis. Clinical correlation is advised.  5. No pericardial effusion.  6. Compared to the previous TTE performed on 10/17/2023, velocity and gradients across aortic bioprosthesis are higher.  EKG 3/9/24: SR rate 75, IL 180ms, QRS 88ms, QTc 486ma, left axis.   7/17/24 labs: BUN/Cr 11/0.66  5MWT 7/22/24: 8:46s, 8:40s, 8:50s KCCQ done on 7/29/24 7/22/24 labs reviewed: WBC 7.86 H/H 12.7/42.1 Plt 288 BUN/Cr 11/0.62 ProBNP 608  CELESTE 7/26/24 CONCLUSIONS:  1. Normal left ventricular systolic function.  2. Normal right ventricular size and systolic function.  3. Bioprosthetic aortic leaflets are mildly thickened and calcified. Non- and right prosthetic leaflets appear fused and restricted in motion.  4. Severe prosthetic aortic stenosis. Aortic valve area measured via 3D multiplanar reconstruction with planimetry is 0.40 cm.  5. No other significant valvular disease.  6. No LA/RA/NETTIE/RAA thrombus seen.  7. No evidence of an intracardiac shunt.  8. No pericardial effusion.  CELESTE-GUIDED TAVR VALVE-IN-VALVE WITH BASILICA PROCEDURE 8/13/24  INTRAPROCEDURAL FINDINGS:  Left aortic cusp was lacerated using Basilica technique under fluoroscopic and CELESTE guidance. 26 mm Corevalve Evolut FX+ valve was deployed in the aortic position under fluoroscopic guidance.  POST-PROCEDURAL CONCLUSIONS:  26 mm Corevalve Evolut FX+ valve appears well seated within the bioprosthetic aortic valve with trace paravalvular aortic regurgitation. Peak transvalvular velocity is 1.7 m/s, the mean transvalvular gradient is 6 mmHg. The peak transaortic gradient is 12 mmHg. Left and right ventricular function as well as all other valves remained unchanged. No pericardial effusion.

## 2024-09-23 NOTE — HISTORY OF PRESENT ILLNESS
[FreeTextEntry1] : This visit was provided via telehealth using real-time 2-way audio visual technology. The patient, RUSSEL DAVID, was located at home, [address] [place] [address] , at the time of the visit. The provider was located at [address] [place] [address]. The patient, RUSSEL DAVID, Dr. Alia Kwok and DELANO GORDILLO all participated in the telehealth encounter. Verbal consent given on 09/16/2024 by RUSSEL DAVID.   Referred by Dr. Vorchheimer Pulm: Dr. Hopper  64F, former smoker, with PMH of asthma/COPD, anxiety, low pain threshold, OA, H. pylori, HTN, HLD, T2DM, severe AS and CAD s/p SAVR (23mm 3300TFX), ascending aortic replacement (28mm graft) to enlarge aortic root, and 1V CABG (SVG-RPDA) by Dr. 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   The patient tolerated the procedure without complications. She did have a groin hematoma immediately post op requiring 2 units of pRBCs. Groin stabilized. LE Duplex negative. Patient was discharged home on POD # 6 with MCOT. Patient's BB was hold on discharge.   ECHO on 09/13/2024- 1. Left ventricular cavity is normal in size. Left ventricular systolic function is normal with an ejection fraction of 60 % by Dukes's method of disks.2. There is mild (grade 1) left ventricular diastolic dysfunction. 3. Mild left ventricular hypertrophy.4. Normal right ventricular cavity size, with normal wall thickness, and probably normal right ventricular systolic function.5. A bioprosthetic vavle in the aortic position without regurgitation.6. The peak transaortic velocity is 2.30 m/s, peak transaortic gradient is 21.2 mmHg and mean transaortic gradient is 10.0 mmHg with an LVOT/aortic valve VTI ratio of 0.67. The effective orifice area is estimated at 1.89 cm by the continuity equation.7. Pulmonary artery systolic pressure could not be estimated.8. No pericardial effusion seen.  MCOT showed SR, no pauses or high grade AVB appreciated. Last strip transmission on 9/11/24.  Today, patient reports she is feeling well with no complaints. She denies any SOB at rest or with exertion, chest pain, palpitations, dizziness, or syncope.

## 2024-09-23 NOTE — ASSESSMENT
[TextEntry] : 64F, former smoker, with PMH of asthma/COPD, anxiety, low pain threshold, OA, H. pylori, HTN, HLD, T2DM, severe AS and CAD s/p SAVR (23mm 3300TFX), ascending aortic replacement (28mm graft) to enlarge aortic root, and 1V CABG (SVG-RPDA) by Dr. 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  The patient is clinically stable, NYHA Class I. The ECHO was reviewed by Dr. Alia Kwok which showed the Corevalve functioning well with no PVL, the results were discussed with the patient and will follow up with [facility] in one year with ECHO/EKG. All questions answered.

## 2024-09-23 NOTE — RESULTS/DATA
[TextEntry] : NCT 3/8/24 Impression: No acute intracranial hemorrhage, mass effect or demarcated territorial infarction. Stable exam since 7/21/2022.  Cardiac CTA 3/8/24 Cardiac: 1.  Probable severe mid RCA stenosis, The distal RCA and RPDA are not well visualized 2.  Stent present in the OM2, patency is indeterminate.  The mid LCx is not well visualized 3.  The distal LAD is not well visualized 4.  BioAVR with restricted motion of the noncoronary cusp  CTA Chest 3/8/24 IMPRESSION: Stable appearance post graft replacement of the ascending aorta. No aortic dissection or aneurysm.  CTA Abd Pelvis 3/08/24 IMPRESSION: 1. Moderate calcified plaque in the aortoiliac vessels without significant stenosis.  TTE 3/6/24 CONCLUSIONS:  1. Normal left ventricular size and systolic function.  2. Normal right ventricular size and systolic function.  3. Normal atria.  4. Bioprosthetic valve is seen in the aortic position. The peak transvalvular velocity is 3.53 m/s, the mean transvalvular gradient is 28.00 mmHg, and the LVOT/AV velocity ratio is 0.28. Aortic valve velocity is elevated and may be suggestive of bioprosthetic stenosis. Clinical correlation is advised.  5. No pericardial effusion.  6. Compared to the previous TTE performed on 10/17/2023, velocity and gradients across aortic bioprosthesis are higher.  EKG 3/9/24: SR rate 75, UT 180ms, QRS 88ms, QTc 486ma, left axis.   7/17/24 labs: BUN/Cr 11/0.66  5MWT 7/22/24: 8:46s, 8:40s, 8:50s KCCQ done on 7/29/24 7/22/24 labs reviewed: WBC 7.86 H/H 12.7/42.1 Plt 288 BUN/Cr 11/0.62 ProBNP 608  CELESTE 7/26/24 CONCLUSIONS:  1. Normal left ventricular systolic function.  2. Normal right ventricular size and systolic function.  3. Bioprosthetic aortic leaflets are mildly thickened and calcified. Non- and right prosthetic leaflets appear fused and restricted in motion.  4. Severe prosthetic aortic stenosis. Aortic valve area measured via 3D multiplanar reconstruction with planimetry is 0.40 cm.  5. No other significant valvular disease.  6. No LA/RA/NETTIE/RAA thrombus seen.  7. No evidence of an intracardiac shunt.  8. No pericardial effusion.  CELESTE-GUIDED TAVR VALVE-IN-VALVE WITH BASILICA PROCEDURE 8/13/24  INTRAPROCEDURAL FINDINGS:  Left aortic cusp was lacerated using Basilica technique under fluoroscopic and CELESTE guidance. 26 mm Corevalve Evolut FX+ valve was deployed in the aortic position under fluoroscopic guidance.  POST-PROCEDURAL CONCLUSIONS:  26 mm Corevalve Evolut FX+ valve appears well seated within the bioprosthetic aortic valve with trace paravalvular aortic regurgitation. Peak transvalvular velocity is 1.7 m/s, the mean transvalvular gradient is 6 mmHg. The peak transaortic gradient is 12 mmHg. Left and right ventricular function as well as all other valves remained unchanged. No pericardial effusion.

## 2024-09-23 NOTE — REVIEW OF SYSTEMS
[Joint Pain] : joint pain Walk in [Anxiety] : anxiety [Negative] : Integumentary [Fever] : no fever [Headache] : no headache [Chills] : no chills [Feeling Fatigued] : not feeling fatigued [SOB] : no shortness of breath [Dyspnea on exertion] : not dyspnea during exertion [Chest Discomfort] : no chest discomfort [Lower Ext Edema] : no extremity edema [Leg Claudication] : no intermittent leg claudication [Palpitations] : no palpitations [Orthopnea] : no orthopnea [PND] : no PND [Syncope] : no syncope [Dizziness] : no dizziness [Tremor] : no tremor was seen [Numbness (Hypoesthesia)] : no numbness [Convulsions] : no convulsions [Tingling (Paresthesia)] : no tingling [Weakness] : no weakness [Limb Weakness (Paresis)] : no limb weakness (Paresis) [Speech Disturbance] : no speech disturbance

## 2024-09-23 NOTE — PLAN
[TextEntry] : 1) Will return to [facility] in one year with ECHO, EKG  2) Continue current medications regimen and cardiology care with Dr. Guerin   I, Dr. Alia Kwok , personally performed the evaluation and management (E/M) services for this established patient who presents today with (a) new problem(s)/exacerbation of (an) existing condition(s). That E/M includes conducting the clinically appropriate interval history &/or exam, assessing all new/exacerbated conditions, and establishing a new plan of care. Today, my MANJEET,  was here to observe my evaluation and management service for this new problem/exacerbated condition and follow the plan of care established by me going forward.

## 2024-09-23 NOTE — RESULTS/DATA
[TextEntry] : NCT 3/8/24 Impression: No acute intracranial hemorrhage, mass effect or demarcated territorial infarction. Stable exam since 7/21/2022.  Cardiac CTA 3/8/24 Cardiac: 1.  Probable severe mid RCA stenosis, The distal RCA and RPDA are not well visualized 2.  Stent present in the OM2, patency is indeterminate.  The mid LCx is not well visualized 3.  The distal LAD is not well visualized 4.  BioAVR with restricted motion of the noncoronary cusp  CTA Chest 3/8/24 IMPRESSION: Stable appearance post graft replacement of the ascending aorta. No aortic dissection or aneurysm.  CTA Abd Pelvis 3/08/24 IMPRESSION: 1. Moderate calcified plaque in the aortoiliac vessels without significant stenosis.  TTE 3/6/24 CONCLUSIONS:  1. Normal left ventricular size and systolic function.  2. Normal right ventricular size and systolic function.  3. Normal atria.  4. Bioprosthetic valve is seen in the aortic position. The peak transvalvular velocity is 3.53 m/s, the mean transvalvular gradient is 28.00 mmHg, and the LVOT/AV velocity ratio is 0.28. Aortic valve velocity is elevated and may be suggestive of bioprosthetic stenosis. Clinical correlation is advised.  5. No pericardial effusion.  6. Compared to the previous TTE performed on 10/17/2023, velocity and gradients across aortic bioprosthesis are higher.  EKG 3/9/24: SR rate 75, DC 180ms, QRS 88ms, QTc 486ma, left axis.   7/17/24 labs: BUN/Cr 11/0.66  5MWT 7/22/24: 8:46s, 8:40s, 8:50s KCCQ done on 7/29/24 7/22/24 labs reviewed: WBC 7.86 H/H 12.7/42.1 Plt 288 BUN/Cr 11/0.62 ProBNP 608  CELESTE 7/26/24 CONCLUSIONS:  1. Normal left ventricular systolic function.  2. Normal right ventricular size and systolic function.  3. Bioprosthetic aortic leaflets are mildly thickened and calcified. Non- and right prosthetic leaflets appear fused and restricted in motion.  4. Severe prosthetic aortic stenosis. Aortic valve area measured via 3D multiplanar reconstruction with planimetry is 0.40 cm.  5. No other significant valvular disease.  6. No LA/RA/NETTIE/RAA thrombus seen.  7. No evidence of an intracardiac shunt.  8. No pericardial effusion.  CELESTE-GUIDED TAVR VALVE-IN-VALVE WITH BASILICA PROCEDURE 8/13/24  INTRAPROCEDURAL FINDINGS:  Left aortic cusp was lacerated using Basilica technique under fluoroscopic and CELESTE guidance. 26 mm Corevalve Evolut FX+ valve was deployed in the aortic position under fluoroscopic guidance.  POST-PROCEDURAL CONCLUSIONS:  26 mm Corevalve Evolut FX+ valve appears well seated within the bioprosthetic aortic valve with trace paravalvular aortic regurgitation. Peak transvalvular velocity is 1.7 m/s, the mean transvalvular gradient is 6 mmHg. The peak transaortic gradient is 12 mmHg. Left and right ventricular function as well as all other valves remained unchanged. No pericardial effusion.

## 2024-10-10 ENCOUNTER — APPOINTMENT (OUTPATIENT)
Dept: ORTHOPEDIC SURGERY | Facility: CLINIC | Age: 64
End: 2024-10-10

## 2024-10-16 ENCOUNTER — APPOINTMENT (OUTPATIENT)
Dept: HEART AND VASCULAR | Facility: CLINIC | Age: 64
End: 2024-10-16

## 2024-11-10 ENCOUNTER — EMERGENCY (EMERGENCY)
Facility: HOSPITAL | Age: 64
LOS: 1 days | Discharge: HOME | End: 2024-11-10
Attending: STUDENT IN AN ORGANIZED HEALTH CARE EDUCATION/TRAINING PROGRAM | Admitting: STUDENT IN AN ORGANIZED HEALTH CARE EDUCATION/TRAINING PROGRAM
Payer: MEDICARE

## 2024-11-10 VITALS
OXYGEN SATURATION: 98 % | SYSTOLIC BLOOD PRESSURE: 167 MMHG | RESPIRATION RATE: 19 BRPM | HEART RATE: 64 BPM | DIASTOLIC BLOOD PRESSURE: 81 MMHG | TEMPERATURE: 98 F

## 2024-11-10 VITALS
HEART RATE: 70 BPM | OXYGEN SATURATION: 97 % | HEIGHT: 63 IN | WEIGHT: 166.01 LBS | RESPIRATION RATE: 18 BRPM | TEMPERATURE: 99 F | DIASTOLIC BLOOD PRESSURE: 107 MMHG | SYSTOLIC BLOOD PRESSURE: 155 MMHG

## 2024-11-10 DIAGNOSIS — D25.9 LEIOMYOMA OF UTERUS, UNSPECIFIED: Chronic | ICD-10-CM

## 2024-11-10 DIAGNOSIS — Z95.2 PRESENCE OF PROSTHETIC HEART VALVE: Chronic | ICD-10-CM

## 2024-11-10 DIAGNOSIS — Z98.890 OTHER SPECIFIED POSTPROCEDURAL STATES: Chronic | ICD-10-CM

## 2024-11-10 DIAGNOSIS — G56.00 CARPAL TUNNEL SYNDROME, UNSPECIFIED UPPER LIMB: Chronic | ICD-10-CM

## 2024-11-10 DIAGNOSIS — Z90.49 ACQUIRED ABSENCE OF OTHER SPECIFIED PARTS OF DIGESTIVE TRACT: Chronic | ICD-10-CM

## 2024-11-10 LAB
ADD ON TEST-SPECIMEN IN LAB: SIGNIFICANT CHANGE UP
ANION GAP SERPL CALC-SCNC: 13 MMOL/L — SIGNIFICANT CHANGE UP (ref 5–17)
BASOPHILS # BLD AUTO: 0.02 K/UL — SIGNIFICANT CHANGE UP (ref 0–0.2)
BASOPHILS NFR BLD AUTO: 0.3 % — SIGNIFICANT CHANGE UP (ref 0–2)
BUN SERPL-MCNC: 11 MG/DL — SIGNIFICANT CHANGE UP (ref 7–23)
CALCIUM SERPL-MCNC: 9.3 MG/DL — SIGNIFICANT CHANGE UP (ref 8.4–10.5)
CHLORIDE SERPL-SCNC: 102 MMOL/L — SIGNIFICANT CHANGE UP (ref 96–108)
CO2 SERPL-SCNC: 26 MMOL/L — SIGNIFICANT CHANGE UP (ref 22–31)
CREAT SERPL-MCNC: 0.62 MG/DL — SIGNIFICANT CHANGE UP (ref 0.5–1.3)
EGFR: 98 ML/MIN/1.73M2 — SIGNIFICANT CHANGE UP
EOSINOPHIL # BLD AUTO: 0.2 K/UL — SIGNIFICANT CHANGE UP (ref 0–0.5)
EOSINOPHIL NFR BLD AUTO: 3 % — SIGNIFICANT CHANGE UP (ref 0–6)
GLUCOSE SERPL-MCNC: 82 MG/DL — SIGNIFICANT CHANGE UP (ref 70–99)
HCT VFR BLD CALC: 40.9 % — SIGNIFICANT CHANGE UP (ref 34.5–45)
HGB BLD-MCNC: 12.5 G/DL — SIGNIFICANT CHANGE UP (ref 11.5–15.5)
IMM GRANULOCYTES NFR BLD AUTO: 0.4 % — SIGNIFICANT CHANGE UP (ref 0–0.9)
LYMPHOCYTES # BLD AUTO: 2.87 K/UL — SIGNIFICANT CHANGE UP (ref 1–3.3)
LYMPHOCYTES # BLD AUTO: 42.5 % — SIGNIFICANT CHANGE UP (ref 13–44)
MCHC RBC-ENTMCNC: 24.9 PG — LOW (ref 27–34)
MCHC RBC-ENTMCNC: 30.6 G/DL — LOW (ref 32–36)
MCV RBC AUTO: 81.3 FL — SIGNIFICANT CHANGE UP (ref 80–100)
MONOCYTES # BLD AUTO: 0.55 K/UL — SIGNIFICANT CHANGE UP (ref 0–0.9)
MONOCYTES NFR BLD AUTO: 8.1 % — SIGNIFICANT CHANGE UP (ref 2–14)
NEUTROPHILS # BLD AUTO: 3.08 K/UL — SIGNIFICANT CHANGE UP (ref 1.8–7.4)
NEUTROPHILS NFR BLD AUTO: 45.7 % — SIGNIFICANT CHANGE UP (ref 43–77)
NRBC # BLD: 0 /100 WBCS — SIGNIFICANT CHANGE UP (ref 0–0)
PLATELET # BLD AUTO: 263 K/UL — SIGNIFICANT CHANGE UP (ref 150–400)
POTASSIUM SERPL-MCNC: 4.4 MMOL/L — SIGNIFICANT CHANGE UP (ref 3.5–5.3)
POTASSIUM SERPL-SCNC: 4.4 MMOL/L — SIGNIFICANT CHANGE UP (ref 3.5–5.3)
RBC # BLD: 5.03 M/UL — SIGNIFICANT CHANGE UP (ref 3.8–5.2)
RBC # FLD: 14.1 % — SIGNIFICANT CHANGE UP (ref 10.3–14.5)
SODIUM SERPL-SCNC: 141 MMOL/L — SIGNIFICANT CHANGE UP (ref 135–145)
WBC # BLD: 6.75 K/UL — SIGNIFICANT CHANGE UP (ref 3.8–10.5)
WBC # FLD AUTO: 6.75 K/UL — SIGNIFICANT CHANGE UP (ref 3.8–10.5)

## 2024-11-10 PROCEDURE — 70496 CT ANGIOGRAPHY HEAD: CPT | Mod: 26,MC

## 2024-11-10 PROCEDURE — 85025 COMPLETE CBC W/AUTO DIFF WBC: CPT

## 2024-11-10 PROCEDURE — 70450 CT HEAD/BRAIN W/O DYE: CPT | Mod: 26,59,MC

## 2024-11-10 PROCEDURE — 93010 ELECTROCARDIOGRAM REPORT: CPT

## 2024-11-10 PROCEDURE — 93005 ELECTROCARDIOGRAM TRACING: CPT

## 2024-11-10 PROCEDURE — 36415 COLL VENOUS BLD VENIPUNCTURE: CPT

## 2024-11-10 PROCEDURE — 99285 EMERGENCY DEPT VISIT HI MDM: CPT | Mod: 25

## 2024-11-10 PROCEDURE — 70498 CT ANGIOGRAPHY NECK: CPT | Mod: 26,MC

## 2024-11-10 PROCEDURE — 70498 CT ANGIOGRAPHY NECK: CPT | Mod: MC

## 2024-11-10 PROCEDURE — 70496 CT ANGIOGRAPHY HEAD: CPT | Mod: MC

## 2024-11-10 PROCEDURE — 80048 BASIC METABOLIC PNL TOTAL CA: CPT

## 2024-11-10 PROCEDURE — 99285 EMERGENCY DEPT VISIT HI MDM: CPT

## 2024-11-10 PROCEDURE — 85730 THROMBOPLASTIN TIME PARTIAL: CPT

## 2024-11-10 PROCEDURE — 82962 GLUCOSE BLOOD TEST: CPT

## 2024-11-10 PROCEDURE — 36000 PLACE NEEDLE IN VEIN: CPT

## 2024-11-10 PROCEDURE — 85610 PROTHROMBIN TIME: CPT

## 2024-11-10 PROCEDURE — 70450 CT HEAD/BRAIN W/O DYE: CPT | Mod: MC

## 2024-11-10 RX ORDER — SODIUM CHLORIDE 9 MG/ML
1000 INJECTION, SOLUTION INTRAMUSCULAR; INTRAVENOUS; SUBCUTANEOUS ONCE
Refills: 0 | Status: COMPLETED | OUTPATIENT
Start: 2024-11-10 | End: 2024-11-10

## 2024-11-10 RX ORDER — MECLIZINE HCL 25 MG
1 TABLET ORAL
Qty: 20 | Refills: 0
Start: 2024-11-10 | End: 2024-11-19

## 2024-11-10 RX ORDER — MECLIZINE HCL 25 MG
25 TABLET ORAL ONCE
Refills: 0 | Status: COMPLETED | OUTPATIENT
Start: 2024-11-10 | End: 2024-11-10

## 2024-11-10 RX ADMIN — SODIUM CHLORIDE 1000 MILLILITER(S): 9 INJECTION, SOLUTION INTRAMUSCULAR; INTRAVENOUS; SUBCUTANEOUS at 14:16

## 2024-11-10 RX ADMIN — Medication 25 MILLIGRAM(S): at 16:15

## 2024-11-10 RX ADMIN — SODIUM CHLORIDE 1000 MILLILITER(S): 9 INJECTION, SOLUTION INTRAMUSCULAR; INTRAVENOUS; SUBCUTANEOUS at 14:15

## 2024-11-10 RX ADMIN — Medication 25 MILLIGRAM(S): at 14:16

## 2024-11-10 NOTE — ED PROVIDER NOTE - NSFOLLOWUPINSTRUCTIONS_ED_ALL_ED_FT
Please stay well hydrated. Take meclizine as needed for dizziness. Return for worsening symptoms, dizziness, blurry vision, numbness, weakness, tingling. Otherwise, please follow up with your primary physician.    I hope you feel better soon!    Sincerely,  Vaughn Jiménez MD

## 2024-11-10 NOTE — ED PROVIDER NOTE - PHYSICAL EXAMINATION
general: Well appearing, in no acute distress  HEENT: Normocephalic, atraumatic, extraocular movements intact  CV: Regular rate  Pulm: No respiratory distress, no tachypnea  Abd: Flat, no gross distension  Ext: warm and well perfused  Skin: No gross rashes or lesions  Neuro: Alert and oriented, moving all extremities, current nerves II to XII intact, motor station intact bilaterally, no dysmetria, negative Romberg's

## 2024-11-10 NOTE — ED ADULT NURSE NOTE - NSFALLHARMRISKINTERV_ED_ALL_ED
Assistance OOB with selected safe patient handling equipment if applicable/Assistance with ambulation/Communicate risk of Fall with Harm to all staff, patient, and family/Encourage patient to sit up slowly, dangle for a short time, stand at bedside before walking/Monitor gait and stability/Orthostatic vital signs/Provide patient with walking aids/Provide visual cue: red socks, yellow wristband, yellow gown, etc/Reinforce activity limits and safety measures with patient and family/Bed in lowest position, wheels locked, appropriate side rails in place/Call bell, personal items and telephone in reach/Instruct patient to call for assistance before getting out of bed/chair/stretcher/Non-slip footwear applied when patient is off stretcher/[place] to call system/Physically safe environment - no spills, clutter or unnecessary equipment/Purposeful Proactive Rounding/Room/bathroom lighting operational, light cord in reach

## 2024-11-10 NOTE — ED PROVIDER NOTE - OBJECTIVE STATEMENT
64 year old female with history of asthma/COPD, anxiety, OA, H. pylori, HTN, HLD, T2DM, severe AS s/p recent TAVR and CAD s/p CABGPresenting with 2 days of dizziness, described as feeling off balance, waxing and waning but now persistent without blurry vision, nausea, vomiting.  Has had vertigo in the past.  No chest pain or shortness of breath, no lightheadedness, no abdominal pain, no nausea or vomiting or diarrhea, no melena or BRB per rectum.  ROS above.

## 2024-11-10 NOTE — ED PROVIDER NOTE - PATIENT PORTAL LINK FT
You can access the FollowMyHealth Patient Portal offered by [facility] by registering at the following website: [email or web address]. By joining [facility]’s FollowMyHealth portal, you will also be able to view your health information using other applications (apps) compatible with our system.

## 2024-11-10 NOTE — ED ADULT NURSE NOTE - OBJECTIVE STATEMENT
Pt is a 65y/o F w/ PMHx CAD (+thinners, +stents), HTN, HLD, asthma, DM, GERD, sciatica, anxiety/panic attacks, presenting to the ED w/ c/o of dizziness since Friday night, "I woke up in the middle of the night and felt like the room was spinning, I felt nauseous, I took my Diazepam and tried to go back to sleep." Pt today w/ c/o of dizziness, "chest tightness," Pt denies fever/chills, abd pain, V/D, HA, visual changes, unilateral weakness, pain, urinary symptoms, recent falls @ home. Pt A/Ox3, speaking in clear/complete sentences. Respirations easy/even and unlabored on RA. Pt ambulates w/ walker @ baseline. Pt placed in gown, resting comfortably in stretcher, no acute distress. Pending ED provider eval. EKG completed, IV placed.

## 2024-11-10 NOTE — ED ADULT NURSE NOTE - RESPIRATORY ASSESSMENT
Detail Level: Detailed Clofazimine Pregnancy And Lactation Text: This medication is Pregnancy Category C and isn't considered safe during pregnancy. It is excreted in breast milk. Methotrexate Pregnancy And Lactation Text: This medication is Pregnancy Category X and is known to cause fetal harm. This medication is excreted in breast milk. Tetracycline Pregnancy And Lactation Text: This medication is Pregnancy Category D and not consider safe during pregnancy. It is also excreted in breast milk. Cosentyx Counseling:  I discussed with the patient the risks of Cosentyx including but not limited to worsening of Crohn's disease, immunosuppression, allergic reactions and infections.  The patient understands that monitoring is required including a PPD at baseline and must alert us or the primary physician if symptoms of infection or other concerning signs are noted. Drysol Counseling:  I discussed with the patient the risks of drysol/aluminum chloride including but not limited to skin rash, itching, irritation, burning. Glycopyrrolate Pregnancy And Lactation Text: This medication is Pregnancy Category B and is considered safe during pregnancy. It is unknown if it is excreted breast milk. Isotretinoin Pregnancy And Lactation Text: This medication is Pregnancy Category X and is considered extremely dangerous during pregnancy. It is unknown if it is excreted in breast milk. Bactrim Counseling:  I discussed with the patient the risks of sulfa antibiotics including but not limited to GI upset, allergic reaction, drug rash, diarrhea, dizziness, photosensitivity, and yeast infections.  Rarely, more serious reactions can occur including but not limited to aplastic anemia, agranulocytosis, methemoglobinemia, blood dyscrasias, liver or kidney failure, lung infiltrates or desquamative/blistering drug rashes. Topical Retinoid Pregnancy And Lactation Text: This medication is Pregnancy Category C. It is unknown if this medication is excreted in breast milk. Stelara Counseling:  I discussed with the patient the risks of ustekinumab including but not limited to immunosuppression, malignancy, posterior leukoencephalopathy syndrome, and serious infections.  The patient understands that monitoring is required including a PPD at baseline and must alert us or the primary physician if symptoms of infection or other concerning signs are noted. Thalidomide Pregnancy And Lactation Text: This medication is Pregnancy Category X and is absolutely contraindicated during pregnancy. It is unknown if it is excreted in breast milk. Ilumya Pregnancy And Lactation Text: The risk during pregnancy and breastfeeding is uncertain with this medication. Glycopyrrolate Counseling:  I discussed with the patient the risks of glycopyrrolate including but not limited to skin rash, drowsiness, dry mouth, difficulty urinating, and blurred vision. Oxybutynin Counseling:  I discussed with the patient the risks of oxybutynin including but not limited to skin rash, drowsiness, dry mouth, difficulty urinating, and blurred vision. 5-Fu Pregnancy And Lactation Text: This medication is Pregnancy Category X and contraindicated in pregnancy and in women who may become pregnant. It is unknown if this medication is excreted in breast milk. Ketoconazole Pregnancy And Lactation Text: This medication is Pregnancy Category C and it isn't know if it is safe during pregnancy. It is also excreted in breast milk and breast feeding isn't recommended. Cimzia Pregnancy And Lactation Text: This medication crosses the placenta but can be considered safe in certain situations. Cimzia may be excreted in breast milk. Erythromycin Pregnancy And Lactation Text: This medication is Pregnancy Category B and is considered safe during pregnancy. It is also excreted in breast milk. Topical Clindamycin Pregnancy And Lactation Text: This medication is Pregnancy Category B and is considered safe during pregnancy. It is unknown if it is excreted in breast milk. Cosentyx Pregnancy And Lactation Text: This medication is Pregnancy Category B and is considered safe during pregnancy. It is unknown if this medication is excreted in breast milk. Drysol Pregnancy And Lactation Text: This medication is considered safe during pregnancy and breast feeding. Bactrim Pregnancy And Lactation Text: This medication is Pregnancy Category D and is known to cause fetal risk.  It is also excreted in breast milk. Taltz Counseling: I discussed with the patient the risks of ixekizumab including but not limited to immunosuppression, serious infections, worsening of inflammatory bowel disease and drug reactions.  The patient understands that monitoring is required including a PPD at baseline and must alert us or the primary physician if symptoms of infection or other concerning signs are noted. Albendazole Counseling:  I discussed with the patient the risks of albendazole including but not limited to cytopenia, kidney damage, nausea/vomiting and severe allergy.  The patient understands that this medication is being used in an off-label manner. Valtrex Pregnancy And Lactation Text: this medication is Pregnancy Category B and is considered safe during pregnancy. This medication is not directly found in breast milk but it's metabolite acyclovir is present. Tazorac Counseling:  Patient advised that medication is irritating and drying.  Patient may need to apply sparingly and wash off after an hour before eventually leaving it on overnight.  The patient verbalized understanding of the proper use and possible adverse effects of tazorac.  All of the patient's questions and concerns were addressed. Metronidazole Pregnancy And Lactation Text: This medication is Pregnancy Category B and considered safe during pregnancy.  It is also excreted in breast milk. Hydroxychloroquine Counseling:  I discussed with the patient that a baseline ophthalmologic exam is needed at the start of therapy and every year thereafter while on therapy. A CBC may also be warranted for monitoring.  The side effects of this medication were discussed with the patient, including but not limited to agranulocytosis, aplastic anemia, seizures, rashes, retinopathy, and liver toxicity. Patient instructed to call the office should any adverse effect occur.  The patient verbalized understanding of the proper use and possible adverse effects of Plaquenil.  All the patient's questions and concerns were addressed. Prednisone Counseling:  I discussed with the patient the risks of prolonged use of prednisone including but not limited to weight gain, insomnia, osteoporosis, mood changes, diabetes, susceptibility to infection, glaucoma and high blood pressure.  In cases where prednisone use is prolonged, patients should be monitored with blood pressure checks, serum glucose levels and an eye exam.  Additionally, the patient may need to be placed on GI prophylaxis, PCP prophylaxis, and calcium and vitamin D supplementation and/or a bisphosphonate.  The patient verbalized understanding of the proper use and the possible adverse effects of prednisone.  All of the patient's questions and concerns were addressed. Terbinafine Counseling: Patient counseling regarding adverse effects of terbinafine including but not limited to headache, diarrhea, rash, upset stomach, liver function test abnormalities, itching, taste/smell disturbance, nausea, abdominal pain, and flatulence.  There is a rare possibility of liver failure that can occur when taking terbinafine.  The patient understands that a baseline LFT and kidney function test may be required. The patient verbalized understanding of the proper use and possible adverse effects of terbinafine.  All of the patient's questions and concerns were addressed. Topical Sulfur Applications Counseling: Topical Sulfur Counseling: Patient counseled that this medication may cause skin irritation or allergic reactions.  In the event of skin irritation, the patient was advised to reduce the amount of the drug applied or use it less frequently.   The patient verbalized understanding of the proper use and possible adverse effects of topical sulfur application.  All of the patient's questions and concerns were addressed. Azathioprine Counseling:  I discussed with the patient the risks of azathioprine including but not limited to myelosuppression, immunosuppression, hepatotoxicity, lymphoma, and infections.  The patient understands that monitoring is required including baseline LFTs, Creatinine, possible TPMP genotyping and weekly CBCs for the first month and then every 2 weeks thereafter.  The patient verbalized understanding of the proper use and possible adverse effects of azathioprine.  All of the patient's questions and concerns were addressed. Metronidazole Counseling:  I discussed with the patient the risks of metronidazole including but not limited to seizures, nausea/vomiting, a metallic taste in the mouth, nausea/vomiting and severe allergy. High Dose Vitamin A Counseling: Side effects reviewed, pt to contact office should one occur. Valtrex Counseling: I discussed with the patient the risks of valacyclovir including but not limited to kidney damage, nausea, vomiting and severe allergy.  The patient understands that if the infection seems to be worsening or is not improving, they are to call. Colchicine Counseling:  Patient counseled regarding adverse effects including but not limited to stomach upset (nausea, vomiting, stomach pain, or diarrhea).  Patient instructed to limit alcohol consumption while taking this medication.  Colchicine may reduce blood counts especially with prolonged use.  The patient understands that monitoring of kidney function and blood counts may be required, especially at baseline. The patient verbalized understanding of the proper use and possible adverse effects of colchicine.  All of the patient's questions and concerns were addressed. Minoxidil Counseling: Minoxidil is a topical medication which can increase blood flow where it is applied. It is uncertain how this medication increases hair growth. Side effects are uncommon and include stinging and allergic reactions. Infliximab Counseling:  I discussed with the patient the risks of infliximab including but not limited to myelosuppression, immunosuppression, autoimmune hepatitis, demyelinating diseases, lymphoma, and serious infections.  The patient understands that monitoring is required including a PPD at baseline and must alert us or the primary physician if symptoms of infection or other concerning signs are noted. Picato Counseling:  I discussed with the patient the risks of Picato including but not limited to erythema, scaling, itching, weeping, crusting, and pain. Hydroxychloroquine Pregnancy And Lactation Text: This medication has been shown to cause fetal harm but it isn't assigned a Pregnancy Risk Category. There are small amounts excreted in breast milk. Birth Control Pills Pregnancy And Lactation Text: This medication should be avoided if pregnant and for the first 30 days post-partum. Elidel Counseling: Patient may experience a mild burning sensation during topical application. Elidel is not approved in children less than 2 years of age. There have been case reports of hematologic and skin malignancies in patients using topical calcineurin inhibitors although causality is questionable. Dupixent Counseling: I discussed with the patient the risks of dupilumab including but not limited to eye infection and irritation, cold sores, injection site reactions, worsening of asthma, allergic reactions and increased risk of parasitic infection.  Live vaccines should be avoided while taking dupilumab. Dupilumab will also interact with certain medications such as warfarin and cyclosporine. The patient understands that monitoring is required and they must alert us or the primary physician if symptoms of infection or other concerning signs are noted. Minocycline Counseling: Patient advised regarding possible photosensitivity and discoloration of the teeth, skin, lips, tongue and gums.  Patient instructed to avoid sunlight, if possible.  When exposed to sunlight, patients should wear protective clothing, sunglasses, and sunscreen.  The patient was instructed to call the office immediately if the following severe adverse effects occur:  hearing changes, easy bruising/bleeding, severe headache, or vision changes.  The patient verbalized understanding of the proper use and possible adverse effects of minocycline.  All of the patient's questions and concerns were addressed. Zyclara Counseling:  I discussed with the patient the risks of imiquimod including but not limited to erythema, scaling, itching, weeping, crusting, and pain.  Patient understands that the inflammatory response to imiquimod is variable from person to person and was educated regarded proper titration schedule.  If flu-like symptoms develop, patient knows to discontinue the medication and contact us. Tazorac Pregnancy And Lactation Text: This medication is not safe during pregnancy. It is unknown if this medication is excreted in breast milk. Fluconazole Counseling:  Patient counseled regarding adverse effects of fluconazole including but not limited to headache, diarrhea, nausea, upset stomach, liver function test abnormalities, taste disturbance, and stomach pain.  There is a rare possibility of liver failure that can occur when taking fluconazole.  The patient understands that monitoring of LFTs and kidney function test may be required, especially at baseline. The patient verbalized understanding of the proper use and possible adverse effects of fluconazole.  All of the patient's questions and concerns were addressed. - - - Albendazole Pregnancy And Lactation Text: This medication is Pregnancy Category C and it isn't known if it is safe during pregnancy. It is also excreted in breast milk. High Dose Vitamin A Pregnancy And Lactation Text: High dose vitamin A therapy is contraindicated during pregnancy and breast feeding. Topical Sulfur Applications Pregnancy And Lactation Text: This medication is Pregnancy Category C and has an unknown safety profile during pregnancy. It is unknown if this topical medication is excreted in breast milk. Birth Control Pills Counseling: Birth Control Pill Counseling: I discussed with the patient the potential side effects of OCPs including but not limited to increased risk of stroke, heart attack, thrombophlebitis, deep venous thrombosis, hepatic adenomas, breast changes, GI upset, headaches, and depression.  The patient verbalized understanding of the proper use and possible adverse effects of OCPs. All of the patient's questions and concerns were addressed. Terbinafine Pregnancy And Lactation Text: This medication is Pregnancy Category B and is considered safe during pregnancy. It is also excreted in breast milk and breast feeding isn't recommended. Prednisone Pregnancy And Lactation Text: This medication is Pregnancy Category C and it isn't know if it is safe during pregnancy. This medication is excreted in breast milk. Cephalexin Counseling: I counseled the patient regarding use of cephalexin as an antibiotic for prophylactic and/or therapeutic purposes. Cephalexin (commonly prescribed under brand name Keflex) is a cephalosporin antibiotic which is active against numerous classes of bacteria, including most skin bacteria. Side effects may include nausea, diarrhea, gastrointestinal upset, rash, hives, yeast infections, and in rare cases, hepatitis, kidney disease, seizures, fever, confusion, neurologic symptoms, and others. Patients with severe allergies to penicillin medications are cautioned that there is about a 10% incidence of cross-reactivity with cephalosporins. When possible, patients with penicillin allergies should use alternatives to cephalosporins for antibiotic therapy. Minoxidil Pregnancy And Lactation Text: This medication has not been assigned a Pregnancy Risk Category but animal studies failed to show danger with the topical medication. It is unknown if the medication is excreted in breast milk. Azathioprine Pregnancy And Lactation Text: This medication is Pregnancy Category D and isn't considered safe during pregnancy. It is unknown if this medication is excreted in breast milk. Cimetidine Counseling:  I discussed with the patient the risks of Cimetidine including but not limited to gynecomastia, headache, diarrhea, nausea, drowsiness, arrhythmias, pancreatitis, skin rashes, psychosis, bone marrow suppression and kidney toxicity. Spironolactone Counseling: Patient advised regarding risks of diarrhea, abdominal pain, hyperkalemia, birth defects (for female patients), liver toxicity and renal toxicity. The patient may need blood work to monitor liver and kidney function and potassium levels while on therapy. The patient verbalized understanding of the proper use and possible adverse effects of spironolactone.  All of the patient's questions and concerns were addressed. Use Enhanced Medication Counseling?: No Benzoyl Peroxide Counseling: Patient counseled that medicine may cause skin irritation and bleach clothing.  In the event of skin irritation, the patient was advised to reduce the amount of the drug applied or use it less frequently.   The patient verbalized understanding of the proper use and possible adverse effects of benzoyl peroxide.  All of the patient's questions and concerns were addressed. Tremfya Counseling: I discussed with the patient the risks of guselkumab including but not limited to immunosuppression, serious infections, worsening of inflammatory bowel disease and drug reactions.  The patient understands that monitoring is required including a PPD at baseline and must alert us or the primary physician if symptoms of infection or other concerning signs are noted. Dapsone Pregnancy And Lactation Text: This medication is Pregnancy Category C and is not considered safe during pregnancy or breast feeding. Griseofulvin Counseling:  I discussed with the patient the risks of griseofulvin including but not limited to photosensitivity, cytopenia, liver damage, nausea/vomiting and severe allergy.  The patient understands that this medication is best absorbed when taken with a fatty meal (e.g., ice cream or french fries). Dapsone Counseling: I discussed with the patient the risks of dapsone including but not limited to hemolytic anemia, agranulocytosis, rashes, methemoglobinemia, kidney failure, peripheral neuropathy, headaches, GI upset, and liver toxicity.  Patients who start dapsone require monitoring including baseline LFTs and weekly CBCs for the first month, then every month thereafter.  The patient verbalized understanding of the proper use and possible adverse effects of dapsone.  All of the patient's questions and concerns were addressed. Rituxan Counseling:  I discussed with the patient the risks of Rituxan infusions. Side effects can include infusion reactions, severe drug rashes including mucocutaneous reactions, reactivation of latent hepatitis and other infections and rarely progressive multifocal leukoencephalopathy.  All of the patient's questions and concerns were addressed. Azithromycin Counseling:  I discussed with the patient the risks of azithromycin including but not limited to GI upset, allergic reaction, drug rash, diarrhea, and yeast infections. Dupixent Pregnancy And Lactation Text: This medication likely crosses the placenta but the risk for the fetus is uncertain. This medication is excreted in breast milk. Fluconazole Pregnancy And Lactation Text: This medication is Pregnancy Category C and it isn't know if it is safe during pregnancy. It is also excreted in breast milk. Cellcept Counseling:  I discussed with the patient the risks of mycophenolate mofetil including but not limited to infection/immunosuppression, GI upset, hypokalemia, hypercholesterolemia, bone marrow suppression, lymphoproliferative disorders, malignancy, GI ulceration/bleed/perforation, colitis, interstitial lung disease, kidney failure, progressive multifocal leukoencephalopathy, and birth defects.  The patient understands that monitoring is required including a baseline creatinine and regular CBC testing. In addition, patient must alert us immediately if symptoms of infection or other concerning signs are noted. Nsaids Counseling: NSAID Counseling: I discussed with the patient that NSAIDs should be taken with food. Prolonged use of NSAIDs can result in the development of stomach ulcers.  Patient advised to stop taking NSAIDs if abdominal pain occurs.  The patient verbalized understanding of the proper use and possible adverse effects of NSAIDs.  All of the patient's questions and concerns were addressed. Ivermectin Counseling:  Patient instructed to take medication on an empty stomach with a full glass of water.  Patient informed of potential adverse effects including but not limited to nausea, diarrhea, dizziness, itching, and swelling of the extremities or lymph nodes.  The patient verbalized understanding of the proper use and possible adverse effects of ivermectin.  All of the patient's questions and concerns were addressed. Cephalexin Pregnancy And Lactation Text: This medication is Pregnancy Category B and considered safe during pregnancy.  It is also excreted in breast milk but can be used safely for shorter doses. Benzoyl Peroxide Pregnancy And Lactation Text: This medication is Pregnancy Category C. It is unknown if benzoyl peroxide is excreted in breast milk. Griseofulvin Pregnancy And Lactation Text: This medication is Pregnancy Category X and is known to cause serious birth defects. It is unknown if this medication is excreted in breast milk but breast feeding should be avoided. Spironolactone Pregnancy And Lactation Text: This medication can cause feminization of the male fetus and should be avoided during pregnancy. The active metabolite is also found in breast milk. Siliq Counseling:  I discussed with the patient the risks of Siliq including but not limited to new or worsening depression, suicidal thoughts and behavior, immunosuppression, malignancy, posterior leukoencephalopathy syndrome, and serious infections.  The patient understands that monitoring is required including a PPD at baseline and must alert us or the primary physician if symptoms of infection or other concerning signs are noted. There is also a special program designed to monitor depression which is required with Siliq. Acitretin Pregnancy And Lactation Text: This medication is Pregnancy Category X and should not be given to women who are pregnant or may become pregnant in the future. This medication is excreted in breast milk. Protopic Counseling: Patient may experience a mild burning sensation during topical application. Protopic is not approved in children less than 2 years of age. There have been case reports of hematologic and skin malignancies in patients using topical calcineurin inhibitors although causality is questionable. Clindamycin Pregnancy And Lactation Text: This medication can be used in pregnancy if certain situations. Clindamycin is also present in breast milk. Cyclophosphamide Counseling:  I discussed with the patient the risks of cyclophosphamide including but not limited to hair loss, hormonal abnormalities, decreased fertility, abdominal pain, diarrhea, nausea and vomiting, bone marrow suppression and infection. The patient understands that monitoring is required while taking this medication. Erivedge Counseling- I discussed with the patient the risks of Erivedge including but not limited to nausea, vomiting, diarrhea, constipation, weight loss, changes in the sense of taste, decreased appetite, muscle spasms, and hair loss.  The patient verbalized understanding of the proper use and possible adverse effects of Erivedge.  All of the patient's questions and concerns were addressed. Enbrel Counseling:  I discussed with the patient the risks of etanercept including but not limited to myelosuppression, immunosuppression, autoimmune hepatitis, demyelinating diseases, lymphoma, and infections.  The patient understands that monitoring is required including a PPD at baseline and must alert us or the primary physician if symptoms of infection or other concerning signs are noted. Eucrisa Counseling: Patient may experience a mild burning sensation during topical application. Eucrisa is not approved in children less than 2 years of age. Clindamycin Counseling: I counseled the patient regarding use of clindamycin as an antibiotic for prophylactic and/or therapeutic purposes. Clindamycin is active against numerous classes of bacteria, including skin bacteria. Side effects may include nausea, diarrhea, gastrointestinal upset, rash, hives, yeast infections, and in rare cases, colitis. Quinolones Counseling:  I discussed with the patient the risks of fluoroquinolones including but not limited to GI upset, allergic reaction, drug rash, diarrhea, dizziness, photosensitivity, yeast infections, liver function test abnormalities, tendonitis/tendon rupture. Rituxan Pregnancy And Lactation Text: This medication is Pregnancy Category C and it isn't know if it is safe during pregnancy. It is unknown if this medication is excreted in breast milk but similar antibodies are known to be excreted. Nsaids Pregnancy And Lactation Text: These medications are considered safe up to 30 weeks gestation. It is excreted in breast milk. Acitretin Counseling:  I discussed with the patient the risks of acitretin including but not limited to hair loss, dry lips/skin/eyes, liver damage, hyperlipidemia, depression/suicidal ideation, photosensitivity.  Serious rare side effects can include but are not limited to pancreatitis, pseudotumor cerebri, bony changes, clot formation/stroke/heart attack.  Patient understands that alcohol is contraindicated since it can result in liver toxicity and significantly prolong the elimination of the drug by many years. Bexarotene Counseling:  I discussed with the patient the risks of bexarotene including but not limited to hair loss, dry lips/skin/eyes, liver abnormalities, hyperlipidemia, pancreatitis, depression/suicidal ideation, photosensitivity, drug rash/allergic reactions, hypothyroidism, anemia, leukopenia, infection, cataracts, and teratogenicity.  Patient understands that they will need regular blood tests to check lipid profile, liver function tests, white blood cell count, thyroid function tests and pregnancy test if applicable. Hydroquinone Counseling:  Patient advised that medication may result in skin irritation, lightening (hypopigmentation), dryness, and burning.  In the event of skin irritation, the patient was advised to reduce the amount of the drug applied or use it less frequently.  Rarely, spots that are treated with hydroquinone can become darker (pseudoochronosis).  Should this occur, patient instructed to stop medication and call the office. The patient verbalized understanding of the proper use and possible adverse effects of hydroquinone.  All of the patient's questions and concerns were addressed. Humira Counseling:  I discussed with the patient the risks of adalimumab including but not limited to myelosuppression, immunosuppression, autoimmune hepatitis, demyelinating diseases, lymphoma, and serious infections.  The patient understands that monitoring is required including a PPD at baseline and must alert us or the primary physician if symptoms of infection or other concerning signs are noted. Itraconazole Counseling:  I discussed with the patient the risks of itraconazole including but not limited to liver damage, nausea/vomiting, neuropathy, and severe allergy.  The patient understands that this medication is best absorbed when taken with acidic beverages such as non-diet cola or ginger ale.  The patient understands that monitoring is required including baseline LFTs and repeat LFTs at intervals.  The patient understands that they are to contact us or the primary physician if concerning signs are noted. Cyclophosphamide Pregnancy And Lactation Text: This medication is Pregnancy Category D and it isn't considered safe during pregnancy. This medication is excreted in breast milk. SSKI Counseling:  I discussed with the patient the risks of SSKI including but not limited to thyroid abnormalities, metallic taste, GI upset, fever, headache, acne, arthralgias, paraesthesias, lymphadenopathy, easy bleeding, arrhythmias, and allergic reaction. Carac Counseling:  I discussed with the patient the risks of Carac including but not limited to erythema, scaling, itching, weeping, crusting, and pain. Doxycycline Counseling:  Patient counseled regarding possible photosensitivity and increased risk for sunburn.  Patient instructed to avoid sunlight, if possible.  When exposed to sunlight, patients should wear protective clothing, sunglasses, and sunscreen.  The patient was instructed to call the office immediately if the following severe adverse effects occur:  hearing changes, easy bruising/bleeding, severe headache, or vision changes.  The patient verbalized understanding of the proper use and possible adverse effects of doxycycline.  All of the patient's questions and concerns were addressed. Protopic Pregnancy And Lactation Text: This medication is Pregnancy Category C. It is unknown if this medication is excreted in breast milk when applied topically. Odomzo Counseling- I discussed with the patient the risks of Odomzo including but not limited to nausea, vomiting, diarrhea, constipation, weight loss, changes in the sense of taste, decreased appetite, muscle spasms, and hair loss.  The patient verbalized understanding of the proper use and possible adverse effects of Odomzo.  All of the patient's questions and concerns were addressed. Doxepin Counseling:  Patient advised that the medication is sedating and not to drive a car after taking this medication. Patient informed of potential adverse effects including but not limited to dry mouth, urinary retention, and blurry vision.  The patient verbalized understanding of the proper use and possible adverse effects of doxepin.  All of the patient's questions and concerns were addressed. Xeljanz Counseling: I discussed with the patient the risks of Xeljanz therapy including increased risk of infection, liver issues, headache, diarrhea, or cold symptoms. Live vaccines should be avoided. They were instructed to call if they have any problems. Otezla Counseling: The side effects of Otezla were discussed with the patient, including but not limited to worsening or new depression, weight loss, diarrhea, nausea, upper respiratory tract infection, and headache. Patient instructed to call the office should any adverse effect occur.  The patient verbalized understanding of the proper use and possible adverse effects of Otezla.  All the patient's questions and concerns were addressed. Doxycycline Pregnancy And Lactation Text: This medication is Pregnancy Category D and not consider safe during pregnancy. It is also excreted in breast milk but is considered safe for shorter treatment courses. Xolair Counseling:  Patient informed of potential adverse effects including but not limited to fever, muscle aches, rash and allergic reactions.  The patient verbalized understanding of the proper use and possible adverse effects of Xolair.  All of the patient's questions and concerns were addressed. Sski Pregnancy And Lactation Text: This medication is Pregnancy Category D and isn't considered safe during pregnancy. It is excreted in breast milk. Bexarotene Pregnancy And Lactation Text: This medication is Pregnancy Category X and should not be given to women who are pregnant or may become pregnant. This medication should not be used if you are breast feeding. Rifampin Pregnancy And Lactation Text: This medication is Pregnancy Category C and it isn't know if it is safe during pregnancy. It is also excreted in breast milk and should not be used if you are breast feeding. Simponi Counseling:  I discussed with the patient the risks of golimumab including but not limited to myelosuppression, immunosuppression, autoimmune hepatitis, demyelinating diseases, lymphoma, and serious infections.  The patient understands that monitoring is required including a PPD at baseline and must alert us or the primary physician if symptoms of infection or other concerning signs are noted. Doxepin Pregnancy And Lactation Text: This medication is Pregnancy Category C and it isn't known if it is safe during pregnancy. It is also excreted in breast milk and breast feeding isn't recommended. Arava Counseling:  Patient counseled regarding adverse effects of Arava including but not limited to nausea, vomiting, abnormalities in liver function tests. Patients may develop mouth sores, rash, diarrhea, and abnormalities in blood counts. The patient understands that monitoring is required including LFTs and blood counts.  There is a rare possibility of scarring of the liver and lung problems that can occur when taking methotrexate. Persistent nausea, loss of appetite, pale stools, dark urine, cough, and shortness of breath should be reported immediately. Patient advised to discontinue Arava treatment and consult with a physician prior to attempting conception. The patient will have to undergo a treatment to eliminate Arava from the body prior to conception. Cyclosporine Counseling:  I discussed with the patient the risks of cyclosporine including but not limited to hypertension, gingival hyperplasia,myelosuppression, immunosuppression, liver damage, kidney damage, neurotoxicity, lymphoma, and serious infections. The patient understands that monitoring is required including baseline blood pressure, CBC, CMP, lipid panel and uric acid, and then 1-2 times monthly CMP and blood pressure. Rifampin Counseling: I discussed with the patient the risks of rifampin including but not limited to liver damage, kidney damage, red-orange body fluids, nausea/vomiting and severe allergy. Xelrenuz Pregnancy And Lactation Text: This medication is Pregnancy Category D and is not considered safe during pregnancy.  The risk during breast feeding is also uncertain. Gabapentin Counseling: I discussed with the patient the risks of gabapentin including but not limited to dizziness, somnolence, fatigue and ataxia. Solaraze Counseling:  I discussed with the patient the risks of Solaraze including but not limited to erythema, scaling, itching, weeping, crusting, and pain. Tetracycline Counseling: Patient counseled regarding possible photosensitivity and increased risk for sunburn.  Patient instructed to avoid sunlight, if possible.  When exposed to sunlight, patients should wear protective clothing, sunglasses, and sunscreen.  The patient was instructed to call the office immediately if the following severe adverse effects occur:  hearing changes, easy bruising/bleeding, severe headache, or vision changes.  The patient verbalized understanding of the proper use and possible adverse effects of tetracycline.  All of the patient's questions and concerns were addressed. Patient understands to avoid pregnancy while on therapy due to potential birth defects. Topical Retinoid counseling:  Patient advised to apply a pea-sized amount only at bedtime and wait 30 minutes after washing their face before applying.  If too drying, patient may add a non-comedogenic moisturizer. The patient verbalized understanding of the proper use and possible adverse effects of retinoids.  All of the patient's questions and concerns were addressed. Methotrexate Counseling:  Patient counseled regarding adverse effects of methotrexate including but not limited to nausea, vomiting, abnormalities in liver function tests. Patients may develop mouth sores, rash, diarrhea, and abnormalities in blood counts. The patient understands that monitoring is required including LFT's and blood counts.  There is a rare possibility of scarring of the liver and lung problems that can occur when taking methotrexate. Persistent nausea, loss of appetite, pale stools, dark urine, cough, and shortness of breath should be reported immediately. Patient advised to discontinue methotrexate treatment at least three months before attempting to become pregnant.  I discussed the need for folate supplements while taking methotrexate.  These supplements can decrease side effects during methotrexate treatment. The patient verbalized understanding of the proper use and possible adverse effects of methotrexate.  All of the patient's questions and concerns were addressed. Otezla Pregnancy And Lactation Text: This medication is Pregnancy Category C and it isn't known if it is safe during pregnancy. It is unknown if it is excreted in breast milk. Azithromycin Pregnancy And Lactation Text: This medication is considered safe during pregnancy and is also secreted in breast milk. Ilumya Counseling: I discussed with the patient the risks of tildrakizumab including but not limited to immunosuppression, malignancy, posterior leukoencephalopathy syndrome, and serious infections.  The patient understands that monitoring is required including a PPD at baseline and must alert us or the primary physician if symptoms of infection or other concerning signs are noted. Imiquimod Counseling:  I discussed with the patient the risks of imiquimod including but not limited to erythema, scaling, itching, weeping, crusting, and pain.  Patient understands that the inflammatory response to imiquimod is variable from person to person and was educated regarded proper titration schedule.  If flu-like symptoms develop, patient knows to discontinue the medication and contact us. Hydroxyzine Pregnancy And Lactation Text: This medication is not safe during pregnancy and should not be taken. It is also excreted in breast milk and breast feeding isn't recommended. Xolair Pregnancy And Lactation Text: This medication is Pregnancy Category B and is considered safe during pregnancy. This medication is excreted in breast milk. Clofazimine Counseling:  I discussed with the patient the risks of clofazimine including but not limited to skin and eye pigmentation, liver damage, nausea/vomiting, gastrointestinal bleeding and allergy. 5-Fu Counseling: 5-Fluorouracil Counseling:  I discussed with the patient the risks of 5-fluorouracil including but not limited to erythema, scaling, itching, weeping, crusting, and pain. Cimzia Counseling:  I discussed with the patient the risks of Cimzia including but not limited to immunosuppression, allergic reactions and infections.  The patient understands that monitoring is required including a PPD at baseline and must alert us or the primary physician if symptoms of infection or other concerning signs are noted. Ketoconazole Counseling:   Patient counseled regarding improving absorption with orange juice.  Adverse effects include but are not limited to breast enlargement, headache, diarrhea, nausea, upset stomach, liver function test abnormalities, taste disturbance, and stomach pain.  There is a rare possibility of liver failure that can occur when taking ketoconazole. The patient understands that monitoring of LFTs may be required, especially at baseline. The patient verbalized understanding of the proper use and possible adverse effects of ketoconazole.  All of the patient's questions and concerns were addressed. Topical Clindamycin Counseling: Patient counseled that this medication may cause skin irritation or allergic reactions.  In the event of skin irritation, the patient was advised to reduce the amount of the drug applied or use it less frequently.   The patient verbalized understanding of the proper use and possible adverse effects of clindamycin.  All of the patient's questions and concerns were addressed. Hydroxyzine Counseling: Patient advised that the medication is sedating and not to drive a car after taking this medication.  Patient informed of potential adverse effects including but not limited to dry mouth, urinary retention, and blurry vision.  The patient verbalized understanding of the proper use and possible adverse effects of hydroxyzine.  All of the patient's questions and concerns were addressed. Isotretinoin Counseling: Patient should get monthly blood tests, not donate blood, not drive at night if vision affected, not share medication, and not undergo elective surgery for 6 months after tx completed. Side effects reviewed, pt to contact office should one occur. Erythromycin Counseling:  I discussed with the patient the risks of erythromycin including but not limited to GI upset, allergic reaction, drug rash, diarrhea, increase in liver enzymes, and yeast infections. Solaraze Pregnancy And Lactation Text: This medication is Pregnancy Category B and is considered safe. There is some data to suggest avoiding during the third trimester. It is unknown if this medication is excreted in breast milk. Thalidomide Counseling: I discussed with the patient the risks of thalidomide including but not limited to birth defects, anxiety, weakness, chest pain, dizziness, cough and severe allergy.

## 2024-11-10 NOTE — ED PROVIDER NOTE - DISPOSITION TYPE
Subjective   Patient ID: Mary is a 65 year old female.    Chief Complaint   Patient presents with   • Office Visit       64 Yo    Bf   With   H/o    Hyperlipidemia /obesity       Pt  Is   Doing well    No    Chest  Pain   oe sob  Good  Appetite    Not  feeling  Depress   Requesting    Flu  Vaccine  Gained one   Lb  Since  Last  Visit     Mary has a past medical history of Gastroesophageal reflux disease and High cholesterol.  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  Mary has a past surgical history that includes Elbow fracture surgery (Right); Tubal ligation (1999); Dilation and curettage of uterus; and Eye surgery (Right, 06/30/2020).  Her family history includes Cancer, Breast in her maternal grandmother and sister; Cancer, Esophageal in her father; Cancer, Lung in her mother; Coronary Artery Disease in her sister; Diabetes in her sister; Myocardial Infarction in her sister; Other in her sister.  Mary reports that she has never smoked. She has never used smokeless tobacco. She reports current alcohol use of about 2.0 standard drinks of alcohol per week. She reports that she does not use drugs.  Mary has a current medication list which includes the following prescription(s): atorvastatin, aspirin, [DISCONTINUED] atorvastatin, [DISCONTINUED] atorvastatin, and fluocinonide.  Mary   Current Outpatient Medications   Medication Sig Dispense Refill   • atorvastatin (LIPITOR) 20 MG tablet TAKE ONE TABLET BY MOUTH ONE TIME DAILY  90 tablet 1   • aspirin 81 MG chewable tablet Chew 81 mg  by mouth daily.     • fluocinonide (LIDEX) 0.05 % cream Apply topically 2 times daily. 60 g 1     No current facility-administered medications for this visit.     Mary is allergic to adhesive   (environmental).    Review of Systems   Constitutional: Negative for appetite change.   Respiratory: Negative for shortness of breath.    Cardiovascular: Negative for chest pain.   Psychiatric/Behavioral: Negative.    BP (!) 140/67 (BP Location: LUE - Left upper extremity, Patient Position: Sitting, Cuff Size: Large Adult)   Pulse 89   Wt 90.3 kg (199 lb)   BMI 32.12 kg/m²   BSA 2 m²   Objective   Physical Exam  Constitutional:       General: She is not in acute distress.     Appearance: Normal appearance. She is obese. She is not ill-appearing, toxic-appearing or diaphoretic.   HENT:      Head: Normocephalic and atraumatic.   Eyes:      General: No scleral icterus.        Right eye: No discharge.         Left eye: No discharge.      Conjunctiva/sclera: Conjunctivae normal.   Musculoskeletal:      Right lower leg: No edema.      Left lower leg: No edema.   Skin:     General: Skin is warm and dry.   Neurological:      General: No focal deficit present.      Mental Status: She is alert. Mental status is at baseline.   Psychiatric:         Mood and Affect: Mood normal.         Behavior: Behavior normal.         Thought Content: Thought content normal.         Judgment: Judgment normal.       Assessment   Problem List Items Addressed This Visit        Cardiac and Vasculature    Hyperlipidemia       Low  Chol  Diet  Cont   Statin  Therapy   Get  Labs   Next  Visit              Endocrine and Metabolic    Obesity (BMI 30-39.9)         Cont  Weight   Reduction  Diet              Infectious Diseases    Flu vaccine need - Primary    Relevant Orders    INFLUENZA QUADRIVALENT SPLIT PRES FREE 0.5 ML VACC, IM (FLULAVAL,FLUARIX,FLUZONE) (Completed)         DISCHARGE

## 2024-11-10 NOTE — ED ADULT TRIAGE NOTE - OTHER COMPLAINTS
reports dizziness when she gets up and moves her head. Denies any slurred speech or one sided weakness.

## 2024-11-10 NOTE — ED PROVIDER NOTE - CLINICAL SUMMARY MEDICAL DECISION MAKING FREE TEXT BOX
64-year-old female present with dizziness, suspect likely peripheral but with risk factors, outside therapeutic window will obtain CT, CTA for rule out posterior stroke, labs, trial meclizine, reassess.

## 2024-11-10 NOTE — ED PROVIDER NOTE - NS ED ROS FT
Constitutional: No fever or chills  Eyes: No discharge or drainage  Ears, Nose, Mouth, Throat: No nasal discharge, no sore throat  Cardiovascular: No chest pain, no palpitations  Respiratory: No shortness of breath, no cough  Gastrointestinal: No nausea or vomiting, no abdominal pain, no diarrhea or constipation  Musculoskeletal: No joint pain, no swelling  Skin: No rashes or lesions  Neurological: No numbness, weakness, tingling, no headache, +dizziness  Psychiatric: No depression

## 2024-11-12 ENCOUNTER — APPOINTMENT (OUTPATIENT)
Dept: HEART AND VASCULAR | Facility: CLINIC | Age: 64
End: 2024-11-12
Payer: MEDICARE

## 2024-11-12 ENCOUNTER — NON-APPOINTMENT (OUTPATIENT)
Age: 64
End: 2024-11-12

## 2024-11-12 VITALS — DIASTOLIC BLOOD PRESSURE: 90 MMHG | SYSTOLIC BLOOD PRESSURE: 140 MMHG

## 2024-11-12 VITALS
HEIGHT: 63 IN | OXYGEN SATURATION: 97 % | DIASTOLIC BLOOD PRESSURE: 90 MMHG | TEMPERATURE: 98.2 F | HEART RATE: 73 BPM | BODY MASS INDEX: 29.23 KG/M2 | SYSTOLIC BLOOD PRESSURE: 140 MMHG | WEIGHT: 165 LBS

## 2024-11-12 DIAGNOSIS — J44.9 CHRONIC OBSTRUCTIVE PULMONARY DISEASE, UNSPECIFIED: ICD-10-CM

## 2024-11-12 DIAGNOSIS — Z95.2 PRESENCE OF PROSTHETIC HEART VALVE: ICD-10-CM

## 2024-11-12 DIAGNOSIS — I10 ESSENTIAL (PRIMARY) HYPERTENSION: ICD-10-CM

## 2024-11-12 DIAGNOSIS — Z91.040 LATEX ALLERGY STATUS: ICD-10-CM

## 2024-11-12 DIAGNOSIS — E11.9 TYPE 2 DIABETES MELLITUS W/OUT COMPLICATIONS: ICD-10-CM

## 2024-11-12 DIAGNOSIS — Z88.8 ALLERGY STATUS TO OTHER DRUGS, MEDICAMENTS AND BIOLOGICAL SUBSTANCES: ICD-10-CM

## 2024-11-12 DIAGNOSIS — R42 DIZZINESS AND GIDDINESS: ICD-10-CM

## 2024-11-12 DIAGNOSIS — Z95.1 PRESENCE OF AORTOCORONARY BYPASS GRAFT: ICD-10-CM

## 2024-11-12 DIAGNOSIS — Z88.1 ALLERGY STATUS TO OTHER ANTIBIOTIC AGENTS: ICD-10-CM

## 2024-11-12 DIAGNOSIS — I25.10 ATHEROSCLEROTIC HEART DISEASE OF NATIVE CORONARY ARTERY WITHOUT ANGINA PECTORIS: ICD-10-CM

## 2024-11-12 DIAGNOSIS — M19.90 UNSPECIFIED OSTEOARTHRITIS, UNSPECIFIED SITE: ICD-10-CM

## 2024-11-12 DIAGNOSIS — Z88.2 ALLERGY STATUS TO SULFONAMIDES: ICD-10-CM

## 2024-11-12 DIAGNOSIS — E11.9 TYPE 2 DIABETES MELLITUS WITHOUT COMPLICATIONS: ICD-10-CM

## 2024-11-12 DIAGNOSIS — E78.5 HYPERLIPIDEMIA, UNSPECIFIED: ICD-10-CM

## 2024-11-12 DIAGNOSIS — Z95.5 PRESENCE OF CORONARY ANGIOPLASTY IMPLANT AND GRAFT: ICD-10-CM

## 2024-11-12 PROCEDURE — 93000 ELECTROCARDIOGRAM COMPLETE: CPT

## 2024-11-12 PROCEDURE — G2211 COMPLEX E/M VISIT ADD ON: CPT

## 2024-11-12 PROCEDURE — 99214 OFFICE O/P EST MOD 30 MIN: CPT

## 2024-11-12 PROCEDURE — 93784 AMBL BP MNTR W/SOFTWARE: CPT

## 2024-11-20 ENCOUNTER — APPOINTMENT (OUTPATIENT)
Dept: HEART AND VASCULAR | Facility: CLINIC | Age: 64
End: 2024-11-20
Payer: MEDICARE

## 2024-11-20 VITALS
TEMPERATURE: 97.8 F | OXYGEN SATURATION: 98 % | DIASTOLIC BLOOD PRESSURE: 90 MMHG | HEART RATE: 82 BPM | HEIGHT: 63 IN | SYSTOLIC BLOOD PRESSURE: 160 MMHG | WEIGHT: 165 LBS | BODY MASS INDEX: 29.23 KG/M2

## 2024-11-20 DIAGNOSIS — I25.10 ATHEROSCLEROTIC HEART DISEASE OF NATIVE CORONARY ARTERY W/OUT ANGINA PECTORIS: ICD-10-CM

## 2024-11-20 DIAGNOSIS — I10 ESSENTIAL (PRIMARY) HYPERTENSION: ICD-10-CM

## 2024-11-20 DIAGNOSIS — Z95.2 PRESENCE OF PROSTHETIC HEART VALVE: ICD-10-CM

## 2024-11-20 DIAGNOSIS — E78.5 HYPERLIPIDEMIA, UNSPECIFIED: ICD-10-CM

## 2024-11-20 DIAGNOSIS — Z86.79 OTHER SPECIFIED POSTPROCEDURAL STATES: ICD-10-CM

## 2024-11-20 DIAGNOSIS — R03.0 ELEVATED BLOOD-PRESSURE READING, W/OUT DIAGNOSIS OF HYPERTENSION: ICD-10-CM

## 2024-11-20 DIAGNOSIS — Z98.890 OTHER SPECIFIED POSTPROCEDURAL STATES: ICD-10-CM

## 2024-11-20 PROCEDURE — 99214 OFFICE O/P EST MOD 30 MIN: CPT

## 2024-11-20 PROCEDURE — G2211 COMPLEX E/M VISIT ADD ON: CPT

## 2024-11-21 NOTE — PATIENT PROFILE ADULT - FUNCTIONAL ASSESSMENT - BASIC MOBILITY 1.
You can access the FollowMyHealth Patient Portal offered by [facility] by registering at the following website: [email or web address]. By joining [facility]’s FollowMyHealth portal, you will also be able to view your health information using other applications (apps) compatible with our system.
No
4 = No assist / stand by assistance

## 2024-11-30 NOTE — HISTORY OF PRESENT ILLNESS
[FreeTextEntry1] :  63 yr old obese F with DM, HTN, HLD, asthma,ex-smoker ( 1/2 ppd, quit 9/2023), mod- severe AS s/p post AVR/ascending aortic replacement, CABG x1 ( SVG-PDA) on 5/22/17 seen today for follow up post hospital visit for unstable angina. She had sudden onset chest pain and underwent cardiac cath. Cath 8/30/23 w/ Dr Barrios: s/p PTCA/DRAKE x 1 OM1 70% stenosis (FFR positive 0.76), pRCA 70% stenosis, mRCA 99% stenosis, RPDA-filled by patent SVG-RPDA graft, pLAD 40% (FFR negative 0.92) She is now s/p cath OM1 stent with patent graft. Overall, she has been doing well. She has been taking her DAPT without any issues. Denies any bleeding/ bruising. She quit smoking while in the hospital and has not relapsed. She is very motivated to never smoke again. Overall, her functional/exercise capacity is limited given chronic back pain due to sciatica. She uses a cane to ambulate. Seen 9/23 feeling well. Since her discharge, she has been active at home. She cooks and cleans She denied any chest pain/ SOB or CARRILLO.  Readmitted H reporting progressive dyspnea with minimal exertion when walking half a block and performing ADLs. Pt reports she has not felt improved symptoms since stent placement in August. In addition, she admits to exertional fatigue and chest pain intermittently with activity. Pt is compliant with DAPT therapy. Patient now s/p cardiac catheterization (10/16/23) w/ Dr. Phillips w/ DRAKE x2 pLAD, OM stent patient. TTE (10/17/23): EF 63%, normal LV/RF SF, bioprosthetic valve, no valvular disease, no pericardial effusion. Since d/c has denied typical angina. Walking, cooking laundry without symptoms. Still typical COPD symptoms, which respond easily to inhalants  Since last visit, continues to improve, mostly in pulm areas - less reliance on inhalants etc  Most symptoms relate to pulm, cough, worse w changes in weather etc, improve w inhalants (she tries to avoid due tachyacrdia) Still frequent muskuloskel CP (ie broght on during upper extremity positioning for CT scan)  [id number]

## 2024-12-24 ENCOUNTER — EMERGENCY (EMERGENCY)
Facility: HOSPITAL | Age: 64
LOS: 1 days | Discharge: HOME | End: 2024-12-24
Attending: STUDENT IN AN ORGANIZED HEALTH CARE EDUCATION/TRAINING PROGRAM | Admitting: STUDENT IN AN ORGANIZED HEALTH CARE EDUCATION/TRAINING PROGRAM
Payer: MEDICARE

## 2024-12-24 VITALS
TEMPERATURE: 98 F | SYSTOLIC BLOOD PRESSURE: 135 MMHG | WEIGHT: 164.91 LBS | HEIGHT: 63 IN | HEART RATE: 83 BPM | DIASTOLIC BLOOD PRESSURE: 81 MMHG | RESPIRATION RATE: 18 BRPM | OXYGEN SATURATION: 97 %

## 2024-12-24 DIAGNOSIS — Z95.2 PRESENCE OF PROSTHETIC HEART VALVE: Chronic | ICD-10-CM

## 2024-12-24 DIAGNOSIS — G56.00 CARPAL TUNNEL SYNDROME, UNSPECIFIED UPPER LIMB: Chronic | ICD-10-CM

## 2024-12-24 DIAGNOSIS — Z90.49 ACQUIRED ABSENCE OF OTHER SPECIFIED PARTS OF DIGESTIVE TRACT: Chronic | ICD-10-CM

## 2024-12-24 DIAGNOSIS — D25.9 LEIOMYOMA OF UTERUS, UNSPECIFIED: Chronic | ICD-10-CM

## 2024-12-24 DIAGNOSIS — Z98.890 OTHER SPECIFIED POSTPROCEDURAL STATES: Chronic | ICD-10-CM

## 2024-12-24 PROCEDURE — 99285 EMERGENCY DEPT VISIT HI MDM: CPT

## 2024-12-25 VITALS
HEART RATE: 73 BPM | OXYGEN SATURATION: 98 % | RESPIRATION RATE: 18 BRPM | DIASTOLIC BLOOD PRESSURE: 80 MMHG | SYSTOLIC BLOOD PRESSURE: 144 MMHG | TEMPERATURE: 98 F

## 2024-12-25 LAB
ANION GAP SERPL CALC-SCNC: 11 MMOL/L — SIGNIFICANT CHANGE UP (ref 5–17)
BASOPHILS # BLD AUTO: 0.05 K/UL — SIGNIFICANT CHANGE UP (ref 0–0.2)
BASOPHILS NFR BLD AUTO: 0.6 % — SIGNIFICANT CHANGE UP (ref 0–2)
BUN SERPL-MCNC: 11 MG/DL — SIGNIFICANT CHANGE UP (ref 7–23)
CALCIUM SERPL-MCNC: 9.4 MG/DL — SIGNIFICANT CHANGE UP (ref 8.4–10.5)
CHLORIDE SERPL-SCNC: 99 MMOL/L — SIGNIFICANT CHANGE UP (ref 96–108)
CO2 SERPL-SCNC: 28 MMOL/L — SIGNIFICANT CHANGE UP (ref 22–31)
CREAT SERPL-MCNC: 0.68 MG/DL — SIGNIFICANT CHANGE UP (ref 0.5–1.3)
EGFR: 96 ML/MIN/1.73M2 — SIGNIFICANT CHANGE UP
EOSINOPHIL # BLD AUTO: 0.23 K/UL — SIGNIFICANT CHANGE UP (ref 0–0.5)
EOSINOPHIL NFR BLD AUTO: 2.8 % — SIGNIFICANT CHANGE UP (ref 0–6)
GLUCOSE SERPL-MCNC: 120 MG/DL — HIGH (ref 70–99)
HCT VFR BLD CALC: 44.1 % — SIGNIFICANT CHANGE UP (ref 34.5–45)
HGB BLD-MCNC: 13.4 G/DL — SIGNIFICANT CHANGE UP (ref 11.5–15.5)
IMM GRANULOCYTES NFR BLD AUTO: 1 % — HIGH (ref 0–0.9)
LYMPHOCYTES # BLD AUTO: 3.27 K/UL — SIGNIFICANT CHANGE UP (ref 1–3.3)
LYMPHOCYTES # BLD AUTO: 39.3 % — SIGNIFICANT CHANGE UP (ref 13–44)
MCHC RBC-ENTMCNC: 25.4 PG — LOW (ref 27–34)
MCHC RBC-ENTMCNC: 30.4 G/DL — LOW (ref 32–36)
MCV RBC AUTO: 83.7 FL — SIGNIFICANT CHANGE UP (ref 80–100)
MONOCYTES # BLD AUTO: 0.71 K/UL — SIGNIFICANT CHANGE UP (ref 0–0.9)
MONOCYTES NFR BLD AUTO: 8.5 % — SIGNIFICANT CHANGE UP (ref 2–14)
NEUTROPHILS # BLD AUTO: 3.99 K/UL — SIGNIFICANT CHANGE UP (ref 1.8–7.4)
NEUTROPHILS NFR BLD AUTO: 47.8 % — SIGNIFICANT CHANGE UP (ref 43–77)
NRBC # BLD: 0 /100 WBCS — SIGNIFICANT CHANGE UP (ref 0–0)
PLATELET # BLD AUTO: 255 K/UL — SIGNIFICANT CHANGE UP (ref 150–400)
POTASSIUM SERPL-MCNC: 3.9 MMOL/L — SIGNIFICANT CHANGE UP (ref 3.5–5.3)
POTASSIUM SERPL-SCNC: 3.9 MMOL/L — SIGNIFICANT CHANGE UP (ref 3.5–5.3)
RBC # BLD: 5.27 M/UL — HIGH (ref 3.8–5.2)
RBC # FLD: 15 % — HIGH (ref 10.3–14.5)
SODIUM SERPL-SCNC: 138 MMOL/L — SIGNIFICANT CHANGE UP (ref 135–145)
TROPONIN T, HIGH SENSITIVITY RESULT: 10 NG/L — SIGNIFICANT CHANGE UP (ref 0–51)
TROPONIN T, HIGH SENSITIVITY RESULT: 9 NG/L — SIGNIFICANT CHANGE UP (ref 0–51)
WBC # BLD: 8.33 K/UL — SIGNIFICANT CHANGE UP (ref 3.8–10.5)
WBC # FLD AUTO: 8.33 K/UL — SIGNIFICANT CHANGE UP (ref 3.8–10.5)

## 2024-12-25 PROCEDURE — 96374 THER/PROPH/DIAG INJ IV PUSH: CPT

## 2024-12-25 PROCEDURE — 85025 COMPLETE CBC W/AUTO DIFF WBC: CPT

## 2024-12-25 PROCEDURE — 99284 EMERGENCY DEPT VISIT MOD MDM: CPT | Mod: 25

## 2024-12-25 PROCEDURE — 96375 TX/PRO/DX INJ NEW DRUG ADDON: CPT

## 2024-12-25 PROCEDURE — 71045 X-RAY EXAM CHEST 1 VIEW: CPT | Mod: 26

## 2024-12-25 PROCEDURE — 71045 X-RAY EXAM CHEST 1 VIEW: CPT

## 2024-12-25 PROCEDURE — 84484 ASSAY OF TROPONIN QUANT: CPT

## 2024-12-25 PROCEDURE — 80048 BASIC METABOLIC PNL TOTAL CA: CPT

## 2024-12-25 PROCEDURE — 36415 COLL VENOUS BLD VENIPUNCTURE: CPT

## 2024-12-25 RX ORDER — DIPHENHYDRAMINE HCL 25 MG
25 CAPSULE ORAL ONCE
Refills: 0 | Status: COMPLETED | OUTPATIENT
Start: 2024-12-25 | End: 2024-12-25

## 2024-12-25 RX ORDER — 0.9 % SODIUM CHLORIDE 0.9 %
1000 INTRAVENOUS SOLUTION INTRAVENOUS ONCE
Refills: 0 | Status: COMPLETED | OUTPATIENT
Start: 2024-12-25 | End: 2024-12-25

## 2024-12-25 RX ORDER — ACETAMINOPHEN 500MG 500 MG/1
1000 TABLET, COATED ORAL ONCE
Refills: 0 | Status: COMPLETED | OUTPATIENT
Start: 2024-12-25 | End: 2024-12-25

## 2024-12-25 RX ORDER — METOCLOPRAMIDE HYDROCHLORIDE 10 MG/1
10 TABLET ORAL ONCE
Refills: 0 | Status: COMPLETED | OUTPATIENT
Start: 2024-12-25 | End: 2024-12-25

## 2024-12-25 RX ADMIN — ACETAMINOPHEN 500MG 400 MILLIGRAM(S): 500 TABLET, COATED ORAL at 01:05

## 2024-12-25 RX ADMIN — METOCLOPRAMIDE HYDROCHLORIDE 10 MILLIGRAM(S): 10 TABLET ORAL at 01:05

## 2024-12-25 RX ADMIN — Medication 25 MILLIGRAM(S): at 01:04

## 2024-12-25 RX ADMIN — Medication 1000 MILLILITER(S): at 01:05

## 2024-12-25 NOTE — ED PROVIDER NOTE - NS ED MD DISPO DISCHARGE
History of present illness    Stef Sawyer is a 71 year old male who presents to  with reports of:      Productive cough green phlegm, eyes watering, nasal drainage  Symptoms were improving and now worsening since Thursday, 4 days ago  He feels he has a sinus infection because nasal drainage smells like coffee, like when he had a sinus infection, however, he has been taking augmentin sine 6/24/23.     Patient recently diagnosed with tonsillitis, strep was negative on 6/24/23  He was prescribed Augmentin and taking liquid 10.9 ml 2 times a day for 10 days, he is still taking antibiotic he has 2 days left.     He reports that he feels he needs another antibiotic.     He has not tried anything over the counter for his congestion symptoms. He has been taking tylenol as needed.     He did call his pcp office on 6/29/23 because he was having gi symptoms while taking augmentin and was placed on omeprazole which helped his symptoms.     No fever  No n/v/d  No dyspnea  No chest pain  No abdominal pain  No headache  No ear pain      Nurse Note:  \"Stef Sawyer is a 71 year old male here with spouse presenting with:     Symptoms: productive cough, eyes watering, rhinorrhea - symptoms were improving and then worsened on Thursday. Nasal drainage smells like coffee - similar to sinus infection in the past.      In urgent care 6/24/23 - step test negative. Taking Augmentin 10.9mL 2 times a day. Had upset stomach taking it - called PCP 6/29 and started omeprazole and eating yogurt 3 times a day. GI upset better.      Symptoms present for: 2 weeks     Denies: difficulty breathing, fever/chills     OTC medications: Tylenol     Recent ABX use: see above     Work note needed: no\"      Up to date with vaccinations.     No fever.   No chills.   No nausea.   No vomiting.   No diarrhea.   No chest pain.   No shortness of breath.   No abdominal pain.   No headache/lightheadedness.       ROS: As above otherwise all other 13 review  of systems are negative.      CURRENT MEDICATIONS:    Current Outpatient Medications   Medication Sig Dispense Refill   • benzonatate (TESSALON PERLES) 100 MG capsule Take 2 capsules by mouth 3 times daily as needed for Cough. 15 capsule 0   • fluticasone (FLONASE) 50 MCG/ACT nasal spray Spray 1 spray in each nostril daily. Use for current illness and nasal congestion for 3 days then only as needed. 16 g 1   • albuterol 108 (90 Base) MCG/ACT inhaler Inhale 2 puffs into the lungs every 4 hours as needed for Shortness of Breath or Wheezing. 2 puffs 4 times a day for the next 5 days for current illness for bronchospastic cough, wheezing, shortness of breath. After use as needed 2 puff every 4-6 hours. 1 each 1   • cetirizine (ZyrTEC Allergy) 10 MG tablet Take 1 tablet by mouth daily. 20 tablet 0   • amoxicillin-clavulanate (AUGMENTIN) 400-57 MG/5ML suspension Take 10.9 mLs by mouth in the morning and 10.9 mLs in the evening. Do all this for 10 days. 218 mL 0   • imiquimod (ALDARA) 5 % cream Apply pea size amount to entire affected area on nose for 2 weeks then take 2 week break. Repeat for 2 more weeks. 12 each 2   • metoPROLOL succinate (TOPROL-XL) 25 MG 24 hr tablet Take 0.5 tablets by mouth daily. 45 tablet 3   • lamoTRIgine (LaMICtal) 25 MG tablet Weeks 1 and 2: 25 mg PO every AM. Weeks 3 and 4: 25 mg every AM & PM. Week 5: 50 mg every AM & PM. Weel 6: 50 mg every AM & 100 mg every PM. Week 7 until follow-up: 100 mg twice daily. Call the clinic for refills of 100 mg tablets when you reach 100 mg twice daily.     • hydroCORTisone (CORTIZONE) 2.5 % cream Apply twice daily to affected area of rash on face x up to 2 weeks as needed for redness and itching. 30 g 3   • ketoconazole (NIZORAL) 2 % shampoo Lather to scalp or other affected areas 3x/weekly in shower PRN.  Leave on 30 seconds before rinsing. 120 mL 11   • amLODIPine (NORVASC) 5 MG tablet Take 1 tablet by mouth daily. 90 tablet 3   • losartan (COZAAR) 25 MG  tablet Take 1 tablet by mouth daily. 90 tablet 3   • EVOLocumab (REPATHA SURECLICK) 140 MG/ML injection Inject 1 mL into the skin every 14 days. 6 mL 4   • levothyroxine (Synthroid) 112 MCG tablet Take 2 tablets by mouth daily (before breakfast). 180 tablet 4   • Omega-3 Fatty Acids (OMEGA 3 PO) Take 1,200 mg by mouth daily.     • DISPENSE 117 mg daily. Turmeric Forte     • aspirin 81 MG chewable tablet Chew 1 tablet by mouth daily. Do not start before February 3, 2021. 30 tablet 3   • ibuprofen (MOTRIN) 200 MG tablet Take 200-400 mg by mouth every 8 hours as needed for Pain.      • pregabalin (LYRICA) 100 MG capsule Take 1 capsule by mouth 2 times daily. (Patient taking differently: Take 100 mg by mouth daily. 1 tablet in the morning, 2 tablets in the evening) 180 capsule 0   • Multiple Vitamins-Minerals (CENTRUM MEN PO) Take 1 tablet by mouth daily. Indications: patient reported      • MAGNESIUM CITRATE PO Take 400 mg by mouth 2 times daily. Indications: Patient reported      • DISPENSE Indications: GroovinAds Probiotic 1 daily      • DISPENSE Take 10 mg by mouth daily. Ferrofood  1 capsule every day.     • LORazepam (ATIVAN) 0.5 MG tablet Take 1 tablet by mouth 2 times daily. 180 tablet 1   • cycloSPORINE (RESTASIS) 0.05 % ophthalmic emulsion Place 3 drops into both eyes nightly as needed.      • acetaminophen 650 MG Tab Take 650 mg by mouth every 4 hours as needed for Pain. 30 tablet 0     No current facility-administered medications for this visit.       SOCIAL HISTORY:    Social History     Tobacco Use   • Smoking status: Former     Current packs/day: 0.00     Types: Pipe, Cigars     Quit date: 1973     Years since quittin.2     Passive exposure: Past   • Smokeless tobacco: Never   Vaping Use   • Vaping Use: never used   Substance Use Topics   • Alcohol use: No     Alcohol/week: 0.0 standard drinks of alcohol   • Drug use: No            Physical Exam    Vital Signs:    Visit Vitals  BP  111/74   Pulse (!) 57   Temp 97.8 °F (36.6 °C)   Resp 16   Wt 111.9 kg (246 lb 11.2 oz)   SpO2 96%   BMI 33.46 kg/m²        Constitutional:  Patient is well-developed, well-nourished in no overt distress. Non-toxic appearing. Alert and oriented x3. Nasal congestion.     Head:  Normocephalic. Atraumatic.     Eyes: PERRLA, EOMIs. Conjunctiva are moist, no erythema. No discharge. No matting.   No preorbital/orbital cellulitis.     Ears: Otoscopic Exam:   Bilateral TMs without erythema, no bulging, no fluid behind TMs. External ear canals without inflammation or discharge. No tragal tenderness, no pinna displacement, no mastoid tenderness/swelling. No hemotympanum.     Pharynx: Posterior pharynx cobblestone appearance, white post nasal drainage, no erythema, no exudate. No peritonsillar abscess appreciated. Uvula is midline. No trismus. Patent airway.     Mouth: Moist mucous membranes. No ulcerations/lesions.     Nose: Clear rhinorrhea. White discharge in nasal passages.  No purulent drainage. No erythema or inflammation to turbinates.    No facial swelling or redness.     Neck: . Supple. No stridor. No nuchal rigidity. No adenopathy.     Cardiovascular:  Normal heart rate. Normal rhythm. No murmurs. No rubs. No gallops.     Respiratory: Lungs are clear to ascultation with symmetric breath sounds. No expiratory wheezing, rales or prolongation of the expiratory phase. No crackles heard. No accessory muscle use.     GI:  Abdomen is soft, non-tender, non-distended     Musculoskeletal:  No gross deformities noted.     Skin: Warm, dry without rash.    Neurologic: Alert and oriented times 3. Cranial nerves 2-12 are grossly intact. No gross motor or sensory deficits.     Psych: Mood, memory and affect are intact. Alert and oriented x 3.  Speech and behavior appropriate.  Patient cooperative.         Results    Recent Results (from the past 12 hour(s))   SARS-COV-2/INFLUENZA BY PCR    Collection Time: 07/03/23 12:20 PM   Result  Value Ref Range    Rapid SARS-COV-2 by PCR Not Detected Not Detected / Detected / Presumptive Positive / Inhibitors present    Influenza A by PCR Not Detected Not Detected    Influenza B by PCR Not Detected Not Detected    Isolation Guidelines      Procedural Comment         HISTORY: Cough     EXAM: PA and Lateral CXR.     COMPARISON: August 12, 2021     FINDINGS: There is minimal scarring or atelectasis in the lower lobes,  particularly on the left. No new or focal airspace disease or significant  interval change. The heart size and pulmonary vasculature are normal. The  mediastinal contour is normal. There is no pneumothorax.        IMPRESSION: No acute cardiopulmonary disease. No interval change.      DIAGNOSIS:  1. Acute cough    2. Nasal congestion with rhinorrhea    3. Acute URI        Assessment & Plan      Chest xray  Covid/flu/rsv    Discussed with patient that this could be a viral or allergies and at this time, no antibiotics needed. Especially because he has been taking augmentin. Chest xray was done to look for pneumonia and swab was done to look for common viral infection.     If continued symptoms may need an antibiotic, however, at this time treat with supportive treatment.     He is to follow up with his pcp for further evaluation and treatment. If he worsens, I.e. shortness of breath, chest pain, fever, going to ER.       1. Rest, fluids, read home care instructions  2. Over the counter Ibuprofen and Tylenol as needed for comfort.  Ibuprofen 200 mg to 800 mg (if 200-400 mg you can take Ibuprofen every 4 hours, if 600 mg then every 6 hours, if 800 mg then every 8 hours-take with food always. Ibuprofen may raise blood pressure, therefore, please monitor if you already have uncontrolled high blood pressure or are being treated for high blood pressure), Tylenol 650 mg or extra strength 1000 mg (650 mg every 4-6 hours max dose of 3250 mg per day, 1000 mg every 6 hours max dose 3000 mg per day), do not  take more than 1 week without following up with your Primary care provider  3. Zyrtec 10 mg daily  4. Albuterol MDI with spacer 2 puffs 4 times a day for 4 days then only as needed every 4-6 hours. For: Bronchospasms (coughing fits), Shortness of breath, Wheezing.  5. Flonase nasal spray 1 spray in each nostril twice a day as needed for congestion  6. Tessalon Perles 200 mg 3 times a day as needed for cough.  7. Follow the above and below restrictions.    8. Go To ER if worsening of pain, change in pain, fevers, trouble breathing, chest pain, abdominal pain.    9. Follow up with primary care provider if symptoms persist for 1 week.       Rest/isolation - to allow body to focus on improving/healing.  This means to avoid public areas (stores/restaurants) and work/school.     Fluids to assist body's ability to heal itself.  This has been shown to shorten any infection.  Humidifier/Vaporizer - can be helpful is air is dry, since the air will take fluid away from the body  Drink Fluids - a lot of them.  Water is best, but can flavor it.  Also water down juice/sport drinks or teas.   Nasal saline to rinse the nose - this includes Kimberlee pot or rinses.  When hungry - eat foods that have a lot of fluid and avoid most of the dry or greasy meals.     - Soups, yogurts, fruits and other fluid giving foods can be helpful.   - Eat small amounts frequently when hungry rather than larger amounts, since gut may be tender after being sick     Medications to assist the body may include:  Ibuprofen (Advil,Motrin) - for inflammation, congestion, headache, body aches. However, if COVID positive Tylenol is recommended instead. Monitor your blood pressure  Acetaminophen (Tylenol) - for congestion, headache and body aches.    Guaifenesin (Mucinex, Robitussin) - for congestion, mucous, cough     Probiotic: If Antibiotic prescribed or gut symptoms, this may help body adjust to medication or infection.     COVID or FLU test Recommended/ordered  have not been completed- Stay home until results given.      Patient informed of indications for returning and presenting to the ED including but not limited to worsening of symptoms, uncontrolled fever, chest pain or shortness of breath.     Note given for time off work/school as indicated by patient/provider in appointment.     Plan of care was discussed with patient.   Patient verbalized understanding and agreement with plan of care.        DDx: strep throat, viral pharyngitis, uri, viral syndrome, bronchitis, asthma exacerbation, pneumonia, sinusitis, oe, om, covid, influenza, ge      Patient voices understanding and agreement with treatment plan. All questions were answered. The patient understands that this is a provisional diagnosis and that the findings from today are a \"picture in time\" of their disease process. If the patient has questions at any time about their diagnosis, disease process, progression, or lack of therapeutic response, they are encouraged to call their primary care provider or the urgent care/emergency department for further direction. New or changing symptoms often require prompt followup and re-evaluation.        An After Visit Summary was given to patient.    Instructions provided as documented in the after visit summary.  The patient indicated understanding of the diagnosis and agreed with the plan of care.     My supervising physician is Dr. Mark Barron.  Dr. Patterson available, in person, today for consultation.   Home

## 2024-12-25 NOTE — ED PROVIDER NOTE - OBJECTIVE STATEMENT
64yF w/ asthma/COPD, anxiety on valium, HTN HLD DM, s/p TAVR, CAD s/p CABG and stents, presents w/ multiple complaints. States since last night has had intermittent headache described as bifrontal pressure w/o vision changes. Improved w/ tylenol and came back. Was seen here last month and had negative CT scans. Pt feels her headaches are due to high blood pressure.  She also endorses intermittent chest pain described as a light pressure in the center of her chest and non-radiating. No associated SOB or leg swelling. Pain is not exertional and feels different than the symptoms she had prior to stent placements. Has f/u with her cardiologist for 1/8/25.

## 2024-12-25 NOTE — ED PROVIDER NOTE - NSFOLLOWUPINSTRUCTIONS_ED_ALL_ED_FT
Follow up with neurology regarding headaches. And follow up with your cardiologist for chest pain.    Headache    A headache is pain or discomfort felt around the head or neck area. The specific cause of a headache may not be found as there are many types including tension headaches, migraine headaches, and cluster headaches. Watch your condition for any changes. Things you can do to manage your pain include taking over the counter and prescription medications as instructed by your health care provider, lying down in a dark quiet room, limiting stress, getting regular sleep, and refraining from alcohol and tobacco products.    SEEK IMMEDIATE MEDICAL CARE IF YOU HAVE ANY OF THE FOLLOWING SYMPTOMS: fever, vomiting, stiff neck, loss of vision, problems with speech, muscle weakness, loss of balance, trouble walking, passing out, or confusion.  _________________  Chest Pain    Chest pain can be caused by many different conditions which may or may not be dangerous. Causes include heartburn, lung infections, heart attack, blood clot in lungs, skin infections, strain or damage to muscle, cartilage, or bones, etc. In addition to a history and physical examination, an electrocardiogram (ECG) or other lab tests may have been performed to determine the cause of your chest pain. Follow up with your primary care provider or with a cardiologist as instructed.     SEEK IMMEDIATE MEDICAL CARE IF YOU HAVE ANY OF THE FOLLOWING SYMPTOMS: worsening chest pain, coughing up blood, unexplained back/neck/jaw pain, severe abdominal pain, dizziness or lightheadedness, fainting, shortness of breath, sweaty or clammy skin, vomiting, or racing heart beat. These symptoms may represent a serious problem that is an emergency. Do not wait to see if the symptoms will go away. Get medical help right away. Call 911 and do not drive yourself to the hospital.

## 2024-12-25 NOTE — ED PROVIDER NOTE - PHYSICAL EXAMINATION
CONST: nontoxic NAD speaking in full sentences  HEAD: atraumatic  EYES: conjunctivae clear, extraocular movements grossly intact  NECK: supple  CARD: regular rate and rhythm  CHEST: breathing comfortably, no stridor/retractions/tripoding, clear BS  EXT: FROM  SKIN: warm, dry  NEURO: awake alert answering questions following commands moving all extremities equal strength and sensation normal gait

## 2024-12-25 NOTE — ED ADULT NURSE NOTE - OBJECTIVE STATEMENT
BIBEMS from home, c/o left sided chest pain started @ noon, as per pt.   Pt also stated, she initially had a headache that started last night, but went away.  pt is awake, AOX4.

## 2024-12-25 NOTE — ED ADULT NURSE NOTE - ALCOHOL PRE SCREEN (AUDIT - C)
What Type Of Note Output Would You Prefer (Optional)?: Bullet Format Have Your Spot(S) Been Treated In The Past?: has not been treated Hpi Title: Evaluation of a Skin Lesion Statement Selected

## 2024-12-25 NOTE — ED PROVIDER NOTE - CLINICAL SUMMARY MEDICAL DECISION MAKING FREE TEXT BOX
Triage VS normal  EKG NSR 76bpm w/ inverted T waves throughout unchanged from prior EKG on 11/10/24  no red flags regarding pt headache. do not suspect space occupying lesion or bleed, do not suspect CVST or meningoencephalitis. Pt had negative CTH and CTA head and neck 1 month ago. Symptomatic treatment for headache, outpt neuro f/u.  Pt also reports atypical chest pain today. No chest pain currently. EKG w/ abnormalities but unchanged from prior. Pt has many comorbidities and hx stent/CABG. R/o ACS plan for trop x2. Clinically not consistent w/ PE, Ptx, AD.

## 2024-12-25 NOTE — ED PROVIDER NOTE - PATIENT PORTAL LINK FT
You can access the FollowMyHealth Patient Portal offered by [facility] by registering at the following website: [email or web address]. By joining [employer]’s FollowMyHealth portal, you will also be able to view your health information using other applications (apps) compatible with our system.

## 2024-12-27 DIAGNOSIS — Z95.5 PRESENCE OF CORONARY ANGIOPLASTY IMPLANT AND GRAFT: ICD-10-CM

## 2024-12-27 DIAGNOSIS — J44.9 CHRONIC OBSTRUCTIVE PULMONARY DISEASE, UNSPECIFIED: ICD-10-CM

## 2024-12-27 DIAGNOSIS — I10 ESSENTIAL (PRIMARY) HYPERTENSION: ICD-10-CM

## 2024-12-27 DIAGNOSIS — Z91.040 LATEX ALLERGY STATUS: ICD-10-CM

## 2024-12-27 DIAGNOSIS — Z88.2 ALLERGY STATUS TO SULFONAMIDES: ICD-10-CM

## 2024-12-27 DIAGNOSIS — Z88.8 ALLERGY STATUS TO OTHER DRUGS, MEDICAMENTS AND BIOLOGICAL SUBSTANCES: ICD-10-CM

## 2024-12-27 DIAGNOSIS — Z88.1 ALLERGY STATUS TO OTHER ANTIBIOTIC AGENTS: ICD-10-CM

## 2024-12-27 DIAGNOSIS — Z95.1 PRESENCE OF AORTOCORONARY BYPASS GRAFT: ICD-10-CM

## 2024-12-27 DIAGNOSIS — F41.9 ANXIETY DISORDER, UNSPECIFIED: ICD-10-CM

## 2024-12-27 DIAGNOSIS — E11.9 TYPE 2 DIABETES MELLITUS WITHOUT COMPLICATIONS: ICD-10-CM

## 2024-12-27 DIAGNOSIS — R07.89 OTHER CHEST PAIN: ICD-10-CM

## 2024-12-27 DIAGNOSIS — R51.9 HEADACHE, UNSPECIFIED: ICD-10-CM

## 2024-12-27 DIAGNOSIS — J45.909 UNSPECIFIED ASTHMA, UNCOMPLICATED: ICD-10-CM

## 2025-01-10 ENCOUNTER — NON-APPOINTMENT (OUTPATIENT)
Age: 65
End: 2025-01-10

## 2025-01-10 ENCOUNTER — APPOINTMENT (OUTPATIENT)
Dept: HEART AND VASCULAR | Facility: CLINIC | Age: 65
End: 2025-01-10
Payer: MEDICARE

## 2025-01-10 VITALS
HEART RATE: 78 BPM | WEIGHT: 163 LBS | SYSTOLIC BLOOD PRESSURE: 145 MMHG | OXYGEN SATURATION: 98 % | HEIGHT: 63 IN | BODY MASS INDEX: 28.88 KG/M2 | TEMPERATURE: 97.8 F | DIASTOLIC BLOOD PRESSURE: 93 MMHG

## 2025-01-10 DIAGNOSIS — Z87.891 PERSONAL HISTORY OF NICOTINE DEPENDENCE: ICD-10-CM

## 2025-01-10 DIAGNOSIS — Z95.1 PRESENCE OF AORTOCORONARY BYPASS GRAFT: ICD-10-CM

## 2025-01-10 DIAGNOSIS — E11.9 TYPE 2 DIABETES MELLITUS W/OUT COMPLICATIONS: ICD-10-CM

## 2025-01-10 DIAGNOSIS — E78.5 HYPERLIPIDEMIA, UNSPECIFIED: ICD-10-CM

## 2025-01-10 DIAGNOSIS — I25.10 ATHEROSCLEROTIC HEART DISEASE OF NATIVE CORONARY ARTERY W/OUT ANGINA PECTORIS: ICD-10-CM

## 2025-01-10 DIAGNOSIS — I10 ESSENTIAL (PRIMARY) HYPERTENSION: ICD-10-CM

## 2025-01-10 DIAGNOSIS — Z86.79 OTHER SPECIFIED POSTPROCEDURAL STATES: ICD-10-CM

## 2025-01-10 DIAGNOSIS — Z95.2 PRESENCE OF PROSTHETIC HEART VALVE: ICD-10-CM

## 2025-01-10 DIAGNOSIS — Z98.890 OTHER SPECIFIED POSTPROCEDURAL STATES: ICD-10-CM

## 2025-01-10 DIAGNOSIS — J44.9 CHRONIC OBSTRUCTIVE PULMONARY DISEASE, UNSPECIFIED: ICD-10-CM

## 2025-01-10 PROCEDURE — 99214 OFFICE O/P EST MOD 30 MIN: CPT | Mod: 25

## 2025-01-10 PROCEDURE — 99406 BEHAV CHNG SMOKING 3-10 MIN: CPT

## 2025-01-10 PROCEDURE — 93000 ELECTROCARDIOGRAM COMPLETE: CPT

## 2025-01-10 RX ORDER — LISINOPRIL 10 MG/1
10 TABLET ORAL DAILY
Refills: 0 | Status: ACTIVE | COMMUNITY

## 2025-02-17 ENCOUNTER — NON-APPOINTMENT (OUTPATIENT)
Age: 65
End: 2025-02-17

## 2025-03-09 ENCOUNTER — EMERGENCY (EMERGENCY)
Facility: HOSPITAL | Age: 65
LOS: 1 days | Discharge: HOME | End: 2025-03-09
Attending: EMERGENCY MEDICINE | Admitting: EMERGENCY MEDICINE
Payer: MEDICARE

## 2025-03-09 VITALS
TEMPERATURE: 98 F | WEIGHT: 169.98 LBS | HEART RATE: 84 BPM | SYSTOLIC BLOOD PRESSURE: 156 MMHG | HEIGHT: 63 IN | RESPIRATION RATE: 16 BRPM | DIASTOLIC BLOOD PRESSURE: 90 MMHG | OXYGEN SATURATION: 97 %

## 2025-03-09 VITALS
SYSTOLIC BLOOD PRESSURE: 121 MMHG | TEMPERATURE: 99 F | OXYGEN SATURATION: 96 % | HEART RATE: 70 BPM | RESPIRATION RATE: 18 BRPM | DIASTOLIC BLOOD PRESSURE: 81 MMHG

## 2025-03-09 DIAGNOSIS — Z90.49 ACQUIRED ABSENCE OF OTHER SPECIFIED PARTS OF DIGESTIVE TRACT: Chronic | ICD-10-CM

## 2025-03-09 DIAGNOSIS — D25.9 LEIOMYOMA OF UTERUS, UNSPECIFIED: Chronic | ICD-10-CM

## 2025-03-09 DIAGNOSIS — G56.00 CARPAL TUNNEL SYNDROME, UNSPECIFIED UPPER LIMB: Chronic | ICD-10-CM

## 2025-03-09 DIAGNOSIS — Z98.890 OTHER SPECIFIED POSTPROCEDURAL STATES: Chronic | ICD-10-CM

## 2025-03-09 DIAGNOSIS — Z95.2 PRESENCE OF PROSTHETIC HEART VALVE: Chronic | ICD-10-CM

## 2025-03-09 LAB
ANION GAP SERPL CALC-SCNC: 11 MMOL/L — SIGNIFICANT CHANGE UP (ref 5–17)
APPEARANCE UR: ABNORMAL
BASOPHILS # BLD AUTO: 0.04 K/UL — SIGNIFICANT CHANGE UP (ref 0–0.2)
BASOPHILS NFR BLD AUTO: 0.4 % — SIGNIFICANT CHANGE UP (ref 0–2)
BILIRUB UR-MCNC: ABNORMAL
BUN SERPL-MCNC: 11 MG/DL — SIGNIFICANT CHANGE UP (ref 7–23)
CALCIUM SERPL-MCNC: 9.2 MG/DL — SIGNIFICANT CHANGE UP (ref 8.4–10.5)
CHLORIDE SERPL-SCNC: 98 MMOL/L — SIGNIFICANT CHANGE UP (ref 96–108)
CO2 SERPL-SCNC: 26 MMOL/L — SIGNIFICANT CHANGE UP (ref 22–31)
COLOR SPEC: ABNORMAL
CREAT SERPL-MCNC: 0.61 MG/DL — SIGNIFICANT CHANGE UP (ref 0.5–1.3)
DIFF PNL FLD: ABNORMAL
EGFR: 99 ML/MIN/1.73M2 — SIGNIFICANT CHANGE UP
EOSINOPHIL # BLD AUTO: 0.24 K/UL — SIGNIFICANT CHANGE UP (ref 0–0.5)
EOSINOPHIL NFR BLD AUTO: 2.7 % — SIGNIFICANT CHANGE UP (ref 0–6)
GLUCOSE SERPL-MCNC: 99 MG/DL — SIGNIFICANT CHANGE UP (ref 70–99)
GLUCOSE UR QL: NEGATIVE MG/DL — SIGNIFICANT CHANGE UP
HCT VFR BLD CALC: 45.2 % — HIGH (ref 34.5–45)
HGB BLD-MCNC: 14.2 G/DL — SIGNIFICANT CHANGE UP (ref 11.5–15.5)
IMM GRANULOCYTES NFR BLD AUTO: 1 % — HIGH (ref 0–0.9)
KETONES UR-MCNC: NEGATIVE MG/DL — SIGNIFICANT CHANGE UP
LEUKOCYTE ESTERASE UR-ACNC: ABNORMAL
LYMPHOCYTES # BLD AUTO: 3.39 K/UL — HIGH (ref 1–3.3)
LYMPHOCYTES # BLD AUTO: 38 % — SIGNIFICANT CHANGE UP (ref 13–44)
MCHC RBC-ENTMCNC: 27.3 PG — SIGNIFICANT CHANGE UP (ref 27–34)
MCHC RBC-ENTMCNC: 31.4 G/DL — LOW (ref 32–36)
MCV RBC AUTO: 86.9 FL — SIGNIFICANT CHANGE UP (ref 80–100)
MONOCYTES # BLD AUTO: 0.79 K/UL — SIGNIFICANT CHANGE UP (ref 0–0.9)
MONOCYTES NFR BLD AUTO: 8.8 % — SIGNIFICANT CHANGE UP (ref 2–14)
NEUTROPHILS # BLD AUTO: 4.38 K/UL — SIGNIFICANT CHANGE UP (ref 1.8–7.4)
NEUTROPHILS NFR BLD AUTO: 49.1 % — SIGNIFICANT CHANGE UP (ref 43–77)
NITRITE UR-MCNC: POSITIVE
NRBC BLD AUTO-RTO: 0 /100 WBCS — SIGNIFICANT CHANGE UP (ref 0–0)
PH UR: 5.5 — SIGNIFICANT CHANGE UP (ref 5–8)
PLATELET # BLD AUTO: 252 K/UL — SIGNIFICANT CHANGE UP (ref 150–400)
POTASSIUM SERPL-MCNC: SIGNIFICANT CHANGE UP (ref 3.5–5.3)
POTASSIUM SERPL-SCNC: SIGNIFICANT CHANGE UP (ref 3.5–5.3)
PROT UR-MCNC: 100 MG/DL
RBC # BLD: 5.2 M/UL — SIGNIFICANT CHANGE UP (ref 3.8–5.2)
RBC # FLD: 13.7 % — SIGNIFICANT CHANGE UP (ref 10.3–14.5)
SODIUM SERPL-SCNC: 135 MMOL/L — SIGNIFICANT CHANGE UP (ref 135–145)
SP GR SPEC: 1.02 — SIGNIFICANT CHANGE UP (ref 1–1.03)
UROBILINOGEN FLD QL: 0.2 MG/DL — SIGNIFICANT CHANGE UP (ref 0.2–1)
WBC # BLD: 8.93 K/UL — SIGNIFICANT CHANGE UP (ref 3.8–10.5)
WBC # FLD AUTO: 8.93 K/UL — SIGNIFICANT CHANGE UP (ref 3.8–10.5)

## 2025-03-09 PROCEDURE — 99285 EMERGENCY DEPT VISIT HI MDM: CPT

## 2025-03-09 PROCEDURE — 81001 URINALYSIS AUTO W/SCOPE: CPT

## 2025-03-09 PROCEDURE — 80048 BASIC METABOLIC PNL TOTAL CA: CPT

## 2025-03-09 PROCEDURE — 36415 COLL VENOUS BLD VENIPUNCTURE: CPT

## 2025-03-09 PROCEDURE — 87086 URINE CULTURE/COLONY COUNT: CPT

## 2025-03-09 PROCEDURE — 85025 COMPLETE CBC W/AUTO DIFF WBC: CPT

## 2025-03-09 PROCEDURE — 51798 US URINE CAPACITY MEASURE: CPT

## 2025-03-09 PROCEDURE — L9997: CPT

## 2025-03-09 PROCEDURE — 74178 CT ABD&PLV WO CNTR FLWD CNTR: CPT | Mod: 26

## 2025-03-09 PROCEDURE — 74178 CT ABD&PLV WO CNTR FLWD CNTR: CPT | Mod: MC

## 2025-03-09 PROCEDURE — 99284 EMERGENCY DEPT VISIT MOD MDM: CPT | Mod: 25

## 2025-03-09 PROCEDURE — 96374 THER/PROPH/DIAG INJ IV PUSH: CPT | Mod: XU

## 2025-03-09 RX ORDER — CEFPODOXIME PROXETIL 200 MG/1
1 TABLET, FILM COATED ORAL
Qty: 14 | Refills: 0
Start: 2025-03-09 | End: 2025-03-15

## 2025-03-09 RX ORDER — CEFTRIAXONE 500 MG/1
1000 INJECTION, POWDER, FOR SOLUTION INTRAMUSCULAR; INTRAVENOUS ONCE
Refills: 0 | Status: COMPLETED | OUTPATIENT
Start: 2025-03-09 | End: 2025-03-09

## 2025-03-09 RX ADMIN — CEFTRIAXONE 100 MILLIGRAM(S): 500 INJECTION, POWDER, FOR SOLUTION INTRAMUSCULAR; INTRAVENOUS at 16:16

## 2025-03-09 NOTE — ED ADULT TRIAGE NOTE - CHIEF COMPLAINT QUOTE
"I have had hematuria for 10 days. My urine is bloody in the morning and then progressively through the day clears up but now its all the time. I have abdominal cramping and back pain since this morning." +AC plavix use for cardiac stents. Denies cp, sob, nvd, dizziness. Ambulatory with rollator with steady gait. "I have had hematuria for 10 days. My urine is bloody in the morning and then progressively through the day clears up but now its bloody all the time. I have abdominal cramping and back pain since this morning." +AC plavix use for cardiac stents. Denies cp, sob, nvd, dizziness. Ambulatory with rollator with steady gait.

## 2025-03-09 NOTE — ED PROVIDER NOTE - NSFOLLOWUPINSTRUCTIONS_ED_ALL_ED_FT
Take antibiotics as prescribed.  Follow up with urologist recommended below.  Return to ED with worsening symptoms or other concerns.  Feel better soon.      Urinary Tract Infection in Women    WHAT YOU NEED TO KNOW:    A urinary tract infection (UTI) is caused by bacteria that get inside your urinary tract. Your urinary tract includes your kidneys, ureters, bladder, and urethra. A UTI is more common in your lower urinary tract, which includes your bladder and urethra.  Female Urinary System    DISCHARGE INSTRUCTIONS:    Return to the emergency department if:    You are urinating very little or not at all.    You have a high fever with shaking chills.    You have side or back pain that gets worse.  Call your doctor if:    You have a fever.    You do not feel better after 2 days of taking antibiotics.    You have new symptoms, such as blood or pus in your urine.    You are vomiting.    You have questions or concerns about your condition or care.  Medicines:    Antibiotics treat a bacterial infection. If you have UTIs often (called recurrent UTIs), you may be given antibiotics to take regularly. You will be given directions for when and how to use antibiotics. The goal is to prevent UTIs but not cause antibiotic resistance by using antibiotics too often.    Medicines may be given to decrease pain and burning when you urinate. They will also help decrease the feeling that you need to urinate often. These medicines may make your urine orange or red.    Take your medicine as directed. Contact your healthcare provider if you think your medicine is not helping or if you have side effects. Tell your provider if you are allergic to any medicine. Keep a list of the medicines, vitamins, and herbs you take. Include the amounts, and when and why you take them. Bring the list or the pill bottles to follow-up visits. Carry your medicine list with you in case of an emergency.  Follow up with your doctor as directed: Write down your questions so you remember to ask them during your visits.    Prevent another UTI:    Empty your bladder often. Urinate and empty your bladder as soon as you feel the need. Do not hold your urine for long periods of time.    Wipe from front to back after you urinate or have a bowel movement. This will help prevent germs from getting into your urinary tract through your urethra.    Drink liquids as directed. Ask how much liquid to drink each day and which liquids are best for you. You may need to drink more liquids than usual to help flush out the bacteria. Do not drink alcohol, caffeine, or citrus juices. These can irritate your bladder and increase your symptoms. Your healthcare provider may recommend cranberry juice to help prevent a UTI.    Urinate before and after you have sex. This can help flush out bacteria passed during sex.    Do not douche or use feminine deodorants. These can change the chemical balance in your vagina.    Change sanitary pads or tampons often. This will help prevent germs from getting into your urinary tract.    Talk to your healthcare provider about your birth control method. You may need to change your method if it is increasing your risk for UTIs.    Wear cotton underwear and clothes that are loose. Tight pants and nylon underwear can trap moisture and cause bacteria to grow.    Vaginal estrogen may be recommended. This medicine helps prevent UTIs in women who have gone through menopause or are in gary-menopause.    Do pelvic muscle exercises often. Pelvic muscle exercises may help you start and stop urinating. Strong pelvic muscles may help you empty your bladder easier. Squeeze these muscles tightly for 5 seconds like you are trying to hold back urine. Then relax for 5 seconds. Gradually work up to squeezing for 10 seconds. Do 3 sets of 15 repetitions a day, or as directed.

## 2025-03-09 NOTE — ED PROVIDER NOTE - PATIENT PORTAL LINK FT
You can access the FollowMyHealth Patient Portal offered by [facility] by registering at the following website: [email or web address]. By joining [employer]’s FollowMyHealth portal, you will also be able to view your health information using other applications (apps) compatible with our system.

## 2025-03-09 NOTE — ED PROVIDER NOTE - CARE PROVIDER_API CALL
Brandon Machado  Urology  [address] [address]  Phone: [phone]  Fax: [phone]  Follow Up Time:

## 2025-03-09 NOTE — ED PROVIDER NOTE - OBJECTIVE STATEMENT
64yF w/ asthma/COPD, anxiety on valium, HTN HLD DM, s/p TAVR, CAD s/p CABG and stents, 64yF w/ asthma/COPD, anxiety on valium, HTN HLD DM, s/p TAVR, CAD s/p CABG and stents presents to ED with intermittent hematuria x 2 weeks.  PT on plavix and aspirin.  Hx of prior hematuria secondary to UTI.  Denies dysuria, flank pain, urinary frequency.  No fever, chills, shortness of breath.  No weakness, dizziness, syncope, no unintentional weight loss.  Prior hx of kidney stones but does not feel similar.

## 2025-03-09 NOTE — ED ADULT NURSE NOTE - OBJECTIVE STATEMENT
64y female presents to ED c/o hematuria x10 days. Pt also endorses bilateral flank pain and abdominal cramping this AM. Pt on plavix for hx of stents. Denies weakness/sob. Ambulates with walker at baseline. A&Ox4.

## 2025-03-09 NOTE — ED ADULT NURSE NOTE - NSFALLHARMRISKINTERV_ED_ALL_ED

## 2025-03-09 NOTE — ED PROVIDER NOTE - CLINICAL SUMMARY MEDICAL DECISION MAKING FREE TEXT BOX
64yF w/ asthma/COPD, anxiety on valium, HTN HLD DM, s/p TAVR, CAD s/p CABG and stents presents to ED with intermittent hematuria x 2 weeks.  PT on plavix and aspirin.  Hx of prior hematuria secondary to UTI.  Denies dysuria, flank pain, urinary frequency.  No fever, chills, shortness of breath.  No weakness, dizziness, syncope, no unintentional weight loss.  Prior hx of kidney stones but does not feel similar.      VSS  PE well appearing no acute findings  POCUS - no clot in bladder no hydro in kidneys  Labs, CT ordered  UA with + infection, ceftriaxone in ED 64yF w/ asthma/COPD, anxiety on valium, HTN HLD DM, s/p TAVR, CAD s/p CABG and stents presents to ED with intermittent hematuria x 2 weeks.  PT on plavix and aspirin.  Hx of prior hematuria secondary to UTI.  Denies dysuria, flank pain, urinary frequency.  No fever, chills, shortness of breath.  No weakness, dizziness, syncope, no unintentional weight loss.  Prior hx of kidney stones but does not feel similar.      VSS  PE well appearing no acute findings  POCUS - no clot in bladder no hydro in kidneys  Labs, CT ordered  UA with + infection, ceftriaxone in ED  CT with 6 mm calculus in the right renal pelvis with mild associated   hydronephrosis and ureteral thickening.    Pt aware of findings and will follow up with urology.  No immediate intervention.  Pt will take antibiotics  Aware of return precautions and follow up

## 2025-03-09 NOTE — ED ADULT NURSE NOTE - CHIEF COMPLAINT QUOTE
"I have had hematuria for 10 days. My urine is bloody in the morning and then progressively through the day clears up but now its bloody all the time. I have abdominal cramping and back pain since this morning." +AC plavix use for cardiac stents. Denies cp, sob, nvd, dizziness. Ambulatory with rollator with steady gait.

## 2025-03-10 ENCOUNTER — NON-APPOINTMENT (OUTPATIENT)
Age: 65
End: 2025-03-10

## 2025-03-12 DIAGNOSIS — Z88.8 ALLERGY STATUS TO OTHER DRUGS, MEDICAMENTS AND BIOLOGICAL SUBSTANCES: ICD-10-CM

## 2025-03-12 DIAGNOSIS — Z88.1 ALLERGY STATUS TO OTHER ANTIBIOTIC AGENTS: ICD-10-CM

## 2025-03-12 DIAGNOSIS — I25.10 ATHEROSCLEROTIC HEART DISEASE OF NATIVE CORONARY ARTERY WITHOUT ANGINA PECTORIS: ICD-10-CM

## 2025-03-12 DIAGNOSIS — Z79.01 LONG TERM (CURRENT) USE OF ANTICOAGULANTS: ICD-10-CM

## 2025-03-12 DIAGNOSIS — Z87.442 PERSONAL HISTORY OF URINARY CALCULI: ICD-10-CM

## 2025-03-12 DIAGNOSIS — Z95.4 PRESENCE OF OTHER HEART-VALVE REPLACEMENT: ICD-10-CM

## 2025-03-12 DIAGNOSIS — N39.0 URINARY TRACT INFECTION, SITE NOT SPECIFIED: ICD-10-CM

## 2025-03-12 DIAGNOSIS — Z95.1 PRESENCE OF AORTOCORONARY BYPASS GRAFT: ICD-10-CM

## 2025-03-12 DIAGNOSIS — R31.9 HEMATURIA, UNSPECIFIED: ICD-10-CM

## 2025-03-12 DIAGNOSIS — J44.89 OTHER SPECIFIED CHRONIC OBSTRUCTIVE PULMONARY DISEASE: ICD-10-CM

## 2025-03-12 DIAGNOSIS — E78.5 HYPERLIPIDEMIA, UNSPECIFIED: ICD-10-CM

## 2025-03-12 DIAGNOSIS — E11.9 TYPE 2 DIABETES MELLITUS WITHOUT COMPLICATIONS: ICD-10-CM

## 2025-03-12 DIAGNOSIS — N13.2 HYDRONEPHROSIS WITH RENAL AND URETERAL CALCULOUS OBSTRUCTION: ICD-10-CM

## 2025-03-12 DIAGNOSIS — Z95.5 PRESENCE OF CORONARY ANGIOPLASTY IMPLANT AND GRAFT: ICD-10-CM

## 2025-03-12 DIAGNOSIS — I10 ESSENTIAL (PRIMARY) HYPERTENSION: ICD-10-CM

## 2025-03-12 DIAGNOSIS — J45.998 OTHER ASTHMA: ICD-10-CM

## 2025-03-12 DIAGNOSIS — Z91.040 LATEX ALLERGY STATUS: ICD-10-CM

## 2025-03-12 DIAGNOSIS — Z79.82 LONG TERM (CURRENT) USE OF ASPIRIN: ICD-10-CM

## 2025-04-01 ENCOUNTER — APPOINTMENT (OUTPATIENT)
Dept: UROLOGY | Facility: CLINIC | Age: 65
End: 2025-04-01
Payer: MEDICARE

## 2025-04-01 VITALS
HEIGHT: 63 IN | SYSTOLIC BLOOD PRESSURE: 172 MMHG | WEIGHT: 168 LBS | OXYGEN SATURATION: 96 % | HEART RATE: 81 BPM | TEMPERATURE: 98 F | BODY MASS INDEX: 29.77 KG/M2 | DIASTOLIC BLOOD PRESSURE: 102 MMHG

## 2025-04-01 DIAGNOSIS — N20.0 CALCULUS OF KIDNEY: ICD-10-CM

## 2025-04-01 PROCEDURE — 99204 OFFICE O/P NEW MOD 45 MIN: CPT

## 2025-04-12 ENCOUNTER — APPOINTMENT (OUTPATIENT)
Dept: CT IMAGING | Facility: HOSPITAL | Age: 65
End: 2025-04-12

## 2025-04-12 ENCOUNTER — OUTPATIENT (OUTPATIENT)
Dept: OUTPATIENT SERVICES | Facility: HOSPITAL | Age: 65
LOS: 1 days | End: 2025-04-12
Payer: MEDICARE

## 2025-04-12 DIAGNOSIS — G56.00 CARPAL TUNNEL SYNDROME, UNSPECIFIED UPPER LIMB: Chronic | ICD-10-CM

## 2025-04-12 DIAGNOSIS — D25.9 LEIOMYOMA OF UTERUS, UNSPECIFIED: Chronic | ICD-10-CM

## 2025-04-12 DIAGNOSIS — Z90.49 ACQUIRED ABSENCE OF OTHER SPECIFIED PARTS OF DIGESTIVE TRACT: Chronic | ICD-10-CM

## 2025-04-12 DIAGNOSIS — Z98.890 OTHER SPECIFIED POSTPROCEDURAL STATES: Chronic | ICD-10-CM

## 2025-04-12 DIAGNOSIS — Z95.2 PRESENCE OF PROSTHETIC HEART VALVE: Chronic | ICD-10-CM

## 2025-04-12 PROCEDURE — 74176 CT ABD & PELVIS W/O CONTRAST: CPT | Mod: 26

## 2025-04-12 PROCEDURE — 74176 CT ABD & PELVIS W/O CONTRAST: CPT

## 2025-04-17 ENCOUNTER — APPOINTMENT (OUTPATIENT)
Dept: UROLOGY | Facility: CLINIC | Age: 65
End: 2025-04-17
Payer: MEDICARE

## 2025-04-17 VITALS
HEIGHT: 63 IN | BODY MASS INDEX: 29.23 KG/M2 | OXYGEN SATURATION: 97 % | DIASTOLIC BLOOD PRESSURE: 89 MMHG | HEART RATE: 78 BPM | TEMPERATURE: 97.3 F | SYSTOLIC BLOOD PRESSURE: 149 MMHG | WEIGHT: 165 LBS

## 2025-04-17 DIAGNOSIS — F17.200 NICOTINE DEPENDENCE, UNSPECIFIED, UNCOMPLICATED: ICD-10-CM

## 2025-04-17 DIAGNOSIS — N20.0 CALCULUS OF KIDNEY: ICD-10-CM

## 2025-04-17 PROCEDURE — G2211 COMPLEX E/M VISIT ADD ON: CPT

## 2025-04-17 PROCEDURE — 99215 OFFICE O/P EST HI 40 MIN: CPT

## 2025-04-18 LAB
ANION GAP SERPL CALC-SCNC: 15 MMOL/L
APTT BLD: 34.2 SEC
BUN SERPL-MCNC: 11 MG/DL
CALCIUM SERPL-MCNC: 10.1 MG/DL
CHLORIDE SERPL-SCNC: 100 MMOL/L
CO2 SERPL-SCNC: 26 MMOL/L
CREAT SERPL-MCNC: 0.62 MG/DL
EGFRCR SERPLBLD CKD-EPI 2021: 98 ML/MIN/1.73M2
GLUCOSE SERPL-MCNC: 96 MG/DL
INR PPP: 0.91 RATIO
POTASSIUM SERPL-SCNC: 4.1 MMOL/L
PT BLD: 10.7 SEC
SODIUM SERPL-SCNC: 142 MMOL/L

## 2025-04-23 ENCOUNTER — APPOINTMENT (OUTPATIENT)
Dept: INTERNAL MEDICINE | Facility: CLINIC | Age: 65
End: 2025-04-23
Payer: MEDICARE

## 2025-04-23 ENCOUNTER — NON-APPOINTMENT (OUTPATIENT)
Age: 65
End: 2025-04-23

## 2025-04-23 VITALS
HEIGHT: 63 IN | TEMPERATURE: 96.6 F | DIASTOLIC BLOOD PRESSURE: 98 MMHG | WEIGHT: 168 LBS | BODY MASS INDEX: 29.77 KG/M2 | OXYGEN SATURATION: 95 % | SYSTOLIC BLOOD PRESSURE: 180 MMHG | HEART RATE: 81 BPM

## 2025-04-23 VITALS — SYSTOLIC BLOOD PRESSURE: 145 MMHG | DIASTOLIC BLOOD PRESSURE: 80 MMHG

## 2025-04-23 DIAGNOSIS — Z01.818 ENCOUNTER FOR OTHER PREPROCEDURAL EXAMINATION: ICD-10-CM

## 2025-04-23 PROCEDURE — 99213 OFFICE O/P EST LOW 20 MIN: CPT | Mod: 25

## 2025-04-23 PROCEDURE — 93000 ELECTROCARDIOGRAM COMPLETE: CPT

## 2025-04-23 RX ORDER — DIAZEPAM 5 MG/1
5 TABLET ORAL
Refills: 0 | Status: ACTIVE | COMMUNITY

## 2025-04-23 RX ORDER — METOPROLOL SUCCINATE 100 MG/1
100 TABLET, EXTENDED RELEASE ORAL
Refills: 0 | Status: ACTIVE | COMMUNITY

## 2025-04-23 RX ORDER — SEMAGLUTIDE 1.34 MG/ML
4 INJECTION, SOLUTION SUBCUTANEOUS
Refills: 0 | Status: ACTIVE | COMMUNITY

## 2025-04-24 LAB
APPEARANCE: ABNORMAL
BACTERIA UR CULT: NORMAL
BACTERIA: NEGATIVE /HPF
BILIRUBIN URINE: ABNORMAL
BLOOD URINE: ABNORMAL
CALCIUM OXALATE CRYSTALS: PRESENT
CAST: 2 /LPF
COLOR: NORMAL
EPITHELIAL CELLS: 5 /HPF
GLUCOSE QUALITATIVE U: NEGATIVE MG/DL
HCT VFR BLD CALC: 48.8 %
HGB BLD-MCNC: 14.8 G/DL
KETONES URINE: ABNORMAL MG/DL
LEUKOCYTE ESTERASE URINE: ABNORMAL
MCHC RBC-ENTMCNC: 26.2 PG
MCHC RBC-ENTMCNC: 30.3 G/DL
MCV RBC AUTO: 86.5 FL
MICROSCOPIC-UA: NORMAL
NITRITE URINE: NEGATIVE
PH URINE: 5.5
PLATELET # BLD AUTO: 266 K/UL
PROTEIN URINE: 300 MG/DL
RBC # BLD: 5.64 M/UL
RBC # FLD: 13.7 %
RED BLOOD CELLS URINE: 195 /HPF
REVIEW: NORMAL
SPECIFIC GRAVITY URINE: >1.03
UROBILINOGEN URINE: 1 MG/DL
WBC # FLD AUTO: 7.73 K/UL
WHITE BLOOD CELLS URINE: 8 /HPF

## 2025-04-28 ENCOUNTER — APPOINTMENT (OUTPATIENT)
Dept: HEART AND VASCULAR | Facility: CLINIC | Age: 65
End: 2025-04-28

## 2025-04-28 VITALS
OXYGEN SATURATION: 97 % | DIASTOLIC BLOOD PRESSURE: 90 MMHG | TEMPERATURE: 97.8 F | WEIGHT: 166 LBS | HEIGHT: 63 IN | HEART RATE: 83 BPM | SYSTOLIC BLOOD PRESSURE: 150 MMHG | BODY MASS INDEX: 29.41 KG/M2

## 2025-04-28 DIAGNOSIS — R42 DIZZINESS AND GIDDINESS: ICD-10-CM

## 2025-04-28 DIAGNOSIS — Z86.79 OTHER SPECIFIED POSTPROCEDURAL STATES: ICD-10-CM

## 2025-04-28 DIAGNOSIS — Z98.890 OTHER SPECIFIED POSTPROCEDURAL STATES: ICD-10-CM

## 2025-04-28 DIAGNOSIS — Z09 ENCOUNTER FOR FOLLOW-UP EXAMINATION AFTER COMPLETED TREATMENT FOR CONDITIONS OTHER THAN MALIGNANT NEOPLASM: ICD-10-CM

## 2025-04-28 DIAGNOSIS — Z87.891 PERSONAL HISTORY OF NICOTINE DEPENDENCE: ICD-10-CM

## 2025-04-28 DIAGNOSIS — I10 ESSENTIAL (PRIMARY) HYPERTENSION: ICD-10-CM

## 2025-04-28 DIAGNOSIS — Z95.1 PRESENCE OF AORTOCORONARY BYPASS GRAFT: ICD-10-CM

## 2025-04-28 DIAGNOSIS — I25.10 ATHEROSCLEROTIC HEART DISEASE OF NATIVE CORONARY ARTERY W/OUT ANGINA PECTORIS: ICD-10-CM

## 2025-04-28 DIAGNOSIS — Z95.2 PRESENCE OF PROSTHETIC HEART VALVE: ICD-10-CM

## 2025-04-28 DIAGNOSIS — Z95.5 PRESENCE OF CORONARY ANGIOPLASTY IMPLANT AND GRAFT: ICD-10-CM

## 2025-04-28 DIAGNOSIS — E11.9 TYPE 2 DIABETES MELLITUS W/OUT COMPLICATIONS: ICD-10-CM

## 2025-04-28 DIAGNOSIS — E78.5 HYPERLIPIDEMIA, UNSPECIFIED: ICD-10-CM

## 2025-04-28 DIAGNOSIS — R03.0 ELEVATED BLOOD-PRESSURE READING, W/OUT DIAGNOSIS OF HYPERTENSION: ICD-10-CM

## 2025-04-28 PROCEDURE — 99214 OFFICE O/P EST MOD 30 MIN: CPT | Mod: 25

## 2025-04-28 PROCEDURE — 99406 BEHAV CHNG SMOKING 3-10 MIN: CPT

## 2025-04-28 PROCEDURE — G2211 COMPLEX E/M VISIT ADD ON: CPT

## 2025-04-28 PROCEDURE — 93306 TTE W/DOPPLER COMPLETE: CPT

## 2025-04-28 RX ORDER — AMLODIPINE BESYLATE 5 MG/1
5 TABLET ORAL
Qty: 30 | Refills: 5 | Status: ACTIVE | COMMUNITY
Start: 2025-04-28 | End: 1900-01-01

## 2025-04-28 RX ORDER — MECLIZINE HYDROCHLORIDE 25 MG/1
25 TABLET ORAL
Qty: 90 | Refills: 3 | Status: ACTIVE | COMMUNITY
Start: 1900-01-01 | End: 1900-01-01

## 2025-05-02 NOTE — ASU PATIENT PROFILE, ADULT - NSICDXPASTMEDICALHX_GEN_ALL_CORE_FT
PAST MEDICAL HISTORY:  Asthma     Back injury lumbar, work related    CAD (coronary artery disease)     Calculus of kidney     Diabetes mellitus     GERD (gastroesophageal reflux disease)     Hyperlipidemia     Hypertension     Kidney stone      PAST MEDICAL HISTORY:  Anxiety     Asthma     Back injury lumbar, work related    CAD (coronary artery disease)     Calculus of kidney     Diabetes mellitus     GERD (gastroesophageal reflux disease)     H/O vertigo     History of panic attacks     Hyperlipidemia     Hypertension     Kidney stone      PAST MEDICAL HISTORY:  Anxiety     Asthma     Back injury lumbar, work related    CAD (coronary artery disease) cardiac stent    Calculus of kidney     Diabetes mellitus     GERD (gastroesophageal reflux disease)     H/O vertigo     History of COPD     History of panic attacks     Hyperlipidemia     Hypertension     Kidney stone

## 2025-05-02 NOTE — ASU PATIENT PROFILE, ADULT - FALL HARM RISK - RISK INTERVENTIONS

## 2025-05-05 ENCOUNTER — APPOINTMENT (OUTPATIENT)
Dept: UROLOGY | Facility: HOSPITAL | Age: 65
End: 2025-05-05

## 2025-05-05 ENCOUNTER — TRANSCRIPTION ENCOUNTER (OUTPATIENT)
Age: 65
End: 2025-05-05

## 2025-05-05 ENCOUNTER — OUTPATIENT (OUTPATIENT)
Dept: OUTPATIENT SERVICES | Facility: HOSPITAL | Age: 65
LOS: 1 days | End: 2025-05-05
Payer: MEDICARE

## 2025-05-05 VITALS
OXYGEN SATURATION: 99 % | WEIGHT: 171.74 LBS | HEART RATE: 73 BPM | RESPIRATION RATE: 18 BRPM | SYSTOLIC BLOOD PRESSURE: 138 MMHG | TEMPERATURE: 98 F | DIASTOLIC BLOOD PRESSURE: 85 MMHG | HEIGHT: 62.99 IN

## 2025-05-05 VITALS
OXYGEN SATURATION: 94 % | TEMPERATURE: 97 F | RESPIRATION RATE: 18 BRPM | HEART RATE: 76 BPM | DIASTOLIC BLOOD PRESSURE: 79 MMHG | SYSTOLIC BLOOD PRESSURE: 159 MMHG

## 2025-05-05 DIAGNOSIS — Z95.2 PRESENCE OF PROSTHETIC HEART VALVE: Chronic | ICD-10-CM

## 2025-05-05 DIAGNOSIS — Z90.49 ACQUIRED ABSENCE OF OTHER SPECIFIED PARTS OF DIGESTIVE TRACT: Chronic | ICD-10-CM

## 2025-05-05 DIAGNOSIS — G56.00 CARPAL TUNNEL SYNDROME, UNSPECIFIED UPPER LIMB: Chronic | ICD-10-CM

## 2025-05-05 DIAGNOSIS — Z98.890 OTHER SPECIFIED POSTPROCEDURAL STATES: Chronic | ICD-10-CM

## 2025-05-05 DIAGNOSIS — N20.0 CALCULUS OF KIDNEY: ICD-10-CM

## 2025-05-05 DIAGNOSIS — D25.9 LEIOMYOMA OF UTERUS, UNSPECIFIED: Chronic | ICD-10-CM

## 2025-05-05 PROCEDURE — 76000 FLUOROSCOPY <1 HR PHYS/QHP: CPT

## 2025-05-05 PROCEDURE — C1889: CPT

## 2025-05-05 PROCEDURE — 52356 CYSTO/URETERO W/LITHOTRIPSY: CPT | Mod: RT

## 2025-05-05 PROCEDURE — 82962 GLUCOSE BLOOD TEST: CPT

## 2025-05-05 PROCEDURE — C9399: CPT

## 2025-05-05 PROCEDURE — 74420 UROGRAPHY RTRGR +-KUB: CPT | Mod: 26,RT

## 2025-05-05 PROCEDURE — C1758: CPT

## 2025-05-05 PROCEDURE — C2617: CPT

## 2025-05-05 PROCEDURE — C1769: CPT

## 2025-05-05 DEVICE — LASER FIBER SOLTIVE 200: Type: IMPLANTABLE DEVICE | Site: RIGHT | Status: FUNCTIONAL

## 2025-05-05 DEVICE — URETERAL STENT CONTOUR 6FR 22CM: Type: IMPLANTABLE DEVICE | Site: RIGHT | Status: FUNCTIONAL

## 2025-05-05 DEVICE — STONE BASKET DAKOTA 1.9FR 8MMX120CM: Type: IMPLANTABLE DEVICE | Site: RIGHT | Status: FUNCTIONAL

## 2025-05-05 DEVICE — URETERAL CATH DUAL LUMEN 10FR 54CM: Type: IMPLANTABLE DEVICE | Site: RIGHT | Status: FUNCTIONAL

## 2025-05-05 DEVICE — GUIDEWIRE SENSOR DUAL-FLEX NITINOL STRAIGHT .035" X 150CM: Type: IMPLANTABLE DEVICE | Site: RIGHT | Status: FUNCTIONAL

## 2025-05-05 RX ORDER — CLOPIDOGREL BISULFATE 75 MG/1
1 TABLET, FILM COATED ORAL
Refills: 0 | DISCHARGE

## 2025-05-05 RX ORDER — METOPROLOL SUCCINATE 50 MG/1
1 TABLET, EXTENDED RELEASE ORAL
Refills: 0 | DISCHARGE

## 2025-05-05 RX ORDER — ASPIRIN 325 MG
0 TABLET ORAL
Refills: 0 | DISCHARGE

## 2025-05-05 RX ORDER — ORAL SEMAGLUTIDE 14 MG/1
0 TABLET ORAL
Refills: 0 | DISCHARGE

## 2025-05-05 RX ORDER — EZETIMIBE 10 MG/1
1 TABLET ORAL
Refills: 0 | DISCHARGE

## 2025-05-05 RX ORDER — TAMSULOSIN HYDROCHLORIDE 0.4 MG/1
1 CAPSULE ORAL
Qty: 7 | Refills: 0
Start: 2025-05-05 | End: 2025-05-11

## 2025-05-05 RX ORDER — OXYBUTYNIN CHLORIDE 5 MG/1
1 TABLET, FILM COATED, EXTENDED RELEASE ORAL
Qty: 7 | Refills: 0
Start: 2025-05-05 | End: 2025-05-11

## 2025-05-05 RX ORDER — MECLIZINE HCL 12.5 MG
1 TABLET ORAL
Refills: 0 | DISCHARGE

## 2025-05-05 RX ORDER — PHENAZOPYRIDINE HCL 100 MG
1 TABLET ORAL
Qty: 9 | Refills: 0
Start: 2025-05-05 | End: 2025-05-07

## 2025-05-05 RX ORDER — EVOLOCUMAB 140 MG/ML
140 INJECTION, SOLUTION SUBCUTANEOUS
Refills: 0 | DISCHARGE

## 2025-05-05 RX ORDER — LISINOPRIL 5 MG/1
1 TABLET ORAL
Refills: 0 | DISCHARGE

## 2025-05-05 RX ORDER — AMLODIPINE BESYLATE 10 MG/1
1 TABLET ORAL
Refills: 0 | DISCHARGE

## 2025-05-05 RX ORDER — ACETAMINOPHEN 500 MG/5ML
650 LIQUID (ML) ORAL ONCE
Refills: 0 | Status: COMPLETED | OUTPATIENT
Start: 2025-05-05 | End: 2025-05-05

## 2025-05-05 RX ORDER — METFORMIN HYDROCHLORIDE 850 MG/1
1 TABLET ORAL
Refills: 0 | DISCHARGE

## 2025-05-05 RX ORDER — ALBUTEROL SULFATE 2.5 MG/3ML
2 VIAL, NEBULIZER (ML) INHALATION
Refills: 0 | DISCHARGE

## 2025-05-05 RX ADMIN — Medication 650 MILLIGRAM(S): at 14:55

## 2025-05-05 RX ADMIN — Medication 650 MILLIGRAM(S): at 15:20

## 2025-05-05 NOTE — ASU DISCHARGE PLAN (ADULT/PEDIATRIC) - NS MD DC FALL RISK RISK
For information on Fall & Injury Prevention, visit: [email or web address] OR  [email or web address] OR  https://www.cdc.gov/steadi/patient.html

## 2025-05-05 NOTE — ASU DISCHARGE PLAN (ADULT/PEDIATRIC) - ASU DC SPECIAL INSTRUCTIONSFT
URETEROSCOPY    GENERAL: It is common to have blood in your urine after your procedure. It may be pink or even red; and it is important to increase fluid intake to 2-3L of water per day to keep the urine as clear as possible. Please inform your doctor if you have a significant amount of clot in the urine or if you are unable to void at all. The urine may clear and then become bloody again especially as you are more physically active.    STENT: You may have an internal stent (a hollow tube that runs from the kidney to your bladder) after your procedure, which helps urine drain from the kidney to your bladder. Some patients experience urinary frequency, burning, or even back pain (especially with urination). These sensations will gradually get better. Increasing your fluid intake can also improve these symptoms. While the stent is in place, your urine may continue to be bloody. This stent is temporary and must be removed by your urologist as an outpatient with in 3 months unless otherwise specified. If your stent is on a string, it is secured to your leg or genitalia with an adhesive bandage. Do not pull on the string, do not remove the bandage, do not insert anything intravaginally/intraurethrally, and do not engage in sexual intercourse until after the stent is removed at your post-operative appointment.      UROLOGIC MEDICATIONS:  The following medications may have been sent to your pharmacy for stent related discomfort: Flomax (tamsulosin) 0.4mg at bedtime until stent removed, Ditropan (oxybutynin) 10mg every 24 hours as needed for bladder spasms, and Pyridium (phenazopyridine) 100mg every 8 hours as needed for kidney/bladder discomfort for max 3 days (Pyridium will make your urine orange).     PAIN: You may take Tylenol (acetaminophen) 650-975mg for pain every 6 hours as needed. Do not exceed 4000mg of Tylenol (acetaminophen) daily.     ANTIBIOTICS: none    STOOL SOFTENERS: Do not allow yourself to become constipated as straining may cause bleeding. Take stool softeners or a laxative (ex. Miralax, Colace, Senokot, ExLax, etc), available over the counter, if needed.    ANTICOAGULATION: You may resume plavix tomorrow 5/6/25    BATHING: You may shower or bathe.    DIET: You may resume your regular diet and regular medication regimen.    ACTIVITY: No heavy lifting or strenuous exercise until you are evaluated at your post-operative appointment. Otherwise, you may return to your usual level of physical activity.    FOLLOW-UP: Dr Bautista's office will call to schedule follow up    CALL YOUR UROLOGIST IF: You have any bleeding that does not stop, inability to void >8 hours, fever over 100.4 F, chills, persistent nausea/vomiting, changes in your incision concerning for infection, or if your pain is not controlled on your discharge pain medications.

## 2025-05-05 NOTE — PRE-OP CHECKLIST - ANTIBIOTIC
Quality 226: Preventive Care And Screening: Tobacco Use: Screening And Cessation Intervention: Patient screened for tobacco use and is an ex/non-smoker
Detail Level: Simple
Quality 47: Advance Care Plan: Advance Care Planning discussed and documented in the medical record; patient did not wish or was not able to name a surrogate decision maker or provide an advance care plan.
n/a

## 2025-05-05 NOTE — PRE-OP CHECKLIST - LAST DOSE WITHIN LAST 24HRS
Patient off bedrest restrictions at 2115. Patient ambulated in room, tolerated activity well, asymptomatic. Will continue to monitor.     Yes

## 2025-05-05 NOTE — PRE-ANESTHESIA EVALUATION ADULT - NSANTHPMHFT_GEN_ALL_CORE
Cardiac: Positive for HTN, HLD, CAD s/p DRAKE to RCA 8/13/2024, Positive for TAVR with valve in valve procedure. Denies MI/Angina/Heart Failure. <4 METS  Pulmonary: Positive for Asthma, COPD. Denies DEIDRA.  Renal: Positive for right sided nephrolithiasis. Denies kidney dysfunction  Hepatic: Denies liver dysfunction  Gastrointestinal: Positive for GERD. Denies IBD.  Endocrine: Positive for DM type II. Denies thyroid dysfunction.   Neurologic: Positive for vertigo  Psychiatric: Positive for panic attacks/anxiety.  Hematologic: Positive for anemia, aspirin and plavix use. Denies blood clotting disorder.    PSH: Aortic valve replacement with valve in valve procedure 4/2024. Laser lithotripsy of ureteral calculus. Uterine fibroid myomectomy. Small bowel resection. Carpal tunnel syndrome. PCI with DRAKE to RCA 8/2024.

## 2025-05-05 NOTE — PRE-ANESTHESIA EVALUATION ADULT - NSDENTALSD_ENT_ALL_CORE
Missing several rear molars, premolars, teeth. Poor dentition with several chipped teeth, ground/worn teeth, cavities./loose teeth/missing teeth Loose upper front teeth. Missing majority of teeth overall. Missing several rear molars, premolars, teeth. Poor dentition with several chipped teeth, ground/worn teeth, cavities./loose teeth/missing teeth

## 2025-05-05 NOTE — ASU DISCHARGE PLAN (ADULT/PEDIATRIC) - FINANCIAL ASSISTANCE
[facility] provides services at a reduced cost to those who are determined to be eligible through [facility]’s financial assistance program. Information regarding [facility]’s financial assistance program can be found by going to [email or web address] or by calling [phone].

## 2025-05-05 NOTE — PRE-ANESTHESIA EVALUATION ADULT - NSRADCARDRESULTSFT_GEN_ALL_CORE
All available imaging reviewed.    Transthoracic Echocardiogram 4/30/2025  "1. Left ventricular cavity is normal in size. Left ventricular systolic function is normal with an ejection fraction of 62 % by Dukes's method of disks.  2. Normal left ventricular diastolic function.  3. Normal right ventricular cavity size, with normal wall thickness, and borderline reduced right ventricular systolic function.  4. A bioprosthetic valve replacement valve replacement is present in the aortic position  The prosthetic valve is well seated. No intravalvular regurgitation.  5. The peak transaortic velocity is 1.99 m/s, peak transaortic gradient is 15.9 mmHg and mean transaortic gradient is 7.3 mmHg with an LVOT/aortic valve VTI ratio of 0.48. The aortic valve acceleration time is 74 msec. The effective orifice area is estimated at 1.02 cm²by the continuity equation.  6. Pericardial fat pad is seen anterior to the right ventricle cannot rule out small effusion.  7. Shadowing in subcostal view, extending from tricuspid annulus / pericardial fat pad.  8. Compared to the prior echo 9/13/24, there is no significant change."    Cardiac Catheterization 8/13/2024  "Conclusions: Successful PCI of the ostial RCA with a 3.5 x 23 Xience DRAKE during a valve -in-valve procedure to restore flow to the RCA via aChimney Stent technique. No complications.    Interventional Details: Ostial right coronary artery: This was a 70 % De Issac stenosis. The lesion length was 20 mm. This was an ACC/AHA Non-High/Non C lesion for intervention. The vessel is not calcified. This is not a bifurcation lesion. This is the culprit lesion. This lesion is not a chronic total occlusion. This lesion was not previously dilated. Guidewire crossing was successful. A successful Drug Eluting Stent was deployed. Following intervention there is a 0 % residual stenosis. There was JUAN JOSÉ Flow 3 before the procedure and JUAN JOSÉ Flow 3 following the procedure. No significant vessel dissection noted. There was no perforation at the intervention site. Following intervention there is an excellent angiographic appearance and no evidence of thrombus. Decreased flow due to valve-in-valve procedure requiring stent."

## 2025-05-07 PROBLEM — N20.0 CALCULUS OF KIDNEY: Chronic | Status: ACTIVE | Noted: 2025-05-02

## 2025-05-07 PROBLEM — Z87.898 PERSONAL HISTORY OF OTHER SPECIFIED CONDITIONS: Chronic | Status: ACTIVE | Noted: 2025-05-02

## 2025-05-07 PROBLEM — F41.9 ANXIETY DISORDER, UNSPECIFIED: Chronic | Status: ACTIVE | Noted: 2025-05-02

## 2025-05-07 PROBLEM — Z86.59 PERSONAL HISTORY OF OTHER MENTAL AND BEHAVIORAL DISORDERS: Chronic | Status: ACTIVE | Noted: 2025-05-02

## 2025-05-07 PROBLEM — Z87.09 PERSONAL HISTORY OF OTHER DISEASES OF THE RESPIRATORY SYSTEM: Chronic | Status: ACTIVE | Noted: 2025-05-02

## 2025-05-09 ENCOUNTER — APPOINTMENT (OUTPATIENT)
Dept: UROLOGY | Facility: CLINIC | Age: 65
End: 2025-05-09
Payer: MEDICARE

## 2025-05-09 VITALS
DIASTOLIC BLOOD PRESSURE: 82 MMHG | BODY MASS INDEX: 29.06 KG/M2 | WEIGHT: 164 LBS | SYSTOLIC BLOOD PRESSURE: 122 MMHG | HEART RATE: 86 BPM | HEIGHT: 63 IN | OXYGEN SATURATION: 99 % | TEMPERATURE: 98 F

## 2025-05-09 PROCEDURE — G2211 COMPLEX E/M VISIT ADD ON: CPT

## 2025-05-09 PROCEDURE — 99213 OFFICE O/P EST LOW 20 MIN: CPT

## 2025-06-13 ENCOUNTER — APPOINTMENT (OUTPATIENT)
Dept: UROLOGY | Facility: CLINIC | Age: 65
End: 2025-06-13

## 2025-06-19 NOTE — PROGRESS NOTE ADULT - PROBLEM SELECTOR PLAN 5
Appears to have simple subconjunctival hemorrhage but is a little atypical that has not cleared yet. I am going to have him lubricate the eye well a few weeks and recheck the spot once the blood is clearing to make sure there is no other ocular pathology in the same area we cannot see. 
discharged 12/29 on Colchicine 0.6 mg BID x 3 months  - d/c colchicine 0.6mg PO BID per cardiology due to risk of colchicine contributing to abdominal discomfort
SpO2 100% on RA, no new or change in cough or sputum production, no CARRILLO, no wheeze. Not on home O2.   - confirm home inhalers in AM
discharged 12/29 on Colchicine 0.6 mg BID x 3 months  - d/c colchicine 0.6mg PO BID per cardiology due to risk of colchicine contributing to abdominal discomfort
SpO2 100% on RA, no new or change in cough or sputum production, no CARRILLO, no wheeze. Not on home O2.   - confirm home inhalers in AM

## 2025-06-23 ENCOUNTER — APPOINTMENT (OUTPATIENT)
Dept: HEART AND VASCULAR | Facility: CLINIC | Age: 65
End: 2025-06-23

## 2025-07-02 ENCOUNTER — APPOINTMENT (OUTPATIENT)
Dept: HEART AND VASCULAR | Facility: CLINIC | Age: 65
End: 2025-07-02
Payer: MEDICARE

## 2025-07-02 VITALS
BODY MASS INDEX: 28.88 KG/M2 | HEIGHT: 63 IN | TEMPERATURE: 97.8 F | HEART RATE: 78 BPM | SYSTOLIC BLOOD PRESSURE: 118 MMHG | OXYGEN SATURATION: 99 % | WEIGHT: 163 LBS | DIASTOLIC BLOOD PRESSURE: 90 MMHG

## 2025-07-02 PROCEDURE — 99406 BEHAV CHNG SMOKING 3-10 MIN: CPT

## 2025-07-02 PROCEDURE — G2211 COMPLEX E/M VISIT ADD ON: CPT

## 2025-07-02 PROCEDURE — 99214 OFFICE O/P EST MOD 30 MIN: CPT | Mod: 25

## 2025-07-02 PROCEDURE — 93000 ELECTROCARDIOGRAM COMPLETE: CPT

## 2025-08-01 ENCOUNTER — APPOINTMENT (OUTPATIENT)
Dept: UROLOGY | Facility: CLINIC | Age: 65
End: 2025-08-01
Payer: MEDICARE

## 2025-08-01 DIAGNOSIS — N20.0 CALCULUS OF KIDNEY: ICD-10-CM

## 2025-08-01 DIAGNOSIS — R82.994 HYPERCALCIURIA: ICD-10-CM

## 2025-08-01 DIAGNOSIS — R82.991 HYPOCITRATURIA: ICD-10-CM

## 2025-08-01 PROCEDURE — 76775 US EXAM ABDO BACK WALL LIM: CPT

## 2025-08-01 PROCEDURE — G2211 COMPLEX E/M VISIT ADD ON: CPT

## 2025-08-01 PROCEDURE — 99214 OFFICE O/P EST MOD 30 MIN: CPT

## 2025-08-01 RX ORDER — POTASSIUM CITRATE 15 MEQ/1
15 MEQ TABLET, EXTENDED RELEASE ORAL
Qty: 60 | Refills: 11 | Status: ACTIVE | COMMUNITY
Start: 2025-08-01 | End: 1900-01-01

## (undated) DEVICE — INFLATOR ENCORE 26

## (undated) DEVICE — DVC TORQ PERIF 0.035

## (undated) DEVICE — PACK CATH CARDIAC KIT DSG SCSR FRCP

## (undated) DEVICE — DRAPE PROBE COVER 5" X 96"

## (undated) DEVICE — SYS DEL CONTROLLER ANGIOTOUCH

## (undated) DEVICE — ELCTR BOVIE PENCIL HANDPIECE ROCKER SWITCH 15FT

## (undated) DEVICE — DRSG QUICKCLOT HEMOSTATIC 4X4 FOIL

## (undated) DEVICE — TUBING RANGER FLUID IRRIGATION SET DISP

## (undated) DEVICE — WARMING BLANKET UPPER ADULT

## (undated) DEVICE — ULTRASOUND TRANSDUCER COVER

## (undated) DEVICE — VENODYNE/SCD SLEEVE CALF MEDIUM

## (undated) DEVICE — SOL IRR BAG NS 0.9% 3000ML

## (undated) DEVICE — NDL PERCU ECHOTIP 21G X 4CM

## (undated) DEVICE — MANIFOLD ANGIO AUTOMD TRNDCR

## (undated) DEVICE — STOPCOCK 3-WAY MED PRESSURE LEFT HANDED ROTATING MALE ADAPTER FEMALE FIT 400 PSI

## (undated) DEVICE — CLIPPER BLADE GENERAL USE

## (undated) DEVICE — CATH CARDIAC LT CUST 601201318

## (undated) DEVICE — SUT NUROLON 1 18" OS-8 (POP-OFF)

## (undated) DEVICE — BAND RADIAL COMPRESSION DEVICE REG 24CM

## (undated) DEVICE — DRAPE TOWEL BLUE 17" X 24"

## (undated) DEVICE — HEMOSTASIS VALVE PHD SM BORE W/ SPRING METAL INSERTION TOOL AND TORQUE DEVICE

## (undated) DEVICE — PRESSURE INFUSOR BAG 3000ML

## (undated) DEVICE — TUBING TRUWAVE PRESSURE MALE/FEMALE 72"

## (undated) DEVICE — DRSG CURITY GAUZE SPONGE 4 X 4" 12-PLY

## (undated) DEVICE — PACING CABLE (BROWN) A/V TEMP SCREW DOWN 12FT

## (undated) DEVICE — DRAPE C ARM 41X74"

## (undated) DEVICE — POSITIONER FOAM EGG CRATE ULNAR 2PCS (PINK)

## (undated) DEVICE — BLADE SCALPEL SAFETY #11 WITH PLASTIC GREEN HANDLE

## (undated) DEVICE — TUBING EXTENSION HI PRESSURE FLEX 48"

## (undated) DEVICE — HEMOSTAT KELLY CURVED DISP

## (undated) DEVICE — CATH CARDIAC RT CUSET 601201319

## (undated) DEVICE — SYR MED A2000 SYRINGE KIT ACIST REUS

## (undated) DEVICE — DRAPE SMALL W ADHESIVE APERTURE

## (undated) DEVICE — SYR LUER LOK 50CC

## (undated) DEVICE — PACK CYSTO

## (undated) DEVICE — BOSTON SCIENTIFIC UROLOK II SCOPE ADAPTOR

## (undated) DEVICE — GLV 7.5 PROTEXIS (WHITE)

## (undated) DEVICE — ELCTR ZOLL DEFIBRILLATOR PAD NO REPLACEMENT

## (undated) DEVICE — DRSG TEGADERM CHLORHEXIDINE 3.5 X 4.5"